# Patient Record
Sex: FEMALE | Race: WHITE | Employment: OTHER | URBAN - METROPOLITAN AREA
[De-identification: names, ages, dates, MRNs, and addresses within clinical notes are randomized per-mention and may not be internally consistent; named-entity substitution may affect disease eponyms.]

---

## 2018-02-16 ENCOUNTER — APPOINTMENT (OUTPATIENT)
Dept: GENERAL RADIOLOGY | Facility: HOSPITAL | Age: 78
End: 2018-02-16
Attending: EMERGENCY MEDICINE
Payer: MEDICARE

## 2018-02-16 ENCOUNTER — HOSPITAL ENCOUNTER (EMERGENCY)
Facility: HOSPITAL | Age: 78
Discharge: HOME | End: 2018-02-16
Attending: EMERGENCY MEDICINE
Payer: MEDICARE

## 2018-02-16 VITALS
DIASTOLIC BLOOD PRESSURE: 58 MMHG | SYSTOLIC BLOOD PRESSURE: 143 MMHG | HEIGHT: 58 IN | WEIGHT: 136 LBS | RESPIRATION RATE: 18 BRPM | HEART RATE: 100 BPM | OXYGEN SATURATION: 98 % | TEMPERATURE: 98 F | BODY MASS INDEX: 28.55 KG/M2

## 2018-02-16 DIAGNOSIS — S42.215A CLOSED NONDISPLACED FRACTURE OF SURGICAL NECK OF LEFT HUMERUS, UNSPECIFIED FRACTURE MORPHOLOGY, INITIAL ENCOUNTER: Primary | ICD-10-CM

## 2018-02-16 PROCEDURE — 99284 EMERGENCY DEPT VISIT MOD MDM: CPT

## 2018-02-16 PROCEDURE — 73030 X-RAY EXAM OF SHOULDER: CPT | Performed by: EMERGENCY MEDICINE

## 2018-02-16 RX ORDER — HYDROCODONE BITARTRATE AND ACETAMINOPHEN 5; 325 MG/1; MG/1
1 TABLET ORAL EVERY 6 HOURS PRN
Qty: 30 TABLET | Refills: 0 | Status: ON HOLD | OUTPATIENT
Start: 2018-02-16 | End: 2020-02-06

## 2018-02-16 RX ORDER — AMOXICILLIN 875 MG/1
875 TABLET, COATED ORAL 2 TIMES DAILY
Status: ON HOLD | COMMUNITY
End: 2020-01-31 | Stop reason: ALTCHOICE

## 2018-02-16 RX ORDER — HYDROCODONE BITARTRATE AND ACETAMINOPHEN 5; 325 MG/1; MG/1
1 TABLET ORAL ONCE
Status: COMPLETED | OUTPATIENT
Start: 2018-02-16 | End: 2018-02-16
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
## 2018-02-18 NOTE — ED PROVIDER NOTES
Patient Seen in: [facility] Emergency Department    History   Patient presents with:  Upper Extremity Injury (musculoskeletal)      HPI    Patient presents to the ED complaining of left shoulder pain.   The patient fell roughly 1 hour ago and injur Cardiovascular: Normal rate and intact distal pulses. Pulmonary/Chest: Effort normal. No stridor. No respiratory distress. Abdominal: Soft. She exhibits no distension. Musculoskeletal: She exhibits edema and tenderness. She exhibits no deformity. mildly laterally displaced, slightly impacted fracture through the surgical neck and slightly laterally displaced fracture fragments. 2. Primary osteoarthritis of the left acromioclavicular joint.          ED Medications Administered:   Medications   HYDRO as of 2/16/2018  7:25 PM    START taking these medications    HYDROcodone-acetaminophen 5-325 MG Oral Tab  Take 1 tablet by mouth every 6 (six) hours as needed for Pain., Print Script, Disp-30 tablet, R-0

## 2018-08-04 ENCOUNTER — APPOINTMENT (OUTPATIENT)
Dept: GENERAL RADIOLOGY | Facility: HOSPITAL | Age: 78
End: 2018-08-04
Payer: MEDICARE

## 2018-08-04 ENCOUNTER — HOSPITAL ENCOUNTER (EMERGENCY)
Facility: HOSPITAL | Age: 78
Discharge: HOME | End: 2018-08-04
Payer: MEDICARE

## 2018-08-04 VITALS
OXYGEN SATURATION: 96 % | BODY MASS INDEX: 28.34 KG/M2 | HEART RATE: 78 BPM | DIASTOLIC BLOOD PRESSURE: 70 MMHG | SYSTOLIC BLOOD PRESSURE: 134 MMHG | RESPIRATION RATE: 20 BRPM | HEIGHT: 58 IN | WEIGHT: 135 LBS | TEMPERATURE: 99 F

## 2018-08-04 DIAGNOSIS — T46.4X5A COUGH DUE TO ACE INHIBITOR: Primary | ICD-10-CM

## 2018-08-04 DIAGNOSIS — R05.8 COUGH DUE TO ACE INHIBITOR: Primary | ICD-10-CM

## 2018-08-04 PROCEDURE — 71046 X-RAY EXAM CHEST 2 VIEWS: CPT

## 2018-08-04 PROCEDURE — 99284 EMERGENCY DEPT VISIT MOD MDM: CPT

## 2018-08-04 PROCEDURE — 93005 ELECTROCARDIOGRAM TRACING: CPT

## 2018-08-04 PROCEDURE — 93010 ELECTROCARDIOGRAM REPORT: CPT | Performed by: INTERNAL MEDICINE

## 2018-08-04 NOTE — ED INITIAL ASSESSMENT (HPI)
Pt to ER with c/o non productive cough for about one week. Pt unsure of temp at home states she feels \"warm\". Pt c/o intermittent left chest aching with cough. Pt currently denies pain to left chest. +poor appetite. No respiratory distress noted.  97% on

## 2018-08-04 NOTE — ED PROVIDER NOTES
Patient Seen in: [facility] AND [facility] Emergency Department    History   Patient presents with:  Cough/URI    Stated Complaint: cough    HPI    Patient is an 57-year-old female who presents to the emergency department with a 3-4 week history of chronic dry c warm and dry. No rash noted. Nursing note and vitals reviewed. ED Course   Labs Reviewed - No data to display  EKG    Rate, intervals and axes as noted on EKG Report.   Rate: 81  Rhythm: Sinus Rhythm  Reading: Normal EKG with occasional PACs cont

## 2020-01-30 ENCOUNTER — HOSPITAL ENCOUNTER (INPATIENT)
Facility: HOSPITAL | Age: 80
LOS: 6 days | Discharge: NURSING, REHAB OR LONG-TERM CARE | DRG: 481 | End: 2020-02-06
Attending: EMERGENCY MEDICINE | Admitting: INTERNAL MEDICINE
Payer: MEDICARE

## 2020-01-30 ENCOUNTER — APPOINTMENT (OUTPATIENT)
Dept: GENERAL RADIOLOGY | Facility: HOSPITAL | Age: 80
DRG: 481 | End: 2020-01-30
Attending: EMERGENCY MEDICINE
Payer: MEDICARE

## 2020-01-30 DIAGNOSIS — S72.002A CLOSED FRACTURE OF LEFT HIP, INITIAL ENCOUNTER (HCC): Primary | ICD-10-CM

## 2020-01-30 PROBLEM — F32.9 MAJOR DEPRESSIVE DISORDER WITH SINGLE EPISODE: Status: ACTIVE | Noted: 2017-07-17

## 2020-01-30 LAB
ANION GAP SERPL CALC-SCNC: 9 MMOL/L (ref 0–18)
BASOPHILS # BLD AUTO: 0.03 X10(3) UL (ref 0–0.2)
BASOPHILS NFR BLD AUTO: 0.4 %
BUN BLD-MCNC: 74 MG/DL (ref 7–18)
BUN/CREAT SERPL: 26.3 (ref 10–20)
CALCIUM BLD-MCNC: 8.7 MG/DL (ref 8.5–10.1)
CHLORIDE SERPL-SCNC: 106 MMOL/L (ref 98–112)
CO2 SERPL-SCNC: 20 MMOL/L (ref 21–32)
CREAT BLD-MCNC: 2.81 MG/DL (ref 0.55–1.02)
DEPRECATED RDW RBC AUTO: 45.9 FL (ref 35.1–46.3)
EOSINOPHIL # BLD AUTO: 0.16 X10(3) UL (ref 0–0.7)
EOSINOPHIL NFR BLD AUTO: 2.3 %
ERYTHROCYTE [DISTWIDTH] IN BLOOD BY AUTOMATED COUNT: 14.4 % (ref 11–15)
GLUCOSE BLD-MCNC: 92 MG/DL (ref 70–99)
HCT VFR BLD AUTO: 25.3 % (ref 35–48)
HGB BLD-MCNC: 8.3 G/DL (ref 12–16)
IMM GRANULOCYTES # BLD AUTO: 0.01 X10(3) UL (ref 0–1)
IMM GRANULOCYTES NFR BLD: 0.1 %
LYMPHOCYTES # BLD AUTO: 1.66 X10(3) UL (ref 1–4)
LYMPHOCYTES NFR BLD AUTO: 23.5 %
MCH RBC QN AUTO: 28.8 PG (ref 26–34)
MCHC RBC AUTO-ENTMCNC: 32.8 G/DL (ref 31–37)
MCV RBC AUTO: 87.8 FL (ref 80–100)
MONOCYTES # BLD AUTO: 0.59 X10(3) UL (ref 0.1–1)
MONOCYTES NFR BLD AUTO: 8.4 %
NEUTROPHILS # BLD AUTO: 4.61 X10 (3) UL (ref 1.5–7.7)
NEUTROPHILS # BLD AUTO: 4.61 X10(3) UL (ref 1.5–7.7)
NEUTROPHILS NFR BLD AUTO: 65.3 %
OSMOLALITY SERPL CALC.SUM OF ELEC: 302 MOSM/KG (ref 275–295)
PLATELET # BLD AUTO: 169 10(3)UL (ref 150–450)
POTASSIUM SERPL-SCNC: 5 MMOL/L (ref 3.5–5.1)
RBC # BLD AUTO: 2.88 X10(6)UL (ref 3.8–5.3)
SODIUM SERPL-SCNC: 135 MMOL/L (ref 136–145)
WBC # BLD AUTO: 7.1 X10(3) UL (ref 4–11)

## 2020-01-30 PROCEDURE — 99285 EMERGENCY DEPT VISIT HI MDM: CPT

## 2020-01-30 PROCEDURE — 93010 ELECTROCARDIOGRAM REPORT: CPT | Performed by: EMERGENCY MEDICINE

## 2020-01-30 PROCEDURE — 80048 BASIC METABOLIC PNL TOTAL CA: CPT | Performed by: EMERGENCY MEDICINE

## 2020-01-30 PROCEDURE — 83036 HEMOGLOBIN GLYCOSYLATED A1C: CPT | Performed by: INTERNAL MEDICINE

## 2020-01-30 PROCEDURE — 93005 ELECTROCARDIOGRAM TRACING: CPT

## 2020-01-30 PROCEDURE — 85025 COMPLETE CBC W/AUTO DIFF WBC: CPT | Performed by: EMERGENCY MEDICINE

## 2020-01-30 PROCEDURE — 73502 X-RAY EXAM HIP UNI 2-3 VIEWS: CPT | Performed by: EMERGENCY MEDICINE

## 2020-01-30 PROCEDURE — 96374 THER/PROPH/DIAG INJ IV PUSH: CPT

## 2020-01-30 PROCEDURE — 87641 MR-STAPH DNA AMP PROBE: CPT | Performed by: EMERGENCY MEDICINE

## 2020-01-30 PROCEDURE — 73552 X-RAY EXAM OF FEMUR 2/>: CPT | Performed by: EMERGENCY MEDICINE

## 2020-01-30 PROCEDURE — 87640 STAPH A DNA AMP PROBE: CPT | Performed by: EMERGENCY MEDICINE

## 2020-01-30 PROCEDURE — 73560 X-RAY EXAM OF KNEE 1 OR 2: CPT | Performed by: EMERGENCY MEDICINE

## 2020-01-30 PROCEDURE — 96375 TX/PRO/DX INJ NEW DRUG ADDON: CPT

## 2020-01-30 RX ORDER — ONDANSETRON 2 MG/ML
4 INJECTION INTRAMUSCULAR; INTRAVENOUS ONCE
Status: COMPLETED | OUTPATIENT
Start: 2020-01-30 | End: 2020-01-30

## 2020-01-30 RX ORDER — CLOPIDOGREL BISULFATE 75 MG/1
75 TABLET ORAL DAILY
COMMUNITY

## 2020-01-30 RX ORDER — MORPHINE SULFATE 4 MG/ML
2 INJECTION, SOLUTION INTRAMUSCULAR; INTRAVENOUS ONCE
Status: COMPLETED | OUTPATIENT
Start: 2020-01-30 | End: 2020-01-30

## 2020-01-30 RX ORDER — MORPHINE SULFATE 4 MG/ML
4 INJECTION, SOLUTION INTRAMUSCULAR; INTRAVENOUS ONCE
Status: COMPLETED | OUTPATIENT
Start: 2020-01-30 | End: 2020-01-30

## 2020-01-30 RX ORDER — LOSARTAN POTASSIUM 25 MG/1
25 TABLET ORAL DAILY
Status: ON HOLD | COMMUNITY
End: 2020-02-06

## 2020-01-30 RX ORDER — ASPIRIN 81 MG/1
81 TABLET ORAL DAILY
Status: ON HOLD | COMMUNITY
End: 2020-02-06

## 2020-01-30 RX ORDER — SIMVASTATIN 20 MG
20 TABLET ORAL NIGHTLY
COMMUNITY

## 2020-01-30 RX ORDER — ESCITALOPRAM OXALATE 5 MG/1
5 TABLET ORAL DAILY
COMMUNITY

## 2020-01-30 RX ORDER — BUSPIRONE HYDROCHLORIDE 15 MG/1
15 TABLET ORAL 3 TIMES DAILY
COMMUNITY

## 2020-01-31 ENCOUNTER — ANESTHESIA (OUTPATIENT)
Dept: SURGERY | Facility: HOSPITAL | Age: 80
DRG: 481 | End: 2020-01-31
Payer: MEDICARE

## 2020-01-31 ENCOUNTER — APPOINTMENT (OUTPATIENT)
Dept: GENERAL RADIOLOGY | Facility: HOSPITAL | Age: 80
DRG: 481 | End: 2020-01-31
Attending: ORTHOPAEDIC SURGERY
Payer: MEDICARE

## 2020-01-31 ENCOUNTER — ANESTHESIA EVENT (OUTPATIENT)
Dept: SURGERY | Facility: HOSPITAL | Age: 80
DRG: 481 | End: 2020-01-31
Payer: MEDICARE

## 2020-01-31 PROBLEM — S72.002A CLOSED FRACTURE OF LEFT HIP, INITIAL ENCOUNTER (HCC): Status: ACTIVE | Noted: 2020-01-31

## 2020-01-31 LAB
ANION GAP SERPL CALC-SCNC: 5 MMOL/L (ref 0–18)
ANION GAP SERPL CALC-SCNC: 6 MMOL/L (ref 0–18)
ANION GAP SERPL CALC-SCNC: 7 MMOL/L (ref 0–18)
ANTIBODY SCREEN: NEGATIVE
BASOPHILS # BLD AUTO: 0.03 X10(3) UL (ref 0–0.2)
BASOPHILS NFR BLD AUTO: 0.6 %
BILIRUB UR QL: NEGATIVE
BUN BLD-MCNC: 71 MG/DL (ref 7–18)
BUN BLD-MCNC: 77 MG/DL (ref 7–18)
BUN BLD-MCNC: 79 MG/DL (ref 7–18)
BUN/CREAT SERPL: 23.2 (ref 10–20)
BUN/CREAT SERPL: 25.5 (ref 10–20)
BUN/CREAT SERPL: 27.4 (ref 10–20)
CALCIUM BLD-MCNC: 7.8 MG/DL (ref 8.5–10.1)
CALCIUM BLD-MCNC: 8.1 MG/DL (ref 8.5–10.1)
CALCIUM BLD-MCNC: 8.3 MG/DL (ref 8.5–10.1)
CHLORIDE SERPL-SCNC: 107 MMOL/L (ref 98–112)
CHLORIDE SERPL-SCNC: 109 MMOL/L (ref 98–112)
CHLORIDE SERPL-SCNC: 112 MMOL/L (ref 98–112)
CO2 SERPL-SCNC: 20 MMOL/L (ref 21–32)
CO2 SERPL-SCNC: 21 MMOL/L (ref 21–32)
CO2 SERPL-SCNC: 22 MMOL/L (ref 21–32)
COLOR UR: YELLOW
CREAT BLD-MCNC: 2.88 MG/DL (ref 0.55–1.02)
CREAT BLD-MCNC: 3.02 MG/DL (ref 0.55–1.02)
CREAT BLD-MCNC: 3.06 MG/DL (ref 0.55–1.02)
DEPRECATED RDW RBC AUTO: 46.6 FL (ref 35.1–46.3)
EOSINOPHIL # BLD AUTO: 0.15 X10(3) UL (ref 0–0.7)
EOSINOPHIL NFR BLD AUTO: 2.9 %
ERYTHROCYTE [DISTWIDTH] IN BLOOD BY AUTOMATED COUNT: 14.6 % (ref 11–15)
EST. AVERAGE GLUCOSE BLD GHB EST-MCNC: 137 MG/DL (ref 68–126)
GLUCOSE BLD-MCNC: 116 MG/DL (ref 70–99)
GLUCOSE BLD-MCNC: 125 MG/DL (ref 70–99)
GLUCOSE BLD-MCNC: 148 MG/DL (ref 70–99)
GLUCOSE BLDC GLUCOMTR-MCNC: 128 MG/DL (ref 70–99)
GLUCOSE BLDC GLUCOMTR-MCNC: 139 MG/DL (ref 70–99)
GLUCOSE BLDC GLUCOMTR-MCNC: 145 MG/DL (ref 70–99)
GLUCOSE BLDC GLUCOMTR-MCNC: 164 MG/DL (ref 70–99)
GLUCOSE BLDC GLUCOMTR-MCNC: 271 MG/DL (ref 70–99)
GLUCOSE BLDC GLUCOMTR-MCNC: 88 MG/DL (ref 70–99)
GLUCOSE UR-MCNC: NEGATIVE MG/DL
HAV IGM SER QL: 2.2 MG/DL (ref 1.6–2.6)
HBA1C MFR BLD HPLC: 6.4 % (ref ?–5.7)
HCT VFR BLD AUTO: 20.9 % (ref 35–48)
HGB BLD-MCNC: 6.9 G/DL (ref 12–16)
HGB BLD-MCNC: 6.9 G/DL (ref 12–16)
HGB BLD-MCNC: 8.5 G/DL (ref 12–16)
IMM GRANULOCYTES # BLD AUTO: 0.02 X10(3) UL (ref 0–1)
IMM GRANULOCYTES NFR BLD: 0.4 %
KETONES UR-MCNC: NEGATIVE MG/DL
LYMPHOCYTES # BLD AUTO: 1.69 X10(3) UL (ref 1–4)
LYMPHOCYTES NFR BLD AUTO: 32.8 %
MCH RBC QN AUTO: 29.2 PG (ref 26–34)
MCHC RBC AUTO-ENTMCNC: 33 G/DL (ref 31–37)
MCV RBC AUTO: 88.6 FL (ref 80–100)
MONOCYTES # BLD AUTO: 0.41 X10(3) UL (ref 0.1–1)
MONOCYTES NFR BLD AUTO: 7.9 %
MRSA NASAL: NEGATIVE
NEUTROPHILS # BLD AUTO: 2.86 X10 (3) UL (ref 1.5–7.7)
NEUTROPHILS # BLD AUTO: 2.86 X10(3) UL (ref 1.5–7.7)
NEUTROPHILS NFR BLD AUTO: 55.4 %
NITRITE UR QL STRIP.AUTO: NEGATIVE
OSMOLALITY SERPL CALC.SUM OF ELEC: 305 MOSM/KG (ref 275–295)
OSMOLALITY SERPL CALC.SUM OF ELEC: 306 MOSM/KG (ref 275–295)
OSMOLALITY SERPL CALC.SUM OF ELEC: 310 MOSM/KG (ref 275–295)
PH UR: 5 [PH] (ref 5–8)
PLATELET # BLD AUTO: 147 10(3)UL (ref 150–450)
POTASSIUM SERPL-SCNC: 4.8 MMOL/L (ref 3.5–5.1)
POTASSIUM SERPL-SCNC: 5.2 MMOL/L (ref 3.5–5.1)
POTASSIUM SERPL-SCNC: 5.2 MMOL/L (ref 3.5–5.1)
POTASSIUM SERPL-SCNC: 5.4 MMOL/L (ref 3.5–5.1)
PROT UR-MCNC: 100 MG/DL
PTH-INTACT SERPL-MCNC: 116.1 PG/ML (ref 18.5–88)
RBC # BLD AUTO: 2.36 X10(6)UL (ref 3.8–5.3)
RBC #/AREA URNS AUTO: 6 /HPF
RH BLOOD TYPE: POSITIVE
SODIUM SERPL-SCNC: 135 MMOL/L (ref 136–145)
SODIUM SERPL-SCNC: 137 MMOL/L (ref 136–145)
SODIUM SERPL-SCNC: 137 MMOL/L (ref 136–145)
SP GR UR STRIP: 1.01 (ref 1–1.03)
STAPH A BY PCR: NEGATIVE
UROBILINOGEN UR STRIP-ACNC: <2
WBC # BLD AUTO: 5.2 X10(3) UL (ref 4–11)
WBC #/AREA URNS AUTO: 142 /HPF

## 2020-01-31 PROCEDURE — 86920 COMPATIBILITY TEST SPIN: CPT

## 2020-01-31 PROCEDURE — 30233N1 TRANSFUSION OF NONAUTOLOGOUS RED BLOOD CELLS INTO PERIPHERAL VEIN, PERCUTANEOUS APPROACH: ICD-10-PCS | Performed by: INTERNAL MEDICINE

## 2020-01-31 PROCEDURE — 81001 URINALYSIS AUTO W/SCOPE: CPT | Performed by: EMERGENCY MEDICINE

## 2020-01-31 PROCEDURE — 85060 BLOOD SMEAR INTERPRETATION: CPT | Performed by: INTERNAL MEDICINE

## 2020-01-31 PROCEDURE — 73501 X-RAY EXAM HIP UNI 1 VIEW: CPT | Performed by: ORTHOPAEDIC SURGERY

## 2020-01-31 PROCEDURE — 0QS736Z REPOSITION LEFT UPPER FEMUR WITH INTRAMEDULLARY INTERNAL FIXATION DEVICE, PERCUTANEOUS APPROACH: ICD-10-PCS | Performed by: ORTHOPAEDIC SURGERY

## 2020-01-31 PROCEDURE — 80048 BASIC METABOLIC PNL TOTAL CA: CPT | Performed by: INTERNAL MEDICINE

## 2020-01-31 PROCEDURE — 82962 GLUCOSE BLOOD TEST: CPT

## 2020-01-31 PROCEDURE — 87086 URINE CULTURE/COLONY COUNT: CPT | Performed by: EMERGENCY MEDICINE

## 2020-01-31 PROCEDURE — 85018 HEMOGLOBIN: CPT | Performed by: INTERNAL MEDICINE

## 2020-01-31 PROCEDURE — 83970 ASSAY OF PARATHORMONE: CPT | Performed by: INTERNAL MEDICINE

## 2020-01-31 PROCEDURE — 86901 BLOOD TYPING SEROLOGIC RH(D): CPT | Performed by: INTERNAL MEDICINE

## 2020-01-31 PROCEDURE — 76000 FLUOROSCOPY <1 HR PHYS/QHP: CPT | Performed by: ORTHOPAEDIC SURGERY

## 2020-01-31 PROCEDURE — 85025 COMPLETE CBC W/AUTO DIFF WBC: CPT | Performed by: INTERNAL MEDICINE

## 2020-01-31 PROCEDURE — 87077 CULTURE AEROBIC IDENTIFY: CPT | Performed by: EMERGENCY MEDICINE

## 2020-01-31 PROCEDURE — 86900 BLOOD TYPING SEROLOGIC ABO: CPT | Performed by: INTERNAL MEDICINE

## 2020-01-31 PROCEDURE — 84132 ASSAY OF SERUM POTASSIUM: CPT | Performed by: INTERNAL MEDICINE

## 2020-01-31 PROCEDURE — 86850 RBC ANTIBODY SCREEN: CPT | Performed by: INTERNAL MEDICINE

## 2020-01-31 PROCEDURE — 82306 VITAMIN D 25 HYDROXY: CPT | Performed by: INTERNAL MEDICINE

## 2020-01-31 PROCEDURE — 83735 ASSAY OF MAGNESIUM: CPT | Performed by: INTERNAL MEDICINE

## 2020-01-31 PROCEDURE — 87186 SC STD MICRODIL/AGAR DIL: CPT | Performed by: EMERGENCY MEDICINE

## 2020-01-31 DEVICE — SCREW BN 5MM 4.3MM 36MM NLEX: Type: IMPLANTABLE DEVICE | Site: FEMUR | Status: FUNCTIONAL

## 2020-01-31 RX ORDER — SODIUM PHOSPHATE, DIBASIC AND SODIUM PHOSPHATE, MONOBASIC 7; 19 G/133ML; G/133ML
1 ENEMA RECTAL ONCE AS NEEDED
Status: DISCONTINUED | OUTPATIENT
Start: 2020-01-31 | End: 2020-02-06

## 2020-01-31 RX ORDER — NEOSTIGMINE METHYLSULFATE 0.5 MG/ML
INJECTION INTRAVENOUS AS NEEDED
Status: DISCONTINUED | OUTPATIENT
Start: 2020-01-31 | End: 2020-01-31 | Stop reason: SURG

## 2020-01-31 RX ORDER — ONDANSETRON 2 MG/ML
4 INJECTION INTRAMUSCULAR; INTRAVENOUS ONCE AS NEEDED
Status: DISCONTINUED | OUTPATIENT
Start: 2020-01-31 | End: 2020-01-31 | Stop reason: HOSPADM

## 2020-01-31 RX ORDER — SODIUM CHLORIDE 0.9 % (FLUSH) 0.9 %
3 SYRINGE (ML) INJECTION AS NEEDED
Status: DISCONTINUED | OUTPATIENT
Start: 2020-01-31 | End: 2020-02-06

## 2020-01-31 RX ORDER — HYDROCODONE BITARTRATE AND ACETAMINOPHEN 5; 325 MG/1; MG/1
2 TABLET ORAL AS NEEDED
Status: DISCONTINUED | OUTPATIENT
Start: 2020-01-31 | End: 2020-01-31 | Stop reason: HOSPADM

## 2020-01-31 RX ORDER — BISACODYL 10 MG
10 SUPPOSITORY, RECTAL RECTAL
Status: DISCONTINUED | OUTPATIENT
Start: 2020-01-31 | End: 2020-01-31 | Stop reason: ALTCHOICE

## 2020-01-31 RX ORDER — DEXAMETHASONE SODIUM PHOSPHATE 4 MG/ML
VIAL (ML) INJECTION AS NEEDED
Status: DISCONTINUED | OUTPATIENT
Start: 2020-01-31 | End: 2020-01-31

## 2020-01-31 RX ORDER — HYDROMORPHONE HYDROCHLORIDE 1 MG/ML
0.2 INJECTION, SOLUTION INTRAMUSCULAR; INTRAVENOUS; SUBCUTANEOUS EVERY 5 MIN PRN
Status: DISCONTINUED | OUTPATIENT
Start: 2020-01-31 | End: 2020-01-31 | Stop reason: HOSPADM

## 2020-01-31 RX ORDER — HYDROCODONE BITARTRATE AND ACETAMINOPHEN 5; 325 MG/1; MG/1
1 TABLET ORAL EVERY 4 HOURS PRN
Status: DISCONTINUED | OUTPATIENT
Start: 2020-01-31 | End: 2020-02-06

## 2020-01-31 RX ORDER — MORPHINE SULFATE 4 MG/ML
4 INJECTION, SOLUTION INTRAMUSCULAR; INTRAVENOUS EVERY 2 HOUR PRN
Status: DISCONTINUED | OUTPATIENT
Start: 2020-01-31 | End: 2020-02-01

## 2020-01-31 RX ORDER — MORPHINE SULFATE 2 MG/ML
1 INJECTION, SOLUTION INTRAMUSCULAR; INTRAVENOUS EVERY 2 HOUR PRN
Status: DISCONTINUED | OUTPATIENT
Start: 2020-01-31 | End: 2020-02-01

## 2020-01-31 RX ORDER — MORPHINE SULFATE 2 MG/ML
2 INJECTION, SOLUTION INTRAMUSCULAR; INTRAVENOUS EVERY 2 HOUR PRN
Status: DISCONTINUED | OUTPATIENT
Start: 2020-01-31 | End: 2020-02-01

## 2020-01-31 RX ORDER — ROCURONIUM BROMIDE 10 MG/ML
INJECTION, SOLUTION INTRAVENOUS AS NEEDED
Status: DISCONTINUED | OUTPATIENT
Start: 2020-01-31 | End: 2020-01-31 | Stop reason: SURG

## 2020-01-31 RX ORDER — ESCITALOPRAM OXALATE 10 MG/1
5 TABLET ORAL DAILY
Status: DISCONTINUED | OUTPATIENT
Start: 2020-01-31 | End: 2020-02-06

## 2020-01-31 RX ORDER — BISACODYL 10 MG
10 SUPPOSITORY, RECTAL RECTAL
Status: DISCONTINUED | OUTPATIENT
Start: 2020-01-31 | End: 2020-02-06

## 2020-01-31 RX ORDER — SODIUM CHLORIDE 9 MG/ML
INJECTION, SOLUTION INTRAVENOUS CONTINUOUS
Status: DISCONTINUED | OUTPATIENT
Start: 2020-01-31 | End: 2020-01-31

## 2020-01-31 RX ORDER — HYDROCODONE BITARTRATE AND ACETAMINOPHEN 5; 325 MG/1; MG/1
2 TABLET ORAL EVERY 4 HOURS PRN
Status: DISCONTINUED | OUTPATIENT
Start: 2020-01-31 | End: 2020-01-31

## 2020-01-31 RX ORDER — ONDANSETRON 2 MG/ML
4 INJECTION INTRAMUSCULAR; INTRAVENOUS EVERY 6 HOURS PRN
Status: DISCONTINUED | OUTPATIENT
Start: 2020-01-31 | End: 2020-02-06

## 2020-01-31 RX ORDER — DEXTROSE AND SODIUM CHLORIDE 5; .9 G/100ML; G/100ML
INJECTION, SOLUTION INTRAVENOUS CONTINUOUS
Status: DISCONTINUED | OUTPATIENT
Start: 2020-01-31 | End: 2020-02-01

## 2020-01-31 RX ORDER — DEXTROSE MONOHYDRATE 25 G/50ML
50 INJECTION, SOLUTION INTRAVENOUS AS NEEDED
Status: DISCONTINUED | OUTPATIENT
Start: 2020-01-31 | End: 2020-02-01

## 2020-01-31 RX ORDER — NALOXONE HYDROCHLORIDE 0.4 MG/ML
80 INJECTION, SOLUTION INTRAMUSCULAR; INTRAVENOUS; SUBCUTANEOUS AS NEEDED
Status: DISCONTINUED | OUTPATIENT
Start: 2020-01-31 | End: 2020-01-31 | Stop reason: HOSPADM

## 2020-01-31 RX ORDER — BUSPIRONE HYDROCHLORIDE 15 MG/1
15 TABLET ORAL 3 TIMES DAILY
Status: DISCONTINUED | OUTPATIENT
Start: 2020-01-31 | End: 2020-02-06

## 2020-01-31 RX ORDER — MORPHINE SULFATE 4 MG/ML
2 INJECTION, SOLUTION INTRAMUSCULAR; INTRAVENOUS EVERY 10 MIN PRN
Status: DISCONTINUED | OUTPATIENT
Start: 2020-01-31 | End: 2020-01-31 | Stop reason: HOSPADM

## 2020-01-31 RX ORDER — SODIUM CHLORIDE, SODIUM LACTATE, POTASSIUM CHLORIDE, CALCIUM CHLORIDE 600; 310; 30; 20 MG/100ML; MG/100ML; MG/100ML; MG/100ML
INJECTION, SOLUTION INTRAVENOUS CONTINUOUS
Status: DISCONTINUED | OUTPATIENT
Start: 2020-01-31 | End: 2020-01-31 | Stop reason: HOSPADM

## 2020-01-31 RX ORDER — POLYETHYLENE GLYCOL 3350 17 G/17G
17 POWDER, FOR SOLUTION ORAL DAILY PRN
Status: DISCONTINUED | OUTPATIENT
Start: 2020-01-31 | End: 2020-02-06

## 2020-01-31 RX ORDER — PROCHLORPERAZINE EDISYLATE 5 MG/ML
5 INJECTION INTRAMUSCULAR; INTRAVENOUS ONCE AS NEEDED
Status: DISCONTINUED | OUTPATIENT
Start: 2020-01-31 | End: 2020-01-31 | Stop reason: HOSPADM

## 2020-01-31 RX ORDER — HALOPERIDOL 5 MG/ML
0.25 INJECTION INTRAMUSCULAR ONCE AS NEEDED
Status: DISCONTINUED | OUTPATIENT
Start: 2020-01-31 | End: 2020-01-31 | Stop reason: HOSPADM

## 2020-01-31 RX ORDER — SODIUM CHLORIDE 9 MG/ML
INJECTION, SOLUTION INTRAVENOUS CONTINUOUS PRN
Status: DISCONTINUED | OUTPATIENT
Start: 2020-01-31 | End: 2020-01-31 | Stop reason: SURG

## 2020-01-31 RX ORDER — MORPHINE SULFATE 10 MG/ML
6 INJECTION, SOLUTION INTRAMUSCULAR; INTRAVENOUS EVERY 10 MIN PRN
Status: DISCONTINUED | OUTPATIENT
Start: 2020-01-31 | End: 2020-01-31 | Stop reason: HOSPADM

## 2020-01-31 RX ORDER — GLYCOPYRROLATE 0.2 MG/ML
INJECTION INTRAMUSCULAR; INTRAVENOUS AS NEEDED
Status: DISCONTINUED | OUTPATIENT
Start: 2020-01-31 | End: 2020-01-31 | Stop reason: SURG

## 2020-01-31 RX ORDER — SENNOSIDES 8.6 MG
17.2 TABLET ORAL NIGHTLY
Status: DISCONTINUED | OUTPATIENT
Start: 2020-01-31 | End: 2020-02-06

## 2020-01-31 RX ORDER — POLYETHYLENE GLYCOL 3350 17 G/17G
17 POWDER, FOR SOLUTION ORAL DAILY
Status: DISCONTINUED | OUTPATIENT
Start: 2020-01-31 | End: 2020-02-06

## 2020-01-31 RX ORDER — HYDROMORPHONE HYDROCHLORIDE 1 MG/ML
0.4 INJECTION, SOLUTION INTRAMUSCULAR; INTRAVENOUS; SUBCUTANEOUS EVERY 5 MIN PRN
Status: DISCONTINUED | OUTPATIENT
Start: 2020-01-31 | End: 2020-01-31 | Stop reason: HOSPADM

## 2020-01-31 RX ORDER — ACETAMINOPHEN 325 MG/1
650 TABLET ORAL EVERY 4 HOURS PRN
Status: DISCONTINUED | OUTPATIENT
Start: 2020-01-31 | End: 2020-02-06

## 2020-01-31 RX ORDER — ASPIRIN 81 MG/1
81 TABLET ORAL DAILY
Status: DISCONTINUED | OUTPATIENT
Start: 2020-01-31 | End: 2020-01-31

## 2020-01-31 RX ORDER — SODIUM CHLORIDE 9 MG/ML
INJECTION, SOLUTION INTRAVENOUS CONTINUOUS
Status: DISCONTINUED | OUTPATIENT
Start: 2020-01-31 | End: 2020-02-01

## 2020-01-31 RX ORDER — HYDROMORPHONE HYDROCHLORIDE 1 MG/ML
0.6 INJECTION, SOLUTION INTRAMUSCULAR; INTRAVENOUS; SUBCUTANEOUS EVERY 5 MIN PRN
Status: DISCONTINUED | OUTPATIENT
Start: 2020-01-31 | End: 2020-01-31 | Stop reason: HOSPADM

## 2020-01-31 RX ORDER — HYDROMORPHONE HYDROCHLORIDE 1 MG/ML
0.2 INJECTION, SOLUTION INTRAMUSCULAR; INTRAVENOUS; SUBCUTANEOUS EVERY 2 HOUR PRN
Status: DISCONTINUED | OUTPATIENT
Start: 2020-01-31 | End: 2020-02-06

## 2020-01-31 RX ORDER — DOCUSATE SODIUM 100 MG/1
100 CAPSULE, LIQUID FILLED ORAL 2 TIMES DAILY
Status: DISCONTINUED | OUTPATIENT
Start: 2020-01-31 | End: 2020-02-06

## 2020-01-31 RX ORDER — ONDANSETRON 2 MG/ML
INJECTION INTRAMUSCULAR; INTRAVENOUS AS NEEDED
Status: DISCONTINUED | OUTPATIENT
Start: 2020-01-31 | End: 2020-01-31 | Stop reason: SURG

## 2020-01-31 RX ORDER — HYDROCODONE BITARTRATE AND ACETAMINOPHEN 5; 325 MG/1; MG/1
1 TABLET ORAL EVERY 4 HOURS PRN
Status: DISCONTINUED | OUTPATIENT
Start: 2020-01-31 | End: 2020-01-31

## 2020-01-31 RX ORDER — SENNA AND DOCUSATE SODIUM 50; 8.6 MG/1; MG/1
2 TABLET, FILM COATED ORAL DAILY
Status: DISCONTINUED | OUTPATIENT
Start: 2020-01-31 | End: 2020-02-01

## 2020-01-31 RX ORDER — SODIUM CHLORIDE 9 MG/ML
INJECTION, SOLUTION INTRAVENOUS ONCE
Status: COMPLETED | OUTPATIENT
Start: 2020-01-31 | End: 2020-01-31

## 2020-01-31 RX ORDER — HYDROMORPHONE HYDROCHLORIDE 1 MG/ML
0.4 INJECTION, SOLUTION INTRAMUSCULAR; INTRAVENOUS; SUBCUTANEOUS EVERY 2 HOUR PRN
Status: DISCONTINUED | OUTPATIENT
Start: 2020-01-31 | End: 2020-02-06

## 2020-01-31 RX ORDER — HYDROCODONE BITARTRATE AND ACETAMINOPHEN 5; 325 MG/1; MG/1
2 TABLET ORAL EVERY 4 HOURS PRN
Status: DISCONTINUED | OUTPATIENT
Start: 2020-01-31 | End: 2020-02-06

## 2020-01-31 RX ORDER — PHENYLEPHRINE HCL 10 MG/ML
VIAL (ML) INJECTION AS NEEDED
Status: DISCONTINUED | OUTPATIENT
Start: 2020-01-31 | End: 2020-01-31 | Stop reason: SURG

## 2020-01-31 RX ORDER — CEFAZOLIN SODIUM/WATER 2 G/20 ML
2 SYRINGE (ML) INTRAVENOUS EVERY 8 HOURS
Status: COMPLETED | OUTPATIENT
Start: 2020-01-31 | End: 2020-02-01

## 2020-01-31 RX ORDER — ESCITALOPRAM OXALATE 10 MG/1
5 TABLET ORAL DAILY
Status: DISCONTINUED | OUTPATIENT
Start: 2020-01-31 | End: 2020-01-31

## 2020-01-31 RX ORDER — ACETAMINOPHEN 325 MG/1
650 TABLET ORAL EVERY 4 HOURS PRN
Status: DISCONTINUED | OUTPATIENT
Start: 2020-01-31 | End: 2020-01-31

## 2020-01-31 RX ORDER — HYDROMORPHONE HYDROCHLORIDE 1 MG/ML
0.8 INJECTION, SOLUTION INTRAMUSCULAR; INTRAVENOUS; SUBCUTANEOUS EVERY 2 HOUR PRN
Status: DISCONTINUED | OUTPATIENT
Start: 2020-01-31 | End: 2020-02-01

## 2020-01-31 RX ORDER — MORPHINE SULFATE 4 MG/ML
4 INJECTION, SOLUTION INTRAMUSCULAR; INTRAVENOUS EVERY 10 MIN PRN
Status: DISCONTINUED | OUTPATIENT
Start: 2020-01-31 | End: 2020-01-31 | Stop reason: HOSPADM

## 2020-01-31 RX ORDER — SODIUM CHLORIDE 0.9 % (FLUSH) 0.9 %
10 SYRINGE (ML) INJECTION AS NEEDED
Status: DISCONTINUED | OUTPATIENT
Start: 2020-01-31 | End: 2020-02-06

## 2020-01-31 RX ORDER — HYDROCODONE BITARTRATE AND ACETAMINOPHEN 5; 325 MG/1; MG/1
1 TABLET ORAL AS NEEDED
Status: DISCONTINUED | OUTPATIENT
Start: 2020-01-31 | End: 2020-01-31 | Stop reason: HOSPADM

## 2020-01-31 RX ORDER — ENOXAPARIN SODIUM 100 MG/ML
30 INJECTION SUBCUTANEOUS 2 TIMES DAILY
Status: DISCONTINUED | OUTPATIENT
Start: 2020-02-01 | End: 2020-01-31

## 2020-01-31 RX ADMIN — GLYCOPYRROLATE 0.2 MG: 0.2 INJECTION INTRAMUSCULAR; INTRAVENOUS at 15:56:00

## 2020-01-31 RX ADMIN — ROCURONIUM BROMIDE 20 MG: 10 INJECTION, SOLUTION INTRAVENOUS at 14:22:00

## 2020-01-31 RX ADMIN — SODIUM CHLORIDE: 9 INJECTION, SOLUTION INTRAVENOUS at 14:14:00

## 2020-01-31 RX ADMIN — SODIUM CHLORIDE: 9 INJECTION, SOLUTION INTRAVENOUS at 14:31:00

## 2020-01-31 RX ADMIN — ONDANSETRON 4 MG: 2 INJECTION INTRAMUSCULAR; INTRAVENOUS at 15:36:00

## 2020-01-31 RX ADMIN — PHENYLEPHRINE HCL 100 MCG: 10 MG/ML VIAL (ML) INJECTION at 15:13:00

## 2020-01-31 RX ADMIN — PHENYLEPHRINE HCL 100 MCG: 10 MG/ML VIAL (ML) INJECTION at 14:27:00

## 2020-01-31 RX ADMIN — PHENYLEPHRINE HCL 100 MCG: 10 MG/ML VIAL (ML) INJECTION at 15:33:00

## 2020-01-31 RX ADMIN — SODIUM CHLORIDE: 9 INJECTION, SOLUTION INTRAVENOUS at 15:38:00

## 2020-01-31 RX ADMIN — SODIUM CHLORIDE: 9 INJECTION, SOLUTION INTRAVENOUS at 16:12:00

## 2020-01-31 RX ADMIN — SODIUM CHLORIDE: 9 INJECTION, SOLUTION INTRAVENOUS at 15:41:00

## 2020-01-31 RX ADMIN — NEOSTIGMINE METHYLSULFATE 1 MG: 0.5 INJECTION INTRAVENOUS at 15:56:00

## 2020-01-31 RX ADMIN — PHENYLEPHRINE HCL 100 MCG: 10 MG/ML VIAL (ML) INJECTION at 14:53:00

## 2020-01-31 NOTE — ANESTHESIA PROCEDURE NOTES
Airway  Date/Time: 1/31/2020 2:24 PM  Urgency: Elective    Airway not difficult    General Information and Staff    Patient location during procedure: OR  Anesthesiologist: Kylah Dan MD  Resident/CRNA: Alcon Mcdermott CRNA  Performed: CRNA     In

## 2020-01-31 NOTE — ANESTHESIA POSTPROCEDURE EVALUATION
Patient:  Carol Benitez    Procedure Summary     Date:  01/31/20 Room / Location:  [facility] OR 09 / [facility] OR    Anesthesia Start:  0141 Anesthesia Stop:      Procedure:  FEMUR IM NAIL (Left Hip) Diagnosis:       Intertrochanteric fracture of left hip ([address]

## 2020-01-31 NOTE — BRIEF OP NOTE
Pre-Operative Diagnosis: Intertrochanteric fracture of left hip ([facility]) [S72.142A]     Post-Operative Diagnosis: Intertrochanteric fracture of left hip ([address] [X52.142A]      Procedure Performed:   Procedure(s):  LEFT HIP CLOSED REDUCTION, INTRAMEDULLARY NAIL

## 2020-01-31 NOTE — PLAN OF CARE
Patient received from ED with acute left hip fracture. Also noted with DEBO. Fluids initiated. Dilaudid administered PRN for pain. Denies nausea. NPO status maintained for possible surgery today. Vitals WNL.        Problem: Diabetes/Glucose Control  Goal: Gl assessment.  - Educate pt/family on patient safety including physical limitations  - Instruct pt to call for assistance with activity based on assessment  - Modify environment to reduce risk of injury  - Provide assistive devices as appropriate  - Consider

## 2020-01-31 NOTE — H&P
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, NHL, PVD who presented after Medical History:   Diagnosis Date   • Anxiety    • Essential hypertension    • Hyperlipidemia    • Type 1 diabetes mellitus (HCC)         PSH  Past Surgical History:   Procedure Laterality Date   • APPENDECTOMY     • TOTAL ABDOM HYSTERECTOMY          ALL: CBC/Chem    Recent Labs   Lab 01/30/20  2259 01/31/20  0512 01/31/20  0655   RBC 2.88* 2.36*  --    HGB 8.3* 6.9* 6.9*   HCT 25.3* 20.9*  --    MCV 87.8 88.6  --    MCH 28.8 29.2  --    MCHC 32.8 33.0  --    RDW 14.4 14.6  --    NEPRELIM 4.61 2.86  -- was submitted by the [facility] teleradiologist and there are no significant discrepancies.   Dictated by (CST): Jazz Calixto MD on 1/31/2020 at 6:10     Approved by (CST): Jazz Calixto MD on 1/31/2020 at 6:11

## 2020-01-31 NOTE — ED PROVIDER NOTES
Patient Seen in: [facility] Emergency Department    History   Patient presents with:  Fall      HPI    Patient presents to the ED after falling last night due to tripping over her dog. She states that she hit her left hip.   Denies hitting her hea distress. Abdominal: Soft. She exhibits no distension. Musculoskeletal:         General: Tenderness and deformity present. No edema. Comments: Severe tenderness to the left hip and shortening of the left leg with mild external rotation.   Neurovascul Available and Reviewed while in ED:     ED Medications Administered:   Medications   ondansetron HCl (ZOFRAN) injection 4 mg (4 mg Intravenous Given 1/30/20 2320)   morphINE sulfate (PF) 4 MG/ML injection 4 mg (4 mg Intravenous Given 1/30/20 2320)   giovaniI

## 2020-01-31 NOTE — CM/SW NOTE
JAYASHREE met with the pt. At bedside. The pt. Lives with her nephew and his wife in a 2 level home with a basement. There are 4 steps to enter and the pt. Resides on the main level. The pt.  Reports being independent prior to admission with adls and ambulation

## 2020-01-31 NOTE — ANESTHESIA PREPROCEDURE EVALUATION
Anesthesia PreOp Note    HPI:     Gabrielle Steward is a 78year old female who presents for preoperative consultation requested by: Elana Loving MD    Date of Surgery: 1/30/2020 - 1/31/2020    Procedure(s):   FEMUR IM NAIL  Indication: Intertrochanteric fr Taking  insulin glargine 100 UNIT/ML Subcutaneous Solution, Inject 5 Units into the skin nightly., Disp: , Rfl: , Taking  aspirin 81 MG Oral Tab EC, Take 81 mg by mouth daily. , Disp: , Rfl: , Taking  escitalopram 5 MG Oral Tab, Take 5 mg by mouth daily. , D packet 17 g, 17 g, Oral, Daily, Malon Area, DO  Senna-Docusate Sodium (SENOKOT S) 8.6-50 MG tab 2 tablet, 2 tablet, Oral, Daily, Guicho Perez,   bisacodyl (DULCOLAX) rectal suppository 10 mg, 10 mg, Rectal, Daily PRN, Malon Area, DO  dext status: Not on file      Intimate partner violence:        Fear of current or ex partner: Not on file        Emotionally abused: Not on file        Physically abused: Not on file        Forced sexual activity: Not on file    Other Topics      Concerns: Patient  Discussed plan with:  CRNA      I have informed Guru Alvarado and/or legal guardian or family member of the nature of the anesthetic plan, benefits, risks including possible dental damage if relevant, major complications, and any alternative forms

## 2020-01-31 NOTE — OPERATIVE REPORT
[facility]    PATIENT'S NAME: Keely Duffy   ATTENDING PHYSICIAN: Kassandra Guerra MD   OPERATING PHYSICIAN: Jaden Hu MD   PATIENT ACCOUNT#:   [id number]    LOCATION:  [facility] PACU 6 [facility] 10  MEDICAL RECORD #:   [id number]       DATE OF BI surgical prophylaxis. She was transferred to the fracture table. With closed reduction manipulation of her left hip, closed reduction of her left intertrochanteric hip fracture was performed.   The patient's left lower extremity was prepped and draped in her questions were answered. She was in agreement with the treatment plan.     Dictated By Jenny Riggs MD  d: 01/31/2020 15:54:56  t: 01/31/2020 16:55:33  [facility] [id number]  JEL/

## 2020-01-31 NOTE — CONSULTS
1/30/2020  Alva Rodriguez  91/1940  78year old   female    I was asked in consultation by Dr. Pat Rodriguez to see and evaluate Alva Rodriguez regarding her left hip pain.     HPI:   Patient presents with:  Fall      Date of injury/ onset of symptoms: 1/29/20 after Smoker      Smokeless tobacco: Never Used    Alcohol use: Not on file    Drug use: Not on file          REVIEW OF SYSTEMS:   A 12 point review of systems was performed as documented on the intake form and reviewed by me today with pertinent positives and n include bleeding, infection, neurovascular injury, failure of the procedure, stiffness, and potential need for additional surgery as well as anesthetic complications including death.   Risks of fracture care include malunion, nonunion, delayed union, and po

## 2020-01-31 NOTE — PAYOR COMM NOTE
--------------  ADMISSION REVIEW     [facility]  Subscriber #:  [id number]  Authorization Number: [id number]      Admit date: 1/31/20  Admit time: 0034       Admitting Physician: Marina Sorto DO  Attending Physician:  Laurie Henley MD  Primary other components within normal limits   URINALYSIS WITH CULTURE REFLEX     EKG SR: Normal intervals, frequent PACs, normal EKG     ED Medications Administered:   Medications   ondansetron HCl (ZOFRAN) injection 4 mg (4 mg Intravenous Given 1/30/20 2320) morphINE sulfate (PF) 4 MG/ML injection 2 mg     Date Action Dose Route User    1/30/2020 2357 Given 2 mg Intravenous Celso Olszewski, RN      ondansetron HCl [facility]) injection 4 mg     Date Action Dose Route User    1/30/2020 2320 Given 4 mg Intraveno

## 2020-01-31 NOTE — CONSULTS
[facility]    Report of Consultation    Jason Ferreira Patient Status:  Inpatient    1940 MRN [id number]   Location [facility] 4W/SW/SE Attending Aundrea Portillo MD   Hosp Day # 0 CONRADO Ann     Date of Admission:  2020 Oral, Q4H PRN    Or  HYDROcodone-acetaminophen (NORCO) 5-325 MG per tab 1 tablet, 1 tablet, Oral, Q4H PRN    Or  HYDROcodone-acetaminophen (NORCO) 5-325 MG per tab 2 tablet, 2 tablet, Oral, Q4H PRN  HYDROmorphone HCl (DILAUDID) 1 MG/ML injection 0.2 mg, 0. Oral, resp. rate 18, weight 136 lb 3.2 oz (61.8 kg), SpO2 96 %. General: No acute distress. Alert and oriented x 3. HEENT: Moist mucous membranes. EOM-I. PERRL  Neck: No JVD.   Respiratory: Clear to auscultation No wheezing Rhonchi or crackles  Cardiov Nathan Keller MD on 1/31/2020 at 6:10     Approved by (CST): Bell Antony MD on 1/31/2020 at 6:11               Impression:   Patient is a 78year old female with PMH of HTN, HL, DM2, PVD, NHL, CKD stage IV- V, previously needed HD in the past was last seen by ne

## 2020-02-01 LAB
ALBUMIN SERPL-MCNC: 2.4 G/DL (ref 3.4–5)
ANION GAP SERPL CALC-SCNC: 7 MMOL/L (ref 0–18)
ANION GAP SERPL CALC-SCNC: 7 MMOL/L (ref 0–18)
ANION GAP SERPL CALC-SCNC: 9 MMOL/L (ref 0–18)
BUN BLD-MCNC: 72 MG/DL (ref 7–18)
BUN BLD-MCNC: 72 MG/DL (ref 7–18)
BUN BLD-MCNC: 76 MG/DL (ref 7–18)
BUN/CREAT SERPL: 22.3 (ref 10–20)
BUN/CREAT SERPL: 23.4 (ref 10–20)
BUN/CREAT SERPL: 23.4 (ref 10–20)
CALCIUM BLD-MCNC: 7.7 MG/DL (ref 8.5–10.1)
CALCIUM BLD-MCNC: 7.7 MG/DL (ref 8.5–10.1)
CALCIUM BLD-MCNC: 8.1 MG/DL (ref 8.5–10.1)
CHLORIDE SERPL-SCNC: 107 MMOL/L (ref 98–112)
CHLORIDE SERPL-SCNC: 110 MMOL/L (ref 98–112)
CHLORIDE SERPL-SCNC: 110 MMOL/L (ref 98–112)
CO2 SERPL-SCNC: 18 MMOL/L (ref 21–32)
CREAT BLD-MCNC: 3.08 MG/DL (ref 0.55–1.02)
CREAT BLD-MCNC: 3.08 MG/DL (ref 0.55–1.02)
CREAT BLD-MCNC: 3.41 MG/DL (ref 0.55–1.02)
DEPRECATED HBV CORE AB SER IA-ACNC: 265.9 NG/ML (ref 18–340)
DEPRECATED RDW RBC AUTO: 46.9 FL (ref 35.1–46.3)
ERYTHROCYTE [DISTWIDTH] IN BLOOD BY AUTOMATED COUNT: 14.5 % (ref 11–15)
GLUCOSE BLD-MCNC: 137 MG/DL (ref 70–99)
GLUCOSE BLD-MCNC: 137 MG/DL (ref 70–99)
GLUCOSE BLD-MCNC: 223 MG/DL (ref 70–99)
GLUCOSE BLDC GLUCOMTR-MCNC: 197 MG/DL (ref 70–99)
GLUCOSE BLDC GLUCOMTR-MCNC: 270 MG/DL (ref 70–99)
GLUCOSE BLDC GLUCOMTR-MCNC: 277 MG/DL (ref 70–99)
GLUCOSE BLDC GLUCOMTR-MCNC: 357 MG/DL (ref 70–99)
HAV IGM SER QL: 2 MG/DL (ref 1.6–2.6)
HCT VFR BLD AUTO: 20.4 % (ref 35–48)
HCT VFR BLD AUTO: 24 % (ref 35–48)
HGB BLD-MCNC: 6.6 G/DL (ref 12–16)
HGB BLD-MCNC: 8.1 G/DL (ref 12–16)
IRON SATURATION: 9 % (ref 15–50)
IRON SERPL-MCNC: 21 UG/DL (ref 50–170)
MCH RBC QN AUTO: 28.9 PG (ref 26–34)
MCHC RBC AUTO-ENTMCNC: 32.4 G/DL (ref 31–37)
MCV RBC AUTO: 89.5 FL (ref 80–100)
OSMOLALITY SERPL CALC.SUM OF ELEC: 303 MOSM/KG (ref 275–295)
OSMOLALITY SERPL CALC.SUM OF ELEC: 303 MOSM/KG (ref 275–295)
OSMOLALITY SERPL CALC.SUM OF ELEC: 308 MOSM/KG (ref 275–295)
PHOSPHATE SERPL-MCNC: 4.5 MG/DL (ref 2.5–4.9)
PLATELET # BLD AUTO: 123 10(3)UL (ref 150–450)
POTASSIUM SERPL-SCNC: 5 MMOL/L (ref 3.5–5.1)
POTASSIUM SERPL-SCNC: 5.1 MMOL/L (ref 3.5–5.1)
POTASSIUM SERPL-SCNC: 5.1 MMOL/L (ref 3.5–5.1)
RBC # BLD AUTO: 2.28 X10(6)UL (ref 3.8–5.3)
SODIUM SERPL-SCNC: 134 MMOL/L (ref 136–145)
SODIUM SERPL-SCNC: 135 MMOL/L (ref 136–145)
SODIUM SERPL-SCNC: 135 MMOL/L (ref 136–145)
TOTAL IRON BINDING CAPACITY: 238 UG/DL (ref 240–450)
TRANSFERRIN SERPL-MCNC: 160 MG/DL (ref 200–360)
WBC # BLD AUTO: 5.1 X10(3) UL (ref 4–11)

## 2020-02-01 PROCEDURE — 82962 GLUCOSE BLOOD TEST: CPT

## 2020-02-01 PROCEDURE — 83735 ASSAY OF MAGNESIUM: CPT | Performed by: INTERNAL MEDICINE

## 2020-02-01 PROCEDURE — 97162 PT EVAL MOD COMPLEX 30 MIN: CPT

## 2020-02-01 PROCEDURE — 80069 RENAL FUNCTION PANEL: CPT | Performed by: INTERNAL MEDICINE

## 2020-02-01 PROCEDURE — 84466 ASSAY OF TRANSFERRIN: CPT | Performed by: INTERNAL MEDICINE

## 2020-02-01 PROCEDURE — 97166 OT EVAL MOD COMPLEX 45 MIN: CPT

## 2020-02-01 PROCEDURE — 97530 THERAPEUTIC ACTIVITIES: CPT

## 2020-02-01 PROCEDURE — 36430 TRANSFUSION BLD/BLD COMPNT: CPT

## 2020-02-01 PROCEDURE — 83540 ASSAY OF IRON: CPT | Performed by: INTERNAL MEDICINE

## 2020-02-01 PROCEDURE — 85018 HEMOGLOBIN: CPT | Performed by: HOSPITALIST

## 2020-02-01 PROCEDURE — 85014 HEMATOCRIT: CPT | Performed by: HOSPITALIST

## 2020-02-01 PROCEDURE — 85027 COMPLETE CBC AUTOMATED: CPT | Performed by: INTERNAL MEDICINE

## 2020-02-01 PROCEDURE — 82728 ASSAY OF FERRITIN: CPT | Performed by: INTERNAL MEDICINE

## 2020-02-01 RX ORDER — SODIUM BICARBONATE 650 MG/1
650 TABLET ORAL 3 TIMES DAILY
Status: DISCONTINUED | OUTPATIENT
Start: 2020-02-01 | End: 2020-02-01

## 2020-02-01 RX ORDER — DEXTROSE MONOHYDRATE 25 G/50ML
50 INJECTION, SOLUTION INTRAVENOUS AS NEEDED
Status: DISCONTINUED | OUTPATIENT
Start: 2020-02-01 | End: 2020-02-06

## 2020-02-01 RX ORDER — SODIUM BICARBONATE 650 MG/1
1300 TABLET ORAL 3 TIMES DAILY
Status: DISCONTINUED | OUTPATIENT
Start: 2020-02-01 | End: 2020-02-06

## 2020-02-01 RX ORDER — SODIUM CHLORIDE 450 MG/100ML
INJECTION, SOLUTION INTRAVENOUS CONTINUOUS
Status: DISCONTINUED | OUTPATIENT
Start: 2020-02-01 | End: 2020-02-02

## 2020-02-01 NOTE — PROGRESS NOTES
[facility]    Nephrology Progress Note    Champ Goldberg Attending:  Mauricio Roberts MD       SUBJECTIVE:   Champ Goldberg is feeling well this afternoon. Denies dyspnea. Denies lower extremity edema. Trying to increase oral intake.      PHYSICAL E (H) 02/01/2020    CREATSERUM 3.08 (H) 02/01/2020    CREATSERUM 3.08 (H) 02/01/2020    ANIONGAP 7 02/01/2020    ANIONGAP 7 02/01/2020    GFRNAA 14 (L) 02/01/2020    GFRNAA 14 (L) 02/01/2020    GFRAA 16 (L) 02/01/2020    GFRAA 16 (L) 02/01/2020    CA 7.7 (L) PRN  dextrose 50 % injection 50 mL, 50 mL, Intravenous, PRN  Glucose-Vitamin C (DEX-4) chewable tab 4 tablet, 4 tablet, Oral, Q15 Min PRN  glucose (DEX4) oral liquid 15 g, 15 g, Oral, Q15 Min PRN  HYDROmorphone HCl (DILAUDID) 1 MG/ML injection 0.2 mg, 0.2 fracture    #.  Chronic anemia of CKD    RECOMMENDATIONS:   - No indication for dialysis presently  - Decreasing IVF rate to 50 ml/hr to avoid hypervolemia  - Encourage PO intake  - Renal diet  - Checking iron stores to ensure replete prior to EPO  - Starti

## 2020-02-01 NOTE — PROGRESS NOTES
[facility] Hospitalist Progress Note     Reason for Admission: mechanical fall, left hip fracture  PCP: Omari Simms     Assessment/Plan:     Principal Problem:    Closed fracture of left hip, initial encounter [facility])  Active Problems:    Closed fracture of left hours) at 2/1/2020 1222  Last data filed at 2/1/2020 0600  Gross per 24 hour   Intake 2462.08 ml   Output 750 ml   Net 1712.08 ml       Last 3 Weights  01/31/20 0534 : 136 lb 3.2 oz (61.8 kg)  08/04/18 1219 : 135 lb (61.2 kg)  02/16/18 1818 : 136 lb (61.7 Xr Knee (1 Or 2 Views), Left (cpt=73560)    Result Date: 1/31/2020  CONCLUSION:   Mild tricompartmental osteoarthritis without acute fracture or dislocation.   Preliminary report was submitted by the [facility] teleradiologist and there are no significant d PEG 3350, magnesium hydroxide, bisacodyl, Fleet Enema, acetaminophen **OR** HYDROcodone-acetaminophen **OR** HYDROcodone-acetaminophen, morphINE sulfate **OR** morphINE sulfate **OR** morphINE sulfate, ondansetron HCl

## 2020-02-01 NOTE — PHYSICAL THERAPY NOTE
PHYSICAL THERAPY EVALUATION - INPATIENT     Room Number: 415/415-A  Evaluation Date: 2/1/2020  Type of Evaluation: Initial   Physician Order: PT Eval and Treat    Presenting Problem: left hip IM nailing  Reason for Therapy: Mobility Dysfunction and Discha fracture of left hip [facility])      Past Medical History  Past Medical History:   Diagnosis Date   • Anxiety    • Essential hypertension    • Hyperlipidemia    • Type 1 diabetes mellitus ([facility] [address]        Past Surgical History  Past Surgical History:   Procedure La bedside commode, etc.): A Lot   -   Moving from lying on back to sitting on the side of the bed?: A Lot   How much help from another person does the patient currently need. ..   -   Moving to and from a bed to a chair (including a wheelchair)?: A Lot   -

## 2020-02-01 NOTE — PLAN OF CARE
Pt is POD #0-1. Vital signs within normal limits. PRN Norco/Tylenol provided for pain. Alert and oriented x4. CMS intact. Tele #65 in place, NSR/ST. On room air. Tolerating carb controlled diet. Rao in place. Dressing clean, dry, and intact.  SCDs and charity management  - Manage/alleviate anxiety  - Utilize distraction and/or relaxation techniques  - Monitor for opioid side effects  - Notify MD/LIP if interventions unsuccessful or patient reports new pain  - Anticipate increased pain with activity and pre-medi

## 2020-02-01 NOTE — PROGRESS NOTES
ARRIVED 4 WEST POST OP. DENTURES UPPER AND LOWER RINSED AND PLACED  IN MOUTH, HEARING AID PLACED IN RIGHT EAR, GLASSES AND JEWELRY 2 RINGS PUT ON BY PATIENT.  DIET ADVANCED TO [address] AND INSTRUCTED TO ORDER DINNER.     BELONGINGS IN CLOSET FROM PREVIOUS

## 2020-02-01 NOTE — PLAN OF CARE
Patient received 1 unit PRBC this AM. Labs following. Received OK to ambulate patient, PT/OT on consult. Patient able to  afternoon with RN/PCT. Worked with PT/OT later in day, ambulated small steps to chair.  2 assist with walker (May need luca w Term Goal  Description  Patient's Short Term Goal: \"I hope to be able to stand/walk\"    Interventions:   - PT/OT added on consult today  - WBAT  - [address] for PRN pain control  - See additional Care Plan goals for specific interventions  Outcome: Progressi

## 2020-02-01 NOTE — OCCUPATIONAL THERAPY NOTE
OCCUPATIONAL THERAPY EVALUATION - INPATIENT      Room Number: 415/415-A  Evaluation Date: 2/1/2020  Type of Evaluation: Initial  Presenting Problem: s/p L closed reduction with intramedullary nail fixation    Physician Order: IP Consult to Occupational The this level of impairment may benefit from LISE.     DISCHARGE RECOMMENDATIONS  OT Discharge Recommendations: Sub-acute rehabilitation  OT Device Recommendations: None(patient owns all recommended equipment; many require hip kit)    PLAN  OT Treatment Plan: B Tolerated    PAIN ASSESSMENT  Ratin  Location: L hip  Management Techniques: Activity promotion; Body mechanics;Repositioning    COGNITION  Overall Cognitive Status:  WFL - within functional limits    RANGE OF MOTION   Upper extremity ROM is within func with Min A  Comment:     Patient will complete toilet transfer with Min A  Comment:     Patient will complete self care task at sink level with Min A   Comment:         Goals  on:2/15/2020  Frequency: 5x/week    Ailin Jose F, OTR/L  [place] [facility]

## 2020-02-02 LAB
ANION GAP SERPL CALC-SCNC: 6 MMOL/L (ref 0–18)
BLOOD TYPE BARCODE: 6200
BUN BLD-MCNC: 81 MG/DL (ref 7–18)
BUN/CREAT SERPL: 25.5 (ref 10–20)
CALCIUM BLD-MCNC: 7.7 MG/DL (ref 8.5–10.1)
CHLORIDE SERPL-SCNC: 108 MMOL/L (ref 98–112)
CO2 SERPL-SCNC: 20 MMOL/L (ref 21–32)
CREAT BLD-MCNC: 3.18 MG/DL (ref 0.55–1.02)
DEPRECATED RDW RBC AUTO: 48 FL (ref 35.1–46.3)
ERYTHROCYTE [DISTWIDTH] IN BLOOD BY AUTOMATED COUNT: 14.5 % (ref 11–15)
GLUCOSE BLD-MCNC: 80 MG/DL (ref 70–99)
GLUCOSE BLDC GLUCOMTR-MCNC: 111 MG/DL (ref 70–99)
GLUCOSE BLDC GLUCOMTR-MCNC: 158 MG/DL (ref 70–99)
GLUCOSE BLDC GLUCOMTR-MCNC: 257 MG/DL (ref 70–99)
GLUCOSE BLDC GLUCOMTR-MCNC: 328 MG/DL (ref 70–99)
HCT VFR BLD AUTO: 23.7 % (ref 35–48)
HGB BLD-MCNC: 7.9 G/DL (ref 12–16)
MCH RBC QN AUTO: 30.3 PG (ref 26–34)
MCHC RBC AUTO-ENTMCNC: 33.3 G/DL (ref 31–37)
MCV RBC AUTO: 90.8 FL (ref 80–100)
OSMOLALITY SERPL CALC.SUM OF ELEC: 301 MOSM/KG (ref 275–295)
PLATELET # BLD AUTO: 123 10(3)UL (ref 150–450)
POTASSIUM SERPL-SCNC: 5.1 MMOL/L (ref 3.5–5.1)
RBC # BLD AUTO: 2.61 X10(6)UL (ref 3.8–5.3)
SODIUM SERPL-SCNC: 134 MMOL/L (ref 136–145)
WBC # BLD AUTO: 5.5 X10(3) UL (ref 4–11)

## 2020-02-02 PROCEDURE — 97116 GAIT TRAINING THERAPY: CPT

## 2020-02-02 PROCEDURE — 97530 THERAPEUTIC ACTIVITIES: CPT

## 2020-02-02 PROCEDURE — 80048 BASIC METABOLIC PNL TOTAL CA: CPT | Performed by: ORTHOPAEDIC SURGERY

## 2020-02-02 PROCEDURE — 51702 INSERT TEMP BLADDER CATH: CPT

## 2020-02-02 PROCEDURE — 85027 COMPLETE CBC AUTOMATED: CPT | Performed by: INTERNAL MEDICINE

## 2020-02-02 PROCEDURE — 82962 GLUCOSE BLOOD TEST: CPT

## 2020-02-02 NOTE — PLAN OF CARE
POD 2. Progressing well. Tolerating PO pain medication of [place] 5. Tele #65 in place. C/o nausea this shift and heartburn, treated with Zofran with relief. Accucheck ACHS with Humolog and Levemir CANDACE Rao in place. I/O charted.  DVT prophylaxis of Eliquis additional Care Plan goals for specific interventions  Outcome: Progressing  Goal: Patient/Family Short Term Goal  Description  Patient's Short Term Goal: free of infections    Interventions:   - ID consult, abx therapy continued  - See additional Care Tamera Problem: GASTROINTESTINAL - ADULT  Goal: Minimal or absence of nausea and vomiting  Description  INTERVENTIONS:  - Maintain adequate hydration with IV or PO as ordered and tolerated  - Nasogastric tube to low intermittent suction as ordered  - Evaluate e mobilization  - Obtain PT/OT consults as needed  - Advance activity as appropriate  - Communicate ordered activity level and limitations with patient/family  Outcome: Progressing  Goal: Maintain proper alignment of affected body part  Description  INTERVEN

## 2020-02-02 NOTE — PROGRESS NOTES
[facility]    Josselyn Ramirez Patient Status:  Inpatient    1940 MRN [id number]   Location [facility] 4W/SW/SE Attending Tammie Marin MD   [facility] PCP Michelle Moreno Gigi is a 78year old female patient.     Rafa also sore; heel protectors in room but not on pt    Pt got another unit of blood yesterday). Objective:  General Appearance:  Comfortable.     Vital signs: (most recent): Blood pressure 142/53, pulse 80, temperature 98.2 °F (36.8 °C), temperature so

## 2020-02-02 NOTE — PROGRESS NOTES
[facility]    Nephrology Progress Note    Jose Hall Attending:  Marti Gardner MD       SUBJECTIVE:   Jose Hall is feeling well this afternoon. Denies leg swelling or SOB.     PHYSICAL EXAM:     Vital Signs: /44 (BP Location: Right GFRAA 15 (L) 02/02/2020    CA 7.7 (L) 02/02/2020    OSMOCALC 301 (H) 02/02/2020    ALB 2.4 (L) 02/01/2020     (L) 02/02/2020    K 5.1 02/02/2020     02/02/2020    CO2 20.0 (L) 02/02/2020     Lab Results   Component Value Date    WBC 5.5 02/0 1-11 Units, Subcutaneous, TID CC and HS  Normal Saline Flush 0.9 % injection 3 mL, 3 mL, Intravenous, PRN  HYDROmorphone HCl (DILAUDID) 1 MG/ML injection 0.2 mg, 0.2 mg, Intravenous, Q2H PRN    Or  HYDROmorphone HCl (DILAUDID) 1 MG/ML injection 0.4 mg, 0.4 days course of IV iron. - Avoid nephrotoxins such as NSAIDs, IV contrast, fleet enema. - Renally dose all medications  - Renal diet.      Fabiano Juarez MD   Nephrology  [address]   [phone]

## 2020-02-02 NOTE — PHYSICAL THERAPY NOTE
PHYSICAL THERAPY TREATMENT NOTE - INPATIENT     Room Number: 068/800-H       Presenting Problem: left hip IM nailing    Problem List  Principal Problem:    Closed fracture of left hip, initial encounter [facility])  Active Problems:    Closed fracture of left hi -           Static Standing: Poor  Dynamic Standing: Poor    ACTIVITY TOLERANCE                         O2 WALK                  AM-PAC '6-Clicks' INPATIENT SHORT FORM - BASIC MOBILITY  How much difficulty does the patient currently have. ..  -   Turning ov stairs/one curb w/ assistive device and supervision   Goal #4   Current Status NT   Goal #5 Patient to demonstrate independence with home activity/exercise instructions provided to patient in preparation for discharge.    Goal #5   Current Status ongoing

## 2020-02-02 NOTE — PLAN OF CARE
Patient progressed with ambulation with PT and staff today. Up with 2 assist with walker and gait belt. Rao catheter removed in afternoon, multiple attempts to void, unsuccessful, MD notified of bladder scan. [address] for PRN pain control.  Diabetic accuchec Goal: \"I hope I can get some rest tonight\"    Interventions:   - Reposition for comfort  - NORCO for PRN pain control  - Verbalize pain needs to staff  - See additional Care Plan goals for specific interventions  Outcome: Progressing     Problem: PAIN - vomiting  Description  INTERVENTIONS:  - Maintain adequate hydration with IV or PO as ordered and tolerated  - Nasogastric tube to low intermittent suction as ordered  - Evaluate effectiveness of ordered antiemetic medications  - Provide nonpharmacologic c Communicate ordered activity level and limitations with patient/family  Outcome: Progressing  Goal: Maintain proper alignment of affected body part  Description  INTERVENTIONS:  - Support and protect limb and body alignment per provider's orders  - Instruc

## 2020-02-02 NOTE — PROGRESS NOTES
[facility] Hospitalist Progress Note     Reason for Admission: mechanical fall, left hip fracture  PCP: Rebecca Quintanilla     Assessment/Plan:     Principal Problem:    Closed fracture of left hip, initial encounter [facility])  Active Problems:    Closed fracture of left 3559 ml   Output 1450 ml   Net 2109 ml       Last 3 Weights  02/02/20 0528 : 145 lb (65.8 kg)  02/01/20 1912 : 139 lb 12.8 oz (63.4 kg)  01/31/20 0534 : 136 lb 3.2 oz (61.8 kg)  08/04/18 1219 : 135 lb (61.2 kg)  02/16/18 1818 : 136 lb (61.7 kg)      Exam (1 Or 2 Views), Left (cpt=73560)    Result Date: 1/31/2020  CONCLUSION:   Mild tricompartmental osteoarthritis without acute fracture or dislocation.   Preliminary report was submitted by the [facility] teleradiologist and there are no significant discrepancies Normal Saline Flush, HYDROmorphone HCl **OR** HYDROmorphone HCl **OR** [DISCONTINUED] HYDROmorphone HCl, Normal Saline Flush, PEG 3350, magnesium hydroxide, bisacodyl, Fleet Enema, acetaminophen **OR** HYDROcodone-acetaminophen **OR** HYDROcodone-acetamino

## 2020-02-03 ENCOUNTER — APPOINTMENT (OUTPATIENT)
Dept: ULTRASOUND IMAGING | Facility: HOSPITAL | Age: 80
DRG: 481 | End: 2020-02-03
Attending: INTERNAL MEDICINE
Payer: MEDICARE

## 2020-02-03 LAB
25(OH)D3 SERPL-MCNC: 25.7 NG/ML (ref 30–100)
ANION GAP SERPL CALC-SCNC: 8 MMOL/L (ref 0–18)
BUN BLD-MCNC: 71 MG/DL (ref 7–18)
BUN/CREAT SERPL: 22.8 (ref 10–20)
CALCIUM BLD-MCNC: 8.4 MG/DL (ref 8.5–10.1)
CHLORIDE SERPL-SCNC: 108 MMOL/L (ref 98–112)
CO2 SERPL-SCNC: 20 MMOL/L (ref 21–32)
CREAT BLD-MCNC: 3.12 MG/DL (ref 0.55–1.02)
DEPRECATED RDW RBC AUTO: 48.2 FL (ref 35.1–46.3)
ERYTHROCYTE [DISTWIDTH] IN BLOOD BY AUTOMATED COUNT: 14.4 % (ref 11–15)
GLUCOSE BLD-MCNC: 161 MG/DL (ref 70–99)
GLUCOSE BLDC GLUCOMTR-MCNC: 143 MG/DL (ref 70–99)
GLUCOSE BLDC GLUCOMTR-MCNC: 145 MG/DL (ref 70–99)
GLUCOSE BLDC GLUCOMTR-MCNC: 159 MG/DL (ref 70–99)
GLUCOSE BLDC GLUCOMTR-MCNC: 162 MG/DL (ref 70–99)
HCT VFR BLD AUTO: 24 % (ref 35–48)
HGB BLD-MCNC: 7.9 G/DL (ref 12–16)
MCH RBC QN AUTO: 29.9 PG (ref 26–34)
MCHC RBC AUTO-ENTMCNC: 32.9 G/DL (ref 31–37)
MCV RBC AUTO: 90.9 FL (ref 80–100)
OSMOLALITY SERPL CALC.SUM OF ELEC: 306 MOSM/KG (ref 275–295)
PLATELET # BLD AUTO: 151 10(3)UL (ref 150–450)
POTASSIUM SERPL-SCNC: 4.8 MMOL/L (ref 3.5–5.1)
RBC # BLD AUTO: 2.64 X10(6)UL (ref 3.8–5.3)
SODIUM SERPL-SCNC: 136 MMOL/L (ref 136–145)
WBC # BLD AUTO: 4.8 X10(3) UL (ref 4–11)

## 2020-02-03 PROCEDURE — 80048 BASIC METABOLIC PNL TOTAL CA: CPT | Performed by: ORTHOPAEDIC SURGERY

## 2020-02-03 PROCEDURE — 76770 US EXAM ABDO BACK WALL COMP: CPT | Performed by: INTERNAL MEDICINE

## 2020-02-03 PROCEDURE — 97110 THERAPEUTIC EXERCISES: CPT

## 2020-02-03 PROCEDURE — 85027 COMPLETE CBC AUTOMATED: CPT | Performed by: INTERNAL MEDICINE

## 2020-02-03 PROCEDURE — 97116 GAIT TRAINING THERAPY: CPT

## 2020-02-03 PROCEDURE — 82962 GLUCOSE BLOOD TEST: CPT

## 2020-02-03 PROCEDURE — 97530 THERAPEUTIC ACTIVITIES: CPT

## 2020-02-03 NOTE — CM/SW NOTE
08: 33AM SW received message from [employer] who states pt's family has chosen [employer] [facility] [employer]. SW sent referral through [employer]. Per chart review, a DON screen has been requested.  Pt would need an insurance authorizat

## 2020-02-03 NOTE — PHYSICAL THERAPY NOTE
PHYSICAL THERAPY TREATMENT NOTE - INPATIENT     Room Number: 301/827-C       Presenting Problem: left hip IM nailing    Problem List  Principal Problem:    Closed fracture of left hip, initial encounter [facility])  Active Problems:    Closed fracture of left hi bedside commode, etc.): A Lot   -   Moving from lying on back to sitting on the side of the bed?: A Lot   How much help from another person does the patient currently need. ..   -   Moving to and from a bed to a chair (including a wheelchair)?: A Lot   -

## 2020-02-03 NOTE — PROGRESS NOTES
[facility]    Nephrology Progress Note      SUBJECTIVE:     Resting comfortably in recliner   Rao had to be replaced due to urinary retention      PHYSICAL EXAM:     Vital Signs: /62 (BP Location: Right arm)   Pulse 91   Temp 99.7 ° Lab Results   Component Value Date    COLORUR Yellow 01/31/2020    CLARITY Hazy (A) 01/31/2020    SPECGRAVITY 1.012 01/31/2020    GLUUR Negative 01/31/2020    BILUR Negative 01/31/2020    KETUR Negative 01/31/2020    BLOODURINE Small (A) 01/31/2020 Daily PRN  Fleet Enema (FLEET) 7-19 GM/118ML enema 133 mL, 1 enema, Rectal, Once PRN  acetaminophen (TYLENOL) tab 650 mg, 650 mg, Oral, Q4H PRN    Or  HYDROcodone-acetaminophen (NORCO) 5-325 MG per tab 1 tablet, 1 tablet, Oral, Q4H PRN    Or  HYDROcodone-a

## 2020-02-03 NOTE — PLAN OF CARE
Problem: Diabetes/Glucose Control  Goal: Glucose maintained within prescribed range  Description  INTERVENTIONS:  - Monitor Blood Glucose as ordered  - Assess for signs and symptoms of hyperglycemia and hypoglycemia  - Administer ordered medications to m devices as appropriate  - Consider OT/PT consult to assist with strengthening/mobility  - Encourage toileting schedule  Outcome: Progressing     Problem: GASTROINTESTINAL - ADULT  Goal: Minimal or absence of nausea and vomiting  Description  INTERVENTIONS: with transfers and ambulation using safe patient handling equipment as needed  - Ensure adequate protection for wounds/incisions during mobilization  - Obtain PT/OT consults as needed  - Advance activity as appropriate  - Communicate ordered activity level assist no new complaints resting  Outcome: Progressing

## 2020-02-03 NOTE — PLAN OF CARE
Problem: Diabetes/Glucose Control  Goal: Glucose maintained within prescribed range  Description  INTERVENTIONS:  - Monitor Blood Glucose as ordered  - Assess for signs and symptoms of hyperglycemia and hypoglycemia  - Administer ordered medications to m devices as appropriate  - Consider OT/PT consult to assist with strengthening/mobility  - Encourage toileting schedule  Outcome: Progressing     Problem: GASTROINTESTINAL - ADULT  Goal: Minimal or absence of nausea and vomiting  Description  INTERVENTIONS: with transfers and ambulation using safe patient handling equipment as needed  - Ensure adequate protection for wounds/incisions during mobilization  - Obtain PT/OT consults as needed  - Advance activity as appropriate  - Communicate ordered activity level intact. Rao catheter re-inserted after patient was unable to void post straight cath. Remote tele in place. Accucheck ACHS. Safety precautions maintained, bed alarm activated. Call light within reach. Will continue to monitor.

## 2020-02-03 NOTE — PROGRESS NOTES
[facility] Hospitalist Progress Note     Reason for Admission: mechanical fall, left hip fracture  PCP: Ion Yeh     Assessment/Plan:     Principal Problem:    Closed fracture of left hip, initial encounter [facility])  Active Problems:    Closed fracture of left weight 139 lb 1.6 oz (63.1 kg), SpO2 97 %.     Temp:  [97.9 °F (36.6 °C)-99.7 °F (37.6 °C)] 99.7 °F (37.6 °C)  Pulse:  [90-91] 91  Resp:  [18] 18  BP: (144-158)/(41-62) 146/62      Intake/Output:    Intake/Output Summary (Last 24 hours) at 2/3/2020 1321  La ALB 2.4*       Imaging:  Xr Fluoroscopy C-arm Time <1 Hour  (cpt=76000)    Result Date: 1/31/2020  CONCLUSION:  1. Intraoperative fluoroscopy was utilized.     Dictated by (CST): Yazmin Benavides MD on 1/31/2020 at 16:54     Approved by (CST): Hernandez

## 2020-02-04 LAB
ANION GAP SERPL CALC-SCNC: 9 MMOL/L (ref 0–18)
BILIRUB UR QL: NEGATIVE
BUN BLD-MCNC: 61 MG/DL (ref 7–18)
BUN/CREAT SERPL: 21.9 (ref 10–20)
CALCIUM BLD-MCNC: 8.1 MG/DL (ref 8.5–10.1)
CHLORIDE SERPL-SCNC: 107 MMOL/L (ref 98–112)
CO2 SERPL-SCNC: 19 MMOL/L (ref 21–32)
COLOR UR: YELLOW
CREAT BLD-MCNC: 2.78 MG/DL (ref 0.55–1.02)
DEPRECATED RDW RBC AUTO: 49.8 FL (ref 35.1–46.3)
ERYTHROCYTE [DISTWIDTH] IN BLOOD BY AUTOMATED COUNT: 14.9 % (ref 11–15)
GLUCOSE BLD-MCNC: 177 MG/DL (ref 70–99)
GLUCOSE BLDC GLUCOMTR-MCNC: 125 MG/DL (ref 70–99)
GLUCOSE BLDC GLUCOMTR-MCNC: 158 MG/DL (ref 70–99)
GLUCOSE BLDC GLUCOMTR-MCNC: 181 MG/DL (ref 70–99)
GLUCOSE BLDC GLUCOMTR-MCNC: 193 MG/DL (ref 70–99)
GLUCOSE UR-MCNC: NEGATIVE MG/DL
HAV IGM SER QL: 2.1 MG/DL (ref 1.6–2.6)
HCT VFR BLD AUTO: 25.2 % (ref 35–48)
HGB BLD-MCNC: 8.4 G/DL (ref 12–16)
KETONES UR-MCNC: NEGATIVE MG/DL
MCH RBC QN AUTO: 30.7 PG (ref 26–34)
MCHC RBC AUTO-ENTMCNC: 33.3 G/DL (ref 31–37)
MCV RBC AUTO: 92 FL (ref 80–100)
NITRITE UR QL STRIP.AUTO: NEGATIVE
OSMOLALITY SERPL CALC.SUM OF ELEC: 302 MOSM/KG (ref 275–295)
PH UR: 6 [PH] (ref 5–8)
PLATELET # BLD AUTO: 184 10(3)UL (ref 150–450)
POTASSIUM SERPL-SCNC: 4.6 MMOL/L (ref 3.5–5.1)
PROT UR-MCNC: NEGATIVE MG/DL
RBC # BLD AUTO: 2.74 X10(6)UL (ref 3.8–5.3)
RBC #/AREA URNS AUTO: 23 /HPF
SODIUM SERPL-SCNC: 135 MMOL/L (ref 136–145)
SP GR UR STRIP: 1.01 (ref 1–1.03)
UROBILINOGEN UR STRIP-ACNC: <2
WBC # BLD AUTO: 5.1 X10(3) UL (ref 4–11)
WBC #/AREA URNS AUTO: 845 /HPF

## 2020-02-04 PROCEDURE — 80048 BASIC METABOLIC PNL TOTAL CA: CPT | Performed by: INTERNAL MEDICINE

## 2020-02-04 PROCEDURE — 83735 ASSAY OF MAGNESIUM: CPT | Performed by: INTERNAL MEDICINE

## 2020-02-04 PROCEDURE — 85027 COMPLETE CBC AUTOMATED: CPT | Performed by: INTERNAL MEDICINE

## 2020-02-04 PROCEDURE — 97110 THERAPEUTIC EXERCISES: CPT

## 2020-02-04 PROCEDURE — 82962 GLUCOSE BLOOD TEST: CPT

## 2020-02-04 PROCEDURE — 97530 THERAPEUTIC ACTIVITIES: CPT

## 2020-02-04 PROCEDURE — 87086 URINE CULTURE/COLONY COUNT: CPT | Performed by: HOSPITALIST

## 2020-02-04 PROCEDURE — 81001 URINALYSIS AUTO W/SCOPE: CPT | Performed by: HOSPITALIST

## 2020-02-04 NOTE — PROGRESS NOTES
[facility]    Nephrology Progress Note      SUBJECTIVE:     Per nursing staff, had some confusion last night   No complaints currently, denies SOB        PHYSICAL EXAM:     Vital Signs: /57 (BP Location: Right arm)   Pulse 110   Temp Results   Component Value Date    COLORUR Yellow 01/31/2020    CLARITY Hazy (A) 01/31/2020    SPECGRAVITY 1.012 01/31/2020    GLUUR Negative 01/31/2020    BILUR Negative 01/31/2020    KETUR Negative 01/31/2020    BLOODURINE Small (A) 01/31/2020    PHURINE PRN  bisacodyl (DULCOLAX) rectal suppository 10 mg, 10 mg, Rectal, Daily PRN  Fleet Enema (FLEET) 7-19 GM/118ML enema 133 mL, 1 enema, Rectal, Once PRN  acetaminophen (TYLENOL) tab 650 mg, 650 mg, Oral, Q4H PRN    Or  HYDROcodone-acetaminophen (NORCO) 5-32

## 2020-02-04 NOTE — PROGRESS NOTES
1/30/2020  Calin Paul  91/1940  78year old   female  No name on file. HPI:   Patient presents with:  Fall      The patient complains of pain located left hip. The pain is decreased. The patient denies numbness and tingling.   The patient has a fole

## 2020-02-04 NOTE — PLAN OF CARE
VSS, no acute events overnight. Pt not complaining of any pain. Pt intermittently confused, insisting to this RN that she took her nighttime medications at 7:30PM and refused them at 9PM when this RN brought them in.  All medications scheduled for 9PM refus PAIN - ADULT  Goal: Verbalizes/displays adequate comfort level or patient's stated pain goal  Description  INTERVENTIONS:  - Encourage pt to monitor pain and request assistance  - Assess pain using appropriate pain scale  - Administer analgesics based on t nonpharmacologic comfort measures as appropriate  - Advance diet as tolerated, if ordered  - Obtain nutritional consult as needed  - Evaluate fluid balance  Outcome: Progressing     Problem: SKIN/TISSUE INTEGRITY - ADULT  Goal: Skin integrity remains intac provider's orders  - Instruct and reinforce with patient and family use of appropriate assistive device and precautions (e.g. spinal or hip dislocation precautions)  Outcome: Progressing     Problem: NEUROLOGICAL - ADULT  Goal: Achieves maximal functionali

## 2020-02-04 NOTE — PLAN OF CARE
Problem: Diabetes/Glucose Control  Goal: Glucose maintained within prescribed range  Description  INTERVENTIONS:  - Monitor Blood Glucose as ordered  - Assess for signs and symptoms of hyperglycemia and hypoglycemia  - Administer ordered medications to m devices as appropriate  - Consider OT/PT consult to assist with strengthening/mobility  - Encourage toileting schedule  Outcome: Progressing     Problem: GASTROINTESTINAL - ADULT  Goal: Minimal or absence of nausea and vomiting  Description  INTERVENTIONS: with transfers and ambulation using safe patient handling equipment as needed  - Ensure adequate protection for wounds/incisions during mobilization  - Obtain PT/OT consults as needed  - Advance activity as appropriate  - Communicate ordered activity level pain up with assist and she worked with therapy.  Pt still waiting for insurance auth for rehab transfer

## 2020-02-04 NOTE — PROGRESS NOTES
[facility] Hospitalist Progress Note     Reason for Admission: mechanical fall, left hip fracture  PCP: Dayan Kumar     Assessment/Plan:     Principal Problem:    Closed fracture of left hip, initial encounter [facility])  Active Problems:    Closed fracture of left CP or SOB     OBJECTIVE:    Blood pressure 147/51, pulse 90, temperature 97.7 °F (36.5 °C), temperature source Oral, resp. rate 18, height 154.9 cm (5' 1\"), weight 139 lb 1.6 oz (63.1 kg), SpO2 100 %.     Temp:  [97.7 °F (36.5 °C)-98.2 °F (36.8 °C)] 97.7 ° * 136 135*   K 5.1 4.8 4.6    108 107   CO2 20.0* 20.0* 19.0*       Recent Labs   Lab 02/01/20  0452   ALB 2.4*       Imaging:          Meds:     • insulin detemir  9 Units Subcutaneous Nightly   • Insulin Aspart Pen  5 Units Subcutaneous TID

## 2020-02-04 NOTE — PROGRESS NOTES
21:35: Pt insistent that she took all her night time medications \"around 7\". This RN reviewed the eMAR documentation with the patient and explained that the night time medications were not given to her.  Pt stated \"I'd rather miss them than take too many

## 2020-02-04 NOTE — OCCUPATIONAL THERAPY NOTE
OCCUPATIONAL THERAPY TREATMENT NOTE - INPATIENT    Room Number: 415/415-A         Presenting Problem: s/p L closed reduction with intramedullary nail fixation     Problem List  Principal Problem:    Closed fracture of left hip, initial encounter ([facility]  Act 6  Location: L hip  Management Techniques: Activity promotion; Body mechanics;Repositioning          ACTIVITIES OF DAILY LIVING ASSESSMENT  AM-PAC ‘6-Clicks’ Inpatient Daily Activity Short Form  How much help from another person does the patient currently n

## 2020-02-04 NOTE — PHYSICAL THERAPY NOTE
PHYSICAL THERAPY TREATMENT NOTE - INPATIENT     Room Number: 615/034-A       Presenting Problem: left hip IM nailing    Problem List  Principal Problem:    Closed fracture of left hip, initial encounter [facility])  Active Problems:    Closed fracture of left hi -   Sitting down on and standing up from a chair with arms (e.g., wheelchair, bedside commode, etc.): A Little   -   Moving from lying on back to sitting on the side of the bed?: A Little   How much help from another person does the patient currently nee

## 2020-02-05 LAB
ALBUMIN SERPL-MCNC: 2.3 G/DL (ref 3.4–5)
ANION GAP SERPL CALC-SCNC: 7 MMOL/L (ref 0–18)
ANION GAP SERPL CALC-SCNC: 7 MMOL/L (ref 0–18)
BASOPHILS # BLD AUTO: 0.03 X10(3) UL (ref 0–0.2)
BASOPHILS NFR BLD AUTO: 0.5 %
BILIRUB UR QL: NEGATIVE
BUN BLD-MCNC: 60 MG/DL (ref 7–18)
BUN BLD-MCNC: 60 MG/DL (ref 7–18)
BUN/CREAT SERPL: 24.3 (ref 10–20)
BUN/CREAT SERPL: 24.3 (ref 10–20)
CALCIUM BLD-MCNC: 8.2 MG/DL (ref 8.5–10.1)
CALCIUM BLD-MCNC: 8.2 MG/DL (ref 8.5–10.1)
CHLORIDE SERPL-SCNC: 108 MMOL/L (ref 98–112)
CHLORIDE SERPL-SCNC: 108 MMOL/L (ref 98–112)
CO2 SERPL-SCNC: 22 MMOL/L (ref 21–32)
CO2 SERPL-SCNC: 22 MMOL/L (ref 21–32)
COLOR UR: YELLOW
CREAT BLD-MCNC: 2.47 MG/DL (ref 0.55–1.02)
CREAT BLD-MCNC: 2.47 MG/DL (ref 0.55–1.02)
DEPRECATED RDW RBC AUTO: 49.5 FL (ref 35.1–46.3)
EOSINOPHIL # BLD AUTO: 0.17 X10(3) UL (ref 0–0.7)
EOSINOPHIL NFR BLD AUTO: 3.1 %
ERYTHROCYTE [DISTWIDTH] IN BLOOD BY AUTOMATED COUNT: 14.7 % (ref 11–15)
GLUCOSE BLD-MCNC: 57 MG/DL (ref 70–99)
GLUCOSE BLD-MCNC: 57 MG/DL (ref 70–99)
GLUCOSE BLDC GLUCOMTR-MCNC: 124 MG/DL (ref 70–99)
GLUCOSE BLDC GLUCOMTR-MCNC: 148 MG/DL (ref 70–99)
GLUCOSE BLDC GLUCOMTR-MCNC: 212 MG/DL (ref 70–99)
GLUCOSE BLDC GLUCOMTR-MCNC: 316 MG/DL (ref 70–99)
GLUCOSE BLDC GLUCOMTR-MCNC: 84 MG/DL (ref 70–99)
GLUCOSE UR-MCNC: NEGATIVE MG/DL
HAV IGM SER QL: 2 MG/DL (ref 1.6–2.6)
HCT VFR BLD AUTO: 24.3 % (ref 35–48)
HGB BLD-MCNC: 8.1 G/DL (ref 12–16)
IMM GRANULOCYTES # BLD AUTO: 0.03 X10(3) UL (ref 0–1)
IMM GRANULOCYTES NFR BLD: 0.5 %
KETONES UR-MCNC: NEGATIVE MG/DL
LYMPHOCYTES # BLD AUTO: 1.11 X10(3) UL (ref 1–4)
LYMPHOCYTES NFR BLD AUTO: 20.3 %
MCH RBC QN AUTO: 30.6 PG (ref 26–34)
MCHC RBC AUTO-ENTMCNC: 33.3 G/DL (ref 31–37)
MCV RBC AUTO: 91.7 FL (ref 80–100)
MONOCYTES # BLD AUTO: 0.58 X10(3) UL (ref 0.1–1)
MONOCYTES NFR BLD AUTO: 10.6 %
NEUTROPHILS # BLD AUTO: 3.55 X10 (3) UL (ref 1.5–7.7)
NEUTROPHILS # BLD AUTO: 3.55 X10(3) UL (ref 1.5–7.7)
NEUTROPHILS NFR BLD AUTO: 65 %
NITRITE UR QL STRIP.AUTO: NEGATIVE
OSMOLALITY SERPL CALC.SUM OF ELEC: 299 MOSM/KG (ref 275–295)
OSMOLALITY SERPL CALC.SUM OF ELEC: 299 MOSM/KG (ref 275–295)
PH UR: 6 [PH] (ref 5–8)
PHOSPHATE SERPL-MCNC: 3.8 MG/DL (ref 2.5–4.9)
PLATELET # BLD AUTO: 197 10(3)UL (ref 150–450)
POTASSIUM SERPL-SCNC: 4.6 MMOL/L (ref 3.5–5.1)
POTASSIUM SERPL-SCNC: 4.6 MMOL/L (ref 3.5–5.1)
PROT UR-MCNC: 30 MG/DL
RBC # BLD AUTO: 2.65 X10(6)UL (ref 3.8–5.3)
RBC #/AREA URNS AUTO: 2 /HPF
SODIUM SERPL-SCNC: 137 MMOL/L (ref 136–145)
SODIUM SERPL-SCNC: 137 MMOL/L (ref 136–145)
SP GR UR STRIP: 1.01 (ref 1–1.03)
UROBILINOGEN UR STRIP-ACNC: <2
WBC # BLD AUTO: 5.5 X10(3) UL (ref 4–11)
WBC #/AREA URNS AUTO: 65 /HPF

## 2020-02-05 PROCEDURE — 82962 GLUCOSE BLOOD TEST: CPT

## 2020-02-05 PROCEDURE — 97110 THERAPEUTIC EXERCISES: CPT

## 2020-02-05 PROCEDURE — 87086 URINE CULTURE/COLONY COUNT: CPT | Performed by: HOSPITALIST

## 2020-02-05 PROCEDURE — 85025 COMPLETE CBC W/AUTO DIFF WBC: CPT | Performed by: HOSPITALIST

## 2020-02-05 PROCEDURE — 81001 URINALYSIS AUTO W/SCOPE: CPT | Performed by: HOSPITALIST

## 2020-02-05 PROCEDURE — 80069 RENAL FUNCTION PANEL: CPT | Performed by: HOSPITALIST

## 2020-02-05 PROCEDURE — 97530 THERAPEUTIC ACTIVITIES: CPT

## 2020-02-05 PROCEDURE — 83735 ASSAY OF MAGNESIUM: CPT | Performed by: INTERNAL MEDICINE

## 2020-02-05 PROCEDURE — 97116 GAIT TRAINING THERAPY: CPT

## 2020-02-05 RX ORDER — 0.9 % SODIUM CHLORIDE 0.9 %
VIAL (ML) INJECTION
Status: DISPENSED
Start: 2020-02-05 | End: 2020-02-05

## 2020-02-05 NOTE — PLAN OF CARE
VSS, no acute events overnight. Pt resting in bed, not complaining of any pain. Rao in place. Disease process discussed with pt. Bed in lowest position, call light and personal possessions within reach.  Plan to continue to monitor, administer medications Progressing     Problem: PAIN - ADULT  Goal: Verbalizes/displays adequate comfort level or patient's stated pain goal  Description  INTERVENTIONS:  - Encourage pt to monitor pain and request assistance  - Assess pain using appropriate pain scale  - Adminis medications  - Provide nonpharmacologic comfort measures as appropriate  - Advance diet as tolerated, if ordered  - Obtain nutritional consult as needed  - Evaluate fluid balance  Outcome: Progressing     Problem: SKIN/TISSUE INTEGRITY - ADULT  Goal: Skin alignment per provider's orders  - Instruct and reinforce with patient and family use of appropriate assistive device and precautions (e.g. spinal or hip dislocation precautions)  Outcome: Progressing     Problem: NEUROLOGICAL - ADULT  Goal: Achieves maxim

## 2020-02-05 NOTE — PROGRESS NOTES
EDGARDO WALLIS [facility]    Nephrology Progress Note      SUBJECTIVE:     No complaints today      PHYSICAL EXAM:     Vital Signs: /64 (BP Location: Right arm)   Pulse 85   Temp 98.2 °F (36.8 °C) (Oral)   Resp 18   Ht 5' 1\" (1.549 m)   Wt 139 lb 1 02/05/2020    LYPERCENT 20.3 02/05/2020    MOPERCENT 10.6 02/05/2020    EOPERCENT 3.1 02/05/2020    BAPERCENT 0.5 02/05/2020    NE 3.55 02/05/2020    LYMABS 1.11 02/05/2020    MOABSO 0.58 02/05/2020    EOABSO 0.17 02/05/2020    BAABSO 0.03 02/05/2020 3350 (MIRALAX) powder packet 17 g, 17 g, Oral, Daily  busPIRone HCl (BUSPAR) tab 15 mg, 15 mg, Oral, TID  escitalopram (LEXAPRO) tablet 5 mg, 5 mg, Oral, Daily  Normal Saline Flush 0.9 % injection 10 mL, 10 mL, Intravenous, PRN  docusate sodium (COLACE) ca follow up with her nephrologist at [facility] - Dr. Kale De La Garza within 2 weeks.         JOLENE Hightower MD   Nephrology  [facility]   [phone]

## 2020-02-05 NOTE — PHYSICAL THERAPY NOTE
PHYSICAL THERAPY TREATMENT NOTE - INPATIENT     Room Number: 755/922-T       Presenting Problem: left hip IM nailing    Problem List  Principal Problem:    Closed fracture of left hip, initial encounter [facility])  Active Problems:    Closed fracture of left hi Sitting down on and standing up from a chair with arms (e.g., wheelchair, bedside commode, etc.): A Little   -   Moving from lying on back to sitting on the side of the bed?: A Little   How much help from another person does the patient currently need. ..

## 2020-02-05 NOTE — CM/SW NOTE
10: 01AM JAYASHREE received confirmation of insurance authorization. JAYASHREE confirmed with RN that pt is medically ready for discharge today. JAYASHREE called and spoke with [facility] from [facility] to arrange a time for discharge. RN is aware of discharge time and location.  JAYASHREE noti

## 2020-02-05 NOTE — PROGRESS NOTES
[facility] Hospitalist Progress Note     Reason for Admission: mechanical fall, left hip fracture  PCP: [facility]     Assessment/Plan:     Principal Problem:    Closed fracture of left hip, initial encounter [facility])  Active Problems:    Closed fracture of left    Ney Alston MD 2/5/20  [facility] Hospitalist  Answering service: [phone]         Subjective:     Denies complaints, states pain controled, no CP or SOB     OBJECTIVE:    Blood pressure 138/64, pulse 85, temperature 98.2 °F (36.8 °C), temperature s 02/04/20  0443 02/05/20  0426   * 177* 57*  57*   BUN 71* 61* 60*  60*   CREATSERUM 3.12* 2.78* 2.47*  2.47*   GFRAA 15* 18* 20*  20*   GFRNAA 13* 15* 18*  18*   CA 8.4* 8.1* 8.2*  8.2*    135* 137  137   K 4.8 4.6 4.6  4.6    107 108  1

## 2020-02-06 VITALS
HEART RATE: 77 BPM | DIASTOLIC BLOOD PRESSURE: 58 MMHG | BODY MASS INDEX: 26.27 KG/M2 | WEIGHT: 139.13 LBS | TEMPERATURE: 98 F | SYSTOLIC BLOOD PRESSURE: 137 MMHG | OXYGEN SATURATION: 98 % | RESPIRATION RATE: 16 BRPM | HEIGHT: 61 IN

## 2020-02-06 LAB
ALBUMIN SERPL-MCNC: 2.2 G/DL (ref 3.4–5)
ANION GAP SERPL CALC-SCNC: 8 MMOL/L (ref 0–18)
ANION GAP SERPL CALC-SCNC: 8 MMOL/L (ref 0–18)
BUN BLD-MCNC: 57 MG/DL (ref 7–18)
BUN BLD-MCNC: 57 MG/DL (ref 7–18)
BUN/CREAT SERPL: 23.3 (ref 10–20)
BUN/CREAT SERPL: 23.3 (ref 10–20)
CALCIUM BLD-MCNC: 7.6 MG/DL (ref 8.5–10.1)
CALCIUM BLD-MCNC: 7.6 MG/DL (ref 8.5–10.1)
CHLORIDE SERPL-SCNC: 103 MMOL/L (ref 98–112)
CHLORIDE SERPL-SCNC: 103 MMOL/L (ref 98–112)
CO2 SERPL-SCNC: 23 MMOL/L (ref 21–32)
CO2 SERPL-SCNC: 23 MMOL/L (ref 21–32)
CREAT BLD-MCNC: 2.45 MG/DL (ref 0.55–1.02)
CREAT BLD-MCNC: 2.45 MG/DL (ref 0.55–1.02)
GLUCOSE BLD-MCNC: 201 MG/DL (ref 70–99)
GLUCOSE BLD-MCNC: 201 MG/DL (ref 70–99)
GLUCOSE BLDC GLUCOMTR-MCNC: 194 MG/DL (ref 70–99)
GLUCOSE BLDC GLUCOMTR-MCNC: 208 MG/DL (ref 70–99)
GLUCOSE BLDC GLUCOMTR-MCNC: 210 MG/DL (ref 70–99)
HAV IGM SER QL: 1.9 MG/DL (ref 1.6–2.6)
HGB BLD-MCNC: 7.5 G/DL (ref 12–16)
OSMOLALITY SERPL CALC.SUM OF ELEC: 300 MOSM/KG (ref 275–295)
OSMOLALITY SERPL CALC.SUM OF ELEC: 300 MOSM/KG (ref 275–295)
PHOSPHATE SERPL-MCNC: 3.6 MG/DL (ref 2.5–4.9)
POTASSIUM SERPL-SCNC: 4.7 MMOL/L (ref 3.5–5.1)
POTASSIUM SERPL-SCNC: 4.7 MMOL/L (ref 3.5–5.1)
SODIUM SERPL-SCNC: 134 MMOL/L (ref 136–145)
SODIUM SERPL-SCNC: 134 MMOL/L (ref 136–145)

## 2020-02-06 PROCEDURE — 85018 HEMOGLOBIN: CPT | Performed by: HOSPITALIST

## 2020-02-06 PROCEDURE — 80069 RENAL FUNCTION PANEL: CPT | Performed by: HOSPITALIST

## 2020-02-06 PROCEDURE — 97535 SELF CARE MNGMENT TRAINING: CPT

## 2020-02-06 PROCEDURE — 97116 GAIT TRAINING THERAPY: CPT

## 2020-02-06 PROCEDURE — 97110 THERAPEUTIC EXERCISES: CPT

## 2020-02-06 PROCEDURE — 83735 ASSAY OF MAGNESIUM: CPT | Performed by: INTERNAL MEDICINE

## 2020-02-06 PROCEDURE — 82962 GLUCOSE BLOOD TEST: CPT

## 2020-02-06 RX ORDER — POLYETHYLENE GLYCOL 3350 17 G/17G
17 POWDER, FOR SOLUTION ORAL DAILY
Qty: 30 EACH | Refills: 0 | Status: SHIPPED
Start: 2020-02-07

## 2020-02-06 RX ORDER — SODIUM BICARBONATE 650 MG/1
1300 TABLET ORAL 3 TIMES DAILY
Qty: 90 TABLET | Refills: 0 | Status: SHIPPED
Start: 2020-02-06

## 2020-02-06 RX ORDER — HYDROCODONE BITARTRATE AND ACETAMINOPHEN 5; 325 MG/1; MG/1
1 TABLET ORAL EVERY 6 HOURS PRN
Qty: 20 TABLET | Refills: 0 | Status: SHIPPED | OUTPATIENT
Start: 2020-02-06

## 2020-02-06 NOTE — OCCUPATIONAL THERAPY NOTE
OCCUPATIONAL THERAPY TREATMENT NOTE - INPATIENT    Room Number: 415/415-A         Presenting Problem: s/p L closed reduction with intramedullary nail fixation     Problem List  Principal Problem:    Closed fracture of left hip, initial encounter ([facility]  Act rate  Location: (c/o L thigh 'throbbing')  Management Techniques:  Activity promotion     ACTIVITY TOLERANCE                         O2 SATURATIONS                ACTIVITIES OF DAILY LIVING ASSESSMENT  AM-PAC ‘6-Clicks’ Inpatient Daily Activity Short Form task at sink level with Min A   Comment: pt tolerated standing for 2 min with CGA            Goals  on:2/15/2020  Frequency: 5x/week

## 2020-02-06 NOTE — PROGRESS NOTES
EDGARDO WALLIS [facility]    Nephrology Progress Note      SUBJECTIVE:     No complaints today, denies SOB      PHYSICAL EXAM:     Vital Signs: /75 (BP Location: Right arm)   Pulse 88   Temp 98 °F (36.7 °C) (Oral)   Resp 16   Ht 5' 1\" (1.549 m)   W NEPERCENT 65.0 02/05/2020    LYPERCENT 20.3 02/05/2020    MOPERCENT 10.6 02/05/2020    EOPERCENT 3.1 02/05/2020    BAPERCENT 0.5 02/05/2020    NE 3.55 02/05/2020    LYMABS 1.11 02/05/2020    MOABSO 0.58 02/05/2020    EOABSO 0.17 02/05/2020    BAABSO 0.0 Daily  Normal Saline Flush 0.9 % injection 10 mL, 10 mL, Intravenous, PRN  docusate sodium (COLACE) cap 100 mg, 100 mg, Oral, BID  PEG 3350 (MIRALAX) powder packet 17 g, 17 g, Oral, Daily PRN  magnesium hydroxide (MILK OF MAGNESIA) 400 MG/5ML suspension 30 JOLENE RN     Jeannette Godinez MD   Nephrology  [facility]   [phone]

## 2020-02-06 NOTE — PHYSICAL THERAPY NOTE
PHYSICAL THERAPY TREATMENT NOTE - INPATIENT     Room Number: 850/351-B       Presenting Problem: left hip IM nailing    Problem List  Principal Problem:    Closed fracture of left hip, initial encounter [facility])  Active Problems:    Closed fracture of left hi and blankets)?: A Little   -   Sitting down on and standing up from a chair with arms (e.g., wheelchair, bedside commode, etc.): A Little   -   Moving from lying on back to sitting on the side of the bed?: A Little   How much help from another person does

## 2020-02-06 NOTE — PLAN OF CARE
Taking Tylenol or Norco prn pain. Up with one assist and walker. Call light and belongings in reach. Instructed on handwashing and safety issues. Rao to stay in place for one more week. Transfer to rehab later today.   Glucose levels monitored before me Short Term Goal  Description  Patient's Short Term Goal:     Interventions:   -   - See additional Care Plan goals for specific interventions  2/6/2020 1522 by Corby Gold RN  Outcome: Adequate for Discharge  2/6/2020 1519 by Corby Gold RN  Outcome: environment to reduce risk of injury  - Provide assistive devices as appropriate  - Consider OT/PT consult to assist with strengthening/mobility  - Encourage toileting schedule  2/6/2020 1522 by Jamil Ramachandran RN  Outcome: Adequate for Discharge  2/6/2020 ROE RN  Outcome: Adequate for Discharge  Goal: Oral mucous membranes remain intact  Description  INTERVENTIONS  - Assess oral mucosa and hygiene practices  - Implement preventative oral hygiene regimen  - Implement oral medicated treatments as ordered  2/6/ TOM AU  Outcome: Adequate for Discharge  2/6/2020 1519 by Lulu Bingham RN  Outcome: Adequate for Discharge     Problem: Impaired Functional Mobility  Goal: Achieve highest/safest level of mobility/gait  Description  Interventions:  - Assess patient's func

## 2020-02-06 NOTE — DISCHARGE SUMMARY
[facility] Hospitalist Discharge Summary   Patient ID:  Calin Paul [id number]  88 year old 7/1/1940    Admit date: 1/30/2020  Discharge date: 2/6/2020  Risk of Readmission Lace+ Score: 72  59-90 High Risk  29-58 Medium Risk  0-28   Low Risk    Primary Care Phys home losartan, if BP control needed will need to switch to alternative agent with hyperkalemia and fluctuating renal function     #HLD:  -statin      #Depression:  -continue home meds    EXAM:   GENERAL: no apparent distress, frail  NEURO: A/A Ox3  RESP: n Ask your nurse or doctor about these medications  · apixaban 2.5 MG Tabs  · PEG 3350 Pack  · Sennosides 17.2 MG Tabs  · sodium bicarbonate 650 MG Tabs         Activity: activity as tolerated  Diet: renal diet  Wound Care: as directed  Code Status: Full C

## 2020-02-06 NOTE — CM/SW NOTE
JAYASHREE received confirmation of insurance authorization. JAYASHREE confirmed with RN that pt is medically ready for discharge today. JAYASHREE called and spoke with Carlin Gleason from [facility] to arrange a time for discharge.  [facility] [place] [facility] at 5:00PM.     RN is aware of dis

## 2020-02-06 NOTE — PLAN OF CARE
Problem: Patient Centered Care  Goal: Patient preferences are identified and integrated in the patient's plan of care  Description  Interventions:  - What would you like us to know as we care for you?   - Provide timely, complete, and accurate informatio relaxation techniques  - Monitor for opioid side effects  - Notify MD/LIP if interventions unsuccessful or patient reports new pain  - Anticipate increased pain with activity and pre-medicate as appropriate  Outcome: Progressing     Problem: RISK FOR INFEC needed  Outcome: Progressing  Goal: Incision(s), wounds(s) or drain site(s) healing without S/S of infection  Description  INTERVENTIONS:  - Assess and document risk factors for pressure ulcer development  - Assess and document skin integrity  - Assess and hearing impaired and aphasic patients to use assistive/communication devices  Outcome: Progressing     Problem: Impaired Functional Mobility  Goal: Achieve highest/safest level of mobility/gait  Description  Interventions:  - Assess patient's functional ab

## 2020-02-07 NOTE — PAYOR COMM NOTE
--------------  DISCHARGE REVIEW    Leigh Maher MA [facility]  Subscriber #:  [id number]  Authorization Number: [id number]      Admit date: 1/31/20  Admit time:  0034  Discharge Date: 2/6/2020  5:30 PM     Admitting Physician: DO Joe Bajwa Phys so far this admission, hgb stable and improved to 8.4->8.1  -my need epogen, follow up with primary nephrologist     #E Coli UTI  #Pyuria  -completed 3 doses ceftriaxone, UA repeated with marked pyruia however no growth  -d/c off abx  -repeat UA as OP    PLAVIX     escitalopram 5 MG Tabs  Commonly known as:  LEXAPRO     HYDROcodone-acetaminophen 5-325 MG Tabs  Commonly known as:  NORCO  Take 1 tablet by mouth every 6 (six) hours as needed for Pain.      insulin glargine 100 UNIT/ML Soln  Commonly known as:

## 2020-02-11 ENCOUNTER — EXTERNAL FACILITY (OUTPATIENT)
Dept: FAMILY MEDICINE CLINIC | Facility: CLINIC | Age: 80
End: 2020-02-11

## 2020-02-11 DIAGNOSIS — E11.65 UNCONTROLLED TYPE 2 DIABETES MELLITUS WITH CIRCULATORY DISORDER, WITH LONG-TERM CURRENT USE OF INSULIN (HCC): ICD-10-CM

## 2020-02-11 DIAGNOSIS — S72.142D CLOSED 2-PART INTERTROCHANTERIC FRACTURE OF LEFT FEMUR WITH ROUTINE HEALING, SUBSEQUENT ENCOUNTER: ICD-10-CM

## 2020-02-11 DIAGNOSIS — E11.59 UNCONTROLLED TYPE 2 DIABETES MELLITUS WITH CIRCULATORY DISORDER, WITH LONG-TERM CURRENT USE OF INSULIN (HCC): ICD-10-CM

## 2020-02-11 DIAGNOSIS — Z79.4 UNCONTROLLED TYPE 2 DIABETES MELLITUS WITH CIRCULATORY DISORDER, WITH LONG-TERM CURRENT USE OF INSULIN (HCC): ICD-10-CM

## 2020-02-11 DIAGNOSIS — N18.30 STAGE 3 CHRONIC KIDNEY DISEASE (HCC): ICD-10-CM

## 2020-02-11 PROCEDURE — 99305 1ST NF CARE MODERATE MDM 35: CPT | Performed by: FAMILY MEDICINE

## 2020-02-12 ENCOUNTER — SNF ADMIT/H&P (OUTPATIENT)
Dept: INTERNAL MEDICINE CLINIC | Facility: SKILLED NURSING FACILITY | Age: 80
End: 2020-02-12

## 2020-02-12 DIAGNOSIS — I10 ESSENTIAL HYPERTENSION, BENIGN: ICD-10-CM

## 2020-02-12 DIAGNOSIS — Z09 SURGICAL FOLLOW-UP CARE: ICD-10-CM

## 2020-02-12 DIAGNOSIS — Z71.89 ENCOUNTER FOR MEDICATION REVIEW AND COUNSELING: ICD-10-CM

## 2020-02-12 DIAGNOSIS — Z09 FOLLOW UP: ICD-10-CM

## 2020-02-12 PROCEDURE — 99310 SBSQ NF CARE HIGH MDM 45: CPT | Performed by: NURSE PRACTITIONER

## 2020-02-12 NOTE — PROGRESS NOTES
Nitin Mtz  : 1940  Age 78year old  female patient is admitted to [address] [place] [address] for LISE. Chief complaint:Initial Assessment/Follow up S/P fall, left hip fracture, urinary retention, UTI, and DEBO.     HPI  Patient is an 68-year-old cyanosis, +pedal pulses. Back:No tenderness. Skin: Normal color, warm, and moist. No rashes, erythema, or diaphoresis. Wound: Left hip surgical incision, no signs of infection  Neuro: AOx3, no focal deficit, and didn't observe gait. Uses wheel chair .

## 2020-02-19 ENCOUNTER — SNF VISIT (OUTPATIENT)
Dept: INTERNAL MEDICINE CLINIC | Facility: SKILLED NURSING FACILITY | Age: 80
End: 2020-02-19

## 2020-02-19 DIAGNOSIS — R60.0 EDEMA OF LEFT LOWER EXTREMITY: ICD-10-CM

## 2020-02-19 DIAGNOSIS — M25.432 PAIN AND SWELLING OF LEFT WRIST: ICD-10-CM

## 2020-02-19 DIAGNOSIS — E86.0 DEHYDRATION: ICD-10-CM

## 2020-02-19 DIAGNOSIS — Z01.89 LABORATORY TEST: ICD-10-CM

## 2020-02-19 DIAGNOSIS — D64.9 ANEMIA, UNSPECIFIED TYPE: ICD-10-CM

## 2020-02-19 DIAGNOSIS — Z09 FOLLOW UP: ICD-10-CM

## 2020-02-19 DIAGNOSIS — M25.532 PAIN AND SWELLING OF LEFT WRIST: ICD-10-CM

## 2020-02-19 PROCEDURE — 99310 SBSQ NF CARE HIGH MDM 45: CPT | Performed by: NURSE PRACTITIONER

## 2020-02-19 NOTE — PROGRESS NOTES
Radha Alvarez  : 1940  Age 78year old  female patient is admitted to [address] [place] [address] for LISE. Chief complaint: Follow up S/P fall, left hip fracture, urinary retention, UTI, and DEBO. C/O LLE edema and wrist edema and reviewed labs resul OF SYSTEMS:See HPI. Constitutional: Denies fever, chills, unintentional weight loss or gain. CV: Denies SOB, dizziness,palpitations or CP. Respiratory: Denies SOB, cough or wheezing   GI: Denies black or bloody stools.  Denies Abd tenderness, diarrhea in 2 weeks  -Monitor labs     Urinary Retention:  -Urinary retention on admission, attempted void trial on 2/2 and failed,  -Indwelling Rao, repeat void trial in one week, follow up with Dr. Ellie Chilel if fails voiding trial     T2DM:  -A1C 6.4%  -CPM Glargin

## 2020-02-20 PROBLEM — S72.142D: Status: ACTIVE | Noted: 2020-02-20

## 2020-02-21 ENCOUNTER — SNF DISCHARGE (OUTPATIENT)
Dept: INTERNAL MEDICINE CLINIC | Facility: SKILLED NURSING FACILITY | Age: 80
End: 2020-02-21

## 2020-02-21 DIAGNOSIS — Z71.89 ENCOUNTER FOR MEDICATION REVIEW AND COUNSELING: ICD-10-CM

## 2020-02-21 DIAGNOSIS — Z09 FOLLOW UP: ICD-10-CM

## 2020-02-21 DIAGNOSIS — Z02.9 DISCHARGE PLANNING ISSUES: ICD-10-CM

## 2020-02-21 DIAGNOSIS — R60.0 EDEMA OF LEFT LOWER EXTREMITY: ICD-10-CM

## 2020-02-21 PROCEDURE — 99310 SBSQ NF CARE HIGH MDM 45: CPT | Performed by: NURSE PRACTITIONER

## 2020-02-21 NOTE — PROGRESS NOTES
Venkat Fleming  : 1940  Age 78year old  female patient is admitted to [address] [place] [address] for LISE. Chief complaint: Follow up S/P fall, S/P Left hip reduction, DEBO and follow up LLE edema and wrist edema and discharge planning.     HPI  Collette stools. Denies Abd tenderness, diarrhea or constipation. Denies N or Nitish Spire frequency/hematuria/dysuria. Skin: Denies rash or itching. Neuro: Denies weakness, syncope or headache.    Hematologic: Denies excessive bruising,hemoptysis, or any bleed UTI  -completed 3 doses ceftriaxone,repeat Ucx with no growth     DEBO on CKD   -baseline Crt 2.7,2/14-BUN-50, Crt 2.30   -renal US normal  -CPM bicarb tabs  -follow up with primary nephrologist at [address]  Urinary Retention:  -Urinary ret

## 2020-04-20 NOTE — ED NOTES
Main caregiver/ great nephew would like to be contacted on [facility] Lyssa Male

## 2025-01-10 LAB
AMB EXT HGBA1C: 6 %
AMB EXT MALB URINE CALC: 737 MG/24HR

## 2025-01-24 ENCOUNTER — APPOINTMENT (OUTPATIENT)
Dept: GENERAL RADIOLOGY | Facility: HOSPITAL | Age: 85
End: 2025-01-24
Attending: EMERGENCY MEDICINE
Payer: MEDICARE

## 2025-01-24 ENCOUNTER — HOSPITAL ENCOUNTER (INPATIENT)
Facility: HOSPITAL | Age: 85
LOS: 4 days | Discharge: NURSING, REHAB OR LONG-TERM CARE | End: 2025-01-28
Attending: EMERGENCY MEDICINE | Admitting: HOSPITALIST
Payer: MEDICARE

## 2025-01-24 DIAGNOSIS — N18.9 CHRONIC RENAL FAILURE, UNSPECIFIED CKD STAGE: ICD-10-CM

## 2025-01-24 DIAGNOSIS — E86.0 DEHYDRATION: ICD-10-CM

## 2025-01-24 DIAGNOSIS — D72.829 LEUKOCYTOSIS, UNSPECIFIED TYPE: ICD-10-CM

## 2025-01-24 DIAGNOSIS — N30.00 ACUTE CYSTITIS WITHOUT HEMATURIA: ICD-10-CM

## 2025-01-24 DIAGNOSIS — U07.1 COVID-19 VIRUS INFECTION: Primary | ICD-10-CM

## 2025-01-24 PROBLEM — A41.9 SEPSIS SECONDARY TO UTI (HCC): Status: ACTIVE | Noted: 2025-01-24

## 2025-01-24 PROBLEM — N39.0 SEPSIS SECONDARY TO UTI (HCC): Status: ACTIVE | Noted: 2025-01-24

## 2025-01-24 LAB
ALBUMIN SERPL-MCNC: 3.5 G/DL (ref 3.2–4.8)
ALP LIVER SERPL-CCNC: 102 U/L
ALT SERPL-CCNC: 13 U/L
ANION GAP SERPL CALC-SCNC: 16 MMOL/L (ref 0–18)
AST SERPL-CCNC: 32 U/L (ref ?–34)
ATRIAL RATE: 101 BPM
BASOPHILS # BLD AUTO: 0.05 X10(3) UL (ref 0–0.2)
BASOPHILS NFR BLD AUTO: 0.2 %
BILIRUB DIRECT SERPL-MCNC: 0.3 MG/DL (ref ?–0.3)
BILIRUB SERPL-MCNC: 0.5 MG/DL (ref 0.2–1.1)
BILIRUB UR QL: NEGATIVE
BUN BLD-MCNC: 58 MG/DL (ref 9–23)
BUN/CREAT SERPL: 18.4 (ref 10–20)
CALCIUM BLD-MCNC: 8.6 MG/DL (ref 8.7–10.4)
CHLORIDE SERPL-SCNC: 96 MMOL/L (ref 98–112)
CO2 SERPL-SCNC: 17 MMOL/L (ref 21–32)
COLOR UR: YELLOW
CREAT BLD-MCNC: 3.16 MG/DL
DEPRECATED RDW RBC AUTO: 47.2 FL (ref 35.1–46.3)
EGFRCR SERPLBLD CKD-EPI 2021: 14 ML/MIN/1.73M2 (ref 60–?)
EOSINOPHIL # BLD AUTO: 0.01 X10(3) UL (ref 0–0.7)
EOSINOPHIL NFR BLD AUTO: 0 %
ERYTHROCYTE [DISTWIDTH] IN BLOOD BY AUTOMATED COUNT: 15.5 % (ref 11–15)
EST. AVERAGE GLUCOSE BLD GHB EST-MCNC: 146 MG/DL (ref 68–126)
FLUAV + FLUBV RNA SPEC NAA+PROBE: NEGATIVE
FLUAV + FLUBV RNA SPEC NAA+PROBE: NEGATIVE
GLUCOSE BLD-MCNC: 182 MG/DL (ref 70–99)
GLUCOSE BLDC GLUCOMTR-MCNC: 190 MG/DL (ref 70–99)
GLUCOSE BLDC GLUCOMTR-MCNC: 195 MG/DL (ref 70–99)
GLUCOSE UR-MCNC: NORMAL MG/DL
HBA1C MFR BLD: 6.7 % (ref ?–5.7)
HCT VFR BLD AUTO: 26.3 %
HGB BLD-MCNC: 9.3 G/DL
IMM GRANULOCYTES # BLD AUTO: 0.18 X10(3) UL (ref 0–1)
IMM GRANULOCYTES NFR BLD: 0.8 %
KETONES UR-MCNC: NEGATIVE MG/DL
LACTATE SERPL-SCNC: 1.5 MMOL/L (ref 0.5–2)
LEUKOCYTE ESTERASE UR QL STRIP.AUTO: 500
LYMPHOCYTES # BLD AUTO: 0.33 X10(3) UL (ref 1–4)
LYMPHOCYTES NFR BLD AUTO: 1.5 %
MAGNESIUM SERPL-MCNC: 1.9 MG/DL (ref 1.6–2.6)
MCH RBC QN AUTO: 29.1 PG (ref 26–34)
MCHC RBC AUTO-ENTMCNC: 35.4 G/DL (ref 31–37)
MCV RBC AUTO: 82.2 FL
MONOCYTES # BLD AUTO: 0.55 X10(3) UL (ref 0.1–1)
MONOCYTES NFR BLD AUTO: 2.6 %
NEUTROPHILS # BLD AUTO: 20.31 X10 (3) UL (ref 1.5–7.7)
NEUTROPHILS # BLD AUTO: 20.31 X10(3) UL (ref 1.5–7.7)
NEUTROPHILS NFR BLD AUTO: 94.9 %
NITRITE UR QL STRIP.AUTO: NEGATIVE
OSMOLALITY SERPL CALC.SUM OF ELEC: 289 MOSM/KG (ref 275–295)
P AXIS: 75 DEGREES
P-R INTERVAL: 196 MS
PH UR: 5.5 [PH] (ref 5–8)
PLATELET # BLD AUTO: 261 10(3)UL (ref 150–450)
POTASSIUM SERPL-SCNC: 4.4 MMOL/L (ref 3.5–5.1)
PROT SERPL-MCNC: 6.4 G/DL (ref 5.7–8.2)
PROT UR-MCNC: 100 MG/DL
Q-T INTERVAL: 340 MS
QRS DURATION: 72 MS
QTC CALCULATION (BEZET): 440 MS
R AXIS: 20 DEGREES
RBC # BLD AUTO: 3.2 X10(6)UL
RBC #/AREA URNS AUTO: >10 /HPF
RSV RNA SPEC NAA+PROBE: NEGATIVE
SARS-COV-2 RNA RESP QL NAA+PROBE: DETECTED
SODIUM SERPL-SCNC: 129 MMOL/L (ref 136–145)
SP GR UR STRIP: 1.01 (ref 1–1.03)
T AXIS: 31 DEGREES
UROBILINOGEN UR STRIP-ACNC: NORMAL
VENTRICULAR RATE: 101 BPM
WBC # BLD AUTO: 21.4 X10(3) UL (ref 4–11)
WBC #/AREA URNS AUTO: >50 /HPF
WBC CLUMPS UR QL AUTO: PRESENT /HPF

## 2025-01-24 PROCEDURE — 71045 X-RAY EXAM CHEST 1 VIEW: CPT | Performed by: EMERGENCY MEDICINE

## 2025-01-24 PROCEDURE — 99223 1ST HOSP IP/OBS HIGH 75: CPT | Performed by: HOSPITALIST

## 2025-01-24 RX ORDER — AMLODIPINE BESYLATE 2.5 MG/1
10 TABLET ORAL DAILY
COMMUNITY

## 2025-01-24 RX ORDER — AMLODIPINE BESYLATE 2.5 MG/1
2.5 TABLET ORAL DAILY
Status: DISCONTINUED | OUTPATIENT
Start: 2025-01-24 | End: 2025-01-28

## 2025-01-24 RX ORDER — METOCLOPRAMIDE HYDROCHLORIDE 5 MG/ML
10 INJECTION INTRAMUSCULAR; INTRAVENOUS EVERY 8 HOURS PRN
Status: DISCONTINUED | OUTPATIENT
Start: 2025-01-24 | End: 2025-01-28

## 2025-01-24 RX ORDER — HEPARIN SODIUM 5000 [USP'U]/ML
5000 INJECTION, SOLUTION INTRAVENOUS; SUBCUTANEOUS EVERY 12 HOURS SCHEDULED
Status: DISCONTINUED | OUTPATIENT
Start: 2025-01-24 | End: 2025-01-28

## 2025-01-24 RX ORDER — DEXTROSE MONOHYDRATE 25 G/50ML
50 INJECTION, SOLUTION INTRAVENOUS
Status: DISCONTINUED | OUTPATIENT
Start: 2025-01-24 | End: 2025-01-28

## 2025-01-24 RX ORDER — VANCOMYCIN HYDROCHLORIDE 125 MG/1
125 CAPSULE ORAL DAILY
Status: DISCONTINUED | OUTPATIENT
Start: 2025-01-24 | End: 2025-01-28

## 2025-01-24 RX ORDER — ONDANSETRON 2 MG/ML
4 INJECTION INTRAMUSCULAR; INTRAVENOUS EVERY 6 HOURS PRN
Status: DISCONTINUED | OUTPATIENT
Start: 2025-01-24 | End: 2025-01-28

## 2025-01-24 RX ORDER — ATORVASTATIN CALCIUM 20 MG/1
20 TABLET, FILM COATED ORAL NIGHTLY
Status: DISCONTINUED | OUTPATIENT
Start: 2025-01-24 | End: 2025-01-28

## 2025-01-24 RX ORDER — SODIUM BICARBONATE 650 MG/1
1300 TABLET ORAL 2 TIMES DAILY
Status: DISCONTINUED | OUTPATIENT
Start: 2025-01-24 | End: 2025-01-28

## 2025-01-24 RX ORDER — ASPIRIN 81 MG/1
81 TABLET ORAL DAILY
COMMUNITY

## 2025-01-24 RX ORDER — INSULIN DEGLUDEC 100 U/ML
5 INJECTION, SOLUTION SUBCUTANEOUS NIGHTLY
Status: DISCONTINUED | OUTPATIENT
Start: 2025-01-24 | End: 2025-01-28

## 2025-01-24 RX ORDER — ASPIRIN 81 MG/1
81 TABLET ORAL DAILY
Status: DISCONTINUED | OUTPATIENT
Start: 2025-01-24 | End: 2025-01-28

## 2025-01-24 RX ORDER — NICOTINE POLACRILEX 4 MG
15 LOZENGE BUCCAL
Status: DISCONTINUED | OUTPATIENT
Start: 2025-01-24 | End: 2025-01-28

## 2025-01-24 RX ORDER — ESCITALOPRAM OXALATE 10 MG/1
10 TABLET ORAL DAILY
Status: DISCONTINUED | OUTPATIENT
Start: 2025-01-24 | End: 2025-01-28

## 2025-01-24 RX ORDER — SODIUM BICARBONATE 650 MG/1
1300 TABLET ORAL 2 TIMES DAILY
COMMUNITY

## 2025-01-24 RX ORDER — CLOPIDOGREL BISULFATE 75 MG/1
75 TABLET ORAL DAILY
Status: DISCONTINUED | OUTPATIENT
Start: 2025-01-24 | End: 2025-01-28

## 2025-01-24 RX ORDER — ACETAMINOPHEN 500 MG
500 TABLET ORAL EVERY 4 HOURS PRN
Status: DISCONTINUED | OUTPATIENT
Start: 2025-01-24 | End: 2025-01-28

## 2025-01-24 RX ORDER — SODIUM CHLORIDE 9 MG/ML
INJECTION, SOLUTION INTRAVENOUS CONTINUOUS
Status: DISCONTINUED | OUTPATIENT
Start: 2025-01-24 | End: 2025-01-26

## 2025-01-24 RX ORDER — NICOTINE POLACRILEX 4 MG
30 LOZENGE BUCCAL
Status: DISCONTINUED | OUTPATIENT
Start: 2025-01-24 | End: 2025-01-28

## 2025-01-24 RX ORDER — ATORVASTATIN CALCIUM 20 MG/1
20 TABLET, FILM COATED ORAL NIGHTLY
COMMUNITY

## 2025-01-24 NOTE — H&P
[facility]    PATIENT'S NAME: RAYRAY PAIZ   ATTENDING PHYSICIAN: Sanjay Allen DO   PATIENT ACCOUNT#:   [id number]    LOCATION:  [address]  MEDICAL RECORD #:   [id number]       YOB: 1940  ADMISSION DATE:       01/24/2025    HISTORY AND PHYSICAL EXAMINATION    CHIEF COMPLAINT:  Urinary tract infection, acute sepsis, acute on chronic kidney injury.    HISTORY OF PRESENT ILLNESS:  Patient is an 84-year-old  female who was brought in today by her family for evaluation of fever, chills, and weakness for the last 2 days.  Usually gets her care at [facility].  In the emergency room today, CBC showed white blood cell count of 21.4 with left shift.  Chemistry showed GFR of 14, slightly below her baseline; bicarb 17; BUN and creatinine 58 and 3.16; sodium 129; chloride 96.  GeneXpert viral panel also was positive for COVID.  Urinalysis showed evidence of gross urinary tract infection.  Blood cultures, urine cultures, and lactic acid still pending.  Chest x-ray showed no acute findings.  Started on IV fluids and IV Rocephin, and she will be admitted to the hospital for further management.     PAST MEDICAL HISTORY:  Patient has a history of diabetes mellitus type 2; hypertension; hyperlipidemia; diabetic nephropathy; and chronic kidney disease stage 4, has required brief dialysis in the past.  History of C difficile infection after antibiotic exposure.  Peripheral arterial disease, status post right popliteal artery angioplasty; bilateral carotid atherosclerosis with chronic occlusion on the left side and right carotid stents; secondary hyperparathyroidism; anemia of chronic kidney disease; diverticulitis.  She has history of non-Hodgkin follicular lymphoma, status post chemotherapy, currently in remission.  History of urinary tract infections.    PAST SURGICAL HISTORY:  Left femoral-popliteal bypass, remote hysterectomy, left hip open reduction and internal  fixation.    MEDICATIONS:  Please see medication reconciliation list.     ALLERGIES:  Levaquin.    FAMILY HISTORY:  Positive for diabetes mellitus type 2 and hypertension.    SOCIAL HISTORY:  Ex-tobacco user.  No current tobacco, alcohol, or drug use.  Lives with her family.  Usually independent for basic activities of daily living.     REVIEW OF SYSTEMS:  Fever, chills, fatigue for the last 2 to 3 days.  Patient is not able to give me any other details.  No sick contacts.        PHYSICAL EXAMINATION:    GENERAL:  Alert and oriented to time, place and person.  Mild distress.   VITAL SIGNS:  Temperature 98.6, pulse 86, respiratory rate 21, blood pressure 124/68, pulse ox 99% on room air.  HEENT:  Atraumatic.  Oropharynx clear.  Dry mucous membranes.  Ears and nose normal.  Eyes:  Anicteric sclerae.   NECK:  Supple.  No lymphadenopathy.  Trachea midline.  Full range of motion.   LUNGS:  Clear to auscultation bilaterally.  Normal respiratory effort.    HEART:  Regular rate and rhythm.  S1 and S2 auscultated.  No murmur.    ABDOMEN:  Soft, nondistended.  No tenderness.  Positive bowel sounds.   EXTREMITIES:  No peripheral edema, clubbing or cyanosis.   NEUROLOGIC:  Motor and sensory intact.    ASSESSMENT:    1.   Urinary tract infection.  2.   Acute on chronic kidney injury.  3.   Diabetes mellitus type 2.    PLAN:  Patient will be admitted to general medical floor.  Follow up on lactic acid, blood and urine cultures.  Continue IV fluids, IV Rocephin.  Oral vancomycin for C difficile prophylaxis.  Monitor hemodynamic status closely.  Monitor Accu-Cheks.  Fall precautions.  When clinically better, physical and occupational therapy.  Further recommendations to follow.     Dictated By Delio Giraldo MD  d: 01/24/2025 14:27:10  t: 01/24/2025 15:18:14  Job [id number]  /

## 2025-01-24 NOTE — ED INITIAL ASSESSMENT (HPI)
Pt to ED with family with c/o fever and dizziness that started last night. Pt c/o fatigue. Family states patient getting over a \"cold\". Family states recent UTI and given oral antibiotics. No respiratory distress noted. Pt denies chest pain.

## 2025-01-24 NOTE — ED QUICK NOTES
Orders for admission, patient is aware of plan and ready to go upstairs. Any questions, please call ED RN susana at extension [phone].     Patient Covid vaccination status: Unvaccinated     COVID Test Ordered in ED: SARS-CoV-2/Flu A and B/RSV by PCR (GeneXpert)    COVID Suspicion at Admission: N/A    Running Infusions:      Mental Status/LOC at time of transport: a/o x3    Other pertinent information: Cov (+)  CIWA score: N/A   NIH score:  N/A

## 2025-01-24 NOTE — ED PROVIDER NOTES
Patient Seen in: [facility] Emergency Department    History     Chief Complaint   Patient presents with    Dizziness    Fever       HPI    84-year-old female presents ER for generalized weakness as well as decreased appetite and fever.  Patient with a past medical history of insulin dependent diabetes, hyperlipidemia, hypertension, UTI.  Patient recently treated for urinary tract infection.  Family concerned she may have a UTI again    History reviewed.   Past Medical History:    Anxiety    Essential hypertension    Hyperlipidemia    Type 1 diabetes mellitus (HCC)       History reviewed.   Past Surgical History:   Procedure Laterality Date    Appendectomy      Total abdom hysterectomy           Medications :  Prescriptions Prior to Admission[1]     History reviewed. No pertinent family history.    Smoking Status:   Social History     Socioeconomic History    Marital status:    Tobacco Use    Smoking status: Every Day    Smokeless tobacco: Never       ROS  All pertinent positives for the review of systems are mentioned in the HPI  All other organ systems are reviewed and are negative.    Constitutional and vital signs reviewed.      Social History and Family History elements reviewed from today, pertinent positives to the presenting problem noted.    Physical Exam     ED Triage Vitals [01/24/25 1121]   /68   Pulse 72   Resp 19   Temp 98.6 °F (37 °C)   Temp src Temporal   SpO2 98 %   O2 Device None (Room air)       All measures to prevent infection transmission during my interaction with the patient were taken. The patient was already wearing a droplet mask on my arrival to the room. Personal protective equipment including droplet mask, eye protection, and gloves were worn throughout the duration of the exam.  Handwashing was performed prior to and after the exam.  Stethoscope and any equipment used during my examination was cleaned with super sani-cloth germicidal wipes following the exam.      Physical Exam  Vitals and nursing note reviewed.   Constitutional:       Appearance: She is well-developed.   HENT:      Head: Normocephalic and atraumatic.      Right Ear: External ear normal.      Left Ear: External ear normal.      Nose: Nose normal.   Eyes:      Conjunctiva/sclera: Conjunctivae normal.      Pupils: Pupils are equal, round, and reactive to light.   Cardiovascular:      Rate and Rhythm: Normal rate and regular rhythm.      Heart sounds: Normal heart sounds.   Pulmonary:      Effort: Pulmonary effort is normal.      Breath sounds: Normal breath sounds.   Abdominal:      General: Bowel sounds are normal.      Palpations: Abdomen is soft.   Musculoskeletal:         General: Normal range of motion.      Cervical back: Normal range of motion and neck supple.   Skin:     General: Skin is warm and dry.   Neurological:      Mental Status: She is oriented to person, place, and time. She is lethargic.      Deep Tendon Reflexes: Reflexes are normal and symmetric.   Psychiatric:         Behavior: Behavior normal.         Thought Content: Thought content normal.         Judgment: Judgment normal.         ED Course        Labs Reviewed   BASIC METABOLIC PANEL (8) - Abnormal; Notable for the following components:       Result Value    Glucose 182 (*)     Sodium 129 (*)     Chloride 96 (*)     CO2 17.0 (*)     BUN 58 (*)     Creatinine 3.16 (*)     Calcium, Total 8.6 (*)     eGFR-Cr 14 (*)     All other components within normal limits   CBC WITH DIFFERENTIAL WITH PLATELET - Abnormal; Notable for the following components:    WBC 21.4 (*)     RBC 3.20 (*)     HGB 9.3 (*)     HCT 26.3 (*)     RDW-SD 47.2 (*)     RDW 15.5 (*)     Neutrophil Absolute Prelim 20.31 (*)     Neutrophil Absolute 20.31 (*)     Lymphocyte Absolute 0.33 (*)     All other components within normal limits   URINALYSIS WITH CULTURE REFLEX - Abnormal; Notable for the following components:    Clarity Urine Turbid (*)     Blood Urine 2+ (*)      Protein Urine 100 (*)     Leukocyte Esterase Urine 500 (*)     WBC Urine >50 (*)     RBC Urine >10 (*)     Bacteria Urine 3+ (*)     Squamous Epi. Cells Few (*)     WBC Clump Present (*)     All other components within normal limits   SARS-COV-2/FLU A AND B/RSV BY PCR (GENEXPERT) - Abnormal; Notable for the following components:    SARS-CoV-2 (COVID-19) - (GeneXpert) Detected (*)     All other components within normal limits    Narrative:     This test is intended for the qualitative detection and differentiation of SARS-CoV-2, influenza A, influenza B, and respiratory syncytial virus (RSV) viral RNA in nasopharyngeal or nares swabs from individuals suspected of respiratory viral infection consistent with COVID-19 by their healthcare provider. Signs and symptoms of respiratory viral infection due to SARS-CoV-2, influenza, and RSV can be similar.                                    Test performed using the Xpert Xpress SARS-CoV-2/FLU/RSV (real time RT-PCR)  assay on the GeneXpert instrument, [employer], [place] [address].                   This test is being used under the Food and Drug Administration's Emergency Use Authorization.                                    The authorized Fact Sheet for Healthcare Providers for this assay is available upon request from the laboratory.   HEPATIC FUNCTION PANEL (7) - Normal   MAGNESIUM - Normal   LACTIC ACID, PLASMA - Normal   URINE CULTURE, ROUTINE   BLOOD CULTURE   BLOOD CULTURE     EKG    Rate, intervals and axes as noted on EKG Report.  Rate: 101  Rhythm: Sinus Rhythm  Reading: Sinus tachycardia, no ST deviation, normal axis             Imaging Results Available and Reviewed while in ED: XR CHEST AP PORTABLE  (CPT=71045)    Result Date: 1/24/2025  CONCLUSION:  1. Suboptimal inspiration with probable bibasilar right greater than left atelectasis.  Less likely small areas of pneumonia.    Dictated by (CST): Mohamud Infante MD on 1/24/2025 at 1:29 PM     Finalized by (CST):  Mohamud Infante MD on 1/24/2025 at 1:31 PM         ED Medications Administered:   Medications   sodium chloride 0.9 % IV bolus 1,000 mL (0 mL Intravenous Stopped 1/24/25 1430)   cefTRIAXone (Rocephin) 2 g in sodium chloride 0.9% 100 mL IVPB-ADDV (0 g Intravenous Stopped 1/24/25 1515)         MDM     Vitals:    01/24/25 1315 01/24/25 1330 01/24/25 1400 01/24/25 1500   BP: 148/62 154/65 156/64 (!) 165/64   Pulse: 89 86 86 88   Resp: 26 26 21 25   Temp:       TempSrc:       SpO2: 99% 100% 99% 98%   Weight:         *I personally reviewed and interpreted all ED vitals.  I also personally reviewed all labs and imaging if ordered    Pulse Ox: 97%, Room air, Normal     Monitor Interpretation:   normal sinus rhythm    Differential Diagnosis/ Diagnostic Considerations: Dehydration, UTI, DEBO, electrolyte abnormality, viral syndrome, fever    Medical Record Review: I personally reviewed available prior medical records for any recent pertinent discharge summaries, testing, and procedures and reviewed those reports.    Complicating Factors: The patient already has does not have any pertinent problems on file. to contribute to the complexity of this ED evaluation.    Medical Decision Making  84-year-old female presents ER for generalized weakness and fever since yesterday.  Patient tested positive for COVID-19.  Patient also with acute cystitis with leukocytosis.  Patient slightly dehydrated as well with baseline renal function.  Patient given  2 L IV fluid as well as 2 g of Rocephin IV.  Patient will be admitted for further evaluation.  [employer] hospitalist notified of admission.  Patient's nephew at bedside made aware of the disposition of admission    Total Critical Care Time: 36 minutes including time spent examining and re-evaluating the patient, ordering and reviewing laboratory tests, documenting, reviewing previous records, obtaining information from the family, and speaking with consultants, admitting doctors, nurses and  medics and excludes any time spent on procedures.      Problems Addressed:  Acute cystitis without hematuria: acute illness or injury  Chronic renal failure, unspecified CKD stage: chronic illness or injury  COVID-19 virus infection: acute illness or injury  Dehydration: acute illness or injury  Leukocytosis, unspecified type: acute illness or injury    Amount and/or Complexity of Data Reviewed  Independent Historian:      Details: Medical history obtained from the nephew who is bedside states he been feeling weak with decreased appetite since yesterday.  Labs: ordered. Decision-making details documented in ED Course.  Radiology: ordered and independent interpretation performed. Decision-making details documented in ED Course.     Details: Chest x-ray reviewed by myself shows no acute cardiopulmonary issues        Condition upon leaving the department: Stable    Disposition and Plan     Clinical Impression:  1. COVID-19 virus infection    2. Acute cystitis without hematuria    3. Leukocytosis, unspecified type    4. Chronic renal failure, unspecified CKD stage    5. Dehydration        Disposition:  Admit    Follow-up:  No follow-up provider specified.    Medications Prescribed:  Current Discharge Medication List          Hospital Problems       Present on Admission  Date Reviewed: 2/20/2020            ICD-10-CM Noted POA    * (Principal) COVID-19 virus infection U07.1 1/24/2025 Unknown               [1] (Not in a hospital admission)

## 2025-01-25 PROBLEM — R78.81 BACTEREMIA: Status: ACTIVE | Noted: 2025-01-25

## 2025-01-25 LAB
ANION GAP SERPL CALC-SCNC: 12 MMOL/L (ref 0–18)
BASOPHILS # BLD AUTO: 0.03 X10(3) UL (ref 0–0.2)
BASOPHILS NFR BLD AUTO: 0.2 %
BUN BLD-MCNC: 59 MG/DL (ref 9–23)
BUN/CREAT SERPL: 19.2 (ref 10–20)
CALCIUM BLD-MCNC: 8.1 MG/DL (ref 8.7–10.4)
CHLORIDE SERPL-SCNC: 100 MMOL/L (ref 98–112)
CO2 SERPL-SCNC: 18 MMOL/L (ref 21–32)
CREAT BLD-MCNC: 3.07 MG/DL
DEPRECATED RDW RBC AUTO: 46.8 FL (ref 35.1–46.3)
EGFRCR SERPLBLD CKD-EPI 2021: 14 ML/MIN/1.73M2 (ref 60–?)
EOSINOPHIL # BLD AUTO: 0 X10(3) UL (ref 0–0.7)
EOSINOPHIL NFR BLD AUTO: 0 %
ERYTHROCYTE [DISTWIDTH] IN BLOOD BY AUTOMATED COUNT: 15.3 % (ref 11–15)
GLUCOSE BLD-MCNC: 130 MG/DL (ref 70–99)
GLUCOSE BLDC GLUCOMTR-MCNC: 135 MG/DL (ref 70–99)
GLUCOSE BLDC GLUCOMTR-MCNC: 168 MG/DL (ref 70–99)
GLUCOSE BLDC GLUCOMTR-MCNC: 199 MG/DL (ref 70–99)
GLUCOSE BLDC GLUCOMTR-MCNC: 278 MG/DL (ref 70–99)
HCT VFR BLD AUTO: 25.4 %
HGB BLD-MCNC: 8.8 G/DL
IMM GRANULOCYTES # BLD AUTO: 0.15 X10(3) UL (ref 0–1)
IMM GRANULOCYTES NFR BLD: 1.2 %
LYMPHOCYTES # BLD AUTO: 0.37 X10(3) UL (ref 1–4)
LYMPHOCYTES NFR BLD AUTO: 2.9 %
MCH RBC QN AUTO: 29 PG (ref 26–34)
MCHC RBC AUTO-ENTMCNC: 34.6 G/DL (ref 31–37)
MCV RBC AUTO: 83.8 FL
MONOCYTES # BLD AUTO: 0.37 X10(3) UL (ref 0.1–1)
MONOCYTES NFR BLD AUTO: 2.9 %
NEUTROPHILS # BLD AUTO: 11.65 X10 (3) UL (ref 1.5–7.7)
NEUTROPHILS # BLD AUTO: 11.65 X10(3) UL (ref 1.5–7.7)
NEUTROPHILS NFR BLD AUTO: 92.8 %
OSMOLALITY SERPL CALC.SUM OF ELEC: 288 MOSM/KG (ref 275–295)
PLATELET # BLD AUTO: 240 10(3)UL (ref 150–450)
POTASSIUM SERPL-SCNC: 3.8 MMOL/L (ref 3.5–5.1)
RBC # BLD AUTO: 3.03 X10(6)UL
SODIUM SERPL-SCNC: 130 MMOL/L (ref 136–145)
WBC # BLD AUTO: 12.6 X10(3) UL (ref 4–11)

## 2025-01-25 PROCEDURE — 99233 SBSQ HOSP IP/OBS HIGH 50: CPT | Performed by: HOSPITALIST

## 2025-01-25 NOTE — PLAN OF CARE
Problem: Patient Centered Care  Goal: Patient preferences are identified and integrated in the patient's plan of care  Description: Interventions:  - What would you like us to know as we care for you?   - Provide timely, complete, and accurate information to patient/family  - Incorporate patient and family knowledge, values, beliefs, and cultural backgrounds into the planning and delivery of care  - Encourage patient/family to participate in care and decision-making at the level they choose  - Honor patient and family perspectives and choices  Outcome: Progressing     Problem: PAIN - ADULT  Goal: Verbalizes/displays adequate comfort level or patient's stated pain goal  Description: INTERVENTIONS:  - Encourage pt to monitor pain and request assistance  - Assess pain using appropriate pain scale  - Administer analgesics based on type and severity of pain and evaluate response  - Implement non-pharmacological measures as appropriate and evaluate response  - Consider cultural and social influences on pain and pain management  - Manage/alleviate anxiety  - Utilize distraction and/or relaxation techniques  - Monitor for opioid side effects  - Notify MD/LIP if interventions unsuccessful or patient reports new pain  - Anticipate increased pain with activity and pre-medicate as appropriate  Outcome: Progressing     Problem: RESPIRATORY - ADULT  Goal: Achieves optimal ventilation and oxygenation  Description: INTERVENTIONS:  - Assess for changes in respiratory status  - Assess for changes in mentation and behavior  - Position to facilitate oxygenation and minimize respiratory effort  - Oxygen supplementation based on oxygen saturation or ABGs  - Provide Smoking Cessation handout, if applicable  - Encourage broncho-pulmonary hygiene including cough, deep breathe, Incentive Spirometry  - Assess the need for suctioning and perform as needed  - Assess and instruct to report SOB or any respiratory difficulty  - Respiratory  Therapy support as indicated  - Manage/alleviate anxiety  - Monitor for signs/symptoms of CO2 retention  Outcome: Progressing

## 2025-01-25 NOTE — PHYSICAL THERAPY NOTE
PHYSICAL THERAPY EVALUATION - INPATIENT     Room Number: 554/554-A  Evaluation Date: 1/25/2025  Type of Evaluation: Initial   Physician Order: PT Eval and Treat    Presenting Problem: admitted with fever, dizziness, UTI. also found to be covid 19 + on 1/24/25  Co-Morbidities : DM, HTN, CKD, L fem pop bypass, L hip ORIF  Reason for Therapy: Mobility Dysfunction and Discharge Planning    PHYSICAL THERAPY ASSESSMENT   Patient is a 84 year old female admitted 1/24/2025 for fever, dizziness, UTI. also found to be covid 19 + on 1/24/25.  Prior to admission, patient's baseline is ambulatory with RW, lives at home with nephew and nephew's wife in a house with 5 steps to enter.  Patient is currently functioning below baseline with bed mobility, transfers, gait, stair negotiation, and standing prolonged periods.  Patient is requiring moderate assist as a result of the following impairments: decreased functional strength, decreased endurance/aerobic capacity, pain, impaired standing balance, and decreased muscular endurance.  Physical Therapy will continue to follow for duration of hospitalization.    Patient will benefit from continued skilled PT Services to promote return to prior level of function and safety with continuous assistance and gradual rehabilitative therapy .    PLAN DURING HOSPITALIZATION  Nursing Mobility Recommendation : Lift Equipment  PT Device Recommendation: Rolling walker  PT Treatment Plan: Bed mobility;Patient education;Strengthening;Transfer training;Balance training  Rehab Potential : Good  Frequency (Obs): 3-5x/week     PHYSICAL THERAPY MEDICAL/SOCIAL HISTORY     Problem List  Principal Problem:    COVID-19 virus infection  Active Problems:    Acute cystitis without hematuria    Leukocytosis, unspecified type    Chronic renal failure, unspecified CKD stage    Dehydration    Sepsis secondary to UTI (HCC)    Bacteremia      HOME SITUATION  Type of Home: House  Home Layout: Multi-level (pt 's  bedroom/bathroom on main floor)  Stairs to Enter : 5   Railing: Yes              Lives With:  (nephew and nephew's wife)    Drives: No   Patient Regularly Uses: Rolling walker     Prior Level of [place]: ambulatory with RW    SUBJECTIVE  \"I can't move too. I'm tired and my back hurts\"    PHYSICAL THERAPY EXAMINATION   OBJECTIVE  Precautions: Limb alert - left;Bed/chair alarm  Fall Risk: High fall risk    WEIGHT BEARING RESTRICTION   none    PAIN ASSESSMENT  Ratin  Location: back  Management Techniques: Activity promotion;Repositioning    COGNITION  Overall Cognitive Status:  Impaired  Orientation Level:  oriented to place and oriented to person  Following Commands:  follows one step commands with repetition  Problem Solving:  assistance required to generate solutions    RANGE OF MOTION AND STRENGTH ASSESSMENT  Upper extremity ROM and strength are within functional limits   Lower extremity ROM is within functional limits   Lower extremity strength is grossly 3/5    BALANCE  Static Sitting: Fair  Dynamic Sitting: Fair -  Static Standing: Poor +       AM-PAC '6-Clicks' INPATIENT SHORT FORM - BASIC MOBILITY  How much difficulty does the patient currently have...  Patient Difficulty: Turning over in bed (including adjusting bedclothes, sheets and blankets)?: A Lot   Patient Difficulty: Sitting down on and standing up from a chair with arms (e.g., wheelchair, bedside commode, etc.): A Lot   Patient Difficulty: Moving from lying on back to sitting on the side of the bed?: A Lot   How much help from another person does the patient currently need...   Help from Another: Moving to and from a bed to a chair (including a wheelchair)?: A Lot   Help from Another: Need to walk in hospital room?: A Lot   Help from Another: Climbing 3-5 steps with a railing?: Total     AM-PAC Score:  Raw Score: 11   Approx Degree of Impairment: 72.57%   Standardized Score (AM-PAC Scale): 33.86   CMS Modifier (G-Code): CL    FUNCTIONAL  ABILITY STATUS  Functional Mobility/Gait Assessment  Gait Assistance: Not tested (pt unable to initiate step at this time, poor standing balance and tolerance)  Rolling: moderate assist  Supine to Sit: moderate assist  Sit to Supine: moderate assist  Sit to Stand: moderate assist    Exercise/Education Provided:  Bed mobility  Functional activity tolerated  Lower therapeutic exercise:  Ankle pumps  Hip AB/AD  Heel slides  Transfer training    Skilled Therapy Provided: pt received in bed. RN approved activity. Pt participated well with PT. Able to perform supine to sit with mod assist. Extra time to complete due to weakness and c/o back pain. Able to stand with RW, mod assist. Standing tolerance ~ 30 secs., manual cues to maintain upright posture    The patient's Approx Degree of Impairment: 72.57% has been calculated based on documentation in the [facility] '6 clicks' Inpatient Basic Mobility Short Form.  Research supports that patients with this level of impairment may benefit from gradual rehab.  Final disposition will be made by interdisciplinary medical team.    Patient End of Session: In bed;Needs met;Call light within reach;RN aware of session/findings;Alarm set;Hospital anti-slip socks    CURRENT GOALS  Goals to be met by: 2/8/25  Patient Goal Patient's self-stated goal is: to be able to walk   Goal #1 Patient is able to demonstrate supine - sit EOB @ level: supervision     Goal #1   Current Status    Goal #2 Patient is able to demonstrate transfers EOB to/from Chair/Wheelchair at assistance level: supervision with rolling walker     Goal #2  Current Status    Goal #3 Patient is able to ambulate 50 feet with assist device: rolling walker at assistance level: supervision   Goal #3   Current Status    Goal #4 Patient will negotiate 5 stairs/one curb w/ assistive device and supervision   Goal #4   Current Status    Goal #5 Patient to demonstrate independence with home activity/exercise instructions provided to patient  in preparation for discharge.   Goal #5   Current Status      Patient Evaluation Complexity Level:  History Moderate - 1 or 2 personal factors and/or co-morbidities   Examination of body systems Moderate - addressing a total of 3 or more elements   Clinical Presentation  Moderate - Evolving   Clinical Decision Making  Moderate Complexity       Therapeutic Activity:  25 minutes

## 2025-01-25 NOTE — PROGRESS NOTES
[facility]  part of [facility]  Hospitalist Progress Note     Radha Yu Patient Status:  Inpatient    1940  84 year old CSN [id number]   Location 554/554-A Attending Jorge Fernando MD   Hosp Day # 1 PCP JONNIE STUBBS     Assessment & Plan:   ----------------------------------  Acute UTI.  Patient has leukocytosis, no fever, ? urinary symptoms.  Sepsis is not present.   -Urine culture: E. coli  -Blood cultures: E. coli  -Antibiotics: Ceftriaxone    E. coli bacteremia.  In the setting of UTI which is the source.  Not septic.  Feeling better.  -Antibiotics as above  -Monitor for sensitivities    Chronic kidney disease stage IV.  Baseline creatinine 2.5, presented at 3.1, slightly improved  -IV fluids  -Recheck in the morning  -Consider nephrology consult    Acute COVID-19 infection.  Possibly incidental finding.  With no hypoxia.  With no pneumonia on imaging/exam.  No symptoms  -No decadron  -No RDV  -antibiotics as above  -Supplemental oxygen as needed  -ID consult if worsens  -Pulmonology consult if worsens    Hyponatremia, normo-osmolar.  Degree is moderate.  Possible cause(s) include current infections, COVID.  Sodium was as low as 129.   -BMP in am  -Interventions: Normal saline  -Nephrology consult if worsens  -Avoid overly rapid correction    Other problems  Non-anion gap metabolic acidosis  diabetes  Hypertension  Dyslipidemia peripheral artery disease  Anemia of chronic kidney disease  Diverticulitis  Remote history of non-Hodgkin's lymphoma    DVT Mechanical Prophylaxis:        DVT Pharmacologic Prophylaxis   Medication    heparin (Porcine) 5000 UNIT/ML injection 5,000 Units         DVT Pharmacologic prophylaxis: Aspirin 81 mg    code status: full  dispo: home upon medical clearance  If applicable, malnutrition status at bottom of note    I personally reviewed the available laboratories, imaging including. I discussed/will discuss the case with consultants. I ordered  laboratories and/or radiographic studies. I adjusted medications as detailed above.  Medical decision making high, risk is high.    Subjective:   ----------------------------------  No physical complaints.  Feeling better.  Stronger.  No chest pain or shortness of breath.  No cough.      Objective:   Chief Complaint:   Chief Complaint   Patient presents with    Dizziness    Fever     ----------------------------------  Temp:  [98.2 °F (36.8 °C)-99.1 °F (37.3 °C)] 98.4 °F (36.9 °C)  Pulse:  [86-95] 93  Resp:  [18-25] 18  BP: (142-179)/(55-77) 159/71  SpO2:  [95 %-100 %] 96 %  Gen: A+Ox3.  No distress.   HEENT: NCAT, neck supple, no carotid bruit.  CV: RRR, S1S2, and intact distal pulses. No gallop, rub, murmur.  Pulm: Effort and breath sounds normal. No distress, wheezes, rales, rhonchi.  Abd: Soft, NTND, BS normal, no mass, no HSM, no rebound/guarding.   Neuro: Normal reflexes, CN. Sensory/motor exams grossly normal deficit.   MS: No joint effusions.  No peripheral edema.  Skin: Skin is warm and dry. No rashes, erythema, diaphoresis.   Psych: Normal mood and affect. Calm, cooperative    Labs:  Lab Results   Component Value Date    HGB 8.8 (L) 01/25/2025    WBC 12.6 (H) 01/25/2025    .0 01/25/2025     (L) 01/25/2025    K 3.8 01/25/2025    CREATSERUM 3.07 (H) 01/25/2025    AST 32 01/24/2025    ALT 13 01/24/2025            cefTRIAXone  2 g Intravenous Q24H    vancomycin  125 mg Oral Daily    insulin aspart  1-7 Units Subcutaneous TID CC    heparin  5,000 Units Subcutaneous 2 times per day    amLODIPine  2.5 mg Oral Daily    aspirin  81 mg Oral Daily    atorvastatin  20 mg Oral Nightly    clopidogrel  75 mg Oral Daily    escitalopram  10 mg Oral Daily    insulin degludec  5 Units Subcutaneous Nightly    sodium bicarbonate  1,300 mg Oral BID       glucose **OR** glucose **OR** glucose-vitamin C **OR** dextrose **OR** glucose **OR** glucose **OR** glucose-vitamin C    acetaminophen    ondansetron     metoclopramide  **Certification      PHYSICIAN Certification of Need for Inpatient Hospitalization - Initial Certification    Patient will require inpatient services that will reasonably be expected to span two midnight's based on the clinical documentation in H+P.   Based on patients current state of illness, I anticipate that, after discharge, patient will require TBD.

## 2025-01-26 LAB
ANION GAP SERPL CALC-SCNC: 13 MMOL/L (ref 0–18)
BASOPHILS # BLD AUTO: 0.02 X10(3) UL (ref 0–0.2)
BASOPHILS NFR BLD AUTO: 0.2 %
BUN BLD-MCNC: 56 MG/DL (ref 9–23)
BUN/CREAT SERPL: 19.3 (ref 10–20)
CALCIUM BLD-MCNC: 7.8 MG/DL (ref 8.7–10.4)
CHLORIDE SERPL-SCNC: 103 MMOL/L (ref 98–112)
CO2 SERPL-SCNC: 16 MMOL/L (ref 21–32)
CREAT BLD-MCNC: 2.9 MG/DL
DEPRECATED RDW RBC AUTO: 46.8 FL (ref 35.1–46.3)
EGFRCR SERPLBLD CKD-EPI 2021: 15 ML/MIN/1.73M2 (ref 60–?)
EOSINOPHIL # BLD AUTO: 0.01 X10(3) UL (ref 0–0.7)
EOSINOPHIL NFR BLD AUTO: 0.1 %
ERYTHROCYTE [DISTWIDTH] IN BLOOD BY AUTOMATED COUNT: 15.4 % (ref 11–15)
GLUCOSE BLD-MCNC: 118 MG/DL (ref 70–99)
GLUCOSE BLDC GLUCOMTR-MCNC: 122 MG/DL (ref 70–99)
GLUCOSE BLDC GLUCOMTR-MCNC: 139 MG/DL (ref 70–99)
GLUCOSE BLDC GLUCOMTR-MCNC: 195 MG/DL (ref 70–99)
GLUCOSE BLDC GLUCOMTR-MCNC: 252 MG/DL (ref 70–99)
HCT VFR BLD AUTO: 24.9 %
HGB BLD-MCNC: 8 G/DL
IMM GRANULOCYTES # BLD AUTO: 0.06 X10(3) UL (ref 0–1)
IMM GRANULOCYTES NFR BLD: 0.7 %
LYMPHOCYTES # BLD AUTO: 0.5 X10(3) UL (ref 1–4)
LYMPHOCYTES NFR BLD AUTO: 6.1 %
MCH RBC QN AUTO: 26.7 PG (ref 26–34)
MCHC RBC AUTO-ENTMCNC: 32.1 G/DL (ref 31–37)
MCV RBC AUTO: 83 FL
MONOCYTES # BLD AUTO: 0.41 X10(3) UL (ref 0.1–1)
MONOCYTES NFR BLD AUTO: 5 %
NEUTROPHILS # BLD AUTO: 7.23 X10 (3) UL (ref 1.5–7.7)
NEUTROPHILS # BLD AUTO: 7.23 X10(3) UL (ref 1.5–7.7)
NEUTROPHILS NFR BLD AUTO: 87.9 %
OSMOLALITY SERPL CALC.SUM OF ELEC: 291 MOSM/KG (ref 275–295)
PLATELET # BLD AUTO: 232 10(3)UL (ref 150–450)
POTASSIUM SERPL-SCNC: 3.5 MMOL/L (ref 3.5–5.1)
RBC # BLD AUTO: 3 X10(6)UL
SODIUM SERPL-SCNC: 132 MMOL/L (ref 136–145)
WBC # BLD AUTO: 8.2 X10(3) UL (ref 4–11)

## 2025-01-26 PROCEDURE — 99233 SBSQ HOSP IP/OBS HIGH 50: CPT | Performed by: HOSPITALIST

## 2025-01-26 NOTE — PROGRESS NOTES
[facility]  part of [facility]  Hospitalist Progress Note     Radha Yu Patient Status:  Inpatient    1940  84 year old CSN [id number]   Location 554/554-A Attending Jorge Fernando MD   Hosp Day # 2 PCP JONNIE STUBBS     Assessment & Plan:   ----------------------------------  Acute UTI.  Patient has leukocytosis, no fever, ? urinary symptoms.  Sepsis is not present.   -Urine culture: E. coli  -Blood cultures: E. coli  -Antibiotics: Ancef    E. coli bacteremia.  In the setting of UTI which is the source.  Not septic.  Feeling better.  -Antibiotics as above    Chronic kidney disease stage IV.  Baseline creatinine 2.5, presented at 3.1, slightly improved  -IV fluids, changed to bicarb solution  -Recheck in the morning  -Consider nephrology consult    Acute COVID-19 infection.  Possibly incidental finding.  With no hypoxia.  With no pneumonia on imaging/exam.  No symptoms  -No decadron  -No RDV  -antibiotics as above  -Supplemental oxygen as needed  -ID consult if worsens  -Pulmonology consult if worsens    Hyponatremia, normo-osmolar.  Degree is moderate.  Possible cause(s) include current infections, COVID.  Sodium was as low as 129.   -BMP in am  -Interventions: Normal saline  -Nephrology consult if worsens  -Avoid overly rapid correction    Other problems  Non-anion gap metabolic acidosis, changed to bicarb solution  diabetes  Hypertension  Dyslipidemia peripheral artery disease  Anemia of chronic kidney disease  Diverticulitis  Remote history of non-Hodgkin's lymphoma    DVT Mechanical Prophylaxis:        DVT Pharmacologic Prophylaxis   Medication    heparin (Porcine) 5000 UNIT/ML injection 5,000 Units         DVT Pharmacologic prophylaxis: Aspirin 81 mg    code status: full  dispo: Discussed with nephew, planning for subacute rehab  If applicable, malnutrition status at bottom of note    I personally reviewed the available laboratories, imaging including. I discussed/will discuss the  case with consultants. I ordered laboratories and/or radiographic studies. I adjusted medications as detailed above.  Medical decision making high, risk is high.  Discussed with nephew at the bedside.    Subjective:   ----------------------------------  No physical complaints.  Feeling better.  Stronger.  No chest pain or shortness of breath.  No cough.      Objective:   Chief Complaint:   Chief Complaint   Patient presents with    Dizziness    Fever     ----------------------------------  Temp:  [98 °F (36.7 °C)-98.5 °F (36.9 °C)] 98.1 °F (36.7 °C)  Pulse:  [] 93  Resp:  [18] 18  BP: (121-167)/(53-74) 167/74  SpO2:  [91 %-96 %] 93 %  Gen: A+Ox3.  No distress.   HEENT: NCAT, neck supple, no carotid bruit.  CV: RRR, S1S2, and intact distal pulses. No gallop, rub, murmur.  Pulm: Effort and breath sounds normal. No distress, wheezes, rales, rhonchi.  Abd: Soft, NTND, BS normal, no mass, no HSM, no rebound/guarding.   Neuro: Normal reflexes, CN. Sensory/motor exams grossly normal deficit.   MS: No joint effusions.  No peripheral edema.  Skin: Skin is warm and dry. No rashes, erythema, diaphoresis.   Psych: Normal mood and affect. Calm, cooperative    Labs:  Lab Results   Component Value Date    HGB 8.0 (L) 01/26/2025    WBC 8.2 01/26/2025    .0 01/26/2025     (L) 01/26/2025    K 3.5 01/26/2025    CREATSERUM 2.90 (H) 01/26/2025    AST 32 01/24/2025    ALT 13 01/24/2025            ceFAZolin  1 g Intravenous Q24H    vancomycin  125 mg Oral Daily    insulin aspart  1-7 Units Subcutaneous TID CC    heparin  5,000 Units Subcutaneous 2 times per day    amLODIPine  2.5 mg Oral Daily    aspirin  81 mg Oral Daily    atorvastatin  20 mg Oral Nightly    clopidogrel  75 mg Oral Daily    escitalopram  10 mg Oral Daily    insulin degludec  5 Units Subcutaneous Nightly    sodium bicarbonate  1,300 mg Oral BID       glucose **OR** glucose **OR** glucose-vitamin C **OR** dextrose **OR** glucose **OR** glucose **OR**  glucose-vitamin C    acetaminophen    ondansetron    metoclopramide  **Certification      PHYSICIAN Certification of Need for Inpatient Hospitalization - Initial Certification    Patient will require inpatient services that will reasonably be expected to span two midnight's based on the clinical documentation in H+P.   Based on patients current state of illness, I anticipate that, after discharge, patient will require TBD.

## 2025-01-26 NOTE — OCCUPATIONAL THERAPY NOTE
OCCUPATIONAL THERAPY EVALUATION - INPATIENT     Room Number: 554/554-A  Evaluation Date: 1/26/2025  Type of Evaluation: Initial  Presenting Problem: bacteremia, COVID +, cystitis    Physician Order: IP Consult to Occupational Therapy  Reason for Therapy: ADL/IADL Dysfunction and Discharge Planning    OCCUPATIONAL THERAPY ASSESSMENT   Patient is a 84 year old female admitted 1/24/2025 for fever, dizzy, weak. Dx include bacteremia, COVID 19 +, cystitis.  Prior to admission, patient's baseline is assist as needed for ADLs and functional mobility using R/W. Pt reports she resides on the main floor of the home and is cared for by nephew and his wife.   Patient is currently functioning below baseline with ADLs and functional mobility.  Patient is requiring maximum assist as a result of the following impairments: decreased functional strength, decreased functional reach, decreased endurance, decreased muscular endurance, cognitive deficits (poor memory, problem solving, impaired problem solving), decreased insight to deficits, and decreased safety awareness. Occupational Therapy will continue to follow for duration of hospitalization.    Patient will benefit from continued skilled OT Services to promote return to prior level of function and safety with continuous assistance and gradual rehabilitative therapy.    PLAN DURING HOSPITALIZATION     OT Treatment Plan: ADL training;Functional transfer training;Endurance training;Cognitive reorientation;Patient/Family education;Equipment eval/education;Patient/Family training;Compensatory technique education     OCCUPATIONAL THERAPY MEDICAL/SOCIAL HISTORY   Problem List  Principal Problem:    COVID-19 virus infection  Active Problems:    Acute cystitis without hematuria    Leukocytosis, unspecified type    Chronic renal failure, unspecified CKD stage    Dehydration    Sepsis secondary to UTI (HCC)    Bacteremia    HOME SITUATION  Type of Home: House  Home Layout: Able to live on  main level  Lives With: Family  Drives: No  Patient Regularly Uses: Rolling walker      Prior Level of [place]: Prior to admission, patient's baseline is assist as needed for ADLs and functional mobility using R/W. Pt reports she resides on the main floor of the home and is cared for by nephew and his wife.      SUBJECTIVE  \"I'm tired\"    OCCUPATIONAL THERAPY EXAMINATION      OBJECTIVE  Precautions: Limb alert - left; Bed/chair alarm  Fall Risk: High fall risk      ACTIVITY TOLERANCE  Pulse: 97    O2 SATURATIONS  Oxygen Therapy  SPO2% on Room Air at Rest: 97    COGNITION  Pt is awake, alert  O to person, place grossly  Pt is able to follow commands with repeated cues  Pt benefits from frequent reassurance    Communication: able to communicate her needs    Behavioral/Emotional/Social: pleasantly confused    RANGE OF MOTION   Upper extremity ROM is within functional limits except for limited AROM LT shoulder    STRENGTH ASSESSMENT  Upper extremity strength is within functional limits except except     ACTIVITIES OF DAILY LIVING ASSESSMENT  AM-PAC ‘6-Clicks’ Inpatient Daily Activity Short Form  How much help from another person does the patient currently need…  -   Putting on and taking off regular lower body clothing?: A Lot  -   Bathing (including washing, rinsing, drying)?: A Lot  -   Toileting, which includes using toilet, bedpan or urinal? : Total  -   Putting on and taking off regular upper body clothing?: Total  -   Taking care of personal grooming such as brushing teeth?: A Little  -   Eating meals?: A Little    AM-PAC Score:  Score: 12  Approx Degree of Impairment: 66.57%  Standardized Score (AM-PAC Scale): 30.6  CMS Modifier (G-Code): CL    FUNCTIONAL TRANSFER ASSESSMENT  Sit to Stand: Edge of Bed  Edge of Bed: Moderate Assist    BED MOBILITY  Supine to Sit : Maximum Assist  Scooting: Max A    FUNCTIONAL ADL ASSESSMENT  Feeding: set up  Grooming: set up from sitting  UE self care: max A  LE self care:  Max A  Toileting: Max A    Skilled Therapy Provided: Pt received sitting up in bed having recently finished her lunch. No visitors present. Pt initially declining to participate however after education and offering to assist her to the chair, pt agreeable.   Pt demo labored breathing with RA O2 sats 97%.  Pt's chief c/o is weakness and fatigue.   Once assisted to bed side chair pt reports feeling improved.   Pt demo poor ST memory, pleasantly confused with poor carry over.       EDUCATION PROVIDED  Patient Education : Role of Occupational Therapy; Plan of Care  Patient's Response to Education: Does Not Demonstrate Skills Needed for Learning    The patient's Approx Degree of Impairment: 66.57% has been calculated based on documentation in the [facility] '6 clicks' Inpatient Daily Activity Short Form.  Research supports that patients with this level of impairment may benefit from [facility].  Final disposition will be made by interdisciplinary medical team.     Patient End of Session: Up in chair;Needs met;Call light within reach;RN aware of session/findings;All patient questions and concerns addressed;Alarm set    OT Goals  Patients self stated goal is: to feel better     Patient will complete functional transfer with Min A  Comment:     Patient will complete toileting with Min A  Comment:     Patient will tolerate standing for 2 minutes in prep for adls with CGA   Comment:     Comment:          Goals  on: 2/10/25  Frequency: 3-5x/week    Patient Evaluation Complexity Level:   Occupational Profile/Medical History MODERATE - Expanded review of history including review of medical or therapy record   Specific performance deficits impacting engagement in ADL/IADL MODERATE  3 - 5 performance deficits   Client Assessment/Performance Deficits MODERATE - Comorbidities and min to mod modifications of tasks    Clinical Decision Making MODERATE - Analysis of occupational profile, detailed assessments, several treatment options     Overall Complexity MODERATE     Self-Care Home Management: 20 minutes

## 2025-01-27 LAB
ANION GAP SERPL CALC-SCNC: 10 MMOL/L (ref 0–18)
BACTERIA BLD CULT: POSITIVE
BASOPHILS # BLD AUTO: 0.02 X10(3) UL (ref 0–0.2)
BASOPHILS NFR BLD AUTO: 0.4 %
BLACTX-M ISLT/SPM QL: NOT DETECTED
BUN BLD-MCNC: 58 MG/DL (ref 9–23)
BUN/CREAT SERPL: 19.2 (ref 10–20)
CALCIUM BLD-MCNC: 7.7 MG/DL (ref 8.7–10.4)
CHLORIDE SERPL-SCNC: 102 MMOL/L (ref 98–112)
CO2 SERPL-SCNC: 25 MMOL/L (ref 21–32)
CREAT BLD-MCNC: 3.02 MG/DL
DEPRECATED RDW RBC AUTO: 47.1 FL (ref 35.1–46.3)
E COLI DNA BLD POS QL NAA+NON-PROBE: DETECTED
EGFRCR SERPLBLD CKD-EPI 2021: 15 ML/MIN/1.73M2 (ref 60–?)
EOSINOPHIL # BLD AUTO: 0.02 X10(3) UL (ref 0–0.7)
EOSINOPHIL NFR BLD AUTO: 0.4 %
ERYTHROCYTE [DISTWIDTH] IN BLOOD BY AUTOMATED COUNT: 15.3 % (ref 11–15)
GLUCOSE BLD-MCNC: 144 MG/DL (ref 70–99)
GLUCOSE BLDC GLUCOMTR-MCNC: 135 MG/DL (ref 70–99)
GLUCOSE BLDC GLUCOMTR-MCNC: 216 MG/DL (ref 70–99)
GLUCOSE BLDC GLUCOMTR-MCNC: 227 MG/DL (ref 70–99)
GLUCOSE BLDC GLUCOMTR-MCNC: 251 MG/DL (ref 70–99)
HCT VFR BLD AUTO: 24.9 %
HGB BLD-MCNC: 8.5 G/DL
IMM GRANULOCYTES # BLD AUTO: 0.03 X10(3) UL (ref 0–1)
IMM GRANULOCYTES NFR BLD: 0.6 %
LYMPHOCYTES # BLD AUTO: 0.62 X10(3) UL (ref 1–4)
LYMPHOCYTES NFR BLD AUTO: 12 %
MCH RBC QN AUTO: 28.4 PG (ref 26–34)
MCHC RBC AUTO-ENTMCNC: 34.1 G/DL (ref 31–37)
MCV RBC AUTO: 83.3 FL
MONOCYTES # BLD AUTO: 0.4 X10(3) UL (ref 0.1–1)
MONOCYTES NFR BLD AUTO: 7.8 %
NEUTROPHILS # BLD AUTO: 4.06 X10 (3) UL (ref 1.5–7.7)
NEUTROPHILS # BLD AUTO: 4.06 X10(3) UL (ref 1.5–7.7)
NEUTROPHILS NFR BLD AUTO: 78.8 %
OSMOLALITY SERPL CALC.SUM OF ELEC: 303 MOSM/KG (ref 275–295)
PLATELET # BLD AUTO: 208 10(3)UL (ref 150–450)
POTASSIUM SERPL-SCNC: 3.2 MMOL/L (ref 3.5–5.1)
POTASSIUM SERPL-SCNC: 3.4 MMOL/L (ref 3.5–5.1)
POTASSIUM SERPL-SCNC: 3.6 MMOL/L (ref 3.5–5.1)
RBC # BLD AUTO: 2.99 X10(6)UL
SODIUM SERPL-SCNC: 137 MMOL/L (ref 136–145)
WBC # BLD AUTO: 5.2 X10(3) UL (ref 4–11)

## 2025-01-27 PROCEDURE — 99233 SBSQ HOSP IP/OBS HIGH 50: CPT | Performed by: HOSPITALIST

## 2025-01-27 RX ORDER — CEFDINIR 300 MG/1
300 CAPSULE ORAL DAILY
Qty: 11 CAPSULE | Refills: 0 | Status: SHIPPED | OUTPATIENT
Start: 2025-01-27

## 2025-01-27 RX ORDER — POTASSIUM CHLORIDE 1500 MG/1
20 TABLET, EXTENDED RELEASE ORAL ONCE
Status: COMPLETED | OUTPATIENT
Start: 2025-01-27 | End: 2025-01-27

## 2025-01-27 NOTE — PROGRESS NOTES
CHG was not able to get done during day shift, I let night shift RN and night shift PCT if they can please get it done.

## 2025-01-27 NOTE — PROGRESS NOTES
[facility]  part of [facility]  Hospitalist Progress Note     Radha Yu Patient Status:  Inpatient    1940  84 year old CSN [id number]   Location 554/554-A Attending Jorge Fernando MD   Hosp Day # 3 PCP JONNIE STUBBS     Assessment & Plan:   ----------------------------------  Acute UTI.  Patient has leukocytosis, no fever, ? urinary symptoms.  Sepsis is not present.   -Urine culture: E. coli  -Blood cultures: E. coli  -Antibiotics: Ancef    E. coli bacteremia.  In the setting of UTI which is the source.  Not septic.  Feeling better.  -Antibiotics as above    Chronic kidney disease stage IV.  Baseline creatinine 2.5, presented at 3.1, slightly improved  -IV fluids, changed to bicarb solution, dec rate  -Recheck in the morning  -Consider nephrology consult    Acute COVID-19 infection.  Possibly incidental finding.  With no hypoxia.  With no pneumonia on imaging/exam.  No symptoms  -No decadron  -No RDV  -antibiotics as above  -Supplemental oxygen as needed  -ID consult if worsens  -Pulmonology consult if worsens    Hyponatremia, normo-osmolar.  Degree is moderate.  Possible cause(s) include current infections, COVID.  Sodium was as low as 129. Now resolved  -BMP in am  -Interventions: Normal saline  -Nephrology consult if worsens  -Avoid overly rapid correction    Other problems  Non-anion gap metabolic acidosis, changed to bicarb solution  diabetes  Hypertension  Dyslipidemia peripheral artery disease  Anemia of chronic kidney disease  Diverticulitis  Remote history of non-Hodgkin's lymphoma    DVT Mechanical Prophylaxis:        DVT Pharmacologic Prophylaxis   Medication    heparin (Porcine) 5000 UNIT/ML injection 5,000 Units         DVT Pharmacologic prophylaxis: Aspirin 81 mg    code status: full  dispo: Medically stable for discharge to subacute rehab when bed available  If applicable, malnutrition status at bottom of note    I personally reviewed the available laboratories, imaging  including. I discussed/will discuss the case with consultants. I ordered laboratories and/or radiographic studies. I adjusted medications as detailed above.  Medical decision making high, risk is high.  Discussed with nephew at the bedside.    Subjective:   ----------------------------------  No physical complaints.  Feeling better.  Stronger.  No chest pain or shortness of breath.  No cough.      Objective:   Chief Complaint:   Chief Complaint   Patient presents with    Dizziness    Fever     ----------------------------------  Temp:  [98 °F (36.7 °C)-98.3 °F (36.8 °C)] 98 °F (36.7 °C)  Pulse:  [80-97] 93  Resp:  [18] 18  BP: (137-161)/(54-67) 137/54  SpO2:  [93 %-95 %] 95 %  Gen: A+Ox3.  No distress.   HEENT: NCAT, neck supple, no carotid bruit.  CV: RRR, S1S2, and intact distal pulses. No gallop, rub, murmur.  Pulm: Effort and breath sounds normal. No distress, wheezes, rales, rhonchi.  Abd: Soft, NTND, BS normal, no mass, no HSM, no rebound/guarding.   Neuro: Normal reflexes, CN. Sensory/motor exams grossly normal deficit.   MS: No joint effusions.  No peripheral edema.  Skin: Skin is warm and dry. No rashes, erythema, diaphoresis.   Psych: Normal mood and affect. Calm, cooperative    Labs:  Lab Results   Component Value Date    HGB 8.5 (L) 01/27/2025    WBC 5.2 01/27/2025    .0 01/27/2025     01/27/2025    K 3.4 (L) 01/27/2025    CREATSERUM 3.02 (H) 01/27/2025    AST 32 01/24/2025    ALT 13 01/24/2025            potassium chloride  20 mEq Oral Once    ceFAZolin  1 g Intravenous Q24H    vancomycin  125 mg Oral Daily    insulin aspart  1-7 Units Subcutaneous TID CC    heparin  5,000 Units Subcutaneous 2 times per day    amLODIPine  2.5 mg Oral Daily    aspirin  81 mg Oral Daily    atorvastatin  20 mg Oral Nightly    clopidogrel  75 mg Oral Daily    escitalopram  10 mg Oral Daily    insulin degludec  5 Units Subcutaneous Nightly    sodium bicarbonate  1,300 mg Oral BID       glucose **OR** glucose  **OR** glucose-vitamin C **OR** dextrose **OR** glucose **OR** glucose **OR** glucose-vitamin C    acetaminophen    ondansetron    metoclopramide  **Certification      PHYSICIAN Certification of Need for Inpatient Hospitalization - Initial Certification    Patient will require inpatient services that will reasonably be expected to span two midnight's based on the clinical documentation in H+P.   Based on patients current state of illness, I anticipate that, after discharge, patient will require TBD.

## 2025-01-27 NOTE — CM/SW NOTE
LMOVM to schedule an OV to discuss a referral.    Patient discussed in RN DC Rounds. JAYASHREE called nephew sharla in regards to [facility] list. Sharla will be back later to review list. Jayashree left list in patient's room. Sharla is aware of patient's insurance auth needing to approve for [facility].     PASRR level 1 screen submitted and completed and uploaded to aidin referral    Plan: Pending medical clearance, DC to [facility], *choice/auth/*PT OT     SW/CM to remain available for support and/or discharge planning.     Gretel Carreno, MSW, LSW   x [phone]

## 2025-01-27 NOTE — CONGREGATE LIVING REVIEW
[facility] Living Authorization    The [facility] Review Committee has reviewed this case and the patient IS APPROVED for discharge to a facility for Short Term Skilled once the following procedure is followed:     - The physician discharge instructions (contained within the JAMES note for SNF) must inlcude the below appropriate and approved COVID instructions to the facility    For questions regarding CLRC approval process, please contact the CM assigned to the case.  For questions regarding RN discharge workflow, please contact the unit Clinical Leader.

## 2025-01-27 NOTE — PLAN OF CARE
Problem: Patient Centered Care  Goal: Patient preferences are identified and integrated in the patient's plan of care  Description: Interventions:  - What would you like us to know as we care for you?   - Provide timely, complete, and accurate information to patient/family  - Incorporate patient and family knowledge, values, beliefs, and cultural backgrounds into the planning and delivery of care  - Encourage patient/family to participate in care and decision-making at the level they choose  - Honor patient and family perspectives and choices  Outcome: Progressing     Problem: PAIN - ADULT  Goal: Verbalizes/displays adequate comfort level or patient's stated pain goal  Description: INTERVENTIONS:  - Encourage pt to monitor pain and request assistance  - Assess pain using appropriate pain scale  - Administer analgesics based on type and severity of pain and evaluate response  - Implement non-pharmacological measures as appropriate and evaluate response  - Consider cultural and social influences on pain and pain management  - Manage/alleviate anxiety  - Utilize distraction and/or relaxation techniques  - Monitor for opioid side effects  - Notify MD/LIP if interventions unsuccessful or patient reports new pain  - Anticipate increased pain with activity and pre-medicate as appropriate  Outcome: Progressing     Problem: RESPIRATORY - ADULT  Goal: Achieves optimal ventilation and oxygenation  Description: INTERVENTIONS:  - Assess for changes in respiratory status  - Assess for changes in mentation and behavior  - Position to facilitate oxygenation and minimize respiratory effort  - Oxygen supplementation based on oxygen saturation or ABGs  - Provide Smoking Cessation handout, if applicable  - Encourage broncho-pulmonary hygiene including cough, deep breathe, Incentive Spirometry  - Assess the need for suctioning and perform as needed  - Assess and instruct to report SOB or any respiratory difficulty  - Respiratory  Therapy support as indicated  - Manage/alleviate anxiety  - Monitor for signs/symptoms of CO2 retention  Outcome: Progressing     Problem: MUSCULOSKELETAL - ADULT  Goal: Return mobility to safest level of function  Description: INTERVENTIONS:  - Assess patient stability and activity tolerance for standing, transferring and ambulating w/ or w/o assistive devices  - Assist with transfers and ambulation using safe patient handling equipment as needed  - Ensure adequate protection for wounds/incisions during mobilization  - Obtain PT/OT consults as needed  - Advance activity as appropriate  - Communicate ordered activity level and limitations with patient/family  Outcome: Progressing  Goal: Maintain proper alignment of affected body part  Description: INTERVENTIONS:  - Support and protect limb and body alignment per provider's orders  - Instruct and reinforce with patient and family use of appropriate assistive device and precautions (e.g. spinal or hip dislocation precautions)  Outcome: Progressing     Problem: Impaired Activities of Daily Living  Goal: Achieve highest/safest level of independence in self care  Description: Interventions:  - Assess ability and encourage patient to participate in ADLs to maximize function  - Promote sitting position while performing ADLs such as feeding, grooming, and bathing  - Educate and encourage patient/family in tolerated functional activity level and precautions during self-care    Outcome: Progressing     Problem: SAFETY ADULT - FALL  Goal: Free from fall injury  Description: INTERVENTIONS:  - Assess pt frequently for physical needs  - Identify cognitive and physical deficits and behaviors that affect risk of falls.  - [place] fall precautions as indicated by assessment.  - Educate pt/family on patient safety including physical limitations  - Instruct pt to call for assistance with activity based on assessment  - Modify environment to reduce risk of injury  - Provide assistive  devices as appropriate  - Consider OT/PT consult to assist with strengthening/mobility  - Encourage toileting schedule  Outcome: Progressing     Problem: DISCHARGE PLANNING  Goal: Discharge to home or other facility with appropriate resources  Description: INTERVENTIONS:  - Identify barriers to discharge w/pt and caregiver  - Include patient/family/discharge partner in discharge planning  - Arrange for needed discharge resources and transportation as appropriate  - Identify discharge learning needs (meds, wound care, etc)  - Arrange for interpreters to assist at discharge as needed  - Consider post-discharge preferences of patient/family/discharge partner  - Complete POLST form as appropriate  - Assess patient's ability to be responsible for managing their own health  - Refer to Case Management Department for coordinating discharge planning if the patient needs post-hospital services based on physician/LIP order or complex needs related to functional status, cognitive ability or social support system  Outcome: Progressing

## 2025-01-27 NOTE — PLAN OF CARE
Problem: Patient Centered Care  Goal: Patient preferences are identified and integrated in the patient's plan of care  Description: Interventions:  - What would you like us to know as we care for you? Home with family   - Provide timely, complete, and accurate information to patient/family  - Incorporate patient and family knowledge, values, beliefs, and cultural backgrounds into the planning and delivery of care  - Encourage patient/family to participate in care and decision-making at the level they choose  - Honor patient and family perspectives and choices  Outcome: Progressing     Problem: PAIN - ADULT  Goal: Verbalizes/displays adequate comfort level or patient's stated pain goal  Description: INTERVENTIONS:  - Encourage pt to monitor pain and request assistance  - Assess pain using appropriate pain scale  - Administer analgesics based on type and severity of pain and evaluate response  - Implement non-pharmacological measures as appropriate and evaluate response  - Consider cultural and social influences on pain and pain management  - Manage/alleviate anxiety  - Utilize distraction and/or relaxation techniques  - Monitor for opioid side effects  - Notify MD/LIP if interventions unsuccessful or patient reports new pain  - Anticipate increased pain with activity and pre-medicate as appropriate  Outcome: Progressing     Problem: RESPIRATORY - ADULT  Goal: Achieves optimal ventilation and oxygenation  Description: INTERVENTIONS:  - Assess for changes in respiratory status  - Assess for changes in mentation and behavior  - Position to facilitate oxygenation and minimize respiratory effort  - Oxygen supplementation based on oxygen saturation or ABGs  - Provide Smoking Cessation handout, if applicable  - Encourage broncho-pulmonary hygiene including cough, deep breathe, Incentive Spirometry  - Assess the need for suctioning and perform as needed  - Assess and instruct to report SOB or any respiratory difficulty  -  Respiratory Therapy support as indicated  - Manage/alleviate anxiety  - Monitor for signs/symptoms of CO2 retention  Outcome: Progressing     Problem: MUSCULOSKELETAL - ADULT  Goal: Return mobility to safest level of function  Description: INTERVENTIONS:  - Assess patient stability and activity tolerance for standing, transferring and ambulating w/ or w/o assistive devices  - Assist with transfers and ambulation using safe patient handling equipment as needed  - Ensure adequate protection for wounds/incisions during mobilization  - Obtain PT/OT consults as needed  - Advance activity as appropriate  - Communicate ordered activity level and limitations with patient/family  Outcome: Progressing  Goal: Maintain proper alignment of affected body part  Description: INTERVENTIONS:  - Support and protect limb and body alignment per provider's orders  - Instruct and reinforce with patient and family use of appropriate assistive device and precautions (e.g. spinal or hip dislocation precautions)  Outcome: Progressing     Problem: Impaired Activities of Daily Living  Goal: Achieve highest/safest level of independence in self care  Description: Interventions:  - Assess ability and encourage patient to participate in ADLs to maximize function  - Promote sitting position while performing ADLs such as feeding, grooming, and bathing  - Educate and encourage patient/family in tolerated functional activity level and precautions during self-care  Outcome: Progressing     Problem: SAFETY ADULT - FALL  Goal: Free from fall injury  Description: INTERVENTIONS:  - Assess pt frequently for physical needs  - Identify cognitive and physical deficits and behaviors that affect risk of falls.  - [place] fall precautions as indicated by assessment.  - Educate pt/family on patient safety including physical limitations  - Instruct pt to call for assistance with activity based on assessment  - Modify environment to reduce risk of injury  - Provide  assistive devices as appropriate  - Consider OT/PT consult to assist with strengthening/mobility  - Encourage toileting schedule  Outcome: Progressing     Problem: DISCHARGE PLANNING  Goal: Discharge to home or other facility with appropriate resources  Description: INTERVENTIONS:  - Identify barriers to discharge w/pt and caregiver  - Include patient/family/discharge partner in discharge planning  - Arrange for needed discharge resources and transportation as appropriate  - Identify discharge learning needs (meds, wound care, etc)  - Arrange for interpreters to assist at discharge as needed  - Consider post-discharge preferences of patient/family/discharge partner  - Complete POLST form as appropriate  - Assess patient's ability to be responsible for managing their own health  - Refer to Case Management Department for coordinating discharge planning if the patient needs post-hospital services based on physician/LIP order or complex needs related to functional status, cognitive ability or social support system  Outcome: Progressing

## 2025-01-27 NOTE — PAYOR COMM NOTE
--------------  ADMISSION REVIEW     [place] [facility] [place]:  [id number]  Authorization Number: [id number]    Admit date: 1/24/25  Admit time:  6:03 PM       ED Provider Notes        History   HPI  84-year-old female presents ER for generalized weakness as well as decreased appetite and fever.  Patient with a past medical history of insulin dependent diabetes, hyperlipidemia, hypertension, UTI.  Patient recently treated for urinary tract infection.  Family concerned she may have a UTI again    Past Medical History:    Anxiety    Essential hypertension    Hyperlipidemia    Type 1 diabetes mellitus (HCC)     Past Surgical History:   Procedure Laterality Date    Appendectomy      Total abdom hysterectomy       ED Triage Vitals [01/24/25 1121]   /68   Pulse 72   Resp 19   Temp 98.6 °F (37 °C)   Temp src Temporal   SpO2 98 %   O2 Device None (Room air)     HENT:      Head: Normocephalic and atraumatic.      Right Ear: External ear normal.      Left Ear: External ear normal.      Nose: Nose normal.   Eyes:      Conjunctiva/sclera: Conjunctivae normal.      Pupils: Pupils are equal, round, and reactive to light.   Cardiovascular:      Rate and Rhythm: Normal rate and regular rhythm.      Heart sounds: Normal heart sounds.   Pulmonary:      Effort: Pulmonary effort is normal.      Breath sounds: Normal breath sounds.   Abdominal:      General: Bowel sounds are normal.      Palpations: Abdomen is soft.   Musculoskeletal:         General: Normal range of motion.      Cervical back: Normal range of motion and neck supple.   Skin:     General: Skin is warm and dry.   Neurological:      Mental Status: She is oriented to person, place, and time. She is lethargic.      Deep Tendon Reflexes: Reflexes are normal and symmetric.   Psychiatric:         Behavior: Behavior normal.         Thought Content: Thought content normal.         Judgment: Judgment normal.     Labs Reviewed   BASIC METABOLIC PANEL (8) - Abnormal;  Notable for the following components:       Result Value    Glucose 182 (*)     Sodium 129 (*)     Chloride 96 (*)     CO2 17.0 (*)     BUN 58 (*)     Creatinine 3.16 (*)     Calcium, Total 8.6 (*)     eGFR-Cr 14 (*)     All other components within normal limits   CBC WITH DIFFERENTIAL WITH PLATELET - Abnormal; Notable for the following components:    WBC 21.4 (*)     RBC 3.20 (*)     HGB 9.3 (*)     HCT 26.3 (*)     RDW-SD 47.2 (*)     RDW 15.5 (*)     Neutrophil Absolute Prelim 20.31 (*)     Neutrophil Absolute 20.31 (*)     Lymphocyte Absolute 0.33 (*)     All other components within normal limits   URINALYSIS WITH CULTURE REFLEX - Abnormal; Notable for the following components:    Clarity Urine Turbid (*)     Blood Urine 2+ (*)     Protein Urine 100 (*)     Leukocyte Esterase Urine 500 (*)     WBC Urine >50 (*)     RBC Urine >10 (*)     Bacteria Urine 3+ (*)     Squamous Epi. Cells Few (*)     WBC Clump Present (*)     All other components within normal limits   SARS-COV-2/FLU A AND B/RSV BY PCR (GENEXPERT) - Abnormal; Notable for the following components:    SARS-CoV-2 (COVID-19) - (GeneXpert) Detected (*)    EKG    Rate, intervals and axes as noted on EKG Report.  Rate: 101  Rhythm: Sinus Rhythm  Reading: Sinus tachycardia, no ST deviation, normal axis     XR CHEST AP PORTABLE  (CPT=71045)    Result Date: 1/24/2025  CONCLUSION:  1. Suboptimal inspiration with probable bibasilar right greater than left atelectasis.  Less likely small areas of pneumonia.    Dictated by (CST): Mohamud Infante MD on 1/24/2025 at 1:29 PM     Finalized by (CST): Mohamud Infante MD on 1/24/2025 at 1:31 PM         ED Medications Administered:   Medications   sodium chloride 0.9 % IV bolus 1,000 mL (0 mL Intravenous Stopped 1/24/25 1430)   cefTRIAXone (Rocephin) 2 g in sodium chloride 0.9% 100 mL IVPB-ADDV (0 g Intravenous Stopped 1/24/25 1515)           Medical Decision Making  84-year-old female presents ER for generalized weakness and  fever since yesterday.  Patient tested positive for COVID-19.  Patient also with acute cystitis with leukocytosis.  Patient slightly dehydrated as well with baseline renal function.  Patient given  2 L IV fluid as well as 2 g of Rocephin IV.  Patient will be admitted for further evaluation.  [facility] notified of admission.  Patient's nephew at bedside made aware of the disposition of admission    Total Critical Care Time: 36 minutes including time spent examining and re-evaluating the patient, ordering and reviewing laboratory tests, documenting, reviewing previous records, obtaining information from the family, and speaking with consultants, admitting doctors, nurses and medics and excludes any time spent on procedures.      Disposition and Plan     Clinical Impression:  1. COVID-19 virus infection    2. Acute cystitis without hematuria    3. Leukocytosis, unspecified type    4. Chronic renal failure, unspecified CKD stage    5. Dehydration      Disposition:  Admit         Rayray Yu #[id number]  Admission Info: Inpatient (Adm: 01/24/25)  Hospital Account: [id number]  Description: 84 year old F Primary Service: Medical Unit Info: [facility] 5-SE/SW     History and Physical    H&P signed by Delio Giraldo MD at 1/25/2025  9:34 AM    Author: Delio Giraldo MD Service: Hospitalist Author Type: Physician   Filed: 1/25/2025  9:34 AM Status: Signed   : Delio Giraldo MD (Physician) Trans ID: [id number]   Dictation Time: 1/24/2025  2:27 PM Trans Time: 1/24/2025  3:18 PM Trans Doc Type: History & Physical Trans Status: Available      [facility]     PATIENT'S NAME: RAYRAY YU   ATTENDING PHYSICIAN: Sanjay Allen DO   PATIENT ACCOUNT#:   [id number]    LOCATION:  [place] [address]  MEDICAL RECORD #:   [id number]       YOB: 1940  ADMISSION DATE:       01/24/2025     HISTORY AND PHYSICAL EXAMINATION     CHIEF COMPLAINT:  Urinary tract infection, acute sepsis, acute on chronic  kidney injury.     HISTORY OF PRESENT ILLNESS:  Patient is an 84-year-old  female who was brought in today by her family for evaluation of fever, chills, and weakness for the last 2 days.  [facility].  In the emergency room today, CBC showed white blood cell count of 21.4 with left shift.  Chemistry showed GFR of 14, slightly below her baseline; bicarb 17; BUN and creatinine 58 and 3.16; sodium 129; chloride 96.  GeneXpert viral panel also was positive for COVID.  Urinalysis showed evidence of gross urinary tract infection.  Blood cultures, urine cultures, and lactic acid still pending.  Chest x-ray showed no acute findings.  Started on IV fluids and IV Rocephin, and she will be admitted to the hospital for further management.      PAST MEDICAL HISTORY:  Patient has a history of diabetes mellitus type 2; hypertension; hyperlipidemia; diabetic nephropathy; and chronic kidney disease stage 4, has required brief dialysis in the past.  History of C difficile infection after antibiotic exposure.  Peripheral arterial disease, status post right popliteal artery angioplasty; bilateral carotid atherosclerosis with chronic occlusion on the left side and right carotid stents; secondary hyperparathyroidism; anemia of chronic kidney disease; diverticulitis.  She has history of non-Hodgkin follicular lymphoma, status post chemotherapy, currently in remission.  History of urinary tract infections.     PAST SURGICAL HISTORY:  Left femoral-popliteal bypass, remote hysterectomy, left hip open reduction and internal fixation.     MEDICATIONS:  Please see medication reconciliation list.      ALLERGIES:  Levaquin.     FAMILY HISTORY:  Positive for diabetes mellitus type 2 and hypertension.     SOCIAL HISTORY:  Ex-tobacco user.  No current tobacco, alcohol, or drug use.  Lives with her family.  Usually independent for basic activities of daily living.      REVIEW OF SYSTEMS:  Fever, chills,  fatigue for the last 2 to 3 days.  Patient is not able to give me any other details.  No sick contacts.         PHYSICAL EXAMINATION:    GENERAL:  Alert and oriented to time, place and person.  Mild distress.   VITAL SIGNS:  Temperature 98.6, pulse 86, respiratory rate 21, blood pressure 124/68, pulse ox 99% on room air.  HEENT:  Atraumatic.  Oropharynx clear.  Dry mucous membranes.  Ears and nose normal.  Eyes:  Anicteric sclerae.   NECK:  Supple.  No lymphadenopathy.  Trachea midline.  Full range of motion.   LUNGS:  Clear to auscultation bilaterally.  Normal respiratory effort.    HEART:  Regular rate and rhythm.  S1 and S2 auscultated.  No murmur.    ABDOMEN:  Soft, nondistended.  No tenderness.  Positive bowel sounds.   EXTREMITIES:  No peripheral edema, clubbing or cyanosis.   NEUROLOGIC:  Motor and sensory intact.     ASSESSMENT:    1.       Urinary tract infection.  2.       Acute on chronic kidney injury.  3.       Diabetes mellitus type 2.     PLAN:  Patient will be admitted to general medical floor.  Follow up on lactic acid, blood and urine cultures.  Continue IV fluids, IV Rocephin.  Oral vancomycin for C difficile prophylaxis.  Monitor hemodynamic status closely.  Monitor Accu-Cheks.  Fall precautions.  When clinically better, physical and occupational therapy.              1/25/25     Assessment & Plan:   ----------------------------------  Acute UTI.  Patient has leukocytosis, no fever, ? urinary symptoms.   -Urine culture: E. coli  -Blood cultures: E. coli  -Antibiotics: Ceftriaxone     E. coli bacteremia.  In the setting of UTI which is the source.  Not septic.  Feeling better.  -Antibiotics as above  -Monitor for sensitivities     Chronic kidney disease stage IV.  Baseline creatinine 2.5, presented at 3.1, slightly improved  -IV fluids  -Recheck in the morning  -Consider nephrology consult     Acute COVID-19 infection.  Possibly incidental finding.  With no hypoxia.  With no pneumonia on  imaging/exam.  No symptoms  -No decadron  -No RDV  -antibiotics as above  -Supplemental oxygen as needed  -ID consult if worsens  -Pulmonology consult if worsens     Hyponatremia, normo-osmolar.  Degree is moderate.  Possible cause(s) include current infections, COVID.  Sodium was as low as 129.   -BMP in am  -Interventions: Normal saline  -Nephrology consult if worsens  -Avoid overly rapid correction     Other problems  Non-anion gap metabolic acidosis  diabetes  Hypertension  Dyslipidemia peripheral artery disease  Anemia of chronic kidney disease  Diverticulitis  Remote history of non-Hodgkin's lymphoma     DVT Mechanical Prophylaxis:            DVT Pharmacologic Prophylaxis   Medication    heparin (Porcine) 5000 UNIT/ML injection 5,000 Units         DVT Pharmacologic prophylaxis: Aspirin 81 mg     Chief Complaint   Patient presents with    Dizziness    Fever      ----------------------------------  Temp:  [98.2 °F (36.8 °C)-99.1 °F (37.3 °C)] 98.4 °F (36.9 °C)  Pulse:  [86-95] 93  Resp:  [18-25] 18  BP: (142-179)/(55-77) 159/71  SpO2:  [95 %-100 %] 96 %  Gen: A+Ox3.  No distress.   HEENT: NCAT, neck supple, no carotid bruit.  CV: RRR, S1S2, and intact distal pulses. No gallop, rub, murmur.  Pulm: Effort and breath sounds normal. No distress, wheezes, rales, rhonchi.  Abd: Soft, NTND, BS normal, no mass, no HSM, no rebound/guarding.   Neuro:  Normal reflexes, CN. Sensory/motor exams grossly normal deficit.   MS: No joint effusions.  No peripheral edema.  Skin: Skin is warm and dry. No rashes, erythema, diaphoresis.   Psych:   Normal mood and affect. Calm, cooperative     Lab Results   Component Value Date     HGB 8.8 (L) 01/25/2025     WBC 12.6 (H) 01/25/2025     .0 01/25/2025      (L) 01/25/2025     K 3.8 01/25/2025     CREATSERUM 3.07 (H) 01/25/2025     AST 32 01/24/2025     ALT 13 01/24/2025         cefTRIAXone  2 g Intravenous Q24H    vancomycin  125 mg Oral Daily    insulin aspart  1-7 Units  Subcutaneous TID CC    heparin  5,000 Units Subcutaneous 2 times per day    amLODIPine  2.5 mg Oral Daily    aspirin  81 mg Oral Daily    atorvastatin  20 mg Oral Nightly    clopidogrel  75 mg Oral Daily    escitalopram  10 mg Oral Daily    insulin degludec  5 Units Subcutaneous Nightly    sodium bicarbonate  1,300 mg Oral BID              1/26/25     Assessment & Plan:   ----------------------------------  Acute UTI.  Patient has leukocytosis, no fever, ? urinary symptoms.    -Urine culture: E. coli  -Blood cultures: E. coli  -Antibiotics: Ancef     E. coli bacteremia.  In the setting of UTI which is the source.  Not septic.  Feeling better.  -Antibiotics as above     Chronic kidney disease stage IV.  Baseline creatinine 2.5, presented at 3.1, slightly improved  -IV fluids, changed to bicarb solution  -Recheck in the morning  -Consider nephrology consult     Acute COVID-19 infection.  Possibly incidental finding.  With no hypoxia.  With no pneumonia on imaging/exam.  No symptoms  -No decadron  -No RDV  -antibiotics as above  -Supplemental oxygen as needed  -ID consult if worsens  -Pulmonology consult if worsens     Hyponatremia, normo-osmolar.  Degree is moderate.  Possible cause(s) include current infections, COVID.  Sodium was as low as 129.   -BMP in am  -Interventions: Normal saline  -Nephrology consult if worsens  -Avoid overly rapid correction     Other problems  Non-anion gap metabolic acidosis, changed to bicarb solution  diabetes  Hypertension  Dyslipidemia peripheral artery disease  Anemia of chronic kidney disease  Diverticulitis  Remote history of non-Hodgkin's lymphoma     DVT Mechanical Prophylaxis:            DVT Pharmacologic Prophylaxis   Medication    heparin (Porcine) 5000 UNIT/ML injection 5,000 Units         DVT Pharmacologic prophylaxis: Aspirin 81 mg     Temp:  [98 °F (36.7 °C)-98.5 °F (36.9 °C)] 98.1 °F (36.7 °C)  Pulse:  [] 93  Resp:  [18] 18  BP: (121-167)/(53-74) 167/74  SpO2:  [91  %-96 %] 93 %  Gen: A+Ox3.    HEENT: NCAT, neck supple, no carotid bruit.  CV: RRR, S1S2, and intact distal pulses. No gallop, rub, murmur.  Pulm: Effort and breath sounds normal. No distress, wheezes, rales, rhonchi.  Abd: Soft, NTND, BS normal, no mass, no HSM, no rebound/guarding.   Neuro:  Normal reflexes, CN. Sensory/motor exams grossly normal deficit.   MS: No joint effusions.  No peripheral edema.  Skin: Skin is warm and dry. No rashes, erythema, diaphoresis.   Psych:   Normal mood and affect. Calm, cooperative     Lab Results   Component Value Date     HGB 8.0 (L) 01/26/2025     WBC 8.2 01/26/2025     .0 01/26/2025      (L) 01/26/2025     K 3.5 01/26/2025     CREATSERUM 2.90 (H) 01/26/2025     AST 32 01/24/2025     ALT 13 01/24/2025         ceFAZolin  1 g Intravenous Q24H    vancomycin  125 mg Oral Daily    insulin aspart  1-7 Units Subcutaneous TID CC    heparin  5,000 Units Subcutaneous 2 times per day    amLODIPine  2.5 mg Oral Daily    aspirin  81 mg Oral Daily    atorvastatin  20 mg Oral Nightly    clopidogrel  75 mg Oral Daily    escitalopram  10 mg Oral Daily    insulin degludec  5 Units Subcutaneous Nightly    sodium bicarbonate  1,300 mg Oral BID                     MEDICATIONS ADMINISTERED IN LAST 1 DAY:  amLODIPine (Norvasc) tab 2.5 mg       Date Action Dose Route User    1/27/2025 0956 Given 2.5 mg Oral Tess Swanson RN          aspirin DR tab 81 mg       Date Action Dose Route User    1/27/2025 0956 Given 81 mg Oral Tess Swanson RN          atorvastatin (Lipitor) tab 20 mg       Date Action Dose Route User    1/26/2025 2243 Given 20 mg Oral Deb Rubio RN          ceFAZolin (Ancef) 1 g in dextrose 5% 100mL IVPB-ADD       Date Action Dose Route User    1/27/2025 0957 New Bag 1 g Intravenous Tess Swanson RN          clopidogrel (Plavix) tab 75 mg       Date Action Dose Route User    1/27/2025 0956 Given 75 mg Oral Tess Swanson RN          escitalopram (Lexapro) tab 10 mg        Date Action Dose Route User    1/27/2025 0956 Given 10 mg Oral Tess Swanson RN          heparin (Porcine) 5000 UNIT/ML injection 5,000 Units       Date Action Dose Route User    1/27/2025 0956 Given 5,000 Units Subcutaneous (Left Lower Abdomen) Tess Swanson RN    1/26/2025 2243 Given 5,000 Units Subcutaneous (Right Upper Arm) Deb Rubio RN          insulin aspart (NovoLOG) 100 Units/mL FlexPen 1-7 Units       Date Action Dose Route User    1/27/2025 1315 Given 3 Units Subcutaneous (Left Upper Arm) Tess Swanson RN    1/26/2025 1914 Given 2 Units Subcutaneous (Right Upper Arm) Tess Swanson RN          insulin degludec (Tresiba) 100 units/mL flextouch 5 Units       Date Action Dose Route User    1/26/2025 2243 Given 5 Units Subcutaneous (Left Upper Abdomen) Deb Rubio RN          potassium chloride (Klor-Con M20) tab 20 mEq       Date Action Dose Route User    1/27/2025 0956 Given 20 mEq Oral Tess Swanson RN          potassium chloride (Klor-Con M20) tab 20 mEq       Date Action Dose Route User    1/27/2025 1400 Given 20 mEq Oral Tess Swanson RN          sodium bicarbonate 150 mEq in sodium chloride 0.45% 1,000 mL infusion       Date Action Dose Route User    1/27/2025 1225 New Bag 150 mEq Intravenous Tess Swanson RN    1/27/2025 0005 New Bag 150 mEq Intravenous Deb Rubio RN          sodium bicarbonate 150 mEq in sodium chloride 0.45% 1,000 mL infusion       Date Action Dose Route User    1/27/2025 1330 Rate/Dose Change (none) Intravenous Tess Swanson RN          sodium bicarbonate tab 1,300 mg       Date Action Dose Route User    1/27/2025 0956 Given 1,300 mg Oral Tess Swanson RN    1/26/2025 2243 Given 1,300 mg Oral Deb Rubio RN          vancomycin (Vancocin) cap 125 mg       Date Action Dose Route User    1/27/2025 0956 Given 125 mg Oral Tess Swanson RN                      Medications 01/24 01/25 01/26 01/27   amLODIPine (Norvasc) tab 2.5 mg  Dose: 2.5 mg  Freq: Daily Route:  OR  Start: 01/24/25 1815    00300      27699      44285      27938        aspirin DR tab 81 mg  Dose: 81 mg  Freq: Daily Route: OR  Start: 01/24/25 1815   Admin Instructions:   Do not crush    26939      26074      35519      04856        atorvastatin (Lipitor) tab 20 mg  Dose: 20 mg  Freq: Nightly Route: OR  Start: 01/24/25 2100    (2114) [C]9     020255      [id number]      [id number] 2100        ceFAZolin (Ancef) 1 g in dextrose 5% 100mL IVPB-ADD  Dose: 1 g  Freq: Every 24 hours Route: IV  Start: 01/26/25 0800   Order specific questions:         [id number]      [id number]        clopidogrel (Plavix) tab 75 mg  Dose: 75 mg  Freq: Daily Route: OR  Start: 01/24/25 1815    204958      279321      369963      873434        escitalopram (Lexapro) tab 10 mg  Dose: 10 mg  Freq: Daily Route: OR  Start: 01/24/25 1815 [id number]      575637      544086      074204        heparin (Porcine) 5000 UNIT/ML injection 5,000 Units  Dose: 5,000 Units  Freq: 2 times per day Route: SC  Start: 01/24/25 2100    [id number]      338502     287611      145172     390649      963551     2100        insulin aspart (NovoLOG) 100 Units/mL FlexPen 1-7 Units  Dose: 1-7 Units  Freq: 3 times daily with meals Route: SC  Start: 01/24/25 1815   Admin Instructions:   CORRECTION FACTOR - MEDIUM DOSE  Continue to give correction insulin even if NPO  DO NOT HOLD OR ALTER INSULIN DOSE WITHOUT A PHYSICIAN ORDER    Give 1 unit for blood glucose 150-180 mg/dL  Give 2 units for blood glucose 181-210 mg/dL  Give 3 units for blood glucose 211-240 mg/dL  Give 4 units for blood glucose 241-270 mg/dL  Give 5 units for blood glucose 271-300 mg/dL  Give 6 units for blood glucose 301-330 mg/dL  Give 7 units for blood glucose 331-360 mg/dL  Call physician if blood glucose is greater than 360 mg/dL with time and last dose of correction insulin given.    365221      (8147) [C]30     170971     840131      (0800)33     (1200)34     319328      (9590)05 [id number] 4945        insulin  degludec (Tresiba) 100 units/mL flextouch 5 Units  Dose: 5 Units  Freq: Nightly Route: SC  Start: 01/24/25 2100   Admin Instructions:   This is LONG ACTING insulin.  Continue to give basal insulin (insulin degludec - Tresiba) even if NPO.  DO NOT hold or alter insulin dose without a physician order.    507743      973749      293382      2100        sodium bicarbonate 150 mEq in sodium chloride 0.45% 1,000 mL infusion  Rate: 42 mL/hr Dose: 150 mEq  Freq: Continuous Route: IV  Start: 01/27/25 1330       703715        sodium bicarbonate tab 1,300 mg  Dose: 1,300 mg  Freq: 2 times daily Route: OR  Start: 01/24/25 2100    093040      954097     203104      153783     587218      731217     2100        vancomycin (Vancocin) cap 125 mg  Dose: 125 mg  Freq: Daily Route: OR  Start: 01/24/25 1815   Order specific questions:       [id number]      684100      635837      158034           Medications 01/24 01/25 01/26 01/27   cefTRIAXone (Rocephin) 2 g in sodium chloride 0.9% 100 mL IVPB-ADDV  Dose: 2 g  Freq: Once Route: IV  Start: 01/24/25 1353 End: 01/24/25 1515   Admin Instructions:   Ceftriaxone must NOT be administered simultaneously with calcium containing IV solutions. Includes Y-site as well.  In patients other than neonates ceftriaxone and calcium containing products may administered sequentially, provided the line is flushed in between administrations.   Order specific questions:       [id number]     [id number]           potassium chloride (Klor-Con M20) tab 20 mEq  Dose: 20 mEq  Freq: Once Route: OR  Start: 01/27/25 1315 End: 01/27/25 1400   Admin Instructions:   Do not crush       [id number]        potassium chloride (Klor-Con M20) tab 20 mEq  Dose: 20 mEq  Freq: Once Route: OR  Start: 01/27/25 0900 End: 01/27/25 0956   Admin Instructions:   Do not crush       [id number]        sodium chloride 0.9 % IV bolus 1,000 mL  Dose: 1,000 mL  Freq: Once Route: IV  Start: 01/24/25 1201 End: 01/24/25 1430    114523     072751               Medications 01/24 01/25 01/26 01/27   cefTRIAXone (Rocephin) 2 g in sodium chloride 0.9% 100 mL IVPB-ADDV  Dose: 2 g  Freq: Every 24 hours Route: IV  Start: 01/25/25 1500 End: 01/26/25 0746   Admin Instructions:   Ceftriaxone must NOT be administered simultaneously with calcium containing IV solutions. Includes Y-site as well.  In patients other than neonates ceftriaxone and calcium containing products may administered sequentially, provided the line is flushed in between administrations.   Order specific questions:        [id number]          sodium bicarbonate 150 mEq in sodium chloride 0.45% 1,000 mL infusion  Rate: 84 mL/hr Dose: 150 mEq  Freq: Continuous Route: IV  Start: 01/26/25 0900 End: 01/27/25 1318      [id number]      940232     540292        sodium chloride 0.9% infusion  Rate: 100 mL/hr  Freq: Continuous Route: IV  Start: 01/24/25 1815 End: 01/26/25 0848    [id number]      017643

## 2025-01-28 VITALS
BODY MASS INDEX: 23.3 KG/M2 | HEIGHT: 60.98 IN | WEIGHT: 123.44 LBS | SYSTOLIC BLOOD PRESSURE: 149 MMHG | TEMPERATURE: 98 F | OXYGEN SATURATION: 92 % | RESPIRATION RATE: 18 BRPM | DIASTOLIC BLOOD PRESSURE: 53 MMHG | HEART RATE: 92 BPM

## 2025-01-28 LAB
ANION GAP SERPL CALC-SCNC: 11 MMOL/L (ref 0–18)
BUN BLD-MCNC: 54 MG/DL (ref 9–23)
BUN/CREAT SERPL: 18.9 (ref 10–20)
CALCIUM BLD-MCNC: 7.6 MG/DL (ref 8.7–10.4)
CHLORIDE SERPL-SCNC: 101 MMOL/L (ref 98–112)
CO2 SERPL-SCNC: 27 MMOL/L (ref 21–32)
CREAT BLD-MCNC: 2.85 MG/DL
EGFRCR SERPLBLD CKD-EPI 2021: 16 ML/MIN/1.73M2 (ref 60–?)
GLUCOSE BLD-MCNC: 144 MG/DL (ref 70–99)
GLUCOSE BLDC GLUCOMTR-MCNC: 140 MG/DL (ref 70–99)
GLUCOSE BLDC GLUCOMTR-MCNC: 170 MG/DL (ref 70–99)
OSMOLALITY SERPL CALC.SUM OF ELEC: 305 MOSM/KG (ref 275–295)
POTASSIUM SERPL-SCNC: 3.5 MMOL/L (ref 3.5–5.1)
SODIUM SERPL-SCNC: 139 MMOL/L (ref 136–145)

## 2025-01-28 PROCEDURE — 99233 SBSQ HOSP IP/OBS HIGH 50: CPT | Performed by: HOSPITALIST

## 2025-01-28 NOTE — PAYOR COMM NOTE
--------------  CONTINUED STAY REVIEW    [place]or[place] [facility] [place]  Subscriber #:  [id number]  Authorization Number: [id number]    Admit date: 1/24/25  Admit time:  6:03 PM    REVIEW DOCUMENTATION:   1-27-25    Assessment & Plan:  ----------------------------------  Acute UTI.  Patient has leukocytosis, no fever, ? urinary symptoms.  Sepsis is not present.   -Urine culture: E. coli  -Blood cultures: E. coli  -Antibiotics: Ancef     E. coli bacteremia.  In the setting of UTI which is the source.  Not septic.  Feeling better.  -Antibiotics as above     Chronic kidney disease stage IV.  Baseline creatinine 2.5, presented at 3.1, slightly improved  -IV fluids, changed to bicarb solution, dec rate  -Recheck in the morning  -Consider nephrology consult     Acute COVID-19 infection.  Possibly incidental finding.  With no hypoxia.  With no pneumonia on imaging/exam.  No symptoms  -No decadron  -No RDV  -antibiotics as above  -Supplemental oxygen as needed  -ID consult if worsens  -Pulmonology consult if worsens     Hyponatremia, normo-osmolar.  Degree is moderate.  Possible cause(s) include current infections, COVID.  Sodium was as low as 129. Now resolved  -BMP in am  -Interventions: Normal saline  -Nephrology consult if worsens  -Avoid overly rapid correction     Other problems  Non-anion gap metabolic acidosis, changed to bicarb solution  diabetes  Hypertension  Dyslipidemia peripheral artery disease  Anemia of chronic kidney disease  Diverticulitis  Remote history of non-Hodgkin's lymphoma     DVT Mechanical Prophylaxis:            DVT Pharmacologic Prophylaxis   Medication    heparin (Porcine) 5000 UNIT/ML injection 5,000 Units         DVT Pharmacologic prophylaxis: Aspirin 81 mg    Gen: A+Ox3.  No distress.   HEENT: NCAT, neck supple, no carotid bruit.  CV: RRR, S1S2, and intact distal pulses. No gallop, rub, murmur.  Pulm: Effort and breath sounds normal. No distress, wheezes, rales, rhonchi.  Abd: Soft, NTND, BS normal, no  mass, no HSM, no rebound/guarding.   Neuro:  Normal reflexes, CN. Sensory/motor exams grossly normal deficit.   MS: No joint effusions.  No peripheral edema.  Skin: Skin is warm and dry. No rashes, erythema, diaphoresis.   Psych:   Normal mood and affect. Calm, cooperative     Labs:        Lab Results   Component Value Date     HGB 8.5 (L) 01/27/2025     WBC 5.2 01/27/2025     .0 01/27/2025      01/27/2025     K 3.4 (L) 01/27/2025            MEDICATIONS ADMINISTERED IN LAST 1 DAY:  amLODIPine (Norvasc) tab 2.5 mg       Date Action Dose Route User    1/28/2025 0941 Given 2.5 mg Oral Tess Swanson RN          aspirin DR tab 81 mg       Date Action Dose Route User    1/28/2025 0941 Given 81 mg Oral Tess Swanson RN          atorvastatin (Lipitor) tab 20 mg       Date Action Dose Route User    1/27/2025 2113 Given 20 mg Oral Greta Estrella RN          ceFAZolin (Ancef) 1 g in dextrose 5% 100mL IVPB-ADD       Date Action Dose Route User    1/28/2025 0941 New Bag 1 g Intravenous Tess Swanson RN          clopidogrel (Plavix) tab 75 mg       Date Action Dose Route User    1/28/2025 0941 Given 75 mg Oral Tess Swanson RN          escitalopram (Lexapro) tab 10 mg       Date Action Dose Route User    1/28/2025 0941 Given 10 mg Oral Tess Swanson RN          heparin (Porcine) 5000 UNIT/ML injection 5,000 Units       Date Action Dose Route User    1/28/2025 0941 Given 5,000 Units Subcutaneous (Left Lower Abdomen) Tess Swanson RN    1/27/2025 2113 Given 5,000 Units Subcutaneous (Right Upper Arm) Greta Estrella RN          insulin aspart (NovoLOG) 100 Units/mL FlexPen 1-7 Units       Date Action Dose Route User    1/27/2025 1746 Given 3 Units Subcutaneous (Left Upper Arm) Tess Swanson RN    1/27/2025 1315 Given 3 Units Subcutaneous (Left Upper Arm) Tess Swanson RN          insulin degludec (Tresiba) 100 units/mL flextouch 5 Units       Date Action Dose Route User    1/27/2025 2113 Given 5 Units  Subcutaneous (Right Upper Arm) Greta Estrella, RN          potassium chloride (Klor-Con M20) tab 20 mEq       Date Action Dose Route User    1/27/2025 1400 Given 20 mEq Oral Tess Swanson RN          sodium bicarbonate 150 mEq in sodium chloride 0.45% 1,000 mL infusion 42 ml hr        Date Action Dose Route User    1/28/2025 0811 New Bag 150 mEq Intravenous Tess Swanson RN    1/27/2025 1330 Rate/Dose Change (none) Intravenous Tess Swanson RN          sodium bicarbonate tab 1,300 mg       Date Action Dose Route User    1/28/2025 0941 Given 1,300 mg Oral Tess Swanson RN    1/27/2025 2113 Given 1,300 mg Oral Greta Estrella RN          vancomycin (Vancocin) cap 125 mg       Date Action Dose Route User    1/28/2025 0941 Given 125 mg Oral Tess Swanson RN            Vitals (last day)       Date/Time Temp Pulse Resp BP SpO2 Weight O2 Device O2 Flow Rate (L/min) [facility]    01/28/25 0939 98 °F (36.7 °C) 92 18 149/53 92 % -- None (Room air) -- YD    01/28/25 0857 -- 88 -- 174/64 -- -- -- -- CM    01/28/25 0830 -- 88 -- 174/64 -- -- -- -- GF    01/28/25 0558 98.4 °F (36.9 °C) 91 18 178/55 91 % -- None (Room air) --     01/27/25 2111 97.5 °F (36.4 °C) 81 18 162/57 94 % -- None (Room air) --     01/27/25 1648 98.3 °F (36.8 °C) 85 18 158/58 94 % -- None (Room air) -- YD    01/27/25 0954 98 °F (36.7 °C) 93 18 137/54 95 % -- None (Room air) -- YD    01/27/25 0503 98.2 °F (36.8 °C) 86 18 159/67 94 % -- None (Room air) -- CL

## 2025-01-28 NOTE — CM/SW NOTE
Patient discussed in RN DC rounds. SW was informed that patient's nephew's LISE choice is [facility]. SW asked DSC to initiate auth, and informed her that [facility] is choice for patient. SW reserved [facility] via aidin.         01/28/25 1135   Discharge disposition   Expected discharge disposition subacute   Post Acute Care Provider [facility]   Discharge transportation [facility]     Patient received insurance auth for [facility]. Pt requires an ambulance according to the RN due to being a max assist, and covid + 1/24/2024. JAYASHREE called and ordered an Ambulance from [facility] to transport pt to [facility]  at 4:00 PM.  PCS flow sheet completed. RN to attached to AVS and print out.  Zachery Gallagher  is aware of above. Le from [facility] is aware of time. RN to call report to [facility] at [phone].       JAYASHREE/GARRETT to remain available for support and/or discharge planning.     Gretel Carreno, MSW, LSW   x [phone]

## 2025-01-28 NOTE — CM/SW NOTE
Prior Authorization - Destination  Destination Type: Skilled nursing facility  Service Provider: (**PAC  Payer Communication Destination Comments: Kush [phone]  Prior Authorization Status: Submitted/Pending      Amy Miller DSC

## 2025-01-28 NOTE — PLAN OF CARE
Problem: Patient Centered Care  Goal: Patient preferences are identified and integrated in the patient's plan of care  Description: Interventions:  - What would you like us to know as we care for you?   - Provide timely, complete, and accurate information to patient/family  - Incorporate patient and family knowledge, values, beliefs, and cultural backgrounds into the planning and delivery of care  - Encourage patient/family to participate in care and decision-making at the level they choose  - Honor patient and family perspectives and choices  Outcome: Adequate for Discharge     Problem: PAIN - ADULT  Goal: Verbalizes/displays adequate comfort level or patient's stated pain goal  Description: INTERVENTIONS:  - Encourage pt to monitor pain and request assistance  - Assess pain using appropriate pain scale  - Administer analgesics based on type and severity of pain and evaluate response  - Implement non-pharmacological measures as appropriate and evaluate response  - Consider cultural and social influences on pain and pain management  - Manage/alleviate anxiety  - Utilize distraction and/or relaxation techniques  - Monitor for opioid side effects  - Notify MD/LIP if interventions unsuccessful or patient reports new pain  - Anticipate increased pain with activity and pre-medicate as appropriate  Outcome: Adequate for Discharge     Problem: RESPIRATORY - ADULT  Goal: Achieves optimal ventilation and oxygenation  Description: INTERVENTIONS:  - Assess for changes in respiratory status  - Assess for changes in mentation and behavior  - Position to facilitate oxygenation and minimize respiratory effort  - Oxygen supplementation based on oxygen saturation or ABGs  - Provide Smoking Cessation handout, if applicable  - Encourage broncho-pulmonary hygiene including cough, deep breathe, Incentive Spirometry  - Assess the need for suctioning and perform as needed  - Assess and instruct to report SOB or any respiratory  difficulty  - Respiratory Therapy support as indicated  - Manage/alleviate anxiety  - Monitor for signs/symptoms of CO2 retention  Outcome: Adequate for Discharge     Problem: MUSCULOSKELETAL - ADULT  Goal: Return mobility to safest level of function  Description: INTERVENTIONS:  - Assess patient stability and activity tolerance for standing, transferring and ambulating w/ or w/o assistive devices  - Assist with transfers and ambulation using safe patient handling equipment as needed  - Ensure adequate protection for wounds/incisions during mobilization  - Obtain PT/OT consults as needed  - Advance activity as appropriate  - Communicate ordered activity level and limitations with patient/family  Outcome: Adequate for Discharge  Goal: Maintain proper alignment of affected body part  Description: INTERVENTIONS:  - Support and protect limb and body alignment per provider's orders  - Instruct and reinforce with patient and family use of appropriate assistive device and precautions (e.g. spinal or hip dislocation precautions)  Outcome: Adequate for Discharge     Problem: Impaired Activities of Daily Living  Goal: Achieve highest/safest level of independence in self care  Description: Interventions:  - Assess ability and encourage patient to participate in ADLs to maximize function  - Promote sitting position while performing ADLs such as feeding, grooming, and bathing  - Educate and encourage patient/family in tolerated functional activity level and precautions during self-care    Outcome: Adequate for Discharge     Problem: SAFETY ADULT - FALL  Goal: Free from fall injury  Description: INTERVENTIONS:  - Assess pt frequently for physical needs  - Identify cognitive and physical deficits and behaviors that affect risk of falls.  - [place] fall precautions as indicated by assessment.  - Educate pt/family on patient safety including physical limitations  - Instruct pt to call for assistance with activity based on  assessment  - Modify environment to reduce risk of injury  - Provide assistive devices as appropriate  - Consider OT/PT consult to assist with strengthening/mobility  - Encourage toileting schedule  Outcome: Adequate for Discharge     Problem: DISCHARGE PLANNING  Goal: Discharge to home or other facility with appropriate resources  Description: INTERVENTIONS:  - Identify barriers to discharge w/pt and caregiver  - Include patient/family/discharge partner in discharge planning  - Arrange for needed discharge resources and transportation as appropriate  - Identify discharge learning needs (meds, wound care, etc)  - Arrange for interpreters to assist at discharge as needed  - Consider post-discharge preferences of patient/family/discharge partner  - Complete POLST form as appropriate  - Assess patient's ability to be responsible for managing their own health  - Refer to Case Management Department for coordinating discharge planning if the patient needs post-hospital services based on physician/LIP order or complex needs related to functional status, cognitive ability or social support system  Outcome: Adequate for Discharge      IV removed. I called [place] extended care and gave report to receiving RN, answered all questions. Patient left via [facility] at [phone].

## 2025-01-28 NOTE — PHYSICAL THERAPY NOTE
PHYSICAL THERAPY TREATMENT NOTE - INPATIENT     Room Number: 554/554-A       Presenting Problem: admitted with fever, dizziness, UTI. also found to be covid 19 + on 1/24/25  Co-Morbidities : LT femur fx '20, DM, retinopathy, OM    Problem List  Principal Problem:    COVID-19 virus infection  Active Problems:    Acute cystitis without hematuria    Leukocytosis, unspecified type    Chronic renal failure, unspecified CKD stage    Dehydration    Sepsis secondary to UTI (HCC)    Bacteremia      PHYSICAL THERAPY ASSESSMENT   Patient demonstrates fair progress this session, goals  remain in progress.      Patient is requiring moderate assist and maximum assist x2 as a result of the following impairments: decreased functional strength, decreased endurance/aerobic capacity, pain, impaired sitting and standing balance, decreased muscular endurance, and medical status.     Patient continues to function below baseline with bed mobility, transfers, gait, stair negotiation, maintaining seated position, standing prolonged periods, and performing household tasks.  Next session anticipate patient to progress bed mobility, transfers, gait, maintaining seated position, and standing prolonged periods.  Physical Therapy will continue to follow patient for duration of hospitalization.    Patient continues to benefit from continued skilled PT services: to promote return to prior level of function and safety with continuous assistance and gradual rehabilitative therapy .    PLAN DURING HOSPITALIZATION  Nursing Mobility Recommendation : Lift Equipment  PT Device Recommendation: Rolling walker  PT Treatment Plan: Bed mobility;Patient education;Strengthening;Transfer training;Balance training  Frequency (Obs): 3-5x/week     SUBJECTIVE  I'm just so tired    OBJECTIVE  Precautions: Limb alert - left;Bed/chair alarm    PAIN ASSESSMENT   Rating: Unable to rate  Location: back  Management Techniques: Activity promotion;Body  mechanics;Repositioning    BALANCE  Static Sitting: Fair  Dynamic Sitting: Fair -  Static Standing: Poor +  Dynamic Standing: Poor    ACTIVITY TOLERANCE  Pulse: 88  Heart Rate Source: Monitor     BP: (!) 174/64  BP Location: Right arm  BP Method: Automatic  Patient Position: Semi-Crews     O2 WALK  Oxygen Therapy  SPO2% on Room Air at Rest: 93    AM-PAC '6-Clicks' INPATIENT SHORT FORM - BASIC MOBILITY  How much difficulty does the patient currently have...  Patient Difficulty: Turning over in bed (including adjusting bedclothes, sheets and blankets)?: A Lot   Patient Difficulty: Sitting down on and standing up from a chair with arms (e.g., wheelchair, bedside commode, etc.): A Lot   Patient Difficulty: Moving from lying on back to sitting on the side of the bed?: A Lot   How much help from another person does the patient currently need...   Help from Another: Moving to and from a bed to a chair (including a wheelchair)?: A Lot   Help from Another: Need to walk in hospital room?: A Lot   Help from Another: Climbing 3-5 steps with a railing?: Total     AM-PAC Score:  Raw Score: 11   Approx Degree of Impairment: 72.57%   Standardized Score (AM-PAC Scale): 33.86   CMS Modifier (G-Code): CL    FUNCTIONAL ABILITY STATUS  Functional Mobility/Gait Assessment  Gait Assistance: Moderate assistance (x2)  Distance (ft): 4 ft  Assistive Device: Rolling walker  Pattern: Shuffle (slow pace, unsteady)  Supine to Sit: maximum assist x2  Sit to Stand: moderate assist x2 from elevated bed height to RW    Skilled Therapy Provided: Pt agreeable to therapy, identified by name and , gait belt donned for mobility. Coordinated session with OT to maximize pt outcomes. Pt requiring Max A x2 for bed mobility and Min A/CGA to maintain static sitting balance at EOB prior to transfer. Pt able to perform STS transfer with Mod A x2 to RW and take steps to chair. Pt with unsteady gait and premature sit to chair, requiring Mod A x2 for safety. Pt  encouraged to sit up in chair as tolerated for improved lung function and core stabilization. Pt most limited by decreased activity tolerance and medical status at this date.     The patient's Approx Degree of Impairment: 72.57% has been calculated based on documentation in the [facility] '6 clicks' Inpatient Daily Activity Short Form.  Research supports that patients with this level of impairment may benefit from rehab.  Final disposition will be made by interdisciplinary medical team.    Patient End of Session: Up in chair;Needs met;Call light within reach;RN aware of session/findings;Alarm set    CURRENT GOALS   Goals to be met by: 2/8/25  Patient Goal Patient's self-stated goal is: to be able to walk   Goal #1 Patient is able to demonstrate supine - sit EOB @ level: supervision      Goal #1   Current Status  in progress   Goal #2 Patient is able to demonstrate transfers EOB to/from Chair/Wheelchair at assistance level: supervision with rolling walker      Goal #2  Current Status  in progress   Goal #3 Patient is able to ambulate 50 feet with assist device: rolling walker at assistance level: supervision   Goal #3   Current Status  in progress   Goal #4 Patient will negotiate 5 stairs/one curb w/ assistive device and supervision   Goal #4   Current Status  in progress   Goal #5 Patient to demonstrate independence with home activity/exercise instructions provided to patient in preparation for discharge.   Goal #5   Current Status  in progress       Therapeutic Activity: 16 minutes

## 2025-01-28 NOTE — CM/SW NOTE
Prior Authorization - Destination  Destination Type: Skilled nursing facility  Service Provider: [facility] [place] [facility]  Payer Communication Destination Comments: Kush vivar [phone]  Prior Authorization Status: Approved  Prior Authorization Start Date: 01/28/25  Prior Authorization Number: [id number]  1/28 to 2/1      Amy Miller DSC

## 2025-01-28 NOTE — OCCUPATIONAL THERAPY NOTE
OCCUPATIONAL THERAPY TREATMENT NOTE - INPATIENT        Room Number: 554/554-A     Presenting Problem: bacteremia, COVID +, cystitis    Problem List  Principal Problem:    COVID-19 virus infection  Active Problems:    Acute cystitis without hematuria    Leukocytosis, unspecified type    Chronic renal failure, unspecified CKD stage    Dehydration    Sepsis secondary to UTI (HCC)    Bacteremia      OCCUPATIONAL THERAPY ASSESSMENT   Patient demonstrates fair progress this session, goals remain in progress.    Patient is requiring maximum assist as a result of the following impairments: decreased functional strength, decreased functional reach, decreased endurance, impaired standing balance, and medical status.    Patient continues to function near baseline with ADLs.  Next session anticipate patient to progress ADLs and functional transfers.  Occupational Therapy will continue to follow patient for duration of hospitalization.    Patient continues to benefit from continued skilled OT services: to promote return to prior level of function and safety with continuous assistance and gradual rehabilitative therapy.     PLAN DURING HOSPITALIZATION     OT Treatment Plan: ADL training;Functional transfer training;Endurance training;Cognitive reorientation;Patient/Family education;Equipment eval/education;Patient/Family training;Compensatory technique education     SUBJECTIVE  \"I just want to relax.\"    OBJECTIVE  Precautions: Limb alert - left;Bed/chair alarm    WEIGHT BEARING RESTRICTION     PAIN ASSESSMENT  Rating: Unable to rate  Location: low back pain    ACTIVITY TOLERANCE  Pulse: 88        BP: (!) 174/64              O2 SATURATIONS  Oxygen Therapy  SPO2% on Room Air at Rest: 93    ACTIVITIES OF DAILY LIVING ASSESSMENT  AM-PAC ‘6-Clicks’ Inpatient Daily Activity Short Form  How much help from another person does the patient currently need…  -   Putting on and taking off regular lower body clothing?: A Lot  -   Bathing  (including washing, rinsing, drying)?: A Lot  -   Toileting, which includes using toilet, bedpan or urinal? : A Lot  -   Putting on and taking off regular upper body clothing?: A Lot  -   Taking care of personal grooming such as brushing teeth?: A Little  -   Eating meals?: A Little    AM-PAC Score:  Score: 14  Approx Degree of Impairment: 59.67%  Standardized Score (AM-PAC Scale): 33.39  CMS Modifier (G-Code): CK    FUNCTIONAL TRANSFER ASSESSMENT  Sit to Stand: Edge of Bed  Edge of Bed: Moderate Assist  Toilet Transfer: Moderate Assist    BED MOBILITY  Supine to Sit : Maximum Assist  Scooting: Max A       FUNCTIONAL ADL ASSESSMENT   Feeding: setup            Skilled Therapy Provided: Per RN, pt ok to tx. Co-treated with PT. No visitors present. Introduced self and role of OT to pt. Pt verbalized understanding and was agreeable to session. Pt received in bed with unrated/10 pain in low back. Max A for supine to sit. VSS; see below. Gait belt donned. Mod A for STS and mod A for functional transfer at RW level. Educated pt about proper hand placement. Pt demonstrated proper carryover. Overall, pt required max for ADLs and tolerated about <1 minutes of standing in supported standing. Pt left in chair and alarm activated. Call light and all needs in reach. Handoff given to RN.          EDUCATION PROVIDED  Patient Education : Role of Occupational Therapy; Plan of Care  Patient's Response to Education: Does Not Demonstrate Skills Needed for Learning    The patient's Approx Degree of Impairment: 59.67% has been calculated based on documentation in the [facility] '6 clicks' Inpatient Daily Activity Short Form.  Research supports that patients with this level of impairment may benefit from GR.  Final disposition will be made by interdisciplinary medical team.    Patient End of Session: Up in chair;Needs met;Call light within reach;RN aware of session/findings;All patient questions and concerns addressed;Alarm set    OT Goals:      Patients self stated goal is: to feel better     Patient will complete functional transfer with Min A  Comment: mod A     Patient will complete toileting with Min A  Comment: max A    Patient will tolerate standing for 2 minutes in prep for adls with CGA   Comment:ongoing     Comment:          Goals  on: 2/10/25  Frequency: 3-5x/week    OT Session Time: 15 minutes  Self-Care Home Management: 15 minutes       Marcela Maloney OT  [facility]  Inpatient Rehabilitation  Occupational Therapy  [phone]

## 2025-01-28 NOTE — PROGRESS NOTES
[facility]  part of [facility]  Hospitalist Progress Note     Radha Yu Patient Status:  Inpatient    1940  84 year old CSN [id number]   Location 554/554-A Attending Jorge Fernando MD   Hosp Day # 4 PCP JONNIE STUBBS     Assessment & Plan:   ----------------------------------  Acute UTI.  Patient has leukocytosis, no fever, ? urinary symptoms.  Sepsis is not present.   -Urine culture: E. coli  -Blood cultures: E. coli  -Antibiotics: Ancef    E. coli bacteremia.  In the setting of UTI which is the source.  Not septic.  Feeling better.  -Antibiotics as above    Chronic kidney disease stage IV.  At baseline  -IV fluids dc  -Recheck in the morning  -Consider nephrology consult    Acute COVID-19 infection.  Possibly incidental finding.  With no hypoxia.  With no pneumonia on imaging/exam.  No symptoms  -No decadron  -No RDV  -antibiotics as above  -Supplemental oxygen as needed    Hyponatremia, normo-osmolar.  Degree is moderate.  Possible cause(s) include current infections, COVID.  Sodium was as low as 129. Now resolved  -BMP in am  -Monitor off IV fluids    Other problems  Non-anion gap metabolic acidosis, changed to bicarb solution  diabetes  Hypertension  Dyslipidemia peripheral artery disease  Anemia of chronic kidney disease  Diverticulitis  Remote history of non-Hodgkin's lymphoma    DVT Mechanical Prophylaxis:        DVT Pharmacologic Prophylaxis   Medication    heparin (Porcine) 5000 UNIT/ML injection 5,000 Units         DVT Pharmacologic prophylaxis: Aspirin 81 mg    code status: full  dispo: Medically stable since  for discharge to subacute rehab when bed available   If applicable, malnutrition status at bottom of note    I personally reviewed the available laboratories, imaging including. I discussed/will discuss the case with consultants. I ordered laboratories and/or radiographic studies. I adjusted medications as detailed above.  Medical decision making high, risk is  high.  Discussed with nephew at the bedside.    Subjective:   ----------------------------------  No physical complaints.  Feeling better.  Stronger.  No chest pain or shortness of breath.  No cough.  Worked well with physical therapy      Objective:   Chief Complaint:   Chief Complaint   Patient presents with    Dizziness    Fever     ----------------------------------  Temp:  [97.5 °F (36.4 °C)-98.4 °F (36.9 °C)] 98 °F (36.7 °C)  Pulse:  [81-92] 92  Resp:  [18] 18  BP: (149-178)/(53-64) 149/53  SpO2:  [91 %-94 %] 92 %  Gen: A+Ox3.  No distress.   HEENT: NCAT, neck supple, no carotid bruit.  CV: RRR, S1S2, and intact distal pulses. No gallop, rub, murmur.  Pulm: Effort and breath sounds normal. No distress, wheezes, rales, rhonchi.  Abd: Soft, NTND, BS normal, no mass, no HSM, no rebound/guarding.   Neuro: Normal reflexes, CN. Sensory/motor exams grossly normal deficit.   MS: No joint effusions.  No peripheral edema.  Skin: Skin is warm and dry. No rashes, erythema, diaphoresis.   Psych: Normal mood and affect. Calm, cooperative    Labs:  Lab Results   Component Value Date    HGB 8.5 (L) 01/27/2025    WBC 5.2 01/27/2025    .0 01/27/2025     01/28/2025    K 3.5 01/28/2025    CREATSERUM 2.85 (H) 01/28/2025    AST 32 01/24/2025    ALT 13 01/24/2025            ceFAZolin  1 g Intravenous Q24H    vancomycin  125 mg Oral Daily    insulin aspart  1-7 Units Subcutaneous TID CC    heparin  5,000 Units Subcutaneous 2 times per day    amLODIPine  2.5 mg Oral Daily    aspirin  81 mg Oral Daily    atorvastatin  20 mg Oral Nightly    clopidogrel  75 mg Oral Daily    escitalopram  10 mg Oral Daily    insulin degludec  5 Units Subcutaneous Nightly    sodium bicarbonate  1,300 mg Oral BID       glucose **OR** glucose **OR** glucose-vitamin C **OR** dextrose **OR** glucose **OR** glucose **OR** glucose-vitamin C    acetaminophen    ondansetron    metoclopramide  **Certification      PHYSICIAN Certification of Need for  Inpatient Hospitalization - Initial Certification    Patient will require inpatient services that will reasonably be expected to span two midnight's based on the clinical documentation in H+P.   Based on patients current state of illness, I anticipate that, after discharge, patient will require TBD.

## 2025-01-28 NOTE — PLAN OF CARE
Problem: Patient Centered Care  Goal: Patient preferences are identified and integrated in the patient's plan of care  Description: Interventions:  - What would you like us to know as we care for you? Home  - Provide timely, complete, and accurate information to patient/family  - Incorporate patient and family knowledge, values, beliefs, and cultural backgrounds into the planning and delivery of care  - Encourage patient/family to participate in care and decision-making at the level they choose  - Honor patient and family perspectives and choices  Outcome: Progressing     Problem: PAIN - ADULT  Goal: Verbalizes/displays adequate comfort level or patient's stated pain goal  Description: INTERVENTIONS:  - Encourage pt to monitor pain and request assistance  - Assess pain using appropriate pain scale  - Administer analgesics based on type and severity of pain and evaluate response  - Implement non-pharmacological measures as appropriate and evaluate response  - Consider cultural and social influences on pain and pain management  - Manage/alleviate anxiety  - Utilize distraction and/or relaxation techniques  - Monitor for opioid side effects  - Notify MD/LIP if interventions unsuccessful or patient reports new pain  - Anticipate increased pain with activity and pre-medicate as appropriate  Outcome: Progressing     Problem: RESPIRATORY - ADULT  Goal: Achieves optimal ventilation and oxygenation  Description: INTERVENTIONS:  - Assess for changes in respiratory status  - Assess for changes in mentation and behavior  - Position to facilitate oxygenation and minimize respiratory effort  - Oxygen supplementation based on oxygen saturation or ABGs  - Provide Smoking Cessation handout, if applicable  - Encourage broncho-pulmonary hygiene including cough, deep breathe, Incentive Spirometry  - Assess the need for suctioning and perform as needed  - Assess and instruct to report SOB or any respiratory difficulty  - Respiratory  Therapy support as indicated  - Manage/alleviate anxiety  - Monitor for signs/symptoms of CO2 retention  Outcome: Progressing     Problem: MUSCULOSKELETAL - ADULT  Goal: Return mobility to safest level of function  Description: INTERVENTIONS:  - Assess patient stability and activity tolerance for standing, transferring and ambulating w/ or w/o assistive devices  - Assist with transfers and ambulation using safe patient handling equipment as needed  - Ensure adequate protection for wounds/incisions during mobilization  - Obtain PT/OT consults as needed  - Advance activity as appropriate  - Communicate ordered activity level and limitations with patient/family  Outcome: Progressing  Goal: Maintain proper alignment of affected body part  Description: INTERVENTIONS:  - Support and protect limb and body alignment per provider's orders  - Instruct and reinforce with patient and family use of appropriate assistive device and precautions (e.g. spinal or hip dislocation precautions)  Outcome: Progressing     Problem: Impaired Activities of Daily Living  Goal: Achieve highest/safest level of independence in self care  Description: Interventions:  - Assess ability and encourage patient to participate in ADLs to maximize function  - Promote sitting position while performing ADLs such as feeding, grooming, and bathing  - Educate and encourage patient/family in tolerated functional activity level and precautions during self-care  Outcome: Progressing     Problem: SAFETY ADULT - FALL  Goal: Free from fall injury  Description: INTERVENTIONS:  - Assess pt frequently for physical needs  - Identify cognitive and physical deficits and behaviors that affect risk of falls.  - [place] fall precautions as indicated by assessment.  - Educate pt/family on patient safety including physical limitations  - Instruct pt to call for assistance with activity based on assessment  - Modify environment to reduce risk of injury  - Provide assistive  devices as appropriate  - Consider OT/PT consult to assist with strengthening/mobility  - Encourage toileting schedule  Outcome: Progressing     Problem: DISCHARGE PLANNING  Goal: Discharge to home or other facility with appropriate resources  Description: INTERVENTIONS:  - Identify barriers to discharge w/pt and caregiver  - Include patient/family/discharge partner in discharge planning  - Arrange for needed discharge resources and transportation as appropriate  - Identify discharge learning needs (meds, wound care, etc)  - Arrange for interpreters to assist at discharge as needed  - Consider post-discharge preferences of patient/family/discharge partner  - Complete POLST form as appropriate  - Assess patient's ability to be responsible for managing their own health  - Refer to Case Management Department for coordinating discharge planning if the patient needs post-hospital services based on physician/LIP order or complex needs related to functional status, cognitive ability or social support system  Outcome: Progressing

## 2025-01-30 ENCOUNTER — INITIAL APN SNF VISIT (OUTPATIENT)
Dept: INTERNAL MEDICINE CLINIC | Facility: SKILLED NURSING FACILITY | Age: 85
End: 2025-01-30

## 2025-01-30 DIAGNOSIS — U07.1 COVID-19: ICD-10-CM

## 2025-01-30 DIAGNOSIS — R78.81 BACTEREMIA: ICD-10-CM

## 2025-01-30 DIAGNOSIS — R53.81 PHYSICAL DECONDITIONING: ICD-10-CM

## 2025-01-30 DIAGNOSIS — N30.00 ACUTE CYSTITIS WITHOUT HEMATURIA: Primary | ICD-10-CM

## 2025-01-30 DIAGNOSIS — I10 ESSENTIAL HYPERTENSION, BENIGN: ICD-10-CM

## 2025-01-30 DIAGNOSIS — E11.69 TYPE 2 DIABETES MELLITUS WITH OTHER SPECIFIED COMPLICATION, UNSPECIFIED WHETHER LONG TERM INSULIN USE (HCC): ICD-10-CM

## 2025-01-30 DIAGNOSIS — Z79.899 ENCOUNTER FOR MEDICATION REVIEW: ICD-10-CM

## 2025-01-30 NOTE — PROGRESS NOTES
HPI: Radha Yu  : 1940  Age 84 year old  female patient is admitted to [facility] for LISE    Reason for visit: Initial APRN assessment and f/u fever, UTI, bacteremia, physical deconditioning    [facility]: -  [facility]: -present     This is a(n) 83 yo female w/ past medical hx significant for anxiety, HTN, DM, HLD; who presented to hospital w/ fever for evaluation of possible UTI.  Work up positive for E.coli UTI and E.coli bacteremia.  Pt was on Ancef and switched to cefdinir at discharge.  Noted w/ hyponatremia which improved after IVF.  Her status improved with treatment and on  she was transferred to [facility] for LISE.     Pt seen in , in no acute distress.  STs she is feeling well.  Tolerating diet.  Participating in therapies.  Denies dysuria.      Past Medical History:    Anxiety    Essential hypertension    Hyperlipidemia    Type 1 diabetes mellitus (HCC)     Past Surgical History:   Procedure Laterality Date    Appendectomy      Total abdom hysterectomy       No family history on file.  Social History     Socioeconomic History    Marital status:    Tobacco Use    Smoking status: Every Day    Smokeless tobacco: Never     Social Drivers of Health     Financial Resource Strain: Low Risk  (7/15/2024)    Received from [facility]    Overall Financial Resource Strain (CARDIA)     Difficulty of Paying Living Expenses: Not hard at all   Food Insecurity: No Food Insecurity (2025)    Food Insecurity     Food Insecurity: Never true   Transportation Needs: No Transportation Needs (2025)    Transportation Needs     Lack of Transportation: No    Received from [facility]    Social Connections   Housing Stability: Low Risk  (2025)    Housing Stability     Housing Instability: No       ALLERGIES:  Allergies[1]    CODE STATUS:  Full Code    CURRENT MEDICATIONS: Reviewed on SNF EMR     SUBJECTIVE/ROS:  GENERAL  HEALTH:feels well otherwise  HEENT:no visual complaints or deficits  denies nasal congestion, sinus pain or sore throat;  RESPIRATORY: denies shortness of breath, wheezing or cough   CARDIOVASCULAR:denies chest pain, no palpitations   GI: denies nausea, vomiting, constipation, diarrhea; no rectal bleeding; no heartburn  MUSCULOSKELETAL:no joint complaints upper or lower extremities  NEURO:denies seizures  PSYCHE: no symptoms of depression or anxiety  HEMATOLOGY:denies excessive bleeding  ENDOCRINE: denies excessive thirst or urination; denies unexpected wt gain or wt loss  SKIN: denies any unusual skin lesions or rashes    OBJECTIVE:  VITALS: Reviewed   LABS/Imaging: Reviewed  PHYSICAL EXAM:  GENERAL HEALTH: well developed, well nourished, in no apparent distress  HEENT: atraumatic/normocephalic, mucous membranes pink and moist, EOMI, sclera anicteric, conjunctiva normal.   RESPIRATORY:clear   CARDIOVASCULAR: regular   ABDOMEN:  normal active BS+, soft, non distended, nontender   LYMPHATIC:no lymphedema  MUSCULOSKELETAL: Able to move extremities x4   EXTREMITIES/VASCULAR:no cyanosis, clubbing or edema  LINES, TUBES, DRAINS:  none  SKIN: no rashes, no suspicious lesions  NEUROLOGIC: follows commands  PSYCHIATRIC: A/) x2-3, calm and coopertaive        ASSESSMENT/PLAN    Acute UTI.    - leukocytosis  -Urine culture: E. coli  -Blood cultures: E. coli  -s/p Ancef     2. E. coli bacteremia.   - In the setting of UTI which is the source.  Not septic.   -s/p ancef, now on cefdinir  -monitor labs     3. Chronic kidney disease stage IV.  - previously on HD  -At baseline  -s/p IV fluids   -monitor labs     4. Acute COVID-19 infection.    -Possibly incidental finding.  With no hypoxia.  With no pneumonia on imaging/exam.  No symptoms  -No decadron  -No RDV  -antibiotics as above  -Supplemental oxygen as needed     5. Hyponatremia,  -Possible cause(s) include current infections, COVID.  Sodium was as low as 129. Now  resolved  -on sodium bicarbonate   -Monitor labs    6. HTN  - amlodipine increased to 10mg   -monitor    7. DM  - Hgb A1c 6.7 1/24/25  -cont insulin glargine  -cont insulin SS and baseline w/ meals  -hypoglycemia management per facility's protocol  - accu checks    8. Depression  -cont escitalopram    9. HLD  -cont statin     10. PVD  -cont asa, plavix  - f/u outpatient     11. GOC  - full code  - SW assisting w/ discharge planing     Shanell Horton, APRN    60 minutes spent w/ patient and staff, including but not limited to/ reviewing present status, needs, abilities with disciplines, reviewing medical records, vital signs, labs, completing medication reconciliation and entering orders for continued care in [facility].    01/30/25   12:26 PM                      [1]   Allergies  Allergen Reactions    Levaquin [Levofloxacin] ITCHING

## 2025-01-30 NOTE — PAYOR COMM NOTE
Payor: [employer] [facility]  Subscriber #:  [id number]  Authorization Number: [id number]    Admit date: 1/24/25  Admit time:   6:03 PM    Discharge Date: 1/28/2025  4:50 PM

## 2025-02-03 PROCEDURE — 1111F DSCHRG MED/CURRENT MED MERGE: CPT | Performed by: CLINICAL NURSE SPECIALIST

## 2025-02-03 PROCEDURE — 99309 SBSQ NF CARE MODERATE MDM 30: CPT | Performed by: CLINICAL NURSE SPECIALIST

## 2025-02-04 ENCOUNTER — SNF VISIT (OUTPATIENT)
Dept: INTERNAL MEDICINE CLINIC | Facility: SKILLED NURSING FACILITY | Age: 85
End: 2025-02-04

## 2025-02-04 DIAGNOSIS — R60.0 EDEMA OF BOTH LEGS: ICD-10-CM

## 2025-02-04 DIAGNOSIS — R78.81 BACTEREMIA: Primary | ICD-10-CM

## 2025-02-04 DIAGNOSIS — Z09 ENCOUNTER FOR FOLLOW-UP: ICD-10-CM

## 2025-02-04 DIAGNOSIS — N30.00 ACUTE CYSTITIS WITHOUT HEMATURIA: ICD-10-CM

## 2025-02-04 DIAGNOSIS — R53.81 PHYSICAL DECONDITIONING: ICD-10-CM

## 2025-02-04 NOTE — PROGRESS NOTES
HPI: Radha Yu : 1940 Age 84 year old female patient is admitted to [facility] for LISE    Reason for visit: f/u fever, UTI, bacteremia, physical deconditioning    [facility]: -  [place]: -present    This is a(n) 83 yo female w/ past medical hx significant for anxiety, HTN, DM, HLD; who presented to hospital w/ fever for evaluation of possible UTI. Work up positive for E.coli UTI and E.coli bacteremia. Pt was on Ancef and switched to cefdinir at discharge. Noted w/ hyponatremia which improved after IVF. Her status improved with treatment and on  she was transferred to [place] for LISE.  Pt seen in , in no acute distress. STs she is feeling well. Tolerating diet, denies constipation or diarrhea. Participating in therapies. Denies dysuria. Noted w/ BLE edema.  CXR was already ordered.  Participating in therapies.    Past Medical History:   Anxiety   Essential hypertension   Hyperlipidemia   Type 1 diabetes mellitus (HCC)  Past Surgical History:  Procedure Laterality Date   Appendectomy   Total abdom hysterectomy  No family history on file.  Social History  Socioeconomic History   Marital status:   Tobacco Use   Smoking status: Every Day   Smokeless tobacco: Never  Social Drivers of Health  FinancialResource Strain: Low Risk (7/15/2024)  Received from [facility]  Overall Financial Resource Strain (CARDIA)   Difficulty of Paying Living Expenses: Not hard at all  Food Insecurity: No Food Insecurity (2025)  Food Insecurity   Food Insecurity: Never true  Transportation Needs: No Transportation Needs (2025)  Transportation Needs   Lack of Transportation: No  Received from [facility]  Social Connections  Housing Stability: LowRisk (2025)  Housing Stability   Housing Instability: No  ALLERGIES:  [Allergies]  [Allergies]  Allergen Reactions   Levaquin [Levofloxacin] ITCHING  CODE STATUS: Full Code  CURRENT  MEDICATIONS: Reviewed on SNF EMR  SUBJECTIVE/ROS:  GENERAL HEALTH:feels well otherwise  HEENT:no visual complaints or deficits denies nasal congestion, sinus pain or sore throat;  RESPIRATORY: denies shortness of breath, wheezing or cough  CARDIOVASCULAR:denies chest pain, no palpitations  GI: denies nausea, vomiting, constipation, diarrhea; no rectal bleeding; no heartburn  MUSCULOSKELETAL:no joint complaints upper or lower extremities  NEURO:denies seizures  PSYCHE: no symptoms of depression or anxiety  HEMATOLOGY:denies excessive bleeding  ENDOCRINE: denies excessive thirst or urination; denies unexpected wt gain or wt loss  SKIN: denies any unusual skin lesions or rashes  OBJECTIVE:  VITALS: Reviewed  LABS/Imaging: Reviewed.  Weekly at [facility]. Ordered for 2/5  PHYSICAL EXAM:  GENERAL HEALTH: well developed, well nourished, in no apparent distress  HEENT: atraumatic/normocephalic, mucous membranes pink and moist, EOMI, sclera anicteric, conjunctiva normal.  RESPIRATORY:clear  CARDIOVASCULAR: regular  ABDOMEN: normal active BS+, soft, non distended, nontender  LYMPHATIC:no lymphedema  MUSCULOSKELETAL: Able to move extremities x4  EXTREMITIES/VASCULAR:+ BLE edema  LINES, TUBES, DRAINS: none  SKIN: no rashes, no suspicious lesions  NEUROLOGIC: follows commands  PSYCHIATRIC: A/O x2-3, calm and cooperative    ASSESSMENT/PLAN    BLE edema: await CXR results.  In house cardiology consult.     1. Acute UTI.  - leukocytosis  -Urine culture: E. coli  -Blood cultures: E. coli  -s/p Ancef     2. E. coli bacteremia.  - In the setting of UTI which is the source. Not septic.  -s/p ancef, now on cefdinir (EOT 2/4)  -monitor labs     3. Chronic kidney disease stage IV.  - previously on HD  -At baseline  -s/p IV fluids  -monitor labs     4. Acute COVID-19 infection.  -Possibly incidental finding. With no hypoxia. With no pneumonia on imaging/exam. No symptoms  -No decadron  -No RDV  -antibiotics as above  -Supplemental oxygen as  needed     5. Hyponatremia,  -Possible cause(s) include current infections, COVID. Sodium was as low as 129. Now resolved  -on sodium bicarbonate  -Monitor labs    6. HTN  -amlodipine increased to 10mg  -monitor    7. DM  - Hgb A1c 6.7 1/24/25  -cont insulin glargine  -cont insulin SS and baseline w/ meals  -hypoglycemia management per facility's protocol  - accu checks    8. Depression  -cont escitalopram    9. HLD  -cont statin    10. PVD  -cont asa, plavix  - f/u outpatient    11. GOC  - full code  - SW assisting w/ discharge planning    Shanell Horton, APRN  30 minutes spent w/ patient and staff, including but not limited to/ reviewing present status, needs, abilities with disciplines, reviewing medical records, vital signs, labs, completing medication reconciliation and entering orders for continued care in [facility].

## 2025-02-06 ENCOUNTER — SNF VISIT (OUTPATIENT)
Dept: INTERNAL MEDICINE CLINIC | Facility: SKILLED NURSING FACILITY | Age: 85
End: 2025-02-06

## 2025-02-06 DIAGNOSIS — I50.9 CONGESTIVE HEART FAILURE, UNSPECIFIED HF CHRONICITY, UNSPECIFIED HEART FAILURE TYPE (HCC): ICD-10-CM

## 2025-02-06 DIAGNOSIS — R50.9 FEVER, UNSPECIFIED FEVER CAUSE: Primary | ICD-10-CM

## 2025-02-06 DIAGNOSIS — Z09 ENCOUNTER FOR FOLLOW-UP: ICD-10-CM

## 2025-02-06 DIAGNOSIS — R78.81 BACTEREMIA: ICD-10-CM

## 2025-02-06 PROCEDURE — 1111F DSCHRG MED/CURRENT MED MERGE: CPT | Performed by: CLINICAL NURSE SPECIALIST

## 2025-02-06 PROCEDURE — 99309 SBSQ NF CARE MODERATE MDM 30: CPT | Performed by: CLINICAL NURSE SPECIALIST

## 2025-02-06 RX ORDER — FUROSEMIDE 20 MG/1
20 TABLET ORAL EVERY OTHER DAY
COMMUNITY

## 2025-02-06 RX ORDER — DOXYCYCLINE 100 MG/1
100 CAPSULE ORAL 2 TIMES DAILY
COMMUNITY
Start: 2025-02-06 | End: 2025-02-13

## 2025-02-06 NOTE — PROGRESS NOTES
HPI: Radha Yu : 1940 Age 84 year old female patient is admitted to [facility] for LISE    Reason for visit: Fever, CHF, f/u UTI, bacteremia, physical deconditioning    [facility]: -  [facility]: -present    This is a(n) 85 yo female w/ past medical hx significant for anxiety, HTN, DM, HLD; who presented to hospital w/ fever for evaluation of possible UTI. Work up positive for E.coli UTI and E.coli bacteremia. Pt was on Ancef and switched to cefdinir at discharge. Noted w/ hyponatremia which improved after IVF. Her status improved with treatment and on  she was transferred to [facility] for LISE.  Pt seen in bed, in no acute distress. STs she is feeling well. Tolerating diet, denies constipation or diarrhea. Denies dysuria but reports frequency.    Started on lasix recently by cardiology due to CHF evidence on CXR.  Participating in therapies. Noted w/ BLE edema. Pt febrile today.    Ua/cx ordered, STAT labs done and results pending.  Doxycycline was ordered.  Participating in therapies. Monitor closely    Past Medical History:   Anxiety   Essential hypertension   Hyperlipidemia   Type 1 diabetes mellitus (HCC)  Past Surgical History:  Procedure Laterality Date   Appendectomy   Total abdom hysterectomy  No family history on file.  Social History  Socioeconomic History   Marital status:   Tobacco Use   Smoking status: Every Day   Smokeless tobacco: Never  Social Drivers of Health  FinancialResource Strain: Low Risk (7/15/2024)  Received from [facility]  Overall Financial Resource Strain (CARDIA)   Difficulty of Paying Living Expenses: Not hard at all  Food Insecurity: No Food Insecurity (2025)  Food Insecurity   Food Insecurity: Never true  Transportation Needs: No Transportation Needs (2025)  Transportation Needs   Lack of Transportation: No  Received from [facility]  Social Connections  Housing Stability: LowRisk  (1/24/2025)  Housing Stability   Housing Instability: No  ALLERGIES:  [Allergies]  [Allergies]  Allergen Reactions   Levaquin [Levofloxacin] ITCHING  CODE STATUS: Full Code  CURRENT MEDICATIONS: Reviewed on SNF EMR  SUBJECTIVE/ROS:  GENERAL HEALTH:feels well otherwise  HEENT:no visual complaints or deficits denies nasal congestion, sinus pain or sore throat;  RESPIRATORY: denies shortness of breath, wheezing or cough  CARDIOVASCULAR:denies chest pain,no palpitations  GI: denies nausea, vomiting, constipation, diarrhea; no rectal bleeding; no heartburn  MUSCULOSKELETAL:no joint complaints upper or lower extremities  NEURO:denies seizures  PSYCHE: no symptoms of depression or anxiety  HEMATOLOGY:deniesexcessive bleeding  ENDOCRINE: denies excessive thirst or urination; denies unexpected wt gain or wt loss  SKIN: denies any unusual skin lesions or rashes  OBJECTIVE:  VITALS: Reviewed  LABS/Imaging: Reviewed. Weekly at [facility]. STAT labs 2/6 pending  PHYSICAL EXAM:  GENERAL HEALTH: well developed, well nourished, in no apparent distress  HEENT: atraumatic/normocephalic, mucous membranes pink and moist, EOMI, sclera anicteric, conjunctiva normal.  RESPIRATORY:clear  CARDIOVASCULAR: regular  ABDOMEN: normal activeBS+, soft, non distended, nontender  LYMPHATIC:no lymphedema  MUSCULOSKELETAL: Able to move extremities x4  EXTREMITIES/VASCULAR:+ BLE edema  LINES, TUBES, DRAINS: none  SKIN: no rashes, no suspicious lesions  NEUROLOGIC: follows commands  PSYCHIATRIC: A/O x2-3, calm and cooperative    ASSESSMENT/PLAN  Fever: STAT labs pending, Ua/Cx, Resp panel. doxycycline started.  Monitor.     1. Acute UTI.  - leukocytosis  -Urine culture: E. coli  -Blood cultures: E. coli  -s/p Ancef  2. E. coli bacteremia.  - In the setting of UTI which is the source. Not septic.  -s/p ancef, now on cefdinir (EOT 2/4)  -monitor labs  3. Chronic kidney disease stage IV.  - previously on HD  -At baseline  -s/p IV fluids  -monitor labs  4. Acute  COVID-19 infection.  -Possibly incidental finding. With no hypoxia. With no pneumonia on imaging/exam. No symptoms  -No decadron  -No RDV  -antibiotics as above  -Supplemental oxygen as needed  5. Hyponatremia,  -Possible cause(s) include current infections, COVID. Sodium was as low as 129. Now resolved  -on sodium bicarbonate  -Monitor labs  6. HTN  -amlodipine increased to 10mg  -monitor  7. DM  - Hgb A1c 6.7 1/24/25  -cont insulin glargine  -cont insulin SS and baseline w/ meals  -hypoglycemia management per facility's protocol  - accu checks  8. Depression  -cont escitalopram  9. HLD  -cont statin  10. PVD  -cont asa, plavix  - f/u outpatient  11. CHF  - as seen on CXR  - in house cardiology following  - ECHO done  - started on lasix  - daily weights  - monitor  12. GOC  - full code  - SW assisting w/ discharge planning  Shanell Horton, FAHAD  30 minutes spent w/ patient and staff, including butnot limited to/ reviewing present status, needs, abilities with disciplines, reviewing medical records, vital signs, labs, completing medication reconciliation and entering orders for continued care in [facility].

## 2025-02-07 NOTE — DISCHARGE SUMMARY
[facility]  part of [facility]     DISCHARGE SUMMARY     Radha Yu Patient Status:  Inpatient    1940 MRN [id number]   Location [facility] 5SW/SE Attending Tiffany Hodge MD   Hosp Day # 4 PCP JONNIE STUBBS     DATE OF ADMISSION: 2025  DATE OF DISCHARGE: 2025   DISPOSITION: home  CONDITION ON DISCHARGE: good    DISCHARGE DIAGNOSES:  Acute UTI  E. coli bacteremia  Chronic kidney disease stage IV  Acute COVID-19 infection  Hyponatremia, normo-osmolar  Non-anion gap metabolic acidosis  diabetes  Hypertension  Dyslipidemia peripheral artery disease  Anemia of chronic kidney disease  Diverticulitis  Remote history of non-Hodgkin's lymphoma    HISTORY OF PRESENT ILLNESS (COPIED FROM ADMISSION H&P)  Patient is an 84-year-old  female who was brought in today by her family for evaluation of fever, chills, and weakness for the last 2 days. Usually gets her care at [facility]. In the emergency room today, CBC showed white blood cell count of 21.4 with left shift. Chemistry showed GFR of 14, slightly below her baseline; bicarb 17; BUN and creatinine 58 and 3.16; sodium 129; chloride 96. GeneXpert viral panel also was positive for COVID. Urinalysis showed evidence of gross urinary tract infection. Blood cultures, urine cultures, and lactic acid still pending. Chest x-ray showed no acute findings. Started on IV fluids and IV Rocephin, and she will be admitted to the hospital for further management.     HOSPITAL COURSE:  Patient was admitted, placed on IV antibiotics for her UTI.  Subsequently had positive blood cultures growing E. coli.  She continued to improve clinically.  Energy level improved.  She will be switched to oral antibiotics to complete a 2-week course.  Patient had positive COVID test but this was likely incidental, without significant symptoms.    Patient understands return to the emergency room for increased pain, fever, discharge, shortness of  breath, chest pain, new neurologic symptoms, other concerning symptoms.    DISCHARGE MEDICATIONS     Discharge Medications        START taking these medications        Instructions Prescription details   cefdinir 300 MG Caps  Commonly known as: Omnicef      Take 1 capsule (300 mg total) by mouth daily.   Quantity: 11 capsule  Refills: 0            CONTINUE taking these medications        Instructions Prescription details   amLODIPine 2.5 MG Tabs  Commonly known as: Norvasc      Take 4 tablets (10 mg total) by mouth daily.   Refills: 0     aspirin 81 MG Tbec      Take 1 tablet (81 mg total) by mouth daily.   Refills: 0     atorvastatin 20 MG Tabs  Commonly known as: Lipitor      Take 1 tablet (20 mg total) by mouth nightly.   Refills: 0     Cholecalciferol 50 MCG (2000 UT) Caps      Take 1 capsule by mouth daily.   Refills: 0     clopidogrel 75 MG Tabs  Commonly known as: Plavix      Take 1 tablet (75 mg total) by mouth daily.   Refills: 0     escitalopram 10 MG Tabs  Commonly known as: Lexapro      Take 1 tablet (10 mg total) by mouth daily.   Refills: 0     insulin aspart 100 Units/mL Sopn  Commonly known as: NovoLOG  Notes to patient: Give 1 unit for blood glucose 150-180 mg/dL  Give 2 units for blood glucose 181-210 mg/dL  Give 3 units for blood glucose 211-240 mg/dL  Give 4 units for blood glucose 241-270 mg/dL  Give 5 units for blood glucose 271-300 mg/dL  Give 6 units for blood glucose 301-330 mg/dL  Give 7 units for blood glucose 331-360 mg/dL  Call physician if blood glucose is greater than 360 mg/dL with time and last dose of correction insulin given.       Inject 4-10 Units into the skin 3 (three) times daily before meals. Base dose: 4 units, Sliding scale dose adjustments:  = 4 units (base dose given); 101-120 = 4 units (base); 121-140 = 4 units (base); 141-160 = 4+1 units; 161-180 = 4+2 units.   Refills: 0     insulin glargine 100 UNIT/ML Soln  Commonly known as: Lantus      Inject 5 Units into the  skin nightly.   Refills: 0     sodium bicarbonate 650 MG Tabs      Take 2 tablets (1,300 mg total) by mouth 2 (two) times daily.   Refills: 0               Where to Get Your Medications        Please  your prescriptions at the location directed by your doctor or nurse    Bring a paper prescription for each of these medications  cefdinir 300 MG Caps         CONSULTANTS      FOLLOW UP:  Dwight Cantu  [address] [place] [address] [id number] [phone]    Follow up in 1 week(s)  Post Discharge Followup    The above plan and follow-up instructions were reviewed with the patient and they verbalized understanding and agreement.  They understand to return to the emergency room for any concerning signs or symptoms.  Greater than 30 minutes spent on discharge.  -----------------------    Hospital Discharge Diagnoses: E. coli bacteremia    Lace+ Score: 69  59-90 High Risk  29-58 Medium Risk  0-28   Low Risk.    TCM Follow-Up Recommendation:  LACE > 58: High Risk of readmission after discharge from the hospital.

## 2025-02-10 ENCOUNTER — SNF VISIT (OUTPATIENT)
Dept: INTERNAL MEDICINE CLINIC | Facility: SKILLED NURSING FACILITY | Age: 85
End: 2025-02-10

## 2025-02-10 DIAGNOSIS — N30.00 ACUTE CYSTITIS WITHOUT HEMATURIA: ICD-10-CM

## 2025-02-10 DIAGNOSIS — R53.81 PHYSICAL DECONDITIONING: ICD-10-CM

## 2025-02-10 DIAGNOSIS — R78.81 BACTEREMIA: ICD-10-CM

## 2025-02-10 DIAGNOSIS — R50.9 FEVER, UNSPECIFIED FEVER CAUSE: Primary | ICD-10-CM

## 2025-02-10 DIAGNOSIS — Z09 ENCOUNTER FOR FOLLOW-UP: ICD-10-CM

## 2025-02-10 NOTE — PROGRESS NOTES
HPI: Radha Yu : 1940 Age 84 year old female patient is admitted to [facility] for LISE    Reason for visit: F/u Fever, CHF, f/u UTI, bacteremia, physical deconditioning    [facility]: -  [facility]: -present    This is a(n) 85 yo female w/ past medical hx significant for anxiety, HTN, DM, HLD; who presented to hospital w/ fever for evaluation of possible UTI. Work up positive for E.coli UTI and E.coli bacteremia. Pt was on Ancef and switched to cefdinir at discharge.Noted w/ hyponatremia which improved after IVF. Her status improved with treatment and on  she was transferred to [facility] for LISE.  Pt seen in , in no acute distress. STs she is feeling well. Tolerating diet, denies constipation or diarrhea.  Participating in therapies. Afebrile.  UCx pending.  CXR negative for infection.      Past Medical History:   Anxiety   Essential hypertension   Hyperlipidemia   Type 1 diabetes mellitus (HCC)  Past Surgical History:  Procedure Laterality Date   Appendectomy   Total abdom hysterectomy  No family history on file.  Social History  Socioeconomic History   Marital status:   Tobacco Use   Smoking status: Every Day   Smokeless tobacco: Never  Social Drivers of Health  FinancialResource Strain: Low Risk (7/15/2024)  Received from [facility]  Overall Financial Resource Strain (CARDIA)   Difficulty of Paying Living Expenses: Not hard at all  Food Insecurity: No Food Insecurity (2025)  Food Insecurity   Food Insecurity: Never true  Transportation Needs: No Transportation Needs (2025)  Transportation Needs   Lack of Transportation: No  Received from [facility]  Social Connections  Housing Stability: LowRisk (2025)  Housing Stability   Housing Instability: No  ALLERGIES:  [Allergies]  [Allergies]  Allergen Reactions   Levaquin [Levofloxacin] ITCHING  CODE STATUS: Full Code  CURRENT MEDICATIONS: Reviewed on SNF  EMR  SUBJECTIVE/ROS:  GENERAL HEALTH:feels well otherwise  HEENT:no visual complaints or deficits denies nasal congestion, sinus pain or sore throat;  RESPIRATORY: denies shortness of breath, wheezing or cough  CARDIOVASCULAR:denies chest pain,no palpitations  GI: denies nausea, vomiting, constipation, diarrhea; no rectal bleeding; no heartburn  MUSCULOSKELETAL:no joint complaints upper or lower extremities  NEURO:denies seizures  PSYCHE: no symptoms of depression or anxiety  HEMATOLOGY:deniesexcessive bleeding  ENDOCRINE: denies excessive thirst or urination;denies unexpected wt gain or wt loss  SKIN: denies any unusual skin lesions or rashes  OBJECTIVE:  VITALS: Reviewed  LABS/Imaging: Reviewed. Weekly at [facility].   PHYSICAL EXAM:  GENERAL HEALTH: well developed, well nourished, in no apparent distress  HEENT: atraumatic/normocephalic, mucous membranes pink and moist, EOMI, sclera anicteric, conjunctiva normal.  RESPIRATORY:clear  CARDIOVASCULAR: regular  ABDOMEN: normal active BS+, soft, non distended, nontender  LYMPHATIC:no lymphedema  MUSCULOSKELETAL: Able to move extremities x4  EXTREMITIES/VASCULAR:+ BLE edema  LINES, TUBES, DRAINS: none  SKIN: no rashes, no suspicious lesions  NEUROLOGIC: follows commands  PSYCHIATRIC: A/O x2-3, calm and cooperative    ASSESSMENT/PLAN  Fever: UCx pending.  Blood Cx NGTD.  Afebrile now.    Continue therapies     1. Acute UTI.  - leukocytosis  -Urine culture: E. coli  -Blood cultures: E. coli  -s/p Ancef  2. E. coli bacteremia.  - In the setting of UTI which is the source. Not septic.  -s/p ancef, now on cefdinir (EOT 2/4)  -monitor labs  - repeat Blood Cx 2/7: NGTD  3. Chronic kidney disease stage IV.  - previously on HD  -At baseline  -s/p IV fluids  -monitor labs  4. Acute COVID-19 infection.  -Possibly incidental finding. With no hypoxia. With no pneumonia on imaging/exam. No symptoms  -No decadron  -No RDV  -antibiotics as above  -Supplemental oxygen as needed  5.  Hyponatremia,  -Possible cause(s) include current infections, COVID. Sodium was as low as 129. Now resolved  -on sodium bicarbonate  -Monitor labs  6. HTN  -amlodipine increased to 10mg  -monitor  7. DM  - Hgb A1c 6.7 1/24/25  -cont insulin glargine  -cont insulin SS and baseline w/ meals  -hypoglycemia management per facility's protocol  - accu checks  8. Depression  -cont escitalopram  9. HLD  -cont statin  10. PVD  -cont asa, plavix  - f/u outpatient  11. CHF  - as seen on CXR  - in house cardiology following  - ECHO done  - cont lasix  - daily weights  - monitor  12. GOC  - full code  - SW assisting w/ discharge planning  Shanell Horton, APRN  30 minutes spent w/ patient and staff, including but not limited to/ reviewing present status, needs, abilities with disciplines, reviewing medical records, vital signs, labs, completing medication reconciliation and entering orders for continued care in [facility].

## 2025-02-13 ENCOUNTER — SNF VISIT (OUTPATIENT)
Dept: INTERNAL MEDICINE CLINIC | Facility: SKILLED NURSING FACILITY | Age: 85
End: 2025-02-13

## 2025-02-13 DIAGNOSIS — Z16.12 ESBL (EXTENDED SPECTRUM BETA-LACTAMASE) PRODUCING BACTERIA INFECTION: ICD-10-CM

## 2025-02-13 DIAGNOSIS — R78.81 BACTEREMIA: ICD-10-CM

## 2025-02-13 DIAGNOSIS — R53.81 PHYSICAL DECONDITIONING: ICD-10-CM

## 2025-02-13 DIAGNOSIS — N18.4 STAGE 4 CHRONIC KIDNEY DISEASE (HCC): ICD-10-CM

## 2025-02-13 DIAGNOSIS — Z09 ENCOUNTER FOR FOLLOW-UP: ICD-10-CM

## 2025-02-13 DIAGNOSIS — N30.00 ACUTE CYSTITIS WITHOUT HEMATURIA: Primary | ICD-10-CM

## 2025-02-13 DIAGNOSIS — A49.9 ESBL (EXTENDED SPECTRUM BETA-LACTAMASE) PRODUCING BACTERIA INFECTION: ICD-10-CM

## 2025-02-13 PROCEDURE — 1111F DSCHRG MED/CURRENT MED MERGE: CPT | Performed by: CLINICAL NURSE SPECIALIST

## 2025-02-13 PROCEDURE — 99309 SBSQ NF CARE MODERATE MDM 30: CPT | Performed by: CLINICAL NURSE SPECIALIST

## 2025-02-13 NOTE — PROGRESS NOTES
HPI: Radha Yu : 1940 Age 84 year old female patient is admitted to [facility] for LISE    Reason for visit: UTI, F/u Fever, CHF, f/u UTI, bacteremia, physical deconditioning    [facility]: -  [facility]: -present    This is a(n) 85 yo female w/ past medical hx significant for anxiety, HTN, DM, HLD; who presented to hospital w/ fever for evaluation of possible UTI. Work up positive for E.coli UTI and E.coli bacteremia. Pt was on Ancef and switched to cefdinir at discharge. Noted w/ hyponatremia which improved after IVF. Her status improved with treatment and on  she was transferred to [facility] for LISE.    Pt seen in , in no acute distress. STs she is feeling well. Tolerating diet, denies constipation or diarrhea. Participating in therapies. Afebrile. UCx + ESBL klebsiella pneumoniae and pseudomonas aeruginosa.  She she was started on Meropenem based on sensitivities. Crt Clearance is 12 based on labs from , Will change dose to 500mg q12H, discussed w/ Dr. Foster.  Per nursing, pt w/ difficulty sleeping at night.  Hgb low at 7.2.  Stool collected to r/o occult blood and results pending.  Pt denies hematuria or blood in stool.      Past Medical History:   Anxiety   Essential hypertension   Hyperlipidemia   Type 1 diabetes mellitus (HCC)  Past Surgical History:  Procedure Laterality Date   Appendectomy   Total abdom hysterectomy  No family history on file.  Social History  Socioeconomic History   Marital status:   Tobacco Use   Smoking status: Every Day   Smokeless tobacco: Never  Social Drivers of Health  FinancialResource Strain: Low Risk (7/15/2024)  Received from [facility]  Overall Financial Resource Strain (CARDIA)   Difficulty of Paying Living Expenses: Not hard at all  Food Insecurity: No Food Insecurity (2025)  Food Insecurity   Food Insecurity: Never true  Transportation Needs: No Transportation Needs  (1/24/2025)  Transportation Needs   Lack of Transportation: No  Received from [facility]  Social Connections  Housing Stability: LowRisk (1/24/2025)  Housing Stability   Housing Instability: No  ALLERGIES:  [Allergies]  [Allergies]  Allergen Reactions   Levaquin [Levofloxacin] ITCHING  CODE STATUS: Full Code  CURRENT MEDICATIONS: Reviewed on SNF EMR  SUBJECTIVE/ROS:  GENERAL HEALTH:feels well otherwise  HEENT:no visual complaints or deficits denies nasal congestion, sinus pain or sore throat;  RESPIRATORY: denies shortness of breath, wheezing or cough  CARDIOVASCULAR:denies chest pain,no palpitations  GI: deniesnausea, vomiting, constipation, diarrhea; no rectal bleeding; no heartburn  MUSCULOSKELETAL:no joint complaints upper or lower extremities  NEURO:denies seizures  PSYCHE: no symptoms of depression or anxiety  HEMATOLOGY:deniesexcessive bleeding  ENDOCRINE: denies excessive thirst or urination;denies unexpected wt gain or wt loss  SKIN: denies any unusual skin lesions or rashes  OBJECTIVE:  VITALS: Reviewed  LABS/Imaging: Reviewed. Weekly at [facility]. Repeat H&H 2/14  PHYSICAL EXAM:  GENERAL HEALTH: well developed, well nourished, in no apparent distress  HEENT: atraumatic/normocephalic, mucous membranes pink and moist, EOMI, sclera anicteric, conjunctiva normal.  RESPIRATORY:clear  CARDIOVASCULAR: regular  ABDOMEN: normal active BS+, soft, non distended, nontender  LYMPHATIC:no lymphedema  MUSCULOSKELETAL: Able to move extremities x4  EXTREMITIES/VASCULAR:+ BLE edema  LINES, TUBES, DRAINS: RUE midline  SKIN: no rashes, no suspicious lesions  NEUROLOGIC: follows commands  PSYCHIATRIC: A/O x2-3, calm and cooperative    ASSESSMENT/PLAN    ESBL UTI: UCx 2/11 + ESBL klebsiella pneumoniae and pseudomonas aeruginosa. cont meropenem IV 500mg q12H (EOT 2/18). Cont contact iso. RUE midline in place.    Continue therapies    1. Acute UTI.  - leukocytosis  -Urine culture: E. coli  -Blood cultures: E.  coli  -s/p Ancef  2. E. coli bacteremia.  - In the setting of UTI which is the source. Not septic.  -s/p ancef, now on cefdinir (EOT 2/4)  -monitor labs  - repeat Blood Cx 2/7: NGTD  3. Chronic kidney disease stage IV.  - previously on HD  - Crt up to 3.09: repeat labs  - s/p IV fluids  -monitor labs  4. Acute COVID-19 infection.  -Possibly incidental finding. With no hypoxia. With no pneumonia on imaging/exam. No symptoms  -No decadron  -No RDV  -antibiotics as above  -Supplemental oxygen as needed  5. Hyponatremia,  -Possible cause(s) include current infections, COVID. Sodium was as low as 129. Now resolved  -on sodium bicarbonate  -Monitor labs  6. HTN  -amlodipine increased to 10mg  -monitor  7. DM  - Hgb A1c 6.7 1/24/25  -cont insulin glargine  -cont insulin SS and baseline w/ meals  -hypoglycemia management per facility's protocol  - accu checks  8. Depression  -cont escitalopram  9. HLD  -cont statin  10. PVD  -cont asa, plavix  - f/u outpatient  11. CHF  - as seen on CXR  - in house cardiology following  - ECHO done  - cont lasix  - daily weights  - monitor  12. Anemia  - likely of chronic disease  - ferritin wnl  - fecal occult blood sample collected: results pending   - Hgb at 7.2  - Repeat H&H 2/14  13. GO  - full code  - SW assisting w/ discharge planning  Shanell Horton, APRN  30 minutes spent w/ patient and staff, including but not limited to/ reviewing present status, needs, abilities with disciplines, reviewing medical records, vital signs, labs, completing medication reconciliation and entering orders for continued care in [facility].

## 2025-02-18 ENCOUNTER — SNF VISIT (OUTPATIENT)
Dept: INTERNAL MEDICINE CLINIC | Facility: SKILLED NURSING FACILITY | Age: 85
End: 2025-02-18

## 2025-02-18 DIAGNOSIS — D64.9 ANEMIA, UNSPECIFIED TYPE: ICD-10-CM

## 2025-02-18 DIAGNOSIS — Z09 ENCOUNTER FOR FOLLOW-UP: ICD-10-CM

## 2025-02-18 DIAGNOSIS — N30.00 ACUTE CYSTITIS WITHOUT HEMATURIA: ICD-10-CM

## 2025-02-18 DIAGNOSIS — Z16.12 ESBL (EXTENDED SPECTRUM BETA-LACTAMASE) PRODUCING BACTERIA INFECTION: Primary | ICD-10-CM

## 2025-02-18 DIAGNOSIS — A49.9 ESBL (EXTENDED SPECTRUM BETA-LACTAMASE) PRODUCING BACTERIA INFECTION: Primary | ICD-10-CM

## 2025-02-18 DIAGNOSIS — R53.81 PHYSICAL DECONDITIONING: ICD-10-CM

## 2025-02-18 DIAGNOSIS — N18.4 STAGE 4 CHRONIC KIDNEY DISEASE (HCC): ICD-10-CM

## 2025-02-18 PROCEDURE — 1111F DSCHRG MED/CURRENT MED MERGE: CPT | Performed by: CLINICAL NURSE SPECIALIST

## 2025-02-18 PROCEDURE — 99309 SBSQ NF CARE MODERATE MDM 30: CPT | Performed by: CLINICAL NURSE SPECIALIST

## 2025-02-18 NOTE — PROGRESS NOTES
HPI: Radha Yu : 1940 Age 84 year old female patient is admitted to [facility] for LISE    Reason for visit: F/u  UTI, F/u Fever, CHF, f/u UTI, bacteremia, physical deconditioning    [facility]: -  [facility]: -present    This is a(n) 85 yo female w/ past medical hx significant for anxiety, HTN, DM, HLD; who presented to hospital w/ fever for evaluation of possible UTI. Work up positive for E.coli UTI and E.coli bacteremia. Pt was on Ancef and switched to cefdinir at discharge. Noted w/ hyponatremia which improved after IVF. Her status improved with treatment and on  she was transferred to [facility] for LISE.  Pt seen in , in no acute distress. STs she is feeling well. Tolerating diet, denies constipation or diarrhea. Participating in therapies. Afebrile. UCx + ESBL klebsiella pneumoniae and pseudomonas aeruginosa. She she was started on Meropenem based on sensitivities. Meropenem EOT .  No acute issues reported by nursing.  Noted w/ edema BLE.  Cardiology following.   Past Medical History:   Anxiety   Essential hypertension   Hyperlipidemia   Type 1 diabetes mellitus (HCC)  Past Surgical History:  Procedure Laterality Date   Appendectomy   Total abdom hysterectomy  No family history on file.  Social History  Socioeconomic History   Marital status:   Tobacco Use   Smoking status: Every Day   Smokeless tobacco: Never  Social Drivers of Health  FinancialResource Strain: Low Risk (7/15/2024)  Received from [facility]  Overall Financial Resource Strain (CARDIA)   Difficulty of Paying Living Expenses: Not hard at all  Food Insecurity: No Food Insecurity (2025)  Food Insecurity   Food Insecurity: Never true  Transportation Needs: No Transportation Needs (2025)  Transportation Needs   Lack of Transportation: No  Received from [facility]  Social Connections  Housing Stability: LowRisk (2025)  Housing Stability    Housing Instability: No  ALLERGIES:  [Allergies]  [Allergies]  Allergen Reactions   Levaquin [Levofloxacin] ITCHING  CODE STATUS: Full Code  CURRENT MEDICATIONS: Reviewed on SNF EMR  SUBJECTIVE/ROS:  GENERAL HEALTH:feels well otherwise  HEENT:no visual complaints or deficits denies nasal congestion, sinus pain or sore throat;  RESPIRATORY: denies shortness of breath, wheezing or cough  CARDIOVASCULAR:denies chest pain,no palpitations  GI: deniesnausea, vomiting, constipation, diarrhea; no rectal bleeding; no heartburn  MUSCULOSKELETAL:no joint complaints upper or lower extremities  NEURO:denies seizures  PSYCHE: no symptoms of depression or anxiety  HEMATOLOGY:deniesexcessive bleeding  ENDOCRINE: deniesexcessive thirst or urination;denies unexpected wt gain or wt loss  SKIN: denies any unusual skin lesions or rashes  OBJECTIVE:  VITALS: Reviewed  LABS/Imaging: Reviewed. CBC 2/18 pending.  CMP 2/19  PHYSICAL EXAM:  GENERAL HEALTH: well developed, well nourished, in no apparent distress  HEENT: atraumatic/normocephalic, mucous membranes pink and moist, EOMI, sclera anicteric, conjunctiva normal.  RESPIRATORY:clear  CARDIOVASCULAR: regular  ABDOMEN: normal active BS+, soft, non distended, nontender  LYMPHATIC:no lymphedema  MUSCULOSKELETAL: Able to move extremities x4  EXTREMITIES/VASCULAR:+ BLE edema  LINES, TUBES, DRAINS: RUE midline  SKIN: no rashes, no suspicious lesions  NEUROLOGIC: follows commands  PSYCHIATRIC: A/O x2-3, calm and cooperative    ASSESSMENT/PLAN    BLE edema: apply tubigrips    Continue therapies    1. Recurrent  UTI.  - leukocytosis  -Urine culture: E. coli  -Blood cultures: E. coli  -s/p Ancef  -ESBL UTI: UCx 2/11 + ESBL klebsiella pneumoniae and pseudomonas aeruginosa. cont meropenem (EOT 2/18). Cont contact iso. RUE midline in place.  2. E. coli bacteremia.  - In the setting of UTI which is the source. Not septic.  -s/p ancef, now on cefdinir (EOT 2/4)  -monitor labs  - repeat Blood Cx 2/7:  NGTD  3. Chronic kidney disease stage IV.  - previously on HD  - Crt up to 3.09: repeat labs  - s/p IV fluids  -monitor labs  - has new consult eval w/ Dr. Sharma 5/19  4. Acute COVID-19 infection.  -Possibly incidental finding. With no hypoxia. With no pneumonia on imaging/exam. No symptoms  -No decadron  -No RDV  -antibiotics as above  -Supplemental oxygen as needed  5. Hyponatremia,  -Possible cause(s) include current infections, COVID. Sodium was as low as 129. Now resolved  -on sodium bicarbonate  -Monitor labs  6. HTN  -amlodipine increased to 10mg  -monitor  7. DM  - Hgb A1c 6.7 1/24/25  -cont insulin glargine  -cont insulin SS and baseline w/ meals  -hypoglycemia management per facility's protocol  - accu checks  8. Depression  -cont escitalopram  9. HLD  -cont statin  10. PVD  -cont asa, plavix  - f/u outpatient  11. CHF  - as seen on CXR  - in house cardiology following  - ECHO done  - cont lasix  - daily weights  - monitor  12. Anemia  - likely of chronic disease  - ferritin wnl  - fecal occult blood sample collected: negative x2  - Hgb at 7.5  - Repeat H&H 2/14  13. GOC  - full code  - SW assisting w/ discharge planning  Shanell Horton, APRN  30 minutes spent w/ patient and staff, including but not limited to/ reviewing present status, needs, abilities with disciplines, reviewing medical records, vital signs, labs, completing medication reconciliation and entering orders for continued care in [facility].

## 2025-02-20 ENCOUNTER — APPOINTMENT (OUTPATIENT)
Dept: GENERAL RADIOLOGY | Facility: HOSPITAL | Age: 85
End: 2025-02-20
Attending: EMERGENCY MEDICINE
Payer: MEDICARE

## 2025-02-20 ENCOUNTER — HOSPITAL ENCOUNTER (EMERGENCY)
Facility: HOSPITAL | Age: 85
Discharge: HOME | End: 2025-02-20
Attending: EMERGENCY MEDICINE
Payer: MEDICARE

## 2025-02-20 VITALS
HEART RATE: 80 BPM | SYSTOLIC BLOOD PRESSURE: 140 MMHG | OXYGEN SATURATION: 99 % | DIASTOLIC BLOOD PRESSURE: 66 MMHG | RESPIRATION RATE: 23 BRPM | BODY MASS INDEX: 23 KG/M2 | TEMPERATURE: 98 F | WEIGHT: 123.44 LBS

## 2025-02-20 DIAGNOSIS — B34.9 VIRAL SYNDROME: Primary | ICD-10-CM

## 2025-02-20 PROCEDURE — 99284 EMERGENCY DEPT VISIT MOD MDM: CPT

## 2025-02-20 PROCEDURE — 99283 EMERGENCY DEPT VISIT LOW MDM: CPT

## 2025-02-20 PROCEDURE — 71045 X-RAY EXAM CHEST 1 VIEW: CPT | Performed by: EMERGENCY MEDICINE

## 2025-02-21 ENCOUNTER — PATIENT OUTREACH (OUTPATIENT)
Dept: CASE MANAGEMENT | Age: 85
End: 2025-02-21

## 2025-02-21 NOTE — ED QUICK NOTES
Spoke w/ [facility] RN. Per family member, pt has been discharged by the facility (confirmed in conversation w/ facility RN) and pt will be discharged into familial care. Facility made aware of pt's discharged status.

## 2025-02-21 NOTE — ED INITIAL ASSESSMENT (HPI)
Pt presents to ED from [facility] for fever. Pt recently treated for ESBL, PICC line removed with plan to discharge home today. Pt is AAOx2-3, verbal. Denies complaints. Dr Fernando at bedside

## 2025-02-21 NOTE — PROGRESS NOTES
Attempted to contact pt for [facility] ER f/u, however no answer. Call continued to ring 15+ times and did not go to a --no option for SMS notification. NCM to try again at a later time.          Disposition and Plan      Clinical Impression:  1. Viral syndrome      Follow-up Appointments:  Your appointments       Date & Time Appointment Department ([place])    May 19, 2025 11:00 AM CDT Consult with Lukasz Sharma MD Counts include 234 beds at the [facility] ([facility])    Please bring in any pertinent lab or diagnostic test results with you to your appointment.              [facility]  [address]  [place] [address]  [phone]     MAR 10  2025 09:15 AM - Appointment  [facility] - Elly Mata OD      DEC 16  2025 08:30 AM - Appointment  [facility]     DEC 16  2025 09:30 AM - Appointment  [facility]     DEC 16  2025 10:00 AM - Appointment  [facility]     DEC 16  2025 11:15 AM - Appointment  [facility] - Domenico Martinez MD

## 2025-02-21 NOTE — ED PROVIDER NOTES
Patient Seen in: [facility] Emergency Department    History     Chief Complaint   Patient presents with    Fever       HPI    History is provided by patient/independent historian: patient, EMS  84 year old female with History of hypertension, diabetes, hyperlipidemia, recently treated for ESBL bacteremia,  supposed to be discharged today, sent here for fever today.  No medications were given or EMS, patient arrives without complaints.    History reviewed.   Past Medical History:    Anxiety    Essential hypertension    Hyperlipidemia    Type 1 diabetes mellitus (HCC)         History reviewed.   Past Surgical History:   Procedure Laterality Date    Appendectomy      Total abdom hysterectomy           Home Medications reviewed :  Prescriptions Prior to Admission[1]      History reviewed.   Social History     Socioeconomic History    Marital status:    Tobacco Use    Smoking status: Every Day    Smokeless tobacco: Never   Substance and Sexual Activity    Alcohol use: Not Currently    Drug use: Not Currently         ROS  Review of Systems   Constitutional:  Positive for fever.   Respiratory:  Negative for shortness of breath.    Cardiovascular:  Negative for chest pain.   All other systems reviewed and are negative.     All other pertinent organ systems are reviewed and are negative.      Physical Exam     ED Triage Vitals [02/20/25 1830]   /90   Pulse 87   Resp 16   Temp 98.3 °F (36.8 °C)   Temp src    SpO2 100 %   O2 Device None (Room air)     Vital signs reviewed.      Physical Exam  Vitals and nursing note reviewed.   Cardiovascular:      Pulses: Normal pulses.   Pulmonary:      Effort: No respiratory distress.   Abdominal:      General: There is no distension.   Neurological:      Mental Status: She is alert.         ED Course       Labs:   Labs Reviewed - No data to display      My EKG Interpretation:   As reviewed and Interpreted by me      Imaging Results Available and Reviewed while in ED:    XR CHEST AP PORTABLE  (CPT=71045)    Result Date: 2/20/2025  CONCLUSION:  1. Cardiomegaly with mild pulmonary venous congestion.  Small left basilar pleural effusion with adjacent atelectasis.  No consolidation indicate pneumonia.     Dictated by (CST): Mohamud Infante MD on 2/20/2025 at 7:53 PM     Finalized by (CST): Mohamud Infante MD on 2/20/2025 at 7:55 PM         My independent review of XR images I ordered: no infiltrate - radiology report corroborates this    Decision rules/scores evaluated: none      Diagnostic labs/tests considered but not ordered: CBC, BMP, blood culture, CXR, UA    ED Medications Administered: Medications - No data to display             MDM       Medical Decision Making      Differential Diagnosis: After obtaining the patient's history, performing the physical exam and reviewing the diagnostics, multiple initial diagnoses were considered based on the presenting problem including infection, UTI, DVT, cellulitis    External document review: I personally reviewed available external medical records for any recent pertinent discharge summaries, testing, and procedures - the findings are as follows: 1/24/25 admission for cystitis/COVID    Complicating Factors: The patient already  has a past medical history of Anxiety, Essential hypertension, Hyperlipidemia, and Type 1 diabetes mellitus (HCC). to contribute to the complexity of this ED evaluation.    Procedures performed: none    Discussed management with physician/appropriate source: - per NH RN, pt had a cough today    Considered admission/deescalation of care for: fever    Social determinants of health affecting patient care: none    Prescription medications considered: discussed continuing current medication regimen    The patient requires continuous monitoring for: fever    Shared decision making: discussed possible admission            Disposition and Plan     Clinical Impression:  1. Viral syndrome         Disposition:  Discharge    Follow-up:  Dwight Cantu  [address] [place] [address]  [facility] [address]  [phone]    Follow up        Medications Prescribed:  Current Discharge Medication List                         [1] (Not in a hospital admission)

## 2025-02-21 NOTE — PROGRESS NOTES
Second attempt to contact pt for [facility] ER f/u, however no answer. Call continued to ring 15+ times and did not go to a VM--no option for SMS notification. NCM to try again at a later time. Pt is not MyChart active.

## 2025-02-25 NOTE — PROGRESS NOTES
Third attempt to contact pt for [facility] ER f/u, however no answer. Call continued to ring 15+ times and did not go to a VM--no option for SMS notification.

## 2025-02-25 NOTE — PROGRESS NOTES
Unable to reach patient - [facility] Navigation Care Manager has attempted outreach three times and are unable to reach the patient Encounter closing.

## 2025-03-05 ENCOUNTER — TELEPHONE (OUTPATIENT)
Dept: CASE MANAGEMENT | Age: 85
End: 2025-03-05

## 2025-03-05 NOTE — TELEPHONE ENCOUNTER
[facility] called, verified patient's Name and . She is calling to get information on the patient's last office visit and to get a copy of progress note. Chart reviewed. Relayed patient has not been seen by any of our providers yet as of this time (has not established care) and that primary care provider is still listed as Dr. Dwight Cantu. Verbalized understanding, states she will notify the patient's home health nurse.

## 2025-03-05 NOTE — TELEPHONE ENCOUNTER
Dr. SHANTI Velasco:   Incoming call from Eileen home health RN from [facility] agency.   They are seeking if PCP can approve a referral for [facility]. (Visiting doctor to patient home, as patient cannot leave home)  Patient has humana o and PCP is SHANTI Velasco.     Patient has never seen Dr SHANTI Velasco. Family had a hard time getting her out of the house.     ER visit 2/20/25 and patient was at [facility]. (ESBL bactermia, )  [facility] was started 2/21/25 for RN, PT, OT.  They will fax over progress notes from [facility] if that helps.     [facility] contact #[phone], Fax #[phone]    [facility] contact ph [phone], fax#[phone]

## 2025-03-06 NOTE — TELEPHONE ENCOUNTER
This patient needs to change PCP from me to someone closer to her house/other physicians who can come to their house.  I do not think it is appropriate for me to place a referral for the patient that I have never seen.  My apologies for the inconvenience.

## 2025-03-06 NOTE — TELEPHONE ENCOUNTER
Yaquelin bender [facility] calling to check status of referral by Dr Velasco. Advised that this patient is not a patient of Dr Johnson.

## 2025-03-06 NOTE — TELEPHONE ENCOUNTER
Called [facility] [employer] [facility] and advised Dr. Velasco's note.

## 2025-03-24 ENCOUNTER — TELEPHONE (OUTPATIENT)
Dept: INTERNAL MEDICINE CLINIC | Facility: CLINIC | Age: 85
End: 2025-03-24

## 2025-03-24 NOTE — TELEPHONE ENCOUNTER
Pt's nephew stated pt seeking appt of Dr Stef gomez , was advised appt available tomorrow ,pt was discharged from Rehab 2/19/25, home health nurse visit, pt now have a sore on right great toe  Appt made ,advised to have her sign HIPAA with his name ,verbalized understanding

## 2025-03-25 ENCOUNTER — OFFICE VISIT (OUTPATIENT)
Dept: INTERNAL MEDICINE CLINIC | Facility: CLINIC | Age: 85
End: 2025-03-25

## 2025-03-25 VITALS
TEMPERATURE: 98 F | HEART RATE: 75 BPM | OXYGEN SATURATION: 100 % | DIASTOLIC BLOOD PRESSURE: 64 MMHG | HEIGHT: 60.98 IN | SYSTOLIC BLOOD PRESSURE: 154 MMHG | BODY MASS INDEX: 23 KG/M2

## 2025-03-25 DIAGNOSIS — Z79.4 TYPE 2 DIABETES MELLITUS WITH STAGE 5 CHRONIC KIDNEY DISEASE NOT ON CHRONIC DIALYSIS, WITH LONG-TERM CURRENT USE OF INSULIN (HCC): ICD-10-CM

## 2025-03-25 DIAGNOSIS — I65.22 STENOSIS OF LEFT CAROTID ARTERY: ICD-10-CM

## 2025-03-25 DIAGNOSIS — F32.A DEPRESSION, UNSPECIFIED DEPRESSION TYPE: ICD-10-CM

## 2025-03-25 DIAGNOSIS — D64.9 ANEMIA, UNSPECIFIED TYPE: ICD-10-CM

## 2025-03-25 DIAGNOSIS — C82.90 FOLLICULAR LYMPHOMA, UNSPECIFIED FOLLICULAR LYMPHOMA TYPE, UNSPECIFIED BODY REGION (HCC): ICD-10-CM

## 2025-03-25 DIAGNOSIS — I70.239 ATHEROSCLEROSIS OF NATIVE ARTERY OF RIGHT LOWER EXTREMITY WITH ULCERATION, UNSPECIFIED ULCERATION SITE (HCC): ICD-10-CM

## 2025-03-25 DIAGNOSIS — I65.21 OCCLUSION OF RIGHT CAROTID ARTERY: Primary | ICD-10-CM

## 2025-03-25 DIAGNOSIS — E11.22 TYPE 2 DIABETES MELLITUS WITH STAGE 5 CHRONIC KIDNEY DISEASE NOT ON CHRONIC DIALYSIS, WITH LONG-TERM CURRENT USE OF INSULIN (HCC): ICD-10-CM

## 2025-03-25 DIAGNOSIS — N18.5 TYPE 2 DIABETES MELLITUS WITH STAGE 5 CHRONIC KIDNEY DISEASE NOT ON CHRONIC DIALYSIS, WITH LONG-TERM CURRENT USE OF INSULIN (HCC): ICD-10-CM

## 2025-03-25 DIAGNOSIS — I10 ESSENTIAL HYPERTENSION, BENIGN: ICD-10-CM

## 2025-03-25 PROBLEM — D72.829 LEUKOCYTOSIS, UNSPECIFIED TYPE: Status: RESOLVED | Noted: 2025-01-24 | Resolved: 2025-03-25

## 2025-03-25 PROBLEM — U07.1 COVID-19 VIRUS INFECTION: Status: RESOLVED | Noted: 2025-01-24 | Resolved: 2025-03-25

## 2025-03-25 PROBLEM — R78.81 BACTEREMIA: Status: RESOLVED | Noted: 2025-01-25 | Resolved: 2025-03-25

## 2025-03-25 PROBLEM — N39.0 SEPSIS SECONDARY TO UTI (HCC): Status: RESOLVED | Noted: 2025-01-24 | Resolved: 2025-03-25

## 2025-03-25 PROBLEM — S72.002A CLOSED FRACTURE OF LEFT HIP, INITIAL ENCOUNTER (HCC): Status: RESOLVED | Noted: 2020-01-31 | Resolved: 2025-03-25

## 2025-03-25 PROBLEM — A41.9 SEPSIS SECONDARY TO UTI (HCC): Status: RESOLVED | Noted: 2025-01-24 | Resolved: 2025-03-25

## 2025-03-25 PROBLEM — E86.0 DEHYDRATION: Status: RESOLVED | Noted: 2025-01-24 | Resolved: 2025-03-25

## 2025-03-25 PROBLEM — N30.00 ACUTE CYSTITIS WITHOUT HEMATURIA: Status: RESOLVED | Noted: 2025-01-24 | Resolved: 2025-03-25

## 2025-03-25 PROBLEM — F32.9 MAJOR DEPRESSIVE DISORDER WITH SINGLE EPISODE: Status: RESOLVED | Noted: 2017-07-17 | Resolved: 2025-03-25

## 2025-03-25 PROBLEM — S72.142D: Status: RESOLVED | Noted: 2020-02-20 | Resolved: 2025-03-25

## 2025-03-25 PROBLEM — N18.9 CHRONIC RENAL FAILURE, UNSPECIFIED CKD STAGE: Status: RESOLVED | Noted: 2025-01-24 | Resolved: 2025-03-25

## 2025-03-25 PROCEDURE — 3078F DIAST BP <80 MM HG: CPT | Performed by: INTERNAL MEDICINE

## 2025-03-25 PROCEDURE — 3008F BODY MASS INDEX DOCD: CPT | Performed by: INTERNAL MEDICINE

## 2025-03-25 PROCEDURE — 1125F AMNT PAIN NOTED PAIN PRSNT: CPT | Performed by: INTERNAL MEDICINE

## 2025-03-25 PROCEDURE — 1159F MED LIST DOCD IN RCRD: CPT | Performed by: INTERNAL MEDICINE

## 2025-03-25 PROCEDURE — 3077F SYST BP >= 140 MM HG: CPT | Performed by: INTERNAL MEDICINE

## 2025-03-25 PROCEDURE — 99205 OFFICE O/P NEW HI 60 MIN: CPT | Performed by: INTERNAL MEDICINE

## 2025-03-25 PROCEDURE — G2211 COMPLEX E/M VISIT ADD ON: HCPCS | Performed by: INTERNAL MEDICINE

## 2025-03-25 RX ORDER — GLUCOSAM/CHON-MSM1/C/MANG/BOSW 500-416.6
TABLET ORAL
COMMUNITY
Start: 2025-01-17

## 2025-03-25 RX ORDER — AMLODIPINE BESYLATE 5 MG/1
5 TABLET ORAL
COMMUNITY
Start: 2025-02-19

## 2025-03-25 RX ORDER — CALCIUM CITRATE/VITAMIN D3 200MG-6.25
TABLET ORAL
COMMUNITY
Start: 2025-01-17

## 2025-03-25 RX ORDER — IPRATROPIUM BROMIDE AND ALBUTEROL SULFATE 2.5; .5 MG/3ML; MG/3ML
SOLUTION RESPIRATORY (INHALATION)
COMMUNITY
Start: 2025-02-13

## 2025-03-25 NOTE — PROGRESS NOTES
Radha Yu is a 84 year old female.  Chief Complaint   Patient presents with    Establish Care    Foot Pain     Pt has noticed a blister on top of right foot, pts caregiver states they were cleaning it but would like to follow up on it    Referral     Would like referral to ophthalmology     HPI:     History of Present Illness  The patient, 84-year-old pleasant lady with a complex medical history, presents today for a follow-up visit after recent hospitalization for a urinary tract infection and COVID-19. She was discharged from subacute rehab and is currently receiving home health care. The patient has a history of carotid artery disease and has had arterial bypass surgery in her leg. She also has a history of a hip fracture. The patient has a toe ulceration, which is being monitored by a home health nurse. The patient's blood pressure was slightly elevated during the visit. The patient's medications include aspirin, Plavix, statins, amlodipine, and Lexapro. The patient is also hard of hearing and uses a hearing aid. The patient's relative, Elliott, is actively involved in her care and is present during the visit.       Current Outpatient Medications   Medication Sig Dispense Refill    amLODIPine 5 MG Oral Tab 1 tablet (5 mg total).      TRUEplus Lancets 33G Does not apply Misc       Glucose Blood (TRUE METRIX BLOOD GLUCOSE TEST) In Vitro Strip       Cranberry 125 MG Oral Tab Take by mouth.      furosemide 20 MG Oral Tab Take 1 tablet (20 mg total) by mouth every other day.      insulin aspart 100 Units/mL Subcutaneous Solution Pen-injector Inject 4-10 Units into the skin 3 (three) times daily before meals. Base dose: 4 units, Sliding scale dose adjustments:  = 4 units (base dose given); 101-120 = 4 units (base); 121-140 = 4 units (base); 141-160 = 4+1 units; 161-180 = 4+2 units.      aspirin 81 MG Oral Tab EC Take 1 tablet (81 mg total) by mouth daily.      sodium bicarbonate 650 MG Oral Tab Take 2 tablets  (1,300 mg total) by mouth 2 (two) times daily.      atorvastatin 20 MG Oral Tab Take 1 tablet (20 mg total) by mouth nightly.      Cholecalciferol 50 MCG (2000 UT) Oral Cap Take 1 capsule by mouth daily.      insulin glargine 100 UNIT/ML Subcutaneous Solution Inject 5 Units into the skin nightly.      escitalopram 10 MG Oral Tab Take 1 tablet (10 mg total) by mouth daily.      Clopidogrel Bisulfate 75 MG Oral Tab Take 1 tablet (75 mg total) by mouth daily.      ipratropium-albuterol 0.5-2.5 (3) MG/3ML Inhalation Solution Inhale into the lungs. (Patient not taking: Reported on 3/25/2025)        Past Medical History:    Anxiety    Essential hypertension    Hyperlipidemia    Kidney disease    Lymphoma (HCC)    Type 1 diabetes mellitus (HCC)      Past Surgical History:   Procedure Laterality Date    Appendectomy      Hip surgery  2018    Total abdom hysterectomy        Social History:  Social History     Socioeconomic History    Marital status:    Tobacco Use    Smoking status: Former     Types: Cigarettes    Smokeless tobacco: Never   Vaping Use    Vaping status: Never Used   Substance and Sexual Activity    Alcohol use: Not Currently    Drug use: Not Currently     Social Drivers of Health     Food Insecurity: No Food Insecurity (3/25/2025)    NCSS - Food Insecurity     Worried About Running Out of Food in the Last Year: No     Ran Out of Food in the Last Year: No   Transportation Needs: No Transportation Needs (3/25/2025)    NCSS - Transportation     Lack of Transportation: No   Housing Stability: Not At Risk (3/25/2025)    NCSS - Housing/Utilities     Has Housing: Yes     Worried About Losing Housing: No     Unable to Get Utilities: No      History reviewed. No pertinent family history.   Allergies[1]     REVIEW OF SYSTEMS:   Review of Systems   Review of Systems   Constitutional: Negative for activity change, appetite change and fever.   HENT: Negative for congestion and voice change.    Respiratory: Negative  for cough and shortness of breath.    Cardiovascular: Negative for chest pain.   Gastrointestinal: Negative for abdominal distention, abdominal pain and vomiting.   Genitourinary: Negative for hematuria.   Skin: Noninfected wound.   Psychiatric/Behavioral: Negative for behavioral problems.   Wt Readings from Last 5 Encounters:   02/20/25 123 lb 7.3 oz (56 kg)   01/26/25 123 lb 7.3 oz (56 kg)   02/03/20 139 lb 1.6 oz (63.1 kg)   08/04/18 135 lb (61.2 kg)   02/16/18 136 lb (61.7 kg)     Body mass index is 23.34 kg/m².      EXAM:   /64   Pulse 75   Temp 97.8 °F (36.6 °C) (Temporal)   Ht 5' 0.98\" (1.549 m)   SpO2 100%   BMI 23.34 kg/m²   Physical Exam   Constitutional:       Appearance: Normal appearance.   HENT:      Head: Normocephalic.   Eyes:      Conjunctiva/sclera: Conjunctivae normal.   Cardiovascular:      Rate and Rhythm: Normal rate and regular rhythm.      Heart sounds: Normal heart sounds. No murmur heard.  Pulmonary:      Effort: Pulmonary effort is normal.      Breath sounds: Normal breath sounds. No rhonchi or rales.   Abdominal:      General: Bowel sounds are normal.      Palpations: Abdomen is soft.      Tenderness: There is no abdominal tenderness.   Musculoskeletal:      Cervical back: Neck supple.      Right lower leg: No edema.      Left lower leg: No edema.   Skin:     General: Skin is warm and dry.   Neurological:      General: No focal deficit present.      Mental Status: He is alert and oriented to person, place, and time. Mental status is at baseline.   Psychiatric:         Mood and Affect: Mood normal.         Behavior: Behavior normal.   Healing laceration with no discharge    ASSESSMENT AND PLAN:   1. Occlusion of right carotid artery    - Vascular Surgery - In Network    2. Stenosis of left carotid artery    - Vascular Surgery - In Network    3. Atherosclerosis of native artery of right lower extremity with ulceration, unspecified ulceration site (HCC)    - Vascular Surgery - In  Network    4. 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 Essential hypertension, benign      6. Follicular lymphoma, unspecified follicular lymphoma type, unspecified body region (HCC)    - Oncology/Hematology Referral - In Network    7. Anemia, unspecified type      8. Depression, unspecified depression type      Extended visit - 1 hr- more than 45 minutes F2F  Elliott nephritchie present  Assessment & Plan  Carotid Artery Disease  Right carotid artery occlusion and 60% stenosis on the left.  - Continue aspirin, clopidogrel, and statins.  - Refer to vascular surgeon.    Peripheral Vascular Disease with Toe Ulceration  Right big toe ulceration without infection due to peripheral vascular disease.  - Continue aspirin, clopidogrel, and statins.  - Monitor for infection.  - Use diabetic footwear.  - Contact healthcare provider if infection develops.    Chronic Kidney Disease  Chronic kidney disease with creatinine level of 3.23. Discussed potential dialysis if renal function declines.  - Monitor renal function and discuss dialysis if necessary.    Type 2 Diabetes Mellitus with Kidney Involvement  Diabetes well-controlled with hemoglobin A1c of 6.0. Renal involvement likely contributing to chronic kidney disease.  - Monitor blood glucose levels.  - Continue current diabetes management.    Hypertension  Initial blood pressure 166/64, improved to 154/64. Home monitoring required.  - Monitor blood pressure with home health nurse.  - Increase amlodipine to 10 mg if blood pressure exceeds 140/90.    Anemia  Chronic anemia with hemoglobin level of 7.8, likely related to chronic kidney disease.  - Monitor hemoglobin levels.  - Consider erythropoiesis-stimulating agents if necessary.    Follicular Lymphoma  Non-Hodgkin's lymphoma in remission. Difficulty scheduling with previous oncologist.  - Refer to  a local oncologist.    Depression  Depression managed with escitalopram, symptoms stable.  - Continue escitalopram.    General Health Maintenance  Vision and vascular health monitoring required. No recent vision assessment.  - Refer to an ophthalmologist for vision assessment.    Follow-up  Scheduled follow-up in June for ongoing care and treatment adjustments.  - Maintain [facility] appointment.  - Provide referrals for vascular surgery, oncology, and ophthalmology.     Plan: As above.      The patient indicates understanding of these issues and agrees to the plan.  No follow-ups on file.    This note was prepared using Dragon Medical voice recognition dictation software. As a result errors may occur. When identified these errors have been corrected. While every attempt is made to correct errors during dictation discrepancies may still exist.         [1]   Allergies  Allergen Reactions    Levaquin [Levofloxacin] ITCHING

## 2025-04-01 ENCOUNTER — PATIENT OUTREACH (OUTPATIENT)
Dept: CASE MANAGEMENT | Age: 85
End: 2025-04-01

## 2025-04-01 NOTE — PROCEDURES
Received order requesting to update Primary Care Physician (PCP) to Dr. Stef Velasco is Approved and finalized on April 1, 2025.    Evan SAAVEDRA Attributed PCP is Stef Velasco MD

## 2025-04-07 ENCOUNTER — TELEPHONE (OUTPATIENT)
Dept: INTERNAL MEDICINE CLINIC | Facility: CLINIC | Age: 85
End: 2025-04-07

## 2025-04-07 NOTE — TELEPHONE ENCOUNTER
Ramandeep calling from [facility] intake     Ramandeep is requesting Last office visit 3/25/2025  notes       Notes faxed to [phone] per Right fax

## 2025-04-09 ENCOUNTER — MED REC SCAN ONLY (OUTPATIENT)
Dept: INTERNAL MEDICINE CLINIC | Facility: CLINIC | Age: 85
End: 2025-04-09

## 2025-05-19 ENCOUNTER — TELEPHONE (OUTPATIENT)
Dept: INTERNAL MEDICINE CLINIC | Facility: CLINIC | Age: 85
End: 2025-05-19

## 2025-05-19 DIAGNOSIS — N18.30 STAGE 3 CHRONIC KIDNEY DISEASE, UNSPECIFIED WHETHER STAGE 3A OR 3B CKD (HCC): Primary | ICD-10-CM

## 2025-05-22 ENCOUNTER — APPOINTMENT (OUTPATIENT)
Dept: INTERVENTIONAL RADIOLOGY/VASCULAR | Facility: HOSPITAL | Age: 85
End: 2025-05-22
Attending: RADIOLOGY
Payer: MEDICARE

## 2025-05-22 ENCOUNTER — APPOINTMENT (OUTPATIENT)
Dept: GENERAL RADIOLOGY | Facility: HOSPITAL | Age: 85
End: 2025-05-22
Attending: RADIOLOGY
Payer: MEDICARE

## 2025-05-22 ENCOUNTER — HOSPITAL ENCOUNTER (INPATIENT)
Facility: HOSPITAL | Age: 85
LOS: 18 days | Discharge: NURSING, REHAB OR LONG-TERM CARE | End: 2025-06-09
Attending: EMERGENCY MEDICINE | Admitting: HOSPITALIST
Payer: MEDICARE

## 2025-05-22 ENCOUNTER — APPOINTMENT (OUTPATIENT)
Dept: INTERVENTIONAL RADIOLOGY/VASCULAR | Facility: HOSPITAL | Age: 85
End: 2025-05-22
Attending: INTERNAL MEDICINE
Payer: MEDICARE

## 2025-05-22 ENCOUNTER — APPOINTMENT (OUTPATIENT)
Dept: GENERAL RADIOLOGY | Facility: HOSPITAL | Age: 85
End: 2025-05-22
Attending: EMERGENCY MEDICINE
Payer: MEDICARE

## 2025-05-22 DIAGNOSIS — I21.4 NSTEMI (NON-ST ELEVATED MYOCARDIAL INFARCTION) (HCC): ICD-10-CM

## 2025-05-22 DIAGNOSIS — N18.6 ESRD (END STAGE RENAL DISEASE) (HCC): ICD-10-CM

## 2025-05-22 DIAGNOSIS — N17.9 ACUTE RENAL FAILURE SUPERIMPOSED ON CHRONIC KIDNEY DISEASE, UNSPECIFIED ACUTE RENAL FAILURE TYPE, UNSPECIFIED CKD STAGE: ICD-10-CM

## 2025-05-22 DIAGNOSIS — J96.21 ACUTE ON CHRONIC RESPIRATORY FAILURE WITH HYPOXIA (HCC): Primary | ICD-10-CM

## 2025-05-22 DIAGNOSIS — I50.9 ACUTE CONGESTIVE HEART FAILURE, UNSPECIFIED HEART FAILURE TYPE (HCC): ICD-10-CM

## 2025-05-22 DIAGNOSIS — N18.9 ACUTE RENAL FAILURE SUPERIMPOSED ON CHRONIC KIDNEY DISEASE, UNSPECIFIED ACUTE RENAL FAILURE TYPE, UNSPECIFIED CKD STAGE: ICD-10-CM

## 2025-05-22 LAB
ANION GAP SERPL CALC-SCNC: 13 MMOL/L (ref 0–18)
APTT PPP: 28.1 SECONDS (ref 23–36)
APTT PPP: 29.4 SECONDS (ref 23–36)
APTT PPP: 31 SECONDS (ref 23–36)
ATRIAL RATE: 99 BPM
BASOPHILS # BLD AUTO: 0.04 X10(3) UL (ref 0–0.2)
BASOPHILS NFR BLD AUTO: 0.8 %
BUN BLD-MCNC: 92 MG/DL (ref 9–23)
BUN/CREAT SERPL: 23.3 (ref 10–20)
CALCIUM BLD-MCNC: 8.3 MG/DL (ref 8.7–10.4)
CHLORIDE SERPL-SCNC: 108 MMOL/L (ref 98–112)
CHOLEST SERPL-MCNC: 113 MG/DL (ref ?–200)
CO2 SERPL-SCNC: 18 MMOL/L (ref 21–32)
CREAT BLD-MCNC: 3.95 MG/DL (ref 0.55–1.02)
DEPRECATED RDW RBC AUTO: 47.6 FL (ref 35.1–46.3)
EGFRCR SERPLBLD CKD-EPI 2021: 11 ML/MIN/1.73M2 (ref 60–?)
EOSINOPHIL # BLD AUTO: 0.06 X10(3) UL (ref 0–0.7)
EOSINOPHIL NFR BLD AUTO: 1.2 %
ERYTHROCYTE [DISTWIDTH] IN BLOOD BY AUTOMATED COUNT: 15.7 % (ref 11–15)
EST. AVERAGE GLUCOSE BLD GHB EST-MCNC: 140 MG/DL (ref 68–126)
FLUAV + FLUBV RNA SPEC NAA+PROBE: NEGATIVE
FLUAV + FLUBV RNA SPEC NAA+PROBE: NEGATIVE
GLUCOSE BLD-MCNC: 169 MG/DL (ref 70–99)
GLUCOSE BLDC GLUCOMTR-MCNC: 203 MG/DL (ref 70–99)
GLUCOSE BLDC GLUCOMTR-MCNC: 266 MG/DL (ref 70–99)
GLUCOSE BLDC GLUCOMTR-MCNC: 410 MG/DL (ref 70–99)
HBA1C MFR BLD: 6.5 % (ref ?–5.7)
HBV CORE AB SERPL QL IA: NONREACTIVE
HBV SURFACE AB SER QL: NONREACTIVE
HBV SURFACE AB SERPL IA-ACNC: <3.1 MIU/ML
HBV SURFACE AG SER-ACNC: <0.1 [IU]/L
HBV SURFACE AG SERPL QL IA: NONREACTIVE
HCT VFR BLD AUTO: 25.4 % (ref 35–48)
HDLC SERPL-MCNC: 52 MG/DL (ref 40–59)
HGB BLD-MCNC: 8.2 G/DL (ref 12–16)
IMM GRANULOCYTES # BLD AUTO: 0.02 X10(3) UL (ref 0–1)
IMM GRANULOCYTES NFR BLD: 0.4 %
INR BLD: 1.16 (ref 0.8–1.2)
LDLC SERPL CALC-MCNC: 48 MG/DL (ref ?–100)
LYMPHOCYTES # BLD AUTO: 1.15 X10(3) UL (ref 1–4)
LYMPHOCYTES NFR BLD AUTO: 23 %
MAGNESIUM SERPL-MCNC: 2.2 MG/DL (ref 1.6–2.6)
MCH RBC QN AUTO: 27.3 PG (ref 26–34)
MCHC RBC AUTO-ENTMCNC: 32.3 G/DL (ref 31–37)
MCV RBC AUTO: 84.7 FL (ref 80–100)
MONOCYTES # BLD AUTO: 0.27 X10(3) UL (ref 0.1–1)
MONOCYTES NFR BLD AUTO: 5.4 %
NEUTROPHILS # BLD AUTO: 3.46 X10 (3) UL (ref 1.5–7.7)
NEUTROPHILS # BLD AUTO: 3.46 X10(3) UL (ref 1.5–7.7)
NEUTROPHILS NFR BLD AUTO: 69.2 %
NONHDLC SERPL-MCNC: 61 MG/DL (ref ?–130)
NT-PROBNP SERPL-MCNC: ABNORMAL PG/ML (ref ?–450)
OSMOLALITY SERPL CALC.SUM OF ELEC: 320 MOSM/KG (ref 275–295)
P AXIS: 46 DEGREES
P-R INTERVAL: 202 MS
PLATELET # BLD AUTO: 234 10(3)UL (ref 150–450)
POTASSIUM SERPL-SCNC: 4.3 MMOL/L (ref 3.5–5.1)
PROTHROMBIN TIME: 15.5 SECONDS (ref 11.6–14.8)
Q-T INTERVAL: 346 MS
QRS DURATION: 74 MS
QTC CALCULATION (BEZET): 444 MS
R AXIS: 27 DEGREES
RBC # BLD AUTO: 3 X10(6)UL (ref 3.8–5.3)
RSV RNA SPEC NAA+PROBE: NEGATIVE
SARS-COV-2 RNA RESP QL NAA+PROBE: NOT DETECTED
SODIUM SERPL-SCNC: 139 MMOL/L (ref 136–145)
T AXIS: 175 DEGREES
TRIGL SERPL-MCNC: 56 MG/DL (ref 30–149)
TROPONIN I SERPL HS-MCNC: 224 NG/L (ref ?–34)
TROPONIN I SERPL HS-MCNC: 235 NG/L (ref ?–34)
TROPONIN I SERPL HS-MCNC: 426 NG/L (ref ?–34)
VENTRICULAR RATE: 99 BPM
VLDLC SERPL CALC-MCNC: 8 MG/DL (ref 0–30)
WBC # BLD AUTO: 5 X10(3) UL (ref 4–11)

## 2025-05-22 PROCEDURE — 99223 1ST HOSP IP/OBS HIGH 75: CPT | Performed by: HOSPITALIST

## 2025-05-22 PROCEDURE — 99283 EMERGENCY DEPT VISIT LOW MDM: CPT | Performed by: INTERNAL MEDICINE

## 2025-05-22 PROCEDURE — 0JH60XZ INSERTION OF TUNNELED VASCULAR ACCESS DEVICE INTO CHEST SUBCUTANEOUS TISSUE AND FASCIA, OPEN APPROACH: ICD-10-PCS | Performed by: RADIOLOGY

## 2025-05-22 PROCEDURE — 02H633Z INSERTION OF INFUSION DEVICE INTO RIGHT ATRIUM, PERCUTANEOUS APPROACH: ICD-10-PCS | Performed by: RADIOLOGY

## 2025-05-22 PROCEDURE — 71045 X-RAY EXAM CHEST 1 VIEW: CPT | Performed by: EMERGENCY MEDICINE

## 2025-05-22 PROCEDURE — 99223 1ST HOSP IP/OBS HIGH 75: CPT | Performed by: INTERNAL MEDICINE

## 2025-05-22 RX ORDER — ASPIRIN 81 MG/1
324 TABLET, CHEWABLE ORAL ONCE
Status: COMPLETED | OUTPATIENT
Start: 2025-05-22 | End: 2025-05-22

## 2025-05-22 RX ORDER — ESCITALOPRAM OXALATE 10 MG/1
10 TABLET ORAL DAILY
Status: DISCONTINUED | OUTPATIENT
Start: 2025-05-22 | End: 2025-06-09

## 2025-05-22 RX ORDER — METOCLOPRAMIDE HYDROCHLORIDE 5 MG/ML
10 INJECTION INTRAMUSCULAR; INTRAVENOUS EVERY 8 HOURS PRN
Status: DISCONTINUED | OUTPATIENT
Start: 2025-05-22 | End: 2025-05-22

## 2025-05-22 RX ORDER — IPRATROPIUM BROMIDE AND ALBUTEROL SULFATE 2.5; .5 MG/3ML; MG/3ML
3 SOLUTION RESPIRATORY (INHALATION)
Status: DISCONTINUED | OUTPATIENT
Start: 2025-05-22 | End: 2025-05-24

## 2025-05-22 RX ORDER — ASPIRIN 81 MG/1
81 TABLET ORAL DAILY
Status: DISCONTINUED | OUTPATIENT
Start: 2025-05-22 | End: 2025-05-28

## 2025-05-22 RX ORDER — HEPARIN SODIUM 1000 [USP'U]/ML
1.5 INJECTION, SOLUTION INTRAVENOUS; SUBCUTANEOUS
Status: DISCONTINUED | OUTPATIENT
Start: 2025-05-22 | End: 2025-05-27 | Stop reason: ALTCHOICE

## 2025-05-22 RX ORDER — FUROSEMIDE 10 MG/ML
40 INJECTION INTRAMUSCULAR; INTRAVENOUS ONCE
Status: COMPLETED | OUTPATIENT
Start: 2025-05-22 | End: 2025-05-22

## 2025-05-22 RX ORDER — HEPARIN SODIUM AND DEXTROSE 10000; 5 [USP'U]/100ML; G/100ML
12 INJECTION INTRAVENOUS ONCE
Status: COMPLETED | OUTPATIENT
Start: 2025-05-22 | End: 2025-05-22

## 2025-05-22 RX ORDER — INSULIN DEGLUDEC 100 U/ML
12 INJECTION, SOLUTION SUBCUTANEOUS NIGHTLY
Status: DISCONTINUED | OUTPATIENT
Start: 2025-05-22 | End: 2025-05-24

## 2025-05-22 RX ORDER — CLOPIDOGREL BISULFATE 75 MG/1
75 TABLET ORAL DAILY
Status: DISCONTINUED | OUTPATIENT
Start: 2025-05-22 | End: 2025-05-28

## 2025-05-22 RX ORDER — DEXTROSE MONOHYDRATE 25 G/50ML
50 INJECTION, SOLUTION INTRAVENOUS
Status: DISCONTINUED | OUTPATIENT
Start: 2025-05-22 | End: 2025-06-09

## 2025-05-22 RX ORDER — ONDANSETRON 2 MG/ML
4 INJECTION INTRAMUSCULAR; INTRAVENOUS EVERY 6 HOURS PRN
Status: DISCONTINUED | OUTPATIENT
Start: 2025-05-22 | End: 2025-06-09

## 2025-05-22 RX ORDER — HEPARIN SODIUM 1000 [USP'U]/ML
1.5 INJECTION, SOLUTION INTRAVENOUS; SUBCUTANEOUS
Status: DISCONTINUED | OUTPATIENT
Start: 2025-05-23 | End: 2025-05-22

## 2025-05-22 RX ORDER — NICOTINE POLACRILEX 4 MG
30 LOZENGE BUCCAL
Status: DISCONTINUED | OUTPATIENT
Start: 2025-05-22 | End: 2025-06-09

## 2025-05-22 RX ORDER — ACETAMINOPHEN 500 MG
500 TABLET ORAL EVERY 4 HOURS PRN
Status: DISCONTINUED | OUTPATIENT
Start: 2025-05-22 | End: 2025-06-09

## 2025-05-22 RX ORDER — INSULIN DEGLUDEC 100 U/ML
5 INJECTION, SOLUTION SUBCUTANEOUS NIGHTLY
Status: DISCONTINUED | OUTPATIENT
Start: 2025-05-22 | End: 2025-05-22

## 2025-05-22 RX ORDER — ALBUMIN (HUMAN) 12.5 G/50ML
25 SOLUTION INTRAVENOUS
Status: DISPENSED | OUTPATIENT
Start: 2025-05-22 | End: 2025-05-24

## 2025-05-22 RX ORDER — HEPARIN SODIUM 1000 [USP'U]/ML
60 INJECTION, SOLUTION INTRAVENOUS; SUBCUTANEOUS ONCE
Status: COMPLETED | OUTPATIENT
Start: 2025-05-22 | End: 2025-05-22

## 2025-05-22 RX ORDER — METOCLOPRAMIDE HYDROCHLORIDE 5 MG/ML
10 INJECTION INTRAMUSCULAR; INTRAVENOUS DAILY PRN
Status: DISCONTINUED | OUTPATIENT
Start: 2025-05-22 | End: 2025-05-30

## 2025-05-22 RX ORDER — ECHINACEA PURPUREA EXTRACT 125 MG
1 TABLET ORAL
Status: DISCONTINUED | OUTPATIENT
Start: 2025-05-22 | End: 2025-06-09

## 2025-05-22 RX ORDER — IPRATROPIUM BROMIDE AND ALBUTEROL SULFATE 2.5; .5 MG/3ML; MG/3ML
3 SOLUTION RESPIRATORY (INHALATION) ONCE
Status: COMPLETED | OUTPATIENT
Start: 2025-05-22 | End: 2025-05-22

## 2025-05-22 RX ORDER — METHYLPREDNISOLONE SODIUM SUCCINATE 125 MG/2ML
60 INJECTION INTRAMUSCULAR; INTRAVENOUS ONCE
Status: COMPLETED | OUTPATIENT
Start: 2025-05-22 | End: 2025-05-22

## 2025-05-22 RX ORDER — NICOTINE POLACRILEX 4 MG
15 LOZENGE BUCCAL
Status: DISCONTINUED | OUTPATIENT
Start: 2025-05-22 | End: 2025-06-09

## 2025-05-22 RX ORDER — MIDAZOLAM HYDROCHLORIDE 1 MG/ML
INJECTION INTRAMUSCULAR; INTRAVENOUS
Status: DISCONTINUED
Start: 2025-05-22 | End: 2025-05-22 | Stop reason: WASHOUT

## 2025-05-22 RX ORDER — METHYLPREDNISOLONE SODIUM SUCCINATE 40 MG/ML
40 INJECTION INTRAMUSCULAR; INTRAVENOUS EVERY 12 HOURS
Status: DISCONTINUED | OUTPATIENT
Start: 2025-05-22 | End: 2025-05-24

## 2025-05-22 RX ORDER — ALBUMIN (HUMAN) 12.5 G/50ML
25 SOLUTION INTRAVENOUS
Status: DISCONTINUED | OUTPATIENT
Start: 2025-05-23 | End: 2025-05-22

## 2025-05-22 RX ORDER — BENZONATATE 200 MG/1
200 CAPSULE ORAL 3 TIMES DAILY PRN
Status: DISCONTINUED | OUTPATIENT
Start: 2025-05-22 | End: 2025-06-09

## 2025-05-22 RX ORDER — LIDOCAINE HYDROCHLORIDE 20 MG/ML
INJECTION, SOLUTION INFILTRATION; PERINEURAL
Status: COMPLETED
Start: 2025-05-22 | End: 2025-05-22

## 2025-05-22 RX ORDER — HEPARIN SODIUM 1000 [USP'U]/ML
INJECTION, SOLUTION INTRAVENOUS; SUBCUTANEOUS
Status: COMPLETED
Start: 2025-05-22 | End: 2025-05-22

## 2025-05-22 RX ORDER — ATORVASTATIN CALCIUM 20 MG/1
20 TABLET, FILM COATED ORAL NIGHTLY
Status: DISCONTINUED | OUTPATIENT
Start: 2025-05-22 | End: 2025-05-28

## 2025-05-22 RX ORDER — HEPARIN SODIUM AND DEXTROSE 10000; 5 [USP'U]/100ML; G/100ML
INJECTION INTRAVENOUS CONTINUOUS
Status: DISCONTINUED | OUTPATIENT
Start: 2025-05-22 | End: 2025-05-24

## 2025-05-22 RX ORDER — POLYETHYLENE GLYCOL 3350 17 G/17G
17 POWDER, FOR SOLUTION ORAL DAILY PRN
Status: DISCONTINUED | OUTPATIENT
Start: 2025-05-22 | End: 2025-06-09

## 2025-05-22 RX ORDER — AMLODIPINE BESYLATE 5 MG/1
5 TABLET ORAL DAILY
Status: DISCONTINUED | OUTPATIENT
Start: 2025-05-23 | End: 2025-05-25

## 2025-05-22 RX ORDER — SENNOSIDES 8.6 MG
17.2 TABLET ORAL NIGHTLY PRN
Status: DISCONTINUED | OUTPATIENT
Start: 2025-05-22 | End: 2025-06-09

## 2025-05-22 RX ORDER — BISACODYL 10 MG
10 SUPPOSITORY, RECTAL RECTAL
Status: DISCONTINUED | OUTPATIENT
Start: 2025-05-22 | End: 2025-06-09

## 2025-05-22 NOTE — CONSULTS
[facility]  Cardiology Consultation    Radha Yu Patient Status:  Emergency    1940 MRN [id number]   Location [facility] EMERGENCY DEPARTMENT Attending Armin Copeland MD    Day # 0 PCP Stef Velasco MD     Date of Admission:  2025  Date of Consult:  2025  Reason for Consultation:   SOB    History of Present Illness:   Patient is an 84-year-old female with a history of PVD s/p left femoral-popliteal bypass , s/p right TCAR and total occluded left ICA, HTN, HLD, DM, and CKD who presents with shortness of breath.  Family stated that yesterday evening she became more short of breath which persisted overnight, this morning was pale with increased work of breathing.  Noticed audible wheezing with some coughing therefore brought her to the hospital.  Denies lower extremity edema, orthopnea, chest pain, palpitations, or syncope.    Troponin 224  Creatinine 3.95    CXR-moderate pulmonary edema  ECG-sinus rhythm, T wave abnormality, 99 bpm    Cardiac history:  PVD s/p left femoropopliteal bypass 2015  Moderate right ICA stenosis s/p right TCAR 2019, totally occluded left ICA  HTN  HLD  DMI  CKD    Past Medical History  Past Medical History:    Anxiety    Essential hypertension    Hyperlipidemia    Kidney disease    Lymphoma (HCC)    Type 1 diabetes mellitus (HCC)     Past Surgical History  Past Surgical History:   Procedure Laterality Date    Appendectomy      Hip surgery  2018    Total abdom hysterectomy       Family History  No family history on file.    Social History  Lives at home with family.  Longtime smoker but quit just a few months ago.  No alcohol use.  Pediatric History   Patient Parents    Not on file     Other Topics Concern    Not on file   Social History Narrative    Not on file         Current Medications:  Current Hospital Medications[1]  Prescriptions Prior to Admission[2]  Allergies  Allergies[3]    Review of Systems:     14 point review of  systems completed and negative except as noted.    Physical Exam:     Patient Vitals for the past 24 hrs:   BP Temp Pulse Resp SpO2 Weight   05/22/25 1114 -- -- -- -- -- 118 lb 13.3 oz (53.9 kg)   05/22/25 1045 143/58 -- 92 17 100 % --   05/22/25 1030 144/54 -- 90 16 100 % --   05/22/25 1015 (!) 182/73 -- 96 20 100 % --   05/22/25 1006 -- -- 102 (!) 27 100 % --   05/22/25 0950 (!) 174/104 -- -- -- -- --   05/22/25 0945 -- 97.9 °F (36.6 °C) 102 (!) 30 100 % --     Intake/Output:   Last 3 shifts:   Vent Settings:    Hemodynamic parameters (last 24 hours):    Scheduled Meds: Scheduled Medications[4]  Continuous Infusions: Medication Infusions[5]    General: Alert and oriented x 3. No apparent distress.   HEENT: Normocephalic, anicteric sclera, neck supple, no thyromegaly or adenopathy.  Neck: No JVD, carotids 2+, no bruits.  Cardiac: Regular rate and rhythm. S1, S2 normal. No murmur, pericardial rub, S3, or extra cardiac sounds.  Lungs: Diffuse wheezing bilaterally.  Normal excursions and effort.  Abdomen: Soft, non-tender. No organosplenomegally, mass or rebound, BS-present.  Extremities: Without clubbing or cyanosis. No edema.    Neurologic: Alert and oriented, normal affect. No focal defects  Skin: Warm and dry.     Results:   Laboratory Data:  Lab Results   Component Value Date    WBC 5.0 05/22/2025    HGB 8.2 (L) 05/22/2025    HCT 25.4 (L) 05/22/2025    .0 05/22/2025    CREATSERUM 3.95 (H) 05/22/2025    BUN 92 (H) 05/22/2025     05/22/2025    K 4.3 05/22/2025     05/22/2025    CO2 18.0 (L) 05/22/2025     (H) 05/22/2025    CA 8.3 (L) 05/22/2025    ALB 2.9 (L) 02/06/2025    ALKPHO 100 02/06/2025    TP 5.5 (L) 02/06/2025    AST 27 02/06/2025    ALT 7 (L) 02/06/2025    MG 1.9 01/24/2025    PHOS 3.6 02/06/2020         Recent Labs   Lab 05/22/25  0948   *   BUN 92*   CREATSERUM 3.95*   CA 8.3*      K 4.3      CO2 18.0*     Recent Labs   Lab 05/22/25  0948   RBC 3.00*   HGB  8.2*   HCT 25.4*   MCV 84.7   MCH 27.3   MCHC 32.3   RDW 15.7*   NEPRELIM 3.46   WBC 5.0   .0       No results for input(s): \"BNPML\" in the last 168 hours.    No results for input(s): \"TROP\", \"CK\" in the last 168 hours.    Imaging:  XR CHEST AP PORTABLE  (CPT=71045)  Result Date: 5/22/2025  CONCLUSION:   Moderate pulmonary edema, significantly progressed since 2/20/2025. Underlying pneumonia can have a similar appearance.   Dictated by (CST): Chirag Reilly MD on 5/22/2025 at 10:38 AM     Finalized by (CST): Chirag Reilly MD on 5/22/2025 at 10:43 AM            Impression:   Assessment:  Acute hypoxic respiratory failure  Chest x-ray with evidence of moderate pulmonary edema, noted diffuse wheezing on exam in setting of long-term tobacco use  Suspect mixture of bronchitis/COPD exacerbation and fluid retention, which in part may be due to progressive worsening kidney disease  Elevated troponin  First troponin elevated at 224, ECG demonstrates lateral T wave inversion  Denies any chest pain, shortness of breath secondary to bronchospasm pulmonary edema  Currently on IV hepari  Acute on chronic renal insufficiency  PVD s/p left femoropopliteal bypass 9/2015  Moderate right ICA stenosis s/p right TCAR 2/2019, totally occluded left ICA  HTN  HLD  DMI    Plan:  - Continue nebulizers, IV steroids, further respiratory management per pulmonary  - Continue Lasix 40 mg IV twice daily  - Obtain transthoracic echocardiogram  - Continue IV heparin while we trend troponin, if flat can likely discontinue  - Continue aspirin, atorvastatin 20 mg daily, Plavix 75 mg daily, amlodipine 5 mg daily  - Management of acute on chronic renal insufficiency per nephrology    Thank you for allowing me to participate in the care of your patient.    Jarvis Bergeron MD  [facility]  5/22/2025'         [1]   Current Facility-Administered Medications   Medication Dose Route Frequency    heparin (Porcine) 1000 UNIT/ML  injection - BOLUS IV 3,200 Units  60 Units/kg Intravenous Once    heparin (Porcine) 08107 units/250mL infusion ED INITIAL DOSE  12 Units/kg/hr Intravenous Once    heparin (Porcine) 99206 units/250mL infusion ACS/AFIB CONTINUOUS  200-3,000 Units/hr Intravenous Continuous   [2] (Not in a hospital admission)  [3]   Allergies  Allergen Reactions    Levaquin [Levofloxacin] ITCHING   [4]    heparin  60 Units/kg Intravenous Once    ED initial dose heparin  12 Units/kg/hr Intravenous Once   [5]    continuous dose heparin

## 2025-05-22 NOTE — ED INITIAL ASSESSMENT (HPI)
Pt brought to ED via ems for shortness of breath that started this morning. Pt is a diabetic and ESRD patient and lifetime smoker. Pt received one neb treatment pta. Per EMS, pt was 90% RA and then 98% RA after neb treatment. Pt has audible wheezing and is alert and answering questions upon arrival. Pt stating \"I can't breathe\" upon arrival. Non rebreather applied upon arrival.

## 2025-05-22 NOTE — PROCEDURES
[facility]  part of [facility]  Procedure Note    Radha Yu Patient Status:  Inpatient    1940 MRN [id number]   Location [facility] 2W/SW Attending Madison Crump MD   Hosp Day # 0 PCP Stef Velasco MD     Procedure: left subclavian vein permcath placement    Pre-Procedure Diagnosis:  arf    Post-Procedure Diagnosis: same    Anesthesia:  Local    Findings:  occluded left jugular vein, interna/external; right  chest port precluding right permcath placement-- tip in mid atrium-- ready for immediate use         Blood Loss:  less than 10 mls       Complications:  None       Mario Hernandez MD  2025

## 2025-05-22 NOTE — ED QUICK NOTES
Orders for admission, patient is aware of plan and ready to go upstairs. Any questions, please call ED RN Chilo at extension [phone].     Patient Covid vaccination status: Unvaccinated     COVID Test Ordered in ED: SARS-CoV-2/Flu A and B/RSV by PCR (GeneXpert)    COVID Suspicion at Admission: N/A    Running Infusions: Medication Infusions[1] None    Mental Status/LOC at time of transport: A+Ox4    Other pertinent information: external urinary catheter  CIWA score: N/A   NIH score:  N/A             [1]    continuous dose heparin        No - the patient is unable to be screened due to medical condition

## 2025-05-22 NOTE — ED QUICK NOTES
Pt not tolerating the high flow vapo therm well. Pt stating \"I can't do it. I am claustrophobic\". Pt using the non rebreather 15L, SpO2 100

## 2025-05-22 NOTE — ED QUICK NOTES
Pt refusing non rebreather and high flow nasal cannula. Pt is tolerating the t piece and is willing to hold the t piece to her mouth.

## 2025-05-22 NOTE — CONSULTS
[facility]  part of [facility]    Report of Consultation    Radha Yu Patient Status:  Emergency    1940 MRN [id number]   Location [facility] EMERGENCY DEPARTMENT Attending Armin Copeland MD   Hosp Day # 0 PCP Stef Velasco MD     Date of Admission:  2025  Date of Consult:  2025   Reason for Consultation:   DEBO    History of Present Illness:   Patient is a 84 year old female who was admitted to the hospital for Acute on chronic respiratory failure with hypoxia (HCC):  The patient has a history of chronic kidney disease and follows with [place] nephrology.  Her GFR tends to run around 14.    History is given to me by her nephew Elliott who is at the bedside.  Apparently in 2019 the patient required dialysis after an episode of pneumonia/respiratory failure.  She recovered from dialysis and has been without dialysis since 2019.    However her nephrologist has been preparing her for the inevitable return to dialysis.  Patient came to the emergency room due to shortness of breath and inability to breathe.  Her initial blood pressure was 170/104        Past Medical History  Past Medical History:   Anxiety    Essential hypertension    Hyperlipidemia    Kidney disease    Lymphoma (HCC)    Type 1 diabetes mellitus (HCC)       Past Surgical History  Past Surgical History:  Procedure Laterality Date    Appendectomy      Hip surgery  2018    Total abdom hysterectomy         Family History  No family history on file.    Social History  Social History  Socioeconomic History    Marital status:    Tobacco Use    Smoking status: Former     Types: Cigarettes    Smokeless tobacco: Never   Vaping Use    Vaping status: Never Used   Substance and Sexual Activity    Alcohol use: Not Currently    Drug use: Not Currently     Social Drivers of Health     Food Insecurity: No Food Insecurity (3/25/2025)    NCSS - Food Insecurity     Worried About Running Out of Food in the Last Year:  No     Ran Out of Food in the Last Year: No   Transportation Needs: No Transportation Needs (3/25/2025)    NCSS - Transportation     Lack of Transportation: No   Housing Stability: Not At Risk (3/25/2025)    NCSS - Housing/Utilities     Has Housing: Yes     Worried About Losing Housing: No     Unable to Get Utilities: No       Current Medications:  Current Facility-Administered Medications   Medication Dose Route Frequency    heparin (Porcine) 84678 units/250mL infusion ED INITIAL DOSE  12 Units/kg/hr Intravenous Once    heparin (Porcine) 05696 units/250mL infusion ACS/AFIB CONTINUOUS  200-3,000 Units/hr Intravenous Continuous     (Not in a hospital admission)      Review of Systems:     General: weak        A comprehensive 12 point review of systems was completed.  Pertinent positives as above and all the rest were negative.     Physical Exam:   /50   Pulse 95   Temp 97.9 °F (36.6 °C)   Resp 25   Wt 118 lb 13.3 oz (53.9 kg)   SpO2 100%   BMI 22.47 kg/m²    No intake or output data in the 24 hours ending 05/22/25 1223  Wt Readings from Last 1 Encounters:   05/22/25 118 lb 13.3 oz (53.9 kg)       Exam  Gen: No acute distress  Heent: NC AT, mucous memb clear, neck supple  Pulm: Lungs coarse, normal respiratory effort  CV: Heart with regular rate and rhythm, no edema  Abd: Abdomen soft, nontender, nondistended, no organomegaly, bowel sounds present  Skin: no symptoms reported  Psych: alert and oriented        Results:     Laboratory Data:  Recent Labs   Lab 05/22/25  0948   RBC 3.00*   HGB 8.2*   HCT 25.4*   MCV 84.7   MCH 27.3   MCHC 32.3   RDW 15.7*   NEPRELIM 3.46   WBC 5.0   .0         Recent Labs   Lab 05/22/25  0948   *   BUN 92*   CREATSERUM 3.95*   CA 8.3*      K 4.3      CO2 18.0*        Imaging:  XR CHEST AP PORTABLE  (CPT=71045)  Result Date: 5/22/2025  CONCLUSION:   Moderate pulmonary edema, significantly progressed since 2/20/2025. Underlying pneumonia can have a  similar appearance.   Dictated by (CST): Chirag Reilly MD on 5/22/2025 at 10:38 AM     Finalized by (CST): Chirag Reilly MD on 5/22/2025 at 10:43 AM                    Impression/Receommendations:     1 - DEBO/fluid overload  Long discussion with the patient's nephew who is at the bedside.  Given her history and her ongoing advanced kidney disease, I feel it is better to go ahead and proceed with initiation of renal replacement therapies, dialysis.  Will place a dialysis catheter today and plan to dialyze her today and tomorrow.    2 - HTN urgency  Blood pressure control    3 - Respiratory failure/COPD  Volume removal with dialysis will help    Thank you for allowing me to participate in the care of your patient.    TEODORO HAJI MD  5/22/2025

## 2025-05-22 NOTE — H&P
[facility]  part of [facility]    History & Physical    Radha Yu Patient Status:  Emergency    1940 MRN [id number]   Location [facility] EMERGENCY DEPARTMENT Attending Armin Copeland MD   Hosp Day # 0 PCP Stef Velasco MD     Date:  2025  Date of Admission:  2025    History provided by:patient  Chief Complaint:   No chief complaint on file.      HPI:   Radha Yu is a(n) 84 year old female with a PMH of PAD, HTN, HL, CKD stage 3 who presents with increased sob.  She was extremely tachypneic and wheezing, saturating 90% on room air.  She was given a nebulizer treatment and her oxygen sats improved to 98%.  In the ED she had a cxr that was consistent with fluid overload.  She also has a significant tobacco use history (stopped in January) and was still wheezing on exam.  Her trop was elevated and she had some EKG changes that were new.  She will be admitted.     History   Past Medical History[1]  Past Surgical History[2]  Family History[3] FH reviewed and not pertinent  Social History:  Short Social Hx on File[4]  Allergies/Medications:   Allergies: Allergies[5]  Prescriptions Prior to Admission[6]    Review of Systems:   Pertinent items are noted in HPI.  12 ROS completed and otherwise negative    Physical Exam:   Vital Signs:  Blood pressure 142/50, pulse 95, temperature 97.9 °F (36.6 °C), resp. rate 25, weight 118 lb 13.3 oz (53.9 kg), SpO2 100%.     Gen: uncomfortable  Pulm: tachypneic, +exp wheeze  CV: Heart with regular rate and rhythm  Abd: Abdomen soft, nontender, nondistended, bowel sounds present  Neuro: No acute focal deficits  MSK: moves extremities  Skin: Warm and dry  Psych: anxious affect  Ext: no c/c/e      Cervical Papanicolaou to be done in MD's office    Results:     Lab Results   Component Value Date    WBC 5.0 2025    HGB 8.2 (L) 2025    HCT 25.4 (L) 2025    .0 2025    CREATSERUM 3.95 (H) 2025    BUN 92  (H) 05/22/2025     05/22/2025    K 4.3 05/22/2025     05/22/2025    CO2 18.0 (L) 05/22/2025     (H) 05/22/2025    CA 8.3 (L) 05/22/2025    ALB 2.9 (L) 02/06/2025    ALKPHO 100 02/06/2025    BILT 0.3 02/06/2025    TP 5.5 (L) 02/06/2025    AST 27 02/06/2025    ALT 7 (L) 02/06/2025    PTT 31.0 05/22/2025    INR 1.16 05/22/2025    MG 1.9 01/24/2025    PHOS 3.6 02/06/2020       XR CHEST AP PORTABLE  (CPT=71045)  Result Date: 5/22/2025  CONCLUSION:   Moderate pulmonary edema, significantly progressed since 2/20/2025. Underlying pneumonia can have a similar appearance.   Dictated by (CST): Chirag Reilly MD on 5/22/2025 at 10:38 AM     Finalized by (CST): Chirag Reilly MD on 5/22/2025 at 10:43 AM          EKG  Result Date: 5/22/2025  Normal sinus rhythm T wave abnormality, consider lateral ischemia Abnormal ECG When compared with ECG of 24-JAN-2025 11:27, T wave inversion now evident in Lateral leads Confirmed by JENNIFER WILEY, HOFFMAN ([id number]) on 5/22/2025 10:23:36 AM      Assessment/Plan:       Possible NSTEMI type 1  Possible Acute CHF, diastolic vs systolic  PVD  HL  HTN  - cards consulted  - check echo  - given IV lasix  - monitor I/O, daily wts  - watch renal function with diuresis  - cont heparin drip while trending trops - if trops remain flat can stop  - cont asa, statin, plavix, anti-hypertensives    Possible acute copd exacerbation  - pulm consulted  - plan nebs scheduled and prn  - cont IV steroids  - plan pulm hygiene  - wean oxygen as able  - hold abx until seen by pulm    DEBO on CKD stage 4  - nephrology consulted  - will plan to initiate HD today   - place HD catheter today     DM  - accuchecks and ISS  - adjust insulin as needed    Madison Crump MD  5/22/2025    **Certification      PHYSICIAN Certification of Need for Inpatient Hospitalization - Initial Certification    Patient will require inpatient services that will reasonably be expected to span two midnight's based on the clinical  documentation in H+P.   Based on patients current state of illness, I anticipate that, after discharge, patient will require TBD.    Greater than 75 minutes spent in preparation of this admission in examining patient, obtaining history, reviewing records and d/w patient/family/consultants.         [1]   Past Medical History:   Anxiety    Essential hypertension    Hyperlipidemia    Kidney disease    Lymphoma (HCC)    Type 1 diabetes mellitus (HCC)   [2]   Past Surgical History:  Procedure Laterality Date    Appendectomy      Hip surgery  2018    Total abdom hysterectomy     [3] No family history on file.  [4]   Social History  Socioeconomic History    Marital status:    Tobacco Use    Smoking status: Former     Types: Cigarettes    Smokeless tobacco: Never   Vaping Use    Vaping status: Never Used   Substance and Sexual Activity    Alcohol use: Not Currently    Drug use: Not Currently     Social Drivers of Health     Food Insecurity: No Food Insecurity (3/25/2025)    NCSS - Food Insecurity     Worried About Running Out of Food in the Last Year: No     Ran Out of Food in the Last Year: No   Transportation Needs: No Transportation Needs (3/25/2025)    NCSS - Transportation     Lack of Transportation: No   Housing Stability: Not At Risk (3/25/2025)    NCSS - Housing/Utilities     Has Housing: Yes     Worried About Losing Housing: No     Unable to Get Utilities: No   [5]   Allergies  Allergen Reactions    Levaquin [Levofloxacin] ITCHING   [6] (Not in a hospital admission)

## 2025-05-22 NOTE — PROGRESS NOTES
Temporary dialysis line attempted bedside with use of lidocaine for numbing by Dr. Hernandez, Patient tolerated well, will need to take patient to lab for placement, Report to Josey SANTOS.

## 2025-05-22 NOTE — ED PROVIDER NOTES
Patient Seen in: [facility] Emergency Department        History  No chief complaint on file.    Stated Complaint: Difficulty breathing    Subjective:   HPI            84-year-old female with listed past medical history comes in by EMS today for difficulty breathing upon awakening this morning.  Patient states she felt fine last night.  Was 90% on room air with EMS.  Smoked in the past.  Does have end-stage kidney disease but is not on dialysis.  EMS gave her 1 treatment and placed her on supplemental O2.  She still states that she feels like she cannot breathe.      Objective:     Past Medical History:    Anxiety    COPD (chronic obstructive pulmonary disease) (HCC)    Essential hypertension    Hearing impaired person, bilateral    Hyperlipidemia    Kidney disease    Lymphoma (HCC)    Osteoarthritis    Renal disorder    Shortness of breath    Type 1 diabetes mellitus (HCC)    Visual impairment              Past Surgical History:   Procedure Laterality Date    Appendectomy      Hip surgery  2018    Total abdom hysterectomy                  Social History     Socioeconomic History    Marital status:    Tobacco Use    Smoking status: Former     Types: Cigarettes    Smokeless tobacco: Never   Vaping Use    Vaping status: Never Used   Substance and Sexual Activity    Alcohol use: Not Currently    Drug use: Not Currently     Social Drivers of Health     Food Insecurity: No Food Insecurity (5/22/2025)    NCSS - Food Insecurity     Worried About Running Out of Food in the Last Year: No     Ran Out of Food in the Last Year: No   Transportation Needs: No Transportation Needs (5/22/2025)    NCSS - Transportation     Lack of Transportation: No   Housing Stability: Not At Risk (5/22/2025)    NCSS - Housing/Utilities     Has Housing: Yes     Worried About Losing Housing: No     Unable to Get Utilities: No                                Physical Exam    ED Triage Vitals   BP 05/22/25 0950 (!) 174/104   Pulse  05/22/25 0945 102   Resp 05/22/25 0945 (!) 30   Temp 05/22/25 0945 97.9 °F (36.6 °C)   Temp src 05/22/25 2000 Temporal   SpO2 05/22/25 0945 100 %   O2 Device 05/22/25 0945 Non-rebreather mask       Current Vitals:   Vital Signs  BP: 156/69  Pulse: 80  Resp: 20  Temp: 98.3 °F (36.8 °C)  Temp src: Oral  MAP (mmHg): 96    Oxygen Therapy  SpO2: 100 %  O2 Device: Nasal cannula  O2 Flow Rate (L/min): 2 L/min            Physical Exam  Constitutional: Oriented to person, place, and time.  Appears well-developed.  Patient is in moderate respiratory distress and very anxious  Head: Normocephalic and atraumatic.   Eyes: Conjunctivae are normal. Pupils are equal, round, and reactive to light.   Neck: Normal range of motion. Neck supple.  No JVD or stridor  Cardiovascular: Normal rate, regular rhythm and intact distal pulses.    Pulmonary/Chest: Moderate respiratory distress with tachypnea diminished lung sounds wheeze and productive cough.  She does have retractions.  Abdominal: Soft. There is no tenderness. There is no guarding.   Musculoskeletal: Normal range of motion. No edema or tenderness.   Neurological: Alert and oriented to person, place, and time.   Skin: Skin is warm and dry.   Psychiatric: Normal mood and affect.  Behavior is normal.  Patient is very anxious.  She is not compliant with O2 therapy despite multiple modalities.  Nursing note and vitals reviewed.    Differential diagnosis includes acute on chronic respiratory failure, COPD exacerbation, viral bacterial infection, pneumothorax and pneumonia, heart failure and ACS.          ED Course  Labs Reviewed   BASIC METABOLIC PANEL (8) - Abnormal; Notable for the following components:       Result Value    Glucose 169 (*)     CO2 18.0 (*)     BUN 92 (*)     Creatinine 3.95 (*)     BUN/CREA Ratio 23.3 (*)     Calcium, Total 8.3 (*)     Calculated Osmolality 320 (*)     eGFR-Cr 11 (*)     All other components within normal limits   CBC WITH DIFFERENTIAL WITH PLATELET  - Abnormal; Notable for the following components:    RBC 3.00 (*)     HGB 8.2 (*)     HCT 25.4 (*)     RDW-SD 47.6 (*)     RDW 15.7 (*)     All other components within normal limits   PRO BETA NATRIURETIC PEPTIDE - Abnormal; Notable for the following components:    Pro-Beta Natriuretic Peptide 29,411 (*)     All other components within normal limits   TROPONIN I HIGH SENSITIVITY - Abnormal; Notable for the following components:    Troponin I (High Sensitivity) 224 (*)     All other components within normal limits   PROTHROMBIN TIME (PT) - Abnormal; Notable for the following components:    PT 15.5 (*)     All other components within normal limits   TROPONIN I HIGH SENSITIVITY - Abnormal; Notable for the following components:    Troponin I (High Sensitivity) 235 (*)     All other components within normal limits   HEPATITIS B PROFILE - Abnormal; Notable for the following components:    Hepatitis B Surface Antibody Nonreactive (*)     All other components within normal limits   HEMOGLOBIN A1C - Abnormal; Notable for the following components:    HgbA1C 6.5 (*)     Estimated Average Glucose 140 (*)     All other components within normal limits   TROPONIN I HIGH SENSITIVITY - Abnormal; Notable for the following components:    Troponin I (High Sensitivity) 426 (*)     All other components within normal limits   PTT, ACTIVATED - Abnormal; Notable for the following components:    PTT 44.4 (*)     All other components within normal limits   CBC WITH DIFFERENTIAL WITH PLATELET - Abnormal; Notable for the following components:    RBC 2.62 (*)     HGB 6.9 (*)     HCT 21.4 (*)     RDW 15.6 (*)     Lymphocyte Absolute 0.47 (*)     All other components within normal limits   RENAL FUNCTION PANEL - Abnormal; Notable for the following components:    Glucose 246 (*)     BUN 57 (*)     Creatinine 2.84 (*)     BUN/CREA Ratio 20.1 (*)     Calcium, Total 8.3 (*)     Calculated Osmolality 312 (*)     eGFR-Cr 16 (*)     All other components  within normal limits   IRON AND TIBC - Abnormal; Notable for the following components:    Iron 30 (*)     Transferrin 222 (*)     % Saturation 10 (*)     All other components within normal limits   HEMOGLOBIN + HEMATOCRIT - Abnormal; Notable for the following components:    HGB 7.9 (*)     HCT 24.8 (*)     All other components within normal limits   HEMOGLOBIN + HEMATOCRIT - Abnormal; Notable for the following components:    HGB 8.9 (*)     HCT 27.8 (*)     All other components within normal limits   POCT GLUCOSE - Abnormal; Notable for the following components:    POC Glucose  203 (*)     All other components within normal limits   POCT GLUCOSE - Abnormal; Notable for the following components:    POC Glucose  410 (*)     All other components within normal limits   POCT GLUCOSE - Abnormal; Notable for the following components:    POC Glucose  266 (*)     All other components within normal limits   POCT GLUCOSE - Abnormal; Notable for the following components:    POC Glucose  193 (*)     All other components within normal limits   POCT GLUCOSE - Abnormal; Notable for the following components:    POC Glucose  145 (*)     All other components within normal limits   LIPID PANEL - Normal   PTT, ACTIVATED - Normal   PTT, ACTIVATED - Normal   MAGNESIUM - Normal   PTT, ACTIVATED - Normal   MAGNESIUM - Normal   FERRITIN - Normal   SARS-COV-2/FLU A AND B/RSV BY PCR (GENEXPERT) - Normal    Narrative:     This test is intended for the qualitative detection and differentiation of SARS-CoV-2, influenza A, influenza B, and respiratory syncytial virus (RSV) viral RNA in nasopharyngeal or nares swabs from individuals suspected of respiratory viral infection consistent with COVID-19 by their healthcare provider. Signs and symptoms of respiratory viral infection due to SARS-CoV-2, influenza, and RSV can be similar.    Test performed using the Xpert Xpress SARS-CoV-2/FLU/RSV (real time RT-PCR)  assay on the [employer] instrument,  [employer], [place] [address].   This test is being used under the Food and Drug Administration's Emergency Use Authorization.    The authorized Fact Sheet for Healthcare Providers for this assay is available upon request from the laboratory.   MD BLOOD SMEAR CONSULT   TYPE AND SCREEN    Narrative:     The following orders were created for panel order Type and screen.  Procedure                               Abnormality         Status                     ---------                               -----------         ------                     ABORH (Blood Type)[[id number]]                               Final result               Antibody Screen[[id number]]                                  Final result                 Please view results for these tests on the individual orders.   PREPARE RBC   ABORH (BLOOD TYPE)   ANTIBODY SCREEN   RAINBOW DRAW LAVENDER   OCCULT BLOOD, STOOL     EKG    Rate, intervals and axes as noted on EKG Report.  Rate: 99  Rhythm: Sinus Rhythm  Reading: Artifact due to respiratory motion artifact, lateral ST and T wave changes, no obvious acute ST elevation              IR CENTRAL VENOUS ACCESS  Result Date: 5/22/2025  PROCEDURE: IR TUNNELED CV CATH INSERTION EXCHANGE CHECK  INDICATIONS: Renal Failure  COMPARISON: None.  (S): DEVI Hernandez MD  ANESTHESIA/SEDATION: Level of anesthesia/sedation:  Local only   ESTIMATED BLOOD LOSS: Less than 5 mL  COMPLICATIONS: None  FINDINGS:  Informed consent was obtained.  Initially, attempted placement of a temporary catheter bedside was unsuccessful given the tortuosity of the right internal jugular vein and existing right-sided port.  Patient was brought to the fluoroscopy suite.  The left neck and chest were prepped and draped usual sterile fashion soft tissues rib to local anesthetic.  Sonographic evaluation left neck reveals occluded left internal and external jugular veins.  Given difficulty of right internal jugular vein access from prior attempt, the  left subclavian vein was targeted.  Left subclavian vein is noted to be patent.  Access was gained in the vein with standard micropuncture technique.  A wire was passed into the central circulation.  Under fluoroscopic guidance, a  0.035 inch wire was advanced into the inferior vena cava. The access was dilated. A peel-away sheath was advanced over the wire and secured.  Local Lidocaine was administered. A short skin incision was made in the left chest several cm from the venotomy. A 27 cm tip-to-cuff tunneled dialysis catheter was tunneled from the skin incision to the venotomy. The catheter was then advanced through the peel-away sheath to the right atrium.  The catheter aspirated and flushed well. The catheter was flushed with heparin and secured to the skin.         CONCLUSION: Placement of tunneled dialysis venous catheter via left subclavian vein. Tip in mid right atrium.  Line is ready for immediate use.    Dictated by (CST): Mario Hernandez MD on 5/22/2025 at 6:52 PM     Finalized by (CST): Mario Hernandez MD on 5/22/2025 at 6:58 PM          IR PROCEDURE NOT PERFORMED  Result Date: 5/22/2025    was not performed/completed today. Reason exam was not completed:  Please review imaging encounter notes for further details.   will be rescheduled using the existing order and a new order is not needed at this time.     XR CHEST AP PORTABLE  (CPT=71045)  Result Date: 5/22/2025  PROCEDURE: XR CHEST AP PORTABLE  (CPT=71045) TIME: 10 12.   COMPARISON: [facility], XR CHEST AP PORTABLE (CPT=71045), 2/20/2025, 7:31 PM.  [facility], XR CHEST AP PORTABLE (CPT=71045), 1/24/2025, 12:55 PM.  [facility], XR CHEST PA + LAT CHEST (XVK=41173), 8/04/2018,  12:48 PM.  INDICATIONS: Difficulty breathing today.  TECHNIQUE:   Single AP view was performed.   FINDINGS:   CARDIAC/MEDIASTINUM:  The cardiac silhouette is enlarged and unchanged. There is atherosclerotic calcification of the aortic  arch and thoracic aorta. There is a right sided port catheter with tip at the mid SVC.  LUNGS:   There is diffuse perihilar fullness and bilateral reticulonodular opacities and perihilar airspace opacities which has progressed since 2/20/2025. There are bibasilar airspace opacities, suggestive of atelectasis.  Small bilateral pleural effusions. BONES:   There is degenerative disease of the thoracic spine.         CONCLUSION:   Moderate pulmonary edema, significantly progressed since 2/20/2025. Underlying pneumonia can have a similar appearance.   Dictated by (CST): Chirag Reilly MD on 5/22/2025 at 10:38 AM     Finalized by (CST): Chirag Reilly MD on 5/22/2025 at 10:43 AM                            [facility]         Admission disposition: 5/22/2025 11:15 AM           Medical Decision Making  As noted above patient arrived in moderate respiratory distress.  She did not need supplemental noninvasive ventilation which I felt could help.  She could not even tolerate the nonrebreather mask.  We tried high flow nasal cannula/Vapotherm with inline treatments but she could not tolerate this as well.  I did talk to respiratory therapy and we will start with a T-piece.  I could see her declining and I told her this.  Will wait on some labs and imaging and treat her appropriately but anticipate obvious admission.    Workup as noted.  I could see the patient decompensating and I think she would be more closely watched and able to be managed and observed more closely in the PCU today.  She was seen by the hospitalist here.  I did talk to renal and I messaged cardiology.  Will do aspirin and heparin.  2-hour troponin pending    Problems Addressed:  Acute congestive heart failure, unspecified heart failure type (HCC): acute illness or injury with systemic symptoms that poses a threat to life or bodily functions  Acute on chronic respiratory failure with hypoxia (HCC): chronic illness or injury with severe exacerbation, progression, or side  effects of treatment that poses a threat to life or bodily functions  Acute renal failure superimposed on chronic kidney disease, unspecified acute renal failure type, unspecified CKD stage: chronic illness or injury with exacerbation, progression, or side effects of treatment  NSTEMI (non-ST elevated myocardial infarction) (HCC): acute illness or injury that poses a threat to life or bodily functions    Amount and/or Complexity of Data Reviewed  External Data Reviewed: ECG.     Details: 1/30/2020 EKG reviewed, ST and T wave changes present today were not present on this EKG from January 2020  Labs: ordered. Decision-making details documented in ED Course.  Radiology: ordered and independent interpretation performed. Decision-making details documented in ED Course.     Details: By my gross review of the chest x-ray there appears to be a likely diffuse pulmonary edema pattern in the mid to lower lung field  ECG/medicine tests: ordered and independent interpretation performed. Decision-making details documented in ED Course.    Risk  Drug therapy requiring intensive monitoring for toxicity.  Decision regarding hospitalization.    Critical Care  Total time providing critical care: minutes (I spent a total of 35 minutes of critical care time in obtaining history, performing a physical exam, bedside monitoring of interventions, collecting and interpreting tests and discussion with consultants but not including time spent performing procedures.)        Disposition and Plan     Clinical Impression:  1. Acute on chronic respiratory failure with hypoxia (HCC)    2. Acute congestive heart failure, unspecified heart failure type (HCC)    3. NSTEMI (non-ST elevated myocardial infarction) (HCC)    4. Acute renal failure superimposed on chronic kidney disease, unspecified acute renal failure type, unspecified CKD stage         Disposition:  Admit  5/22/2025 11:15 am    Follow-up:  No follow-up provider specified.  We recommend  that you schedule follow up care with a primary care provider within the next three months to obtain basic health screening including reassessment of your blood pressure.      Medications Prescribed:  Current Discharge Medication List                Supplementary Documentation:         Hospital Problems       Present on Admission  Date Reviewed: 3/28/2025          ICD-10-CM Noted POA    * (Principal) Acute on chronic respiratory failure with hypoxia ([facility]) J96.21 5/22/2025 Unknown    Acute congestive heart failure, unspecified heart failure type ([facility]) I50.9 5/22/2025 Unknown    Acute renal failure superimposed on chronic kidney disease, unspecified acute renal failure type, unspecified CKD stage N17.9, N18.9 5/22/2025 Unknown    NSTEMI (non-ST elevated myocardial infarction) ([facility]) I21.4 5/22/2025 Unknown

## 2025-05-22 NOTE — PROGRESS NOTES
[facility]  part of [facility]  Progress Note    Rayray Yu Patient Status:  Inpatient    1940 MRN [id number]   Location [facility] 2W/SW Attending Madison Crump MD   Hosp Day # 0 PCP Stef Velasco MD       Subjective:   SOB improved.     Objective:   Sitting up in bed.      Blood pressure 155/66, pulse 103, temperature (!) 95 °F (35 °C), resp. rate 24, weight 118 lb 13.3 oz (53.9 kg), SpO2 100%.    Results:   Labs:  Lab Results   Component Value Date    WBC 5.0 2025    RBC 3.00 2025    HGB 8.2 2025    HCT 25.4 2025    MCV 84.7 2025    MCH 27.3 2025    MCHC 32.3 2025    RDW 15.7 2025    .0 2025       Microbiology:  No results for input(s): \"URINE\", \"CULTI\", \"BLDSMR\" in the last 168 hours.    XR CHEST AP PORTABLE  (CPT=71045)  Result Date: 2025  CONCLUSION:   Moderate pulmonary edema, significantly progressed since 2025. Underlying pneumonia can have a similar appearance.   Dictated by (CST): Chirag Reilly MD on 2025 at 10:38 AM     Finalized by (CST): Chirag Reilly MD on 2025 at 10:43 AM             Assessment and Plan:   Renal failure - plan is for left sided tunneled dialysis catheter placement.  Rayray consents to procedure.       RAYRAY LEO AMI, APRN  2025

## 2025-05-22 NOTE — CONSULTS
[facility]  part of [facility]    Report of Consultation    Radha Yu Patient Status:  Inpatient    1940 MRN [id number]   Location [facility] 2W/SW Attending Hien Longoria MD   Hosp Day # 0 PCP Stef Velasco MD     Date of Admission:  2025    Reason for Consultation:   Dyspnea    History of Present Illness:   Patient is a 84-year-old female with past medical history significant for hypertension, chronic kidney disease who presents with chief complaint of increased dyspnea since last night.  Admits to some increased work of breathing with wheezing.  Cough primarily nonproductive in nature.  Family unaware of any history of known lung disease.  Dyspnea somewhat improved after nebulizer treatment.  Denies active chest pain.    Past Medical History  Past Medical History[1]    Past Surgical History  Past Surgical History[2]    Family History  Family History[3]    Social History  Social Hx on file[4]        Current Medications:  Current Hospital Medications[5]  Prescriptions Prior to Admission[6]    Allergies  Allergies[7]    Review of Systems:   Constitutional: Fatigue, malaise  HEENT: denies headache, sore throat, vision loss  Cardio: denies chest pain, chest pressure, palpitations  Respiratory: dyspnea, cough, wheezing, denies hemoptysis   GI: denies nausea, vomiting, abdominal pain  : denies dysuria, hematuria  Musculoskeletal: denies arthralgia, myalgia  Integumentary: denies rash, itching  Neurological: denies syncope, weakness, dizziness,   Psychiatric: denies depression, anxiety  Hematologic: denies bruising        Physical Exam:   Blood pressure 140/64, pulse 103, temperature 97.9 °F (36.6 °C), resp. rate 23, weight 118 lb 13.3 oz (53.9 kg), SpO2 95%.    Constitutional: no acute distress  Eyes: PERRL  ENT: nares patent  Neck: neck supple, no JVD  Cardio: RRR, S1 S2  Respiratory: Expiratory wheeze present  GI: abdomen soft, non tender, active bowel souds, no  organomegaly  Extremities: no clubbing, cyanosis, edema  Neurologic: no gross motor deficits  Skin: warm, dry    Results:   Laboratory Data  Lab Results   Component Value Date    WBC 5.0 05/22/2025    HGB 8.2 (L) 05/22/2025    HCT 25.4 (L) 05/22/2025    .0 05/22/2025    CREATSERUM 3.95 (H) 05/22/2025    BUN 92 (H) 05/22/2025     05/22/2025    K 4.3 05/22/2025     05/22/2025    CO2 18.0 (L) 05/22/2025     (H) 05/22/2025    CA 8.3 (L) 05/22/2025    ALB 2.9 (L) 02/06/2025    ALKPHO 100 02/06/2025    TP 5.5 (L) 02/06/2025    AST 27 02/06/2025    ALT 7 (L) 02/06/2025    PTT 31.0 05/22/2025    INR 1.16 05/22/2025    PTP 15.5 (H) 05/22/2025    MG 1.9 01/24/2025    PHOS 3.6 02/06/2020         Imaging  XR CHEST AP PORTABLE  (CPT=71045)  Result Date: 5/22/2025  CONCLUSION:   Moderate pulmonary edema, significantly progressed since 2/20/2025. Underlying pneumonia can have a similar appearance.   Dictated by (CST): Chirag Reilly MD on 5/22/2025 at 10:38 AM     Finalized by (CST): Chirag Reilly MD on 5/22/2025 at 10:43 AM            Assessment   1.  Acute hypoxemic respiratory failure  2.  Pulmonary edema  3.  Elevated troponin  4.  Wheezing  5.  Peripheral vascular disease  6.  Acute kidney injury on chronic kidney disease  7.  Prior history of right ICA stenosis status post TCAR  8.  Hypertension  9.  Diabetes mellitus    Plan   -Patient presents with worsening dyspnea symptoms.  Concern for pulmonary edema on chest x-ray cannot rule out underlying pneumonia.  Wheezing on examination but no history of known lung disease.  - IV steroids and gradually  - Nebulizer treatments  - Diuresis per cardiology recommendations  -   - Viral respiratory panel negative  - Anticoagulation with heparin per cardiology recommendations.  - Eval by nephrology.  Plan for dialysis catheter placement and initiation of hemodialysis afterwards.  - Reviewed vitals labs and imaging    Neelam Cherry DO  Pulmonary Critical Care  [facility]  5/22/2025  2:43 PM          [1]   Past Medical History:   Anxiety    Essential hypertension    Hyperlipidemia    Kidney disease    Lymphoma (HCC)    Type 1 diabetes mellitus (HCC)   [2]   Past Surgical History:  Procedure Laterality Date    Appendectomy      Hip surgery  2018    Total abdom hysterectomy     [3] No family history on file.  [4]   Social History  Socioeconomic History    Marital status:    Tobacco Use    Smoking status: Former     Types: Cigarettes    Smokeless tobacco: Never   Vaping Use    Vaping status: Never Used   Substance and Sexual Activity    Alcohol use: Not Currently    Drug use: Not Currently   [5]   Current Facility-Administered Medications   Medication Dose Route Frequency    heparin (Porcine) 93962 units/250mL infusion ED INITIAL DOSE  12 Units/kg/hr Intravenous Once    heparin (Porcine) 57672 units/250mL infusion ACS/AFIB CONTINUOUS  200-3,000 Units/hr Intravenous Continuous   [6]   Medications Prior to Admission   Medication Sig    amLODIPine 5 MG Oral Tab Take 1 tablet (5 mg total) by mouth in the morning.    Cranberry 125 MG Oral Tab Take 1 tablet by mouth in the morning.    insulin aspart 100 Units/mL Subcutaneous Solution Pen-injector Inject 5-10 Units into the skin in the morning and 5-10 Units at noon and 5-10 Units in the evening. Inject before meals. Injecting with sliding scale as = 5 units; 150-170= 6 units; 170-190= 7 units; 190-210= 8 units; 210-230= 9 units; 230 and above= 10 units. .    aspirin 81 MG Oral Tab EC Take 1 tablet (81 mg total) by mouth in the morning.    sodium bicarbonate 650 MG Oral Tab Take 2 tablets (1,300 mg total) by mouth in the morning and 2 tablets (1,300 mg total) before bedtime.    atorvastatin 20 MG Oral Tab Take 1 tablet (20 mg total) by mouth in the morning.    Cholecalciferol 50 MCG (2000 UT) Oral Cap Take 1 capsule by mouth in the morning.    insulin glargine 100 UNIT/ML Subcutaneous Solution Inject 5  Units into the skin nightly. (Patient taking differently: Inject 4 Units into the skin nightly.)    escitalopram 10 MG Oral Tab Take 1 tablet (10 mg total) by mouth in the morning.    Clopidogrel Bisulfate 75 MG Oral Tab Take 1 tablet (75 mg total) by mouth in the morning.   [7]   Allergies  Allergen Reactions    Levaquin [Levofloxacin] ITCHING

## 2025-05-23 ENCOUNTER — APPOINTMENT (OUTPATIENT)
Dept: CV DIAGNOSTICS | Facility: HOSPITAL | Age: 85
End: 2025-05-23
Attending: STUDENT IN AN ORGANIZED HEALTH CARE EDUCATION/TRAINING PROGRAM
Payer: MEDICARE

## 2025-05-23 LAB
ALBUMIN SERPL-MCNC: 3.8 G/DL (ref 3.2–4.8)
ANION GAP SERPL CALC-SCNC: 11 MMOL/L (ref 0–18)
ANTIBODY SCREEN: NEGATIVE
APTT PPP: 44.4 SECONDS (ref 23–36)
BASOPHILS # BLD AUTO: 0.01 X10(3) UL (ref 0–0.2)
BASOPHILS NFR BLD AUTO: 0.2 %
BUN BLD-MCNC: 57 MG/DL (ref 9–23)
BUN/CREAT SERPL: 20.1 (ref 10–20)
CALCIUM BLD-MCNC: 8.3 MG/DL (ref 8.7–10.4)
CHLORIDE SERPL-SCNC: 105 MMOL/L (ref 98–112)
CO2 SERPL-SCNC: 23 MMOL/L (ref 21–32)
CREAT BLD-MCNC: 2.84 MG/DL (ref 0.55–1.02)
DEPRECATED HBV CORE AB SER IA-ACNC: 90 NG/ML (ref 50–306)
DEPRECATED RDW RBC AUTO: 46 FL (ref 35.1–46.3)
EGFRCR SERPLBLD CKD-EPI 2021: 16 ML/MIN/1.73M2 (ref 60–?)
EOSINOPHIL # BLD AUTO: 0 X10(3) UL (ref 0–0.7)
EOSINOPHIL NFR BLD AUTO: 0 %
ERYTHROCYTE [DISTWIDTH] IN BLOOD BY AUTOMATED COUNT: 15.6 % (ref 11–15)
GLUCOSE BLD-MCNC: 246 MG/DL (ref 70–99)
GLUCOSE BLDC GLUCOMTR-MCNC: 135 MG/DL (ref 70–99)
GLUCOSE BLDC GLUCOMTR-MCNC: 145 MG/DL (ref 70–99)
GLUCOSE BLDC GLUCOMTR-MCNC: 158 MG/DL (ref 70–99)
GLUCOSE BLDC GLUCOMTR-MCNC: 193 MG/DL (ref 70–99)
GLUCOSE BLDC GLUCOMTR-MCNC: 320 MG/DL (ref 70–99)
HCT VFR BLD AUTO: 21.4 % (ref 35–48)
HCT VFR BLD AUTO: 24.8 % (ref 35–48)
HCT VFR BLD AUTO: 27.8 % (ref 35–48)
HGB BLD-MCNC: 6.9 G/DL (ref 12–16)
HGB BLD-MCNC: 7.9 G/DL (ref 12–16)
HGB BLD-MCNC: 8.9 G/DL (ref 12–16)
IMM GRANULOCYTES # BLD AUTO: 0.02 X10(3) UL (ref 0–1)
IMM GRANULOCYTES NFR BLD: 0.4 %
IRON SATN MFR SERPL: 10 % (ref 15–50)
IRON SERPL-MCNC: 30 UG/DL (ref 50–170)
LYMPHOCYTES # BLD AUTO: 0.47 X10(3) UL (ref 1–4)
LYMPHOCYTES NFR BLD AUTO: 8.4 %
MAGNESIUM SERPL-MCNC: 2.1 MG/DL (ref 1.6–2.6)
MCH RBC QN AUTO: 26.3 PG (ref 26–34)
MCHC RBC AUTO-ENTMCNC: 32.2 G/DL (ref 31–37)
MCV RBC AUTO: 81.7 FL (ref 80–100)
MONOCYTES # BLD AUTO: 0.23 X10(3) UL (ref 0.1–1)
MONOCYTES NFR BLD AUTO: 4.1 %
NEUTROPHILS # BLD AUTO: 4.86 X10 (3) UL (ref 1.5–7.7)
NEUTROPHILS # BLD AUTO: 4.86 X10(3) UL (ref 1.5–7.7)
NEUTROPHILS NFR BLD AUTO: 86.9 %
OSMOLALITY SERPL CALC.SUM OF ELEC: 312 MOSM/KG (ref 275–295)
PHOSPHATE SERPL-MCNC: 4.5 MG/DL (ref 2.4–5.1)
PLATELET # BLD AUTO: 204 10(3)UL (ref 150–450)
POTASSIUM SERPL-SCNC: 4.5 MMOL/L (ref 3.5–5.1)
RBC # BLD AUTO: 2.62 X10(6)UL (ref 3.8–5.3)
RH BLOOD TYPE: POSITIVE
SODIUM SERPL-SCNC: 139 MMOL/L (ref 136–145)
TOTAL IRON BINDING CAPACITY: 296 UG/DL (ref 250–425)
TRANSFERRIN SERPL-MCNC: 222 MG/DL (ref 250–380)
WBC # BLD AUTO: 5.6 X10(3) UL (ref 4–11)

## 2025-05-23 PROCEDURE — 5A1D70Z PERFORMANCE OF URINARY FILTRATION, INTERMITTENT, LESS THAN 6 HOURS PER DAY: ICD-10-PCS | Performed by: INTERNAL MEDICINE

## 2025-05-23 PROCEDURE — 93306 TTE W/DOPPLER COMPLETE: CPT | Performed by: STUDENT IN AN ORGANIZED HEALTH CARE EDUCATION/TRAINING PROGRAM

## 2025-05-23 PROCEDURE — 99233 SBSQ HOSP IP/OBS HIGH 50: CPT | Performed by: INTERNAL MEDICINE

## 2025-05-23 PROCEDURE — 99232 SBSQ HOSP IP/OBS MODERATE 35: CPT | Performed by: INTERNAL MEDICINE

## 2025-05-23 PROCEDURE — 30233N1 TRANSFUSION OF NONAUTOLOGOUS RED BLOOD CELLS INTO PERIPHERAL VEIN, PERCUTANEOUS APPROACH: ICD-10-PCS | Performed by: HOSPITALIST

## 2025-05-23 PROCEDURE — 99233 SBSQ HOSP IP/OBS HIGH 50: CPT | Performed by: HOSPITALIST

## 2025-05-23 RX ORDER — FUROSEMIDE 10 MG/ML
20 INJECTION INTRAMUSCULAR; INTRAVENOUS ONCE
Status: COMPLETED | OUTPATIENT
Start: 2025-05-23 | End: 2025-05-23

## 2025-05-23 RX ORDER — SODIUM CHLORIDE 9 MG/ML
INJECTION, SOLUTION INTRAVENOUS ONCE
Status: COMPLETED | OUTPATIENT
Start: 2025-05-23 | End: 2025-05-23

## 2025-05-23 RX ORDER — HEPARIN SODIUM 1000 [USP'U]/ML
INJECTION, SOLUTION INTRAVENOUS; SUBCUTANEOUS
Status: COMPLETED
Start: 2025-05-23 | End: 2025-05-23

## 2025-05-23 RX ORDER — HYDROCODONE BITARTRATE AND ACETAMINOPHEN 5; 325 MG/1; MG/1
1 TABLET ORAL EVERY 6 HOURS PRN
Refills: 0 | Status: DISCONTINUED | OUTPATIENT
Start: 2025-05-23 | End: 2025-06-09

## 2025-05-23 NOTE — PROGRESS NOTES
[facility]  part of [facility]    Progress Note    Radha Yu Patient Status:  Inpatient    1940 MRN [id number]   Location [facility] 2W/SW Attending Madison Crump MD   Hosp Day # 1 PCP Stef Velasco MD     Chief Complaint: sob    SUBJECTIVE:    Patient feeling much better this morning.  Tolerating HD.  Currently on HD.  Hgb 6.9 this am.  Discussed GI consult.     10 ROS completed and otherwise negative.    OBJECTIVE:  Vital signs in last 24 hours:  /64 (BP Location: Right arm)   Pulse 81   Temp 97.7 °F (36.5 °C) (Oral)   Resp 24   Wt 123 lb 14.4 oz (56.2 kg)   SpO2 98%   BMI 23.43 kg/m²     Intake/Output:    Intake/Output Summary (Last 24 hours) at 2025 1149  Last data filed at 2025 0611  Gross per 24 hour   Intake 318.05 ml   Output 1697 ml   Net -1378.95 ml       Wt Readings from Last 3 Encounters:   25 123 lb 14.4 oz (56.2 kg)   25 123 lb 7.3 oz (56 kg)   25 123 lb 7.3 oz (56 kg)       Exam     Gen: No acute distress  Pulm: no wheezing, decreased bs  CV: Heart with regular rate and rhythm  Abd: Abdomen soft, nontender, bowel sounds present  Neuro: No acute focal deficits  MSK: moves extremities  Skin: Warm and dry  Psych: Normal affect  Ext: no c/c/e    Data Review:     Labs:   Lab Results   Component Value Date    WBC 5.6 2025    HGB 6.9 2025    HCT 21.4 2025    .0 2025    CREATSERUM 2.84 2025    BUN 57 2025     2025    K 4.5 2025     2025    CO2 23.0 2025     2025    CA 8.3 2025    ALB 3.8 2025    PTT 44.4 2025    MG 2.1 2025    PHOS 4.5 2025       Imaging: Reviewed    IR CENTRAL VENOUS ACCESS  Result Date: 2025  CONCLUSION: Placement of tunneled dialysis venous catheter via left subclavian vein. Tip in mid right atrium.  Line is ready for immediate use.    Dictated by (CST): Mario Hernandez MD on  5/22/2025 at 6:52 PM     Finalized by (CST): Mario Hernandez MD on 5/22/2025 at 6:58 PM          XR CHEST AP PORTABLE  (CPT=71045)  Result Date: 5/22/2025  CONCLUSION:   Moderate pulmonary edema, significantly progressed since 2/20/2025. Underlying pneumonia can have a similar appearance.   Dictated by (CST): Chirag Reilly MD on 5/22/2025 at 10:38 AM     Finalized by (CST): Chirag Reilly MD on 5/22/2025 at 10:43 AM            Meds:   Current Hospital Medications[1]      Assessment  Problem List[2]    Plan:     Possible NSTEMI type 1  Possible Acute CHF, diastolic vs systolic  PVD  HL  HTN  - cards consulted  - check echo - pending  - given IV lasix - another dose of 20mg IV given once now - assess daily  - monitor I/O, daily wts  - Started heparin drip - hold now given drop in hgb  - cont asa, statin, plavix, anti-hypertensives     Acute on chronic anemia  - possibly baseline anemia from ckd  - for acute drop would need to rule out gi etiology since she was just started on a heparin drip  - start PPI IV  - check stool for occult blood  - check iron studies  - transfuse 1 unit prbc for hgb 6.9  - GI consulted    Possible acute copd exacerbation  - pulm consulted  - plan nebs scheduled and prn  - cont IV steroids  - plan pulm hygiene  - wean oxygen as able  - hold abx until seen by pulm     DEBO on CKD stage 4  - nephrology consulted  - will plan to initiate HD today   - place HD catheter today      DM  - accuchecks and ISS  - adjust insulin as needed        Global A/P  - reviewed labs and vitals from today  - reviewed notes of the day  - cbc, bmp, mag ordered for tomorrow  - discussed need to stay in the hospital today due to above  - cont IV ppi  - discussed with patient/RN and care team  - dispo pending clinical course      Madison Crump MD  5/23/2025  11:49 AM    Supplementary Documentation:   DVT Mechanical Prophylaxis:   SCDs,    DVT Pharmacologic Prophylaxis   Medication    heparin (Porcine) 74031  units/250mL infusion ACS/AFIB CONTINUOUS    heparin (Porcine) 100 Units/mL lock flush 150 Units    heparin (Porcine) 1000 UNIT/ML injection 1,500 Units         DVT Pharmacologic prophylaxis: Aspirin 81 mg      Code Status: Full Code  Rao: External urinary catheter in place  Rao Duration (in days):   Central line:    FRANCIS:                            MDM: High complexity- severe exacerbation of chronic illness posing a threat to life. IV meds requiring close inpatient monitoring. I personally spent time on chart/note review, review of labs/imaging, discussion with patient, physical exam, discussion with staff, writing progress note, and discussion of plan of care.         [1]   Current Facility-Administered Medications   Medication Dose Route Frequency    HYDROcodone-acetaminophen (Norco) 5-325 MG per tab 1 tablet  1 tablet Oral Q6H PRN    furosemide (Lasix) 10 mg/mL injection 20 mg  20 mg Intravenous Once    epoetin brittany (Epogen, Procrit) 5,000 Units injection  5,000 Units Subcutaneous Once per day on Monday Wednesday Friday    pantoprazole (Protonix) 40 mg in sodium chloride 0.9% PF 10 mL IV push  40 mg Intravenous Q12H    heparin (Porcine) 16331 units/250mL infusion ACS/AFIB CONTINUOUS  200-3,000 Units/hr Intravenous Continuous    sodium chloride 0.9 % IV bolus 100 mL  100 mL Intravenous Q30 Min PRN    And    albumin human (Albumin) 25% injection 25 g  25 g Intravenous PRN Dialysis    aspirin DR tab 81 mg  81 mg Oral Daily    atorvastatin (Lipitor) tab 20 mg  20 mg Oral Nightly    amLODIPine (Norvasc) tab 5 mg  5 mg Oral Daily    clopidogrel (Plavix) tab 75 mg  75 mg Oral Daily    escitalopram (Lexapro) tab 10 mg  10 mg Oral Daily    ipratropium-albuterol (Duoneb) 0.5-2.5 (3) MG/3ML inhalation solution 3 mL  3 mL Nebulization Q6H WA    glucose (Dex4) 15 GM/59ML oral liquid 15 g  15 g Oral Q15 Min PRN    Or    glucose (Glutose) 40% oral gel 15 g  15 g Oral Q15 Min PRN    Or    glucose-vitamin C (Dex-4) chewable  tab 4 tablet  4 tablet Oral Q15 Min PRN    Or    dextrose 50% injection 50 mL  50 mL Intravenous Q15 Min PRN    Or    glucose (Dex4) 15 GM/59ML oral liquid 30 g  30 g Oral Q15 Min PRN    Or    glucose (Glutose) 40% oral gel 30 g  30 g Oral Q15 Min PRN    Or    glucose-vitamin C (Dex-4) chewable tab 8 tablet  8 tablet Oral Q15 Min PRN    acetaminophen (Tylenol Extra Strength) tab 500 mg  500 mg Oral Q4H PRN    melatonin tab 3 mg  3 mg Oral Nightly PRN    polyethylene glycol (PEG 3350) (Miralax) 17 g oral packet 17 g  17 g Oral Daily PRN    sennosides (Senokot) tab 17.2 mg  17.2 mg Oral Nightly PRN    bisacodyl (Dulcolax) 10 MG rectal suppository 10 mg  10 mg Rectal Daily PRN    ondansetron (Zofran) 4 MG/2ML injection 4 mg  4 mg Intravenous Q6H PRN    benzonatate (Tessalon) cap 200 mg  200 mg Oral TID PRN    guaiFENesin (Robitussin) 100 MG/5 ML oral liquid 200 mg  200 mg Oral Q4H PRN    glycerin-hypromellose- (Artificial Tears) 0.2-0.2-1 % ophthalmic solution 1 drop  1 drop Both Eyes QID PRN    sodium chloride (Saline Mist) 0.65 % nasal solution 1 spray  1 spray Each Nare Q3H PRN    methylPREDNISolone sodium succinate (Solu-MEDROL) injection 40 mg  40 mg Intravenous Q12H    metoclopramide (Reglan) 5 mg/mL injection 10 mg  10 mg Intravenous Daily PRN    insulin aspart (NovoLOG) 100 Units/mL FlexPen 1-7 Units  1-7 Units Subcutaneous TID CC    heparin (Porcine) 100 Units/mL lock flush 150 Units  1.5 mL Intravenous Once    insulin degludec (Tresiba) 100 units/mL flextouch 12 Units  12 Units Subcutaneous Nightly    insulin aspart (NovoLOG) 100 Units/mL FlexPen 5 Units  5 Units Subcutaneous TID CC and HS    heparin (Porcine) 1000 UNIT/ML injection 1,500 Units  1.5 mL Intracatheter PRN Dialysis   [2]   Patient Active Problem List  Diagnosis    Closed fracture of left hip (HCC)    Background diabetic retinopathy(362.01)    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HCC)    Essential hypertension, benign    Occlusion of right carotid artery     Stenosis of left carotid artery    Atherosclerosis of native artery of right lower extremity with ulceration (HCC)    Type 2 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 not on chronic dialysis, with long-term current use of insulin (HCC)    Anemia    Depression    Acute on chronic respiratory failure with hypoxia (HCC)    Acute congestive heart failure, unspecified heart failure type (HCC)    NSTEMI (non-ST elevated myocardial infarction) (HCC)    Acute renal failure superimposed on chronic kidney disease, unspecified acute renal failure type, unspecified CKD stage

## 2025-05-23 NOTE — PLAN OF CARE
Patient A&Ox2-3 on 2L/NC. Had hemodialysis last night, 1547ml removed. Heparin drip restarted. Norco given prn pain to back.  Safety precautions maintained, call light and personal belongings within reach.     Problem: CARDIOVASCULAR - ADULT  Goal: Maintains optimal cardiac output and hemodynamic stability  Description: INTERVENTIONS:  - Monitor vital signs, rhythm, and trends  - Monitor for bleeding, hypotension and signs of decreased cardiac output  - Evaluate effectiveness of vasoactive medications to optimize hemodynamic stability  - Monitor arterial and/or venous puncture sites for bleeding and/or hematoma  - Assess quality of pulses, skin color and temperature  - Assess for signs of decreased coronary artery perfusion - ex. Angina  - Evaluate fluid balance, assess for edema, trend weights  Outcome: Progressing  Goal: Absence of cardiac arrhythmias or at baseline  Description: INTERVENTIONS:  - Continuous cardiac monitoring, monitor vital signs, obtain 12 lead EKG if indicated  - Evaluate effectiveness of antiarrhythmic and heart rate control medications as ordered  - Initiate emergency measures for life threatening arrhythmias  - Monitor electrolytes and administer replacement therapy as ordered  Outcome: Progressing     Problem: RESPIRATORY - ADULT  Goal: Achieves optimal ventilation and oxygenation  Description: INTERVENTIONS:  - Assess for changes in respiratory status  - Assess for changes in mentation and behavior  - Position to facilitate oxygenation and minimize respiratory effort  - Oxygen supplementation based on oxygen saturation or ABGs  - Provide Smoking Cessation handout, if applicable  - Encourage broncho-pulmonary hygiene including cough, deep breathe, Incentive Spirometry  - Assess the need for suctioning and perform as needed  - Assess and instruct to report SOB or any respiratory difficulty  - Respiratory Therapy support as indicated  - Manage/alleviate anxiety  - Monitor for signs/symptoms  of CO2 retention  Outcome: Progressing     Problem: GENITOURINARY - ADULT  Goal: Absence of urinary retention  Description: INTERVENTIONS:  - Assess patient’s ability to void and empty bladder  - Monitor intake/output and perform bladder scan as needed  - Follow urinary retention protocol/standard of care  - Consider collaborating with pharmacy to review patient's medication profile  - Implement strategies to promote bladder emptying  Outcome: Progressing     Problem: Patient Centered Care  Goal: Patient preferences are identified and integrated in the patient's plan of care  Description: Interventions:  - What would you like us to know as we care for you? From home w/ nephew  - Provide timely, complete, and accurate information to patient/family  - Incorporate patient and family knowledge, values, beliefs, and cultural backgrounds into the planning and delivery of care  - Encourage patient/family to participate in care and decision-making at the level they choose  - Honor patient and family perspectives and choices  Outcome: Progressing     Problem: Diabetes/Glucose Control  Goal: Glucose maintained within prescribed range  Description: INTERVENTIONS:  - Monitor Blood Glucose as ordered  - Assess for signs and symptoms of hyperglycemia and hypoglycemia  - Administer ordered medications to maintain glucose within target range  - Assess barriers to adequate nutritional intake and initiate nutrition consult as needed  - Instruct patient on self management of diabetes  Outcome: Progressing

## 2025-05-23 NOTE — PROGRESS NOTES
[facility]  Cardiology Progress Note    Radha Yu Patient Status:  Inpatient    1940 MRN [id number]   Location [facility] 2W/SW Attending Madison Crump MD   Hosp Day # 1 PCP Stef Velasco MD     Subjective     Feeling much better today, now on 2 L oxygen.    Objective:     Patient Vitals for the past 24 hrs:   BP Temp Temp src Pulse Resp SpO2 Weight   25 1130 130/64 97.7 °F (36.5 °C) Oral 81 24 98 % --   25 1115 (!) 116/103 97.7 °F (36.5 °C) Oral 112 15 100 % --   25 1045 124/45 97.6 °F (36.4 °C) -- 83 25 100 % --   25 1030 122/55 97.7 °F (36.5 °C) Oral 83 23 98 % --   25 1000 135/52 -- -- 81 24 99 % --   25 0827 132/66 -- -- 97 19 99 % --   25 0800 -- 98.3 °F (36.8 °C) -- -- -- -- --   25 0600 140/67 -- -- 80 (!) 27 -- --   25 0400 129/46 -- Temporal 72 21 99 % --   25 0200 118/44 -- -- 74 20 95 % --   25 0000 116/44 98.1 °F (36.7 °C) Temporal 84 24 97 % --   25 2300 113/55 -- -- 87 23 96 % --   25 2200 124/50 -- -- 85 24 100 % --   25 2146 -- -- -- 89 24 100 % 123 lb 14.4 oz (56.2 kg)   255 121/58 -- -- -- -- 100 % --   25 2100 110/55 -- -- 94 -- 99 % --   255 111/56 -- -- -- -- -- --   25 106/57 -- -- 98 -- 98 % --   25 97/48 -- -- -- -- -- --   25 105/54 97.7 °F (36.5 °C) Temporal 97 (!) 27 95 % --   25 1950 98/46 -- -- 101 -- 100 % --   25 1945 (!) 82/51 -- -- 98 -- 98 % --   25 193 106/61 -- -- 102 -- 100 % --   25 191 100/58 -- -- 100 -- 100 % --   25 190 104/54 -- -- 101 (!) 29 100 % --   25 1900 116/57 -- -- 104 20 -- --   25 1538 155/66 (!) 95 °F (35 °C) -- -- 24 100 % --   25 1524 147/60 98.2 °F (36.8 °C) -- 100 (!) 33 100 % --   25 1345 140/64 -- -- 103 23 95 % --   25 1325 -- -- -- -- -- 96 % --   25 1230 138/58 -- -- 98 24 100 % --   25 1145  142/50 -- -- 95 25 100 % --       Intake/Output:   Last 3 shifts:   Intake/Output                   05/21/25 0700 - 05/22/25 0659 (Not Admitted) 05/22/25 0700 - 05/23/25 0659 05/23/25 0700 - 05/24/25 0659       Intake    P.O.  --  240  --    P.O. -- 240 --    I.V.  --  78.1  --    I.V. -- 20 --    Volume (mL) Heparin -- 58.1 --    Total Intake -- 318.1 --       Output    Urine  --  150  --    Incontinent Urine Occurrence -- 4 x --    Output (mL) (External Urinary Catheter) -- 150 --    Other  --  1547  --    HD Output (Hemodialysis Catheter Permacath (tunneled) Left Jugular) -- 1547 --    Total Output -- 1697 --       Net I/O     -- -1379 --             Vent Settings:      Hemodynamic parameters (last 24 hours):      Scheduled Meds: Scheduled Medications[1]    Continuous Infusions: Medication Infusions[2]    Results:     Lab Results   Component Value Date    WBC 5.6 05/23/2025    HGB 6.9 (LL) 05/23/2025    HCT 21.4 (L) 05/23/2025    .0 05/23/2025    CREATSERUM 2.84 (H) 05/23/2025    BUN 57 (H) 05/23/2025     05/23/2025    K 4.5 05/23/2025     05/23/2025    CO2 23.0 05/23/2025     (H) 05/23/2025    CA 8.3 (L) 05/23/2025    ALB 3.8 05/23/2025    ALKPHO 100 02/06/2025    BILT 0.3 02/06/2025    TP 5.5 (L) 02/06/2025    AST 27 02/06/2025    ALT 7 (L) 02/06/2025    PTT 44.4 (H) 05/23/2025    INR 1.16 05/22/2025    MG 2.1 05/23/2025    PHOS 4.5 05/23/2025       Recent Labs   Lab 05/22/25  0948 05/23/25  0413   * 246*   BUN 92* 57*   CREATSERUM 3.95* 2.84*   CA 8.3* 8.3*    139   K 4.3 4.5    105   CO2 18.0* 23.0     Recent Labs   Lab 05/22/25  0948 05/23/25  0413   RBC 3.00* 2.62*   HGB 8.2* 6.9*   HCT 25.4* 21.4*   MCV 84.7 81.7   MCH 27.3 26.3   MCHC 32.3 32.2   RDW 15.7* 15.6*   NEPRELIM 3.46 4.86   WBC 5.0 5.6   .0 204.0       No results for input(s): \"BNPML\" in the last 168 hours.    No results for input(s): \"TROP\", \"CK\" in the last 168 hours.    IR CENTRAL VENOUS  ACCESS  Result Date: 5/22/2025  CONCLUSION: Placement of tunneled dialysis venous catheter via left subclavian vein. Tip in mid right atrium.  Line is ready for immediate use.    Dictated by (CST): Mario Hernandez MD on 5/22/2025 at 6:52 PM     Finalized by (CST): Mario Hernandez MD on 5/22/2025 at 6:58 PM          XR CHEST AP PORTABLE  (CPT=71045)  Result Date: 5/22/2025  CONCLUSION:   Moderate pulmonary edema, significantly progressed since 2/20/2025. Underlying pneumonia can have a similar appearance.   Dictated by (CST): Chirag Reilly MD on 5/22/2025 at 10:38 AM     Finalized by (CST): Chirag Reilly MD on 5/22/2025 at 10:43 AM          EKG  Result Date: 5/22/2025  Normal sinus rhythm T wave abnormality, consider lateral ischemia Abnormal ECG When compared with ECG of 24-JAN-2025 11:27, T wave inversion now evident in Lateral leads Confirmed by JENNIFER WILEY, HOFFMAN ([id number]) on 5/22/2025 10:23:36 AM           Exam:     General: Alert and oriented x 3. No apparent distress.   HEENT: Normocephalic, anicteric sclera, neck supple, no thyromegaly or adenopathy.  Neck: No JVD, carotids 2+, no bruits.  Cardiac: Regular rate and rhythm. S1, S2 normal. No murmur, pericardial rub, S3, or extra cardiac sounds.  Lungs: Bilateral wheezing..  Normal excursions and effort.  Abdomen: Soft, non-tender. No organosplenomegally, mass or rebound, BS-present.  Extremities: Without clubbing or cyanosis. No edema.    Neurologic: Alert and oriented, normal affect. No focal defects  Skin: Warm and dry.     Assessment and Plan:   Assessment:  Acute hypoxic respiratory failure  Chest x-ray with evidence of moderate pulmonary edema, noted diffuse wheezing on exam in setting of long-term tobacco use  Suspect mixture of bronchitis/COPD exacerbation and fluid retention, which in part may be due to progressive worsening kidney disease  Elevated troponin  First troponin elevated at 224, ECG demonstrates lateral T wave inversion  Denies any chest pain,  shortness of breath secondary to bronchospasm pulmonary edema  Currently on IV hepari  Acute on chronic renal insufficiency  Started on dialysis today 5/23/2025  PVD s/p left femoropopliteal bypass 9/2015  Moderate right ICA stenosis s/p right TCAR 2/2019, totally occluded left ICA  HTN  HLD  DMI     Plan:  - Continue nebulizers, IV steroids, further respiratory management per pulmonary  - Obtain transthoracic echocardiogram, pending today  - Continue aspirin, atorvastatin 20 mg daily, Plavix 75 mg daily, amlodipine 5 mg daily  - Dialysis management per nephrology    Jarvis Bergeron MD  [facility]  5/23/2025         [1]    furosemide  20 mg Intravenous Once    epoetin brittany  5,000 Units Subcutaneous Once per day on Monday Wednesday Friday    pantoprazole  40 mg Intravenous Q12H    aspirin  81 mg Oral Daily    atorvastatin  20 mg Oral Nightly    amLODIPine  5 mg Oral Daily    clopidogrel  75 mg Oral Daily    escitalopram  10 mg Oral Daily    ipratropium-albuterol  3 mL Nebulization Q6H WA    methylPREDNISolone  40 mg Intravenous Q12H    insulin aspart  1-7 Units Subcutaneous TID CC    heparin  1.5 mL Intravenous Once    insulin degludec  12 Units Subcutaneous Nightly    insulin aspart  5 Units Subcutaneous TID CC and HS   [2]    continuous dose heparin Stopped (05/23/25 0611)

## 2025-05-23 NOTE — PLAN OF CARE
Problem: CARDIOVASCULAR - ADULT  Goal: Maintains optimal cardiac output and hemodynamic stability  Description: INTERVENTIONS:  - Monitor vital signs, rhythm, and trends  - Monitor for bleeding, hypotension and signs of decreased cardiac output  - Evaluate effectiveness of vasoactive medications to optimize hemodynamic stability  - Monitor arterial and/or venous puncture sites for bleeding and/or hematoma  - Assess quality of pulses, skin color and temperature  - Assess for signs of decreased coronary artery perfusion - ex. Angina  - Evaluate fluid balance, assess for edema, trend weights  Outcome: Progressing     Problem: RESPIRATORY - ADULT  Goal: Achieves optimal ventilation and oxygenation  Description: INTERVENTIONS:  - Assess for changes in respiratory status  - Assess for changes in mentation and behavior  - Position to facilitate oxygenation and minimize respiratory effort  - Oxygen supplementation based on oxygen saturation or ABGs  - Provide Smoking Cessation handout, if applicable  - Encourage broncho-pulmonary hygiene including cough, deep breathe, Incentive Spirometry  - Assess the need for suctioning and perform as needed  - Assess and instruct to report SOB or any respiratory difficulty  - Respiratory Therapy support as indicated  - Manage/alleviate anxiety  - Monitor for signs/symptoms of CO2 retention  Outcome: Progressing     Problem: GENITOURINARY - ADULT  Goal: Absence of urinary retention  Description: INTERVENTIONS:  - Assess patient’s ability to void and empty bladder  - Monitor intake/output and perform bladder scan as needed  - Follow urinary retention protocol/standard of care  - Consider collaborating with pharmacy to review patient's medication profile  - Implement strategies to promote bladder emptying  Outcome: Progressing     Problem: Diabetes/Glucose Control  Goal: Glucose maintained within prescribed range  Description: INTERVENTIONS:  - Monitor Blood Glucose as ordered  - Assess  for signs and symptoms of hyperglycemia and hypoglycemia  - Administer ordered medications to maintain glucose within target range  - Assess barriers to adequate nutritional intake and initiate nutrition consult as needed  - Instruct patient on self management of diabetes  Outcome: Progressing     Problem: Patient/Family Goals  Goal: Patient/Family Long Term Goal  Description: Patient's Long Term Goal:     Interventions:  - See additional Care Plan goals for specific interventions  Outcome: Progressing  Goal: Patient/Family Short Term Goal  Description: Patient's Short Term Goal:    Interventions:   - See additional Care Plan goals for specific interventions  Outcome: Progressing

## 2025-05-23 NOTE — PROGRESS NOTES
[facility]  part of [facility]     Progress Note    Radha Yu Patient Status:  Inpatient    1940 MRN [id number]   Location [facility] 2W/SW Attending Madison Crump MD   Hosp Day # 1 PCP Stef Velasco MD       Subjective:   Patient seen and examined.  Dyspnea improving.  Less wheezing.    Objective:   Blood pressure 130/50, pulse 86, temperature 97.1 °F (36.2 °C), temperature source Oral, resp. rate 20, weight 123 lb 14.4 oz (56.2 kg), SpO2 99%.  Intake/Output:   Last 3 shifts: I/O last 3 completed shifts:  In: 318.1 [P.O.:240; I.V.:78.1]  Out: 1697 [Urine:150; Other:1547]   This shift: I/O this shift:  In: 500 [Blood:500]  Out: 2500 [Other:2500]     Vent Settings:      Hemodynamic parameters (last 24 hours):      Scheduled Meds: Current Hospital Medications[1]    Continuous Infusions: Medication Infusions[2]    Physical Exam  Constitutional: no acute distress  Eyes: PERRL  ENT: nares pateint  Neck: supple, no JVD  Cardio: RRR, S1 S2  Respiratory: Faint expiratory wheeze  GI: abdomen soft, non tender, active bowel sounds, no organomegaly  Extremities: no clubbing, cyanosis, edema  Neurologic: no gross motor deficits  Skin: warm, dry      Results:     Lab Results   Component Value Date    WBC 5.6 2025    HGB 6.9 2025    HCT 21.4 2025    .0 2025    CREATSERUM 2.84 2025    BUN 57 2025     2025    K 4.5 2025     2025    CO2 23.0 2025     2025    CA 8.3 2025    ALB 3.8 2025    PTT 44.4 2025    MG 2.1 2025    PHOS 4.5 2025       IR CENTRAL VENOUS ACCESS  Result Date: 2025  CONCLUSION: Placement of tunneled dialysis venous catheter via left subclavian vein. Tip in mid right atrium.  Line is ready for immediate use.    Dictated by (CST): Mario Hernandez MD on 2025 at 6:52 PM     Finalized by (CST): Mario Hernandez MD on 2025 at 6:58 PM           XR CHEST AP PORTABLE  (CPT=71045)  Result Date: 5/22/2025  CONCLUSION:   Moderate pulmonary edema, significantly progressed since 2/20/2025. Underlying pneumonia can have a similar appearance.   Dictated by (CST): Chirag Reilly MD on 5/22/2025 at 10:38 AM     Finalized by (CST): Chirag Reilly MD on 5/22/2025 at 10:43 AM            EKG  Result Date: 5/22/2025  Normal sinus rhythm T wave abnormality, consider lateral ischemia Abnormal ECG When compared with ECG of 24-JAN-2025 11:27, T wave inversion now evident in Lateral leads Confirmed by JENNIFER WILEY, HOFFMAN ([id number]) on 5/22/2025 10:23:36 AM      Assessment   1.  Acute hypoxemic respiratory failure  2.  Pulmonary edema  3.  Elevated troponin  4.  Wheezing  5.  Peripheral vascular disease  6.  Acute kidney injury on chronic kidney disease  7.  Prior history of right ICA stenosis status post TCAR  8.  Hypertension  9.  Diabetes mellitus     Plan   -Patient presents with worsening dyspnea symptoms.  Concern for pulmonary edema on chest x-ray cannot rule out underlying pneumonia.  Wheezing on examination but no history of known lung disease.  - 2D Echo pending  - Repeat CXR in AM  - Wean steroid therapy  - Nebulizer treatments  - Diuresis per cardiology recommendations  - Viral respiratory panel negative  - Anticoagulation with heparin per cardiology recommendations.  - Eval by nephrology.  Plan for dialysis catheter placement and initiation of hemodialysis afterwards.  - Heparin GTT per cardiology recommendations  - Possible transfer to floor later today      Neelam Cherry, DO  Pulmonary Critical Care Medicine  [facility]        [1]   Current Facility-Administered Medications   Medication Dose Route Frequency    HYDROcodone-acetaminophen (Norco) 5-325 MG per tab 1 tablet  1 tablet Oral Q6H PRN    furosemide (Lasix) 10 mg/mL injection 20 mg  20 mg Intravenous Once    epoetin brittany (Epogen, Procrit) 5,000 Units injection  5,000 Units Subcutaneous Once per day on Monday  Wednesday Friday    pantoprazole (Protonix) 40 mg in sodium chloride 0.9% PF 10 mL IV push  40 mg Intravenous Q12H    heparin (Porcine) 64211 units/250mL infusion ACS/AFIB CONTINUOUS  200-3,000 Units/hr Intravenous Continuous    sodium chloride 0.9 % IV bolus 100 mL  100 mL Intravenous Q30 Min PRN    And    albumin human (Albumin) 25% injection 25 g  25 g Intravenous PRN Dialysis    aspirin DR tab 81 mg  81 mg Oral Daily    atorvastatin (Lipitor) tab 20 mg  20 mg Oral Nightly    amLODIPine (Norvasc) tab 5 mg  5 mg Oral Daily    clopidogrel (Plavix) tab 75 mg  75 mg Oral Daily    escitalopram (Lexapro) tab 10 mg  10 mg Oral Daily    ipratropium-albuterol (Duoneb) 0.5-2.5 (3) MG/3ML inhalation solution 3 mL  3 mL Nebulization Q6H WA    glucose (Dex4) 15 GM/59ML oral liquid 15 g  15 g Oral Q15 Min PRN    Or    glucose (Glutose) 40% oral gel 15 g  15 g Oral Q15 Min PRN    Or    glucose-vitamin C (Dex-4) chewable tab 4 tablet  4 tablet Oral Q15 Min PRN    Or    dextrose 50% injection 50 mL  50 mL Intravenous Q15 Min PRN    Or    glucose (Dex4) 15 GM/59ML oral liquid 30 g  30 g Oral Q15 Min PRN    Or    glucose (Glutose) 40% oral gel 30 g  30 g Oral Q15 Min PRN    Or    glucose-vitamin C (Dex-4) chewable tab 8 tablet  8 tablet Oral Q15 Min PRN    acetaminophen (Tylenol Extra Strength) tab 500 mg  500 mg Oral Q4H PRN    melatonin tab 3 mg  3 mg Oral Nightly PRN    polyethylene glycol (PEG 3350) (Miralax) 17 g oral packet 17 g  17 g Oral Daily PRN    sennosides (Senokot) tab 17.2 mg  17.2 mg Oral Nightly PRN    bisacodyl (Dulcolax) 10 MG rectal suppository 10 mg  10 mg Rectal Daily PRN    ondansetron (Zofran) 4 MG/2ML injection 4 mg  4 mg Intravenous Q6H PRN    benzonatate (Tessalon) cap 200 mg  200 mg Oral TID PRN    guaiFENesin (Robitussin) 100 MG/5 ML oral liquid 200 mg  200 mg Oral Q4H PRN    glycerin-hypromellose- (Artificial Tears) 0.2-0.2-1 % ophthalmic solution 1 drop  1 drop Both Eyes QID PRN    sodium  chloride (Saline Mist) 0.65 % nasal solution 1 spray  1 spray Each Nare Q3H PRN    methylPREDNISolone sodium succinate (Solu-MEDROL) injection 40 mg  40 mg Intravenous Q12H    metoclopramide (Reglan) 5 mg/mL injection 10 mg  10 mg Intravenous Daily PRN    insulin aspart (NovoLOG) 100 Units/mL FlexPen 1-7 Units  1-7 Units Subcutaneous TID CC    heparin (Porcine) 100 Units/mL lock flush 150 Units  1.5 mL Intravenous Once    insulin degludec (Tresiba) 100 units/mL flextouch 12 Units  12 Units Subcutaneous Nightly    insulin aspart (NovoLOG) 100 Units/mL FlexPen 5 Units  5 Units Subcutaneous TID CC and HS    heparin (Porcine) 1000 UNIT/ML injection 1,500 Units  1.5 mL Intracatheter PRN Dialysis   [2]    continuous dose heparin Stopped (05/23/25 0611)

## 2025-05-23 NOTE — SPIRITUAL CARE NOTE
Spiritual Care Visit Note    Patient Name: Radha Yu Date of Spiritual Care Visit: 25   : 1940 Primary Dx: Acute on chronic respiratory failure with hypoxia (HCC)       Referred By: Referral From: Care Coordination    Spiritual Care Taxonomy:    Intended Effects: Build relationship of care and support    Methods: Collaborate with care team member, Offer support, Offer spiritual/Rastafari support, Offer emotional support    Interventions: Assist someone with Advance Directives    Visit Type/Summary:     - PoA: Other: Provided education regarding PoA for Healthcare. Left PoA information with patient for review.   assessed for other spiritual needs.  Left PoA information and Spiritual Care contact information. Patient wants to wait for her nephew and decide later if she is ready to sign POA documentation. Patient is on hemodialysis at this moment and no family members are present  remains available for follow up.    Spiritual Care support can be requested via an Epic consult. For urgent/immediate needs, please contact the On Call  at: [place]: ext [phone]    Harsh LOPEZ  Chaplain Resident  [id number]

## 2025-05-23 NOTE — PROGRESS NOTES
05/23/25 0810   Wound 05/22/25 1 Dorsal;Right   Date First Assessed/Time First Assessed: 05/22/25 1425    Wound Number (Wound Clinic Only): 1  Wound Location Orientation: Dorsal;Right   Wound Image     Site Assessment Dry;Brown;Black   Closure Not approximated   Drainage Amount None   Treatments Betadine   Dressing Open to air   Wound Depth (cm)   (adherent eschar)   Margins Attached edges   Non-staged Wound Description Full thickness   Ruth-wound Assessment Dry;Intact;Fragile;Pink   Wound Bed Eschar (%) 100 %   State of Healing Non-healing   Wound Odor None   Wound Follow Up   Follow up needed No   Nursing consult to see the pt's right 1st toe tip, she is resting in bed, no c/o pain, see the wound assessment, right toe, applied Betadine swab, heels are intact. Recommend to turn in bed, elevate heels, Betadine swab to right 1st toe daily. Call light in reach. Spoke with the nurse.

## 2025-05-23 NOTE — PROGRESS NOTES
[facility]  part of [facility]    Progress Note    Radha Yu Patient Status:  Inpatient    1940 MRN [id number]   Location [facility] 2W/SW Attending Madison Crump MD   Hosp Day # 1 PCP Stef Velasco MD       Subjective:   Radha Yu is a(n) 84 year old female who I am seeing for DEBO    Resting      Objective:   /66 (BP Location: Right arm)   Pulse 97   Temp 98.1 °F (36.7 °C) (Temporal)   Resp 19   Wt 123 lb 14.4 oz (56.2 kg)   SpO2 99%   BMI 23.43 kg/m²      Intake/Output Summary (Last 24 hours) at 2025 0949  Last data filed at 2025 0611  Gross per 24 hour   Intake 318.05 ml   Output 1697 ml   Net -1378.95 ml     Wt Readings from Last 1 Encounters:   25 123 lb 14.4 oz (56.2 kg)       Exam  Gen: No acute distress, Heent: NC AT, mucous memb clear, neck supple  Pulm: Lungs clear, normal respiratory effort  CV: Heart with regular rate and rhythm, no edema  Abd: Abdomen soft, nontender, nondistended, no organomegaly, bowel sounds present  Skin: no symptoms reported  Psych: alert and oriented    Assessment and Plan:       1 - ESRD  New start to dialysis  SW to place pt in an output dialysis unit     2 - HTN urgency  On amlodipin     3 - Respiratory failure/COPD  Better    4 - Anemia  PRBC today  Start epogen            Results:     Recent Labs   Lab 25  0948 25   RBC 3.00* 2.62*   HGB 8.2* 6.9*   HCT 25.4* 21.4*   MCV 84.7 81.7   MCH 27.3 26.3   MCHC 32.3 32.2   RDW 15.7* 15.6*   NEPRELIM 3.46 4.86   WBC 5.0 5.6   .0 204.0         Recent Labs   Lab 25  0948 25  0413   * 246*   BUN 92* 57*   CREATSERUM 3.95* 2.84*   CA 8.3* 8.3*    139   K 4.3 4.5    105   CO2 18.0* 23.0          IR CENTRAL VENOUS ACCESS  Result Date: 2025  CONCLUSION: Placement of tunneled dialysis venous catheter via left subclavian vein. Tip in mid right atrium.  Line is ready for immediate use.    Dictated by  (CST): Mario Hernandez MD on 5/22/2025 at 6:52 PM     Finalized by (CST): Mario Hernandez MD on 5/22/2025 at 6:58 PM          XR CHEST AP PORTABLE  (CPT=71045)  Result Date: 5/22/2025  CONCLUSION:   Moderate pulmonary edema, significantly progressed since 2/20/2025. Underlying pneumonia can have a similar appearance.   Dictated by (CST): Chirag Reilly MD on 5/22/2025 at 10:38 AM     Finalized by (CST): Chirag Reilly MD on 5/22/2025 at 10:43 AM                TEODORO HAJI MD  5/23/2025

## 2025-05-23 NOTE — PAYOR COMM NOTE
--------------  ADMISSION REVIEW   5/22-5/23  Payor: [employer]  Subscriber #:  [id number]  Authorization Number: [id number]    Admit date: 5/22/25  Admit time:  2:36 PM       REVIEW DOCUMENTATION:  5/22 H&P    HPI:   Radha Yu is a(n) 84 year old female with a PMH of PAD, HTN, HL, CKD stage 3 who presents with increased sob.  She was extremely tachypneic and wheezing, saturating 90% on room air.  She was given a nebulizer treatment and her oxygen sats improved to 98%.  In the ED she had a cxr that was consistent with fluid overload.  She also has a significant tobacco use history (stopped in January) and was still wheezing on exam.  Her trop was elevated and she had some EKG changes that were new.  She will be admitted.     Blood pressure 142/50, pulse 95, temperature 97.9 °F (36.6 °C), resp. rate 25, weight 118 lb 13.3 oz (53.9 kg), SpO2 100%.     Gen: uncomfortable  Pulm: tachypneic, +exp wheeze  CV: Heart with regular rate and rhythm  Abd: Abdomen soft, nontender, nondistended, bowel sounds present  Neuro: No acute focal deficits  MSK: moves extremities  Skin: Warm and dry  Psych: anxious affect  Ext: no c/c/e  XR CHEST AP PORTABLE  (CPT=71045)  Result Date: 5/22/2025  CONCLUSION:                Moderate pulmonary edema, significantly progressed since 2/20/2025. Underlying pneumonia can have a similar appearance.   Dictated by (CST): Chirag Reilly MD on 5/22/2025 at 10:38 AM     Finalized by (CST): Chirag Reilly MD on 5/22/2025 at 10:43 AM           EKG  Result Date: 5/22/2025  Normal sinus rhythm T wave abnormality, consider lateral ischemia Abnormal ECG When compared with ECG of 24-JAN-2025 11:27, T wave inversion now evident in Lateral leads Confirmed by JENNIFER WILEY, DALTON ([id number]) on 5/22/2025 10:23:36 AM    Possible NSTEMI type 1  Possible Acute CHF, diastolic vs systolic  PVD  HL  HTN  - cards consulted  - check echo  - given IV lasix  - monitor I/O, daily wts  - watch renal function with diuresis  - cont  heparin drip while trending trops - if trops remain flat can stop  - cont asa, statin, plavix, anti-hypertensives     Possible acute copd exacerbation  - pulm consulted  - plan nebs scheduled and prn  - cont IV steroids  - plan pulm hygiene  - wean oxygen as able  - hold abx until seen by pulm     DEBO on CKD stage 4  - nephrology consulted  - will plan to initiate HD today   - place HD catheter today      DM  - accuchecks and ISS  - adjust insulin as needed          5/22 Cardiology    Reason for Consultation:   SOB     History of Present Illness:   Patient is an 84-year-old female with a history of PVD s/p left femoral-popliteal bypass 2015, s/p right TCAR and total occluded left ICA, HTN, HLD, DM, and CKD who presents with shortness of breath.  Family stated that yesterday evening she became more short of breath which persisted overnight, this morning was pale with increased work of breathing.  Noticed audible wheezing with some coughing therefore brought her to the hospital.  Denies lower extremity edema, orthopnea, chest pain, palpitations, or syncope.     Troponin 224  Creatinine 3.95     CXR-moderate pulmonary edema  ECG-sinus rhythm, T wave abnormality, 99 bpm     Cardiac history:  PVD s/p left femoropopliteal bypass 9/2015  Moderate right ICA stenosis s/p right TCAR 2/2019, totally occluded left ICA  HTN  HLD  DMI  CKD  Assessment:  Acute hypoxic respiratory failure  Chest x-ray with evidence of moderate pulmonary edema, noted diffuse wheezing on exam in setting of long-term tobacco use  Suspect mixture of bronchitis/COPD exacerbation and fluid retention, which in part may be due to progressive worsening kidney disease  Elevated troponin  First troponin elevated at 224, ECG demonstrates lateral T wave inversion  Denies any chest pain, shortness of breath secondary to bronchospasm pulmonary edema  Currently on IV hepari  Acute on chronic renal insufficiency  PVD s/p left femoropopliteal bypass 9/2015  Moderate  right ICA stenosis s/p right TCAR 2/2019, totally occluded left ICA  HTN  HLD  DMI     Plan:  - Continue nebulizers, IV steroids, further respiratory management per pulmonary  - Continue Lasix 40 mg IV twice daily  - Obtain transthoracic echocardiogram  - Continue IV heparin while we trend troponin, if flat can likely discontinue  - Continue aspirin, atorvastatin 20 mg daily, Plavix 75 mg daily, amlodipine 5 mg daily          5/22 Nephrology  DEBO     History of Present Illness:   Patient is a 84 year old female who was admitted to the hospital for Acute on chronic respiratory failure with hypoxia (HCC):  The patient has a history of chronic kidney disease and follows with [facility].  Her GFR tends to run around 14.     History is given to me by her nephew Elliott who is at the bedside.  Apparently in 2019 the patient required dialysis after an episode of pneumonia/respiratory failure.  She recovered from dialysis and has been without dialysis since 2019.     However her nephrologist has been preparing her for the inevitable return to dialysis.  Patient came to the emergency room due to shortness of breath and inability to breathe.  Her initial blood pressure was 170/104          1 - DEBO/fluid overload  Long discussion with the patient's nephew who is at the bedside.  Given her history and her ongoing advanced kidney disease, I feel it is better to go ahead and proceed with initiation of renal replacement therapies, dialysis.  Will place a dialysis catheter today and plan to dialyze her today and tomorrow.     2 - HTN urgency  Blood pressure control     3 - Respiratory failure/COPD  Volume removal with dialysis will help        5/22 Pulm  Patient is a 84-year-old female with past medical history significant for hypertension, chronic kidney disease who presents with chief complaint of increased dyspnea since last night.  Admits to some increased work of breathing with wheezing.  Cough primarily nonproductive in  nature.  Family unaware of any history of known lung disease.  Dyspnea somewhat improved after nebulizer treatment.  Denies active chest pain.  Assessment   1.  Acute hypoxemic respiratory failure  2.  Pulmonary edema  3.  Elevated troponin  4.  Wheezing  5.  Peripheral vascular disease  6.  Acute kidney injury on chronic kidney disease  7.  Prior history of right ICA stenosis status post TCAR  8.  Hypertension  9.  Diabetes mellitus     Plan   -Patient presents with worsening dyspnea symptoms.  Concern for pulmonary edema on chest x-ray cannot rule out underlying pneumonia.  Wheezing on examination but no history of known lung disease.  - IV steroids and gradually  - Nebulizer treatments  - Diuresis per cardiology recommendations  -   - Viral respiratory panel negative  - Anticoagulation with heparin per cardiology recommendations.  - Eval by nephrology.  Plan for dialysis catheter placement and initiation of hemodialysis afterwards.        5/22  Procedure: left subclavian vein permcath placement     Pre-Procedure Diagnosis:  arf     Post-Procedure Diagnosis: same     Anesthesia:  Local     Findings:  occluded left jugular vein, interna/external; right  chest port precluding right permcath placement-- tip in mid atrium-- ready for immediate use           Blood Loss:  less than 10 mls                        Complications:  None            5/23 Nephrology      1 - ESRD  New start to dialysis  SW to place pt in an output dialysis unit     2 - HTN urgency  On amlodipin     3 - Respiratory failure/COPD  Better     4 - Anemia  PRBC today  Start epogen          5/23 GI  Reason for Consultation:  Acute on chronic      History of Present Illness:  Radha Yu is a 84 year old female w/ PMHx of PAD, HTN, HL, CKD who presented to ER for worsening SOB. She was found to have elevated troponin and fluid overload on CXR. Was started on heparin drip. GI consulted for acute on chronic anemia.      Patient is sitting comfortably in  bed getting first session of dialysis. She states her stools have been brown in color. She has no GI complaints. Denies hematochezia, melena. Denies nausea and vomiting. Denies abdominal pain, acid reflux, dysphagia and globus. Denies bright red blood per rectum.     Radha Yu is a 84 year old female w/ PMHx of PAD, HTN, HL, CKD who presented to ER for worsening SOB. She was found to have elevated troponin and fluid overload on CXR. Was started on heparin drip. GI consulted for acute on chronic anemia.      #acute on chronic anemia  -patient without overt GIB bleeding   -last colonoscopy 2007, no prior EGD  -etiology of anemia likely component of CKD and LALITA, at this time without overt bleeding discussed with patient plan for conservative therapy unless overt bleeding or further drop in hemoglobin, family acknowledged understanding      #acute hypoxic respiratory failure   -pulmonology following      #acute on chronic renal insufficiency   -started dialysis today   -nephrology following      #elevated troponin  -cardiology following was started on heparin, held due to drop in hemoglobin      Recommend:  -diet as tolerated   -empiric PPI BID  -trend hemoglobin, goal >7   -monitor for overt bleeding  -ferritin pending   -reserve endoscopic evaluation for further drop in hemoglobin or overt GI bleeding           5/23 Cardiology  now on 2 L oxygen.   Assessment:  Acute hypoxic respiratory failure  Chest x-ray with evidence of moderate pulmonary edema, noted diffuse wheezing on exam in setting of long-term tobacco use  Suspect mixture of bronchitis/COPD exacerbation and fluid retention, which in part may be due to progressive worsening kidney disease  Elevated troponin  First troponin elevated at 224, ECG demonstrates lateral T wave inversion  Denies any chest pain, shortness of breath secondary to bronchospasm pulmonary edema  Currently on IV hepari  Acute on chronic renal insufficiency  Started on dialysis today  5/23/2025  PVD s/p left femoropopliteal bypass 9/2015  Moderate right ICA stenosis s/p right TCAR 2/2019, totally occluded left ICA  HTN  HLD  DMI     Plan:  - Continue nebulizers, IV steroids, further respiratory management per pulmonary  - Obtain transthoracic echocardiogram, pending today  - Continue aspirin, atorvastatin 20 mg daily, Plavix 75 mg daily, amlodipine 5 mg daily        5/23 IM  Tolerating HD. Currently on HD. Hgb 6.9 this am.         Possible NSTEMI type 1  Possible Acute CHF, diastolic vs systolic  PVD  HL  HTN  - cards consulted  - check echo - pending  - given IV lasix - another dose of 20mg IV given once now - assess daily  - monitor I/O, daily wts  - Started heparin drip - hold now given drop in hgb  - cont asa, statin, plavix, anti-hypertensives     Acute on chronic anemia  - possibly baseline anemia from ckd  - for acute drop would need to rule out gi etiology since she was just started on a heparin drip  - start PPI IV  - check stool for occult blood  - check iron studies  - transfuse 1 unit prbc for hgb 6.9  - GI consulted     Possible acute copd exacerbation  - pulm consulted  - plan nebs scheduled and prn  - cont IV steroids  - plan pulm hygiene  - wean oxygen as able  - hold abx until seen by pulm     DEBO on CKD stage 4  - nephrology consulted  - will plan to initiate HD today   - place HD catheter today      DM  - accuchecks and ISS  - adjust insulin as needed                      Global A/P  - reviewed labs and vitals from today  - reviewed notes of the day  - cbc, bmp, mag ordered for tomorrow  - discussed need to stay in the hospital today due to above  - cont IV ppi  - discussed with patient/RN and care team        BMP:   No results found for: \"GLUCOSE\"  Lab Results   Component Value Date    K 4.5 05/23/2025    K 4.3 05/22/2025    K 4.9 02/06/2025     Lab Results   Component Value Date    BUN 57 (H) 05/23/2025    BUN 92 (H) 05/22/2025    BUN 51 (H) 02/06/2025     Lab Results    Component Value Date    CREATSERUM 2.84 (H) 05/23/2025    CREATSERUM 3.95 (H) 05/22/2025    CREATSERUM 3.23 (H) 02/06/2025     CBC:    Lab Results   Component Value Date    WBC 5.6 05/23/2025    WBC 5.0 05/22/2025    WBC 9.5 02/06/2025     Lab Results   Component Value Date    HGB 6.9 (LL) 05/23/2025    HGB 8.2 (L) 05/22/2025    HGB 7.8 (L) 02/06/2025      Lab Results   Component Value Date    .0 05/23/2025    .0 05/22/2025    .0 02/06/2025           MEDICATIONS ADMINISTERED IN LAST 1 DAY:  Transfuse RBC       Date Action Dose Route User    5/23/2025 1030 New Bag (none) Intravenous Jaja Flores RN          acetaminophen (Tylenol Extra Strength) tab 500 mg       Date Action Dose Route User    5/23/2025 1225 Given 500 mg Oral Jaja Flores RN    5/22/2025 2132 Given 500 mg Oral Millie Mccain RN          aspirin DR tab 81 mg       Date Action Dose Route User    5/23/2025 0833 Given 81 mg Oral Jaja Flores RN          atorvastatin (Lipitor) tab 20 mg       Date Action Dose Route User    5/22/2025 2136 Given 20 mg Oral Millie Mccain RN          heparin (Porcine) 77517 units/250mL infusion ACS/AFIB CONTINUOUS       Date Action Dose Route User    5/23/2025 0526 Hi-Risk Rate/Dose Change 700 Units/hr Intravenous Millie Mccain RN    5/22/2025 2139 New Bag 600 Units/hr Intravenous Millie Mccain RN          escitalopram (Lexapro) tab 10 mg       Date Action Dose Route User    5/23/2025 0833 Given 10 mg Oral Jaja Flores RN          heparin (Porcine) 1000 UNIT/ML injection 1,500 Units       Date Action Dose Route User    5/22/2025 2236 Given 1,500 Units Intracatheter Millie Mccain RN          heparin (Porcine) 1000 UNIT/ML injection       Date Action Dose Route User    5/23/2025 1246 Given 4,000 Units (none) Jaja Flores RN          HYDROcodone-acetaminophen (Norco) 5-325 MG per tab 1 tablet       Date Action Dose Route User    5/23/2025 0117 Given 1 tablet Oral Millie Mccain RN           insulin aspart (NovoLOG) 100 Units/mL FlexPen 1-7 Units       Date Action Dose Route User    5/23/2025 0833 Given 2 Units Subcutaneous (Right Upper Arm) Jaja Flores RN    5/22/2025 1820 Given 7 Units Subcutaneous (Right Lower Abdomen) Josey Gomez RN          insulin aspart (NovoLOG) 100 Units/mL FlexPen 5 Units       Date Action Dose Route User    5/23/2025 1302 Given 5 Units Subcutaneous (Left Upper Arm) Jaja Flores RN    5/23/2025 0833 Given 5 Units Subcutaneous (Right Upper Arm) Jaja Flores RN    5/22/2025 2115 Given 5 Units Subcutaneous (Left Upper Abdomen) Millie Mccain RN          insulin degludec (Tresiba) 100 units/mL flextouch 12 Units       Date Action Dose Route User    5/22/2025 2132 Given 12 Units Subcutaneous (Left Upper Abdomen) Millie Mccain RN          ipratropium-albuterol (Duoneb) 0.5-2.5 (3) MG/3ML inhalation solution 3 mL       Date Action Dose Route User    5/23/2025 1323 Given 3 mL Nebulization Morgan Crow    5/23/2025 0809 Given 3 mL Nebulization Morgan Crow    5/22/2025 1829 Given 3 mL Nebulization Morgan Crow          melatonin tab 3 mg       Date Action Dose Route User    5/22/2025 2353 Given 3 mg Oral Millei Mccain RN          methylPREDNISolone sodium succinate (Solu-MEDROL) injection 40 mg       Date Action Dose Route User    5/23/2025 0411 Given 40 mg Intravenous Millie Mccain RN    5/22/2025 1604 Given 40 mg Intravenous Josey Gomez RN          pantoprazole (Protonix) 40 mg in sodium chloride 0.9% PF 10 mL IV push       Date Action Dose Route User    5/23/2025 1225 Given 40 mg Intravenous Jaja Flores RN          sennosides (Senokot) tab 17.2 mg       Date Action Dose Route User    5/22/2025 2353 Given 17.2 mg Oral Millie Mccain RN          sodium chloride 0.9% infusion       Date Action Dose Route User    5/23/2025 1000 New Bag (none) Intravenous Jaja Flores, RN            Vitals (last day)       Date/Time  Temp Pulse Resp BP SpO2 Weight O2 Device O2 Flow Rate (L/min) [facility]    05/23/25 1300 97.1 °F (36.2 °C) 86 20 130/50 99 % -- Nasal cannula 2 L/min LR    05/23/25 1230 97.1 °F (36.2 °C) 77 22 125/50 98 % -- Nasal cannula 2 L/min LR    05/23/25 1200 -- 83 21 118/52 100 % -- Nasal cannula 2 L/min LR    05/23/25 1130 97.7 °F (36.5 °C) 81 24 130/64 98 % -- Nasal cannula 2 L/min LR    05/23/25 1115 97.7 °F (36.5 °C) 112 15 116/103 100 % -- Nasal cannula 2 L/min LR    05/23/25 1100 -- 86 24 125/81 100 % -- Nasal cannula 2 L/min LR    05/23/25 1045 97.6 °F (36.4 °C) 83 25 124/45 100 % -- -- -- LR    05/23/25 1030 97.7 °F (36.5 °C) 83 23 122/55 98 % -- Nasal cannula 2 L/min LR    05/23/25 1000 -- 81 24 135/52 99 % -- Nasal cannula 2 L/min LR    05/23/25 0827 -- 97 19 132/66 99 % -- Nasal cannula 2 L/min LR    05/23/25 0800 98.3 °F (36.8 °C) -- -- -- -- -- -- -- LR    05/23/25 0600 -- 80 27 140/67 -- -- Nasal cannula 2 L/min TQ    05/23/25 0400 -- 72 21 129/46 99 % -- Nasal cannula 2 L/min TQ    05/23/25 0200 -- 74 20 118/44 95 % -- Nasal cannula 2 L/min TQ    05/23/25 0000 98.1 °F (36.7 °C) 84 24 116/44 97 % -- Nasal cannula 2 L/min TQ    05/22/25 2300 -- 87 23 113/55 96 % -- Nasal cannula 2 L/min TQ    05/22/25 2200 -- 85 24 124/50 100 % -- Nasal cannula 2 L/min TQ    05/22/25 2146 -- 89 24 -- 100 % 123 lb 14.4 oz (56.2 kg) Nasal cannula 2 L/min TQ    05/22/25 2115 -- -- -- 121/58 100 % -- -- -- TQ    05/22/25 2100 -- 94 -- 110/55 99 % -- Nasal cannula 2 L/min TQ    05/22/25 2045 -- -- -- 111/56 -- -- -- --     05/22/25 2030 -- 98 -- 106/57 98 % -- Nasal cannula 2 L/min TQ    05/22/25 2015 -- -- -- 97/48 -- -- -- --     05/22/25 2000 97.7 °F (36.5 °C) 97 27 105/54 95 % -- Nasal cannula 2 L/min TQ    05/22/25 1950 -- 101 -- 98/46 100 % -- Nasal cannula 2 L/min TQ    05/22/25 1945 -- 98 -- 82/51 98 % -- Nasal cannula 2 L/min TQ    05/22/25 1930 -- 102 -- 106/61 100 % -- Nasal cannula 2 L/min TQ    05/22/25 1915 -- 100 --  100/58 100 % -- Nasal cannula 2 L/min TQ    05/22/25 1909 -- 101 29 104/54 100 % -- Nasal cannula 2 L/min AM    05/22/25 1900 -- 104 20 116/57 -- -- -- -- TQ    05/22/25 1900 -- -- -- -- -- -- Nasal cannula 2 L/min AM    05/22/25 1538 95 °F (35 °C) -- 24 155/66 100 % -- Nasal cannula 2 L/min MB    05/22/25 1524 -- -- 33 147/60 100 % -- Nasal cannula 2 L/min MB    05/22/25 1524 98.2 °F (36.8 °C) 100 -- -- -- -- -- --     05/22/25 1345 -- 103 23 140/64 95 % -- None (Room air) --     05/22/25 1325 -- -- -- -- 96 % -- None (Room air) --     05/22/25 1230 -- 98 24 138/58 100 % -- T-Piece --     05/22/25 1145 -- 95 25 142/50 100 % -- T-Piece --     05/22/25 1114 -- -- -- -- -- 118 lb 13.3 oz (53.9 kg) -- --     05/22/25 1100 -- 95 29 145/54 97 % -- T-Piece --     05/22/25 1045 -- 92 17 143/58 100 % -- T-Piece 8 L/min     05/22/25 1030 -- 90 16 144/54 100 % -- T-Piece --     05/22/25 1015 -- 96 20 182/73 100 % -- T-Piece --     05/22/25 1006 -- 102 27 -- 100 % -- T-Piece --     05/22/25 0951 -- -- -- -- -- -- Non-rebreather mask 15 L/min     05/22/25 0950 -- -- -- 174/104 -- -- -- --     05/22/25 0945 97.9 °F (36.6 °C) 102 30 -- 100 % -- Non-rebreather mask 15 L/min KB          CIWA Scores (since admission)       None          Blood Transfusion Record       Product Unit Status Volume Start End            Transfuse RBC       25  [id number]  [id number] Completed 05/23/25 1303 500 mL 05/23/25 1030 05/23/25 1300

## 2025-05-23 NOTE — PROGRESS NOTES
Report given to Shima SANTOS. Pt transferring to room 521, all beside belongings sent w/pt. Nephew updated on new room.

## 2025-05-23 NOTE — CONSULTS
Is this a shared or split note between Advanced Practice Provider and Physician? Yes       [facility]   Gastroenterology Consultation Note    Radha Yu  Patient Status:    Inpatient  Date of Admission:         5/22/2025, Hospital day #1  Attending:   Madison Crump MD  PCP:     Stef Velasco MD    Reason for Consultation:  Acute on chronic     History of Present Illness:  Radha Yu is a 84 year old female w/ PMHx of PAD, HTN, HL, CKD who presented to ER for worsening SOB. She was found to have elevated troponin and fluid overload on CXR. Was started on heparin drip. GI consulted for acute on chronic anemia.     Patient is sitting comfortably in bed getting first session of dialysis. She states her stools have been brown in color. She has no GI complaints. Denies hematochezia, melena. Denies nausea and vomiting. Denies abdominal pain, acid reflux, dysphagia and globus. Denies bright red blood per rectum.     Pertinent Family Hx:  - No known history of esophageal, gastric or colon cancers  - No known IBD  - No known liver pathologies    Endoscopy Hx:  - Cln in 2007 with 16 small colon polyps    Social Hx:  - foremer tobacco use/No ETOH  - Denies illicit drug use  - Lives with: nephew  - Occupation: retired       History:  Past Medical History[1]  Past Surgical History[2]  Family History[3]   reports that she has quit smoking. Her smoking use included cigarettes. She has never used smokeless tobacco. She reports that she does not currently use alcohol. She reports that she does not currently use drugs.    Allergies:  Allergies[4]    Medications:  Current Hospital Medications[5]    Review of Systems:   CONSTITUTIONAL:  negative for fevers, chills, unintentional weight loss   EYES:  negative for diplopia or change in vision   RESPIRATORY:  negative for severe shortness of breath  CARDIOVASCULAR:  negative for crushing sub-sternal chest pain  GASTROINTESTINAL:  see HPI  GENITOURINARY:   negative for dysuria or gross hematuria  INTEGUMENT/BREAST:  SKIN:  negative for jaundice or new rash   ALLERGIC/IMMUNOLOGIC:  negative for hay fever   ENDOCRINE:  negative for cold intolerance and heat intolerance  MUSCULOSKELETAL:  negative for joint effusion/severe erythema  NEURO: negative for new loss of consciousness or dizziness   BEHAVIOR/PSYCH:  negative for psychotic behavior    Physical Exam:    Blood pressure 122/55, pulse 83, temperature 97.7 °F (36.5 °C), temperature source Oral, resp. rate 23, weight 123 lb 14.4 oz (56.2 kg), SpO2 98%. Body mass index is 23.43 kg/m².    Gen: awake, alert patient, NAD  HEENT: EOMI, the sclera appears anicteric, oropharynx clear, mucus membranes appear moist  CV: RRR  Lung: no conversational dyspnea on supplemental O2  Abdomen: soft NTND abdomen with NABS appreciated   Back: No CVA tenderness   Skin: dry, warm, no jaundice  Ext: no LE edema is evident  Neuro: Alert and interactive  Psych: calm, cooperative    Laboratory Data:  Lab Results   Component Value Date    WBC 5.6 05/23/2025    HGB 6.9 05/23/2025    HCT 21.4 05/23/2025    .0 05/23/2025    CREATSERUM 2.84 05/23/2025    BUN 57 05/23/2025     05/23/2025    K 4.5 05/23/2025     05/23/2025    CO2 23.0 05/23/2025     05/23/2025    CA 8.3 05/23/2025    ALB 3.8 05/23/2025    PTT 44.4 05/23/2025    MG 2.1 05/23/2025    PHOS 4.5 05/23/2025       Imaging:  IR CENTRAL VENOUS ACCESS  Result Date: 5/22/2025  CONCLUSION: Placement of tunneled dialysis venous catheter via left subclavian vein. Tip in mid right atrium.  Line is ready for immediate use.    Dictated by (CST): Mario Hernandez MD on 5/22/2025 at 6:52 PM     Finalized by (CST): Mario Hernandez MD on 5/22/2025 at 6:58 PM          XR CHEST AP PORTABLE  (CPT=71045)  Result Date: 5/22/2025  CONCLUSION:   Moderate pulmonary edema, significantly progressed since 2/20/2025. Underlying pneumonia can have a similar appearance.   Dictated by (CST):  Chirag Reilly MD on 5/22/2025 at 10:38 AM     Finalized by (CST): Chirag Reilly MD on 5/22/2025 at 10:43 AM            Assessment & Plan   Radha Yu is a 84 year old female w/ PMHx of PAD, HTN, HL, CKD who presented to ER for worsening SOB. She was found to have elevated troponin and fluid overload on CXR. Was started on heparin drip. GI consulted for acute on chronic anemia.     #acute on chronic anemia  -patient without overt GIB bleeding   -last colonoscopy 2007, no prior EGD  -etiology of anemia likely component of CKD and LALITA, at this time without overt bleeding discussed with patient plan for conservative therapy unless overt bleeding or further drop in hemoglobin, family acknowledged understanding     #acute hypoxic respiratory failure   -pulmonology following     #acute on chronic renal insufficiency   -started dialysis today   -nephrology following     #elevated troponin  -cardiology following was started on heparin, held due to drop in hemoglobin     Recommend:  -diet as tolerated   -empiric PPI BID  -trend hemoglobin, goal >7   -monitor for overt bleeding  -ferritin pending   -reserve endoscopic evaluation for further drop in hemoglobin or overt GI bleeding       Thank you for the opportunity to participate in the care of this patient.    Case discussed with Morgan Santos MD and Jaja SANTOS.    Avril Horan PA-C  [facility] Gastroenter[facility]  5/23/2025         [1]   Past Medical History:   Anxiety    COPD (chronic obstructive pulmonary disease) (HCC)    Essential hypertension    Hearing impaired person, bilateral    Hyperlipidemia    Kidney disease    Lymphoma (HCC)    Osteoarthritis    Renal disorder    Shortness of breath    Type 1 diabetes mellitus (HCC)    Visual impairment   [2]   Past Surgical History:  Procedure Laterality Date    Appendectomy      Hip surgery  2018    Total abdom hysterectomy     [3] No family history on file.  [4]   Allergies  Allergen Reactions    Levaquin  [Levofloxacin] ITCHING   [5]   Current Facility-Administered Medications:     HYDROcodone-acetaminophen (Norco) 5-325 MG per tab 1 tablet, 1 tablet, Oral, Q6H PRN    furosemide (Lasix) 10 mg/mL injection 20 mg, 20 mg, Intravenous, Once    epoetin brittany (Epogen, Procrit) 5,000 Units injection, 5,000 Units, Subcutaneous, Once per day on Monday Wednesday Friday    heparin (Porcine) 71852 units/250mL infusion ACS/AFIB CONTINUOUS, 200-3,000 Units/hr, Intravenous, Continuous    sodium chloride 0.9 % IV bolus 100 mL, 100 mL, Intravenous, Q30 Min PRN **AND** albumin human (Albumin) 25% injection 25 g, 25 g, Intravenous, PRN Dialysis    aspirin DR tab 81 mg, 81 mg, Oral, Daily    atorvastatin (Lipitor) tab 20 mg, 20 mg, Oral, Nightly    amLODIPine (Norvasc) tab 5 mg, 5 mg, Oral, Daily    clopidogrel (Plavix) tab 75 mg, 75 mg, Oral, Daily    escitalopram (Lexapro) tab 10 mg, 10 mg, Oral, Daily    ipratropium-albuterol (Duoneb) 0.5-2.5 (3) MG/3ML inhalation solution 3 mL, 3 mL, Nebulization, Q6H WA    glucose (Dex4) 15 GM/59ML oral liquid 15 g, 15 g, Oral, Q15 Min PRN **OR** glucose (Glutose) 40% oral gel 15 g, 15 g, Oral, Q15 Min PRN **OR** glucose-vitamin C (Dex-4) chewable tab 4 tablet, 4 tablet, Oral, Q15 Min PRN **OR** dextrose 50% injection 50 mL, 50 mL, Intravenous, Q15 Min PRN **OR** glucose (Dex4) 15 GM/59ML oral liquid 30 g, 30 g, Oral, Q15 Min PRN **OR** glucose (Glutose) 40% oral gel 30 g, 30 g, Oral, Q15 Min PRN **OR** glucose-vitamin C (Dex-4) chewable tab 8 tablet, 8 tablet, Oral, Q15 Min PRN    acetaminophen (Tylenol Extra Strength) tab 500 mg, 500 mg, Oral, Q4H PRN    melatonin tab 3 mg, 3 mg, Oral, Nightly PRN    polyethylene glycol (PEG 3350) (Miralax) 17 g oral packet 17 g, 17 g, Oral, Daily PRN    sennosides (Senokot) tab 17.2 mg, 17.2 mg, Oral, Nightly PRN    bisacodyl (Dulcolax) 10 MG rectal suppository 10 mg, 10 mg, Rectal, Daily PRN    ondansetron (Zofran) 4 MG/2ML injection 4 mg, 4 mg, Intravenous, Q6H  PRN    benzonatate (Tessalon) cap 200 mg, 200 mg, Oral, TID PRN    guaiFENesin (Robitussin) 100 MG/5 ML oral liquid 200 mg, 200 mg, Oral, Q4H PRN    glycerin-hypromellose- (Artificial Tears) 0.2-0.2-1 % ophthalmic solution 1 drop, 1 drop, Both Eyes, QID PRN    sodium chloride (Saline Mist) 0.65 % nasal solution 1 spray, 1 spray, Each Nare, Q3H PRN    methylPREDNISolone sodium succinate (Solu-MEDROL) injection 40 mg, 40 mg, Intravenous, Q12H    metoclopramide (Reglan) 5 mg/mL injection 10 mg, 10 mg, Intravenous, Daily PRN    insulin aspart (NovoLOG) 100 Units/mL FlexPen 1-7 Units, 1-7 Units, Subcutaneous, TID CC    heparin (Porcine) 100 Units/mL lock flush 150 Units, 1.5 mL, Intravenous, Once    insulin degludec (Tresiba) 100 units/mL flextouch 12 Units, 12 Units, Subcutaneous, Nightly    insulin aspart (NovoLOG) 100 Units/mL FlexPen 5 Units, 5 Units, Subcutaneous, TID CC and HS    heparin (Porcine) 1000 UNIT/ML injection 1,500 Units, 1.5 mL, Intracatheter, PRN Dialysis

## 2025-05-24 LAB
ANION GAP SERPL CALC-SCNC: 10 MMOL/L (ref 0–18)
BLOOD TYPE BARCODE: 6200
BUN BLD-MCNC: 42 MG/DL (ref 9–23)
BUN/CREAT SERPL: 16.8 (ref 10–20)
CALCIUM BLD-MCNC: 8.3 MG/DL (ref 8.7–10.4)
CHLORIDE SERPL-SCNC: 101 MMOL/L (ref 98–112)
CO2 SERPL-SCNC: 25 MMOL/L (ref 21–32)
CREAT BLD-MCNC: 2.5 MG/DL (ref 0.55–1.02)
DEPRECATED RDW RBC AUTO: 48 FL (ref 35.1–46.3)
EGFRCR SERPLBLD CKD-EPI 2021: 18 ML/MIN/1.73M2 (ref 60–?)
ERYTHROCYTE [DISTWIDTH] IN BLOOD BY AUTOMATED COUNT: 16.1 % (ref 11–15)
GLUCOSE BLD-MCNC: 54 MG/DL (ref 70–99)
GLUCOSE BLDC GLUCOMTR-MCNC: 105 MG/DL (ref 70–99)
GLUCOSE BLDC GLUCOMTR-MCNC: 109 MG/DL (ref 70–99)
GLUCOSE BLDC GLUCOMTR-MCNC: 131 MG/DL (ref 70–99)
GLUCOSE BLDC GLUCOMTR-MCNC: 231 MG/DL (ref 70–99)
GLUCOSE BLDC GLUCOMTR-MCNC: 52 MG/DL (ref 70–99)
GLUCOSE BLDC GLUCOMTR-MCNC: 82 MG/DL (ref 70–99)
GLUCOSE BLDC GLUCOMTR-MCNC: 94 MG/DL (ref 70–99)
HCT VFR BLD AUTO: 27.3 % (ref 35–48)
HGB BLD-MCNC: 8.9 G/DL (ref 12–16)
MAGNESIUM SERPL-MCNC: 2 MG/DL (ref 1.6–2.6)
MCH RBC QN AUTO: 26.8 PG (ref 26–34)
MCHC RBC AUTO-ENTMCNC: 32.6 G/DL (ref 31–37)
MCV RBC AUTO: 82.2 FL (ref 80–100)
OSMOLALITY SERPL CALC.SUM OF ELEC: 290 MOSM/KG (ref 275–295)
PLATELET # BLD AUTO: 192 10(3)UL (ref 150–450)
POTASSIUM SERPL-SCNC: 4.2 MMOL/L (ref 3.5–5.1)
RBC # BLD AUTO: 3.32 X10(6)UL (ref 3.8–5.3)
SODIUM SERPL-SCNC: 136 MMOL/L (ref 136–145)
UNIT VOLUME: 350 ML
WBC # BLD AUTO: 8.5 X10(3) UL (ref 4–11)

## 2025-05-24 PROCEDURE — 99233 SBSQ HOSP IP/OBS HIGH 50: CPT | Performed by: HOSPITALIST

## 2025-05-24 PROCEDURE — 99232 SBSQ HOSP IP/OBS MODERATE 35: CPT | Performed by: INTERNAL MEDICINE

## 2025-05-24 RX ORDER — INSULIN DEGLUDEC 100 U/ML
9 INJECTION, SOLUTION SUBCUTANEOUS NIGHTLY
Status: DISCONTINUED | OUTPATIENT
Start: 2025-05-24 | End: 2025-05-26

## 2025-05-24 RX ORDER — IPRATROPIUM BROMIDE AND ALBUTEROL SULFATE 2.5; .5 MG/3ML; MG/3ML
3 SOLUTION RESPIRATORY (INHALATION) EVERY 6 HOURS PRN
Status: DISCONTINUED | OUTPATIENT
Start: 2025-05-24 | End: 2025-06-09

## 2025-05-24 NOTE — PROGRESS NOTES
[facility]  GI SERVICE PROGRESS NOTE    Radha Yu Patient Status:  Inpatient    1940 MRN [id number]   Location [facility]5W Attending Steff Vazquez,*   Hosp Day # 2 PCP Stef Velasco MD       Subjective:     Stable overnight.  Son at bedside today describes dramatic improvement since starting hemodialysis.  Now ambulating with her walker without dyspnea.  Good appetite, eating dinner tonight.    Afebrile; HR 70s-104; pulse oximetry 100% on room air    Objective:   Blood pressure 122/51, pulse 76, temperature 97.4 °F (36.3 °C), temperature source Oral, resp. rate 20, weight 123 lb 14.4 oz (56.2 kg), SpO2 100%.    GENERAL: Sitting up in bedside chair eating dinner  HEENT: Normocephalic; pupils equally round and reactive to light; no temporal wasting; no thyromegaly or cervical lymphadenopathy  PULM: Breathing and speaking comfortably  ABD: Normoactive bowel sounds; soft/nondistended  EXT: No edema  SKIN: No rash        Results:       Recent Labs   Lab 253 25  1635 25  0526   RBC 3.00*  --  2.62*  --  3.32*   HGB 8.2*   < > 6.9* 8.9* 8.9*   HCT 25.4*   < > 21.4* 27.8* 27.3*   MCV 84.7  --  81.7  --  82.2   MCH 27.3  --  26.3  --  26.8   MCHC 32.3  --  32.2  --  32.6   RDW 15.7*  --  15.6*  --  16.1*   NEPRELIM 3.46  --  4.86  --   --    WBC 5.0  --  5.6  --  8.5   .0  --  204.0  --  192.0    < > = values in this interval not displayed.       Lab Results   Component Value Date    INR 1.16 2025       Recent Labs   Lab 2548 25  0413 25  0526   * 246* 54*   BUN 92* 57* 42*   CREATSERUM 3.95* 2.84* 2.50*   CA 8.3* 8.3* 8.3*   ALB  --  3.8  --     139 136   K 4.3 4.5 4.2    105 101   CO2 18.0* 23.0 25.0       No results for input(s): \"MIKKI\", \"LIP\" in the last 168 hours.    Recent Labs   Lab 25  1127 25  0413 25  0526   MG 2.2 2.1 2.0   PHOS  --  4.5  --         No results for input(s): \"URINE\", \"CULTI\", \"BLDSMR\" in the last 168 hours.      IR CENTRAL VENOUS ACCESS  Result Date: 5/22/2025  CONCLUSION: Placement of tunneled dialysis venous catheter via left subclavian vein. Tip in mid right atrium.  Line is ready for immediate use.    Dictated by (CST): Mario Hernandez MD on 5/22/2025 at 6:52 PM     Finalized by (CST): Mario Hernandez MD on 5/22/2025 at 6:58 PM              Assessment and Plan:     84-year-old woman with history hypertension dyslipidemia peripheral artery disease seen in the ED for worsening shortness of breath, renal failure, concern for fluid overload and elevated serum troponin on initial workup.  Concern for hypoxic respiratory failure.     GI service consulted for anemia on labs.  No gross bleeding.    Significant previous anemia hemoglobin 6.6g January - February 2020; no documented normalization; then hemoglobin 9.3g --> 8.0g January 2025; then Hgb 8.2g 5/22/2025, then 6.9g 5/23/2025.  Iron studies 5/22/2025 consistent with iron deficiency     Starting on dialysis during this hospitalization for end-stage renal disease.     To start Epogen therapy for the anemia.     Previous tobacco smoker    5/24/2025: Hemoglobin 6.9g -> 8.9g      Plan:  Supportive care, continue to monitor H&H  Empiric high-dose pantoprazole PPI therapy  Just starting hemodialysis treatment this hospitalization  Would defer invasive procedures under anesthesia next few weeks with initiation of hemodialysis and better fluid status management.  Consider elective EGD/colonoscopy examination next few weeks to months.  84-year-old woman, moderately frail.  Distant history of approximately 16 small colon polyps removed outside colonoscopy examination 2007        I will sign off.  I gave son and caregiver my card today.  Please call with questions.      Morgan Santos MD          Over 25 minutes spent today reviewing today's and recent data; greater than 50% of that time was  spent counseling the patient and coordination of care with RN and other involved physicians.    Digital transcription software was utilized to produce this note. The note was proofread for content only. Typographical errors may remain.

## 2025-05-24 NOTE — PROGRESS NOTES
[facility]  part of [facility]    Progress Note    Radha Yu Patient Status:  Inpatient    1940 MRN [id number]   Location [facility]5W Attending Madison Crump MD   Hosp Day # 2 PCP Stef Velasco MD       Subjective:   Radha Yu is a(n) 84 year old female who I am seeing for ESRD    On room air  Shortness of breath  Tired      Objective:   /69 (BP Location: Left arm)   Pulse 95   Temp 98.4 °F (36.9 °C) (Oral)   Resp 14   Wt 123 lb 14.4 oz (56.2 kg)   SpO2 98%   BMI 23.43 kg/m²      Intake/Output Summary (Last 24 hours) at 2025 0836  Last data filed at 2025 0645  Gross per 24 hour   Intake 960 ml   Output 2500 ml   Net -1540 ml     Wt Readings from Last 1 Encounters:   25 123 lb 14.4 oz (56.2 kg)       Exam  Vital signs: Blood pressure 147/69, pulse 95, temperature 98.4 °F (36.9 °C), temperature source Oral, resp. rate 14, weight 123 lb 14.4 oz (56.2 kg), SpO2 98%.    General: No acute distress. Alert and oriented x 3.  HEENT: Moist mucous membranes. EOMI. PERRLA  Neck:  No JVD.   Respiratory: Clear to auscultation bilaterally.    Cardiovascular: S1, S2.  Regular rate and rhythm.   Abdomen: Soft, nontender, nondistended.    Neurologic: No focal neurological deficits.  Musculoskeletal: Full range of motion of all extremities.  No swelling noted.  Access: Tunneled dialysis catheter    Results:     Recent Labs   Lab 25  0948 25  2056 25  0413 25  1635 25  0526   RBC 3.00*  --  2.62*  --  3.32*   HGB 8.2*   < > 6.9* 8.9* 8.9*   HCT 25.4*   < > 21.4* 27.8* 27.3*   MCV 84.7  --  81.7  --  82.2   MCH 27.3  --  26.3  --  26.8   MCHC 32.3  --  32.2  --  32.6   RDW 15.7*  --  15.6*  --  16.1*   NEPRELIM 3.46  --  4.86  --   --    WBC 5.0  --  5.6  --  8.5   .0  --  204.0  --  192.0    < > = values in this interval not displayed.         Recent Labs   Lab 25  0948 25  0413 25  0526   *  246* 54*   BUN 92* 57* 42*   CREATSERUM 3.95* 2.84* 2.50*   CA 8.3* 8.3* 8.3*    139 136   K 4.3 4.5 4.2    105 101   CO2 18.0* 23.0 25.0          IR CENTRAL VENOUS ACCESS  Result Date: 5/22/2025  CONCLUSION: Placement of tunneled dialysis venous catheter via left subclavian vein. Tip in mid right atrium.  Line is ready for immediate use.    Dictated by (CST): Mario Hernandez MD on 5/22/2025 at 6:52 PM     Finalized by (CST): Mario Hernandez MD on 5/22/2025 at 6:58 PM          XR CHEST AP PORTABLE  (CPT=71045)  Result Date: 5/22/2025  CONCLUSION:   Moderate pulmonary edema, significantly progressed since 2/20/2025. Underlying pneumonia can have a similar appearance.   Dictated by (CST): Chirag Reilly MD on 5/22/2025 at 10:38 AM     Finalized by (CST): Chirag Reilly MD on 5/22/2025 at 10:43 AM            Assessment and Plan:     84 year old female w/ PMHx of PAD, HTN, HL, CKD who presented to ER for worsening SOB     Impression:    ESRD.  New start to dialysis this admission.  2 treatments done on Thursday and Friday.  Hypertensive urgency, amlodipine  Hypoxic respiratory failure/fluid overload/COPD.  Improved and on room air  Anemia, status post PRBC.  LEVON  Secondary hyperparathyroidism, check intact PTH      Plan:    Tentatively plan for third dialysis session on Monday  Social work consult for dialysis placement  Follow-up echo    Thank you very much for allowing me to participate in the care of your patient.  If you have any questions, please do not hesitate to contact me.     Becki Krueger MD  5/24/2025

## 2025-05-24 NOTE — PROGRESS NOTES
Progress Note  Radha Yu Patient Status:  Inpatient    1940 MRN [id number]   Location [facility]5W Attending Madison Crump MD   Hosp Day # 2 PCP Stef Velasco MD     Subjective   No CV concerns this am. Sugar was little low this am, otherwise no acute issues overnight.       Objective:   /63 (BP Location: Left arm)   Pulse 89   Temp 97.4 °F (36.3 °C) (Oral)   Resp 20   Wt 123 lb 14.4 oz (56.2 kg)   SpO2 98%   BMI 23.43 kg/m²     Telemetry: n/a    Intake/Output:    Intake/Output Summary (Last 24 hours) at 2025 05  Last data filed at 2025 1810  Gross per 24 hour   Intake 1001.95 ml   Output 2650 ml   Net -1648.05 ml       Wt Readings from Last 3 Encounters:   25 123 lb 14.4 oz (56.2 kg)   25 123 lb 7.3 oz (56 kg)   25 123 lb 7.3 oz (56 kg)         Labs:  Recent Labs   Lab 25  0948 25  0413   * 246*   BUN 92* 57*   CREATSERUM 3.95* 2.84*   EGFRCR 11* 16*   CA 8.3* 8.3*    139   K 4.3 4.5    105   CO2 18.0* 23.0     Recent Labs   Lab 25  0948 256 25  0413 25  1635   RBC 3.00*  --  2.62*  --    HGB 8.2* 7.9* 6.9* 8.9*   HCT 25.4* 24.8* 21.4* 27.8*   MCV 84.7  --  81.7  --    MCH 27.3  --  26.3  --    MCHC 32.3  --  32.2  --    RDW 15.7*  --  15.6*  --    NEPRELIM 3.46  --  4.86  --    WBC 5.0  --  5.6  --    .0  --  204.0  --          Recent Labs   Lab 25  0948 25  1127 25   TROPHS 224* 235* 426*         Review of Systems:     10 point review of systems completed and negative except as noted in HPI      Exam:     Physical Exam:  General: Alert and oriented x 3. No apparent distress.   HEENT: Normocephalic, neck supple, no thyromegaly or adenopathy.  Neck: No JVD, carotids 2+, no bruits.  Cardiac: Regular rate and rhythm. S1, S2 normal. No murmur, pericardial rub, S3, or extra cardiac sounds.  Lungs: Diminished.  Normal excursions and effort.  Abdomen: Soft,  non-tender. BS-present.  Extremities: Without clubbing or cyanosis. No edema.    Neurologic: Alert and oriented, normal affect. No focal defects  Skin: Warm and dry.       Diagnostic Studies:     Echo completed, pending report     Assessment and Plan:     Assessment:  # acute hypoxic respiratory failure   CXR with moderate pulmonary edema  Suspect mixture of bronchitis/COPD exacerbation and fluid retention, which in part may be due to progressive worsening kidney disease   Pulmonary team following   # NSTEMI  Initial troponin elevated at 224 > 235 > 426   ECG shows lateral T wave inversion   Denies chest pain or anginal equivalent   Echo pending   # CKD on ESRD   Started on dialysis 5/22  Volume management per renal   # acute on chronic anemia  Hgb 6.9 (5/23) s/p unit of pRBC   GI consulted   # PVD s/p left femoropopliteal bypass 9/2015   # Moderate right ICA stenosis s/p right TCAR 2/2019, totally occluded left ICA   # HTN - controlled   # HLD - on statin   # T2DM - per primary       Plan:  BP control much improved   Volume management per nephrology   Currently on room air, further respiratory management per pulmonary     Cardiology will sign off. Please call us with any questions or concerns.     Plan of care discussed with patient, RN.      Winifred Meyers, APRN  5/24/2025  Ph [phone] ([place])  Ph [phone] ([place])        =======================================================  Patient seen and examined independently.  Note reviewed and labs reviewed.  Agree with above assessment and plan except as noted below.      Assessment:  New diagnosis of LV dysfunction  Acute respiratory failure  Non-ST elevation MI  End-stage renal disease on dialysis  Severe PAD    Plan:  Continue current medical therapy  Workup for anemia as per GI, once cleared of no active signs of bleeding would require ischemic evaluation, will need invasive.  Most likely will plan on Tuesday.      Ovidio Amaya MD        3

## 2025-05-24 NOTE — PROGRESS NOTES
[facility]  part of [facility]    Progress Note    Radha Yu Patient Status:  Inpatient    1940 MRN [id number]   Location [facility]5W Attending Steff Vazquez,*   Hosp Day # 2 PCP Stef Velasco MD     Chief Complaint: ok    Subjective:     Constitutional:  Positive for activity change and fatigue. Negative for appetite change.   HENT:  Positive for congestion.    Respiratory:  Positive for shortness of breath.    Gastrointestinal:  Negative for abdominal pain.   Genitourinary:  Negative for difficulty urinating.   Musculoskeletal:  Positive for joint pain.   Neurological:  Positive for weakness.   Psychiatric/Behavioral:  Positive for sleep disturbance. The patient is nervous/anxious.        Objective:   Blood pressure 122/51, pulse 76, temperature 97.4 °F (36.3 °C), temperature source Oral, resp. rate 20, weight 123 lb 14.4 oz (56.2 kg), SpO2 100%.  Physical Exam  Vitals and nursing note reviewed.   Constitutional:       General: She is not in acute distress.     Appearance: She is ill-appearing. She is not toxic-appearing or diaphoretic.   HENT:      Head: Normocephalic and atraumatic.   Cardiovascular:      Rate and Rhythm: Normal rate.      Pulses: Normal pulses.   Pulmonary:      Effort: Pulmonary effort is normal.      Breath sounds: Rhonchi present.   Abdominal:      General: Bowel sounds are normal.      Palpations: Abdomen is soft.      Tenderness: There is no abdominal tenderness.   Skin:     General: Skin is warm and dry.      Capillary Refill: Capillary refill takes less than 2 seconds.   Neurological:      General: No focal deficit present.      Mental Status: She is alert and oriented to person, place, and time.   Psychiatric:         Behavior: Behavior normal.         Results:   Lab Results   Component Value Date    WBC 8.5 2025    HGB 8.9 (L) 2025    HCT 27.3 (L) 2025    .0 2025    CREATSERUM 2.50 (H) 2025    BUN  42 (H) 05/24/2025     05/24/2025    K 4.2 05/24/2025     05/24/2025    CO2 25.0 05/24/2025    GLU 54 (L) 05/24/2025    CA 8.3 (L) 05/24/2025    ALB 3.8 05/23/2025    ALKPHO 100 02/06/2025    BILT 0.3 02/06/2025    TP 5.5 (L) 02/06/2025    AST 27 02/06/2025    ALT 7 (L) 02/06/2025    PTT 44.4 (H) 05/23/2025    INR 1.16 05/22/2025    MG 2.0 05/24/2025    PHOS 4.5 05/23/2025    TROPHS 426 (HH) 05/22/2025       IR CENTRAL VENOUS ACCESS  Result Date: 5/22/2025  CONCLUSION: Placement of tunneled dialysis venous catheter via left subclavian vein. Tip in mid right atrium.  Line is ready for immediate use.    Dictated by (CST): Mario Hernandez MD on 5/22/2025 at 6:52 PM     Finalized by (CST): Mario Hernandez MD on 5/22/2025 at 6:58 PM                Assessment & Plan:         Possible NSTEMI type 1  Possible Acute CHF, diastolic vs systolic  PVD  HL  HTN  - cards consulted  - check echo - pending  - given IV lasix - another dose of 20mg IV given once now - assess daily  - monitor I/O, daily wts  - Started heparin drip - hold now given drop in hgb  - cont asa, statin, plavix, anti-hypertensives     Acute on chronic anemia  - possibly baseline anemia from ckd  - for acute drop would need to rule out gi etiology since she was just started on a heparin drip  - start PPI IV  - check stool for occult blood  - check iron studies  - transfuse 1 unit prbc for hgb 6.9  - GI consulted     Possible acute copd exacerbation  - pulm consulted  - plan nebs scheduled and prn  - cont IV steroids  - plan pulm hygiene  - wean oxygen as able  - hold abx until seen by pulm     DEBO on CKD stage 4 better  - nephrology consulted  - will plan to initiate HD today   - place HD catheter today      DM  - accuchecks and ISS  - adjust insulin as needed needs less now will dec                       complex mdm; coordinating care with nurse and counseling pt about chf/sob/diet           ALEX NUNEZ MD

## 2025-05-24 NOTE — PLAN OF CARE
ECHO with newly reduced LVEF 35-40%, no WMA, Mod-Severe MR.     Ischemic evaluation once recovered from acute illness.     Timing and modality to be determined; With elevated troponin, EKG changes, and depressed LV; likely have ischemic eval prior to discharge.     Cardiology will continue to follow.     Mary Sánchez, MSN, FNP-BC, CCK  05/24/25   10:44 AM  [phone] [place]  [phone] [place]

## 2025-05-24 NOTE — PLAN OF CARE
Problem: CARDIOVASCULAR - ADULT  Goal: Maintains optimal cardiac output and hemodynamic stability  Description: INTERVENTIONS:  - Monitor vital signs, rhythm, and trends  - Monitor for bleeding, hypotension and signs of decreased cardiac output  - Evaluate effectiveness of vasoactive medications to optimize hemodynamic stability  - Monitor arterial and/or venous puncture sites for bleeding and/or hematoma  - Assess quality of pulses, skin color and temperature  - Assess for signs of decreased coronary artery perfusion - ex. Angina  - Evaluate fluid balance, assess for edema, trend weights  Outcome: Progressing  Goal: Absence of cardiac arrhythmias or at baseline  Description: INTERVENTIONS:  - Continuous cardiac monitoring, monitor vital signs, obtain 12 lead EKG if indicated  - Evaluate effectiveness of antiarrhythmic and heart rate control medications as ordered  - Initiate emergency measures for life threatening arrhythmias  - Monitor electrolytes and administer replacement therapy as ordered  Outcome: Progressing     Problem: RESPIRATORY - ADULT  Goal: Achieves optimal ventilation and oxygenation  Description: INTERVENTIONS:  - Assess for changes in respiratory status  - Assess for changes in mentation and behavior  - Position to facilitate oxygenation and minimize respiratory effort  - Oxygen supplementation based on oxygen saturation or ABGs  - Provide Smoking Cessation handout, if applicable  - Encourage broncho-pulmonary hygiene including cough, deep breathe, Incentive Spirometry  - Assess the need for suctioning and perform as needed  - Assess and instruct to report SOB or any respiratory difficulty  - Respiratory Therapy support as indicated  - Manage/alleviate anxiety  - Monitor for signs/symptoms of CO2 retention  Outcome: Progressing     Problem: GENITOURINARY - ADULT  Goal: Absence of urinary retention  Description: INTERVENTIONS:  - Assess patient’s ability to void and empty bladder  - Monitor  intake/output and perform bladder scan as needed  - Follow urinary retention protocol/standard of care  - Consider collaborating with pharmacy to review patient's medication profile  - Implement strategies to promote bladder emptying  Outcome: Progressing     Problem: Patient Centered Care  Goal: Patient preferences are identified and integrated in the patient's plan of care  Description: Interventions:  - What would you like us to know as we care for you? From home with nephew   - Provide timely, complete, and accurate information to patient/family  - Incorporate patient and family knowledge, values, beliefs, and cultural backgrounds into the planning and delivery of care  - Encourage patient/family to participate in care and decision-making at the level they choose  - Honor patient and family perspectives and choices  Outcome: Progressing     Problem: Diabetes/Glucose Control  Goal: Glucose maintained within prescribed range  Description: INTERVENTIONS:  - Monitor Blood Glucose as ordered  - Assess for signs and symptoms of hyperglycemia and hypoglycemia  - Administer ordered medications to maintain glucose within target range  - Assess barriers to adequate nutritional intake and initiate nutrition consult as needed  - Instruct patient on self management of diabetes  Outcome: Progressing     Problem: Patient/Family Goals  Goal: Patient/Family Long Term Goal  Description: Patient's Long Term Goal: dc home     Interventions:  - vitals  - labs  - HD if needed   - See additional Care Plan goals for specific interventions  Outcome: Progressing  Goal: Patient/Family Short Term Goal  Description: Patient's Short Term Goal: Relief of shortness of breath     Interventions:   - oxygen as needed   - frequent ambulation   - See additional Care Plan goals for specific interventions  Outcome: Progressing

## 2025-05-24 NOTE — PROGRESS NOTES
[facility]  part of [facility]     Progress Note    Radha Yu Patient Status:  Inpatient    1940 MRN [id number]   Location [facility] 2W/SW Attending Madison Crump MD   Hosp Day # 2 PCP Stef Velasco MD       Subjective:   Patient seen and examined.  Dyspnea improving.  Less wheezing.    Objective:   Blood pressure 146/63, pulse 89, temperature 97.4 °F (36.3 °C), temperature source Oral, resp. rate 20, weight 123 lb 14.4 oz (56.2 kg), SpO2 98%.  Intake/Output:   Last 3 shifts: I/O last 3 completed shifts:  In: 1278.1 [P.O.:690; I.V.:88.1; Blood:500]  Out: 4197 [Urine:150; Other:4047]   This shift: No intake/output data recorded.     Vent Settings:      Hemodynamic parameters (last 24 hours):      Scheduled Meds: Current Hospital Medications[1]    Continuous Infusions: Medication Infusions[2]    Physical Exam  Constitutional: no acute distress  Eyes: PERRL  ENT: nares pateint  Neck: supple, no JVD  Cardio: RRR, S1 S2  Respiratory: Faint crackles  GI: abdomen soft, non tender, active bowel sounds, no organomegaly  Extremities: no clubbing, cyanosis, edema  Neurologic: no gross motor deficits  Skin: warm, dry      Results:     Lab Results   Component Value Date    WBC 8.5 2025    HGB 8.9 2025    HCT 27.3 2025    .0 2025    CREATSERUM 2.50 2025    BUN 42 2025     2025    K 4.2 2025     2025    CO2 25.0 2025    GLU 54 2025    CA 8.3 2025    MG 2.0 2025       IR CENTRAL VENOUS ACCESS  Result Date: 2025  CONCLUSION: Placement of tunneled dialysis venous catheter via left subclavian vein. Tip in mid right atrium.  Line is ready for immediate use.    Dictated by (CST): Mario Hernandez MD on 2025 at 6:52 PM     Finalized by (CST): Mario Hernandez MD on 2025 at 6:58 PM          XR CHEST AP PORTABLE  (CPT=71045)  Result Date: 2025  CONCLUSION:   Moderate pulmonary  edema, significantly progressed since 2/20/2025. Underlying pneumonia can have a similar appearance.   Dictated by (CST): Chirag Reilly MD on 5/22/2025 at 10:38 AM     Finalized by (CST): Chirag Reilly MD on 5/22/2025 at 10:43 AM            EKG  Result Date: 5/22/2025  Normal sinus rhythm T wave abnormality, consider lateral ischemia Abnormal ECG When compared with ECG of 24-JAN-2025 11:27, T wave inversion now evident in Lateral leads Confirmed by JENNIFER WILEY, HOFFMAN ([id number]) on 5/22/2025 10:23:36 AM      Assessment   1.  Acute hypoxemic respiratory failure  2.  Pulmonary edema  3.  Elevated troponin  4.  Wheezing, improved  5.  Peripheral vascular disease  6.  Acute kidney injury on chronic kidney disease  7.  Prior history of right ICA stenosis status post TCAR  8.  Hypertension  9.  Diabetes mellitus     Plan   -Patient presents with worsening dyspnea symptoms.  Concern for pulmonary edema on chest x-ray cannot rule out underlying pneumonia.  Wheezing on examination but no history of known lung disease.  - 2D Echo results pending  - No significant wheezing.  Will discontinue antibiotic therapy  - Nebulizer treatments  - Diuresis per cardiology recommendations  - Viral respiratory panel negative  - Anticoagulation with heparin per cardiology recommendations.  - Eval by nephrology.  Plan for dialysis catheter placement and initiation of hemodialysis afterwards.  - Heparin GTT per cardiology recommendations        Neelam Cherry,   Pulmonary Critical Care Medicine  [facility]          [1]   Current Facility-Administered Medications   Medication Dose Route Frequency    HYDROcodone-acetaminophen (Norco) 5-325 MG per tab 1 tablet  1 tablet Oral Q6H PRN    epoetin brittany (Epogen, Procrit) 5,000 Units injection  5,000 Units Subcutaneous Once per day on Monday Wednesday Friday    pantoprazole (Protonix) 40 mg in sodium chloride 0.9% PF 10 mL IV push  40 mg Intravenous Q12H    sodium chloride 0.9 % IV bolus 100 mL  100 mL  Intravenous Q30 Min PRN    And    albumin human (Albumin) 25% injection 25 g  25 g Intravenous PRN Dialysis    aspirin DR tab 81 mg  81 mg Oral Daily    atorvastatin (Lipitor) tab 20 mg  20 mg Oral Nightly    amLODIPine (Norvasc) tab 5 mg  5 mg Oral Daily    clopidogrel (Plavix) tab 75 mg  75 mg Oral Daily    escitalopram (Lexapro) tab 10 mg  10 mg Oral Daily    ipratropium-albuterol (Duoneb) 0.5-2.5 (3) MG/3ML inhalation solution 3 mL  3 mL Nebulization Q6H WA    glucose (Dex4) 15 GM/59ML oral liquid 15 g  15 g Oral Q15 Min PRN    Or    glucose (Glutose) 40% oral gel 15 g  15 g Oral Q15 Min PRN    Or    glucose-vitamin C (Dex-4) chewable tab 4 tablet  4 tablet Oral Q15 Min PRN    Or    dextrose 50% injection 50 mL  50 mL Intravenous Q15 Min PRN    Or    glucose (Dex4) 15 GM/59ML oral liquid 30 g  30 g Oral Q15 Min PRN    Or    glucose (Glutose) 40% oral gel 30 g  30 g Oral Q15 Min PRN    Or    glucose-vitamin C (Dex-4) chewable tab 8 tablet  8 tablet Oral Q15 Min PRN    acetaminophen (Tylenol Extra Strength) tab 500 mg  500 mg Oral Q4H PRN    melatonin tab 3 mg  3 mg Oral Nightly PRN    polyethylene glycol (PEG 3350) (Miralax) 17 g oral packet 17 g  17 g Oral Daily PRN    sennosides (Senokot) tab 17.2 mg  17.2 mg Oral Nightly PRN    bisacodyl (Dulcolax) 10 MG rectal suppository 10 mg  10 mg Rectal Daily PRN    ondansetron (Zofran) 4 MG/2ML injection 4 mg  4 mg Intravenous Q6H PRN    benzonatate (Tessalon) cap 200 mg  200 mg Oral TID PRN    guaiFENesin (Robitussin) 100 MG/5 ML oral liquid 200 mg  200 mg Oral Q4H PRN    glycerin-hypromellose- (Artificial Tears) 0.2-0.2-1 % ophthalmic solution 1 drop  1 drop Both Eyes QID PRN    sodium chloride (Saline Mist) 0.65 % nasal solution 1 spray  1 spray Each Nare Q3H PRN    methylPREDNISolone sodium succinate (Solu-MEDROL) injection 40 mg  40 mg Intravenous Q12H    metoclopramide (Reglan) 5 mg/mL injection 10 mg  10 mg Intravenous Daily PRN    insulin aspart (NovoLOG)  100 Units/mL FlexPen 1-7 Units  1-7 Units Subcutaneous TID CC    heparin (Porcine) 100 Units/mL lock flush 150 Units  1.5 mL Intravenous Once    insulin degludec (Tresiba) 100 units/mL flextouch 12 Units  12 Units Subcutaneous Nightly    insulin aspart (NovoLOG) 100 Units/mL FlexPen 5 Units  5 Units Subcutaneous TID CC and HS    heparin (Porcine) 1000 UNIT/ML injection 1,500 Units  1.5 mL Intracatheter PRN Dialysis   [2]

## 2025-05-24 NOTE — CM/SW NOTE
05/24/25 1400   Discharge Needs   Anticipated D/C needs Subacute rehab   Services Requested   Submitted to [facility] Yes   PASRR Level 1 Submitted Yes     Anticipated therapy need: Gradual Rehabilitative Therapy.    Social work sent a LISE referral in [place].     Social work will provide LISE list once available.    Social work sent HH referrals as a back.    If patient chooses home health, outpatient HD will need to be set up.    SW/CM to remain available for support and/or discharge planning.     Regina Fuentes MSW, LSW  Discharge Planner [id number]

## 2025-05-24 NOTE — PLAN OF CARE
Problem: CARDIOVASCULAR - ADULT  Goal: Maintains optimal cardiac output and hemodynamic stability  Description: INTERVENTIONS:  - Monitor vital signs, rhythm, and trends  - Monitor for bleeding, hypotension and signs of decreased cardiac output  - Evaluate effectiveness of vasoactive medications to optimize hemodynamic stability  - Monitor arterial and/or venous puncture sites for bleeding and/or hematoma  - Assess quality of pulses, skin color and temperature  - Assess for signs of decreased coronary artery perfusion - ex. Angina  - Evaluate fluid balance, assess for edema, trend weights  Outcome: Progressing  Goal: Absence of cardiac arrhythmias or at baseline  Description: INTERVENTIONS:  - Continuous cardiac monitoring, monitor vital signs, obtain 12 lead EKG if indicated  - Evaluate effectiveness of antiarrhythmic and heart rate control medications as ordered  - Initiate emergency measures for life threatening arrhythmias  - Monitor electrolytes and administer replacement therapy as ordered  Outcome: Progressing     Problem: RESPIRATORY - ADULT  Goal: Achieves optimal ventilation and oxygenation  Description: INTERVENTIONS:  - Assess for changes in respiratory status  - Assess for changes in mentation and behavior  - Position to facilitate oxygenation and minimize respiratory effort  - Oxygen supplementation based on oxygen saturation or ABGs  - Provide Smoking Cessation handout, if applicable  - Encourage broncho-pulmonary hygiene including cough, deep breathe, Incentive Spirometry  - Assess the need for suctioning and perform as needed  - Assess and instruct to report SOB or any respiratory difficulty  - Respiratory Therapy support as indicated  - Manage/alleviate anxiety  - Monitor for signs/symptoms of CO2 retention  Outcome: Progressing     Problem: GENITOURINARY - ADULT  Goal: Absence of urinary retention  Description: INTERVENTIONS:  - Assess patient’s ability to void and empty bladder  - Monitor  intake/output and perform bladder scan as needed  - Follow urinary retention protocol/standard of care  - Consider collaborating with pharmacy to review patient's medication profile  - Implement strategies to promote bladder emptying  Outcome: Progressing     Problem: Patient Centered Care  Goal: Patient preferences are identified and integrated in the patient's plan of care  Description: Interventions:  - What would you like us to know as we care for you? From home with son.   - Provide timely, complete, and accurate information to patient/family  - Incorporate patient and family knowledge, values, beliefs, and cultural backgrounds into the planning and delivery of care  - Encourage patient/family to participate in care and decision-making at the level they choose  - Honor patient and family perspectives and choices  Outcome: Progressing     Problem: Diabetes/Glucose Control  Goal: Glucose maintained within prescribed range  Description: INTERVENTIONS:  - Monitor Blood Glucose as ordered  - Assess for signs and symptoms of hyperglycemia and hypoglycemia  - Administer ordered medications to maintain glucose within target range  - Assess barriers to adequate nutritional intake and initiate nutrition consult as needed  - Instruct patient on self management of diabetes  Outcome: Progressing     Problem: Patient/Family Goals  Goal: Patient/Family Long Term Goal  Description: Patient's Long Term Goal:     Interventions:  -   - See additional Care Plan goals for specific interventions  Outcome: Progressing  Goal: Patient/Family Short Term Goal  Description: Patient's Short Term Goal:     Interventions:   -   - See additional Care Plan goals for specific interventions  Outcome: Progressing

## 2025-05-24 NOTE — PHYSICAL THERAPY NOTE
PHYSICAL THERAPY EVALUATION - INPATIENT     Room Number: 521/521-A  Evaluation Date: 5/24/2025  Type of Evaluation: Initial   Physician Order: PT Eval and Treat    Presenting Problem: Acute on Chronic  CHF ,anemia  and Respiratory failure / NSTEMI / CKD ESRD new to dialysis this admission / weakness and declining status in home  Co-Morbidities : Prior left hip sx with shorter limb post operatively / Left shoulder fx 2017 / PVD with left femo popliteal Bypass / Moderate right ICA stenosis  right TCAR 2019  / left total occlusion  Left ICA  Reason for Therapy: Mobility Dysfunction and Discharge Planning    PHYSICAL THERAPY ASSESSMENT   Patient is a 84 year old female admitted 5/22/2025 for Presenting Problem: Acute on Chronic  CHF ,anemia  and Respiratory failure / NSTEMI / CKD ESRD new to dialysis this admission / weakness and declining status in home.   Pt with SOB during activity yet feels improved since admission. Pt is alert oriented x 3 with prompts.  Nephew providing support for bills and pills prior to admission reporting some forgetfulness at baseline.  Pt denies pain .   Pt and family reports declining status prior to admission with ambulation and difficulty /inability to negotiate stairs in and out of her home .  Pt primarily homebound on admission.     Prior to admission, patient's baseline is Indep dressing and tolieting , assisted bathing . Denies recent falls, indep household ambulation short distances with RW .  If leaving home use of w/c and family support nephew at times carrying pt up and down stairs.      Patient is currently functioning below baseline with bed mobility, transfers, gait, stair negotiation, standing prolonged periods, and performing household tasks.  Patient is requiring minimal assist as a result of the following impairments with inability to progress to stair training assessment this visit : decreased functional strength, decreased endurance/aerobic capacity, impaired standing  balance, decreased muscular endurance, and medical status.  Physical Therapy will continue to follow for duration of hospitalization.    Patient will benefit from continued skilled PT Services to promote return to prior level of function and safety with continuous assistance and gradual rehabilitative therapy . Pt is new to dialysis with dialysis needs at DC .   Pt and nephew with goal for DC to home setting with 24hr support of family and HH PT .    Nephew feels he will be able to get pt to dialysis.    Therapy will continue to follow updating recommendations as needed  . Pt reports feels her ambulation has improved this admission and feeling better overall.   Therapy anticipates will progress to DC to home setting --stair training assessment to be completed prior to DC for further recommendations on getting into home and dialysis with nephew .     Pt presents with HFR and decrease activity tolerance overall with chronic left shorter LE .  Therapy discussed with pt / nephew resources for trial of lift at next level of care possibly.   SW consult recommended for pt / family for optimal DC Planning.     PLAN DURING HOSPITALIZATION  Nursing Mobility Recommendation : 1 Assist  PT Device Recommendation: Rolling walker, Gait belt (Pt has a w/c  shower chair  rollator and RW at home)  PT Treatment Plan: Bed mobility, Body mechanics, Endurance, Energy conservation, Patient education, Family education, Gait training, Transfer training, Balance training, Stair training  Rehab Potential : Fair  Frequency (Obs): 3-5x/week     PHYSICAL THERAPY MEDICAL/SOCIAL HISTORY   History related to current admission:  Nephrology , Pulmonology , Cardiology on consult     From primary care :     Plan:      Possible NSTEMI type 1  Possible Acute CHF, diastolic vs systolic  PVD  HL  HTN  - cards consulted  - check echo - pending  - given IV lasix - another dose of 20mg IV given once now - assess daily  - monitor I/O, daily wts  - Started  heparin drip - hold now given drop in hgb  - cont asa, statin, plavix, anti-hypertensives     Acute on chronic anemia  - possibly baseline anemia from ckd  - for acute drop would need to rule out gi etiology since she was just started on a heparin drip  - start PPI IV  - check stool for occult blood  - check iron studies  - transfuse 1 unit prbc for hgb 6.9  - GI consulted     Possible acute copd exacerbation  - pulm consulted  - plan nebs scheduled and prn  - cont IV steroids  - plan pulm hygiene  - wean oxygen as able  - hold abx until seen by pulm     DEBO on CKD stage 4  - nephrology consulted  - will plan to initiate HD today   - place HD catheter today      DM  - accuchecks and ISS  - adjust insulin as needed                      Global A/P  - reviewed labs and vitals from today  - reviewed notes of the day  - cbc, bmp, mag ordered for tomorrow  - discussed need to stay in the hospital today due to above  - cont IV ppi  - discussed with patient/RN and care team  - dispo pending clinical course        Madison Crump MD  5/23/2025  11:49 AM       Problem List  Principal Problem:    Acute on chronic respiratory failure with hypoxia (HCC)  Active Problems:    Acute congestive heart failure, unspecified heart failure type (HCC)    NSTEMI (non-ST elevated myocardial infarction) (HCC)    Acute renal failure superimposed on chronic kidney disease, unspecified acute renal failure type, unspecified CKD stage      HOME SITUATION  Type of Home: House  Home Layout: One level  Stairs to Enter : 5   Railing: Yes              Lives With: Family (Nephew present in room.  Per nephew between his spouse and his brother pt has 24hr supported care .)    Drives: No   Patient Regularly Uses: Rolling walker (Per nephew and pt , indep dressing and tolieting , assisted showering with shower chair and nephew help ---Indep ambulation in home short distance with RW yet prior to admission was declining with significant difficulty  inability to negotiate stairs)     SUBJECTIVE  \"I am so happy I was able to walk like this today \"     SOB reported     PHYSICAL THERAPY EXAMINATION   OBJECTIVE  Precautions: Cardiac, Bed/chair alarm, Limb alert - left, Hard of hearing (wears HA)  Fall Risk: High fall risk    WEIGHT BEARING RESTRICTION       PAIN ASSESSMENT  Ratin          COGNITION  Orientation Level:  oriented to place, oriented to time, oriented to situation, oriented to person, and prompts needed   Per nephew some mild forgetfulness at DC     RANGE OF MOTION AND STRENGTH ASSESSMENT  Upper extremity ROM and strength are within functional limits   Lower extremity ROM is within functional limits     Generalized weakness of B LE observed during fxn mobility as noted by difficulty and gait quality --returned demonstration of B AP     BALANCE  Static Sitting: Fair +  Dynamic Sitting: Fair  Static Standing: Poor +  Dynamic Standing: Poor +    ADDITIONAL TESTS         Wound on right toe . RN Informed and they are aware  Chronic left shorter limb from sx 1 year ago post hip fx and repair                             NEUROLOGICAL FINDINGS         Hx of neuropathy              ACTIVITY TOLERANCE  Pulse: 104 (during ambulation  resting 81)        BP: 131/58 (resting   post activity  133/65)             O2 WALK  Oxygen Therapy  SPO2% on Room Air at Rest: 99  SPO2% Ambulation on Room Air: 100 (SOB during activity)    AM-PAC '6-Clicks' INPATIENT SHORT FORM - BASIC MOBILITY  How much difficulty does the patient currently have...  Patient Difficulty: Turning over in bed (including adjusting bedclothes, sheets and blankets)?: A Little   Patient Difficulty: Sitting down on and standing up from a chair with arms (e.g., wheelchair, bedside commode, etc.): A Little   Patient Difficulty: Moving from lying on back to sitting on the side of the bed?: A Little   How much help from another person does the patient currently need...   Help from Another: Moving to and  from a bed to a chair (including a wheelchair)?: A Little   Help from Another: Need to walk in hospital room?: A Little   Help from Another: Climbing 3-5 steps with a railing?: A Lot     AM-PAC Score:  Raw Score: 17   Approx Degree of Impairment: 50.57%   Standardized Score (AM-PAC Scale): 42.13   CMS Modifier (G-Code): CK    FUNCTIONAL ABILITY STATUS--Pacing reinforced and rest provided between ambulation   Functional Mobility/Gait Assessment  Gait Assistance: Minimum assistance  Distance (ft): 40 ft x 1 with RW ---SOB observed with stable O2 sats and BP   HR up to 104 during activity .   Pt reports feels tolerating ambulation better than did prior to admission .   \" I am so happy I could isabel today \"  Assistive Device: Rolling walker  Pattern: R Steppage, L Steppage, R Foot flat, L Foot flat (pt with left shorter limb on ball of left foot ---- discussed / education provided regarding trial of lift.   Left shorter limb for over a year prior to admision)  Sit to Stand: minimal assist        Skilled Therapy Provided: PT eval completed , fxn mobility training , pt and family education and DC Planning , B AP   Education Provided To: Patient, Family/Caregiver   Patient Education: Role of Physical Therapy, Plan of Care, Discharge Recommendations, DME Recommendations, Functional Transfer Techniques, Fall Prevention, Posture/Positioning, Energy Conservation, LE HEP for ROM, Gait Training, Proper Body Mechanics (Reinforced importance of pacing of activity / respect for symptoms and rest /  B AP / Gait belt use at home encouraged , home safety and DC Planning---discussed trial of lift and community resources for lift if family willing to trial with  PT)   Patient's Response to Education: Verbalized Understanding, Returned Demonstration, Requires Further Education      The patient's Approx Degree of Impairment: 50.57% has been calculated based on documentation in the [facility] '6 clicks' Inpatient Basic Mobility Short Form.   Research supports that patients with this level of impairment may benefit from home with HH PT vs need for rehab care facility .  Final disposition will be made by interdisciplinary medical team.    Patient End of Session: Up in chair, Needs met, Call light within reach, RN aware of session/findings, All patient questions and concerns addressed, Alarm set, Family present    CURRENT GOALS  Goals to be met by: 6/15/25   Patient Goal Patient's self-stated goal is: home with family    Goal #1 Patient is able to demonstrate supine - sit EOB @ level: supervision     Goal #1   Current Status    Goal #2 Patient is able to demonstrate transfers EOB to/from Chair/Wheelchair at assistance level: SBA with walker - rolling     Goal #2  Current Status    Goal #3 Patient is able to ambulate 50- 75 feet with assist device: walker - rolling at assistance level: CGA with stable vitals and good tolerance    Goal #3   Current Status    Goal #4 Patient will negotiate 5 stairs/one curb w/ assistive device and min assist with good tolerance    Goal #4   Current Status    Goal #5 Family training in prep for DC to home setting with family    Goal #5   Current Status    Goal #6    Goal #6  Current Status      Patient Evaluation Complexity Level:  History Moderate - 1 or 2 personal factors and/or co-morbidities   Examination of body systems Low -  addressing 1-2 elements   Clinical Presentation  Moderate - Evolving   Clinical Decision Making  Moderate Complexity   PT eval and therapy activity 2 units

## 2025-05-25 PROBLEM — N18.6 ESRD (END STAGE RENAL DISEASE) (HCC): Status: ACTIVE | Noted: 2025-01-24

## 2025-05-25 LAB
ALBUMIN SERPL-MCNC: 3.8 G/DL (ref 3.2–4.8)
ANION GAP SERPL CALC-SCNC: 10 MMOL/L (ref 0–18)
BASOPHILS # BLD AUTO: 0.04 X10(3) UL (ref 0–0.2)
BASOPHILS NFR BLD AUTO: 0.7 %
BUN BLD-MCNC: 57 MG/DL (ref 9–23)
BUN/CREAT SERPL: 17.6 (ref 10–20)
CALCIUM BLD-MCNC: 7.9 MG/DL (ref 8.7–10.4)
CHLORIDE SERPL-SCNC: 103 MMOL/L (ref 98–112)
CO2 SERPL-SCNC: 23 MMOL/L (ref 21–32)
CREAT BLD-MCNC: 3.23 MG/DL (ref 0.55–1.02)
DEPRECATED RDW RBC AUTO: 50.2 FL (ref 35.1–46.3)
EGFRCR SERPLBLD CKD-EPI 2021: 13 ML/MIN/1.73M2 (ref 60–?)
EOSINOPHIL # BLD AUTO: 0.09 X10(3) UL (ref 0–0.7)
EOSINOPHIL NFR BLD AUTO: 1.5 %
ERYTHROCYTE [DISTWIDTH] IN BLOOD BY AUTOMATED COUNT: 16 % (ref 11–15)
GLUCOSE BLD-MCNC: 74 MG/DL (ref 70–99)
GLUCOSE BLDC GLUCOMTR-MCNC: 115 MG/DL (ref 70–99)
GLUCOSE BLDC GLUCOMTR-MCNC: 188 MG/DL (ref 70–99)
GLUCOSE BLDC GLUCOMTR-MCNC: 279 MG/DL (ref 70–99)
GLUCOSE BLDC GLUCOMTR-MCNC: 98 MG/DL (ref 70–99)
HCT VFR BLD AUTO: 27.8 % (ref 35–48)
HGB BLD-MCNC: 8.7 G/DL (ref 12–16)
IMM GRANULOCYTES # BLD AUTO: 0.03 X10(3) UL (ref 0–1)
IMM GRANULOCYTES NFR BLD: 0.5 %
LYMPHOCYTES # BLD AUTO: 1.33 X10(3) UL (ref 1–4)
LYMPHOCYTES NFR BLD AUTO: 21.6 %
MAGNESIUM SERPL-MCNC: 2 MG/DL (ref 1.6–2.6)
MCH RBC QN AUTO: 26.9 PG (ref 26–34)
MCHC RBC AUTO-ENTMCNC: 31.3 G/DL (ref 31–37)
MCV RBC AUTO: 85.8 FL (ref 80–100)
MONOCYTES # BLD AUTO: 0.42 X10(3) UL (ref 0.1–1)
MONOCYTES NFR BLD AUTO: 6.8 %
NEUTROPHILS # BLD AUTO: 4.24 X10 (3) UL (ref 1.5–7.7)
NEUTROPHILS # BLD AUTO: 4.24 X10(3) UL (ref 1.5–7.7)
NEUTROPHILS NFR BLD AUTO: 68.9 %
OSMOLALITY SERPL CALC.SUM OF ELEC: 296 MOSM/KG (ref 275–295)
PHOSPHATE SERPL-MCNC: 3.8 MG/DL (ref 2.4–5.1)
PLATELET # BLD AUTO: 167 10(3)UL (ref 150–450)
POTASSIUM SERPL-SCNC: 4.5 MMOL/L (ref 3.5–5.1)
PTH-INTACT SERPL-MCNC: 430 PG/ML (ref 18.5–88)
RBC # BLD AUTO: 3.24 X10(6)UL (ref 3.8–5.3)
SODIUM SERPL-SCNC: 136 MMOL/L (ref 136–145)
WBC # BLD AUTO: 6.2 X10(3) UL (ref 4–11)

## 2025-05-25 PROCEDURE — 99232 SBSQ HOSP IP/OBS MODERATE 35: CPT | Performed by: INTERNAL MEDICINE

## 2025-05-25 PROCEDURE — 99233 SBSQ HOSP IP/OBS HIGH 50: CPT | Performed by: HOSPITALIST

## 2025-05-25 RX ORDER — CALCITRIOL 0.25 UG/1
0.25 CAPSULE, LIQUID FILLED ORAL
Status: DISCONTINUED | OUTPATIENT
Start: 2025-05-26 | End: 2025-06-09

## 2025-05-25 RX ORDER — PANTOPRAZOLE SODIUM 40 MG/1
40 TABLET, DELAYED RELEASE ORAL
Status: DISCONTINUED | OUTPATIENT
Start: 2025-05-25 | End: 2025-06-09

## 2025-05-25 RX ORDER — HEPARIN SODIUM 1000 [USP'U]/ML
1.5 INJECTION, SOLUTION INTRAVENOUS; SUBCUTANEOUS
Status: ACTIVE | OUTPATIENT
Start: 2025-05-25 | End: 2025-05-27

## 2025-05-25 RX ORDER — ALBUMIN (HUMAN) 12.5 G/50ML
25 SOLUTION INTRAVENOUS
Status: ACTIVE | OUTPATIENT
Start: 2025-05-25 | End: 2025-05-27

## 2025-05-25 NOTE — PLAN OF CARE
Problem: CARDIOVASCULAR - ADULT  Goal: Maintains optimal cardiac output and hemodynamic stability  Description: INTERVENTIONS:  - Monitor vital signs, rhythm, and trends  - Monitor for bleeding, hypotension and signs of decreased cardiac output  - Evaluate effectiveness of vasoactive medications to optimize hemodynamic stability  - Monitor arterial and/or venous puncture sites for bleeding and/or hematoma  - Assess quality of pulses, skin color and temperature  - Assess for signs of decreased coronary artery perfusion - ex. Angina  - Evaluate fluid balance, assess for edema, trend weights  Outcome: Progressing  Goal: Absence of cardiac arrhythmias or at baseline  Description: INTERVENTIONS:  - Continuous cardiac monitoring, monitor vital signs, obtain 12 lead EKG if indicated  - Evaluate effectiveness of antiarrhythmic and heart rate control medications as ordered  - Initiate emergency measures for life threatening arrhythmias  - Monitor electrolytes and administer replacement therapy as ordered  Outcome: Progressing     Problem: RESPIRATORY - ADULT  Goal: Achieves optimal ventilation and oxygenation  Description: INTERVENTIONS:  - Assess for changes in respiratory status  - Assess for changes in mentation and behavior  - Position to facilitate oxygenation and minimize respiratory effort  - Oxygen supplementation based on oxygen saturation or ABGs  - Provide Smoking Cessation handout, if applicable  - Encourage broncho-pulmonary hygiene including cough, deep breathe, Incentive Spirometry  - Assess the need for suctioning and perform as needed  - Assess and instruct to report SOB or any respiratory difficulty  - Respiratory Therapy support as indicated  - Manage/alleviate anxiety  - Monitor for signs/symptoms of CO2 retention  Outcome: Progressing     Problem: GENITOURINARY - ADULT  Goal: Absence of urinary retention  Description: INTERVENTIONS:  - Assess patient’s ability to void and empty bladder  - Monitor  intake/output and perform bladder scan as needed  - Follow urinary retention protocol/standard of care  - Consider collaborating with pharmacy to review patient's medication profile  - Implement strategies to promote bladder emptying  Outcome: Progressing

## 2025-05-25 NOTE — OCCUPATIONAL THERAPY NOTE
OCCUPATIONAL THERAPY EVALUATION - INPATIENT     Room Number: 521/521-A  Evaluation Date: 5/25/2025  Type of Evaluation: Initial   Presenting problem: acute on chronic respiratory failure with hypoxia   Co-morbidities: PAD, HTN, HL, CKD stage 3     Physician Order: IP Consult to Occupational Therapy  Reason for Therapy: ADL/IADL Dysfunction and Discharge Planning    OCCUPATIONAL THERAPY ASSESSMENT   Patient is a 84 year old female admitted 5/22/2025 for acute on chronic respiratory failure with hypoxia.  Prior to admission, patient's baseline is from home with family who provide PRN assist for ADLs (mainly bathing). Pt reports being mod I for mobility using RW. Patient is currently functioning below baseline with toileting, lower body dressing, grooming, transfers, static standing balance, dynamic standing balance, and functional standing tolerance.  Patient is requiring stand-by assist, contact guard assist, and minimal assist as a result of the following impairments: decreased functional strength, decreased endurance, impaired standing balance, decreased muscular endurance, and medical status. Occupational Therapy will continue to follow for duration of hospitalization.    Patient will benefit from continued skilled OT Services to promote return to prior level of function and safety with continuous assistance and gradual rehabilitative therapy.    PLAN DURING HOSPITALIZATION  OT Device Recommendations: None  OT Treatment Plan: Balance activities, Energy conservation/work simplification techniques, ADL training, Functional transfer training, Endurance training, Patient/Family education, Patient/Family training, Compensatory technique education     OCCUPATIONAL THERAPY MEDICAL/SOCIAL HISTORY   Problem List  Principal Problem:    Acute on chronic respiratory failure with hypoxia (HCC)  Active Problems:    ESRD (end stage renal disease) (HCC)    Acute congestive heart failure, unspecified heart failure type (HCC)     NSTEMI (non-ST elevated myocardial infarction) (HCC)    Acute renal failure superimposed on chronic kidney disease, unspecified acute renal failure type, unspecified CKD stage    HOME SITUATION  Type of Home: House  Home Layout: One level  Lives With: Family (Nephew present in room.  Per nephew between his spouse and his brother pt has 24hr supported care .)  Shower/Tub and Equipment: Tub-shower combo; Tub transfer bench  Drives: No  Patient Regularly Uses: Rolling walker (Per nephew and pt , indep dressing and tolieting , assisted showering with shower chair and nephew help ---Indep ambulation in home short distance with RW yet prior to admission was declining with significant difficulty inability to negotiate stairs)    Prior Level of [place]: Prior to admission pt was from home with family who provide  supervision/assist. Pt reports being independent with dressing and toileting; required assist for bathing. Pt was mod I for mobility using RW.    SUBJECTIVE  \"I don't think I can go that far today.\" -when asked if she wanted to walk to bathroom to complete grooming tasks     OCCUPATIONAL THERAPY EXAMINATION      OBJECTIVE  Precautions: Cardiac; Bed/chair alarm; Limb alert - left; Hard of hearing (wears HA)  Fall Risk: High fall risk      PAIN ASSESSMENT  Ratin      ACTIVITY TOLERANCE  Pulse: 77  Heart Rate Source: Monitor                   O2 SATURATIONS  Oxygen Therapy  SPO2% on Room Air at Rest: 99    COGNITION  Orientation Level:  oriented to place, oriented to person, disoriented to time, and disoriented to situation  Following Commands:  follows one step commands with increased time and follows one step commands with repetition    RANGE OF MOTION   Upper extremity ROM is within functional limits     STRENGTH ASSESSMENT  Upper extremity strength is within functional limits     COORDINATION  Gross Motor: WFL   Fine Motor: WFL     ACTIVITIES OF DAILY LIVING ASSESSMENT  AM-PAC ‘6-Clicks’ Inpatient  Daily Activity Short Form  How much help from another person does the patient currently need…  -   Putting on and taking off regular lower body clothing?: A Lot  -   Bathing (including washing, rinsing, drying)?: A Lot  -   Toileting, which includes using toilet, bedpan or urinal? : A Lot  -   Putting on and taking off regular upper body clothing?: A Little  -   Taking care of personal grooming such as brushing teeth?: A Little  -   Eating meals?: None    AM-PAC Score:  Score: 16  Approx Degree of Impairment: 53.32%  Standardized Score (AM-PAC Scale): 35.96  CMS Modifier (G-Code): CK    FUNCTIONAL TRANSFER ASSESSMENT  Sit to Stand: Chair  Chair: Minimal Assist    FUNCTIONAL MOBILITY  Pt required min assist with RW support to complete ~15 ft functional mobility to simulate household distances.     BALANCE ASSESSMENT  Static Sitting: Supervision  Static Standing: Contact Guard Assist  Dynamic sitting: SBA  Dynamic Standing: min assist     FUNCTIONAL ADL ASSESSMENT  Grooming Seated: Supervision    Skilled Therapy Provided:   RN cleared pt for participation. Pt received in recliner with no visitors present. Pt agreeable to participation in therapy. Pt oriented to self and place only; not able to accurately report year or why she is in the hospital. To assess pt's participation during tasks while also providing intermittent education to maximize participation, OT facilitated the following: functional transfers, functional mobility, and ADL tasks. Pt tolerated treatment fairly well and completed all tasks with assist levels listed above. Pt required min assist to complete STS from recliner; min assist to complete ~15 ft room mobility with RW support. Pt not feeling strong enough to walk to bathroom at this time; required SUP to complete grooming tasks from recliner. Vitals monitored and WNL. Pt remained in recliner with all needs in reach and alarm on at end of session. RN aware.         EDUCATION PROVIDED  Patient  Education : Role of Occupational Therapy; Plan of Care; Functional Transfer Techniques; Fall Prevention; Posture/Positioning; Energy Conservation; Proper Body Mechanics  Patient's Response to Education: Returned Demonstration; Verbalized Understanding    The patient's Approx Degree of Impairment: 53.32% has been calculated based on documentation in the [facility] '6 clicks' Inpatient Daily Activity Short Form.  Research supports that patients with this level of impairment may benefit from rehab.  Final disposition will be made by interdisciplinary medical team.     Patient End of Session: Up in chair, Call light within reach, Needs met, RN aware of session/findings, All patient questions and concerns addressed, Hospital anti-slip socks, Alarm set    OT Goals  Patients self stated goal is: none stated      Patient will complete functional transfer with SBA  Comment:     Patient will complete toileting with SBA  Comment:     Patient will tolerate standing for 5 minutes in prep for adls with SBA   Comment:    Patient will complete item retrieval with SBA  Comment:          Goals  on: 25  Frequency: 3x/week    Patient Evaluation Complexity Level:   Occupational Profile/Medical History MODERATE - Expanded review of history including review of medical or therapy record   Specific performance deficits impacting engagement in ADL/IADL MODERATE  3 - 5 performance deficits   Client Assessment/Performance Deficits MODERATE - Comorbidities and min to mod modifications of tasks    Clinical Decision Making MODERATE - Analysis of occupational profile, detailed assessments, several treatment options    Overall Complexity MODERATE     OT Session Time: 18 minutes  Self-Care Home Management: 18 minutes

## 2025-05-25 NOTE — PROGRESS NOTES
Progress Note  Radha Yu Patient Status:  Inpatient    1940 MRN [id number]   Location [facility]5W Attending Madison Crump MD   Hosp Day # 3 PCP Stef Velasco MD     Subjective   Up in the chair. No cardiac complaints this am. Slept well.       Objective:   /56 (BP Location: Left arm)   Pulse 90   Temp 98 °F (36.7 °C) (Oral)   Resp 16   Wt 123 lb 14.4 oz (56.2 kg)   SpO2 100%   BMI 23.43 kg/m²     Telemetry: n/a    Intake/Output:    Intake/Output Summary (Last 24 hours) at 2025 0532  Last data filed at 2025 1335  Gross per 24 hour   Intake 650 ml   Output 0 ml   Net 650 ml       Wt Readings from Last 3 Encounters:   25 123 lb 14.4 oz (56.2 kg)   25 123 lb 7.3 oz (56 kg)   25 123 lb 7.3 oz (56 kg)         Labs:  Recent Labs   Lab 25  0948 25  0413 25  0526   * 246* 54*   BUN 92* 57* 42*   CREATSERUM 3.95* 2.84* 2.50*   EGFRCR 11* 16* 18*   CA 8.3* 8.3* 8.3*    139 136   K 4.3 4.5 4.2    105 101   CO2 18.0* 23.0 25.0     Recent Labs   Lab 25  0948 25  0413 25  1635 25  0526   RBC 3.00*  --  2.62*  --  3.32*   HGB 8.2*   < > 6.9* 8.9* 8.9*   HCT 25.4*   < > 21.4* 27.8* 27.3*   MCV 84.7  --  81.7  --  82.2   MCH 27.3  --  26.3  --  26.8   MCHC 32.3  --  32.2  --  32.6   RDW 15.7*  --  15.6*  --  16.1*   NEPRELIM 3.46  --  4.86  --   --    WBC 5.0  --  5.6  --  8.5   .0  --  204.0  --  192.0    < > = values in this interval not displayed.         Recent Labs   Lab 25  0948 25  1127 25   TROPHS 224* 235* 426*         Review of Systems:     10 point review of systems completed and negative except as noted in HPI      Exam:     Physical Exam:  General: Alert and oriented x 3. No apparent distress.   HEENT: Normocephalic, neck supple, no thyromegaly or adenopathy.  Neck: No JVD, carotids 2+, no bruits.  Cardiac: Regular rate and rhythm. S1, S2 normal.  No murmur, pericardial rub, S3, or extra cardiac sounds.  Lungs: Diminished.  Normal excursions and effort.  Abdomen: Soft, non-tender. BS-present.  Extremities: Without clubbing or cyanosis. No edema.    Neurologic: Alert and oriented, normal affect. No focal defects  Skin: Warm and dry.       Diagnostic Studies:     Echo 5/23/25  Conclusions:     1. Left ventricle: The cavity size was normal. Wall thickness was mildly      increased. Systolic function was moderately reduced. The estimated      ejection fraction was 35-40%, by biplane method of disks. Doppler      parameters are consistent with abnormal left ventricular relaxation -      grade 1 diastolic dysfunction.   2. Left atrium: The atrium was markedly dilated.   3. Right atrium: The atrium was dilated.   4. Atrial septum: A patent foramen ovale cannot be excluded.   5. Aortic valve: There was mild regurgitation.   6. Mitral valve: The annulus was mildly to moderately calcified. The      leaflets were normal thickness. There was moderate to severe      regurgitation.   7. Tricuspid valve: There was mild-moderate regurgitation.   Impressions:  No previous study from [facility] was   available for comparison.     Assessment and Plan:     Assessment:  # acute hypoxic respiratory failure   CXR with moderate pulmonary edema  Suspect mixture of bronchitis/COPD exacerbation and fluid retention, which in part may be due to progressive worsening kidney disease and new cardiomyopathy   Pulmonary team following   # NSTEMI  Initial troponin elevated at 224 > 235 > 426   ECG shows lateral T wave inversion   Denies chest pain or anginal equivalent   Echo showed moderately reduced LVEF 35-40%, no diffuse rwma, moderate to severe mitral regurgitation, mild to mod TR   # new cardiomyopathy, HFrEF 35-40%  Ischemic work up, likely [facility] Tuesday   Volume management with HD per renal   GDMT: limited now with ESRD and on dialysis   # CKD on ESRD   Started on  dialysis 5/22  Volume management per renal   # acute on chronic anemia  Hgb 6.9 (5/23) s/p unit of pRBC  Started on Epogen   GI consulted - deferring invasive work up at this time   # PVD s/p left femoropopliteal bypass 9/2015   # Moderate right ICA stenosis s/p right TCAR 2/2019, totally occluded left ICA   # HTN - controlled   # HLD - on statin   # T2DM - per primary       Plan:  Start BB for GDMT, otherwise limited with ESRD on dialysis   Volume management per nephrology   Plan for ischemic eval with [facility] on Tuesday, keep npo Monday after midnight   Hgb trending steady in upper 8's, no gross bleeding, now on epogen  Currently on room air, further respiratory management per pulmonary         Plan of care discussed with patient, RN.      Winifred Meyers, APRN  5/25/2025  Ph [phone] ([place])  Ph [phone] ([place])          =======================================================  Patient seen and examined independently.  Note reviewed and labs reviewed.  Agree with above assessment and plan except as noted below.      Ovidio Amaya MD        2

## 2025-05-25 NOTE — PLAN OF CARE
HD scheduled for tomorrow, call made to [facility].     Problem: CARDIOVASCULAR - ADULT  Goal: Maintains optimal cardiac output and hemodynamic stability  Description: INTERVENTIONS:  - Monitor vital signs, rhythm, and trends  - Monitor for bleeding, hypotension and signs of decreased cardiac output  - Evaluate effectiveness of vasoactive medications to optimize hemodynamic stability  - Monitor arterial and/or venous puncture sites for bleeding and/or hematoma  - Assess quality of pulses, skin color and temperature  - Assess for signs of decreased coronary artery perfusion - ex. Angina  - Evaluate fluid balance, assess for edema, trend weights  5/25/2025 1449 by Shima Lambert RN  Outcome: Progressing  5/25/2025 1220 by Shima Lambert RN  Outcome: Progressing  Goal: Absence of cardiac arrhythmias or at baseline  Description: INTERVENTIONS:  - Continuous cardiac monitoring, monitor vital signs, obtain 12 lead EKG if indicated  - Evaluate effectiveness of antiarrhythmic and heart rate control medications as ordered  - Initiate emergency measures for life threatening arrhythmias  - Monitor electrolytes and administer replacement therapy as ordered  5/25/2025 1449 by Shima Lambert RN  Outcome: Progressing  5/25/2025 1220 by Shima Lambert RN  Outcome: Progressing     Problem: RESPIRATORY - ADULT  Goal: Achieves optimal ventilation and oxygenation  Description: INTERVENTIONS:  - Assess for changes in respiratory status  - Assess for changes in mentation and behavior  - Position to facilitate oxygenation and minimize respiratory effort  - Oxygen supplementation based on oxygen saturation or ABGs  - Provide Smoking Cessation handout, if applicable  - Encourage broncho-pulmonary hygiene including cough, deep breathe, Incentive Spirometry  - Assess the need for suctioning and perform as needed  - Assess and instruct to report SOB or any respiratory difficulty  - Respiratory Therapy support as indicated  -  Manage/alleviate anxiety  - Monitor for signs/symptoms of CO2 retention  5/25/2025 1449 by Shima Lambert RN  Outcome: Progressing  5/25/2025 1220 by Shima Lambert RN  Outcome: Progressing     Problem: GENITOURINARY - ADULT  Goal: Absence of urinary retention  Description: INTERVENTIONS:  - Assess patient’s ability to void and empty bladder  - Monitor intake/output and perform bladder scan as needed  - Follow urinary retention protocol/standard of care  - Consider collaborating with pharmacy to review patient's medication profile  - Implement strategies to promote bladder emptying  5/25/2025 1449 by Shima Lambert RN  Outcome: Progressing  5/25/2025 1220 by Shima Lambert RN  Outcome: Progressing     Problem: Patient Centered Care  Goal: Patient preferences are identified and integrated in the patient's plan of care  Description: Interventions:  - What would you like us to know as we care for you? From home with nephew  - Provide timely, complete, and accurate information to patient/family  - Incorporate patient and family knowledge, values, beliefs, and cultural backgrounds into the planning and delivery of care  - Encourage patient/family to participate in care and decision-making at the level they choose  - Honor patient and family perspectives and choices  5/25/2025 1449 by Shima Lambert RN  Outcome: Progressing  5/25/2025 1220 by Shima Lambert RN  Outcome: Progressing     Problem: Diabetes/Glucose Control  Goal: Glucose maintained within prescribed range  Description: INTERVENTIONS:  - Monitor Blood Glucose as ordered  - Assess for signs and symptoms of hyperglycemia and hypoglycemia  - Administer ordered medications to maintain glucose within target range  - Assess barriers to adequate nutritional intake and initiate nutrition consult as needed  - Instruct patient on self management of diabetes  5/25/2025 1449 by Shima Lambert RN  Outcome: Progressing  5/25/2025 1220 by  Shima Lambert, RN  Outcome: Progressing     Problem: Patient/Family Goals  Goal: Patient/Family Long Term Goal  Description: Patient's Long Term Goal: dc home     Interventions:  - vitals  - labs  - HD started    - See additional Care Plan goals for specific interventions  5/25/2025 1449 by Shima Lambert, RN  Outcome: Progressing  5/25/2025 1220 by Shima Lambert, RN  Outcome: Progressing  Goal: Patient/Family Short Term Goal  Description: Patient's Short Term Goal: relief of shortness of breath     Interventions:   - oxygen as needed   - HD   - See additional Care Plan goals for specific interventions  5/25/2025 1449 by Shima Lambert, RN  Outcome: Progressing  5/25/2025 1220 by Shima Lambert, RN  Outcome: Progressing

## 2025-05-25 NOTE — CM/SW NOTE
05/25/25 1000   CM/SW Referral Data   Referral Source Social Work (self-referral)   Reason for Referral Discharge planning   Informant Other  (Nephew Elliott)   Medical Hx   Does patient have an established PCP? Yes   Significant Past Medical/Mental Health Hx PAD, HTN, HL, CKD3   Patient Info   Patient's Home Environment House   Number of Levels in Home 1   Number of Stair in Home 0   Patient lives with Other  (Nephew and Niece)   Patient Status Prior to Admission   Independent with ADLs and Mobility Yes   Services in place prior to admission Home Health Care;DME/Supplies at home   Home Health Provider Info [facility]    Type of DME/Supplies Wheeled Walker   Discharge Needs   Anticipated D/C needs Subacute rehab   Services Requested   Submitted to [facility] Yes   PASRR Level 1 Submitted Yes     Social work was able to speak to the patient's nephew via phone regarding discharge planning.    The patient lives with her nephew and his wife and their son in a 1 level home.  The patient is independent with most ADLs at baseline.  The patient uses a walker.     Social work advised the nephew that the Anticipated therapy need: Gradual Rehabilitative Therapy.  The patient's nephew is in agreement.    Social work sent a [facility] referral in [facility] with on-site HD.  HD Flowsheets and Hep B profile are uploaded.  Social work will provide the LISE list to the patient and her nephew at bedside once options are available.     The patient's PCP had set her up with [facility] out of [place], [place].  Social work sent a referral to them in [facility] as a back up.    The patient's nephew has no questions or concerns at this time.    SW/CM to remain available for support and/or discharge planning.     Regina Fuentes MSW, LSW  Discharge Planner [id number]

## 2025-05-25 NOTE — PROGRESS NOTES
[facility]  part of [facility]    Progress Note    Radha Yu Patient Status:  Inpatient    1940 MRN [id number]   Location [facility]5W Attending Madison Crump MD   Hosp Day # 3 PCP Stef Velasco MD       Subjective:   Radha Yu is a(n) 84 year old female who I am seeing for ESRD    On room air  Denies sob        Objective:   /57 (BP Location: Right arm)   Pulse 76   Temp 97 °F (36.1 °C) (Oral)   Resp 18   Wt 123 lb 14.4 oz (56.2 kg)   SpO2 96%   BMI 23.43 kg/m²      Intake/Output Summary (Last 24 hours) at 2025 1058  Last data filed at 2025 1335  Gross per 24 hour   Intake 280 ml   Output --   Net 280 ml     Wt Readings from Last 1 Encounters:   25 123 lb 14.4 oz (56.2 kg)       Exam  Vital signs: Blood pressure 140/57, pulse 76, temperature 97 °F (36.1 °C), temperature source Oral, resp. rate 18, weight 123 lb 14.4 oz (56.2 kg), SpO2 96%.    General: No acute distress. Alert and oriented x 3.  HEENT: Moist mucous membranes. EOMI. PERRLA  Neck:  No JVD.   Respiratory: Clear to auscultation bilaterally.    Cardiovascular: S1, S2.  Regular rate and rhythm.   Abdomen: Soft, nontender, nondistended.    Neurologic: No focal neurological deficits.  Musculoskeletal: Full range of motion of all extremities.  No swelling noted.  Access: Tunneled dialysis catheter    Results:     Recent Labs   Lab 25  0948 25  2056 25  0413 25  1635 25  0526 25  0516   RBC 3.00*  --  2.62*  --  3.32* 3.24*   HGB 8.2*   < > 6.9* 8.9* 8.9* 8.7*   HCT 25.4*   < > 21.4* 27.8* 27.3* 27.8*   MCV 84.7  --  81.7  --  82.2 85.8   MCH 27.3  --  26.3  --  26.8 26.9   MCHC 32.3  --  32.2  --  32.6 31.3   RDW 15.7*  --  15.6*  --  16.1* 16.0*   NEPRELIM 3.46  --  4.86  --   --  4.24   WBC 5.0  --  5.6  --  8.5 6.2   .0  --  204.0  --  192.0 167.0    < > = values in this interval not displayed.         Recent Labs   Lab 25  7422  05/24/25  0526 05/25/25  0516   * 54* 74   BUN 57* 42* 57*   CREATSERUM 2.84* 2.50* 3.23*   CA 8.3* 8.3* 7.9*    136 136   K 4.5 4.2 4.5    101 103   CO2 23.0 25.0 23.0          No results found.      Assessment and Plan:     84 year old female w/ PMHx of PAD, HTN, HL, CKD who presented to ER for worsening SOB     Impression:    ESRD.  New start to dialysis this admission.  2 treatments done on Thursday and Friday.  Hypertensive urgency, amlodipine  Hypoxic respiratory failure/fluid overload/COPD.  Improved and on room air  Anemia, status post PRBC.  LEVON  Secondary hyperparathyroidism, ipth 430  chfrEF echo 5/23/25 EF 35-40%, grade 1 DD, moderate to severe MR, mild to moderate TR      Plan:    HD in am _ mwf schedule while inpatient  Social work consult for dialysis placement  Because of new cardiomyopathy plans for left heart cath on Tuesday per cardiology notes  Start calcitriol      Thank you very much for allowing me to participate in the care of your patient.  If you have any questions, please do not hesitate to contact me.     Bceki Krueger MD

## 2025-05-25 NOTE — PROGRESS NOTES
[facility]  part of [facility]     Progress Note    Radha Yu Patient Status:  Inpatient    1940 MRN [id number]   Location [facility] 2W/SW Attending Madison Crump MD   Hosp Day # 3 PCP Stef Velasco MD       Subjective:   Patient seen and examined.  Dyspnea improving.  Denies wheezing    Objective:   Blood pressure 140/57, pulse 76, temperature 97 °F (36.1 °C), temperature source Oral, resp. rate 18, weight 123 lb 14.4 oz (56.2 kg), SpO2 96%.  Intake/Output:   Last 3 shifts: I/O last 3 completed shifts:  In: 660 [P.O.:640; I.V.:20]  Out: 0    This shift: No intake/output data recorded.     Vent Settings:      Hemodynamic parameters (last 24 hours):      Scheduled Meds: Current Hospital Medications[1]    Continuous Infusions: Medication Infusions[2]    Physical Exam  Constitutional: no acute distress  Eyes: PERRL  ENT: nares pateint  Neck: supple, no JVD  Cardio: RRR, S1 S2  Respiratory: Faint crackles  GI: abdomen soft, non tender, active bowel sounds, no organomegaly  Extremities: no clubbing, cyanosis, edema  Neurologic: no gross motor deficits  Skin: warm, dry      Results:     Lab Results   Component Value Date    WBC 6.2 2025    HGB 8.7 2025    HCT 27.8 2025    .0 2025    CREATSERUM 3.23 2025    BUN 57 2025     2025    K 4.5 2025     2025    CO2 23.0 2025    GLU 74 2025    CA 7.9 2025    ALB 3.8 2025    MG 2.0 2025    PHOS 3.8 2025       No results found.              Assessment   1.  Acute hypoxemic respiratory failure  2.  Pulmonary edema  3.  Elevated troponin  4.  Wheezing, improved  5.  Peripheral vascular disease  6.  Acute kidney injury on chronic kidney disease  7.  Prior history of right ICA stenosis status post TCAR  8.  Hypertension  9.  Diabetes mellitus     Plan   -Patient presents with worsening dyspnea symptoms.  Concern for pulmonary edema  on chest x-ray cannot rule out underlying pneumonia.  Wheezing on examination but no history of known lung disease.  - No significant wheezing.  Steroid therapy discontinued  - Nebulizer treatments  - Viral respiratory panel negative  - Eval by nephrology.  Dialysis catheter placed and started on dialysis.  - OK for DC from pulmonary standpoint.        Neelam Cherry DO  Pulmonary Critical Care Medicine  [facility]          [1]   Current Facility-Administered Medications   Medication Dose Route Frequency    metoprolol succinate (Toprol XL) partial tablet 12.5 mg  12.5 mg Oral Daily Beta Blocker    pantoprazole (Protonix) DR tab 40 mg  40 mg Oral BID AC    ipratropium-albuterol (Duoneb) 0.5-2.5 (3) MG/3ML inhalation solution 3 mL  3 mL Nebulization Q6H PRN    insulin degludec (Tresiba) 100 units/mL flextouch 9 Units  9 Units Subcutaneous Nightly    HYDROcodone-acetaminophen (Norco) 5-325 MG per tab 1 tablet  1 tablet Oral Q6H PRN    epoetin brittany (Epogen, Procrit) 5,000 Units injection  5,000 Units Subcutaneous Once per day on Monday Wednesday Friday    aspirin DR tab 81 mg  81 mg Oral Daily    atorvastatin (Lipitor) tab 20 mg  20 mg Oral Nightly    clopidogrel (Plavix) tab 75 mg  75 mg Oral Daily    escitalopram (Lexapro) tab 10 mg  10 mg Oral Daily    glucose (Dex4) 15 GM/59ML oral liquid 15 g  15 g Oral Q15 Min PRN    Or    glucose (Glutose) 40% oral gel 15 g  15 g Oral Q15 Min PRN    Or    glucose-vitamin C (Dex-4) chewable tab 4 tablet  4 tablet Oral Q15 Min PRN    Or    dextrose 50% injection 50 mL  50 mL Intravenous Q15 Min PRN    Or    glucose (Dex4) 15 GM/59ML oral liquid 30 g  30 g Oral Q15 Min PRN    Or    glucose (Glutose) 40% oral gel 30 g  30 g Oral Q15 Min PRN    Or    glucose-vitamin C (Dex-4) chewable tab 8 tablet  8 tablet Oral Q15 Min PRN    acetaminophen (Tylenol Extra Strength) tab 500 mg  500 mg Oral Q4H PRN    melatonin tab 3 mg  3 mg Oral Nightly PRN    polyethylene glycol (PEG 3350)  (Miralax) 17 g oral packet 17 g  17 g Oral Daily PRN    sennosides (Senokot) tab 17.2 mg  17.2 mg Oral Nightly PRN    bisacodyl (Dulcolax) 10 MG rectal suppository 10 mg  10 mg Rectal Daily PRN    ondansetron (Zofran) 4 MG/2ML injection 4 mg  4 mg Intravenous Q6H PRN    benzonatate (Tessalon) cap 200 mg  200 mg Oral TID PRN    guaiFENesin (Robitussin) 100 MG/5 ML oral liquid 200 mg  200 mg Oral Q4H PRN    glycerin-hypromellose- (Artificial Tears) 0.2-0.2-1 % ophthalmic solution 1 drop  1 drop Both Eyes QID PRN    sodium chloride (Saline Mist) 0.65 % nasal solution 1 spray  1 spray Each Nare Q3H PRN    metoclopramide (Reglan) 5 mg/mL injection 10 mg  10 mg Intravenous Daily PRN    insulin aspart (NovoLOG) 100 Units/mL FlexPen 1-7 Units  1-7 Units Subcutaneous TID CC    heparin (Porcine) 100 Units/mL lock flush 150 Units  1.5 mL Intravenous Once    [Held by provider] insulin aspart (NovoLOG) 100 Units/mL FlexPen 5 Units  5 Units Subcutaneous TID CC and HS    heparin (Porcine) 1000 UNIT/ML injection 1,500 Units  1.5 mL Intracatheter PRN Dialysis   [2]

## 2025-05-25 NOTE — PROGRESS NOTES
[facility]  part of [facility]    Progress Note    Radha Yu Patient Status:  Inpatient    1940 MRN [id number]   Location [facility]5W Attending Steff Vazquez,*   Hosp Day # 3 PCP Stef Velasco MD     Chief Complaint: ok    Subjective:     Constitutional:  Positive for activity change and fatigue. Negative for appetite change.   HENT:  Positive for congestion.    Respiratory:  Negative for choking, chest tightness and shortness of breath.    Cardiovascular:  Negative for chest pain.   Gastrointestinal:  Negative for abdominal pain.   Genitourinary:  Negative for difficulty urinating.   Musculoskeletal:  Positive for joint pain.   Neurological:  Positive for weakness.   Psychiatric/Behavioral:  Positive for sleep disturbance. The patient is nervous/anxious.        Objective:   Blood pressure 140/55, pulse 79, temperature 98.9 °F (37.2 °C), temperature source Oral, resp. rate 14, weight 123 lb 14.4 oz (56.2 kg), SpO2 99%.  Physical Exam  Vitals and nursing note reviewed.   Constitutional:       General: She is not in acute distress.     Appearance: She is ill-appearing. She is not toxic-appearing or diaphoretic.   HENT:      Head: Normocephalic and atraumatic.   Cardiovascular:      Rate and Rhythm: Normal rate.      Pulses: Normal pulses.   Pulmonary:      Effort: Pulmonary effort is normal.      Breath sounds: Rhonchi present.   Abdominal:      General: Bowel sounds are normal.      Palpations: Abdomen is soft.      Tenderness: There is no abdominal tenderness.   Skin:     General: Skin is warm and dry.      Capillary Refill: Capillary refill takes less than 2 seconds.   Neurological:      General: No focal deficit present.      Mental Status: She is alert and oriented to person, place, and time.   Psychiatric:         Behavior: Behavior normal.         Results:   Lab Results   Component Value Date    WBC 6.2 2025    HGB 8.7 (L) 2025    HCT 27.8 (L)  05/25/2025    .0 05/25/2025    CREATSERUM 3.23 (H) 05/25/2025    BUN 57 (H) 05/25/2025     05/25/2025    K 4.5 05/25/2025     05/25/2025    CO2 23.0 05/25/2025    GLU 74 05/25/2025    CA 7.9 (L) 05/25/2025    ALB 3.8 05/25/2025    ALKPHO 100 02/06/2025    BILT 0.3 02/06/2025    TP 5.5 (L) 02/06/2025    AST 27 02/06/2025    ALT 7 (L) 02/06/2025    PTT 44.4 (H) 05/23/2025    INR 1.16 05/22/2025    MG 2.0 05/25/2025    PHOS 3.8 05/25/2025    TROPHS 426 (HH) 05/22/2025       No results found.          Assessment & Plan:       NSTEMI type 1 with new ischemic cardiomyopathy acute systolic chf  await [facility] tues as per cards    Tricuspid and mitral regurg    PVD    HL    HTN     Acute on chronic anemia of renal disease  - possibly baseline anemia from ckd  - heparin stopped; gi defers invasive dx till more stable     acute copd exacerbation  - pulm consulted signed off no steroids; no abx  - plan nebs scheduled and prn  - plan pulm hygiene  - wean oxygen as able     DEBO on CKD stage 4 better  - nephrology consulted  - will plan to initiate HD today   - place HD catheter today      DM  - accuchecks and ISS  - adjust insulin as needed needs less now will dec                       complex mdm; coordinating care with nurse and counseling pt and with her permission her nephew in room  about chf/sob/diet/cath/dialysis    Dispo: poss dc when dialysis/lisa set up and after cath; perhaps wed           ALEX NUNEZ MD

## 2025-05-26 LAB
ALBUMIN SERPL-MCNC: 3.6 G/DL (ref 3.2–4.8)
ALBUMIN/GLOB SERPL: 1.7 {RATIO} (ref 1–2)
ALP LIVER SERPL-CCNC: 73 U/L (ref 55–142)
ALT SERPL-CCNC: <7 U/L (ref 10–49)
ANION GAP SERPL CALC-SCNC: 10 MMOL/L (ref 0–18)
AST SERPL-CCNC: 16 U/L (ref ?–34)
BASOPHILS # BLD AUTO: 0.03 X10(3) UL (ref 0–0.2)
BASOPHILS NFR BLD AUTO: 0.6 %
BILIRUB SERPL-MCNC: 0.2 MG/DL (ref 0.2–1.1)
BUN BLD-MCNC: 75 MG/DL (ref 9–23)
BUN/CREAT SERPL: 20.9 (ref 10–20)
CALCIUM BLD-MCNC: 7.6 MG/DL (ref 8.7–10.4)
CHLORIDE SERPL-SCNC: 102 MMOL/L (ref 98–112)
CO2 SERPL-SCNC: 23 MMOL/L (ref 21–32)
CREAT BLD-MCNC: 3.58 MG/DL (ref 0.55–1.02)
DEPRECATED RDW RBC AUTO: 49.5 FL (ref 35.1–46.3)
EGFRCR SERPLBLD CKD-EPI 2021: 12 ML/MIN/1.73M2 (ref 60–?)
EOSINOPHIL # BLD AUTO: 0.23 X10(3) UL (ref 0–0.7)
EOSINOPHIL NFR BLD AUTO: 4.5 %
ERYTHROCYTE [DISTWIDTH] IN BLOOD BY AUTOMATED COUNT: 15.9 % (ref 11–15)
GLOBULIN PLAS-MCNC: 2.1 G/DL (ref 2–3.5)
GLUCOSE BLD-MCNC: 88 MG/DL (ref 70–99)
GLUCOSE BLDC GLUCOMTR-MCNC: 146 MG/DL (ref 70–99)
GLUCOSE BLDC GLUCOMTR-MCNC: 151 MG/DL (ref 70–99)
GLUCOSE BLDC GLUCOMTR-MCNC: 195 MG/DL (ref 70–99)
GLUCOSE BLDC GLUCOMTR-MCNC: 208 MG/DL (ref 70–99)
GLUCOSE BLDC GLUCOMTR-MCNC: 228 MG/DL (ref 70–99)
GLUCOSE BLDC GLUCOMTR-MCNC: 265 MG/DL (ref 70–99)
GLUCOSE BLDC GLUCOMTR-MCNC: 64 MG/DL (ref 70–99)
GLUCOSE BLDC GLUCOMTR-MCNC: 74 MG/DL (ref 70–99)
HCT VFR BLD AUTO: 26.6 % (ref 35–48)
HGB BLD-MCNC: 8.3 G/DL (ref 12–16)
IMM GRANULOCYTES # BLD AUTO: 0.02 X10(3) UL (ref 0–1)
IMM GRANULOCYTES NFR BLD: 0.4 %
LYMPHOCYTES # BLD AUTO: 1.43 X10(3) UL (ref 1–4)
LYMPHOCYTES NFR BLD AUTO: 27.7 %
MAGNESIUM SERPL-MCNC: 1.9 MG/DL (ref 1.6–2.6)
MCH RBC QN AUTO: 26.4 PG (ref 26–34)
MCHC RBC AUTO-ENTMCNC: 31.2 G/DL (ref 31–37)
MCV RBC AUTO: 84.7 FL (ref 80–100)
MONOCYTES # BLD AUTO: 0.47 X10(3) UL (ref 0.1–1)
MONOCYTES NFR BLD AUTO: 9.1 %
NEUTROPHILS # BLD AUTO: 2.98 X10 (3) UL (ref 1.5–7.7)
NEUTROPHILS # BLD AUTO: 2.98 X10(3) UL (ref 1.5–7.7)
NEUTROPHILS NFR BLD AUTO: 57.7 %
OSMOLALITY SERPL CALC.SUM OF ELEC: 302 MOSM/KG (ref 275–295)
PHOSPHATE SERPL-MCNC: 3.9 MG/DL (ref 2.4–5.1)
PLATELET # BLD AUTO: 155 10(3)UL (ref 150–450)
POTASSIUM SERPL-SCNC: 4.7 MMOL/L (ref 3.5–5.1)
PROT SERPL-MCNC: 5.7 G/DL (ref 5.7–8.2)
RBC # BLD AUTO: 3.14 X10(6)UL (ref 3.8–5.3)
SODIUM SERPL-SCNC: 135 MMOL/L (ref 136–145)
WBC # BLD AUTO: 5.2 X10(3) UL (ref 4–11)

## 2025-05-26 PROCEDURE — 99233 SBSQ HOSP IP/OBS HIGH 50: CPT | Performed by: INTERNAL MEDICINE

## 2025-05-26 PROCEDURE — 99233 SBSQ HOSP IP/OBS HIGH 50: CPT | Performed by: HOSPITALIST

## 2025-05-26 PROCEDURE — 99232 SBSQ HOSP IP/OBS MODERATE 35: CPT | Performed by: INTERNAL MEDICINE

## 2025-05-26 RX ORDER — CHLORHEXIDINE GLUCONATE 40 MG/ML
SOLUTION TOPICAL
Status: COMPLETED | OUTPATIENT
Start: 2025-05-27 | End: 2025-05-27

## 2025-05-26 RX ORDER — ISOSORBIDE DINITRATE 20 MG/1
20 TABLET ORAL
Status: DISCONTINUED | OUTPATIENT
Start: 2025-05-26 | End: 2025-06-04

## 2025-05-26 RX ORDER — DEXTROSE MONOHYDRATE 50 MG/ML
INJECTION, SOLUTION INTRAVENOUS CONTINUOUS
Status: DISCONTINUED | OUTPATIENT
Start: 2025-05-27 | End: 2025-05-26

## 2025-05-26 RX ORDER — ASPIRIN 81 MG/1
324 TABLET, CHEWABLE ORAL ONCE
Status: COMPLETED | OUTPATIENT
Start: 2025-05-27 | End: 2025-05-27

## 2025-05-26 RX ORDER — HYDRALAZINE HYDROCHLORIDE 25 MG/1
25 TABLET, FILM COATED ORAL EVERY 8 HOURS SCHEDULED
Status: DISCONTINUED | OUTPATIENT
Start: 2025-05-26 | End: 2025-06-04

## 2025-05-26 RX ORDER — SODIUM CHLORIDE 9 MG/ML
INJECTION, SOLUTION INTRAVENOUS
Status: ACTIVE | OUTPATIENT
Start: 2025-05-27 | End: 2025-05-27

## 2025-05-26 NOTE — PLAN OF CARE
Problem: CARDIOVASCULAR - ADULT  Goal: Maintains optimal cardiac output and hemodynamic stability  Description: INTERVENTIONS:  - Monitor vital signs, rhythm, and trends  - Monitor for bleeding, hypotension and signs of decreased cardiac output  - Evaluate effectiveness of vasoactive medications to optimize hemodynamic stability  - Monitor arterial and/or venous puncture sites for bleeding and/or hematoma  - Assess quality of pulses, skin color and temperature  - Assess for signs of decreased coronary artery perfusion - ex. Angina  - Evaluate fluid balance, assess for edema, trend weights  Outcome: Progressing  Goal: Absence of cardiac arrhythmias or at baseline  Description: INTERVENTIONS:  - Continuous cardiac monitoring, monitor vital signs, obtain 12 lead EKG if indicated  - Evaluate effectiveness of antiarrhythmic and heart rate control medications as ordered  - Initiate emergency measures for life threatening arrhythmias  - Monitor electrolytes and administer replacement therapy as ordered  Outcome: Progressing     Problem: RESPIRATORY - ADULT  Goal: Achieves optimal ventilation and oxygenation  Description: INTERVENTIONS:  - Assess for changes in respiratory status  - Assess for changes in mentation and behavior  - Position to facilitate oxygenation and minimize respiratory effort  - Oxygen supplementation based on oxygen saturation or ABGs  - Provide Smoking Cessation handout, if applicable  - Encourage broncho-pulmonary hygiene including cough, deep breathe, Incentive Spirometry  - Assess the need for suctioning and perform as needed  - Assess and instruct to report SOB or any respiratory difficulty  - Respiratory Therapy support as indicated  - Manage/alleviate anxiety  - Monitor for signs/symptoms of CO2 retention  Outcome: Progressing     Problem: GENITOURINARY - ADULT  Goal: Absence of urinary retention  Description: INTERVENTIONS:  - Assess patient’s ability to void and empty bladder  - Monitor  intake/output and perform bladder scan as needed  - Follow urinary retention protocol/standard of care  - Consider collaborating with pharmacy to review patient's medication profile  - Implement strategies to promote bladder emptying  Outcome: Progressing          Problem: Diabetes/Glucose Control  Goal: Glucose maintained within prescribed range  Description: INTERVENTIONS:  - Monitor Blood Glucose as ordered  - Assess for signs and symptoms of hyperglycemia and hypoglycemia  - Administer ordered medications to maintain glucose within target range  - Assess barriers to adequate nutritional intake and initiate nutrition consult as needed  - Instruct patient on self management of diabetes  Outcome: Progressing

## 2025-05-26 NOTE — PROGRESS NOTES
[facility]  part of [facility]    Progress Note    Radha Yu Patient Status:  Inpatient    1940 MRN [id number]   Location [facility]5W Attending Britni Watson,    Hosp Day # 4 PCP Stef Velasco MD     Chief complaint     Subjective:   Radha Yu is a(n) 84 year old female Pt doing well. No c/o's.     Blood sugar was low this am.     ROS:   No cp, sob   No c/d   No n/v     Objective:     Blood pressure 128/49, pulse 78, temperature 97.5 °F (36.4 °C), temperature source Oral, resp. rate 20, weight 123 lb 14.4 oz (56.2 kg), SpO2 99%.      Intake/Output Summary (Last 24 hours) at 2025 1352  Last data filed at 2025 1304  Gross per 24 hour   Intake 640 ml   Output 2450 ml   Net -1810 ml       Patient Weight(s) for the past 336 hrs:   Weight   25 2146 123 lb 14.4 oz (56.2 kg)   25 1114 118 lb 13.3 oz (53.9 kg)           General appearance: alert, appears stated age and cooperative  Pulmonary:  clear to auscultation bilaterally  Cardiovascular: S1, S2 normal, no murmur, click, rub or gallop, regular rate and rhythm  Abdominal: soft, non-tender; bowel sounds normal; no masses,  no organomegaly  Extremities: extremities normal, atraumatic, no cyanosis or edema        Medicines:     Current Hospital Medications[1]            Blood Sugar Medications            insulin aspart 100 Units/mL Subcutaneous Solution Pen-injector    insulin glargine 100 UNIT/ML Subcutaneous Solution            Blood Pressure and Cardiac Medications            amLODIPine 5 MG Oral Tab            Medication Infusions[2]        Lab Results   Component Value Date    WBC 5.2 2025    HGB 8.3 (L) 2025    HCT 26.6 (L) 2025    .0 2025    CREATSERUM 3.58 (H) 2025    BUN 75 (H) 2025     (L) 2025    K 4.7 2025     2025    CO2 23.0 2025    GLU 88 2025    CA 7.6 (L) 2025    ALB 3.6 2025    ALKPHO 73  05/26/2025    BILT 0.2 05/26/2025    TP 5.7 05/26/2025    AST 16 05/26/2025    ALT <7 (L) 05/26/2025    PTT 44.4 (H) 05/23/2025    INR 1.16 05/22/2025    MG 1.9 05/26/2025    PHOS 3.9 05/26/2025       No results found.        Results:     CBC:    Lab Results   Component Value Date    WBC 5.2 05/26/2025    WBC 6.2 05/25/2025    WBC 8.5 05/24/2025     Lab Results   Component Value Date    HGB 8.3 (L) 05/26/2025    HGB 8.7 (L) 05/25/2025    HGB 8.9 (L) 05/24/2025      Lab Results   Component Value Date    .0 05/26/2025    .0 05/25/2025    .0 05/24/2025       Recent Labs   Lab 05/24/25  0526 05/25/25  0516 05/26/25  0551   GLU 54* 74 88   BUN 42* 57* 75*   CREATSERUM 2.50* 3.23* 3.58*   CA 8.3* 7.9* 7.6*    136 135*   K 4.2 4.5 4.7    103 102   CO2 25.0 23.0 23.0           Assessment and Plan:     NSTEMI type 1 with new ischemic cardiomyopathy acute systolic chf  - left heart cath tomorrow ; apprec cards   - heparin on hold due to acute drop in hb      Tricuspid and mitral regurg     PVD     HL     HTN     Acute on chronic anemia of renal disease  - possibly baseline anemia from ckd  - heparin stopped; gi defers invasive dx till more stable  - hb stable today      acute copd exacerbation  - pulm consulted signed off no steroids; no abx  - plan nebs scheduled and prn  - plan pulm hygiene  - wean oxygen as able     DEBO on CKD stage 4 better  - nephrology consulted  - tolerating HD   - hd cath placed      DM  - hypoglycemic this am. Hold tresiba.   - accuchecks and ISS                       complex mdm; coordinating care with nurse and counseling pt and with her permission her nephew in room  about chf/sob/diet/cath/dialysis     Dispo: poss dc when dialysis/lisa set up and after cath; perhaps wed       Britni Watson,          Chart reviewed, including current vitals, notes, labs and imaging  Labs ordered and medications adjusted as outlined above  Coordinate care with care  team/consultants  Discussed with patient results of tests, management plan as outlined above, and the need for ongoing hospitalization  D/w RN     [facility]        5/26/2025             Supplementary Documentation:   DVT Mechanical Prophylaxis:   SCDs,    DVT Pharmacologic Prophylaxis   Medication    heparin (Porcine) 1000 UNIT/ML injection 1,500 Units    heparin (Porcine) 100 Units/mL lock flush 150 Units    heparin (Porcine) 1000 UNIT/ML injection 1,500 Units      DVT Pharmacologic prophylaxis: Aspirin 162 mg  DVT Pharmacologic prophylaxis: Aspirin 81 mg      Code Status: Full Code  Rao: External urinary catheter in place  Rao Duration (in days):   Central line:    FRANCIS:                             [1]   Current Facility-Administered Medications   Medication Dose Route Frequency    hydrALAZINE (Apresoline) tab 25 mg  25 mg Oral Q8H BLANE    isosorbide dinitrate (Isordil) tab 20 mg  20 mg Oral TID (Nitrates)    [START ON 5/27/2025] chlorhexidine (Hibiclens) 4 % external liquid   Topical On Call    [START ON 5/27/2025] sodium chloride 0.9% infusion   Intravenous On Call    [START ON 5/27/2025] aspirin chewable tab 324 mg  324 mg Oral Once    metoprolol succinate (Toprol XL) partial tablet 12.5 mg  12.5 mg Oral Daily Beta Blocker    pantoprazole (Protonix) DR tab 40 mg  40 mg Oral BID AC    sodium chloride 0.9 % IV bolus 100 mL  100 mL Intravenous Q30 Min PRN    And    albumin human (Albumin) 25% injection 25 g  25 g Intravenous PRN Dialysis    heparin (Porcine) 1000 UNIT/ML injection 1,500 Units  1.5 mL Intracatheter Once    calcitriol (Rocaltrol) cap 0.25 mcg  0.25 mcg Oral Q MWF    ipratropium-albuterol (Duoneb) 0.5-2.5 (3) MG/3ML inhalation solution 3 mL  3 mL Nebulization Q6H PRN    HYDROcodone-acetaminophen (Norco) 5-325 MG per tab 1 tablet  1 tablet Oral Q6H PRN    epoetin brittany (Epogen, Procrit) 5,000 Units injection  5,000 Units Subcutaneous Once per day on Monday Wednesday Friday    [Held by provider]  aspirin DR tab 81 mg  81 mg Oral Daily    atorvastatin (Lipitor) tab 20 mg  20 mg Oral Nightly    clopidogrel (Plavix) tab 75 mg  75 mg Oral Daily    escitalopram (Lexapro) tab 10 mg  10 mg Oral Daily    glucose (Dex4) 15 GM/59ML oral liquid 15 g  15 g Oral Q15 Min PRN    Or    glucose (Glutose) 40% oral gel 15 g  15 g Oral Q15 Min PRN    Or    glucose-vitamin C (Dex-4) chewable tab 4 tablet  4 tablet Oral Q15 Min PRN    Or    dextrose 50% injection 50 mL  50 mL Intravenous Q15 Min PRN    Or    glucose (Dex4) 15 GM/59ML oral liquid 30 g  30 g Oral Q15 Min PRN    Or    glucose (Glutose) 40% oral gel 30 g  30 g Oral Q15 Min PRN    Or    glucose-vitamin C (Dex-4) chewable tab 8 tablet  8 tablet Oral Q15 Min PRN    acetaminophen (Tylenol Extra Strength) tab 500 mg  500 mg Oral Q4H PRN    melatonin tab 3 mg  3 mg Oral Nightly PRN    polyethylene glycol (PEG 3350) (Miralax) 17 g oral packet 17 g  17 g Oral Daily PRN    sennosides (Senokot) tab 17.2 mg  17.2 mg Oral Nightly PRN    bisacodyl (Dulcolax) 10 MG rectal suppository 10 mg  10 mg Rectal Daily PRN    ondansetron (Zofran) 4 MG/2ML injection 4 mg  4 mg Intravenous Q6H PRN    benzonatate (Tessalon) cap 200 mg  200 mg Oral TID PRN    guaiFENesin (Robitussin) 100 MG/5 ML oral liquid 200 mg  200 mg Oral Q4H PRN    glycerin-hypromellose- (Artificial Tears) 0.2-0.2-1 % ophthalmic solution 1 drop  1 drop Both Eyes QID PRN    sodium chloride (Saline Mist) 0.65 % nasal solution 1 spray  1 spray Each Nare Q3H PRN    metoclopramide (Reglan) 5 mg/mL injection 10 mg  10 mg Intravenous Daily PRN    insulin aspart (NovoLOG) 100 Units/mL FlexPen 1-7 Units  1-7 Units Subcutaneous TID CC    heparin (Porcine) 100 Units/mL lock flush 150 Units  1.5 mL Intravenous Once    [Held by provider] insulin aspart (NovoLOG) 100 Units/mL FlexPen 5 Units  5 Units Subcutaneous TID CC and HS    heparin (Porcine) 1000 UNIT/ML injection 1,500 Units  1.5 mL Intracatheter PRN Dialysis   [2]

## 2025-05-26 NOTE — PLAN OF CARE
Evanius RN provided education about renal diet, this RN reeinforced at bedside.   NPO at midnight for L heart cath tomorrow.   Spoke w/patients nephew, made aware of transfer to room 309    Problem: CARDIOVASCULAR - ADULT  Goal: Maintains optimal cardiac output and hemodynamic stability  Description: INTERVENTIONS:  - Monitor vital signs, rhythm, and trends  - Monitor for bleeding, hypotension and signs of decreased cardiac output  - Evaluate effectiveness of vasoactive medications to optimize hemodynamic stability  - Monitor arterial and/or venous puncture sites for bleeding and/or hematoma  - Assess quality of pulses, skin color and temperature  - Assess for signs of decreased coronary artery perfusion - ex. Angina  - Evaluate fluid balance, assess for edema, trend weights  5/26/2025 1305 by Marietta Daugherty, RN  Outcome: Progressing  5/26/2025 1106 by Marietta Daugherty, RN  Outcome: Progressing  Goal: Absence of cardiac arrhythmias or at baseline  Description: INTERVENTIONS:  - Continuous cardiac monitoring, monitor vital signs, obtain 12 lead EKG if indicated  - Evaluate effectiveness of antiarrhythmic and heart rate control medications as ordered  - Initiate emergency measures for life threatening arrhythmias  - Monitor electrolytes and administer replacement therapy as ordered  5/26/2025 1305 by Marietta Daugherty, RN  Outcome: Progressing  5/26/2025 1106 by Marietta Daugherty, RN  Outcome: Progressing     Problem: RESPIRATORY - ADULT  Goal: Achieves optimal ventilation and oxygenation  Description: INTERVENTIONS:  - Assess for changes in respiratory status  - Assess for changes in mentation and behavior  - Position to facilitate oxygenation and minimize respiratory effort  - Oxygen supplementation based on oxygen saturation or ABGs  - Provide Smoking Cessation handout, if applicable  - Encourage broncho-pulmonary hygiene including cough, deep breathe, Incentive Spirometry  - Assess the need for suctioning and  perform as needed  - Assess and instruct to report SOB or any respiratory difficulty  - Respiratory Therapy support as indicated  - Manage/alleviate anxiety  - Monitor for signs/symptoms of CO2 retention  5/26/2025 1305 by Marietta Daugherty RN  Outcome: Progressing  5/26/2025 1106 by Marietta Daugherty RN  Outcome: Progressing     Problem: GENITOURINARY - ADULT  Goal: Absence of urinary retention  Description: INTERVENTIONS:  - Assess patient’s ability to void and empty bladder  - Monitor intake/output and perform bladder scan as needed  - Follow urinary retention protocol/standard of care  - Consider collaborating with pharmacy to review patient's medication profile  - Implement strategies to promote bladder emptying  5/26/2025 1305 by Marietta Daugherty RN  Outcome: Progressing  5/26/2025 1106 by Marietta Daugherty RN  Outcome: Progressing     Problem: Patient Centered Care  Goal: Patient preferences are identified and integrated in the patient's plan of care  Description: Interventions:  - What would you like us to know as we care for you? From home w/ nephew  - Provide timely, complete, and accurate information to patient/family  - Incorporate patient and family knowledge, values, beliefs, and cultural backgrounds into the planning and delivery of care  - Encourage patient/family to participate in care and decision-making at the level they choose  - Honor patient and family perspectives and choices  5/26/2025 1305 by Marietta Daugherty RN  Outcome: Progressing  5/26/2025 1106 by Marietta Daugherty RN  Outcome: Progressing     Problem: Diabetes/Glucose Control  Goal: Glucose maintained within prescribed range  Description: INTERVENTIONS:  - Monitor Blood Glucose as ordered  - Assess for signs and symptoms of hyperglycemia and hypoglycemia  - Administer ordered medications to maintain glucose within target range  - Assess barriers to adequate nutritional intake and initiate nutrition consult as needed  - Instruct  patient on self management of diabetes  5/26/2025 1305 by Marietta Daugherty RN  Outcome: Progressing  5/26/2025 1106 by Marietta Daugherty RN  Outcome: Progressing     Problem: Patient/Family Goals  Goal: Patient/Family Long Term Goal  Description: Patient's Long Term Goal: discharge     Interventions:  - cards and nephrology consult   - See additional Care Plan goals for specific interventions  5/26/2025 1305 by Marietta Daugherty RN  Outcome: Progressing  5/26/2025 1106 by Marietta Daugherty RN  Outcome: Progressing  Goal: Patient/Family Short Term Goal  Description: Patient's Short Term Goal: feel better     Interventions:   - monitor labs, monitor vitals,   - See additional Care Plan goals for specific interventions  5/26/2025 1305 by Marietta Daugherty RN  Outcome: Progressing  5/26/2025 1106 by Marietta Daugherty RN  Outcome: Progressing     Problem: METABOLIC/FLUID AND ELECTROLYTES - ADULT  Goal: Glucose maintained within prescribed range  Description: INTERVENTIONS:  - Monitor Blood Glucose as ordered  - Assess for signs and symptoms of hyperglycemia and hypoglycemia  - Administer ordered medications to maintain glucose within target range  - Assess barriers to adequate nutritional intake and initiate nutrition consult as needed  - Instruct patient on self management of diabetes  5/26/2025 1305 by Marietta Daugherty RN  Outcome: Progressing  5/26/2025 1106 by Marietta Daugherty RN  Outcome: Progressing  Goal: Electrolytes maintained within normal limits  Description: INTERVENTIONS:  - Monitor labs and rhythm and assess patient for signs and symptoms of electrolyte imbalances  - Administer electrolyte replacement as ordered  - Monitor response to electrolyte replacements, including rhythm and repeat lab results as appropriate  - Fluid restriction as ordered  - Instruct patient on fluid and nutrition restrictions as appropriate  Outcome: Progressing  Goal: Hemodynamic stability and optimal renal function  maintained  Description: INTERVENTIONS:  - Monitor labs and assess for signs and symptoms of volume excess or deficit  - Monitor intake, output and patient weight  - Monitor urine specific gravity, serum osmolarity and serum sodium as indicated or ordered  - Monitor response to interventions for patient's volume status, including labs, urine output, blood pressure (other measures as available)  - Encourage oral intake as appropriate  - Instruct patient on fluid and nutrition restrictions as appropriate  Outcome: Progressing

## 2025-05-26 NOTE — PROGRESS NOTES
[facility]  part of [facility]    Progress Note    Radha Yu Patient Status:  Inpatient    1940 MRN [id number]   Location [facility]5W Attending Britni Watson,    Hosp Day # 4 PCP Stef Velasco MD       Subjective:   Subjective:  Up to the chair  Comfortable on room air  Denied any chest pain or cough or sputum or wheezes  No fever  Objective:   Blood pressure 142/53, pulse 69, temperature 97.5 °F (36.4 °C), temperature source Oral, resp. rate 20, weight 123 lb 14.4 oz (56.2 kg), SpO2 99%.  Physical Exam  Vitals and nursing note reviewed.   Constitutional:       General: She is not in acute distress.     Appearance: She is ill-appearing.   HENT:      Head: Atraumatic.      Nose: Nose normal.      Mouth/Throat:      Mouth: Mucous membranes are moist.   Eyes:      General: No scleral icterus.  Cardiovascular:      Rate and Rhythm: Normal rate.      Heart sounds:      No gallop.   Pulmonary:      Effort: No respiratory distress.      Breath sounds: No stridor. No wheezing, rhonchi or rales.   Abdominal:      General: Abdomen is flat. Bowel sounds are normal.      Palpations: Abdomen is soft.   Musculoskeletal:      Cervical back: Normal range of motion.      Right lower leg: No edema.      Left lower leg: No edema.   Skin:     General: Skin is dry.   Neurological:      General: No focal deficit present.      Mental Status: She is oriented to person, place, and time.         Results:   Lab Results   Component Value Date    WBC 5.2 2025    HGB 8.3 (L) 2025    HCT 26.6 (L) 2025    .0 2025    CREATSERUM 3.58 (H) 2025    BUN 75 (H) 2025     (L) 2025    K 4.7 2025     2025    CO2 23.0 2025    GLU 88 2025    CA 7.6 (L) 2025    ALB 3.6 2025    ALKPHO 73 2025    BILT 0.2 2025    TP 5.7 2025    AST 16 2025    ALT <7 (L) 2025    PTT 44.4 (H) 2025    INR  1.16 05/22/2025    MG 1.9 05/26/2025    PHOS 3.9 05/26/2025    TROPHS 426 () 05/22/2025           Assessment & Plan:      1- acute HFrEF LVEF 45 % , NSTEMI   Patient was on heparin drip now off since hemoglobin dropped  Diuretics as needed  Beta-blocker, statin, aspirin, Plavix  Cardiac cath in a.m.    2-acute on chronic stage IV kidney injury  Started on hemodialysis  Renal following    3-acute respiratory failure with hypoxia  Secondary to pulmonary edema possible underlying COPD  Better overall and now on room air  Avoid steroid  Neb as needed    4-DVT prophylaxis  No heparin on hold b/o anemia           Kristin Ashford MD  5/26/2025

## 2025-05-26 NOTE — PROGRESS NOTES
Progress Note  Radha Yu Patient Status:  Inpatient    1940 MRN [id number]   Location [facility]5W Attending Britni Watson DO   Hosp Day # 4 PCP Stef Velasco MD     SUBJECTIVE:    Denies chest pain or dyspnea.     VITALS:  /53 (BP Location: Right arm)   Pulse 69   Temp 97.5 °F (36.4 °C) (Oral)   Resp 20   Wt 123 lb 14.4 oz (56.2 kg)   SpO2 99%   BMI 23.43 kg/m²   INTAKE/OUTPUT:    Intake/Output Summary (Last 24 hours) at 2025 0901  Last data filed at 2025 2140  Gross per 24 hour   Intake 480 ml   Output --   Net 480 ml     Last 3 Weights   25 2146 123 lb 14.4 oz (56.2 kg)   25 1114 118 lb 13.3 oz (53.9 kg)   25 1830 123 lb 7.3 oz (56 kg)   25 0700 123 lb 7.3 oz (56 kg)   25 1805 123 lb 6.4 oz (56 kg)   25 1121 139 lb 1.8 oz (63.1 kg)     LABS:  Recent Labs   Lab 25  0526 25  0516 25  0551   GLU 54* 74 88   BUN 42* 57* 75*   CREATSERUM 2.50* 3.23* 3.58*   EGFRCR 18* 13* 12*   CA 8.3* 7.9* 7.6*    136 135*   K 4.2 4.5 4.7    103 102   CO2 25.0 23.0 23.0     Recent Labs   Lab 25  0413 25  1635 25  0526 25  0516 25  0551   RBC 2.62*  --  3.32* 3.24* 3.14*   HGB 6.9*   < > 8.9* 8.7* 8.3*   HCT 21.4*   < > 27.3* 27.8* 26.6*   MCV 81.7  --  82.2 85.8 84.7   MCH 26.3  --  26.8 26.9 26.4   MCHC 32.2  --  32.6 31.3 31.2   RDW 15.6*  --  16.1* 16.0* 15.9*   NEPRELIM 4.86  --   --  4.24 2.98   WBC 5.6  --  8.5 6.2 5.2   .0  --  192.0 167.0 155.0    < > = values in this interval not displayed.     No results for input(s): \"TROP\", \"CK\" in the last 168 hours.  DIAGNOSTICS:  TELEMETRY: Not tele     Echo 25  Conclusions:   1. Left ventricle: The cavity size was normal. Wall thickness was mildly      increased. Systolic function was moderately reduced. The estimated      ejection fraction was 35-40%, by biplane method of disks. Doppler      parameters are consistent with abnormal  left ventricular relaxation -      grade 1 diastolic dysfunction.   2. Left atrium: The atrium was markedly dilated.   3. Right atrium: The atrium was dilated.   4. Atrial septum: A patent foramen ovale cannot be excluded.   5. Aortic valve: There was mild regurgitation.   6. Mitral valve: The annulus was mildly to moderately calcified. The      leaflets were normal thickness. There was moderate to severe      regurgitation.   7. Tricuspid valve: There was mild-moderate regurgitation.     ROS: Negative unless noted above     PHYSICAL EXAM:  General: Alert and oriented x 3. No apparent distress.  HEENT: Normocephalic, sclera are nonicteric. Hearing appropriate bilaterally.  Neck: No JVD or Carotid bruits. Trachea midline.   Cardiac: IRRegular rate. S1, S2 auscultated. +murmur   Lungs: Clear without wheezes, rales, rhonchi or dullness. Chest expansion symmetrical. Regular effort.  Abdomen: Soft, non-tender, +BS. No hepatosplenomegaly or appreciable masses.   Extremities: Without clubbing, cyanosis. Peripheral pulses are 2+. Edema none   Neurologic: Motor and sensory nerves grossly intact.   Psych: Appropriate affect   Skin: Warm and dry. No obvious lesions, wounds, or ulcerations.     MEDICATIONS:  Scheduled Medications[1]  Medication Infusions[2]    ASSESSMENT:    Patient is an 84-year-old female with a history of PVD s/p left femoral-popliteal bypass 2015, s/p right TCAR and total occluded left ICA, HTN, HLD, DM, and CKD who presents with shortness of breath. Family stated that yesterday she became more short of breath which persisted overnight, was pale with increased work of breathing. Noticed audible wheezing with some coughing therefore brought her to the hospital.     Acute Hypoxic Respiratory Failure   Acute Bronchitits/COPD/ Pulmonary Edema   Acute HF/ New LV Dysfunction   Mod-Severe TR & MR  - Pulm s/ 5/25, s/p steroids  - proBNP 29,411  - ECHO with new LVEF 35-40%, no WMA  - Volume per nephro w/ HD  - BB,  not on ACE/ARB/ARNI/MRA 2/2 renal fx     Type I Vs Type II NSTEMI  - Troponin up to 426  - EKG with lateral Twave inversion  - ECHO as above     ESRD- New to HD- MWF  - Nephro following     Acute Anemia   - Hgb 6.9 on 5/23 , s/p I U PRBCs   - GI eval, defer invasive procedures with Hgb stabilization  - Epogen  - Iron Sat 10%    PVD s/p left femoropopliteal bypass 9/2015- ASA, Plavix   Moderate right ICA stenosis s/p right TCAR 2/2019, totally occluded left ICA  HTN- Uncontrolled   HLD- LDL 48, Lipitor 20 mg   DMI- A1c 6.5%    PLAN:  - LHC tomorrow with new LV dysfunction, possible add on RHC with valve dysfunction  - Start nitrates for LV dysfunction and uncontrolled HTN   - HF  to see tomorrow, will need HF follow up with [facility] on discharge   - Eventual Jardiance   - Start telemetry, irregular rhythm on exam, possibly A-fib if having paroxysmal A-fib outpatient could have possibly caused LV dysfunction  - Transferred to telemetry when bed available     Plan of care discussed with patient and RN.     Mary Sánchez, MSN, FNP-BC, CCK  05/26/25   9:01 AM        L3  Seen and examined bedside. Agree with the present management, will follow with you.    New onset cardiomyopathy plan for left and right heart catheterization tomorrow n.p.o. after midnight    ESRD- New to HD- MWF  Nephrology following   Hgb 6.9 on 5/23 , s/p I U PRBCs   Thank you for allowing me to participate in the care of your patient, feel free to contact me if you have any questions.    Rahul Meraz MD  [facility]  Interventional Cardiology.             [1]    metoprolol succinate  12.5 mg Oral Daily Beta Blocker    pantoprazole  40 mg Oral BID AC    heparin  1.5 mL Intracatheter Once    calcitriol  0.25 mcg Oral Q MWF    insulin degludec  9 Units Subcutaneous Nightly    epoetin brittany  5,000 Units Subcutaneous Once per day on Monday Wednesday Friday    aspirin  81 mg Oral Daily    atorvastatin  20 mg Oral  Nightly    clopidogrel  75 mg Oral Daily    escitalopram  10 mg Oral Daily    insulin aspart  1-7 Units Subcutaneous TID CC    heparin  1.5 mL Intravenous Once    [Held by provider] insulin aspart  5 Units Subcutaneous TID CC and HS   [2]

## 2025-05-26 NOTE — PROGRESS NOTES
[facility]  Nephrology Daily Progress Note    Radha Yu  [id number]  84 year old      HPI:   Radha Yu is a 84 year old female.  Patient had hemodialysis earlier today.  Tolerated well with 2 L fluid removal.  Currently denies any chest pain or shortness of breath.  Comfortable at rest.      ROS:     Constitutional:  Negative for decreased activity, fever, irritability and lethargy  Endocrine:  Negative for abnormal sleep patterns, increased activity, polydipsia and polyphagia  Cardiovascular:  Negative for cool extremity and irregular heartbeat/palpitations  Gastrointestinal:  Negative for abdominal pain, constipation, decreased appetite, diarrhea and vomiting  Genitourinary:  Negative for dysuria and hematuria  Hema/Lymph:  Negative for easy bleeding and easy bruising  Integumentary:  Negative for pruritus and rash  Musculoskeletal:  Negative for bone/joint symptoms  Neurological:  Negative for gait disturbance  Psychiatric:  Negative for inappropriate interaction and psychiatric symptoms  Respiratory:  Negative for cough, dyspnea and wheezing      PHYSICAL EXAM:   Temp:  [97.2 °F (36.2 °C)-98.8 °F (37.1 °C)] 97.5 °F (36.4 °C)  Pulse:  [69-89] 78  Resp:  [18-20] 20  BP: (128-166)/(46-68) 128/49  SpO2:  [96 %-99 %] 99 %  No data found.    Constitutional: appears well hydrated alert and responsive no acute distress noted  Neck/Thyroid: neck is supple without adenopathy  Lymphatic: no abnormal cervical, supraclavicular or axillary adenopathy is noted  Respiratory: normal to inspection lungs are clear to auscultation bilaterally normal respiratory effort  Cardiovascular: regular rate and rhythm no murmurs, gallups, or rubs  Abdomen: soft non-tender non-distended no organomegaly noted no masses  Musculoskeletal: full ROM all extremities good strength  no deformities  Extremities: no edema, cyanosis, or clubbing  Neurological: exam appropriate for age reflexes and motor skills appropriate for  age    Labs:  Lab Results   Component Value Date    WBC 5.2 05/26/2025    HGB 8.3 05/26/2025    HCT 26.6 05/26/2025    .0 05/26/2025    CREATSERUM 3.58 05/26/2025    BUN 75 05/26/2025     05/26/2025    K 4.7 05/26/2025     05/26/2025    CO2 23.0 05/26/2025    GLU 88 05/26/2025    CA 7.6 05/26/2025    ALB 3.6 05/26/2025    ALKPHO 73 05/26/2025    BILT 0.2 05/26/2025    TP 5.7 05/26/2025    AST 16 05/26/2025    ALT <7 05/26/2025    MG 1.9 05/26/2025    PHOS 3.9 05/26/2025     Recent Labs   Lab 05/24/25  0526 05/25/25  0516 05/26/25  0551   WBC 8.5 6.2 5.2   HGB 8.9* 8.7* 8.3*   HCT 27.3* 27.8* 26.6*   .0 167.0 155.0     Recent Labs   Lab 05/23/25  0413 05/24/25  0526 05/25/25  0516 05/26/25  0551   * 54* 74 88   BUN 57* 42* 57* 75*   CREATSERUM 2.84* 2.50* 3.23* 3.58*   CA 8.3* 8.3* 7.9* 7.6*   ALB 3.8  --  3.8 3.6    136 136 135*   K 4.5 4.2 4.5 4.7    101 103 102   CO2 23.0 25.0 23.0 23.0   ALKPHO  --   --   --  73   AST  --   --   --  16   ALT  --   --   --  <7*   BILT  --   --   --  0.2   TP  --   --   --  5.7   PHOS 4.5  --  3.8 3.9       Imaging  No results found.      Medications:  Current Hospital Medications[1]    Allergies:  Allergies[2]    Input/Output:    Intake/Output Summary (Last 24 hours) at 5/26/2025 1708  Last data filed at 5/26/2025 1304  Gross per 24 hour   Intake 640 ml   Output 2450 ml   Net -1810 ml          ASSESSMENT/PLAN:   Assessment   Problem List[3]    Overall stable from a renal standpoint.  Scheduled for left heart cath tomorrow.  Otherwise next hemodialysis treatment on Wednesday.  Hemoglobin 8.3.  On  Epogen.  Check iron studies.             5/26/2025  Lukasz Sharma MD               [1]   Current Facility-Administered Medications:     hydrALAZINE (Apresoline) tab 25 mg, 25 mg, Oral, Q8H BLANE    isosorbide dinitrate (Isordil) tab 20 mg, 20 mg, Oral, TID (Nitrates)    [START ON 5/27/2025] chlorhexidine (Hibiclens) 4 % external liquid, ,  Topical, On Call    [START ON 5/27/2025] sodium chloride 0.9% infusion, , Intravenous, On Call    [START ON 5/27/2025] aspirin chewable tab 324 mg, 324 mg, Oral, Once    metoprolol succinate (Toprol XL) partial tablet 12.5 mg, 12.5 mg, Oral, Daily Beta Blocker    pantoprazole (Protonix) DR tab 40 mg, 40 mg, Oral, BID AC    sodium chloride 0.9 % IV bolus 100 mL, 100 mL, Intravenous, Q30 Min PRN **AND** albumin human (Albumin) 25% injection 25 g, 25 g, Intravenous, PRN Dialysis    heparin (Porcine) 1000 UNIT/ML injection 1,500 Units, 1.5 mL, Intracatheter, Once    calcitriol (Rocaltrol) cap 0.25 mcg, 0.25 mcg, Oral, Q MWF    ipratropium-albuterol (Duoneb) 0.5-2.5 (3) MG/3ML inhalation solution 3 mL, 3 mL, Nebulization, Q6H PRN    HYDROcodone-acetaminophen (Norco) 5-325 MG per tab 1 tablet, 1 tablet, Oral, Q6H PRN    epoetin brittany (Epogen, Procrit) 5,000 Units injection, 5,000 Units, Subcutaneous, Once per day on Monday Wednesday Friday    [Held by provider] aspirin DR tab 81 mg, 81 mg, Oral, Daily    atorvastatin (Lipitor) tab 20 mg, 20 mg, Oral, Nightly    clopidogrel (Plavix) tab 75 mg, 75 mg, Oral, Daily    escitalopram (Lexapro) tab 10 mg, 10 mg, Oral, Daily    glucose (Dex4) 15 GM/59ML oral liquid 15 g, 15 g, Oral, Q15 Min PRN **OR** glucose (Glutose) 40% oral gel 15 g, 15 g, Oral, Q15 Min PRN **OR** glucose-vitamin C (Dex-4) chewable tab 4 tablet, 4 tablet, Oral, Q15 Min PRN **OR** dextrose 50% injection 50 mL, 50 mL, Intravenous, Q15 Min PRN **OR** glucose (Dex4) 15 GM/59ML oral liquid 30 g, 30 g, Oral, Q15 Min PRN **OR** glucose (Glutose) 40% oral gel 30 g, 30 g, Oral, Q15 Min PRN **OR** glucose-vitamin C (Dex-4) chewable tab 8 tablet, 8 tablet, Oral, Q15 Min PRN    acetaminophen (Tylenol Extra Strength) tab 500 mg, 500 mg, Oral, Q4H PRN    melatonin tab 3 mg, 3 mg, Oral, Nightly PRN    polyethylene glycol (PEG 3350) (Miralax) 17 g oral packet 17 g, 17 g, Oral, Daily PRN    sennosides (Senokot) tab 17.2 mg,  17.2 mg, Oral, Nightly PRN    bisacodyl (Dulcolax) 10 MG rectal suppository 10 mg, 10 mg, Rectal, Daily PRN    ondansetron (Zofran) 4 MG/2ML injection 4 mg, 4 mg, Intravenous, Q6H PRN    benzonatate (Tessalon) cap 200 mg, 200 mg, Oral, TID PRN    guaiFENesin (Robitussin) 100 MG/5 ML oral liquid 200 mg, 200 mg, Oral, Q4H PRN    glycerin-hypromellose- (Artificial Tears) 0.2-0.2-1 % ophthalmic solution 1 drop, 1 drop, Both Eyes, QID PRN    sodium chloride (Saline Mist) 0.65 % nasal solution 1 spray, 1 spray, Each Nare, Q3H PRN    metoclopramide (Reglan) 5 mg/mL injection 10 mg, 10 mg, Intravenous, Daily PRN    insulin aspart (NovoLOG) 100 Units/mL FlexPen 1-7 Units, 1-7 Units, Subcutaneous, TID CC    heparin (Porcine) 100 Units/mL lock flush 150 Units, 1.5 mL, Intravenous, Once    [Held by provider] insulin aspart (NovoLOG) 100 Units/mL FlexPen 5 Units, 5 Units, Subcutaneous, TID CC and HS    heparin (Porcine) 1000 UNIT/ML injection 1,500 Units, 1.5 mL, Intracatheter, PRN Dialysis  [2]   Allergies  Allergen Reactions    Levaquin [Levofloxacin] ITCHING   [3]   Patient Active Problem List  Diagnosis    Closed fracture of left hip ([facility])    Background diabetic retinopathy(362.01)    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[facility])    Essential hypertension, benign    ESRD (end stage renal disease) ([facility])    Occlusion of right carotid artery    Stenosis of left carotid artery    Atherosclerosis of native artery of right lower extremity with ulceration ([facility])    Type 2 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 not on chronic dialysis, with long-term current use of insulin ([facility])    Anemia    Depression    Acute on chronic respiratory failure with hypoxia ([facility])    Acute congestive heart failure, unspecified heart failure type ([facility])    NSTEMI (non-ST elevated myocardial infarction) ([facility])    Acute renal failure superimposed on chronic kidney disease, unspecified acute renal failure type, unspecified CKD stage

## 2025-05-27 ENCOUNTER — APPOINTMENT (OUTPATIENT)
Dept: INTERVENTIONAL RADIOLOGY/VASCULAR | Facility: HOSPITAL | Age: 85
End: 2025-05-27
Payer: MEDICARE

## 2025-05-27 LAB
ALBUMIN SERPL-MCNC: 3.6 G/DL (ref 3.2–4.8)
ANION GAP SERPL CALC-SCNC: 8 MMOL/L (ref 0–18)
AVOX TOTAL HEMOGLOBIN CONCENTRATION: 7.3 G/DL (ref 12–16)
AVOX TOTAL HEMOGLOBIN CONCENTRATION: 7.6 G/DL (ref 12–16)
BASOPHILS # BLD AUTO: 0.03 X10(3) UL (ref 0–0.2)
BASOPHILS NFR BLD AUTO: 0.7 %
BUN BLD-MCNC: 43 MG/DL (ref 9–23)
BUN/CREAT SERPL: 16.1 (ref 10–20)
BUN/CREAT SERPL: 16.1 (ref 10–20)
BUN/CREAT SERPL: 16.5 (ref 10–20)
CALCIUM BLD-MCNC: 7.4 MG/DL (ref 8.7–10.4)
CALCIUM BLD-MCNC: 7.7 MG/DL (ref 8.7–10.4)
CALCIUM BLD-MCNC: 7.7 MG/DL (ref 8.7–10.4)
CHLORIDE SERPL-SCNC: 101 MMOL/L (ref 98–112)
CHLORIDE SERPL-SCNC: 101 MMOL/L (ref 98–112)
CHLORIDE SERPL-SCNC: 102 MMOL/L (ref 98–112)
CO2 SERPL-SCNC: 27 MMOL/L (ref 21–32)
COHGB MFR BLD: 51.5 % (ref 65–80)
COHGB MFR BLD: 91.2 % (ref 95–100)
CREAT BLD-MCNC: 2.61 MG/DL (ref 0.55–1.02)
CREAT BLD-MCNC: 2.67 MG/DL (ref 0.55–1.02)
CREAT BLD-MCNC: 2.67 MG/DL (ref 0.55–1.02)
DEPRECATED RDW RBC AUTO: 48.3 FL (ref 35.1–46.3)
EGFRCR SERPLBLD CKD-EPI 2021: 17 ML/MIN/1.73M2 (ref 60–?)
EOSINOPHIL # BLD AUTO: 0.18 X10(3) UL (ref 0–0.7)
EOSINOPHIL NFR BLD AUTO: 4.2 %
ERYTHROCYTE [DISTWIDTH] IN BLOOD BY AUTOMATED COUNT: 15.9 % (ref 11–15)
GLUCOSE BLD-MCNC: 171 MG/DL (ref 70–99)
GLUCOSE BLD-MCNC: 171 MG/DL (ref 70–99)
GLUCOSE BLD-MCNC: 223 MG/DL (ref 70–99)
GLUCOSE BLDC GLUCOMTR-MCNC: 164 MG/DL (ref 70–99)
GLUCOSE BLDC GLUCOMTR-MCNC: 176 MG/DL (ref 70–99)
GLUCOSE BLDC GLUCOMTR-MCNC: 260 MG/DL (ref 70–99)
GLUCOSE BLDC GLUCOMTR-MCNC: 403 MG/DL (ref 70–99)
HCT VFR BLD AUTO: 26.2 % (ref 35–48)
HGB BLD-MCNC: 8.3 G/DL (ref 12–16)
IMM GRANULOCYTES # BLD AUTO: 0.02 X10(3) UL (ref 0–1)
IMM GRANULOCYTES NFR BLD: 0.5 %
IRON SATN MFR SERPL: 9 % (ref 15–50)
IRON SERPL-MCNC: 23 UG/DL (ref 50–170)
LYMPHOCYTES # BLD AUTO: 1.01 X10(3) UL (ref 1–4)
LYMPHOCYTES NFR BLD AUTO: 23.8 %
MAGNESIUM SERPL-MCNC: 1.8 MG/DL (ref 1.6–2.6)
MAGNESIUM SERPL-MCNC: 1.8 MG/DL (ref 1.6–2.6)
MCH RBC QN AUTO: 26.3 PG (ref 26–34)
MCHC RBC AUTO-ENTMCNC: 31.7 G/DL (ref 31–37)
MCV RBC AUTO: 82.9 FL (ref 80–100)
MONOCYTES # BLD AUTO: 0.41 X10(3) UL (ref 0.1–1)
MONOCYTES NFR BLD AUTO: 9.6 %
NEUTROPHILS # BLD AUTO: 2.6 X10 (3) UL (ref 1.5–7.7)
NEUTROPHILS # BLD AUTO: 2.6 X10(3) UL (ref 1.5–7.7)
NEUTROPHILS NFR BLD AUTO: 61.2 %
OSMOLALITY SERPL CALC.SUM OF ELEC: 297 MOSM/KG (ref 275–295)
OSMOLALITY SERPL CALC.SUM OF ELEC: 297 MOSM/KG (ref 275–295)
OSMOLALITY SERPL CALC.SUM OF ELEC: 302 MOSM/KG (ref 275–295)
PHOSPHATE SERPL-MCNC: 3.2 MG/DL (ref 2.4–5.1)
PLATELET # BLD AUTO: 145 10(3)UL (ref 150–450)
POTASSIUM SERPL-SCNC: 3.6 MMOL/L (ref 3.5–5.1)
POTASSIUM SERPL-SCNC: 3.6 MMOL/L (ref 3.5–5.1)
POTASSIUM SERPL-SCNC: 3.8 MMOL/L (ref 3.5–5.1)
RBC # BLD AUTO: 3.16 X10(6)UL (ref 3.8–5.3)
SODIUM SERPL-SCNC: 136 MMOL/L (ref 136–145)
SODIUM SERPL-SCNC: 136 MMOL/L (ref 136–145)
SODIUM SERPL-SCNC: 137 MMOL/L (ref 136–145)
TOTAL IRON BINDING CAPACITY: 264 UG/DL (ref 250–425)
TRANSFERRIN SERPL-MCNC: 193 MG/DL (ref 250–380)
TSI SER-ACNC: 1.99 UIU/ML (ref 0.55–4.78)
WBC # BLD AUTO: 4.3 X10(3) UL (ref 4–11)

## 2025-05-27 PROCEDURE — 99233 SBSQ HOSP IP/OBS HIGH 50: CPT | Performed by: INTERNAL MEDICINE

## 2025-05-27 PROCEDURE — B2151ZZ FLUOROSCOPY OF LEFT HEART USING LOW OSMOLAR CONTRAST: ICD-10-PCS | Performed by: INTERNAL MEDICINE

## 2025-05-27 PROCEDURE — 99232 SBSQ HOSP IP/OBS MODERATE 35: CPT | Performed by: INTERNAL MEDICINE

## 2025-05-27 PROCEDURE — B2111ZZ FLUOROSCOPY OF MULTIPLE CORONARY ARTERIES USING LOW OSMOLAR CONTRAST: ICD-10-PCS | Performed by: INTERNAL MEDICINE

## 2025-05-27 PROCEDURE — 4A023N8 MEASUREMENT OF CARDIAC SAMPLING AND PRESSURE, BILATERAL, PERCUTANEOUS APPROACH: ICD-10-PCS | Performed by: INTERNAL MEDICINE

## 2025-05-27 PROCEDURE — B41D1ZZ FLUOROSCOPY OF AORTA AND BILATERAL LOWER EXTREMITY ARTERIES USING LOW OSMOLAR CONTRAST: ICD-10-PCS | Performed by: INTERNAL MEDICINE

## 2025-05-27 RX ORDER — VERAPAMIL HYDROCHLORIDE 2.5 MG/ML
INJECTION INTRAVENOUS
Status: COMPLETED
Start: 2025-05-27 | End: 2025-05-27

## 2025-05-27 RX ORDER — LIDOCAINE HYDROCHLORIDE 20 MG/ML
INJECTION, SOLUTION EPIDURAL; INFILTRATION; INTRACAUDAL; PERINEURAL
Status: COMPLETED
Start: 2025-05-27 | End: 2025-05-27

## 2025-05-27 RX ORDER — ASPIRIN 81 MG/1
324 TABLET, CHEWABLE ORAL ONCE
Status: COMPLETED | OUTPATIENT
Start: 2025-05-28 | End: 2025-05-28

## 2025-05-27 RX ORDER — SODIUM CHLORIDE 9 MG/ML
INJECTION, SOLUTION INTRAVENOUS
Status: ACTIVE | OUTPATIENT
Start: 2025-05-28 | End: 2025-05-28

## 2025-05-27 RX ORDER — CLOPIDOGREL BISULFATE 75 MG/1
600 TABLET ORAL ONCE
Status: COMPLETED | OUTPATIENT
Start: 2025-05-27 | End: 2025-05-27

## 2025-05-27 RX ORDER — CHLORHEXIDINE GLUCONATE 40 MG/ML
SOLUTION TOPICAL
Status: ACTIVE | OUTPATIENT
Start: 2025-05-28 | End: 2025-05-28

## 2025-05-27 RX ORDER — IOPAMIDOL 612 MG/ML
90 INJECTION, SOLUTION INTRAVASCULAR
Status: COMPLETED | OUTPATIENT
Start: 2025-05-27 | End: 2025-05-27

## 2025-05-27 RX ORDER — ALBUMIN (HUMAN) 12.5 G/50ML
25 SOLUTION INTRAVENOUS
Status: ACTIVE | OUTPATIENT
Start: 2025-05-27 | End: 2025-05-29

## 2025-05-27 RX ORDER — MIDAZOLAM HYDROCHLORIDE 1 MG/ML
INJECTION INTRAMUSCULAR; INTRAVENOUS
Status: COMPLETED
Start: 2025-05-27 | End: 2025-05-27

## 2025-05-27 RX ORDER — NITROGLYCERIN 20 MG/100ML
INJECTION INTRAVENOUS
Status: COMPLETED
Start: 2025-05-27 | End: 2025-05-27

## 2025-05-27 RX ORDER — HEPARIN SODIUM 1000 [USP'U]/ML
INJECTION, SOLUTION INTRAVENOUS; SUBCUTANEOUS
Status: COMPLETED
Start: 2025-05-27 | End: 2025-05-27

## 2025-05-27 NOTE — PHYSICAL THERAPY NOTE
PHYSICAL THERAPY TREATMENT NOTE - INPATIENT     Room Number: 309/309-A       Presenting Problem: Acute on Chronic  CHF ,anemia  and Respiratory failure / NSTEMI / CKD ESRD new to dialysis this admission / weakness and declining status in home  Co-Morbidities : Prior left hip sx with shorter limb post operatively / Left shoulder fx 2017 / PVD with left femo popliteal Bypass / Moderate right ICA stenosis  right TCAR 2019  / left total occlusion  Left ICA    Problem List  Principal Problem:    Acute on chronic respiratory failure with hypoxia ([facility])  Active Problems:    ESRD (end stage renal disease) ([facility])    Acute congestive heart failure, unspecified heart failure type ([facility])    NSTEMI (non-ST elevated myocardial infarction) ([facility])    Acute renal failure superimposed on chronic kidney disease, unspecified acute renal failure type, unspecified CKD stage      PHYSICAL THERAPY ASSESSMENT   Patient demonstrates limited progress this session, goals  remain in progress.      Patient is requiring minimal assist as a result of the following impairments: decreased functional strength, decreased endurance/aerobic capacity, pain, impaired standing balance, impaired coordination, impaired motor planning, decreased muscular endurance, and medical status.     Patient continues to function below baseline with bed mobility, transfers, gait, stair negotiation, maintaining seated position, standing prolonged periods, and performing household tasks.  Next session anticipate patient to progress bed mobility, transfers, gait, stair negotiation, maintaining seated position, standing prolonged periods, and performing household tasks.  Physical Therapy will continue to follow patient for duration of hospitalization.    Patient continues to benefit from continued skilled PT services: to promote return to prior level of function and safety with continuous assistance and gradual rehabilitative therapy . Pt would require transport assist with 5  stairs to enter home unable to complete stair training. Vs GR with PT OT as a secondary discharge option if [facility] PT 24 hr assist with stair assist to enter is not an option.     PLAN DURING HOSPITALIZATION  Nursing Mobility Recommendation : 1 Assist  PT Device Recommendation: Rolling walker, Gait belt (Pt has a w/c  shower chair  rollator and RW at home)  PT Treatment Plan: Bed mobility, Body mechanics, Endurance, Energy conservation, Family education, Gait training, Range of motion, Strengthening, Transfer training, Balance training, Stoop training, Stair training  Frequency (Obs): 3-5x/week     SUBJECTIVE  I feel limited walking to the door and back that's my max right now I am not able to do any stairs. I am too weak for stairs.     OBJECTIVE  Precautions: Cardiac, Bed/chair alarm, Limb alert - left, Hard of hearing (wears HA)    WEIGHT BEARING RESTRICTION       PAIN ASSESSMENT   Ratin          BALANCE  Static Sitting: Fair +  Dynamic Sitting: Fair  Static Standing: Poor +  Dynamic Standing: Poor +    ACTIVITY TOLERANCE                          O2 WALK  Oxygen Therapy  SPO2% Ambulation on Room Air: 100    AM-PAC '6-Clicks' INPATIENT SHORT FORM - BASIC MOBILITY  How much difficulty does the patient currently have...  Patient Difficulty: Turning over in bed (including adjusting bedclothes, sheets and blankets)?: A Little   Patient Difficulty: Sitting down on and standing up from a chair with arms (e.g., wheelchair, bedside commode, etc.): A Little   Patient Difficulty: Moving from lying on back to sitting on the side of the bed?: A Little   How much help from another person does the patient currently need...   Help from Another: Moving to and from a bed to a chair (including a wheelchair)?: A Little   Help from Another: Need to walk in hospital room?: A Little   Help from Another: Climbing 3-5 steps with a railing?: A Lot     AM-PAC Score:  Raw Score: 17   Approx Degree of Impairment: 50.57%   Standardized Score  (AM-PAC Scale): 42.13   CMS Modifier (G-Code): CK    FUNCTIONAL ABILITY STATUS  Functional Mobility/Gait Assessment  Gait Assistance: Minimum assistance  Distance (ft): 40  Assistive Device: Rolling walker  Pattern: R Decreased stance time, R Foot flat, L Foot flat  Rolling: minimal assist  Supine to Sit: minimal assist  Sit to Supine: minimal assist  Sit to Stand: minimal assist    Skilled Therapy Provided: Pt ed with bed mobility and transfers with RW to chair with min A. Pt ed with min A with RW to amb 40' with shuffling unsteady gait and limited balance and endurance. Pt is unable to tolerate stair mobility. Pt will benefit from [facility] PT with family 24 hr assist for assist with all mobility and ADL's once in the home. Pt will benefit from transport assist to re enter home with 5 stairs to enter she is unable to tolerate stair mobility. If [facility] PT with 24 hr assist and transport assist to enter the home is not an option GR with PT, OT would be a secondary D/C recommendation.     The patient's Approx Degree of Impairment: 50.57% has been calculated based on documentation in the [facility] '6 clicks' Inpatient Daily Activity Short Form.  Research supports that patients with this level of impairment may benefit from [facility] PT 24 hr assist with transport assist to enter home 5 stairs.    Final disposition will be made by interdisciplinary medical team.    THERAPEUTIC EXERCISES  Lower Extremity Ankle pumps  Heel slides  LAQ     Position Sitting & Standing       Patient End of Session: Up in chair, With  staff, Needs met, Call light within reach, RN aware of session/findings, All patient questions and concerns addressed, Alarm set    CURRENT GOALS     Goals to be met by: 6/15/25   Patient Goal Patient's self-stated goal is: home with family    Goal #1 Patient is able to demonstrate supine - sit EOB @ level: supervision      Goal #1   Current Status  Min A   Goal #2 Patient is able to demonstrate transfers EOB to/from  Chair/Wheelchair at assistance level: SBA with walker - rolling      Goal #2  Current Status  Min A with RW   Goal #3 Patient is able to ambulate 50- 75 feet with assist device: walker - rolling at assistance level: CGA with stable vitals and good tolerance    Goal #3   Current Status  Min A with RW 40' shuffling unsteady gait limited endurance    Goal #4 Patient will negotiate 5 stairs/one curb w/ assistive device and min assist with good tolerance    Goal #4   Current Status  Unable   Goal #5 Family training in prep for DC to home setting with family    Goal #5   Current Status  Ongoing     Gait Training: 15 minutes  Therapeutic Exercise: 8 minutes

## 2025-05-27 NOTE — PROGRESS NOTES
[facility]  part of [facility]     Progress Note    Radha Yu Patient Status:  Inpatient    1940 MRN [id number]   Location [facility] 2W/SW Attending Madison Crump MD   Hosp Day # 5 PCP Stef Velasco MD       Subjective:   Patient seen and examined.  Dyspnea improving.  Denies wheezing    Objective:   Blood pressure 131/45, pulse 71, temperature 97.1 °F (36.2 °C), temperature source Oral, resp. rate 16, weight 116 lb 9.6 oz (52.9 kg), SpO2 99%.  Intake/Output:   Last 3 shifts: I/O last 3 completed shifts:  In: 1290 [P.O.:1280; I.V.:10]  Out: 2450 [Urine:450; Other:2000]   This shift: No intake/output data recorded.     Vent Settings:      Hemodynamic parameters (last 24 hours):      Scheduled Meds: Current Hospital Medications[1]    Continuous Infusions: Medication Infusions[2]    Physical Exam  Constitutional: no acute distress  Eyes: PERRL  ENT: nares pateint  Neck: supple, no JVD  Cardio: RRR, S1 S2  Respiratory: Faint crackles  GI: abdomen soft, non tender, active bowel sounds, no organomegaly  Extremities: no clubbing, cyanosis, edema  Neurologic: no gross motor deficits  Skin: warm, dry      Results:     Lab Results   Component Value Date    WBC 4.3 2025    HGB 8.3 2025    HCT 26.2 2025    .0 2025    CREATSERUM 2.67 2025    CREATSERUM 2.67 2025    BUN 43 2025    BUN 43 2025     2025     2025    K 3.6 2025    K 3.6 2025     2025     2025    CO2 27.0 2025    CO2 27.0 2025     2025     2025    CA 7.7 2025    CA 7.7 2025    ALB 3.6 2025    TSH 1.993 2025    MG 1.8 2025    PHOS 3.2 2025       No results found.              Assessment   1.  Acute hypoxemic respiratory failure  2.  Pulmonary edema  3.  Non-ST elevation myocardial infarction  4.  Wheezing, resolved  5.  Peripheral  vascular disease  6.  Acute kidney injury on chronic kidney disease  7.  Prior history of right ICA stenosis status post TCAR  8.  Hypertension  9.  Diabetes mellitus     Plan   -Patient presents with worsening dyspnea symptoms.  Concern for pulmonary edema on chest x-ray cannot rule out underlying pneumonia.  Wheezing on examination but no history of known lung disease.  - No significant wheezing.  Steroid therapy discontinued  - Nebulizer treatments  - Viral respiratory panel negative  -Status post left and right heart catheterization on 5/27/2025 with mild to moderate LV dysfunction and severe multivessel coronary disease seen.  Recommending CT surgery evaluation for consideration of CABG  - Eval by nephrology.  Dialysis catheter placed and started on dialysis.          Neelam Cherry DO  Pulmonary Critical Care Medicine  [facility]          [1]   Current Facility-Administered Medications   Medication Dose Route Frequency    clopidogrel (Plavix) tab 600 mg  600 mg Oral Once    [START ON 5/28/2025] chlorhexidine (Hibiclens) 4 % external liquid   Topical On Call    [START ON 5/28/2025] sodium chloride 0.9% infusion   Intravenous On Call    [START ON 5/28/2025] aspirin chewable tab 324 mg  324 mg Oral Once    hydrALAZINE (Apresoline) tab 25 mg  25 mg Oral Q8H BLANE    isosorbide dinitrate (Isordil) tab 20 mg  20 mg Oral TID (Nitrates)    metoprolol succinate (Toprol XL) partial tablet 12.5 mg  12.5 mg Oral Daily Beta Blocker    pantoprazole (Protonix) DR tab 40 mg  40 mg Oral BID AC    calcitriol (Rocaltrol) cap 0.25 mcg  0.25 mcg Oral Q MWF    ipratropium-albuterol (Duoneb) 0.5-2.5 (3) MG/3ML inhalation solution 3 mL  3 mL Nebulization Q6H PRN    HYDROcodone-acetaminophen (Norco) 5-325 MG per tab 1 tablet  1 tablet Oral Q6H PRN    epoetin brittany (Epogen, Procrit) 5,000 Units injection  5,000 Units Subcutaneous Once per day on Monday Wednesday Friday    [Held by provider] aspirin DR tab 81 mg  81 mg Oral Daily     atorvastatin (Lipitor) tab 20 mg  20 mg Oral Nightly    clopidogrel (Plavix) tab 75 mg  75 mg Oral Daily    escitalopram (Lexapro) tab 10 mg  10 mg Oral Daily    glucose (Dex4) 15 GM/59ML oral liquid 15 g  15 g Oral Q15 Min PRN    Or    glucose (Glutose) 40% oral gel 15 g  15 g Oral Q15 Min PRN    Or    glucose-vitamin C (Dex-4) chewable tab 4 tablet  4 tablet Oral Q15 Min PRN    Or    dextrose 50% injection 50 mL  50 mL Intravenous Q15 Min PRN    Or    glucose (Dex4) 15 GM/59ML oral liquid 30 g  30 g Oral Q15 Min PRN    Or    glucose (Glutose) 40% oral gel 30 g  30 g Oral Q15 Min PRN    Or    glucose-vitamin C (Dex-4) chewable tab 8 tablet  8 tablet Oral Q15 Min PRN    acetaminophen (Tylenol Extra Strength) tab 500 mg  500 mg Oral Q4H PRN    melatonin tab 3 mg  3 mg Oral Nightly PRN    polyethylene glycol (PEG 3350) (Miralax) 17 g oral packet 17 g  17 g Oral Daily PRN    sennosides (Senokot) tab 17.2 mg  17.2 mg Oral Nightly PRN    bisacodyl (Dulcolax) 10 MG rectal suppository 10 mg  10 mg Rectal Daily PRN    ondansetron (Zofran) 4 MG/2ML injection 4 mg  4 mg Intravenous Q6H PRN    benzonatate (Tessalon) cap 200 mg  200 mg Oral TID PRN    guaiFENesin (Robitussin) 100 MG/5 ML oral liquid 200 mg  200 mg Oral Q4H PRN    glycerin-hypromellose- (Artificial Tears) 0.2-0.2-1 % ophthalmic solution 1 drop  1 drop Both Eyes QID PRN    sodium chloride (Saline Mist) 0.65 % nasal solution 1 spray  1 spray Each Nare Q3H PRN    metoclopramide (Reglan) 5 mg/mL injection 10 mg  10 mg Intravenous Daily PRN    insulin aspart (NovoLOG) 100 Units/mL FlexPen 1-7 Units  1-7 Units Subcutaneous TID CC    [Held by provider] insulin aspart (NovoLOG) 100 Units/mL FlexPen 5 Units  5 Units Subcutaneous TID CC and HS   [2]

## 2025-05-27 NOTE — CONSULTS
[facility]  part of [facility]  [facility]  Report of Consultation    Radha Yu Patient Status:  Inpatient    1940 MRN [id number]   Location [facility] 3W/SW Attending Villa Angel MD   Hosp Day # 5 PCP Stef Velasco MD     Date of Admission:  2025  Date of Consult:  2025    Reason for Consultation:  Multivessel coronary artery disease, moderate to severe mitral regurgitation    History of Present Illness:  Radha Yu is a a(n) 84 year old female.  History of hypertension, hyperlipidemia, peripheral arterial disease status post a left lower extremity femoropopliteal bypass, right TCAR for carotid stenosis, known left carotid artery occlusion and chronic kidney disease stage III.  She presented to the hospital 5 days ago with shortness of breath and dyspnea on exertion with tachypnea and saturating 90% on room air.  Chest x-ray at that time demonstrated fluid overload and elevated troponin.  She was admitted for further workup and evaluation.  Of note she has an extensive tobacco use history and just recently stopped in January of this year.    Since admission, she has been seen by multiple services including cardiology and nephrology.  She had a brief run of dialysis in 2019 but was told that she would need dialysis at some point in the future with her GFR running around 14.  Since this admission, she has been started on hemodialysis via a right IJ line.  Fluid removal has been performed and she has been feeling better from a respiratory standpoint and her creatinine has somewhat improved.    She does appear to have anemia of chronic disease or chronic kidney disease therefore she is being evaluated by nephrology for this as well and gastroenterology.    Regards to her cardiac function, a cardiologist consult emergency room given elevated troponin.  She had echocardiogram that demonstrated mildly reduced ejection fraction as well as moderate to  severe mitral valve regurgitation.  This prompted ischemic workup and ultimately today she underwent left heart cath and this was found to have multivessel coronary artery disease including 100% occluded right coronary artery with good collateral filling of the posterior descending artery, high-grade lesion in the proximal to mid left anterior descending artery at the takeoff of the diagonal artery.  No significant left main stenosis or circumflex stenosis.  Given these findings, we are asked to eval for possible surgical vascularization mitral invention.  Patient seen in her room today after her angiogram.  Her nephew Elliott is at bedside and he is the power of .    History:  Past Medical History[1]  Past Surgical History[2]  Family History[3]   reports that she has quit smoking. Her smoking use included cigarettes. She has never used smokeless tobacco. She reports that she does not currently use alcohol. She reports that she does not currently use drugs.    Allergies:  Allergies[4]    Medications:  Current Hospital Medications[5]    Review of Systems:  Pertinent items are noted in HPI.    Physical Exam:  Blood pressure 129/50, pulse 67, temperature 97.1 °F (36.2 °C), temperature source Oral, resp. rate 16, weight 116 lb 9.6 oz (52.9 kg), SpO2 98%.    GENERAL: No acute distress, appears states age, thin, frail  VITAL SIGNS: See above.  HEENT: Trachea midline.  No jugular venous distention.  No carotid bruit.  CHEST: Chest wall is nontender.  HEART: Regular rate and rhythm, 3/6 VIOLET LLSB  LUNGS: Decreased breath sounds in bases, mild wheeze  ABDOMEN: Soft, nontender nondistended.  VASCULAR: Palpable radial artery pulses 2+ bilateral.  SKIN: No rash, no excessive bruising, petechiae, or purpura.  NEUROLOGIC: Cranial nerves II-XII intact without motor/sensory deficit.    Laboratory Data:  Recent Labs   Lab 05/25/25  0516 05/26/25  0551 05/27/25  0750   RBC 3.24* 3.14* 3.16*   HGB 8.7* 8.3* 8.3*   HCT 27.8* 26.6*  26.2*   MCV 85.8 84.7 82.9   MCH 26.9 26.4 26.3   MCHC 31.3 31.2 31.7   RDW 16.0* 15.9* 15.9*   NEPRELIM 4.24 2.98 2.60   WBC 6.2 5.2 4.3   .0 155.0 145.0*       Recent Labs   Lab 05/26/25  0551 05/27/25  0246 05/27/25  0750   GLU 88 223* 171*  171*   BUN 75* 43* 43*  43*   CREATSERUM 3.58* 2.61* 2.67*  2.67*   EGFRCR 12* 17* 17*  17*   CA 7.6* 7.4* 7.7*  7.7*   * 137 136  136   K 4.7 3.8 3.6  3.6    102 101  101   CO2 23.0 27.0 27.0  27.0     Findings:  1) Left Ventriculography at 30 degrees CEE: LVEF 40 to 45%, hypokinesia of the mid to distal inferior/inferoapical walls.  2) Hemodynamics: LVEDP 18 mmHg  3) Coronaries:  Left main coronary artery: Large size vessel with no angiographically significant disease.  Left anterior descending artery: Large size, Type IV vessel with 50% proximal, 80% mid segment stenosis.  90% proximal first diagonal branch disease which originates from the diseased segment of the mid LAD.   Circumflex artery: Large size non-dominant vessel with luminal irregularities.  Angiographically significant disease.  Small OM1 with focal 80% ostial stenosis.  OM 2  is medium caliber, diffuse 70 to 80% disease of proximal segment.  Right coronary artery: Large size dominant vessel with 100%  of the proximal segment with reconstitution of the PDA via left-to-right septal collaterals.      Aortobifemoral angiogram  Mildly dilated infrarenal aorta  Left renal artery proximal 80% stenosis  Nonvisualization of the right renal artery  Severely, diffusely calcified but patent bilateral iliac arteries     RIGHT HEART CATHETERIZATION HEMODYNAMICS:  Right Atrial Pressure: 14/13/12 mmHg  Right Ventricular Pressure: 36/10/14 mmHg  Pulmonary Artery Pressure: 37/18/24 mmHg  Wedge Pressure: 23/22/21 mmHg  Colleen CO: 4.7 L/min; Colleen CI: 3.1 L/min/m²  Transpulmonary Gradient (PAmean - Wedge): 13 mmHg  Pulmonary Vascular Resistance (PA-wedge/CO): 0.6 CONARD  Ao saturation 91.2%  PA saturation  51.5%  Hgb 7.6  Heart Rate 58     Conclusions:   1. Left ventricle: The cavity size was normal. Wall thickness was mildly      increased. Systolic function was moderately reduced. The estimated      ejection fraction was 35-40%, by biplane method of disks. Doppler      parameters are consistent with abnormal left ventricular relaxation -      grade 1 diastolic dysfunction.   2. Left atrium: The atrium was markedly dilated.   3. Right atrium: The atrium was dilated.   4. Atrial septum: A patent foramen ovale cannot be excluded.   5. Aortic valve: There was mild regurgitation.   6. Mitral valve: The annulus was mildly to moderately calcified. The      leaflets were normal thickness. There was moderate to severe      regurgitation.   7. Tricuspid valve: There was mild-moderate regurgitation.     Impression and Plan:  Problem List[6]    84-year-old female admitted with acute heart failure masturbation found to have moderate severe mitral valve regurgitation and multivessel coronary artery disease.    STS risk calculator for CABG + MVR as follows.    Procedure Type: CABG + MVR  Perioperative Outcome Estimate %  Operative Mortality 47.1%  Morbidity & Mortality 64.2%  Stroke 10.1%  Renal Failure NA  Reoperation 22.8%  Prolonged Ventilation 60.4%  Deep Sternal Wound Infection 0.238%  Long Hospital Stay (>14 days) 55.2%  Short Hospital Stay (<6 days)* 0.435%    If we only do CABG, risks as follows:     Procedure Type: Isolated CABG  Perioperative Outcome Estimate %  Operative Mortality 41.1%  Morbidity & Mortality 50.6%  Stroke 4.96%  Renal Failure NA  Reoperation 16.2%  Prolonged Ventilation 32%  Deep Sternal Wound Infection 0.283%  Long Hospital Stay (>14 days) 41.1%  Short Hospital Stay (<6 days)* 2.99%    From my standpoint, patient is a prohibitive risk candidate for open heart surgery.  Extremely high risk of mortality and morbidity as seen by the STS risk or is as outlined above.  I do not believe she would survive an  operation.  At this point, recommend continue medical management versus consideration for percutaneous treatment options as determined by cardiology.  This was discussed at length with the patient and her nephew who is her power of .  Both patient and nephew are much in agreement with this plan.  Discussed with cardiology team.  We will follow peripherally at this point.    Manuel Anderson MD  Cardiothoracic Surgery  [facility]      Time spent on counseling/coordination of care:  48265- 80 min    Total time spent with patient:  1 Hour    Manuel Anderson MD  5/27/2025  1:49 PM         [1]   Past Medical History:   Anxiety    COPD (chronic obstructive pulmonary disease) (HCC)    Essential hypertension    Hearing impaired person, bilateral    Hyperlipidemia    Kidney disease    Lymphoma (HCC)    Osteoarthritis    Renal disorder    Shortness of breath    Type 1 diabetes mellitus (HCC)    Visual impairment   [2]   Past Surgical History:  Procedure Laterality Date    Appendectomy      Hip surgery  2018    Total abdom hysterectomy     [3] No family history on file.  [4]   Allergies  Allergen Reactions    Levaquin [Levofloxacin] ITCHING   [5]   Current Facility-Administered Medications:     hydrALAZINE (Apresoline) tab 25 mg, 25 mg, Oral, Q8H BLANE    isosorbide dinitrate (Isordil) tab 20 mg, 20 mg, Oral, TID (Nitrates)    metoprolol succinate (Toprol XL) partial tablet 12.5 mg, 12.5 mg, Oral, Daily Beta Blocker    pantoprazole (Protonix) DR tab 40 mg, 40 mg, Oral, BID AC    calcitriol (Rocaltrol) cap 0.25 mcg, 0.25 mcg, Oral, Q MWF    ipratropium-albuterol (Duoneb) 0.5-2.5 (3) MG/3ML inhalation solution 3 mL, 3 mL, Nebulization, Q6H PRN    HYDROcodone-acetaminophen (Norco) 5-325 MG per tab 1 tablet, 1 tablet, Oral, Q6H PRN    epoetin brittany (Epogen, Procrit) 5,000 Units injection, 5,000 Units, Subcutaneous, Once per day on Monday Wednesday Friday    [Held by provider] aspirin DR tab 81 mg, 81 mg, Oral,  Daily    atorvastatin (Lipitor) tab 20 mg, 20 mg, Oral, Nightly    clopidogrel (Plavix) tab 75 mg, 75 mg, Oral, Daily    escitalopram (Lexapro) tab 10 mg, 10 mg, Oral, Daily    glucose (Dex4) 15 GM/59ML oral liquid 15 g, 15 g, Oral, Q15 Min PRN **OR** glucose (Glutose) 40% oral gel 15 g, 15 g, Oral, Q15 Min PRN **OR** glucose-vitamin C (Dex-4) chewable tab 4 tablet, 4 tablet, Oral, Q15 Min PRN **OR** dextrose 50% injection 50 mL, 50 mL, Intravenous, Q15 Min PRN **OR** glucose (Dex4) 15 GM/59ML oral liquid 30 g, 30 g, Oral, Q15 Min PRN **OR** glucose (Glutose) 40% oral gel 30 g, 30 g, Oral, Q15 Min PRN **OR** glucose-vitamin C (Dex-4) chewable tab 8 tablet, 8 tablet, Oral, Q15 Min PRN    acetaminophen (Tylenol Extra Strength) tab 500 mg, 500 mg, Oral, Q4H PRN    melatonin tab 3 mg, 3 mg, Oral, Nightly PRN    polyethylene glycol (PEG 3350) (Miralax) 17 g oral packet 17 g, 17 g, Oral, Daily PRN    sennosides (Senokot) tab 17.2 mg, 17.2 mg, Oral, Nightly PRN    bisacodyl (Dulcolax) 10 MG rectal suppository 10 mg, 10 mg, Rectal, Daily PRN    ondansetron (Zofran) 4 MG/2ML injection 4 mg, 4 mg, Intravenous, Q6H PRN    benzonatate (Tessalon) cap 200 mg, 200 mg, Oral, TID PRN    guaiFENesin (Robitussin) 100 MG/5 ML oral liquid 200 mg, 200 mg, Oral, Q4H PRN    glycerin-hypromellose- (Artificial Tears) 0.2-0.2-1 % ophthalmic solution 1 drop, 1 drop, Both Eyes, QID PRN    sodium chloride (Saline Mist) 0.65 % nasal solution 1 spray, 1 spray, Each Nare, Q3H PRN    metoclopramide (Reglan) 5 mg/mL injection 10 mg, 10 mg, Intravenous, Daily PRN    insulin aspart (NovoLOG) 100 Units/mL FlexPen 1-7 Units, 1-7 Units, Subcutaneous, TID CC    [Held by provider] insulin aspart (NovoLOG) 100 Units/mL FlexPen 5 Units, 5 Units, Subcutaneous, TID CC and HS  [6]   Patient Active Problem List  Diagnosis    Closed fracture of left hip (HCC)    Background diabetic retinopathy(362.01)    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HCC)    Essential hypertension,  benign    ESRD (end stage renal disease) (HCC)    Occlusion of right carotid artery    Stenosis of left carotid artery    Atherosclerosis of native artery of right lower extremity with ulceration ([facility])    Type 2 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 not on chronic dialysis, with long-term current use of insulin (HCC)    Anemia    Depression    Acute on chronic respiratory failure with hypoxia (HCC)    Acute congestive heart failure, unspecified heart failure type (HCC)    NSTEMI (non-ST elevated myocardial infarction) ([facility])    Acute renal failure superimposed on chronic kidney disease, unspecified acute renal failure type, unspecified CKD stage

## 2025-05-27 NOTE — CM/SW NOTE
Per chart, PT worked w/ pt today and Anticipated therapy need: Gradual Rehabilitative Therapy vs home w/ HH and 24/7 family support.    SW met w/ pt at bedside to discuss. Per pt's request, SW contacted her nephew Elliott.    Elliott confirmed he would likely want to pursue LISE for pt to regain some strength. He also confirmed that someone would be home w/ pt 24/7 upon DC. Per Elliott, he is able to assist and work is flexible and he can work from home to assist her during the day. Elliott's wife and Elliott's nephew would also be available for assistance.    Elliott is agreeable to have LISE list left at bedside for review and discussion w/ pt. List of quality data left at bedside w/ SW's name and contact # for f/up.    JAYASHREE also sent tentative HD referral to Dr. Sharma's preferred clinic:  [facility] in [place]. If pt returns home, will need to confirm w/ pt and nephew of preferred HD clinic and make final arrangements.    Need for DC:  Confirm LISE vs Home Health  If LISE: choice, Ins auth, & onsite HD approval  If Home: confirmed HD clinic & arrangements      PLAN: LISE w/ onsite HD vs Home w/ [facility] & Outpatient HD - pending above & med clear      SW/CM to remain available for support and/or discharge planning.       ANTONIA Prieto, LSW [id number]

## 2025-05-27 NOTE — PROGRESS NOTES
[facility]  Nephrology Daily Progress Note    Radha Yu  [id number]  84 year old      HPI:   Radha Yu is a 84 year old female.  Cardiac cath report noted.  Also CV surgeon note reviewed.  Currently the patient states she is feeling well without any chest pain, shortness of breath.  Eating well.  On room air.      ROS:     Constitutional:  Negative for decreased activity, fever, irritability and lethargy  Endocrine:  Negative for abnormal sleep patterns, increased activity, polydipsia and polyphagia  Cardiovascular:  Negative for cool extremity and irregular heartbeat/palpitations  Gastrointestinal:  Negative for abdominal pain, constipation, decreased appetite, diarrhea and vomiting  Genitourinary:  Negative for dysuria and hematuria  Hema/Lymph:  Negative for easy bleeding and easy bruising  Integumentary:  Negative for pruritus and rash  Musculoskeletal:  Negative for bone/joint symptoms  Neurological:  Negative for gait disturbance  Psychiatric:  Negative for inappropriate interaction and psychiatric symptoms  Respiratory:  Negative for cough, dyspnea and wheezing      PHYSICAL EXAM:   Temp:  [97.1 °F (36.2 °C)-98.3 °F (36.8 °C)] 97.5 °F (36.4 °C)  Pulse:  [59-85] 85  Resp:  [16-20] 16  BP: (114-160)/(41-76) 146/50  SpO2:  [95 %-99 %] 99 %  Patient Weight for the past 72 hrs:   Weight   05/27/25 0617 116 lb 9.6 oz (52.9 kg)       Constitutional: appears well hydrated alert and responsive no acute distress noted  Neck/Thyroid: neck is supple without adenopathy  Lymphatic: no abnormal cervical, supraclavicular or axillary adenopathy is noted  Respiratory: normal to inspection lungs are clear to auscultation bilaterally normal respiratory effort  Cardiovascular: regular rate and rhythm no murmurs, gallups, or rubs  Abdomen: soft non-tender non-distended no organomegaly noted no masses  Musculoskeletal: full ROM all extremities good strength  no deformities  Extremities: no edema, cyanosis, or  clubbing  Neurological: exam appropriate for age reflexes and motor skills appropriate for age    Labs:  Lab Results   Component Value Date    WBC 4.3 05/27/2025    HGB 8.3 05/27/2025    HCT 26.2 05/27/2025    .0 05/27/2025    CREATSERUM 2.67 05/27/2025    CREATSERUM 2.67 05/27/2025    BUN 43 05/27/2025    BUN 43 05/27/2025     05/27/2025     05/27/2025    K 3.6 05/27/2025    K 3.6 05/27/2025     05/27/2025     05/27/2025    CO2 27.0 05/27/2025    CO2 27.0 05/27/2025     05/27/2025     05/27/2025    CA 7.7 05/27/2025    CA 7.7 05/27/2025    ALB 3.6 05/27/2025    TSH 1.993 05/27/2025    MG 1.8 05/27/2025    PHOS 3.2 05/27/2025     Recent Labs   Lab 05/25/25  0516 05/26/25  0551 05/27/25  0750   WBC 6.2 5.2 4.3   HGB 8.7* 8.3* 8.3*   HCT 27.8* 26.6* 26.2*   .0 155.0 145.0*     Recent Labs   Lab 05/25/25  0516 05/26/25  0551 05/27/25  0246 05/27/25  0750   GLU 74 88 223* 171*  171*   BUN 57* 75* 43* 43*  43*   CREATSERUM 3.23* 3.58* 2.61* 2.67*  2.67*   CA 7.9* 7.6* 7.4* 7.7*  7.7*   ALB 3.8 3.6  --  3.6    135* 137 136  136   K 4.5 4.7 3.8 3.6  3.6    102 102 101  101   CO2 23.0 23.0 27.0 27.0  27.0   ALKPHO  --  73  --   --    AST  --  16  --   --    ALT  --  <7*  --   --    BILT  --  0.2  --   --    TP  --  5.7  --   --    PHOS 3.8 3.9  --  3.2       Imaging  No results found.      Medications:  Current Hospital Medications[1]    Allergies:  Allergies[2]    Input/Output:    Intake/Output Summary (Last 24 hours) at 5/27/2025 1635  Last data filed at 5/27/2025 1547  Gross per 24 hour   Intake 690 ml   Output --   Net 690 ml          ASSESSMENT/PLAN:   Assessment   Problem List[3]    Overall stable from renal standpoint.  Volume status looks good.  Will arrange for routine hemodialysis treatment in AM.  Discussed with nephew.             5/27/2025  Lukasz Sharma MD               [1]   Current Facility-Administered Medications:     clopidogrel  (Plavix) tab 600 mg, 600 mg, Oral, Once    [START ON 5/28/2025] chlorhexidine (Hibiclens) 4 % external liquid, , Topical, On Call    [START ON 5/28/2025] sodium chloride 0.9% infusion, , Intravenous, On Call    [START ON 5/28/2025] aspirin chewable tab 324 mg, 324 mg, Oral, Once    hydrALAZINE (Apresoline) tab 25 mg, 25 mg, Oral, Q8H BLANE    isosorbide dinitrate (Isordil) tab 20 mg, 20 mg, Oral, TID (Nitrates)    metoprolol succinate (Toprol XL) partial tablet 12.5 mg, 12.5 mg, Oral, Daily Beta Blocker    pantoprazole (Protonix) DR tab 40 mg, 40 mg, Oral, BID AC    calcitriol (Rocaltrol) cap 0.25 mcg, 0.25 mcg, Oral, Q MWF    ipratropium-albuterol (Duoneb) 0.5-2.5 (3) MG/3ML inhalation solution 3 mL, 3 mL, Nebulization, Q6H PRN    HYDROcodone-acetaminophen (Norco) 5-325 MG per tab 1 tablet, 1 tablet, Oral, Q6H PRN    epoetin brittany (Epogen, Procrit) 5,000 Units injection, 5,000 Units, Subcutaneous, Once per day on Monday Wednesday Friday    [Held by provider] aspirin DR tab 81 mg, 81 mg, Oral, Daily    atorvastatin (Lipitor) tab 20 mg, 20 mg, Oral, Nightly    clopidogrel (Plavix) tab 75 mg, 75 mg, Oral, Daily    escitalopram (Lexapro) tab 10 mg, 10 mg, Oral, Daily    glucose (Dex4) 15 GM/59ML oral liquid 15 g, 15 g, Oral, Q15 Min PRN **OR** glucose (Glutose) 40% oral gel 15 g, 15 g, Oral, Q15 Min PRN **OR** glucose-vitamin C (Dex-4) chewable tab 4 tablet, 4 tablet, Oral, Q15 Min PRN **OR** dextrose 50% injection 50 mL, 50 mL, Intravenous, Q15 Min PRN **OR** glucose (Dex4) 15 GM/59ML oral liquid 30 g, 30 g, Oral, Q15 Min PRN **OR** glucose (Glutose) 40% oral gel 30 g, 30 g, Oral, Q15 Min PRN **OR** glucose-vitamin C (Dex-4) chewable tab 8 tablet, 8 tablet, Oral, Q15 Min PRN    acetaminophen (Tylenol Extra Strength) tab 500 mg, 500 mg, Oral, Q4H PRN    melatonin tab 3 mg, 3 mg, Oral, Nightly PRN    polyethylene glycol (PEG 3350) (Miralax) 17 g oral packet 17 g, 17 g, Oral, Daily PRN    sennosides (Senokot) tab 17.2 mg,  17.2 mg, Oral, Nightly PRN    bisacodyl (Dulcolax) 10 MG rectal suppository 10 mg, 10 mg, Rectal, Daily PRN    ondansetron (Zofran) 4 MG/2ML injection 4 mg, 4 mg, Intravenous, Q6H PRN    benzonatate (Tessalon) cap 200 mg, 200 mg, Oral, TID PRN    guaiFENesin (Robitussin) 100 MG/5 ML oral liquid 200 mg, 200 mg, Oral, Q4H PRN    glycerin-hypromellose- (Artificial Tears) 0.2-0.2-1 % ophthalmic solution 1 drop, 1 drop, Both Eyes, QID PRN    sodium chloride (Saline Mist) 0.65 % nasal solution 1 spray, 1 spray, Each Nare, Q3H PRN    metoclopramide (Reglan) 5 mg/mL injection 10 mg, 10 mg, Intravenous, Daily PRN    insulin aspart (NovoLOG) 100 Units/mL FlexPen 1-7 Units, 1-7 Units, Subcutaneous, TID CC    [Held by provider] insulin aspart (NovoLOG) 100 Units/mL FlexPen 5 Units, 5 Units, Subcutaneous, TID CC and HS  [2]   Allergies  Allergen Reactions    Levaquin [Levofloxacin] ITCHING   [3]   Patient Active Problem List  Diagnosis    Closed fracture of left hip ([facility])    Background diabetic retinopathy(362.01)    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[facility])    Essential hypertension, benign    ESRD (end stage renal disease) ([facility])    Occlusion of right carotid artery    Stenosis of left carotid artery    Atherosclerosis of native artery of right lower extremity with ulceration ([facility])    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

## 2025-05-27 NOTE — PROCEDURES
[facility]  part of [facility] Cardiac Cath Procedure Note  Radha Yu Patient Status:  Inpatient    1940 MRN [id number]   Location [facility] 3W/SW Attending Villa Angel MD   Hosp Day # 5 PCP Stef Velasco MD       Proceduralist: Fausto Aquino DO  Date of Procedure: 2025     Preoperative Diagnosis:  1) NSTEMI  2) Cardiomyopathy EF 35 to 40%  3) Hypoxic respiratory failure  4) Moderate to severe mitral regurgitation    Postoperative Diagnosis:  1) Mild to moderate LV dysfunction EF 40 to 45%  2) Severe multivessel coronary disease  3) PAD and 80% proximal left renal artery stenosis    Procedures Performed:  1) Selective Left and Right Coronary angiogram  2) Left Heart Catheterization  3) Right heart catheterization  4) Aortobifemoral angiogram    Indication/History: Radha Yu is a 84 year old female with history of CKD now on hemodialysis, peripheral vascular disease with  prior TCAR, left femoropopliteal bypass, anemia who presents with hypoxia, shortness of breath and referred for coronary catheterization with possible PCI indicated for LV dysfunction and NSTEMI.    Summary of Case: After written informed consent was obtained from the patient, patient was brought to the cardiac catheterization laboratory.  Patient was prepped and draped in the usual sterile fashion. Lidocaine 1% was used to infiltrate the right groin for local anesthesia and a 6 Iraqi introducer sheath was inserted into the right femoral artery and right femoral vein using modified Seldinger technique and ultrasound guidance.      Selective coronary angiography performed with 6 Iraqi JR4 catheter for RCA and 6 Iraqi JL 4 catheter for LCA.  Angiography performed in standard projections.  6 Iraqi pigtail catheter catheter used to cross the aortic valve and left heart catheterization with left ventriculogram was performed.     Selective right femoral angiogram done assess anatomy for  closure.    Findings:  1) Left Ventriculography at 30 degrees CEE: LVEF 40 to 45%, hypokinesia of the mid to distal inferior/inferoapical walls.  2) Hemodynamics: LVEDP 18 mmHg  3) Coronaries:  Left main coronary artery: Large size vessel with no angiographically significant disease.  Left anterior descending artery: Large size, Type IV vessel with 50% proximal, 80% mid segment stenosis.  90% proximal first diagonal branch disease which originates from the diseased segment of the mid LAD.   Circumflex artery: Large size non-dominant vessel with luminal irregularities.  Angiographically significant disease.  Small OM1 with focal 80% ostial stenosis.  OM 2  is medium caliber, diffuse 70 to 80% disease of proximal segment.  Right coronary artery: Large size dominant vessel with 100%  of the proximal segment with reconstitution of the PDA via left-to-right septal collaterals.     Aortobifemoral angiogram  Mildly dilated infrarenal aorta  Left renal artery proximal 80% stenosis  Nonvisualization of the right renal artery  Severely, diffusely calcified but patent bilateral iliac arteries    RIGHT HEART CATHETERIZATION HEMODYNAMICS:  Right Atrial Pressure: 14/13/12 mmHg  Right Ventricular Pressure: 36/10/14 mmHg  Pulmonary Artery Pressure: 37/18/24 mmHg  Wedge Pressure: 23/22/21 mmHg  Colleen CO: 4.7 L/min; Colleen CI: 3.1 L/min/m²  Transpulmonary Gradient (PAmean - Wedge): 13 mmHg  Pulmonary Vascular Resistance (PA-wedge/CO): 0.6 CONRAD  Ao saturation 91.2%  PA saturation 51.5%  Hgb 7.6  Heart Rate 58      Specimen sent to: No specimen collected  Estimated blood loss: 10cc  Closure: Perclose right femoral artery, Mynx  right femoral.    IV was maintained by RN and moderate conscious sedation of versed 1 mg and fentanyl 50 mcg were given.  Patient was assessed and monitoring of oxygen, heart rate and blood pressure by nurse and myself during the exam from 1148 to 1221.      Assessment and Plan:  84-year-old female with above  history referred for right heart catheterization and coronary catheterization with possible PCI indicated for NSTEMI.  Today's study shows mildly reduced ejection fraction EF 40 to 45%.  There is severe multivessel coronary disease including long segment  of the proximal and mid RCA with left-to-right collaterals filling the PDA.  There is severe bifurcation disease of the mid LAD extending into a large first diagonal branch.  Additionally, there is severe disease of a moderate size second obtuse marginal branch.  Given complex multivessel coronary disease, will recommend CT surgery evaluation for CABG.  If she is turned down then we will discuss options for high risk multivessel PCI with patient, ideally after anemia has stabilized.    Fausto Aquino DO  [facility]   Interventional Cardiac and Vascular Services      Fausto Aquino DO  05/27/25

## 2025-05-27 NOTE — PLAN OF CARE
Notified of nsvt this pm checking and repleting lytes patient asymptomatic   Bp 147/50  HR 70 O2 sats 96%

## 2025-05-27 NOTE — OCCUPATIONAL THERAPY NOTE
Pt undergoing LHC today at noon. Will follow up post-procedure.      Marcela Maloney OT  [facility]  Inpatient Rehabilitation  Occupational Therapy  [phone]

## 2025-05-27 NOTE — PROGRESS NOTES
Progress Note  Radha Yu Patient Status:  Inpatient    1940 MRN [id number]   Location [facility]5W Attending Britni Watson, DO   Hosp Day # 5 PCP Stef Velasco MD     SUBJECTIVE:    Denies chest pain or dyspnea.  Slept well overnight, does not have any questions about her upcoming procedure today.    VITALS:  /61 (BP Location: Right arm)   Pulse 71   Temp 98.1 °F (36.7 °C) (Axillary)   Resp 18   Wt 116 lb 9.6 oz (52.9 kg)   SpO2 96%   BMI 22.05 kg/m²   INTAKE/OUTPUT:    Intake/Output Summary (Last 24 hours) at 2025 0703  Last data filed at 2025 0617  Gross per 24 hour   Intake 1050 ml   Output 2450 ml   Net -1400 ml     Last 3 Weights   25 0617 116 lb 9.6 oz (52.9 kg)   25 2146 123 lb 14.4 oz (56.2 kg)   25 1114 118 lb 13.3 oz (53.9 kg)   25 1830 123 lb 7.3 oz (56 kg)   25 0700 123 lb 7.3 oz (56 kg)   25 1805 123 lb 6.4 oz (56 kg)   25 1121 139 lb 1.8 oz (63.1 kg)     LABS:  Recent Labs   Lab 25  0516 25  0551 25  0246   GLU 74 88 223*   BUN 57* 75* 43*   CREATSERUM 3.23* 3.58* 2.61*   EGFRCR 13* 12* 17*   CA 7.9* 7.6* 7.4*    135* 137   K 4.5 4.7 3.8    102 102   CO2 23.0 23.0 27.0     Recent Labs   Lab 25  0413 25  1635 25  0526 25  0516 25  0551   RBC 2.62*  --  3.32* 3.24* 3.14*   HGB 6.9*   < > 8.9* 8.7* 8.3*   HCT 21.4*   < > 27.3* 27.8* 26.6*   MCV 81.7  --  82.2 85.8 84.7   MCH 26.3  --  26.8 26.9 26.4   MCHC 32.2  --  32.6 31.3 31.2   RDW 15.6*  --  16.1* 16.0* 15.9*   NEPRELIM 4.86  --   --  4.24 2.98   WBC 5.6  --  8.5 6.2 5.2   .0  --  192.0 167.0 155.0    < > = values in this interval not displayed.     No results for input(s): \"TROP\", \"CK\" in the last 168 hours.  DIAGNOSTICS:  TELEMETRY: SR ~ PVCs    Echo 25  Conclusions:   1. Left ventricle: The cavity size was normal. Wall thickness was mildly      increased. Systolic function was moderately  reduced. The estimated      ejection fraction was 35-40%, by biplane method of disks. Doppler      parameters are consistent with abnormal left ventricular relaxation -      grade 1 diastolic dysfunction.   2. Left atrium: The atrium was markedly dilated.   3. Right atrium: The atrium was dilated.   4. Atrial septum: A patent foramen ovale cannot be excluded.   5. Aortic valve: There was mild regurgitation.   6. Mitral valve: The annulus was mildly to moderately calcified. The      leaflets were normal thickness. There was moderate to severe      regurgitation.   7. Tricuspid valve: There was mild-moderate regurgitation.     ROS: Negative unless noted above     PHYSICAL EXAM:  General: Alert and oriented x 3. No apparent distress.  HEENT: Normocephalic, sclera are nonicteric. Hearing decreased bilaterally.  Neck: No JVD or Carotid bruits. Trachea midline.   Cardiac: IRRegular rate. S1, S2 auscultated. +murmur   Lungs: Clear without wheezes, rales, rhonchi or dullness. Chest expansion symmetrical. Regular effort. + Murmur  Abdomen: Soft, non-tender, +BS. No hepatosplenomegaly or appreciable masses.   Extremities: Without clubbing, cyanosis. Peripheral pulses are 2+. Edema none   Neurologic: Motor and sensory nerves grossly intact.   Psych: Appropriate affect   Skin: Warm and dry. No obvious lesions, wounds, or ulcerations.     MEDICATIONS:  Scheduled Medications[1]  Medication Infusions[2]    ASSESSMENT:    Patient is an 84-year-old female with a history of PVD s/p left femoral-popliteal bypass 2015, s/p right TCAR and total occluded left ICA, HTN, HLD, DM, and CKD who presents with shortness of breath. Family stated that yesterday she became more short of breath which persisted overnight, was pale with increased work of breathing. Noticed audible wheezing with some coughing therefore brought her to the hospital.     Acute Hypoxic Respiratory Failure   Acute Bronchitits/COPD/ Pulmonary Edema   Acute HF/ New LV  Dysfunction   Mod-Severe TR & MR  - Pulm s/ 5/25, s/p steroids  - proBNP 29,411  - ECHO with new LVEF 35-40%, no WMA  - Volume per nephro w/ HD  - BB, not on ACE/ARB/ARNI/MRA 2/2 renal fx; started nitrate combo yesterday and tolerating well    Type I Vs Type II NSTEMI  - Troponin up to 426  - EKG with lateral Twave inversion  - ECHO as above     ESRD- New to HD- MWF  Mild electrolyte dysfunction  - Nephro following   - Replacement per nephrology recommendations    Acute Anemia   - Hgb 6.9 on 5/23 , s/p I U PRBCs   - GI eval, defer invasive procedures with Hgb stabilization  - Epogen  - Iron Sat 10%-repeat iron studies per nephrology    PVD s/p left femoropopliteal bypass 9/2015- ASA, Plavix   Moderate right ICA stenosis s/p right TCAR 2/2019, totally occluded left ICA  HTN-controlled with the addition of hydralazine and Isordil  HLD- LDL 48, Lipitor 20 mg   DMI- A1c 6.5%    PLAN:  - LHC today with new LV dysfunction, possible add on RHC with valve dysfunction, determined by procedurlist  - HF  to see today will need HF follow up with [facility] on discharge   - Eventual Jardiance   - Check TSH    Plan of care discussed with patient and RN.     Mary Sánchez, MSN, FNP-BC, CCK  05/27/25   7:03 AM               [1]    hydrALAZINE  25 mg Oral Q8H BLANE    isosorbide dinitrate  20 mg Oral TID (Nitrates)    sodium chloride   Intravenous On Call    aspirin  324 mg Oral Once    metoprolol succinate  12.5 mg Oral Daily Beta Blocker    pantoprazole  40 mg Oral BID AC    heparin  1.5 mL Intracatheter Once    calcitriol  0.25 mcg Oral Q MWF    epoetin brittany  5,000 Units Subcutaneous Once per day on Monday Wednesday Friday    [Held by provider] aspirin  81 mg Oral Daily    atorvastatin  20 mg Oral Nightly    clopidogrel  75 mg Oral Daily    escitalopram  10 mg Oral Daily    insulin aspart  1-7 Units Subcutaneous TID CC    heparin  1.5 mL Intravenous Once    [Held by provider] insulin aspart  5 Units Subcutaneous TID CC and HS    [2]

## 2025-05-27 NOTE — PLAN OF CARE
Alert and oriented.  Vitals stable.  Heart cath done.  Right femoral dressing in place.  Post cath vitals complete.  Plan to go for cath w stents tomorrow.  Call light and belongings in reach.  Nephew at bedside.  Problem: CARDIOVASCULAR - ADULT  Goal: Maintains optimal cardiac output and hemodynamic stability  Description: INTERVENTIONS:  - Monitor vital signs, rhythm, and trends  - Monitor for bleeding, hypotension and signs of decreased cardiac output  - Evaluate effectiveness of vasoactive medications to optimize hemodynamic stability  - Monitor arterial and/or venous puncture sites for bleeding and/or hematoma  - Assess quality of pulses, skin color and temperature  - Assess for signs of decreased coronary artery perfusion - ex. Angina  - Evaluate fluid balance, assess for edema, trend weights  Outcome: Progressing  Goal: Absence of cardiac arrhythmias or at baseline  Description: INTERVENTIONS:  - Continuous cardiac monitoring, monitor vital signs, obtain 12 lead EKG if indicated  - Evaluate effectiveness of antiarrhythmic and heart rate control medications as ordered  - Initiate emergency measures for life threatening arrhythmias  - Monitor electrolytes and administer replacement therapy as ordered  Outcome: Progressing     Problem: RESPIRATORY - ADULT  Goal: Achieves optimal ventilation and oxygenation  Description: INTERVENTIONS:  - Assess for changes in respiratory status  - Assess for changes in mentation and behavior  - Position to facilitate oxygenation and minimize respiratory effort  - Oxygen supplementation based on oxygen saturation or ABGs  - Provide Smoking Cessation handout, if applicable  - Encourage broncho-pulmonary hygiene including cough, deep breathe, Incentive Spirometry  - Assess the need for suctioning and perform as needed  - Assess and instruct to report SOB or any respiratory difficulty  - Respiratory Therapy support as indicated  - Manage/alleviate anxiety  - Monitor for  signs/symptoms of CO2 retention  Outcome: Progressing     Problem: GENITOURINARY - ADULT  Goal: Absence of urinary retention  Description: INTERVENTIONS:  - Assess patient’s ability to void and empty bladder  - Monitor intake/output and perform bladder scan as needed  - Follow urinary retention protocol/standard of care  - Consider collaborating with pharmacy to review patient's medication profile  - Implement strategies to promote bladder emptying  Outcome: Progressing     Problem: Patient Centered Care  Goal: Patient preferences are identified and integrated in the patient's plan of care  Description: Interventions:  - What would you like us to know as we care for you?   - Provide timely, complete, and accurate information to patient/family  - Incorporate patient and family knowledge, values, beliefs, and cultural backgrounds into the planning and delivery of care  - Encourage patient/family to participate in care and decision-making at the level they choose  - Honor patient and family perspectives and choices  Outcome: Progressing     Problem: Diabetes/Glucose Control  Goal: Glucose maintained within prescribed range  Description: INTERVENTIONS:  - Monitor Blood Glucose as ordered  - Assess for signs and symptoms of hyperglycemia and hypoglycemia  - Administer ordered medications to maintain glucose within target range  - Assess barriers to adequate nutritional intake and initiate nutrition consult as needed  - Instruct patient on self management of diabetes  Outcome: Progressing     Problem: Patient/Family Goals  Goal: Patient/Family Long Term Goal  Description: Patient's Long Term Goal:     Interventions:    - See additional Care Plan goals for specific interventions  Outcome: Progressing  Goal: Patient/Family Short Term Goal  Description: Patient's Short Term Goal:     Interventions:   - See additional Care Plan goals for specific interventions  Outcome: Progressing     Problem: METABOLIC/FLUID AND  ELECTROLYTES - ADULT  Goal: Glucose maintained within prescribed range  Description: INTERVENTIONS:  - Monitor Blood Glucose as ordered  - Assess for signs and symptoms of hyperglycemia and hypoglycemia  - Administer ordered medications to maintain glucose within target range  - Assess barriers to adequate nutritional intake and initiate nutrition consult as needed  - Instruct patient on self management of diabetes  Outcome: Progressing  Goal: Electrolytes maintained within normal limits  Description: INTERVENTIONS:  - Monitor labs and rhythm and assess patient for signs and symptoms of electrolyte imbalances  - Administer electrolyte replacement as ordered  - Monitor response to electrolyte replacements, including rhythm and repeat lab results as appropriate  - Fluid restriction as ordered  - Instruct patient on fluid and nutrition restrictions as appropriate  Outcome: Progressing  Goal: Hemodynamic stability and optimal renal function maintained  Description: INTERVENTIONS:  - Monitor labs and assess for signs and symptoms of volume excess or deficit  - Monitor intake, output and patient weight  - Monitor urine specific gravity, serum osmolarity and serum sodium as indicated or ordered  - Monitor response to interventions for patient's volume status, including labs, urine output, blood pressure (other measures as available)  - Encourage oral intake as appropriate  - Instruct patient on fluid and nutrition restrictions as appropriate  Outcome: Progressing

## 2025-05-27 NOTE — PLAN OF CARE
Patient alert and oriented on room air with no complaints of pain. NPO at midnight for [facility]. Call light in reach and no needs noted at this time.   Problem: CARDIOVASCULAR - ADULT  Goal: Maintains optimal cardiac output and hemodynamic stability  Description: INTERVENTIONS:  - Monitor vital signs, rhythm, and trends  - Monitor for bleeding, hypotension and signs of decreased cardiac output  - Evaluate effectiveness of vasoactive medications to optimize hemodynamic stability  - Monitor arterial and/or venous puncture sites for bleeding and/or hematoma  - Assess quality of pulses, skin color and temperature  - Assess for signs of decreased coronary artery perfusion - ex. Angina  - Evaluate fluid balance, assess for edema, trend weights  Outcome: Progressing  Goal: Absence of cardiac arrhythmias or at baseline  Description: INTERVENTIONS:  - Continuous cardiac monitoring, monitor vital signs, obtain 12 lead EKG if indicated  - Evaluate effectiveness of antiarrhythmic and heart rate control medications as ordered  - Initiate emergency measures for life threatening arrhythmias  - Monitor electrolytes and administer replacement therapy as ordered  Outcome: Progressing     Problem: RESPIRATORY - ADULT  Goal: Achieves optimal ventilation and oxygenation  Description: INTERVENTIONS:  - Assess for changes in respiratory status  - Assess for changes in mentation and behavior  - Position to facilitate oxygenation and minimize respiratory effort  - Oxygen supplementation based on oxygen saturation or ABGs  - Provide Smoking Cessation handout, if applicable  - Encourage broncho-pulmonary hygiene including cough, deep breathe, Incentive Spirometry  - Assess the need for suctioning and perform as needed  - Assess and instruct to report SOB or any respiratory difficulty  - Respiratory Therapy support as indicated  - Manage/alleviate anxiety  - Monitor for signs/symptoms of CO2 retention  Outcome: Progressing     Problem:  GENITOURINARY - ADULT  Goal: Absence of urinary retention  Description: INTERVENTIONS:  - Assess patient’s ability to void and empty bladder  - Monitor intake/output and perform bladder scan as needed  - Follow urinary retention protocol/standard of care  - Consider collaborating with pharmacy to review patient's medication profile  - Implement strategies to promote bladder emptying  Outcome: Progressing     Problem: Patient Centered Care  Goal: Patient preferences are identified and integrated in the patient's plan of care  Description: Interventions:  - What would you like us to know as we care for you?   - Provide timely, complete, and accurate information to patient/family  - Incorporate patient and family knowledge, values, beliefs, and cultural backgrounds into the planning and delivery of care  - Encourage patient/family to participate in care and decision-making at the level they choose  - Honor patient and family perspectives and choices  Outcome: Progressing     Problem: Diabetes/Glucose Control  Goal: Glucose maintained within prescribed range  Description: INTERVENTIONS:  - Monitor Blood Glucose as ordered  - Assess for signs and symptoms of hyperglycemia and hypoglycemia  - Administer ordered medications to maintain glucose within target range  - Assess barriers to adequate nutritional intake and initiate nutrition consult as needed  - Instruct patient on self management of diabetes  Outcome: Progressing     Problem: Patient/Family Goals  Goal: Patient/Family Long Term Goal  Description: Patient's Long Term Goal:     Interventions:  -   - See additional Care Plan goals for specific interventions  Outcome: Progressing  Goal: Patient/Family Short Term Goal  Description: Patient's Short Term Goal:     Interventions:     - See additional Care Plan goals for specific interventions  Outcome: Progressing     Problem: METABOLIC/FLUID AND ELECTROLYTES - ADULT  Goal: Glucose maintained within prescribed  range  Description: INTERVENTIONS:  - Monitor Blood Glucose as ordered  - Assess for signs and symptoms of hyperglycemia and hypoglycemia  - Administer ordered medications to maintain glucose within target range  - Assess barriers to adequate nutritional intake and initiate nutrition consult as needed  - Instruct patient on self management of diabetes  Outcome: Progressing  Goal: Electrolytes maintained within normal limits  Description: INTERVENTIONS:  - Monitor labs and rhythm and assess patient for signs and symptoms of electrolyte imbalances  - Administer electrolyte replacement as ordered  - Monitor response to electrolyte replacements, including rhythm and repeat lab results as appropriate  - Fluid restriction as ordered  - Instruct patient on fluid and nutrition restrictions as appropriate  Outcome: Progressing  Goal: Hemodynamic stability and optimal renal function maintained  Description: INTERVENTIONS:  - Monitor labs and assess for signs and symptoms of volume excess or deficit  - Monitor intake, output and patient weight  - Monitor urine specific gravity, serum osmolarity and serum sodium as indicated or ordered  - Monitor response to interventions for patient's volume status, including labs, urine output, blood pressure (other measures as available)  - Encourage oral intake as appropriate  - Instruct patient on fluid and nutrition restrictions as appropriate  Outcome: Progressing

## 2025-05-27 NOTE — DISCHARGE INSTRUCTIONS
Daily weights while in facility    Going Home Instructions  In this section you will find the tools which will guide you through the first few days after you leave the hospital. Continued use of these tools will help you develop the skills necessary to keep your heart failure under control.     Home Care Instructions Following Heart Failure - the most important things to do every day include:   Weigh yourself and review the “Self-Check Plan” sheet every morning.   Call your cardiologist office if you are in the “Pay Attention-Use Caution” (yellow zone) or “Medical Alert-Warning!” (red zone) as outlined in the Self-Check Plan sheet.  Take your medicines as prescribed.  Limit your sodium (salt) intake.  Know when to call your cardiologist, primary doctor, or nurse.  Know when to seek emergency care.      Things for You to Remember:   1. See your doctor or healthcare provider as written on your discharge instructions.  It is important that you attend this appointment to make sure your symptoms are under control.     2. Your recommended sodium intake is 3861-6545 mg daily.    3.  Weigh yourself every day.    4. Some exercise and activity is important to help keep your heart functioning and strong. Unless instructed not to exercise, you may walk at a slow to moderate pace for 10-15 minutes 2-3 days per week to start. Pace your activity to prevent shortness of breath or fatigue. Stop exercising if you develop chest pain, lightheadedness, or significant shortness of breath.       Call Your Cardiologist If:   You gain 2-3 pounds in one day or 5 pounds in one week.  You have more difficulty breathing.  You are getting more tired with normal activity.  You are more short of breath lying down, or awaken at night short of breath.  You have swelling of your feet or legs.  You urinate less often during the day and more often at night.  You have cramps in your legs.  You have blurred vision or see yellowish-green halos around  objects of lights.    Go to the Emergency Room If:   You have pain or tightness in your chest  You are extremely short of breath  You are coughing up pink-frothy mucus  You are traveling and develop symptoms of worsening heart failure      ** Please follow up with your cardiologist or Advanced Practice Provider as written on your discharge instructions. If you are not provided with an appointment, let your nurse know so you can get an appointment**

## 2025-05-28 ENCOUNTER — APPOINTMENT (OUTPATIENT)
Dept: INTERVENTIONAL RADIOLOGY/VASCULAR | Facility: HOSPITAL | Age: 85
End: 2025-05-28
Payer: MEDICARE

## 2025-05-28 LAB
ALBUMIN SERPL-MCNC: 3.6 G/DL (ref 3.2–4.8)
ANION GAP SERPL CALC-SCNC: 11 MMOL/L (ref 0–18)
ANION GAP SERPL CALC-SCNC: 11 MMOL/L (ref 0–18)
ATRIAL RATE: 57 BPM
BASOPHILS # BLD AUTO: 0.02 X10(3) UL (ref 0–0.2)
BASOPHILS NFR BLD AUTO: 0.4 %
BUN BLD-MCNC: 60 MG/DL (ref 9–23)
BUN BLD-MCNC: 60 MG/DL (ref 9–23)
BUN/CREAT SERPL: 18.1 (ref 10–20)
BUN/CREAT SERPL: 18.1 (ref 10–20)
CALCIUM BLD-MCNC: 7.4 MG/DL (ref 8.7–10.4)
CALCIUM BLD-MCNC: 7.4 MG/DL (ref 8.7–10.4)
CHLORIDE SERPL-SCNC: 100 MMOL/L (ref 98–112)
CHLORIDE SERPL-SCNC: 100 MMOL/L (ref 98–112)
CO2 SERPL-SCNC: 23 MMOL/L (ref 21–32)
CO2 SERPL-SCNC: 23 MMOL/L (ref 21–32)
CREAT BLD-MCNC: 3.31 MG/DL (ref 0.55–1.02)
CREAT BLD-MCNC: 3.31 MG/DL (ref 0.55–1.02)
DEPRECATED RDW RBC AUTO: 48 FL (ref 35.1–46.3)
EGFRCR SERPLBLD CKD-EPI 2021: 13 ML/MIN/1.73M2 (ref 60–?)
EGFRCR SERPLBLD CKD-EPI 2021: 13 ML/MIN/1.73M2 (ref 60–?)
EOSINOPHIL # BLD AUTO: 0.11 X10(3) UL (ref 0–0.7)
EOSINOPHIL NFR BLD AUTO: 2.2 %
ERYTHROCYTE [DISTWIDTH] IN BLOOD BY AUTOMATED COUNT: 15.9 % (ref 11–15)
GLUCOSE BLD-MCNC: 209 MG/DL (ref 70–99)
GLUCOSE BLD-MCNC: 209 MG/DL (ref 70–99)
GLUCOSE BLDC GLUCOMTR-MCNC: 133 MG/DL (ref 70–99)
GLUCOSE BLDC GLUCOMTR-MCNC: 142 MG/DL (ref 70–99)
GLUCOSE BLDC GLUCOMTR-MCNC: 234 MG/DL (ref 70–99)
GLUCOSE BLDC GLUCOMTR-MCNC: 243 MG/DL (ref 70–99)
GLUCOSE BLDC GLUCOMTR-MCNC: 243 MG/DL (ref 70–99)
GLUCOSE BLDC GLUCOMTR-MCNC: 347 MG/DL (ref 70–99)
HCT VFR BLD AUTO: 26 % (ref 35–48)
HGB BLD-MCNC: 8.4 G/DL (ref 12–16)
IMM GRANULOCYTES # BLD AUTO: 0.01 X10(3) UL (ref 0–1)
IMM GRANULOCYTES NFR BLD: 0.2 %
ISTAT ACTIVATED CLOTTING TIME: 198 SECONDS (ref 125–137)
ISTAT ACTIVATED CLOTTING TIME: 233 SECONDS (ref 125–137)
ISTAT ACTIVATED CLOTTING TIME: 239 SECONDS (ref 125–137)
LYMPHOCYTES # BLD AUTO: 1.24 X10(3) UL (ref 1–4)
LYMPHOCYTES NFR BLD AUTO: 25.2 %
MAGNESIUM SERPL-MCNC: 1.8 MG/DL (ref 1.6–2.6)
MCH RBC QN AUTO: 26.7 PG (ref 26–34)
MCHC RBC AUTO-ENTMCNC: 32.3 G/DL (ref 31–37)
MCV RBC AUTO: 82.5 FL (ref 80–100)
MONOCYTES # BLD AUTO: 0.4 X10(3) UL (ref 0.1–1)
MONOCYTES NFR BLD AUTO: 8.1 %
NEUTROPHILS # BLD AUTO: 3.14 X10 (3) UL (ref 1.5–7.7)
NEUTROPHILS # BLD AUTO: 3.14 X10(3) UL (ref 1.5–7.7)
NEUTROPHILS NFR BLD AUTO: 63.9 %
OSMOLALITY SERPL CALC.SUM OF ELEC: 301 MOSM/KG (ref 275–295)
OSMOLALITY SERPL CALC.SUM OF ELEC: 301 MOSM/KG (ref 275–295)
P AXIS: 25 DEGREES
P-R INTERVAL: 212 MS
PHOSPHATE SERPL-MCNC: 4.1 MG/DL (ref 2.4–5.1)
PLATELET # BLD AUTO: 142 10(3)UL (ref 150–450)
POTASSIUM SERPL-SCNC: 4.3 MMOL/L (ref 3.5–5.1)
POTASSIUM SERPL-SCNC: 4.3 MMOL/L (ref 3.5–5.1)
Q-T INTERVAL: 464 MS
QRS DURATION: 74 MS
QTC CALCULATION (BEZET): 451 MS
R AXIS: 5 DEGREES
RBC # BLD AUTO: 3.15 X10(6)UL (ref 3.8–5.3)
SODIUM SERPL-SCNC: 134 MMOL/L (ref 136–145)
SODIUM SERPL-SCNC: 134 MMOL/L (ref 136–145)
T AXIS: 120 DEGREES
VENTRICULAR RATE: 57 BPM
WBC # BLD AUTO: 4.9 X10(3) UL (ref 4–11)

## 2025-05-28 PROCEDURE — 0271356 DILATION OF CORONARY ARTERY, TWO ARTERIES, BIFURCATION, WITH TWO DRUG-ELUTING INTRALUMINAL DEVICES, PERCUTANEOUS APPROACH: ICD-10-PCS | Performed by: INTERNAL MEDICINE

## 2025-05-28 PROCEDURE — 99232 SBSQ HOSP IP/OBS MODERATE 35: CPT | Performed by: INTERNAL MEDICINE

## 2025-05-28 PROCEDURE — 99233 SBSQ HOSP IP/OBS HIGH 50: CPT | Performed by: INTERNAL MEDICINE

## 2025-05-28 PROCEDURE — B241ZZ3 ULTRASONOGRAPHY OF MULTIPLE CORONARY ARTERIES, INTRAVASCULAR: ICD-10-PCS | Performed by: INTERNAL MEDICINE

## 2025-05-28 RX ORDER — LIDOCAINE HYDROCHLORIDE 20 MG/ML
INJECTION, SOLUTION EPIDURAL; INFILTRATION; INTRACAUDAL; PERINEURAL
Status: COMPLETED
Start: 2025-05-28 | End: 2025-05-28

## 2025-05-28 RX ORDER — CLOPIDOGREL BISULFATE 75 MG/1
75 TABLET ORAL DAILY
Status: DISCONTINUED | OUTPATIENT
Start: 2025-05-29 | End: 2025-06-09

## 2025-05-28 RX ORDER — ASPIRIN 81 MG/1
81 TABLET ORAL DAILY
Status: DISCONTINUED | OUTPATIENT
Start: 2025-05-29 | End: 2025-06-09

## 2025-05-28 RX ORDER — IOPAMIDOL 612 MG/ML
60 INJECTION, SOLUTION INTRAVASCULAR
Status: COMPLETED | OUTPATIENT
Start: 2025-05-28 | End: 2025-05-28

## 2025-05-28 RX ORDER — NITROGLYCERIN 20 MG/100ML
INJECTION INTRAVENOUS
Status: COMPLETED
Start: 2025-05-28 | End: 2025-05-28

## 2025-05-28 RX ORDER — MIDAZOLAM HYDROCHLORIDE 1 MG/ML
INJECTION INTRAMUSCULAR; INTRAVENOUS
Status: COMPLETED
Start: 2025-05-28 | End: 2025-05-28

## 2025-05-28 RX ORDER — ATORVASTATIN CALCIUM 40 MG/1
40 TABLET, FILM COATED ORAL NIGHTLY
Status: DISCONTINUED | OUTPATIENT
Start: 2025-05-28 | End: 2025-06-09

## 2025-05-28 RX ORDER — CLOPIDOGREL BISULFATE 75 MG/1
TABLET ORAL
Status: COMPLETED
Start: 2025-05-28 | End: 2025-05-28

## 2025-05-28 RX ORDER — SODIUM CHLORIDE 9 MG/ML
INJECTION, SOLUTION INTRAVENOUS CONTINUOUS
Status: ACTIVE | OUTPATIENT
Start: 2025-05-28 | End: 2025-05-28

## 2025-05-28 RX ORDER — HEPARIN SODIUM 1000 [USP'U]/ML
INJECTION, SOLUTION INTRAVENOUS; SUBCUTANEOUS
Status: COMPLETED
Start: 2025-05-28 | End: 2025-05-28

## 2025-05-28 NOTE — PLAN OF CARE
Notified by RN of right groin hematoma. Femstop removed. Manual pressure applied w/ resolution of hematoma. Dressing saturated w/ blood. Removed w/ mild ooze noted. Manual pressure applied for approximately 30 minutes without resolution of bleeding. Quickclot applied w/ tegaderm. Hemostasis achieved.  Femstop reapplied at skin level - plan for 1 hour. Vitals stable. Pt denies cp, sob, abd pain, back pain. Site soft . R DP pulse absent, R PT per doppler. Plan for bedrest x 6 hours. Dr. Aquino notified    Jeana Almaraz, MSN, FPA-APRN, FNP-BC, CCK   5/28/2025  5:45 PM  Ph [phone] ([place])  Ph [phone] ([place])

## 2025-05-28 NOTE — PAYOR COMM NOTE
--------------  CONTINUED STAY REVIEW    Payor: [employer]  Subscriber #:  [id number]  Authorization Number: [id number]    Admit date: 5/22/25  Admit time:  2:36 PM    5/28/25    Pending [facility] today      /52 (BP Location: Right arm)   Pulse 69   Temp 96.9 °F (36.1 °C) (Oral)   Resp 18   Wt 120 lb 14.4 oz (54.8 kg)   SpO2 96%   BMI 22.86 kg/m²      Telemetry: NSR, 69 bpm      Lab 05/27/25  0246 05/27/25  0750 05/28/25  0751   * 171*  171* 209*  209*   BUN 43* 43*  43* 60*  60*   CREATSERUM 2.61* 2.67*  2.67* 3.31*  3.31*   EGFRCR 17* 17*  17* 13*  13*   CA 7.4* 7.7*  7.7* 7.4*  7.4*    136  136 134*  134*   K 3.8 3.6  3.6 4.3  4.3    101  101 100  100   CO2 27.0 27.0  27.0 23.0  23.0      Lab 05/26/25  0551 05/27/25  0750 05/28/25  0751   RBC 3.14* 3.16* 3.15*   HGB 8.3* 8.3* 8.4*   HCT 26.6* 26.2* 26.0*   MCV 84.7 82.9 82.5   MCH 26.4 26.3 26.7   MCHC 31.2 31.7 32.3   RDW 15.9* 15.9* 15.9*   NEPRELIM 2.98 2.60 3.14   WBC 5.2 4.3 4.9   .0 145.0* 142.0*      Lab 05/22/25  0948 05/22/25  1127 05/22/25 2029   TROPHS 224* 235* 426*         5/27/25 L/RHC   Findings:  1) Left Ventriculography at 30 degrees CEE: LVEF 40 to 45%, hypokinesia of the mid to distal inferior/inferoapical walls.  2) Hemodynamics: LVEDP 18 mmHg  3) Coronaries:  Left main coronary artery: Large size vessel with no angiographically significant disease.  Left anterior descending artery: Large size, Type IV vessel with 50% proximal, 80% mid segment stenosis.  90% proximal first diagonal branch disease which originates from the diseased segment of the mid LAD.   Circumflex artery: Large size non-dominant vessel with luminal irregularities.  Angiographically significant disease.  Small OM1 with focal 80% ostial stenosis.  OM 2  is medium caliber, diffuse 70 to 80% disease of proximal segment.  Right coronary artery: Large size dominant vessel with 100%  of the proximal segment with reconstitution  of the PDA via left-to-right septal collaterals.      Aortobifemoral angiogram  Mildly dilated infrarenal aorta  Left renal artery proximal 80% stenosis  Nonvisualization of the right renal artery  Severely, diffusely calcified but patent bilateral iliac arteries     RIGHT HEART CATHETERIZATION HEMODYNAMICS:  Right Atrial Pressure: 14/13/12 mmHg  Right Ventricular Pressure: 36/10/14 mmHg  Pulmonary Artery Pressure: 37/18/24 mmHg  Wedge Pressure: 23/22/21 mmHg  Colleen CO: 4.7 L/min; Colleen CI: 3.1 L/min/m²  Transpulmonary Gradient (PAmean - Wedge): 13 mmHg  Pulmonary Vascular Resistance (PA-wedge/CO): 0.6 CONRAD  Ao saturation 91.2%  PA saturation 51.5%  Hgb 7.6  Heart Rate 58          Physical Exam:    General: Alert and oriented x 3.   HEENT: Normocephalic, anicteric sclera, neck supple, no thyromegaly or adenopathy.  Neck: No JVD, carotids 2+, no bruits.  Cardiac: Regular rate & rhythm. S1, S2 normal. No pericardial rub, S3, or extra cardiac sounds. +Murmur   Lungs: Clear without wheezes, rales, rhonchi or dullness.  Normal excursions and effort.  Abdomen: Soft, non-tender. No organosplenomegally, mass or rebound, BS-present.  Extremities: Without clubbing or cyanosis.  No left lower extremity edema, no right lower extremity edema.  Neurologic: Alert and oriented, normal affect. No focal defects  Skin: Warm and dry.    Right groin: C/D/I. Soft. Non-tender. No signs of bruising, bleeding,  hematoma, or infection noted      Assessment:  84-year-old female with a history of PVD s/p left femoral-popliteal bypass 2015, s/p right TCAR and total occluded left ICA, HTN, HLD, DM, and CKD who presents with shortness of breath      Acute hypoxic respiratory failure  - Chest x-ray w/ evidence of moderate pulmonary edema, noted diffuse wheezing initially on exam in setting of long-term tobacco use  - Viral resp panel negative   - S/p nebs & steroids . Pulm following      NSTEMI   Multivessel coronary artery disease   - Peak TnI 426,  ECG w/ lateral T wave inversion  - Echo w/ EF 35-40%, grade 1dd, bi-atrial dilation, PFO can't be excluded, mod - severe MR, mild-mod TR   - L/RHC w/ severe multivessel CAD. Surgical turndown. Plan for complex PCI today    - On ASA, plavix, atorvastatin, toprol xl      New cardiomyopathy, EF 35-40%   Mod-severe mitral regurgitation   Mild-mod tricuspid regurgitation   - Volume removal per HD   - Compensated on exam   - GDMT: toprol xl , isordil/hydralazine , Not on acei/arb/arni/mra due to CKD      ESRD   - Started HD this admission  - Nephrology following      PVD s/p left femoropopliteal bypass 9/2015  - 80% proximal left renal artery stenosis on angiogram   - On asa, plavix, atorvastatin      Moderate right ICA stenosis s/p right TCAR 2/2019, totally occluded left ICA  - On asa, plavix, atorvastatin      HTN   - Stable on toprol xl , isordil/hydralazine     HLD  - LDL 48, on atorvastatin      DMII  - HgbA1c = 6.5%. On insulin per PMD      Acute on chronic anemia   - Hgb stable   - On PPI. GI following      Plan:     Denies anginal sx . Compensated on exam   Hemodynamically stable. Plan for complex PCI today   Continue asa, plavix, toprol xl, isordil, hydralazine    Increase atorvastatin to 40mg nightly      MEDICATIONS ADMINISTERED IN LAST 1 DAY:  aspirin chewable tab 324 mg       Date Action Dose Route User    5/28/2025 0813 Given 324 mg Oral Shanell Gastelum RN          atorvastatin (Lipitor) tab 20 mg       Date Action Dose Route User    5/27/2025 2027 Given 20 mg Oral Agata Esparza RN          calcitriol (Rocaltrol) cap 0.25 mcg       Date Action Dose Route User    5/28/2025 0818 Given 0.25 mcg Oral Shanell Gastelum RN          clopidogrel (Plavix) tab 600 mg       Date Action Dose Route User    5/27/2025 2047 Given 600 mg Oral Agata Esparza RN          escitalopram (Lexapro) tab 10 mg       Date Action Dose Route User    5/28/2025 0813 Given 10 mg Oral Shanell Gastelum RN          hydrALAZINE (Apresoline) tab 25 mg        Date Action Dose Route User    5/28/2025 1458 Given 25 mg Oral Nunu Duarte RN    5/28/2025 0456 Given 25 mg Oral Agata Esparza RN    5/27/2025 2027 Given 25 mg Oral Agata Esparza RN          insulin aspart (NovoLOG) 100 Units/mL FlexPen 1-7 Units       Date Action Dose Route User    5/28/2025 0814 Given 4 Units Subcutaneous (Right Upper Arm) Shanell Gastelum RN    5/27/2025 1724 Given 7 Units Subcutaneous (Right Upper Arm) Shanell Gastelum RN     isosorbide dinitrate (Isordil) tab 20 mg       Date Action Dose Route User    5/28/2025 1458 Given 20 mg Oral Nunu Duarte RN    5/28/2025 0813 Given 20 mg Oral Shanell Gastelum RN    5/27/2025 1648 Given 20 mg Oral Shanell Gastelum RN          metoprolol succinate (Toprol XL) partial tablet 12.5 mg       Date Action Dose Route User    5/28/2025 0456 Given 12.5 mg Oral Agata Esparza RN          pantoprazole (Protonix) DR tab 40 mg       Date Action Dose Route User    5/28/2025 0456 Given 40 mg Oral Agata Esparza RN    5/27/2025 1648 Given 40 mg Oral Shanell Gastelum RN          sodium chloride 0.9% infusion       Date Action Dose Route User    5/28/2025 1507 New Bag (none) Intravenous Nunu Duarte RN            Vitals (last day)       Date/Time Temp Pulse Resp BP SpO2 Weight O2 Device O2 Flow Rate (L/min) [facility]    05/28/25 1515 -- 62 -- 111/42 95 % -- None (Room air) --     05/28/25 1500 -- 64 -- 133/48 96 % -- None (Room air) --     05/28/25 1445 -- 56 18 138/52 98 % -- None (Room air) --     05/28/25 0811 96.9 °F (36.1 °C) 69 18 158/52 96 % -- None (Room air) --     05/28/25 0454 98 °F (36.7 °C) 68 18 174/54 95 % -- None (Room air) --     05/28/25 0402 -- -- -- -- -- 120 lb 14.4 oz (54.8 kg) -- --     05/27/25 2027 99.1 °F (37.3 °C) 77 18 134/49 96 % -- None (Room air) --     05/27/25 1645 -- 70 18 134/46 -- -- -- --     05/27/25 1546 97.5 °F (36.4 °C) 85 16 146/50 -- -- -- --     05/27/25 1243 97.1 °F (36.2 °C) 59 16 138/54 98 % -- None (Room air)  -- AA    05/27/25 1230 -- 62 -- -- -- -- -- -- AA    05/27/25 0912 98.1 °F (36.7 °C) 61 18 128/76 95 % -- None (Room air) -- AA    05/27/25 0617 98.1 °F (36.7 °C) 71 18 160/61 96 % -- None (Room air) -- EC     Blood Transfusion Record       Product Unit Status Volume Start End            Transfuse RBC       25  [id number]  [id number] Completed 05/23/25 1303 500 mL 05/23/25 1030 05/23/25 1300

## 2025-05-28 NOTE — PROGRESS NOTES
[facility]  part of [facility]    Progress Note    Radha Yu Patient Status:  Inpatient    1940 MRN [id number]   Location [facility]5W Attending Villa Angel MD   Hosp Day # 6 PCP Stef Velasco MD     Chief complaint     Subjective:   Radha Yu is a(n) 84 year old female Pt doing well. No c/o's.     LHC again today. Was not candidate for surgery for multivessel cad found yesterday.     ROS:   No cp, sob   No c/d   No n/v     Objective:     Blood pressure 113/45, pulse 67, temperature 96.9 °F (36.1 °C), temperature source Oral, resp. rate 18, weight 120 lb 14.4 oz (54.8 kg), SpO2 98%.      Intake/Output Summary (Last 24 hours) at 2025 1704  Last data filed at 2025 0815  Gross per 24 hour   Intake 340 ml   Output --   Net 340 ml       Patient Weight(s) for the past 336 hrs:   Weight   25 0402 120 lb 14.4 oz (54.8 kg)   25 0617 116 lb 9.6 oz (52.9 kg)   25 2146 123 lb 14.4 oz (56.2 kg)   25 1114 118 lb 13.3 oz (53.9 kg)           General appearance: alert, appears stated age and cooperative  Pulmonary:  clear to auscultation bilaterally  Cardiovascular: S1, S2 normal, no murmur, click, rub or gallop, regular rate and rhythm  Abdominal: soft, non-tender; bowel sounds normal; no masses,  no organomegaly  Extremities: extremities normal, atraumatic, no cyanosis or edema        Medicines:     Current Hospital Medications[1]            Blood Sugar Medications            insulin aspart 100 Units/mL Subcutaneous Solution Pen-injector    insulin glargine 100 UNIT/ML Subcutaneous Solution            Blood Pressure and Cardiac Medications            amLODIPine 5 MG Oral Tab            Medication Infusions[2]        Lab Results   Component Value Date    WBC 4.9 2025    HGB 8.4 (L) 2025    HCT 26.0 (L) 2025    .0 (L) 2025    CREATSERUM 3.31 (H) 2025    CREATSERUM 3.31 (H) 2025    BUN 60 (H) 2025    BUN 60  (H) 05/28/2025     (L) 05/28/2025     (L) 05/28/2025    K 4.3 05/28/2025    K 4.3 05/28/2025     05/28/2025     05/28/2025    CO2 23.0 05/28/2025    CO2 23.0 05/28/2025     (H) 05/28/2025     (H) 05/28/2025    CA 7.4 (L) 05/28/2025    CA 7.4 (L) 05/28/2025    ALB 3.6 05/28/2025    ALKPHO 73 05/26/2025    BILT 0.2 05/26/2025    TP 5.7 05/26/2025    AST 16 05/26/2025    ALT <7 (L) 05/26/2025    PTT 44.4 (H) 05/23/2025    INR 1.16 05/22/2025    TSH 1.993 05/27/2025    MG 1.8 05/28/2025    PHOS 4.1 05/28/2025       No results found.  EKG 12 Lead  Result Date: 5/28/2025  Sinus bradycardia with 1st degree A-V block with occasional Premature ventricular complexes T wave abnormality, consider anterolateral ischemia Abnormal ECG When compared with ECG of 22-MAY-2025 10:07, Premature ventricular complexes are now Present Vent. rate has decreased BY  42 BPM T wave inversion now evident in Anterior leads      Results:     CBC:    Lab Results   Component Value Date    WBC 4.9 05/28/2025    WBC 4.3 05/27/2025    WBC 5.2 05/26/2025     Lab Results   Component Value Date    HGB 8.4 (L) 05/28/2025    HGB 8.3 (L) 05/27/2025    HGB 8.3 (L) 05/26/2025      Lab Results   Component Value Date    .0 (L) 05/28/2025    .0 (L) 05/27/2025    .0 05/26/2025       Recent Labs   Lab 05/27/25  0246 05/27/25  0750 05/28/25  0751   * 171*  171* 209*  209*   BUN 43* 43*  43* 60*  60*   CREATSERUM 2.61* 2.67*  2.67* 3.31*  3.31*   CA 7.4* 7.7*  7.7* 7.4*  7.4*    136  136 134*  134*   K 3.8 3.6  3.6 4.3  4.3    101  101 100  100   CO2 27.0 27.0  27.0 23.0  23.0           Assessment and Plan:     NSTEMI type 1 with new ischemic cardiomyopathy acute systolic chf  Multivessel CAD  - left heart cath again today with pci, surgical turndown ; apprec cards   - heparin was on hold due to acute drop in hb -resume per cardio  - on asa, plavix, statin, bb     Tricuspid  and mitral regurg  -volume removal per hd     PVD s/p left femoropopliteal bypass 9/2015  - 80% proximal left renal artery stenosis on angiogram   - On asa, plavix, atorvastatin      HL     HTN     Acute on chronic anemia of renal disease  - possibly baseline anemia from ckd  - heparin stopped; gi defers invasive dx till more stable  - hb stable today , ppi     acute copd exacerbation  - pulm consulted signed off no steroids; no abx  - plan nebs scheduled and prn  - plan pulm hygiene  - wean oxygen as able     DEBO on CKD stage 4 ->no esrd  - nephrology consulted  - tolerating HD   - hd cath placed      DM  - hypoglycemic this am. Hold tresiba.   - accuchecks and ISS    Moderate right ICA stenosis s/p right TCAR 2/2019, totally occluded left ICA  - On asa, plavix, atorvastatin                        complex mdm; coordinating care with nurse and counseling pt and with her permission her nephew in room  about chf/sob/diet/cath/dialysis     Dispo: poss dc when dialysis/lisa set up and after cath; perhaps Thursday/friday       Villa Angel MD         Chart reviewed, including current vitals, notes, labs and imaging  Labs ordered and medications adjusted as outlined above  Coordinate care with care team/consultants  Discussed with patient results of tests, management plan as outlined above, and the need for ongoing hospitalization  D/w RN     MDM high        5/28/2025             Supplementary Documentation:   DVT Mechanical Prophylaxis:   SCDs,    DVT Pharmacologic Prophylaxis   Medication   None         DVT Pharmacologic prophylaxis: Aspirin 81 mg      Code Status: Full Code  Rao: No urinary catheter in place  Rao Duration (in days):   Central line:    FRANCIS: 5/31/2025                            [1]   Current Facility-Administered Medications   Medication Dose Route Frequency    atorvastatin (Lipitor) tab 40 mg  40 mg Oral Nightly    sodium chloride 0.9% infusion   Intravenous Continuous    [START ON 5/29/2025]  clopidogrel (Plavix) tab 75 mg  75 mg Oral Daily    [START ON 5/29/2025] aspirin DR tab 81 mg  81 mg Oral Daily    sodium chloride 0.9 % IV bolus 100 mL  100 mL Intravenous Q30 Min PRN    And    albumin human (Albumin) 25% injection 25 g  25 g Intravenous PRN Dialysis    hydrALAZINE (Apresoline) tab 25 mg  25 mg Oral Q8H LBANE    isosorbide dinitrate (Isordil) tab 20 mg  20 mg Oral TID (Nitrates)    metoprolol succinate (Toprol XL) partial tablet 12.5 mg  12.5 mg Oral Daily Beta Blocker    pantoprazole (Protonix) DR tab 40 mg  40 mg Oral BID AC    calcitriol (Rocaltrol) cap 0.25 mcg  0.25 mcg Oral Q MWF    ipratropium-albuterol (Duoneb) 0.5-2.5 (3) MG/3ML inhalation solution 3 mL  3 mL Nebulization Q6H PRN    HYDROcodone-acetaminophen (Norco) 5-325 MG per tab 1 tablet  1 tablet Oral Q6H PRN    epoetin brittany (Epogen, Procrit) 5,000 Units injection  5,000 Units Subcutaneous Once per day on Monday Wednesday Friday    escitalopram (Lexapro) tab 10 mg  10 mg Oral Daily    glucose (Dex4) 15 GM/59ML oral liquid 15 g  15 g Oral Q15 Min PRN    Or    glucose (Glutose) 40% oral gel 15 g  15 g Oral Q15 Min PRN    Or    glucose-vitamin C (Dex-4) chewable tab 4 tablet  4 tablet Oral Q15 Min PRN    Or    dextrose 50% injection 50 mL  50 mL Intravenous Q15 Min PRN    Or    glucose (Dex4) 15 GM/59ML oral liquid 30 g  30 g Oral Q15 Min PRN    Or    glucose (Glutose) 40% oral gel 30 g  30 g Oral Q15 Min PRN    Or    glucose-vitamin C (Dex-4) chewable tab 8 tablet  8 tablet Oral Q15 Min PRN    acetaminophen (Tylenol Extra Strength) tab 500 mg  500 mg Oral Q4H PRN    melatonin tab 3 mg  3 mg Oral Nightly PRN    polyethylene glycol (PEG 3350) (Miralax) 17 g oral packet 17 g  17 g Oral Daily PRN    sennosides (Senokot) tab 17.2 mg  17.2 mg Oral Nightly PRN    bisacodyl (Dulcolax) 10 MG rectal suppository 10 mg  10 mg Rectal Daily PRN    ondansetron (Zofran) 4 MG/2ML injection 4 mg  4 mg Intravenous Q6H PRN    benzonatate (Tessalon) cap 200  mg  200 mg Oral TID PRN    guaiFENesin (Robitussin) 100 MG/5 ML oral liquid 200 mg  200 mg Oral Q4H PRN    glycerin-hypromellose- (Artificial Tears) 0.2-0.2-1 % ophthalmic solution 1 drop  1 drop Both Eyes QID PRN    sodium chloride (Saline Mist) 0.65 % nasal solution 1 spray  1 spray Each Nare Q3H PRN    metoclopramide (Reglan) 5 mg/mL injection 10 mg  10 mg Intravenous Daily PRN    insulin aspart (NovoLOG) 100 Units/mL FlexPen 1-7 Units  1-7 Units Subcutaneous TID CC    [Held by provider] insulin aspart (NovoLOG) 100 Units/mL FlexPen 5 Units  5 Units Subcutaneous TID CC and HS   [2]    sodium chloride 50 mL/hr at 05/28/25 1503

## 2025-05-28 NOTE — PROCEDURES
[facility]  part of [facility] Cardiac Cath Procedure Note  Radha Yu Patient Status:  Inpatient    1940 MRN [id number]   Location [facility] 3W/SW Attending Villa Angel MD   Hosp Day # 6 PCP Stef Velasco MD       Proceduralist: Fausto Aquino DO  Date of Procedure: 2025     Preoperative Diagnosis:  1) NSTEMI  2) Cardiomyopathy EF 40-45%  3) Multivessel coronary artery disease, surgical turndown for CABG    Postoperative Diagnosis:  1) Successful bifurcation PCI to mid LAD and first diagonal branch    Procedures Performed:  1) Successful IVUS guided bifurcation PCI of first diagonal branch and mid LAD (minicrush technique)    Indication/History: Radha Yu is a 84 year old female with CKD now on hemodialysis, PAD with prior left femoropopliteal bypass, TCAR who presented shortness of breath and was referred for coronary catheterization yesterday for NSTEMI.  She was found to have severe multivessel coronary disease and was referred  to CT surgery for CABG evaluation.  She was turndown for CABG due to high mortality risk and was referred back for complex PCI.      Summary of Case: After written informed consent was obtained from the patient, patient was brought to the cardiac catheterization laboratory.  Patient was prepped and draped in the usual sterile fashion. Lidocaine 1% was used to infiltrate the right groin for local anesthesia and a 6 Sami introducer sheath was inserted into the right femoral artery using modified Seldinger technique.      Selective coronary angiography performed with 6 Sami EBU 3.5 guide catheter for LCA.  Angiography performed in standard projections.      Selective right femoral angiogram done assess anatomy for closure.    Findings:  Coronaries:  Left main coronary artery: Large size vessel with no angiographically significant disease.  Left anterior descending artery: Large size, Type IV vessel with 50% proximal, 80% mid  segment stenosis.  90% proximal third diagonal branch disease which originates from the diseased segment of the mid LAD.   Circumflex artery: Large size non-dominant vessel with luminal irregularities.  Angiographically significant disease.  Small OM1 with focal 80% ostial stenosis.  OM 2  is medium caliber, diffuse 70 to 80% disease of proximal segment.    Specimen sent to: No specimen collected  Estimated blood loss: 10cc  Closure: Perclose      Lesion #1 mid LAD, third diagonal branch bifurcation  Lesion Characteristics- nontorturous, moderately calcified.  Type non-C lesion.  Pre-intervention stenosis 78-80% (LAD), 90% third diagonal branch, Post intervention stenosis 0%.  Pre YOU 3, Post YOU 3.     Guide Catheter: 6 [place] EBU 3.5 guide  Wire: [place] blue LAD, run-through diagonal branch  Pre-dilation Balloon: 2.0 compliant, 2.5 NC diagonal; 2.5 NC, 3.0 NC mid LAD  Stent: 2.5 x 18 Favian BRENNA diagonal branch: 3 x 26 mm Favian BRENNA mid LAD  Post-dilation Balloon: 3.5 NC for POTS proximal LAD   Coronaries:  Left main coronary artery: Large size vessel with no angiographically significant disease.  Left anterior descending artery: Large size, Type IV vessel with 50% proximal, 80% mid segment stenosis.  90% proximal first diagonal branch disease which originates from the diseased segment of the mid LAD.   Circumflex artery: Large size non-dominant vessel with luminal irregularities.  Angiographically significant disease.  Small OM1 with focal 80% ostial stenosis.  OM 2  is medium caliber, diffuse 70 to 80% disease of proximal segment. 2 mm compliant diagonal branch, 3 mm NC LAD    IV was maintained by RN and moderate conscious sedation of versed 2mg and fentanyl 75mcg were given.  Patient was assessed and monitoring of oxygen, heart rate and blood pressure by nurse and myself during the exam from 1236 to 1336.      Assessment and Plan:  84-year-old female with above history presenting with NSTEMI, found to have  multivessel coronary disease yesterday.  Patient turndown for bypass due to high mortality risk by CT surgery.  Patient now presents for diagonal/LAD PCI.  Second obtuse marginal branch will be treated medically due to patient's intolerance to lay flat and back pain after LAD/diagonal PCI.  Heparin is used for anticoagulation, greater than 100 units/kg were administered.  Patient loaded with Plavix yesterday and received maintenance dose this morning.  Continue aspirin, Plavix, statin.    Fausto Aquino DO  [facility]   Interventional Cardiac and Vascular Services      Fausto Aquino DO  05/28/25

## 2025-05-28 NOTE — PROGRESS NOTES
[facility]  Nephrology Daily Progress Note    Radha Yu  [id number]  84 year old      HPI:   Radha Yu is a 84 year old female.  Patient just back from cardiac cath.  2 stents were successfully placed.  Patient states she is feeling well and is just hungry.  No chest pain or shortness of breath      ROS:     Constitutional:  Negative for decreased activity, fever, irritability and lethargy  Endocrine:  Negative for abnormal sleep patterns, increased activity, polydipsia and polyphagia  Cardiovascular:  Negative for cool extremity and irregular heartbeat/palpitations  Gastrointestinal:  Negative for abdominal pain, constipation, decreased appetite, diarrhea and vomiting  Genitourinary:  Negative for dysuria and hematuria  Hema/Lymph:  Negative for easy bleeding and easy bruising  Integumentary:  Negative for pruritus and rash  Musculoskeletal:  Negative for bone/joint symptoms  Neurological:  Negative for gait disturbance  Psychiatric:  Negative for inappropriate interaction and psychiatric symptoms  Respiratory:  Negative for cough, dyspnea and wheezing      PHYSICAL EXAM:   Temp:  [96.9 °F (36.1 °C)-99.1 °F (37.3 °C)] 96.9 °F (36.1 °C)  Pulse:  [56-77] 62  Resp:  [18] 18  BP: (111-174)/(42-54) 125/47  SpO2:  [95 %-98 %] 98 %  Patient Weight for the past 72 hrs:   Weight   05/27/25 0617 116 lb 9.6 oz (52.9 kg)   05/28/25 0402 120 lb 14.4 oz (54.8 kg)       Constitutional: appears well hydrated alert and responsive no acute distress noted  Neck/Thyroid: neck is supple without adenopathy  Lymphatic: no abnormal cervical, supraclavicular or axillary adenopathy is noted  Respiratory: normal to inspection lungs are clear to auscultation bilaterally normal respiratory effort  Cardiovascular: regular rate and rhythm no murmurs, gallups, or rubs  Abdomen: soft non-tender non-distended no organomegaly noted no masses  Musculoskeletal: full ROM all extremities good strength  no deformities  Extremities:  no edema, cyanosis, or clubbing  Neurological: exam appropriate for age reflexes and motor skills appropriate for age    Labs:  Lab Results   Component Value Date    WBC 4.9 05/28/2025    HGB 8.4 05/28/2025    HCT 26.0 05/28/2025    .0 05/28/2025    CREATSERUM 3.31 05/28/2025    CREATSERUM 3.31 05/28/2025    BUN 60 05/28/2025    BUN 60 05/28/2025     05/28/2025     05/28/2025    K 4.3 05/28/2025    K 4.3 05/28/2025     05/28/2025     05/28/2025    CO2 23.0 05/28/2025    CO2 23.0 05/28/2025     05/28/2025     05/28/2025    CA 7.4 05/28/2025    CA 7.4 05/28/2025    ALB 3.6 05/28/2025    MG 1.8 05/28/2025    PHOS 4.1 05/28/2025     Recent Labs   Lab 05/26/25  0551 05/27/25  0750 05/28/25  0751   WBC 5.2 4.3 4.9   HGB 8.3* 8.3* 8.4*   HCT 26.6* 26.2* 26.0*   .0 145.0* 142.0*     Recent Labs   Lab 05/26/25  0551 05/27/25  0246 05/27/25  0750 05/28/25  0751   GLU 88 223* 171*  171* 209*  209*   BUN 75* 43* 43*  43* 60*  60*   CREATSERUM 3.58* 2.61* 2.67*  2.67* 3.31*  3.31*   CA 7.6* 7.4* 7.7*  7.7* 7.4*  7.4*   ALB 3.6  --  3.6 3.6   * 137 136  136 134*  134*   K 4.7 3.8 3.6  3.6 4.3  4.3    102 101  101 100  100   CO2 23.0 27.0 27.0  27.0 23.0  23.0   ALKPHO 73  --   --   --    AST 16  --   --   --    ALT <7*  --   --   --    BILT 0.2  --   --   --    TP 5.7  --   --   --    PHOS 3.9  --  3.2 4.1       Imaging  No results found.      Medications:  Current Hospital Medications[1]    Allergies:  Allergies[2]    Input/Output:    Intake/Output Summary (Last 24 hours) at 5/28/2025 1636  Last data filed at 5/28/2025 0815  Gross per 24 hour   Intake 340 ml   Output --   Net 340 ml          ASSESSMENT/PLAN:   Assessment   Problem List[3]    Patient's status post cardiac cath.  Routine hemodialysis scheduled for later today.   attempting to set up L[facility] with onsite dialysis versus going home with outpatient hemodialysis.  Discussed with  nursing.             5/28/2025  Lukasz Sharma MD               [1]   Current Facility-Administered Medications:     atorvastatin (Lipitor) tab 40 mg, 40 mg, Oral, Nightly    sodium chloride 0.9% infusion, , Intravenous, Continuous    [START ON 5/29/2025] clopidogrel (Plavix) tab 75 mg, 75 mg, Oral, Daily    [START ON 5/29/2025] aspirin DR tab 81 mg, 81 mg, Oral, Daily    sodium chloride 0.9 % IV bolus 100 mL, 100 mL, Intravenous, Q30 Min PRN **AND** albumin human (Albumin) 25% injection 25 g, 25 g, Intravenous, PRN Dialysis    hydrALAZINE (Apresoline) tab 25 mg, 25 mg, Oral, Q8H BLANE    isosorbide dinitrate (Isordil) tab 20 mg, 20 mg, Oral, TID (Nitrates)    metoprolol succinate (Toprol XL) partial tablet 12.5 mg, 12.5 mg, Oral, Daily Beta Blocker    pantoprazole (Protonix) DR tab 40 mg, 40 mg, Oral, BID AC    calcitriol (Rocaltrol) cap 0.25 mcg, 0.25 mcg, Oral, Q MWF    ipratropium-albuterol (Duoneb) 0.5-2.5 (3) MG/3ML inhalation solution 3 mL, 3 mL, Nebulization, Q6H PRN    HYDROcodone-acetaminophen (Norco) 5-325 MG per tab 1 tablet, 1 tablet, Oral, Q6H PRN    epoetin brittany (Epogen, Procrit) 5,000 Units injection, 5,000 Units, Subcutaneous, Once per day on Monday Wednesday Friday    escitalopram (Lexapro) tab 10 mg, 10 mg, Oral, Daily    glucose (Dex4) 15 GM/59ML oral liquid 15 g, 15 g, Oral, Q15 Min PRN **OR** glucose (Glutose) 40% oral gel 15 g, 15 g, Oral, Q15 Min PRN **OR** glucose-vitamin C (Dex-4) chewable tab 4 tablet, 4 tablet, Oral, Q15 Min PRN **OR** dextrose 50% injection 50 mL, 50 mL, Intravenous, Q15 Min PRN **OR** glucose (Dex4) 15 GM/59ML oral liquid 30 g, 30 g, Oral, Q15 Min PRN **OR** glucose (Glutose) 40% oral gel 30 g, 30 g, Oral, Q15 Min PRN **OR** glucose-vitamin C (Dex-4) chewable tab 8 tablet, 8 tablet, Oral, Q15 Min PRN    acetaminophen (Tylenol Extra Strength) tab 500 mg, 500 mg, Oral, Q4H PRN    melatonin tab 3 mg, 3 mg, Oral, Nightly PRN    polyethylene glycol (PEG 3350) (Miralax) 17 g  oral packet 17 g, 17 g, Oral, Daily PRN    sennosides (Senokot) tab 17.2 mg, 17.2 mg, Oral, Nightly PRN    bisacodyl (Dulcolax) 10 MG rectal suppository 10 mg, 10 mg, Rectal, Daily PRN    ondansetron (Zofran) 4 MG/2ML injection 4 mg, 4 mg, Intravenous, Q6H PRN    benzonatate (Tessalon) cap 200 mg, 200 mg, Oral, TID PRN    guaiFENesin (Robitussin) 100 MG/5 ML oral liquid 200 mg, 200 mg, Oral, Q4H PRN    glycerin-hypromellose- (Artificial Tears) 0.2-0.2-1 % ophthalmic solution 1 drop, 1 drop, Both Eyes, QID PRN    sodium chloride (Saline Mist) 0.65 % nasal solution 1 spray, 1 spray, Each Nare, Q3H PRN    metoclopramide (Reglan) 5 mg/mL injection 10 mg, 10 mg, Intravenous, Daily PRN    insulin aspart (NovoLOG) 100 Units/mL FlexPen 1-7 Units, 1-7 Units, Subcutaneous, TID CC    [Held by provider] insulin aspart (NovoLOG) 100 Units/mL FlexPen 5 Units, 5 Units, Subcutaneous, TID CC and HS  [2]   Allergies  Allergen Reactions    Levaquin [Levofloxacin] ITCHING   [3]   Patient Active Problem List  Diagnosis    Closed fracture of left hip ([facility])    Background diabetic retinopathy(362.01)    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[facility])    Essential hypertension, benign    ESRD (end stage renal disease) ([facility])    Occlusion of right carotid artery    Stenosis of left carotid artery    Atherosclerosis of native artery of right lower extremity with ulceration ([facility])    Type 2 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 not on chronic dialysis, with long-term current use of insulin ([facility])    Anemia    Depression    Acute on chronic respiratory failure with hypoxia ([facility])    Acute congestive heart failure, unspecified heart failure type ([facility])    NSTEMI (non-ST elevated myocardial infarction) ([facility])    Acute renal failure superimposed on chronic kidney disease, unspecified acute renal failure type, unspecified CKD stage

## 2025-05-28 NOTE — PROGRESS NOTES
Attempted to see patient for heart failure education. Per patient's nurse Nunu, patient is back from [facility], but Nunu is currently holding pressure on patient's access site. Will attempt to see patient later as time allows.     Katharina Nolasco RN  HF   Extension [phone]

## 2025-05-28 NOTE — DIETARY NOTE
NUTRITION EDUCATION NOTE     Received consult for cardiac nutrition education. Verbally reviewed basic cardiac diet restrictions. Provided with Eating Heart-Healthy handout to reinforce. Receptive to instruction. May benefit from outpt f/u. Expect fair compliance. RD contact information provided to pt.       Chica Meadows, MS, RD, LDN  Clinical Dietitian  [phone]

## 2025-05-28 NOTE — CM/SW NOTE
05/28/25 1402   Prior Authorization - Destination   Destination Type Skilled nursing facility   Service Provider PAC pending/Facility choice pending   Payer Communication Destination Comments Pending   Prior Authorization Status Submitted/Pending  (pending Auth ID #: [id number])     SW plan to f/up w/ pt and family in regards to [facility] w/ onsite HD vs Home w/ HH and outpatient HD - pt is currently marked out of her room.    SW submitted tentative Insurance Auth via [employer] web portal - pending L[facility] choice/decision.     Will need to f/up w/ pt and/or nephritchie Gallagher when more appropriate for further DC Planning.    Need for DC:  Confirm L[facility] vs Home Health  If L[facility]: choice, Ins auth, & onsite HD approval  If Home: confirmed HD clinic & arrangements        PLAN: [facility] w/ onsite HD vs Home w/ [facility] & Outpatient HD - pending above & med clear        SW/CM to remain available for support and/or discharge planning.         MS IdaW, LSW [id number]

## 2025-05-28 NOTE — PROGRESS NOTES
[facility]  part of [facility]    Progress Note    Radha Yu Patient Status:  Inpatient    1940 MRN [id number]   Location [facility]5W Attending Villa Angel MD   Hosp Day # 6 PCP Stef Velasco MD     Chief complaint     Subjective:   Radha Yu is a(n) 84 year old female Pt doing well. No c/o's.     To go for cath today.     ROS:   No cp, sob   No c/d   No n/v     Objective:     Blood pressure 113/45, pulse 67, temperature 96.9 °F (36.1 °C), temperature source Oral, resp. rate 18, weight 120 lb 14.4 oz (54.8 kg), SpO2 99%.      Intake/Output Summary (Last 24 hours) at 2025 1702  Last data filed at 2025 0815  Gross per 24 hour   Intake 340 ml   Output --   Net 340 ml       Patient Weight(s) for the past 336 hrs:   Weight   25 0402 120 lb 14.4 oz (54.8 kg)   25 0617 116 lb 9.6 oz (52.9 kg)   25 2146 123 lb 14.4 oz (56.2 kg)   25 1114 118 lb 13.3 oz (53.9 kg)           General appearance: alert, appears stated age and cooperative  Pulmonary:  clear to auscultation bilaterally  Cardiovascular: S1, S2 normal, no murmur, click, rub or gallop, regular rate and rhythm  Abdominal: soft, non-tender; bowel sounds normal; no masses,  no organomegaly  Extremities: extremities normal, atraumatic, no cyanosis or edema        Medicines:     Current Hospital Medications[1]            Blood Sugar Medications            insulin aspart 100 Units/mL Subcutaneous Solution Pen-injector    insulin glargine 100 UNIT/ML Subcutaneous Solution            Blood Pressure and Cardiac Medications            amLODIPine 5 MG Oral Tab            Medication Infusions[2]        Lab Results   Component Value Date    WBC 4.9 2025    HGB 8.4 (L) 2025    HCT 26.0 (L) 2025    .0 (L) 2025    CREATSERUM 3.31 (H) 2025    CREATSERUM 3.31 (H) 2025    BUN 60 (H) 2025    BUN 60 (H) 2025     (L) 2025     (L)  05/28/2025    K 4.3 05/28/2025    K 4.3 05/28/2025     05/28/2025     05/28/2025    CO2 23.0 05/28/2025    CO2 23.0 05/28/2025     (H) 05/28/2025     (H) 05/28/2025    CA 7.4 (L) 05/28/2025    CA 7.4 (L) 05/28/2025    ALB 3.6 05/28/2025    ALKPHO 73 05/26/2025    BILT 0.2 05/26/2025    TP 5.7 05/26/2025    AST 16 05/26/2025    ALT <7 (L) 05/26/2025    PTT 44.4 (H) 05/23/2025    INR 1.16 05/22/2025    TSH 1.993 05/27/2025    MG 1.8 05/28/2025    PHOS 4.1 05/28/2025       No results found.  EKG 12 Lead  Result Date: 5/28/2025  Sinus bradycardia with 1st degree A-V block with occasional Premature ventricular complexes T wave abnormality, consider anterolateral ischemia Abnormal ECG When compared with ECG of 22-MAY-2025 10:07, Premature ventricular complexes are now Present Vent. rate has decreased BY  42 BPM T wave inversion now evident in Anterior leads      Results:     CBC:    Lab Results   Component Value Date    WBC 4.9 05/28/2025    WBC 4.3 05/27/2025    WBC 5.2 05/26/2025     Lab Results   Component Value Date    HGB 8.4 (L) 05/28/2025    HGB 8.3 (L) 05/27/2025    HGB 8.3 (L) 05/26/2025      Lab Results   Component Value Date    .0 (L) 05/28/2025    .0 (L) 05/27/2025    .0 05/26/2025       Recent Labs   Lab 05/27/25  0246 05/27/25  0750 05/28/25  0751   * 171*  171* 209*  209*   BUN 43* 43*  43* 60*  60*   CREATSERUM 2.61* 2.67*  2.67* 3.31*  3.31*   CA 7.4* 7.7*  7.7* 7.4*  7.4*    136  136 134*  134*   K 3.8 3.6  3.6 4.3  4.3    101  101 100  100   CO2 27.0 27.0  27.0 23.0  23.0           Assessment and Plan:     NSTEMI type 1 with new ischemic cardiomyopathy acute systolic chf  - left heart cath today ; apprec cards   - heparin was on hold due to acute drop in hb -resume per cardio     Tricuspid and mitral regurg     PVD     HL     HTN     Acute on chronic anemia of renal disease  - possibly baseline anemia from ckd  - heparin  stopped; gi defers invasive dx till more stable  - hb stable today      acute copd exacerbation  - pulm consulted signed off no steroids; no abx  - plan nebs scheduled and prn  - plan pulm hygiene  - wean oxygen as able     DEBO on CKD stage 4 better  - nephrology consulted  - tolerating HD   - hd cath placed      DM  - hypoglycemic this am. Hold tresiba.   - accuchecks and ISS                       complex mdm; coordinating care with nurse and counseling pt and with her permission her nephew in room  about chf/sob/diet/cath/dialysis     Dispo: poss dc when dialysis/lisa set up and after cath; perhaps wed       Villa Angel MD         Chart reviewed, including current vitals, notes, labs and imaging  Labs ordered and medications adjusted as outlined above  Coordinate care with care team/consultants  Discussed with patient results of tests, management plan as outlined above, and the need for ongoing hospitalization  D/w RN     [facility]        5/26/2025             Supplementary Documentation:   DVT Mechanical Prophylaxis:   SCDs,    DVT Pharmacologic Prophylaxis   Medication   None         DVT Pharmacologic prophylaxis: Aspirin 81 mg      Code Status: Full Code  Rao: No urinary catheter in place  Rao Duration (in days):   Central line:    FRANCIS: 5/31/2025                            [1]   Current Facility-Administered Medications   Medication Dose Route Frequency    atorvastatin (Lipitor) tab 40 mg  40 mg Oral Nightly    sodium chloride 0.9% infusion   Intravenous Continuous    [START ON 5/29/2025] clopidogrel (Plavix) tab 75 mg  75 mg Oral Daily    [START ON 5/29/2025] aspirin DR tab 81 mg  81 mg Oral Daily    sodium chloride 0.9 % IV bolus 100 mL  100 mL Intravenous Q30 Min PRN    And    albumin human (Albumin) 25% injection 25 g  25 g Intravenous PRN Dialysis    hydrALAZINE (Apresoline) tab 25 mg  25 mg Oral Q8H BLANE    isosorbide dinitrate (Isordil) tab 20 mg  20 mg Oral TID (Nitrates)    metoprolol succinate  (Toprol XL) partial tablet 12.5 mg  12.5 mg Oral Daily Beta Blocker    pantoprazole (Protonix) DR tab 40 mg  40 mg Oral BID AC    calcitriol (Rocaltrol) cap 0.25 mcg  0.25 mcg Oral Q MWF    ipratropium-albuterol (Duoneb) 0.5-2.5 (3) MG/3ML inhalation solution 3 mL  3 mL Nebulization Q6H PRN    HYDROcodone-acetaminophen (Norco) 5-325 MG per tab 1 tablet  1 tablet Oral Q6H PRN    epoetin brittany (Epogen, Procrit) 5,000 Units injection  5,000 Units Subcutaneous Once per day on Monday Wednesday Friday    escitalopram (Lexapro) tab 10 mg  10 mg Oral Daily    glucose (Dex4) 15 GM/59ML oral liquid 15 g  15 g Oral Q15 Min PRN    Or    glucose (Glutose) 40% oral gel 15 g  15 g Oral Q15 Min PRN    Or    glucose-vitamin C (Dex-4) chewable tab 4 tablet  4 tablet Oral Q15 Min PRN    Or    dextrose 50% injection 50 mL  50 mL Intravenous Q15 Min PRN    Or    glucose (Dex4) 15 GM/59ML oral liquid 30 g  30 g Oral Q15 Min PRN    Or    glucose (Glutose) 40% oral gel 30 g  30 g Oral Q15 Min PRN    Or    glucose-vitamin C (Dex-4) chewable tab 8 tablet  8 tablet Oral Q15 Min PRN    acetaminophen (Tylenol Extra Strength) tab 500 mg  500 mg Oral Q4H PRN    melatonin tab 3 mg  3 mg Oral Nightly PRN    polyethylene glycol (PEG 3350) (Miralax) 17 g oral packet 17 g  17 g Oral Daily PRN    sennosides (Senokot) tab 17.2 mg  17.2 mg Oral Nightly PRN    bisacodyl (Dulcolax) 10 MG rectal suppository 10 mg  10 mg Rectal Daily PRN    ondansetron (Zofran) 4 MG/2ML injection 4 mg  4 mg Intravenous Q6H PRN    benzonatate (Tessalon) cap 200 mg  200 mg Oral TID PRN    guaiFENesin (Robitussin) 100 MG/5 ML oral liquid 200 mg  200 mg Oral Q4H PRN    glycerin-hypromellose- (Artificial Tears) 0.2-0.2-1 % ophthalmic solution 1 drop  1 drop Both Eyes QID PRN    sodium chloride (Saline Mist) 0.65 % nasal solution 1 spray  1 spray Each Nare Q3H PRN    metoclopramide (Reglan) 5 mg/mL injection 10 mg  10 mg Intravenous Daily PRN    insulin aspart (NovoLOG) 100  Units/mL FlexPen 1-7 Units  1-7 Units Subcutaneous TID CC    [Held by provider] insulin aspart (NovoLOG) 100 Units/mL FlexPen 5 Units  5 Units Subcutaneous TID CC and HS   [2]    sodium chloride 50 mL/hr at 05/28/25 1502

## 2025-05-28 NOTE — PLAN OF CARE
Alert and forgetful.  Cath and stents done.  Bleeding post procedure.  Manual pressure applied.  Fem stop applied for an hour. Site monitored closely.  Cath recovery continued.  Dialysis moved to tomorrow morning. Call light and belongings in reach.  Problem: CARDIOVASCULAR - ADULT  Goal: Maintains optimal cardiac output and hemodynamic stability  Description: INTERVENTIONS:  - Monitor vital signs, rhythm, and trends  - Monitor for bleeding, hypotension and signs of decreased cardiac output  - Evaluate effectiveness of vasoactive medications to optimize hemodynamic stability  - Monitor arterial and/or venous puncture sites for bleeding and/or hematoma  - Assess quality of pulses, skin color and temperature  - Assess for signs of decreased coronary artery perfusion - ex. Angina  - Evaluate fluid balance, assess for edema, trend weights  Outcome: Progressing  Goal: Absence of cardiac arrhythmias or at baseline  Description: INTERVENTIONS:  - Continuous cardiac monitoring, monitor vital signs, obtain 12 lead EKG if indicated  - Evaluate effectiveness of antiarrhythmic and heart rate control medications as ordered  - Initiate emergency measures for life threatening arrhythmias  - Monitor electrolytes and administer replacement therapy as ordered  Outcome: Progressing     Problem: RESPIRATORY - ADULT  Goal: Achieves optimal ventilation and oxygenation  Description: INTERVENTIONS:  - Assess for changes in respiratory status  - Assess for changes in mentation and behavior  - Position to facilitate oxygenation and minimize respiratory effort  - Oxygen supplementation based on oxygen saturation or ABGs  - Provide Smoking Cessation handout, if applicable  - Encourage broncho-pulmonary hygiene including cough, deep breathe, Incentive Spirometry  - Assess the need for suctioning and perform as needed  - Assess and instruct to report SOB or any respiratory difficulty  - Respiratory Therapy support as indicated  -  Manage/alleviate anxiety  - Monitor for signs/symptoms of CO2 retention  Outcome: Progressing     Problem: GENITOURINARY - ADULT  Goal: Absence of urinary retention  Description: INTERVENTIONS:  - Assess patient’s ability to void and empty bladder  - Monitor intake/output and perform bladder scan as needed  - Follow urinary retention protocol/standard of care  - Consider collaborating with pharmacy to review patient's medication profile  - Implement strategies to promote bladder emptying  Outcome: Progressing     Problem: Patient Centered Care  Goal: Patient preferences are identified and integrated in the patient's plan of care  Description: Interventions:  - What would you like us to know as we care for you?   - Provide timely, complete, and accurate information to patient/family  - Incorporate patient and family knowledge, values, beliefs, and cultural backgrounds into the planning and delivery of care  - Encourage patient/family to participate in care and decision-making at the level they choose  - Honor patient and family perspectives and choices  Outcome: Progressing     Problem: Diabetes/Glucose Control  Goal: Glucose maintained within prescribed range  Description: INTERVENTIONS:  - Monitor Blood Glucose as ordered  - Assess for signs and symptoms of hyperglycemia and hypoglycemia  - Administer ordered medications to maintain glucose within target range  - Assess barriers to adequate nutritional intake and initiate nutrition consult as needed  - Instruct patient on self management of diabetes  Outcome: Progressing     Problem: Patient/Family Goals  Goal: Patient/Family Long Term Goal  Description: Patient's Long Term Goal:     Interventions:  - See additional Care Plan goals for specific interventions  Outcome: Progressing  Goal: Patient/Family Short Term Goal  Description: Patient's Short Term Goal:     Interventions:   - See additional Care Plan goals for specific interventions  Outcome: Progressing      Problem: METABOLIC/FLUID AND ELECTROLYTES - ADULT  Goal: Glucose maintained within prescribed range  Description: INTERVENTIONS:  - Monitor Blood Glucose as ordered  - Assess for signs and symptoms of hyperglycemia and hypoglycemia  - Administer ordered medications to maintain glucose within target range  - Assess barriers to adequate nutritional intake and initiate nutrition consult as needed  - Instruct patient on self management of diabetes  Outcome: Progressing  Goal: Electrolytes maintained within normal limits  Description: INTERVENTIONS:  - Monitor labs and rhythm and assess patient for signs and symptoms of electrolyte imbalances  - Administer electrolyte replacement as ordered  - Monitor response to electrolyte replacements, including rhythm and repeat lab results as appropriate  - Fluid restriction as ordered  - Instruct patient on fluid and nutrition restrictions as appropriate  Outcome: Progressing  Goal: Hemodynamic stability and optimal renal function maintained  Description: INTERVENTIONS:  - Monitor labs and assess for signs and symptoms of volume excess or deficit  - Monitor intake, output and patient weight  - Monitor urine specific gravity, serum osmolarity and serum sodium as indicated or ordered  - Monitor response to interventions for patient's volume status, including labs, urine output, blood pressure (other measures as available)  - Encourage oral intake as appropriate  - Instruct patient on fluid and nutrition restrictions as appropriate  Outcome: Progressing

## 2025-05-28 NOTE — PROGRESS NOTES
[facility]  part of [facility]     Progress Note    Radha Yu Patient Status:  Inpatient    1940 MRN [id number]   Location [facility] 2W/SW Attending Madison Crump MD   Hosp Day # 6 PCP Stef Velasco MD       Subjective:   Patient seen and examined.  Dyspnea improving.  Denies wheezing.  No significant respiratory distress    Objective:   Blood pressure (!) 174/54, pulse 68, temperature 98 °F (36.7 °C), temperature source Axillary, resp. rate 18, weight 120 lb 14.4 oz (54.8 kg), SpO2 95%.  Intake/Output:   Last 3 shifts: I/O last 3 completed shifts:  In: 790 [P.O.:780; I.V.:10]  Out: -    This shift: No intake/output data recorded.     Vent Settings:      Hemodynamic parameters (last 24 hours):      Scheduled Meds: Current Hospital Medications[1]    Continuous Infusions: Medication Infusions[2]    Physical Exam  Constitutional: no acute distress  Eyes: PERRL  ENT: nares pateint  Neck: supple, no JVD  Cardio: RRR, S1 S2  Respiratory: Faint crackles  GI: abdomen soft, non tender, active bowel sounds, no organomegaly  Extremities: no clubbing, cyanosis, edema  Neurologic: no gross motor deficits  Skin: warm, dry      Results:            No results found.              Assessment   1.  Acute hypoxemic respiratory failure  2.  Pulmonary edema  3.  Non-ST elevation myocardial infarction  4.  Wheezing, resolved  5.  Peripheral vascular disease  6.  Acute kidney injury on chronic kidney disease  7.  Prior history of right ICA stenosis status post TCAR  8.  Hypertension  9.  Diabetes mellitus     Plan   -Patient presents with worsening dyspnea symptoms.  Concern for pulmonary edema on chest x-ray cannot rule out underlying pneumonia.  Wheezing on examination but no history of known lung disease.  - No significant wheezing.  Steroid therapy discontinued  - Nebulizer treatments  - Viral respiratory panel negative  -Status post left and right heart catheterization on 2025  with mild to moderate LV dysfunction and severe multivessel coronary disease seen.  Eval by CT surgery.  Poor candidate for CABG.  Further recommendations per cardiology  - Eval by nephrology.  Dialysis catheter placed and started on dialysis.          Neelam Cherry DO  Pulmonary Critical Care Medicine  [facility]          [1]   Current Facility-Administered Medications   Medication Dose Route Frequency    chlorhexidine (Hibiclens) 4 % external liquid   Topical On Call    sodium chloride 0.9% infusion   Intravenous On Call    aspirin chewable tab 324 mg  324 mg Oral Once    sodium chloride 0.9 % IV bolus 100 mL  100 mL Intravenous Q30 Min PRN    And    albumin human (Albumin) 25% injection 25 g  25 g Intravenous PRN Dialysis    hydrALAZINE (Apresoline) tab 25 mg  25 mg Oral Q8H BLANE    isosorbide dinitrate (Isordil) tab 20 mg  20 mg Oral TID (Nitrates)    metoprolol succinate (Toprol XL) partial tablet 12.5 mg  12.5 mg Oral Daily Beta Blocker    pantoprazole (Protonix) DR tab 40 mg  40 mg Oral BID AC    calcitriol (Rocaltrol) cap 0.25 mcg  0.25 mcg Oral Q MWF    ipratropium-albuterol (Duoneb) 0.5-2.5 (3) MG/3ML inhalation solution 3 mL  3 mL Nebulization Q6H PRN    HYDROcodone-acetaminophen (Norco) 5-325 MG per tab 1 tablet  1 tablet Oral Q6H PRN    epoetin brittany (Epogen, Procrit) 5,000 Units injection  5,000 Units Subcutaneous Once per day on Monday Wednesday Friday    [Held by provider] aspirin DR tab 81 mg  81 mg Oral Daily    atorvastatin (Lipitor) tab 20 mg  20 mg Oral Nightly    clopidogrel (Plavix) tab 75 mg  75 mg Oral Daily    escitalopram (Lexapro) tab 10 mg  10 mg Oral Daily    glucose (Dex4) 15 GM/59ML oral liquid 15 g  15 g Oral Q15 Min PRN    Or    glucose (Glutose) 40% oral gel 15 g  15 g Oral Q15 Min PRN    Or    glucose-vitamin C (Dex-4) chewable tab 4 tablet  4 tablet Oral Q15 Min PRN    Or    dextrose 50% injection 50 mL  50 mL Intravenous Q15 Min PRN    Or    glucose (Dex4) 15 GM/59ML oral  liquid 30 g  30 g Oral Q15 Min PRN    Or    glucose (Glutose) 40% oral gel 30 g  30 g Oral Q15 Min PRN    Or    glucose-vitamin C (Dex-4) chewable tab 8 tablet  8 tablet Oral Q15 Min PRN    acetaminophen (Tylenol Extra Strength) tab 500 mg  500 mg Oral Q4H PRN    melatonin tab 3 mg  3 mg Oral Nightly PRN    polyethylene glycol (PEG 3350) (Miralax) 17 g oral packet 17 g  17 g Oral Daily PRN    sennosides (Senokot) tab 17.2 mg  17.2 mg Oral Nightly PRN    bisacodyl (Dulcolax) 10 MG rectal suppository 10 mg  10 mg Rectal Daily PRN    ondansetron (Zofran) 4 MG/2ML injection 4 mg  4 mg Intravenous Q6H PRN    benzonatate (Tessalon) cap 200 mg  200 mg Oral TID PRN    guaiFENesin (Robitussin) 100 MG/5 ML oral liquid 200 mg  200 mg Oral Q4H PRN    glycerin-hypromellose- (Artificial Tears) 0.2-0.2-1 % ophthalmic solution 1 drop  1 drop Both Eyes QID PRN    sodium chloride (Saline Mist) 0.65 % nasal solution 1 spray  1 spray Each Nare Q3H PRN    metoclopramide (Reglan) 5 mg/mL injection 10 mg  10 mg Intravenous Daily PRN    insulin aspart (NovoLOG) 100 Units/mL FlexPen 1-7 Units  1-7 Units Subcutaneous TID CC    [Held by provider] insulin aspart (NovoLOG) 100 Units/mL FlexPen 5 Units  5 Units Subcutaneous TID CC and HS   [2]

## 2025-05-28 NOTE — PROGRESS NOTES
[facility] Cardiology Progress Note    Radha Yu Patient Status:  Inpatient    1940 MRN [id number]   Location [facility] 3W/SW Attending Villa Angel MD   Hosp Day # 6 PCP Stef Velasco MD     Subjective:  No CV concerns. Feels tired. Pending [facility] today     Objective:  /52 (BP Location: Right arm)   Pulse 69   Temp 96.9 °F (36.1 °C) (Oral)   Resp 18   Wt 120 lb 14.4 oz (54.8 kg)   SpO2 96%   BMI 22.86 kg/m²     Telemetry: NSR, 69 bpm       Intake/Output:    Intake/Output Summary (Last 24 hours) at 2025 1118  Last data filed at 2025 0815  Gross per 24 hour   Intake 580 ml   Output --   Net 580 ml       Last 3 Weights   25 0402 120 lb 14.4 oz (54.8 kg)   25 0617 116 lb 9.6 oz (52.9 kg)   25 2146 123 lb 14.4 oz (56.2 kg)   25 1114 118 lb 13.3 oz (53.9 kg)   25 1830 123 lb 7.3 oz (56 kg)   25 0700 123 lb 7.3 oz (56 kg)   25 1805 123 lb 6.4 oz (56 kg)   25 1121 139 lb 1.8 oz (63.1 kg)       Labs:  Recent Labs   Lab 25  0246 25  0750 25  0751   * 171*  171* 209*  209*   BUN 43* 43*  43* 60*  60*   CREATSERUM 2.61* 2.67*  2.67* 3.31*  3.31*   EGFRCR 17* 17*  17* 13*  13*   CA 7.4* 7.7*  7.7* 7.4*  7.4*    136  136 134*  134*   K 3.8 3.6  3.6 4.3  4.3    101  101 100  100   CO2 27.0 27.0  27.0 23.0  23.0     Recent Labs   Lab 25  0551 25  0750 25  0751   RBC 3.14* 3.16* 3.15*   HGB 8.3* 8.3* 8.4*   HCT 26.6* 26.2* 26.0*   MCV 84.7 82.9 82.5   MCH 26.4 26.3 26.7   MCHC 31.2 31.7 32.3   RDW 15.9* 15.9* 15.9*   NEPRELIM 2.98 2.60 3.14   WBC 5.2 4.3 4.9   .0 145.0* 142.0*         Recent Labs   Lab 25  0948 25  1127 25   TROPHS 224* 235* 426*       Diagnostics:   25 Echo  1. Left ventricle: The cavity size was normal. Wall thickness was mildly      increased. Systolic function was moderately reduced. The estimated       ejection fraction was 35-40%, by biplane method of disks. Doppler      parameters are consistent with abnormal left ventricular relaxation -      grade 1 diastolic dysfunction.   2. Left atrium: The atrium was markedly dilated.   3. Right atrium: The atrium was dilated.   4. Atrial septum: A patent foramen ovale cannot be excluded.   5. Aortic valve: There was mild regurgitation.   6. Mitral valve: The annulus was mildly to moderately calcified. The      leaflets were normal thickness. There was moderate to severe      regurgitation.   7. Tricuspid valve: There was mild-moderate regurgitation.   Impressions:  No previous study from [facility] was   available for comparison.     5/27/25 L/RHC   Findings:  1) Left Ventriculography at 30 degrees CEE: LVEF 40 to 45%, hypokinesia of the mid to distal inferior/inferoapical walls.  2) Hemodynamics: LVEDP 18 mmHg  3) Coronaries:  Left main coronary artery: Large size vessel with no angiographically significant disease.  Left anterior descending artery: Large size, Type IV vessel with 50% proximal, 80% mid segment stenosis.  90% proximal first diagonal branch disease which originates from the diseased segment of the mid LAD.   Circumflex artery: Large size non-dominant vessel with luminal irregularities.  Angiographically significant disease.  Small OM1 with focal 80% ostial stenosis.  OM 2  is medium caliber, diffuse 70 to 80% disease of proximal segment.  Right coronary artery: Large size dominant vessel with 100%  of the proximal segment with reconstitution of the PDA via left-to-right septal collaterals.      Aortobifemoral angiogram  Mildly dilated infrarenal aorta  Left renal artery proximal 80% stenosis  Nonvisualization of the right renal artery  Severely, diffusely calcified but patent bilateral iliac arteries     RIGHT HEART CATHETERIZATION HEMODYNAMICS:  Right Atrial Pressure: 14/13/12 mmHg  Right Ventricular Pressure: 36/10/14 mmHg  Pulmonary  Artery Pressure: 37/18/24 mmHg  Wedge Pressure: 23/22/21 mmHg  Colleen CO: 4.7 L/min; Colleen CI: 3.1 L/min/m²  Transpulmonary Gradient (PAmean - Wedge): 13 mmHg  Pulmonary Vascular Resistance (PA-wedge/CO): 0.6 CONRAD  Ao saturation 91.2%  PA saturation 51.5%  Hgb 7.6  Heart Rate 58      Review of Systems   Respiratory: Negative.     Cardiovascular: Negative.      Physical Exam:    General: Alert and oriented x 3. No apparent distress.   HEENT: Normocephalic, anicteric sclera, neck supple, no thyromegaly or adenopathy.  Neck: No JVD, carotids 2+, no bruits.  Cardiac: Regular rate & rhythm. S1, S2 normal. No pericardial rub, S3, or extra cardiac sounds. +Murmur   Lungs: Clear without wheezes, rales, rhonchi or dullness.  Normal excursions and effort.  Abdomen: Soft, non-tender. No organosplenomegally, mass or rebound, BS-present.  Extremities: Without clubbing or cyanosis.  No left lower extremity edema, no right lower extremity edema.  Neurologic: Alert and oriented, normal affect. No focal defects  Skin: Warm and dry.    Right groin: C/D/I. Soft. Non-tender. No signs of bruising, bleeding,  hematoma, or infection noted     Medications:  Scheduled Medications[1]  Medication Infusions[2]    Assessment:  84-year-old female with a history of PVD s/p left femoral-popliteal bypass 2015, s/p right TCAR and total occluded left ICA, HTN, HLD, DM, and CKD who presents with shortness of breath     Acute hypoxic respiratory failure  - Chest x-ray w/ evidence of moderate pulmonary edema, noted diffuse wheezing initially on exam in setting of long-term tobacco use  - Viral resp panel negative   - S/p nebs & steroids . Pulm following     NSTEMI   Multivessel coronary artery disease   - Peak TnI 426, ECG w/ lateral T wave inversion  - Echo w/ EF 35-40%, grade 1dd, bi-atrial dilation, PFO can't be excluded, mod - severe MR, mild-mod TR   - L/RHC w/ severe multivessel CAD. Surgical turndown. 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%   Mod-severe mitral regurgitation   Mild-mod tricuspid regurgitation   - Volume removal per HD   - Compensated on exam   - GDMT: toprol xl , isordil/hydralazine , Not on acei/arb/arni/mra due to CKD     ESRD   - Started HD this admission  - Nephrology following     PVD s/p left femoropopliteal bypass 9/2015  - 80% proximal left renal artery stenosis on angiogram   - On asa, plavix, atorvastatin     Moderate right ICA stenosis s/p right TCAR 2/2019, totally occluded left ICA  - On asa, plavix, atorvastatin     HTN   - Stable on toprol xl , isordil/hydralazine    HLD  - LDL 48, on atorvastatin     DMII  - HgbA1c = 6.5%. On insulin per PMD     Acute on chronic anemia   - Hgb stable   - On PPI. GI following     Plan:    Denies anginal sx . Compensated on exam   Hemodynamically stable. Plan for complex PCI today   Continue asa, plavix, toprol xl, isordil, hydralazine    Increase atorvastatin to 40mg nightly     JACKLYN Taylor  5/28/2025  11:18 AM  Ph [phone] ([place])  Ph [phone] ([place])           [1]    chlorhexidine   Topical On Call    sodium chloride   Intravenous On Call    hydrALAZINE  25 mg Oral Q8H BLANE    isosorbide dinitrate  20 mg Oral TID (Nitrates)    metoprolol succinate  12.5 mg Oral Daily Beta Blocker    pantoprazole  40 mg Oral BID AC    calcitriol  0.25 mcg Oral Q MWF    epoetin brittany  5,000 Units Subcutaneous Once per day on Monday Wednesday Friday    [Held by provider] aspirin  81 mg Oral Daily    atorvastatin  20 mg Oral Nightly    clopidogrel  75 mg Oral Daily    escitalopram  10 mg Oral Daily    insulin aspart  1-7 Units Subcutaneous TID CC    [Held by provider] insulin aspart  5 Units Subcutaneous TID CC and HS   [2]

## 2025-05-28 NOTE — PROGRESS NOTES
Patient arrived from cath lab with femstop inflated per order. Old drainage on dressing site that was already there per cath lab RN. Cath lab RN stated +1 pedal pulse. This RN used doppler and was unable to obtain pedal pulse. Jeana VILLASENOR was notified about the pulse, no new orders. Femstop was deflated per order and shortly after hematoma started. Manual pressure was applied and Jeana VILLASENOR was notified and came to the bedside - see her note. Quickclot and femstop was applied at 1615. Femstop was removed at 1715 - at that time dressing clean, dry and intact and site soft without hematoma.

## 2025-05-29 ENCOUNTER — APPOINTMENT (OUTPATIENT)
Dept: CT IMAGING | Facility: HOSPITAL | Age: 85
End: 2025-05-29
Attending: HOSPITALIST
Payer: MEDICARE

## 2025-05-29 LAB
ALBUMIN SERPL-MCNC: 3.6 G/DL (ref 3.2–4.8)
ANION GAP SERPL CALC-SCNC: 9 MMOL/L (ref 0–18)
ANTIBODY SCREEN: NEGATIVE
ATRIAL RATE: 81 BPM
BASOPHILS # BLD AUTO: 0.02 X10(3) UL (ref 0–0.2)
BASOPHILS NFR BLD AUTO: 0.4 %
BUN BLD-MCNC: 26 MG/DL (ref 9–23)
BUN/CREAT SERPL: 15.6 (ref 10–20)
CALCIUM BLD-MCNC: 7.8 MG/DL (ref 8.7–10.4)
CHLORIDE SERPL-SCNC: 101 MMOL/L (ref 98–112)
CO2 SERPL-SCNC: 27 MMOL/L (ref 21–32)
CREAT BLD-MCNC: 1.67 MG/DL (ref 0.55–1.02)
DEPRECATED RDW RBC AUTO: 46.8 FL (ref 35.1–46.3)
EGFRCR SERPLBLD CKD-EPI 2021: 30 ML/MIN/1.73M2 (ref 60–?)
EOSINOPHIL # BLD AUTO: 0.08 X10(3) UL (ref 0–0.7)
EOSINOPHIL NFR BLD AUTO: 1.6 %
ERYTHROCYTE [DISTWIDTH] IN BLOOD BY AUTOMATED COUNT: 15.9 % (ref 11–15)
GLUCOSE BLD-MCNC: 129 MG/DL (ref 70–99)
GLUCOSE BLDC GLUCOMTR-MCNC: 147 MG/DL (ref 70–99)
GLUCOSE BLDC GLUCOMTR-MCNC: 161 MG/DL (ref 70–99)
GLUCOSE BLDC GLUCOMTR-MCNC: 250 MG/DL (ref 70–99)
GLUCOSE BLDC GLUCOMTR-MCNC: 295 MG/DL (ref 70–99)
HCT VFR BLD AUTO: 22.5 % (ref 35–48)
HGB BLD-MCNC: 7.4 G/DL (ref 12–16)
IMM GRANULOCYTES # BLD AUTO: 0.02 X10(3) UL (ref 0–1)
IMM GRANULOCYTES NFR BLD: 0.4 %
LYMPHOCYTES # BLD AUTO: 0.84 X10(3) UL (ref 1–4)
LYMPHOCYTES NFR BLD AUTO: 16.5 %
MAGNESIUM SERPL-MCNC: 1.8 MG/DL (ref 1.6–2.6)
MCH RBC QN AUTO: 26.5 PG (ref 26–34)
MCHC RBC AUTO-ENTMCNC: 32.9 G/DL (ref 31–37)
MCV RBC AUTO: 80.6 FL (ref 80–100)
MONOCYTES # BLD AUTO: 0.32 X10(3) UL (ref 0.1–1)
MONOCYTES NFR BLD AUTO: 6.3 %
NEUTROPHILS # BLD AUTO: 3.81 X10 (3) UL (ref 1.5–7.7)
NEUTROPHILS # BLD AUTO: 3.81 X10(3) UL (ref 1.5–7.7)
NEUTROPHILS NFR BLD AUTO: 74.8 %
OSMOLALITY SERPL CALC.SUM OF ELEC: 290 MOSM/KG (ref 275–295)
P-R INTERVAL: 182 MS
PHOSPHATE SERPL-MCNC: 1.9 MG/DL (ref 2.4–5.1)
PLATELET # BLD AUTO: 141 10(3)UL (ref 150–450)
PLATELETS.RETICULATED NFR BLD AUTO: 5.9 % (ref 0–7)
POTASSIUM SERPL-SCNC: 3.6 MMOL/L (ref 3.5–5.1)
Q-T INTERVAL: 398 MS
QRS DURATION: 72 MS
QTC CALCULATION (BEZET): 462 MS
R AXIS: 14 DEGREES
RBC # BLD AUTO: 2.79 X10(6)UL (ref 3.8–5.3)
RH BLOOD TYPE: POSITIVE
SODIUM SERPL-SCNC: 137 MMOL/L (ref 136–145)
T AXIS: 71 DEGREES
VENTRICULAR RATE: 81 BPM
WBC # BLD AUTO: 5.1 X10(3) UL (ref 4–11)

## 2025-05-29 PROCEDURE — 99291 CRITICAL CARE FIRST HOUR: CPT | Performed by: HOSPITALIST

## 2025-05-29 PROCEDURE — 99233 SBSQ HOSP IP/OBS HIGH 50: CPT | Performed by: INTERNAL MEDICINE

## 2025-05-29 PROCEDURE — 75635 CT ANGIO ABDOMINAL ARTERIES: CPT | Performed by: HOSPITALIST

## 2025-05-29 PROCEDURE — 99223 1ST HOSP IP/OBS HIGH 75: CPT | Performed by: SURGERY

## 2025-05-29 RX ORDER — MORPHINE SULFATE 2 MG/ML
1 INJECTION, SOLUTION INTRAMUSCULAR; INTRAVENOUS ONCE
Refills: 0 | Status: COMPLETED | OUTPATIENT
Start: 2025-05-29 | End: 2025-05-29

## 2025-05-29 RX ORDER — MORPHINE SULFATE 2 MG/ML
INJECTION, SOLUTION INTRAMUSCULAR; INTRAVENOUS
Status: DISPENSED
Start: 2025-05-29 | End: 2025-05-29

## 2025-05-29 RX ORDER — INSULIN DEGLUDEC 100 U/ML
12 INJECTION, SOLUTION SUBCUTANEOUS DAILY
Status: DISCONTINUED | OUTPATIENT
Start: 2025-05-29 | End: 2025-06-05

## 2025-05-29 NOTE — OCCUPATIONAL THERAPY NOTE
Attempted OT tx session x2. Pt off unit for dialysis then STAT CTA of abdomen/pelvis/BLE due to RRT called in dialysis. Will follow.      Marcela Maloney OT  [facility]  Inpatient Rehabilitation  Occupational Therapy  [phone]

## 2025-05-29 NOTE — PHYSICAL THERAPY NOTE
PHYSICAL THERAPY TREATMENT NOTE - INPATIENT     Room Number: 309/309-A       Presenting Problem: Acute on Chronic  CHF ,anemia  and Respiratory failure / NSTEMI / CKD ESRD new to dialysis this admission / weakness and declining status in home  Co-Morbidities : Prior left hip sx with shorter limb post operatively / Left shoulder fx 2017 / PVD with left femo popliteal Bypass / Moderate right ICA stenosis  right TCAR 2019  / left total occlusion  Left ICA    Problem List  Principal Problem:    Acute on chronic respiratory failure with hypoxia ([facility])  Active Problems:    ESRD (end stage renal disease) ([facility])    Acute congestive heart failure, unspecified heart failure type ([facility])    NSTEMI (non-ST elevated myocardial infarction) ([facility])    Acute renal failure superimposed on chronic kidney disease, unspecified acute renal failure type, unspecified CKD stage      PHYSICAL THERAPY ASSESSMENT   Patient demonstrates fair progress this session, goals  remain in progress.      Patient is requiring moderate assist as a result of the following impairments: decreased functional strength, impaired standing balance, decreased muscular endurance, medical status, and impaired sensation.     Patient continues to function below baseline with bed mobility, transfers, and gait.  Next session anticipate patient to progress bed mobility, transfers, and gait.  Physical Therapy will continue to follow patient for duration of hospitalization.    Patient continues to benefit from continued skilled PT services: to promote return to prior level of function and safety with continuous assistance and gradual rehabilitative therapy .    PLAN DURING HOSPITALIZATION  Nursing Mobility Recommendation : 1 Assist  PT Device Recommendation: Rolling walker, Gait belt  PT Treatment Plan: Bed mobility, Body mechanics, Endurance, Energy conservation, Family education, Gait training, Range of motion, Strengthening, Transfer training, Balance training, Stoop  training, Stair training  Frequency (Obs): 3-5x/week     SUBJECTIVE  Agreeable to activity.     OBJECTIVE  Precautions: Cardiac, Bed/chair alarm    WEIGHT BEARING RESTRICTION  none    PAIN ASSESSMENT   Ratin    BALANCE  Static Sitting: Good  Dynamic Sitting: Fair +  Static Standing: Poor +  Dynamic Standing: Poor    ACTIVITY TOLERANCE  BP: 122/50  BP Location: Right arm  BP Method: Automatic  Patient Position: Sitting    AM-PAC '6-Clicks' INPATIENT SHORT FORM - BASIC MOBILITY  How much difficulty does the patient currently have...  Patient Difficulty: Turning over in bed (including adjusting bedclothes, sheets and blankets)?: A Little   Patient Difficulty: Sitting down on and standing up from a chair with arms (e.g., wheelchair, bedside commode, etc.): A Little   Patient Difficulty: Moving from lying on back to sitting on the side of the bed?: A Little   How much help from another person does the patient currently need...   Help from Another: Moving to and from a bed to a chair (including a wheelchair)?: A Lot   Help from Another: Need to walk in hospital room?: A Lot   Help from Another: Climbing 3-5 steps with a railing?: A Lot     AM-PAC Score:  Raw Score: 15   Approx Degree of Impairment: 57.7%   Standardized Score (AM-PAC Scale): 39.45   CMS Modifier (G-Code): CK    FUNCTIONAL ABILITY STATUS  Functional Mobility/Gait Assessment  Gait Assistance: Moderate assistance  Distance (ft): 3  Assistive Device: Rolling walker  Pattern: Shuffle (unsteady, flexed posture, R knee buckling noted on 1st step)  Supine to Sit: minimal assist  Sit to Stand: minimal assist    Skilled Therapy Provided: Pt received resting in bed, eating, and agreeable to activity. No c/o pain. C/o numbness and tingling of RLE. Demos bed mobility with increased time, HOB elevated, and min A. Demos STS transfer to/from bed and chair with RW and min A. On initial step, noted knee buckling requiring mod A for balance. Pt able to take several  unsteady, shuffled steps to chair with RW and mod A. Pt attempted to sit down too far from the chair and required mod A to have hips directed into chair to prevent fall. Pt reported significant fatigue with activity. VSS. Pt was left sitting in chair with needs within reach, alarm on, handoff to RN complete.     The patient's Approx Degree of Impairment: 57.7% has been calculated based on documentation in the [facility] '6 clicks' Inpatient Daily Activity Short Form.  Research supports that patients with this level of impairment may benefit from rehab facility.  Final disposition will be made by interdisciplinary medical team.    Patient End of Session: Up in chair, Needs met, Call light within reach, RN aware of session/findings, All patient questions and concerns addressed, Hospital anti-slip socks, Alarm set    CURRENT GOALS   Goals to be met by: 6/15/25   Patient Goal Patient's self-stated goal is: home with family    Goal #1 Patient is able to demonstrate supine - sit EOB @ level: supervision      Goal #1   Current Status  In progress   Goal #2 Patient is able to demonstrate transfers EOB to/from Chair/Wheelchair at assistance level: SBA with walker - rolling      Goal #2  Current Status In progress   Goal #3 Patient is able to ambulate 50- 75 feet with assist device: walker - rolling at assistance level: CGA with stable vitals and good tolerance    Goal #3   Current Status In progress   Goal #4 Patient will negotiate 5 stairs/one curb w/ assistive device and min assist with good tolerance    Goal #4   Current Status In progress    Goal #5 Family training in prep for DC to home setting with family    Goal #5   Current Status In progress      Therapeutic Activity: 15 minutes

## 2025-05-29 NOTE — CM/SW NOTE
05/29/25 0941   Choice of Post-Acute Provider   Informed patient of right to choose their preferred provider Yes   List of appropriate post-acute services provided to patient/family with quality data Yes   Patient/family choice [facility]   Information given to Other (comment)  (nephew Elliott)     09:45AM  SW spoke to pt's nephew Elliott via phone. He was not able to fully discuss LISE vs home w/ HH and outpatient HD w/ pt yesterday due to her procedure.    Elliott plans to speak w/ pt today. He attempted this AM but she was sleeping.  SW informed Elliott that pt may be medically cleared this afternoon pending confirmation from MD's.    He confirmed he will further discuss w/ pt but is agreeable and has requested to have [facility] reserved for [facility] w/ onsite HD. JAYASHREE explained notifying insurance of choice to confirm approval vs denial for LISE and Elliott is agreeable to this as well.    JAYASHREE also discussed Outpatient HD at  [facility]  if pt returns home. He is agreeable to that location in order to stay w/ Dr. Sharma's services.    [facility] reserved in [place].   [facility]/insurance notified of facility choice. Pending determination.    [facility] has HD on MWF schedule. Message sent to MD and nephro MD's to confirm if pt can have HD tomorrow/Friday or Monday if she should DC to [facility] later today.    11:45AM  Received confirmation from pt's nephew. He spoke w/ pt - she is agreeable to [facility] L[facility] at DC.    Pt is not cleared for DC today.     02:15PM  Auth approved for LISE. Please see separate approval note for details.      PLAN: [facility] w/ onsite HD - pending onsite HD approval & med clear       SW/CM to remain available for support and/or discharge planning.       Ida, MSW, LSW [id number]

## 2025-05-29 NOTE — PLAN OF CARE
Post cath site C/D/I, NO hematoma or new bleeding noted. Report of back pain - PRN pain medication administered. Up with asssist with walker. Plan for dialysis in AM.    Problem: CARDIOVASCULAR - ADULT  Goal: Maintains optimal cardiac output and hemodynamic stability  Description: INTERVENTIONS:  - Monitor vital signs, rhythm, and trends  - Monitor for bleeding, hypotension and signs of decreased cardiac output  - Evaluate effectiveness of vasoactive medications to optimize hemodynamic stability  - Monitor arterial and/or venous puncture sites for bleeding and/or hematoma  - Assess quality of pulses, skin color and temperature  - Assess for signs of decreased coronary artery perfusion - ex. Angina  - Evaluate fluid balance, assess for edema, trend weights  Outcome: Progressing  Goal: Absence of cardiac arrhythmias or at baseline  Description: INTERVENTIONS:  - Continuous cardiac monitoring, monitor vital signs, obtain 12 lead EKG if indicated  - Evaluate effectiveness of antiarrhythmic and heart rate control medications as ordered  - Initiate emergency measures for life threatening arrhythmias  - Monitor electrolytes and administer replacement therapy as ordered  Outcome: Progressing     Problem: RESPIRATORY - ADULT  Goal: Achieves optimal ventilation and oxygenation  Description: INTERVENTIONS:  - Assess for changes in respiratory status  - Assess for changes in mentation and behavior  - Position to facilitate oxygenation and minimize respiratory effort  - Oxygen supplementation based on oxygen saturation or ABGs  - Provide Smoking Cessation handout, if applicable  - Encourage broncho-pulmonary hygiene including cough, deep breathe, Incentive Spirometry  - Assess the need for suctioning and perform as needed  - Assess and instruct to report SOB or any respiratory difficulty  - Respiratory Therapy support as indicated  - Manage/alleviate anxiety  - Monitor for signs/symptoms of CO2 retention  Outcome: Progressing      Problem: GENITOURINARY - ADULT  Goal: Absence of urinary retention  Description: INTERVENTIONS:  - Assess patient’s ability to void and empty bladder  - Monitor intake/output and perform bladder scan as needed  - Follow urinary retention protocol/standard of care  - Consider collaborating with pharmacy to review patient's medication profile  - Implement strategies to promote bladder emptying  Outcome: Progressing     Problem: METABOLIC/FLUID AND ELECTROLYTES - ADULT  Goal: Glucose maintained within prescribed range  Description: INTERVENTIONS:  - Monitor Blood Glucose as ordered  - Assess for signs and symptoms of hyperglycemia and hypoglycemia  - Administer ordered medications to maintain glucose within target range  - Assess barriers to adequate nutritional intake and initiate nutrition consult as needed  - Instruct patient on self management of diabetes  Outcome: Progressing  Goal: Electrolytes maintained within normal limits  Description: INTERVENTIONS:  - Monitor labs and rhythm and assess patient for signs and symptoms of electrolyte imbalances  - Administer electrolyte replacement as ordered  - Monitor response to electrolyte replacements, including rhythm and repeat lab results as appropriate  - Fluid restriction as ordered  - Instruct patient on fluid and nutrition restrictions as appropriate  Outcome: Progressing  Goal: Hemodynamic stability and optimal renal function maintained  Description: INTERVENTIONS:  - Monitor labs and assess for signs and symptoms of volume excess or deficit  - Monitor intake, output and patient weight  - Monitor urine specific gravity, serum osmolarity and serum sodium as indicated or ordered  - Monitor response to interventions for patient's volume status, including labs, urine output, blood pressure (other measures as available)  - Encourage oral intake as appropriate  - Instruct patient on fluid and nutrition restrictions as appropriate  Outcome: Progressing     Problem:  Patient Centered Care  Goal: Patient preferences are identified and integrated in the patient's plan of care  Description: Interventions:  - What would you like us to know as we care for you? Home with Nephew  - Provide timely, complete, and accurate information to patient/family  - Incorporate patient and family knowledge, values, beliefs, and cultural backgrounds into the planning and delivery of care  - Encourage patient/family to participate in care and decision-making at the level they choose  - Honor patient and family perspectives and choices  Outcome: Progressing     Problem: Diabetes/Glucose Control  Goal: Glucose maintained within prescribed range  Description: INTERVENTIONS:  - Monitor Blood Glucose as ordered  - Assess for signs and symptoms of hyperglycemia and hypoglycemia  - Administer ordered medications to maintain glucose within target range  - Assess barriers to adequate nutritional intake and initiate nutrition consult as needed  - Instruct patient on self management of diabetes  Outcome: Progressing     Problem: Patient/Family Goals  Goal: Patient/Family Long Term Goal  Description: Patient's Long Term Goal:     Interventions:  - See additional Care Plan goals for specific interventions  Outcome: Progressing  Goal: Patient/Family Short Term Goal  Description: Patient's Short Term Goal:    Interventions:   - See additional Care Plan goals for specific interventions  Outcome: Progressing

## 2025-05-29 NOTE — CM/SW NOTE
05/29/25 1418   Prior Authorization - Destination   Destination Type Skilled nursing facility   Service Provider [facility]   Payer Communication Destination Comments Approved - auth expires 6/2   Prior Authorization Status Approved   Prior Authorization Start Date 05/29/25   Prior Authorization Number Plan Auth ID: [id number]     Per [facility] [employer] web portal - pt approved for LISE at [facility].  Auth approved from 5/29/25 through 6/2/25.    Approval information uploaded to [employer].    ANTONIA Prieto, LSW [id number]

## 2025-05-29 NOTE — PLAN OF CARE
Problem: CARDIOVASCULAR - ADULT  Goal: Maintains optimal cardiac output and hemodynamic stability  Description: INTERVENTIONS:  - Monitor vital signs, rhythm, and trends  - Monitor for bleeding, hypotension and signs of decreased cardiac output  - Evaluate effectiveness of vasoactive medications to optimize hemodynamic stability  - Monitor arterial and/or venous puncture sites for bleeding and/or hematoma  - Assess quality of pulses, skin color and temperature  - Assess for signs of decreased coronary artery perfusion - ex. Angina  - Evaluate fluid balance, assess for edema, trend weights  Outcome: Progressing  Goal: Absence of cardiac arrhythmias or at baseline  Description: INTERVENTIONS:  - Continuous cardiac monitoring, monitor vital signs, obtain 12 lead EKG if indicated  - Evaluate effectiveness of antiarrhythmic and heart rate control medications as ordered  - Initiate emergency measures for life threatening arrhythmias  - Monitor electrolytes and administer replacement therapy as ordered  Outcome: Progressing     Problem: RESPIRATORY - ADULT  Goal: Achieves optimal ventilation and oxygenation  Description: INTERVENTIONS:  - Assess for changes in respiratory status  - Assess for changes in mentation and behavior  - Position to facilitate oxygenation and minimize respiratory effort  - Oxygen supplementation based on oxygen saturation or ABGs  - Provide Smoking Cessation handout, if applicable  - Encourage broncho-pulmonary hygiene including cough, deep breathe, Incentive Spirometry  - Assess the need for suctioning and perform as needed  - Assess and instruct to report SOB or any respiratory difficulty  - Respiratory Therapy support as indicated  - Manage/alleviate anxiety  - Monitor for signs/symptoms of CO2 retention  Outcome: Progressing     Problem: GENITOURINARY - ADULT  Goal: Absence of urinary retention  Description: INTERVENTIONS:  - Assess patient’s ability to void and empty bladder  - Monitor  intake/output and perform bladder scan as needed  - Follow urinary retention protocol/standard of care  - Consider collaborating with pharmacy to review patient's medication profile  - Implement strategies to promote bladder emptying  Outcome: Progressing     Problem: METABOLIC/FLUID AND ELECTROLYTES - ADULT  Goal: Glucose maintained within prescribed range  Description: INTERVENTIONS:  - Monitor Blood Glucose as ordered  - Assess for signs and symptoms of hyperglycemia and hypoglycemia  - Administer ordered medications to maintain glucose within target range  - Assess barriers to adequate nutritional intake and initiate nutrition consult as needed  - Instruct patient on self management of diabetes  Outcome: Progressing  Goal: Electrolytes maintained within normal limits  Description: INTERVENTIONS:  - Monitor labs and rhythm and assess patient for signs and symptoms of electrolyte imbalances  - Administer electrolyte replacement as ordered  - Monitor response to electrolyte replacements, including rhythm and repeat lab results as appropriate  - Fluid restriction as ordered  - Instruct patient on fluid and nutrition restrictions as appropriate  Outcome: Progressing  Goal: Hemodynamic stability and optimal renal function maintained  Description: INTERVENTIONS:  - Monitor labs and assess for signs and symptoms of volume excess or deficit  - Monitor intake, output and patient weight  - Monitor urine specific gravity, serum osmolarity and serum sodium as indicated or ordered  - Monitor response to interventions for patient's volume status, including labs, urine output, blood pressure (other measures as available)  - Encourage oral intake as appropriate  - Instruct patient on fluid and nutrition restrictions as appropriate  Outcome: Progressing     Problem: Patient Centered Care  Goal: Patient preferences are identified and integrated in the patient's plan of care  Description: Interventions:  - What would you like us to  know as we care for you? Lives home with nephew  - Provide timely, complete, and accurate information to patient/family  - Incorporate patient and family knowledge, values, beliefs, and cultural backgrounds into the planning and delivery of care  - Encourage patient/family to participate in care and decision-making at the level they choose  - Honor patient and family perspectives and choices  Outcome: Progressing     Problem: Diabetes/Glucose Control  Goal: Glucose maintained within prescribed range  Description: INTERVENTIONS:  - Monitor Blood Glucose as ordered  - Assess for signs and symptoms of hyperglycemia and hypoglycemia  - Administer ordered medications to maintain glucose within target range  - Assess barriers to adequate nutritional intake and initiate nutrition consult as needed  - Instruct patient on self management of diabetes  Outcome: Progressing     Problem: Patient/Family Goals  Goal: Patient/Family Long Term Goal  Description: Patient's Long Term Goal: get better and go home    Interventions:  - vascular consult  -assess q shift prn  - See additional Care Plan goals for specific interventions  Outcome: Progressing  Goal: Patient/Family Short Term Goal  Description: Patient's Short Term Goal: will remain without injury    Interventions:   - call light and personal belongings are within reach  -pain controlled  -bathroom assistance  Hourly rounds done  - See additional Care Plan goals for specific interventions  Outcome: Progressing

## 2025-05-29 NOTE — PROGRESS NOTES
[facility]  Nephrology Daily Progress Note    Radha Yu  [id number]  84 year old      HPI:   Radha Yu is a 84 year old female.  Was getting HD this am when she developed severe RLE pain with loss of pulse in R foot.  Pain better after MS.  HD rx terminated early.  No CP or SOB.        ROS:     Constitutional:  Negative for decreased activity, fever, irritability and lethargy  Endocrine:  Negative for abnormal sleep patterns, increased activity, polydipsia and polyphagia  Cardiovascular:  Negative for cool extremity and irregular heartbeat/palpitations  Gastrointestinal:  Negative for abdominal pain, constipation, decreased appetite, diarrhea and vomiting  Genitourinary:  Negative for dysuria and hematuria  Hema/Lymph:  Negative for easy bleeding and easy bruising  Integumentary:  Negative for pruritus and rash  Musculoskeletal:  Negative for bone/joint symptoms  Neurological:  Negative for gait disturbance  Psychiatric:  Negative for inappropriate interaction and psychiatric symptoms  Respiratory:  Negative for cough, dyspnea and wheezing      PHYSICAL EXAM:   Temp:  [97.4 °F (36.3 °C)-98.8 °F (37.1 °C)] 98.2 °F (36.8 °C)  Pulse:  [56-90] 76  Resp:  [16-24] 18  BP: ()/(40-86) 136/59  SpO2:  [95 %-99 %] 98 %  Patient Weight for the past 72 hrs:   Weight   05/27/25 0617 116 lb 9.6 oz (52.9 kg)   05/28/25 0402 120 lb 14.4 oz (54.8 kg)   05/29/25 0558 121 lb (54.9 kg)       Constitutional: appears well hydrated alert and responsive no acute distress noted  Neck/Thyroid: neck is supple without adenopathy  Lymphatic: no abnormal cervical, supraclavicular or axillary adenopathy is noted  Respiratory: normal to inspection lungs are clear to auscultation bilaterally normal respiratory effort  Cardiovascular: regular rate and rhythm no murmurs, gallups, or rubs  Abdomen: soft non-tender non-distended no organomegaly noted no masses  Musculoskeletal: full ROM all extremities good strength  no  deformities  Extremities: no edema, cyanosis, or clubbing  Neurological: exam appropriate for age reflexes and motor skills appropriate for age    Labs:  Lab Results   Component Value Date    WBC 5.1 05/29/2025    HGB 7.4 05/29/2025    HCT 22.5 05/29/2025    .0 05/29/2025    CREATSERUM 1.67 05/29/2025    BUN 26 05/29/2025     05/29/2025    K 3.6 05/29/2025     05/29/2025    CO2 27.0 05/29/2025     05/29/2025    CA 7.8 05/29/2025    ALB 3.6 05/29/2025    MG 1.8 05/29/2025    PHOS 1.9 05/29/2025     Recent Labs   Lab 05/27/25  0750 05/28/25  0751 05/29/25  0900   WBC 4.3 4.9 5.1   HGB 8.3* 8.4* 7.4*   HCT 26.2* 26.0* 22.5*   .0* 142.0* 141.0*     Recent Labs   Lab 05/26/25  0551 05/27/25  0246 05/27/25  0750 05/28/25  0751 05/29/25  0901   GLU 88   < > 171*  171* 209*  209* 129*   BUN 75*   < > 43*  43* 60*  60* 26*   CREATSERUM 3.58*   < > 2.67*  2.67* 3.31*  3.31* 1.67*   CA 7.6*   < > 7.7*  7.7* 7.4*  7.4* 7.8*   ALB 3.6  --  3.6 3.6 3.6   *   < > 136  136 134*  134* 137   K 4.7   < > 3.6  3.6 4.3  4.3 3.6      < > 101  101 100  100 101   CO2 23.0   < > 27.0  27.0 23.0  23.0 27.0   ALKPHO 73  --   --   --   --    AST 16  --   --   --   --    ALT <7*  --   --   --   --    BILT 0.2  --   --   --   --    TP 5.7  --   --   --   --    PHOS 3.9  --  3.2 4.1 1.9*    < > = values in this interval not displayed.       Imaging  CTA ABDOMEN/PELVIS LOWER EXT BILAT W RUNOFF (XIU=[id number])  Result Date: 5/29/2025  CONCLUSION:   1. RIGHT lower extremity:  An approximate 8 cm segment of the proximal superficial femoral artery demonstrates high-grade stenosis and/or near complete occlusion at and below the femoral bifurcation.  There is partial distal reconstitution of the SFA, but with other multifocal high-grade stenoses and occlusions throughout the mid-distal SFA.  The profunda femoris artery is grossly patent.  Moderate multifocal non flow-limiting atherosclerotic  stenoses of the popliteal artery, which is otherwise patent.  Single-vessel runoff via the peroneal artery.  Up to moderate multiple common and external iliac artery atherosclerotic stenoses, but with no high-grade stenosis or occlusion of these vessels.  2. LEFT lower extremity:  Left femoral to left popliteal artery bypass is patent.  Mild scattered non flow-limiting granulation tissue at the mid to distal aspect of the bypass graft.  Native superficial femoral artery is essentially completely occluded.   The profunda femoris artery is patent.  The popliteal artery is grossly patent and demonstrates mild-to-moderate multifocal atherosclerotic stenoses.  Two vessel runoff via the posterior tibial and peroneal arteries, but with partial thready flow throughout the posterior tibial artery.  3. Moderate to severe atherosclerotic stenosis at the ostium of the superior mesenteric artery and both renal arteries.  There is also moderate stenosis at the ostium of the celiac axis. 4. Congestive failure and/or fluid overload with small left greater than right pleural effusions and severe anasarca. 5. Excreted contrast material within the renal collecting systems and urinary bladder.  There is also Kong's suspicion of contrast in the gallbladder.  Please note that contrast in the renal collecting systems limits sensitivity for detection of urinary tract calculi.  Extensive renal hilar vascular calcifications are seen.  There is also right greater than left renal atrophy. 6. Fatty atrophy of the pancreas with mild 6 mm pancreatic ductal dilation.  Please correlate with any available prior imaging to ensure stability of this finding.  If unavailable, request correlation with obstructive laboratory parameters and consider further evaluation with MRCP if laboratory abnormalities are present. 7. Indeterminate 2.4 cm left adrenal nodule, which should be correlated with prior outside institution imaging to ensure stability. 8.  Colonic diverticulosis. 9. Hysterectomy. 10. Left proximal femoral internal fixation; resultant streak artifacts limit evaluation of the pelvic structures.  There are also chronic L2 and L5 vertebral body compression fractures. 11. Lesser incidental findings as above.   [id number]  Dictated by (CST): Nasir Cash MD on 5/29/2025 at 11:08 AM     Finalized by (CST): Nasir Cash MD on 5/29/2025 at 11:42 AM              Medications:  Current Hospital Medications[1]    Allergies:  Allergies[2]    Input/Output:  No intake or output data in the 24 hours ending 05/29/25 1246       ASSESSMENT/PLAN:   Assessment   Problem List[3]    CV surgery has seen pt and may be going to OR later today.  Reevaluate in am for dialysis.  Discussed with RN.              5/29/2025  Lukasz Sharma MD               [1]   Current Facility-Administered Medications:     insulin degludec (Tresiba) 100 units/mL flextouch 12 Units, 12 Units, Subcutaneous, Daily    morphINE PF 2 MG/ML injection, , ,     atorvastatin (Lipitor) tab 40 mg, 40 mg, Oral, Nightly    clopidogrel (Plavix) tab 75 mg, 75 mg, Oral, Daily    aspirin DR tab 81 mg, 81 mg, Oral, Daily    sodium chloride 0.9 % IV bolus 100 mL, 100 mL, Intravenous, Q30 Min PRN **AND** albumin human (Albumin) 25% injection 25 g, 25 g, Intravenous, PRN Dialysis    hydrALAZINE (Apresoline) tab 25 mg, 25 mg, Oral, Q8H BLANE    isosorbide dinitrate (Isordil) tab 20 mg, 20 mg, Oral, TID (Nitrates)    metoprolol succinate (Toprol XL) partial tablet 12.5 mg, 12.5 mg, Oral, Daily Beta Blocker    pantoprazole (Protonix) DR tab 40 mg, 40 mg, Oral, BID AC    calcitriol (Rocaltrol) cap 0.25 mcg, 0.25 mcg, Oral, Q MWF    ipratropium-albuterol (Duoneb) 0.5-2.5 (3) MG/3ML inhalation solution 3 mL, 3 mL, Nebulization, Q6H PRN    HYDROcodone-acetaminophen (Norco) 5-325 MG per tab 1 tablet, 1 tablet, Oral, Q6H PRN    epoetin brittany (Epogen, Procrit) 5,000 Units injection, 5,000 Units, Subcutaneous, Once per day on  Monday Wednesday Friday    escitalopram (Lexapro) tab 10 mg, 10 mg, Oral, Daily    glucose (Dex4) 15 GM/59ML oral liquid 15 g, 15 g, Oral, Q15 Min PRN **OR** glucose (Glutose) 40% oral gel 15 g, 15 g, Oral, Q15 Min PRN **OR** glucose-vitamin C (Dex-4) chewable tab 4 tablet, 4 tablet, Oral, Q15 Min PRN **OR** dextrose 50% injection 50 mL, 50 mL, Intravenous, Q15 Min PRN **OR** glucose (Dex4) 15 GM/59ML oral liquid 30 g, 30 g, Oral, Q15 Min PRN **OR** glucose (Glutose) 40% oral gel 30 g, 30 g, Oral, Q15 Min PRN **OR** glucose-vitamin C (Dex-4) chewable tab 8 tablet, 8 tablet, Oral, Q15 Min PRN    acetaminophen (Tylenol Extra Strength) tab 500 mg, 500 mg, Oral, Q4H PRN    melatonin tab 3 mg, 3 mg, Oral, Nightly PRN    polyethylene glycol (PEG 3350) (Miralax) 17 g oral packet 17 g, 17 g, Oral, Daily PRN    sennosides (Senokot) tab 17.2 mg, 17.2 mg, Oral, Nightly PRN    bisacodyl (Dulcolax) 10 MG rectal suppository 10 mg, 10 mg, Rectal, Daily PRN    ondansetron (Zofran) 4 MG/2ML injection 4 mg, 4 mg, Intravenous, Q6H PRN    benzonatate (Tessalon) cap 200 mg, 200 mg, Oral, TID PRN    guaiFENesin (Robitussin) 100 MG/5 ML oral liquid 200 mg, 200 mg, Oral, Q4H PRN    glycerin-hypromellose- (Artificial Tears) 0.2-0.2-1 % ophthalmic solution 1 drop, 1 drop, Both Eyes, QID PRN    sodium chloride (Saline Mist) 0.65 % nasal solution 1 spray, 1 spray, Each Nare, Q3H PRN    metoclopramide (Reglan) 5 mg/mL injection 10 mg, 10 mg, Intravenous, Daily PRN    insulin aspart (NovoLOG) 100 Units/mL FlexPen 1-7 Units, 1-7 Units, Subcutaneous, TID CC    [Held by provider] insulin aspart (NovoLOG) 100 Units/mL FlexPen 5 Units, 5 Units, Subcutaneous, TID CC and HS  [2]   Allergies  Allergen Reactions    Levaquin [Levofloxacin] ITCHING   [3]   Patient Active Problem List  Diagnosis    Closed fracture of left hip (HCC)    Background diabetic retinopathy(362.01)    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HCC)    Essential hypertension, benign    ESRD (end  stage renal disease) (HCC)    Occlusion of right carotid artery    Stenosis of left carotid artery    Atherosclerosis of native artery of right lower extremity with ulceration (HCC)    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

## 2025-05-29 NOTE — CONSULTS
Samer F. Najjar, MD.    Vascular Surgery           VASCULAR SURGERY   INPATIENT CONSULT NOTE      Name: Radha Yu   :   1940  [id number]     Date of Consultation: 2025       REFERRING PHYSICIAN: Madison Crump MD  PRIMARY CARE PHYSICIAN:  Stef Velasco MD    HISTORY OF PRESENT ILLNESS:   Patient is a 84 year old female whom I have been asked to see regarding assessment of her right lower extremity erythema she has recently undergone a cardiac angiogram with intervention yesterday.  She had presented with myocardial infarction and was felt to be of prohibitive risk for a CABG.  She used to be on lifelong smoker until January.  She reported significant numbness with coolness and occasional pain.  Numbness now is intermittent.The nursing staff reports the ability to get Doppler signals.  She states that she did not have the symptoms prior to the angiogram.  A closure device was used.  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  She underwent a CT angiogram that revealed an approximate 8 cm segment of the proximal superficial femoral artery demonstrates high-grade stenosis and/or near complete occlusion at and below the femoral bifurcation.  There is partial distal reconstitution of the SFA, but with other multifocal high-grade stenoses and occlusions throughout the mid-distal SFA.She still had motor function. The patient has a history of  end-stage renal disease and is currently on dialysis.  She also has a history of vascular procedures in the past mostly done at [facility] where she had undergone a left common femoral endarterectomy with bovine patch angioplasty, left femoral to below-knee artery bypass with PTFE  in .  She has a history of a right TCAR in 2019 and has known left carotid artery occlusion.  She also has a history of lymphoma and still has a port present.  She is on ASA and  plavix.       PAST MEDICAL HISTORY:    Past Medical History[1]    PAST SURGICAL HISTORY:   Past Surgical History[2]    MEDICATIONS:   Current Hospital Medications[3]    ALLERGIES:    She is allergic to levaquin [levofloxacin].    SOCIAL HISTORY:    Patient  reports that she has quit smoking. Her smoking use included cigarettes. She has never used smokeless tobacco. She reports that she does not currently use alcohol. She reports that she does not currently use drugs.    FAMILY HISTORY:    Patient's family history is not on file.    ROS:    Comprehensive ROS reviewed and negative except for what's stated above.  Including negative for chest pain, shortness of breath, syncope.     EXAM:  /57 (BP Location: Right arm)   Pulse 72   Temp 98.1 °F (36.7 °C) (Oral)   Resp 18   Wt 121 lb (54.9 kg)   SpO2 98%   BMI 22.88 kg/m²   GENERAL: alert and orientated X 3, well developed, well nourished, in no apparent distress  NEURO/PSYCH: normal mood and affect  NECK: supple   CAROTID: No bruits  RESPIRATORY: no rales, rhonchi, or wheezes B  CARDIO: RRR without murmur, no murmur, no gallop   ABDOMEN: soft, non-tender with no palpable aneurysm or masses  EXTREMITIES: no tenderness  VASCULAR:        Femoral Popliteal DP PT Peroneal Edema   Right non-palpable       absent signal absent signal absent signal none   Left 1+       monophasic signal monophasic signal monophasic signal none        The right foot is colder than the left.  It has weakened motor function compared to the left.  The calf is soft.  There are some minor numbness present that comes and goes.      Diagnostic Data:      LABS:  Recent Labs   Lab 05/25/25  0516 05/26/25  0551 05/27/25  0750 05/28/25  0751 05/29/25  0900   WBC 6.2 5.2 4.3 4.9 5.1   HGB 8.7* 8.3* 8.3* 8.4* 7.4*   MCV 85.8 84.7 82.9 82.5 80.6   .0 155.0 145.0* 142.0* 141.0*       Recent Labs   Lab 05/25/25  0516 05/26/25  0551 05/27/25  0246 05/27/25  0750 05/28/25  0751 05/29/25  0901     135* 137 136  136 134*  134* 137   K 4.5 4.7 3.8 3.6  3.6 4.3  4.3 3.6    102 102 101  101 100  100 101   CO2 23.0 23.0 27.0 27.0  27.0 23.0  23.0 27.0   BUN 57* 75* 43* 43*  43* 60*  60* 26*   CREATSERUM 3.23* 3.58* 2.61* 2.67*  2.67* 3.31*  3.31* 1.67*   GLU 74 88 223* 171*  171* 209*  209* 129*   CA 7.9* 7.6* 7.4* 7.7*  7.7* 7.4*  7.4* 7.8*   MG 2.0 1.9 1.8 1.8 1.8 1.8   PHOS 3.8 3.9  --  3.2 4.1 1.9*     Recent Labs   Lab 05/22/25  1900 05/22/25 2029 05/23/25  0413   PTT 29.4 28.1 44.4*     Recent Labs   Lab 05/25/25  0516 05/26/25  0551 05/27/25  0750 05/28/25  0751 05/29/25  0901   ALT  --  <7*  --   --   --    AST  --  16  --   --   --    ALB 3.8 3.6 3.6 3.6 3.6     No results for input(s): \"TROP\" in the last 168 hours.  No results found for: \"ANAS\", \"SHYLA\", \"ANASCRN\"  Recent Labs   Lab 05/22/25  1323   HBSAG Nonreactive   HBCAB Nonreactive       Lab Results   Component Value Date    HGB 7.4 (L) 05/29/2025    HGB 8.4 (L) 05/28/2025    HGB 8.3 (L) 05/27/2025    HGB 8.3 (L) 05/26/2025    HGB 8.7 (L) 05/25/2025    HGB 8.9 (L) 05/24/2025    CREATSERUM 1.67 (H) 05/29/2025    CREATSERUM 3.31 (H) 05/28/2025    CREATSERUM 3.31 (H) 05/28/2025    CREATSERUM 2.67 (H) 05/27/2025    CREATSERUM 2.67 (H) 05/27/2025    CREATSERUM 2.61 (H) 05/27/2025           Radiology: CTA ABDOMEN/PELVIS LOWER EXT BILAT W RUNOFF (GCB=47596)  Result Date: 5/29/2025  PROCEDURE: CTA ABDOMEN/PELVIS LOWER EXT BILAT W RUNOFF (CPT=75635)  COMPARISON: [facility], XR CHEST AP PORTABLE (CPT=71045), 5/22/2025, 10:12 AM.  INDICATIONS: rle pain  TECHNIQUE: CT images of the abdomen, pelvis, and lower extremities were obtained without and with non-ionic intravenous contrast material. Multi-planar reformatted and 3-D images were created with vessel analysis performed on an independent workstation. Automated exposure control for dose reduction was used. Adjustment of the mA and/or kV was done based on the  patient's size. Iterative reconstruction technique for dose reduction was employed.  FINDINGS:  ANGIOGRAPHY: CELIAC AXIS/SMA: Moderate atherosclerotic stenosis at the ostium of the celiac axis with additional moderate to severe atherosclerotic stenosis at the ostium of the superior mesenteric artery. RENAL ARTERIES: Moderate to severe atherosclerotic stenosis at the ostia of both renal arteries. AORTA:   There is 2.2 cm ectasia of the infrarenal abdominal aorta, but with no freddy aneurysm.  Moderate atherosclerosis with no abdominal aortic dissection. TAMIKA: Flow is present.  RIGHT LEG   ILIAC ARTERIES: Moderate to severe multifocal common and external iliac artery atherosclerosis, which results in up to moderate multifocal non flow-limiting stenoses, but no high-grade stenosis or occlusion.  FEMORAL ARTERIES: Extensive atherosclerotic disease throughout the common and superficial femoral arteries.  Long segment high-grade stenosis and near complete occlusion that involves an approximate 8 cm segment of the superficial femoral artery at and below the femoral bifurcation.  There is partial distal reconstitution of the superficial femoral artery, but with other multifocal high-grade stenoses and occlusions throughout the course of the SFA.  The profunda femoris artery is grossly patent.  POPLITEAL ARTERY: Reconstitution of distal SFA and popliteal artery (likely via collaterals).  Atherosclerotic disease and moderate multifocal non flow-limiting stenoses of the popliteal artery.  RUN-OFF ARTERIES: Single-vessel runoff via the peroneal artery.  Thready flow and multifocal occlusions throughout the posterior tibial and to a lesser degree anterior tibial arteries.   LEFT LEG   ILIAC ARTERIES: Moderate to severe multifocal atherosclerosis of the common and to a lesser degree external iliac arteries with mild-to-moderate multifocal non flow-limiting stenoses, but no high-grade stenosis or occlusion.  FEMORAL ARTERIES: There  is a left femoral to left popliteal artery bypass, which appears patent.  Mild non flow-limiting granulation tissue is noted throughout the mid to distal aspect of the bypass.  The native superficial femoral artery is essentially  completely occluded.  The profunda femoris artery is grossly patent.  POPLITEAL ARTERY: Grossly patent popliteal artery with up to moderate multifocal atherosclerotic stenoses, but no high-grade stenosis or occlusion.  RUN-OFF ARTERIES: Two-vessel runoff via the posterior tibial and peroneal arteries, but with partial thready flow throughout the posterior tibial artery.  Anterior tibial artery demonstrates multifocal occlusions.  NON-ANGIOGRAPHIC FINDINGS: COMMENT: Interpretation is made in the context of the arterial phase of contrast-enhancement. LUNG BASES: The heart is normal in size.  The inferior tip of a right chest port is partially imaged.  Multi-vessel coronary artery and mitral annular calcification.  Small left greater than right pleural effusions.  Probable associated compressive atelectasis at the left greater than right lower lobes.  Coarse calcifications in both breasts. LIVER: No enlargement, atrophy, abnormal density, or significant focal lesion is identified within the parameters of this arterially enhanced study.  BILIARY: Suspect vicarious excretion of contrast in the nondistended gallbladder. PANCREAS: Fatty atrophy and probable mild 6 mm dilation of the main pancreatic duct.  No peripancreatic inflammation or peripancreatic collection. SPLEEN: No enlargement.  ADRENALS:   Indeterminate 2.4 cm left adrenal nodule. KIDNEYS:   Residual excreted contrast material in the renal collecting systems and urinary bladder, which limits evaluation for detection of urinary tract calculi.  No renal or ureteral calculi are identified. There is no hydronephrosis or hydroureter.  Renal atrophy, right greater than left.  Probable bilateral renal hilar vascular calcification.   Simple-appearing left interpolar renal cyst. GI/MESENTERY:  There is no evidence of bowel obstruction.  Mild gastric distention noted.  Colonic diverticulosis, please note that some segments of the colon are incompletely distended and suboptimally evaluated, moderate to large colonic fecal burden. The appendix is not clearly delineated, but there are no suspicious inflammatory manifestations in the right lower quadrant. URINARY BLADDER: Excreted contrast material throughout the urinary bladder. PELVIC NODES: No lymphadenopathy.   PELVIC ORGANS: Uterus is atrophic or surgically absent. RETROPERITONEUM: Prominent in number but nonenlarged/subcentimeter short axis retroperitoneal lymph nodes. BONES:   Demineralization.  Left proximal femoral internal fixation with chronic fracture deformity of the left proximal femur.  Resultant streak artifacts limit evaluation of the pelvic structures.  Osteitis pubis. Scattered sclerotic foci likely reflect bone islands.  Moderate to severe dextroscoliosis of the thoracolumbar spine.  Chronic mild-to-moderate compression fractures at the L2 and L5 vertebrae.  Tricompartmental osteoarthritis of both knees with coexistent chondrocalcinosis.  Bilateral  1st metatarsophalangeal joint osteoarthritis.  Small left and trace right suprapatellar joint effusions. ABDOMINAL WALL: No mass or hernia is perceived.  Severe anasarca. OTHER: No free air or fluid is seen in the abdomen or pelvis.          CONCLUSION:   1. RIGHT lower extremity:  An approximate 8 cm segment of the proximal superficial femoral artery demonstrates high-grade stenosis and/or near complete occlusion at and below the femoral bifurcation.  There is partial distal reconstitution of the SFA, but with other multifocal high-grade stenoses and occlusions throughout the mid-distal SFA.  The profunda femoris artery is grossly patent.  Moderate multifocal non flow-limiting atherosclerotic stenoses of the popliteal artery, which is  otherwise patent.  Single-vessel runoff via the peroneal artery.  Up to moderate multiple common and external iliac artery atherosclerotic stenoses, but with no high-grade stenosis or occlusion of these vessels.  2. LEFT lower extremity:  Left femoral to left popliteal artery bypass is patent.  Mild scattered non flow-limiting granulation tissue at the mid to distal aspect of the bypass graft.  Native superficial femoral artery is essentially completely occluded.   The profunda femoris artery is patent.  The popliteal artery is grossly patent and demonstrates mild-to-moderate multifocal atherosclerotic stenoses.  Two vessel runoff via the posterior tibial and peroneal arteries, but with partial thready flow throughout the posterior tibial artery.  3. Moderate to severe atherosclerotic stenosis at the ostium of the superior mesenteric artery and both renal arteries.  There is also moderate stenosis at the ostium of the celiac axis. 4. Congestive failure and/or fluid overload with small left greater than right pleural effusions and severe anasarca. 5. Excreted contrast material within the renal collecting systems and urinary bladder.  There is also Kong's suspicion of contrast in the gallbladder.  Please note that contrast in the renal collecting systems limits sensitivity for detection of urinary tract calculi.  Extensive renal hilar vascular calcifications are seen.  There is also right greater than left renal atrophy. 6. Fatty atrophy of the pancreas with mild 6 mm pancreatic ductal dilation.  Please correlate with any available prior imaging to ensure stability of this finding.  If unavailable, request correlation with obstructive laboratory parameters and consider further evaluation with MRCP if laboratory abnormalities are present. 7. Indeterminate 2.4 cm left adrenal nodule, which should be correlated with prior outside institution imaging to ensure stability. 8. Colonic diverticulosis. 9. Hysterectomy. 10.  Left proximal femoral internal fixation; resultant streak artifacts limit evaluation of the pelvic structures.  There are also chronic L2 and L5 vertebral body compression fractures. 11. Lesser incidental findings as above.   elm-remote  Dictated by (CST): Nasir Cash MD on 2025 at 11:08 AM     Finalized by (CST): Nasir Cash MD on 2025 at 11:42 AM          CATH LEFT HEART CATH W/INTERVENTION  Result Date: 2025  This exam has been completed. Please refer to Notes for the results to this procedure.    CATH LEFT HEART CATH W/INTERVENTION  Result Date: 2025  This exam has been completed. Please refer to Notes for the results to this procedure.    CARD ECHO 2D DOPPLER (CPT=93306)  Result Date: 2025  Transthoracic Echocardiogram Name:Radha Yu Date:    2025    :     1940    Ht:     (60in)     BP: MRN:     [id number]       Age:     84years       Wt:     (123lb)    HR: Loc:     [facility]          Gndr:    F             BSA:    1.52m^2 Sonographer: Annalise Winslow [facility] Ordering:    Jarvis Bergeron Consulting:  Morgan Santos ---------------------------------------------------------------------------- Procedure information:  A transthoracic complete 2D study was performed. Additional evaluation included M-mode, complete spectral Doppler, and color Doppler.  Image quality was adequate. ECG rhythm:   Normal sinus ---------------------------------------------------------------------------- Conclusions: 1. Left ventricle: The cavity size was normal. Wall thickness was mildly    increased. Systolic function was moderately reduced. The estimated    ejection fraction was 35-40%, by biplane method of disks. Doppler    parameters are consistent with abnormal left ventricular relaxation -    grade 1 diastolic dysfunction. 2. Left atrium: The atrium was markedly dilated. 3. Right atrium: The atrium was dilated. 4. Atrial septum: A patent foramen ovale cannot be excluded. 5. Aortic valve:  There was mild regurgitation. 6. Mitral valve: The annulus was mildly to moderately calcified. The    leaflets were normal thickness. There was moderate to severe    regurgitation. 7. Tricuspid valve: There was mild-moderate regurgitation. Impressions:  No previous study from [facility] was available for comparison. * ---------------------------------------------------------------------------- * Findings: Left ventricle:  The cavity size was normal. Wall thickness was mildly increased. Systolic function was moderately reduced. The estimated ejection fraction was 35-40%, by biplane method of disks. No diagnostic evidence for diffuse regional wall motion abnormalities. Doppler parameters are consistent with abnormal left ventricular relaxation - grade 1 diastolic dysfunction. Left atrium:  Well visualized. The atrium was markedly dilated. Right ventricle:  The cavity size was normal. Systolic function was normal. Right atrium:  Well visualized. The atrium was dilated. Mitral valve:  Well visualized. The annulus was mildly to moderately calcified. The leaflets were normal thickness. Leaflet separation was normal. No evidence for prolapse.  Doppler:  Transvalvular velocity was within the normal range. There was no evidence for stenosis. There was moderate to severe regurgitation. Aortic valve:  Well visualized.  The valve was probably trileaflet. The leaflets were mildly thickened and mildly calcified. Cusp separation was normal.  Doppler:  Transvalvular velocity was within the normal range. There was no evidence for stenosis. There was mild regurgitation. Tricuspid valve:  Well visualized. The annulus is normal-sized. The valve is structurally normal. The leaflets are normal thickness. Leaflet separation was normal. No echocardiographic evidence for tricuspid prolapse.  Doppler: Transvalvular velocity was within the normal range. There was no evidence for stenosis. There was mild-moderate regurgitation.  Pulmonic valve:   Well visualized. The annulus is normal-sized. The valve is structurally normal. The leaflets are normal thickness. Cusp separation was normal. No evidence for prolapse.  Doppler:  Transvalvular velocity was within the normal range. There was no evidence for stenosis. There was no significant regurgitation. Pericardium:   There was no pericardial effusion. Pleura:  No evidence of pleural fluid accumulation. Aorta: Aortic root: The aortic root was normal-sized. The aortic root was normal. Ascending aorta: The ascending aorta was normal. The ascending aorta was normal. Pulmonary arteries: The main pulmonary artery was normal-sized. Moderate pulmonary hypertension was present. Systemic veins:  Central venous respirophasic diameter changes are blunted (< 50%). Inferior vena cava: The IVC was normal-sized. The IVC was normal-sized. Atrial septum:  A patent foramen ovale cannot be excluded. ---------------------------------------------------------------------------- Measurements  Left ventricle                    Value        Ref  IVS thickness, ED, PLAX       (H) 1.4   cm     0.6 - 0.9  LV ID, ED, PLAX                   4.2   cm     3.8 - 5.2  LV ID, ES, PLAX                   3.3   cm     2.2 - 3.5  LV PW thickness, ED, PLAX     (H) 1.1   cm     0.6 - 0.9  IVS/LV PW ratio, ED, PLAX         1.27         ---------  LV PW/LV ID ratio, ED, PLAX       0.26         ---------  LV ejection fraction          (L) 44    %      54 - 74  Stroke volume/bsa, 2D             36    ml/m^2 ---------  LV end-diastolic volume, 1-p      86    ml     48 - 140  A4C  LV ejection fraction, 1-p A4C (L) 37    %      46 - 78  Stroke volume, 1-p A4C            32    ml     ---------  LV end-diastolic volume/bsa,      57    ml/m^2 30 - 82  1-p A4C  Stroke volume/bsa, 1-p A4C        21    ml/m^2 ---------  LV end-diastolic volume, 2-p      93    ml     46 - 106  LV end-systolic volume, 2-p   (H) 54    ml     14 - 42  LV ejection  fraction, 2-p     (L) 42    %      54 - 74  Stroke volume, 2-p                39    ml     ---------  LV end-diastolic volume/bsa,      61    ml/m^2 29 - 61  2-p  LV end-systolic volume/bsa,   (H) 36    ml/m^2 8 - 24  2-p  Stroke volume/bsa, 2-p            25.5  ml/m^2 ---------  LV e', lateral                (L) 6.1   cm/sec >=10.0  LV E/e', lateral              (H) 22           <=13  LV e', medial                 (L) 5.9   cm/sec >=7.0  LV E/e', medial                   23           ---------  LV e', average                    6.0   cm/sec ---------  LV E/e', average              (H) 22           <=14  LVOT                              Value        Ref  LVOT ID                           2.1   cm     ---------  LVOT peak velocity, S             0.73  m/sec  ---------  LVOT VTI, S                       16.0  cm     ---------  LVOT peak gradient, S             2     mm Hg  ---------  LVOT mean gradient, S             1     mm Hg  ---------  Stroke volume (SV), LVOT DP       55    ml     ---------  Stroke index (SV/bsa), LVOT       37    ml/m^2 ---------  DP  Aortic valve                      Value        Ref  Aortic leaflet separation, MM     1.3   cm     ---------  Aortic pressure half-time         218   ms     ---------  Aortic regurg velocity, ED        3.14  m/sec  ---------  Aortic regurg deceleration        420   cm/s^2 ---------  Aortic regurg pressure            219   ms     ---------  half-time  Aortic regurg gradient, ED        39    mm Hg  ---------  Ascending aorta                   Value        Ref  Ascending aorta ID            (H) 3.7   cm     1.9 - 3.5  Left atrium                       Value        Ref  LA volume, S                  (H) 88    ml     22 - 52  LA volume/bsa, S              (H) 58    ml/m^2 16 - 34  LA volume, ES, 1-p A4C        (H) 94    ml     22 - 52  LA volume, ES, 1-p A2C        (H) 75    ml     22 - 52  LA volume, ES, A/L                91    ml     ---------  LA volume/bsa, ES,  A/L        (H) 60    ml/m^2 16 - 34  Mitral valve                      Value        Ref  Mitral E-wave peak velocity       1.33  m/sec  ---------  Mitral A-wave peak velocity       1.62  m/sec  ---------  Mitral deceleration time          180   ms     ---------  Mitral peak gradient, D           7     mm Hg  ---------  Mitral E/A ratio, peak            0.8          ---------  Pulmonary artery                  Value        Ref  PA pressure, S, DP                51    mm Hg  ---------  Tricuspid valve                   Value        Ref  Tricuspid regurg peak         (H) 3.26  m/sec  <=2.8  velocity  Tricuspid peak RV-RA gradient     43    mm Hg  ---------  Systemic veins                    Value        Ref  Estimated CVP                     8     mm Hg  ---------  Inferior vena cava                Value        Ref  ID                                1.9   cm     <=2.1  Right ventricle                   Value        Ref  TAPSE, 2D                         2.22  cm     >=1.70  TAPSE, MM                         2.22  cm     >=1.70  RV pressure, S, DP                51    mm Hg  ---------  RV s', lateral                    10.8  cm/sec >=9.5 Legend: (L)  and  (H)  sonny values outside specified reference range. ---------------------------------------------------------------------------- Prepared and electronically signed by Ovidio Amaya MD 05/24/2025 10:12     IR CENTRAL VENOUS ACCESS  Result Date: 5/22/2025  PROCEDURE: IR TUNNELED CV CATH INSERTION EXCHANGE CHECK  INDICATIONS: Renal Failure  COMPARISON: None.  (S): DEVI Hernandez MD  ANESTHESIA/SEDATION: Level of anesthesia/sedation:  Local only   ESTIMATED BLOOD LOSS: Less than 5 mL  COMPLICATIONS: None  FINDINGS:  Informed consent was obtained.  Initially, attempted placement of a temporary catheter bedside was unsuccessful given the tortuosity of the right internal jugular vein and existing right-sided port.  Patient was brought to the fluoroscopy suite.  The left  neck and chest were prepped and draped usual sterile fashion soft tissues rib to local anesthetic.  Sonographic evaluation left neck reveals occluded left internal and external jugular veins.  Given difficulty of right internal jugular vein access from prior attempt, the left subclavian vein was targeted.  Left subclavian vein is noted to be patent.  Access was gained in the vein with standard micropuncture technique.  A wire was passed into the central circulation.  Under fluoroscopic guidance, a  0.035 inch wire was advanced into the inferior vena cava. The access was dilated. A peel-away sheath was advanced over the wire and secured.  Local Lidocaine was administered. A short skin incision was made in the left chest several cm from the venotomy. A 27 cm tip-to-cuff tunneled dialysis catheter was tunneled from the skin incision to the venotomy. The catheter was then advanced through the peel-away sheath to the right atrium.  The catheter aspirated and flushed well. The catheter was flushed with heparin and secured to the skin.         CONCLUSION: Placement of tunneled dialysis venous catheter via left subclavian vein. Tip in mid right atrium.  Line is ready for immediate use.    Dictated by (CST): Mario Hernandez MD on 5/22/2025 at 6:52 PM     Finalized by (CST): Mario Hernandez MD on 5/22/2025 at 6:58 PM          IR PROCEDURE NOT PERFORMED  Result Date: 5/22/2025    was not performed/completed today. Reason exam was not completed:  Please review imaging encounter notes for further details.   will be rescheduled using the existing order and a new order is not needed at this time.     XR CHEST AP PORTABLE  (CPT=71045)  Result Date: 5/22/2025  PROCEDURE: XR CHEST AP PORTABLE  (CPT=71045) TIME: 10 12.   COMPARISON: [facility], XR CHEST AP PORTABLE (CPT=71045), 2/20/2025, 7:31 PM.  [facility], XR CHEST AP PORTABLE (CPT=71045), 1/24/2025, 12:55 PM.  [facility], XR CHEST PA  + LAT CHEST (CPT=71046), 8/04/2018,  12:48 PM.  INDICATIONS: Difficulty breathing today.  TECHNIQUE:   Single AP view was performed.   FINDINGS:   CARDIAC/MEDIASTINUM:  The cardiac silhouette is enlarged and unchanged. There is atherosclerotic calcification of the aortic arch and thoracic aorta. There is a right sided port catheter with tip at the mid SVC.  LUNGS:   There is diffuse perihilar fullness and bilateral reticulonodular opacities and perihilar airspace opacities which has progressed since 2/20/2025. There are bibasilar airspace opacities, suggestive of atelectasis.  Small bilateral pleural effusions. BONES:   There is degenerative disease of the thoracic spine.         CONCLUSION:   Moderate pulmonary edema, significantly progressed since 2/20/2025. Underlying pneumonia can have a similar appearance.   Dictated by (CST): Chirag Reilly MD on 5/22/2025 at 10:38 AM     Finalized by (CST): Chirag Reilly MD on 5/22/2025 at 10:43 AM                 ASSESSMENT AND PLAN:     The patient is a 84 year old female who has severe peripheral arterial disease and has recently undergone cardiac catheterization with intervention and developed a postprocedure occlusion of her right common femoral artery.  The artery itself is very diseased including the majority of her superficial femoral artery and the origin of the profunda.  She is now too weak to walk because her right leg has been giving out.  Given the severity of her symptoms and the presence of great toe gangrene at the tip, I have recommended a femoral endarterectomy with a femoral to below-knee popliteal artery bypass.  We will likely require an intraoperative angiogram to better define the below-knee target but this can be done in the OR.  I had a prolonged discussion with her and her nephew and all of the questions were answered. We discussed the risks of MI, need for transfusion, bleeding, wound complications, nerve injury, thrombosis, restenosis, infection, and  need for more open surgery among other complications including an amputation. Patient understood and all questions were answered. The patient has expressed a wish to proceed.   The patient indicated an understanding of these issues and agreed to the plan and all questions were answered.     Thank you for allowing to participate in the patient's care.         Samer F. Najjar, MD    Vascular Surgery                        [1]   Past Medical History:   Anxiety    COPD (chronic obstructive pulmonary disease) (HCC)    Essential hypertension    Hearing impaired person, bilateral    Hyperlipidemia    Kidney disease    Lymphoma (HCC)    Osteoarthritis    Renal disorder    Shortness of breath    Type 1 diabetes mellitus (HCC)    Visual impairment   [2]   Past Surgical History:  Procedure Laterality Date    Appendectomy      Hip surgery  2018    Total abdom hysterectomy     [3]   Current Facility-Administered Medications:     insulin degludec (Tresiba) 100 units/mL flextouch 12 Units, 12 Units, Subcutaneous, Daily    morphINE PF 2 MG/ML injection, , ,     atorvastatin (Lipitor) tab 40 mg, 40 mg, Oral, Nightly    clopidogrel (Plavix) tab 75 mg, 75 mg, Oral, Daily    aspirin DR tab 81 mg, 81 mg, Oral, Daily    sodium chloride 0.9 % IV bolus 100 mL, 100 mL, Intravenous, Q30 Min PRN **AND** albumin human (Albumin) 25% injection 25 g, 25 g, Intravenous, PRN Dialysis    hydrALAZINE (Apresoline) tab 25 mg, 25 mg, Oral, Q8H BLANE    isosorbide dinitrate (Isordil) tab 20 mg, 20 mg, Oral, TID (Nitrates)    metoprolol succinate (Toprol XL) partial tablet 12.5 mg, 12.5 mg, Oral, Daily Beta Blocker    pantoprazole (Protonix) DR tab 40 mg, 40 mg, Oral, BID AC    calcitriol (Rocaltrol) cap 0.25 mcg, 0.25 mcg, Oral, Q MWF    ipratropium-albuterol (Duoneb) 0.5-2.5 (3) MG/3ML inhalation solution 3 mL, 3 mL, Nebulization, Q6H PRN    HYDROcodone-acetaminophen (Norco) 5-325 MG per tab 1 tablet, 1 tablet, Oral, Q6H PRN    epoetin brittany (Epogen,  Procrit) 5,000 Units injection, 5,000 Units, Subcutaneous, Once per day on Monday Wednesday Friday    escitalopram (Lexapro) tab 10 mg, 10 mg, Oral, Daily    glucose (Dex4) 15 GM/59ML oral liquid 15 g, 15 g, Oral, Q15 Min PRN **OR** glucose (Glutose) 40% oral gel 15 g, 15 g, Oral, Q15 Min PRN **OR** glucose-vitamin C (Dex-4) chewable tab 4 tablet, 4 tablet, Oral, Q15 Min PRN **OR** dextrose 50% injection 50 mL, 50 mL, Intravenous, Q15 Min PRN **OR** glucose (Dex4) 15 GM/59ML oral liquid 30 g, 30 g, Oral, Q15 Min PRN **OR** glucose (Glutose) 40% oral gel 30 g, 30 g, Oral, Q15 Min PRN **OR** glucose-vitamin C (Dex-4) chewable tab 8 tablet, 8 tablet, Oral, Q15 Min PRN    acetaminophen (Tylenol Extra Strength) tab 500 mg, 500 mg, Oral, Q4H PRN    melatonin tab 3 mg, 3 mg, Oral, Nightly PRN    polyethylene glycol (PEG 3350) (Miralax) 17 g oral packet 17 g, 17 g, Oral, Daily PRN    sennosides (Senokot) tab 17.2 mg, 17.2 mg, Oral, Nightly PRN    bisacodyl (Dulcolax) 10 MG rectal suppository 10 mg, 10 mg, Rectal, Daily PRN    ondansetron (Zofran) 4 MG/2ML injection 4 mg, 4 mg, Intravenous, Q6H PRN    benzonatate (Tessalon) cap 200 mg, 200 mg, Oral, TID PRN    guaiFENesin (Robitussin) 100 MG/5 ML oral liquid 200 mg, 200 mg, Oral, Q4H PRN    glycerin-hypromellose- (Artificial Tears) 0.2-0.2-1 % ophthalmic solution 1 drop, 1 drop, Both Eyes, QID PRN    sodium chloride (Saline Mist) 0.65 % nasal solution 1 spray, 1 spray, Each Nare, Q3H PRN    metoclopramide (Reglan) 5 mg/mL injection 10 mg, 10 mg, Intravenous, Daily PRN    insulin aspart (NovoLOG) 100 Units/mL FlexPen 1-7 Units, 1-7 Units, Subcutaneous, TID CC    [Held by provider] insulin aspart (NovoLOG) 100 Units/mL FlexPen 5 Units, 5 Units, Subcutaneous, TID CC and HS

## 2025-05-29 NOTE — PROGRESS NOTES
[facility]  part of [facility]    Progress Note    Radha Yu Patient Status:  Inpatient    1940 MRN [id number]   Location [facility]5W Attending Villa Angel MD   Hosp Day # 7 PCP Stef Velasco MD     Chief complaint     Subjective:   Radha Yu is a(n) 84 year old female who came in with chest pain and sob.     Today RRT called by RN b/c she was having increased RLE pain, decreased sensation and decreased rt pedal pulse.  She denies chest pain.  No n/v.  No fevers/chills.     A comprehensive 10 point review of systems was completed.  Pertinent positives and negatives noted in the the HPI.    Objective:     Blood pressure (!) 163/60, pulse 86, temperature 98.8 °F (37.1 °C), temperature source Oral, resp. rate 24, weight 121 lb (54.9 kg), SpO2 98%.    No intake or output data in the 24 hours ending 25 1013      Patient Weight(s) for the past 336 hrs:   Weight   25 0558 121 lb (54.9 kg)   25 0402 120 lb 14.4 oz (54.8 kg)   25 0617 116 lb 9.6 oz (52.9 kg)   25 2146 123 lb 14.4 oz (56.2 kg)   25 1114 118 lb 13.3 oz (53.9 kg)           Gen: uncomfortable  Pulm: Lungs clear, normal respiratory effort  CV: Heart with regular rate and rhythm  Abd: Abdomen soft, nontender, nondistended, bowel sounds present  Neuro: No acute focal deficits  MSK: moves extremities  Skin: Warm and dry  Psych: Normal affect  Ext: rt le looks paler compared to left, sensation intact but decreased, rt groin wound looks good, rt pedal pulse heard with doppler          Medicines:     Current Hospital Medications[1]            Blood Sugar Medications            insulin aspart 100 Units/mL Subcutaneous Solution Pen-injector    insulin glargine 100 UNIT/ML Subcutaneous Solution            Blood Pressure and Cardiac Medications            amLODIPine 5 MG Oral Tab            Medication Infusions[2]        Lab Results   Component Value Date    WBC 5.1 2025    HGB 7.4 (L)  05/29/2025    HCT 22.5 (L) 05/29/2025    .0 (L) 05/29/2025    CREATSERUM 1.67 (H) 05/29/2025    BUN 26 (H) 05/29/2025     05/29/2025    K 3.6 05/29/2025     05/29/2025    CO2 27.0 05/29/2025     (H) 05/29/2025    CA 7.8 (L) 05/29/2025    ALB 3.6 05/29/2025    ALKPHO 73 05/26/2025    BILT 0.2 05/26/2025    TP 5.7 05/26/2025    AST 16 05/26/2025    ALT <7 (L) 05/26/2025    PTT 44.4 (H) 05/23/2025    INR 1.16 05/22/2025    TSH 1.993 05/27/2025    MG 1.8 05/29/2025    PHOS 1.9 (L) 05/29/2025       No results found.  EKG 12 Lead  Result Date: 5/28/2025  Sinus bradycardia with 1st degree A-V block with occasional Premature ventricular complexes T wave abnormality, consider anterolateral ischemia Abnormal ECG When compared with ECG of 22-MAY-2025 10:07, Premature ventricular complexes are now Present Vent. rate has decreased BY  42 BPM T wave inversion now evident in Anterior leads Confirmed by Juliocesar Sol ([id number]) on 5/28/2025 5:12:43 PM      Results:     CBC:    Lab Results   Component Value Date    WBC 5.1 05/29/2025    WBC 4.9 05/28/2025    WBC 4.3 05/27/2025     Lab Results   Component Value Date    HGB 7.4 (L) 05/29/2025    HGB 8.4 (L) 05/28/2025    HGB 8.3 (L) 05/27/2025      Lab Results   Component Value Date    .0 (L) 05/29/2025    .0 (L) 05/28/2025    .0 (L) 05/27/2025       Recent Labs   Lab 05/27/25  0750 05/28/25  0751 05/29/25  0901   *  171* 209*  209* 129*   BUN 43*  43* 60*  60* 26*   CREATSERUM 2.67*  2.67* 3.31*  3.31* 1.67*   CA 7.7*  7.7* 7.4*  7.4* 7.8*     136 134*  134* 137   K 3.6  3.6 4.3  4.3 3.6     101 100  100 101   CO2 27.0  27.0 23.0  23.0 27.0           Assessment and Plan:       Rt LE pain and decreased sensation, color   - plan stat ct a/p/bl le runoff to rule out dissection  - discussed with Dr Najjar who will see patient    NSTEMI type 1 with new ischemic cardiomyopathy acute systolic chf  Multivessel  CAD  - left heart cath again 5/28 with pci, post op with rt groin wound bleeding that improved after pressure   - on asa, plavix, statin, bb     Tricuspid and mitral regurg  - volume removal per hd     PVD s/p left femoropopliteal bypass 9/2015  - 80% proximal left renal artery stenosis on angiogram   - On asa, plavix, atorvastatin      HL  - statin     HTN  - elevated - adjust oral meds per cards     Acute on chronic anemia of renal disease  - possibly baseline anemia from ckd  - heparin stopped; gi defers invasive dx till more stable  - hb stable today , ppi     acute copd exacerbation  - pulm consulted signed off no steroids; no abx  - plan nebs scheduled and prn  - plan pulm hygiene  - wean oxygen as able     DEBO on CKD stage 4 ->no esrd  - nephrology consulted  - tolerating HD   - hd cath placed      DM  - hypoglycemic this am. Hold tresiba.   - accuchecks and ISS    Moderate right ICA stenosis s/p right TCAR 2/2019, totally occluded left ICA  - On asa, plavix, atorvastatin                       high mdm; coordinating care with nurse and counseling pt and with her permission her nephew in room  about chf/sob/diet/cath/dialysis    Upon my evaluation, this patient had a high probability of imminent or life-threatening deterioration due to unexpected postoperative complications, which required my direct attention, intervention, and personal management.    I have personally provided 45 minutes of critical care time, exclusive of time spent on separately billable procedures.  Time includes review of all pertinent laboratory/radiology results, discussion with consultants, and monitoring for potential decompensation.  Performed interventions included stat cta a/p/le.       Dispo: poss dc when dialysis/lisa set up and after cath; perhaps Thursday/friday            Chart reviewed, including current vitals, notes, labs and imaging  Labs ordered and medications adjusted as outlined above  Coordinate care with care  team/consultants  Discussed with patient results of tests, management plan as outlined above, and the need for ongoing hospitalization  D/w RN     [facility]        5/28/2025             Supplementary Documentation:   DVT Mechanical Prophylaxis:   SCDs,    DVT Pharmacologic Prophylaxis   Medication   None         DVT Pharmacologic prophylaxis: Aspirin 81 mg      Code Status: Full Code  Rao: No urinary catheter in place  Rao Duration (in days):   Central line:    FRANCIS: 5/31/2025                            [1]   Current Facility-Administered Medications   Medication Dose Route Frequency    insulin degludec (Tresiba) 100 units/mL flextouch 12 Units  12 Units Subcutaneous Daily    morphINE PF 2 MG/ML injection        atorvastatin (Lipitor) tab 40 mg  40 mg Oral Nightly    clopidogrel (Plavix) tab 75 mg  75 mg Oral Daily    aspirin DR tab 81 mg  81 mg Oral Daily    sodium chloride 0.9 % IV bolus 100 mL  100 mL Intravenous Q30 Min PRN    And    albumin human (Albumin) 25% injection 25 g  25 g Intravenous PRN Dialysis    hydrALAZINE (Apresoline) tab 25 mg  25 mg Oral Q8H BLANE    isosorbide dinitrate (Isordil) tab 20 mg  20 mg Oral TID (Nitrates)    metoprolol succinate (Toprol XL) partial tablet 12.5 mg  12.5 mg Oral Daily Beta Blocker    pantoprazole (Protonix) DR tab 40 mg  40 mg Oral BID AC    calcitriol (Rocaltrol) cap 0.25 mcg  0.25 mcg Oral Q MWF    ipratropium-albuterol (Duoneb) 0.5-2.5 (3) MG/3ML inhalation solution 3 mL  3 mL Nebulization Q6H PRN    HYDROcodone-acetaminophen (Norco) 5-325 MG per tab 1 tablet  1 tablet Oral Q6H PRN    epoetin brittany (Epogen, Procrit) 5,000 Units injection  5,000 Units Subcutaneous Once per day on Monday Wednesday Friday    escitalopram (Lexapro) tab 10 mg  10 mg Oral Daily    glucose (Dex4) 15 GM/59ML oral liquid 15 g  15 g Oral Q15 Min PRN    Or    glucose (Glutose) 40% oral gel 15 g  15 g Oral Q15 Min PRN    Or    glucose-vitamin C (Dex-4) chewable tab 4 tablet  4 tablet Oral Q15  Min PRN    Or    dextrose 50% injection 50 mL  50 mL Intravenous Q15 Min PRN    Or    glucose (Dex4) 15 GM/59ML oral liquid 30 g  30 g Oral Q15 Min PRN    Or    glucose (Glutose) 40% oral gel 30 g  30 g Oral Q15 Min PRN    Or    glucose-vitamin C (Dex-4) chewable tab 8 tablet  8 tablet Oral Q15 Min PRN    acetaminophen (Tylenol Extra Strength) tab 500 mg  500 mg Oral Q4H PRN    melatonin tab 3 mg  3 mg Oral Nightly PRN    polyethylene glycol (PEG 3350) (Miralax) 17 g oral packet 17 g  17 g Oral Daily PRN    sennosides (Senokot) tab 17.2 mg  17.2 mg Oral Nightly PRN    bisacodyl (Dulcolax) 10 MG rectal suppository 10 mg  10 mg Rectal Daily PRN    ondansetron (Zofran) 4 MG/2ML injection 4 mg  4 mg Intravenous Q6H PRN    benzonatate (Tessalon) cap 200 mg  200 mg Oral TID PRN    guaiFENesin (Robitussin) 100 MG/5 ML oral liquid 200 mg  200 mg Oral Q4H PRN    glycerin-hypromellose- (Artificial Tears) 0.2-0.2-1 % ophthalmic solution 1 drop  1 drop Both Eyes QID PRN    sodium chloride (Saline Mist) 0.65 % nasal solution 1 spray  1 spray Each Nare Q3H PRN    metoclopramide (Reglan) 5 mg/mL injection 10 mg  10 mg Intravenous Daily PRN    insulin aspart (NovoLOG) 100 Units/mL FlexPen 1-7 Units  1-7 Units Subcutaneous TID CC    [Held by provider] insulin aspart (NovoLOG) 100 Units/mL FlexPen 5 Units  5 Units Subcutaneous TID CC and HS   [2]

## 2025-05-29 NOTE — PROGRESS NOTES
RRT    *See RRT Documentation Record*    Reason the RRT was called: patient c/o severe right leg, numbness and tingling , absent pedal pulses noted, right foot cool to touch  Interventions/Testing: Stat Ct done, vascular consult, morphine for pain  Patient Outcome/Disposition:   Family Notified: yes  Name of family notified: Elliott otoole

## 2025-05-29 NOTE — PLAN OF CARE
Discussed case with NP. Pt s/p complex PCI yesterday for NSTEMI - has cardiomyopathy. RLE numbness and pain. CTA reviewed. Recommend percutaneous revascularization of right SFA with possible IVL vs atherectomy as she is high risk for open vascular surgery. If vascular surgery is unable to offer percutaneous revascularization, then we will be happy to coordinate attempt at percutaneous SFA revascularization.    Fausto Aquino DO  [facility]   Interventional Cardiac and Vascular Services

## 2025-05-29 NOTE — PROGRESS NOTES
[facility] Cardiology Progress Note    Radha Yu Patient Status:  Inpatient    1940 MRN [id number]   Location [facility] 3W/SW Attending Villa Angel MD   Hosp Day # 7 PCP Stef Velasco MD     Subjective:  No CV concerns   Ambulated overnight with no new sx   Right groin wnl     Objective:  BP (!) 163/60 (BP Location: Right arm)   Pulse 86   Temp 98.8 °F (37.1 °C) (Oral)   Resp 24   Wt 121 lb (54.9 kg)   SpO2 98%   BMI 22.88 kg/m²     Telemetry: NSR, 86 bpm       Intake/Output:    Intake/Output Summary (Last 24 hours) at 2025 0740  Last data filed at 2025 0815  Gross per 24 hour   Intake 0 ml   Output --   Net 0 ml       Last 3 Weights   25 0558 121 lb (54.9 kg)   25 0402 120 lb 14.4 oz (54.8 kg)   25 0617 116 lb 9.6 oz (52.9 kg)   25 2146 123 lb 14.4 oz (56.2 kg)   25 1114 118 lb 13.3 oz (53.9 kg)   25 1830 123 lb 7.3 oz (56 kg)   25 0700 123 lb 7.3 oz (56 kg)   25 1805 123 lb 6.4 oz (56 kg)   25 1121 139 lb 1.8 oz (63.1 kg)       Labs:  Recent Labs   Lab 25  0246 25  0750 25  0751   * 171*  171* 209*  209*   BUN 43* 43*  43* 60*  60*   CREATSERUM 2.61* 2.67*  2.67* 3.31*  3.31*   EGFRCR 17* 17*  17* 13*  13*   CA 7.4* 7.7*  7.7* 7.4*  7.4*    136  136 134*  134*   K 3.8 3.6  3.6 4.3  4.3    101  101 100  100   CO2 27.0 27.0  27.0 23.0  23.0     Recent Labs   Lab 25  0551 25  0750 25  0751   RBC 3.14* 3.16* 3.15*   HGB 8.3* 8.3* 8.4*   HCT 26.6* 26.2* 26.0*   MCV 84.7 82.9 82.5   MCH 26.4 26.3 26.7   MCHC 31.2 31.7 32.3   RDW 15.9* 15.9* 15.9*   NEPRELIM 2.98 2.60 3.14   WBC 5.2 4.3 4.9   .0 145.0* 142.0*         Recent Labs   Lab 25  0948 25  1127 25   TROPHS 224* 235* 426*       Diagnostics:   25 Echo  1. Left ventricle: The cavity size was normal. Wall thickness was mildly      increased. Systolic function  was moderately reduced. The estimated      ejection fraction was 35-40%, by biplane method of disks. Doppler      parameters are consistent with abnormal left ventricular relaxation -      grade 1 diastolic dysfunction.   2. Left atrium: The atrium was markedly dilated.   3. Right atrium: The atrium was dilated.   4. Atrial septum: A patent foramen ovale cannot be excluded.   5. Aortic valve: There was mild regurgitation.   6. Mitral valve: The annulus was mildly to moderately calcified. The      leaflets were normal thickness. There was moderate to severe      regurgitation.   7. Tricuspid valve: There was mild-moderate regurgitation.   Impressions:  No previous study from [facility] was   available for comparison.     5/27/25 L/RHC   Findings:  1) Left Ventriculography at 30 degrees CEE: LVEF 40 to 45%, hypokinesia of the mid to distal inferior/inferoapical walls.  2) Hemodynamics: LVEDP 18 mmHg  3) Coronaries:  Left main coronary artery: Large size vessel with no angiographically significant disease.  Left anterior descending artery: Large size, Type IV vessel with 50% proximal, 80% mid segment stenosis.  90% proximal first diagonal branch disease which originates from the diseased segment of the mid LAD.   Circumflex artery: Large size non-dominant vessel with luminal irregularities.  Angiographically significant disease.  Small OM1 with focal 80% ostial stenosis.  OM 2  is medium caliber, diffuse 70 to 80% disease of proximal segment.  Right coronary artery: Large size dominant vessel with 100%  of the proximal segment with reconstitution of the PDA via left-to-right septal collaterals.      Aortobifemoral angiogram  Mildly dilated infrarenal aorta  Left renal artery proximal 80% stenosis  Nonvisualization of the right renal artery  Severely, diffusely calcified but patent bilateral iliac arteries     RIGHT HEART CATHETERIZATION HEMODYNAMICS:  Right Atrial Pressure: 14/13/12 mmHg  Right  Ventricular Pressure: 36/10/14 mmHg  Pulmonary Artery Pressure: 37/18/24 mmHg  Wedge Pressure: 23/22/21 mmHg  Colleen CO: 4.7 L/min; Colleen CI: 3.1 L/min/m²  Transpulmonary Gradient (PAmean - Wedge): 13 mmHg  Pulmonary Vascular Resistance (PA-wedge/CO): 0.6 CONRAD  Ao saturation 91.2%  PA saturation 51.5%  Hgb 7.6  Heart Rate 58      Review of Systems   Respiratory: Negative.     Cardiovascular: Negative.      Physical Exam:    General: Alert and oriented x 3. No apparent distress.   HEENT: Normocephalic, anicteric sclera, neck supple, no thyromegaly or adenopathy.  Neck: No JVD, carotids 2+, no bruits.  Cardiac: Regular rate & rhythm. S1, S2 normal. No pericardial rub, S3, or extra cardiac sounds. +Murmur   Lungs: Clear without wheezes, rales, rhonchi or dullness.  Normal excursions and effort.  Abdomen: Soft, non-tender. No organosplenomegally, mass or rebound, BS-present.  Extremities: Without clubbing or cyanosis.  No left lower extremity edema, no right lower extremity edema.  Neurologic: Alert and oriented, normal affect. No focal defects  Skin: Warm and dry.    Right groin: C/D/I. Soft. Non-tender. No signs of bruising, bleeding,  hematoma, or infection noted     Medications:  Scheduled Medications[1]  Medication Infusions[2]    Assessment:  84-year-old female with a history of PVD s/p left femoral-popliteal bypass 2015, s/p right TCAR and total occluded left ICA, HTN, HLD, DM, and CKD who presents with shortness of breath     Acute hypoxic respiratory failure  - Chest x-ray w/ evidence of moderate pulmonary edema, noted diffuse wheezing initially on exam in setting of long-term tobacco use  - Viral resp panel negative   - S/p nebs & steroids . Pulm following   - SP02 stable on RA     NSTEMI   Multivessel coronary artery disease   - Peak TnI 426, ECG w/ lateral T wave inversion  - Echo w/ EF 35-40%, grade 1dd, bi-atrial dilation, PFO can't be excluded, mod - severe MR, mild-mod TR   - L/RHC w/ severe multivessel CAD.  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%   Mod-severe mitral regurgitation   Mild-mod tricuspid regurgitation   - Volume removal per HD   - Compensated on exam   - GDMT: toprol xl , isordil/hydralazine , Not on acei/arb/arni/mra due to CKD     ESRD   - Started HD this admission  - Nephrology following     PVD s/p left femoropopliteal bypass 9/2015  - 80% proximal left renal artery stenosis on angiogram   - On asa, plavix, atorvastatin     Moderate right ICA stenosis s/p right TCAR 2/2019, totally occluded left ICA  - On asa, plavix, atorvastatin     HTN   - Stable on toprol xl , isordil/hydralazine    HLD  - LDL 48, on atorvastatin     DMII  - HgbA1c = 6.5%. On insulin per PMD     Acute on chronic anemia   - Hgb stable   - On PPI. GI following     Plan:    Denies anginal sx . Compensated on exam   S/p successful bifurcation PCI to mid LAD and first diagonal branch yesterday   Right groin hematoma & oozing noted post procedure - both resolved w/ manual pressure & quickclot. Wnl this AM. Pt denies back pain, abd pain   Continue asa, plavix, toprol xl, isordil, hydralazine , atorvastatin   Obtain routine post procedure EKG   Plan for HD today     Jeana Almaraz, MSN, FPA-APRN, FNP-BC, CCK   5/29/2025  7:10 AM  Ph [phone] ([place])  Ph [phone] ([place])      Addendum @ 0530 - RRT called on pt by RN for decreased RLE sensation & decreased pedal pulses. Per PMD, plan for STAT CTA abd/pel w/ runoff & Vascular Surgery consult.     Addendum @ 5381 - Per Vascular Surgery APN , pt scheduled for right fem-pop bypass tomorrow. High grade stenosis found on CTA today.          [1]    atorvastatin  40 mg Oral Nightly    clopidogrel  75 mg Oral Daily    aspirin  81 mg Oral Daily    hydrALAZINE  25 mg Oral Q8H BLANE    isosorbide dinitrate  20 mg Oral TID (Nitrates)    metoprolol succinate  12.5 mg Oral Daily Beta Blocker    pantoprazole  40 mg  Oral BID AC    calcitriol  0.25 mcg Oral Q MWF    epoetin brittany  5,000 Units Subcutaneous Once per day on Monday Wednesday Friday    escitalopram  10 mg Oral Daily    insulin aspart  1-7 Units Subcutaneous TID CC    [Held by provider] insulin aspart  5 Units Subcutaneous TID CC and HS   [2]

## 2025-05-29 NOTE — OCCUPATIONAL THERAPY NOTE
OCCUPATIONAL THERAPY TREATMENT NOTE - INPATIENT        Room Number: 309/309-A     Presenting Problem: Acute on chronic CHF    Problem List  Principal Problem:    Acute on chronic respiratory failure with hypoxia ([facility])  Active Problems:    ESRD (end stage renal disease) ([facility])    Acute congestive heart failure, unspecified heart failure type ([facility])    NSTEMI (non-ST elevated myocardial infarction) ([facility])    Acute renal failure superimposed on chronic kidney disease, unspecified acute renal failure type, unspecified CKD stage      OCCUPATIONAL THERAPY ASSESSMENT   Patient demonstrates limited progress this session, goals remain in progress.    Patient is requiring minimal assist and moderate assist as a result of the following impairments: decreased functional strength, decreased functional reach, decreased endurance, impaired standing balance, medical status, decreased sensation in R leg.     Patient continues to function below baseline with toileting, bathing, upper body dressing, lower body dressing, grooming, eating, bed mobility, and transfers.  Next session anticipate patient to progress toileting, bathing, upper body dressing, lower body dressing, grooming, eating, bed mobility, and transfers.  Occupational Therapy will continue to follow patient for duration of hospitalization.    Patient continues to benefit from continued skilled OT services: to promote return to prior level of function and safety with continuous assistance and gradual rehabilitative therapy.     PLAN DURING HOSPITALIZATION  OT Device Recommendations: None  OT Treatment Plan: Balance activities, ADL training, Functional transfer training, Endurance training, Patient/Family education, Patient/Family training, Equipment eval/education, Compensatory technique education     SUBJECTIVE  \"My leg is tingly\"    OBJECTIVE  Precautions: Cardiac, Bed/chair alarm    PAIN ASSESSMENT  Ratin    ACTIVITY TOLERANCE  Pulse: 81  Heart Rate Source:  Monitor  BP: 122/50  BP Location: Right arm  BP Method: Automatic  Patient Position: Sitting    O2 SATURATIONS  Oxygen Therapy  SPO2% on Room Air at Rest: 96    ACTIVITIES OF DAILY LIVING ASSESSMENT  AM-PAC ‘6-Clicks’ Inpatient Daily Activity Short Form  How much help from another person does the patient currently need…  -   Putting on and taking off regular lower body clothing?: A Lot  -   Bathing (including washing, rinsing, drying)?: A Lot  -   Toileting, which includes using toilet, bedpan or urinal? : A Lot  -   Putting on and taking off regular upper body clothing?: A Little  -   Taking care of personal grooming such as brushing teeth?: A Little  -   Eating meals?: A Little    AM-PAC Score:  Score: 15  Approx Degree of Impairment: 56.46%  Standardized Score (AM-PAC Scale): 34.69  CMS Modifier (G-Code): CK    FUNCTIONAL TRANSFER ASSESSMENT  Sit to Stand: Edge of Bed  Edge of Bed: Minimal Assist  Chair: Moderate Assist    FUNCTIONAL ADL ASSESSMENT  Eating: Supervision (set up)    Skilled Therapy Provided: Session completed in collaboration with PT. Pt received in bed agreeable to session. C/o tingling in the R leg with no pain. Required up to min A for bed mobility and STS at RW level. Pt with 1 instance of LOB required up to mod A for functional t/f. Pt left in bedside chair with meal; set up A required for eating. Needs met and call light within reach. Handoff to RN.     EDUCATION PROVIDED  Patient Education : Role of Occupational Therapy; Plan of Care; Functional Transfer Techniques; Fall Prevention; Posture/Positioning; Energy Conservation; Proper Body Mechanics  Patient's Response to Education: Returned Demonstration; Verbalized Understanding    The patient's Approx Degree of Impairment: 56.46% has been calculated based on documentation in the [facility] '6 clicks' Inpatient Daily Activity Short Form.  Research supports that patients with this level of impairment may benefit from rehab.  Final disposition will  be made by interdisciplinary medical team.    Patient End of Session: Up in chair, Needs met, Call light within reach, Hospital anti-slip socks, Alarm set    OT Goals:  Patients self stated goal is: none stated       Patient will complete functional transfer with SBA  Comment: mod A pt with 1 instance of LOB    Patient will complete toileting with SBA  Comment: NT    Patient will tolerate standing for 5 minutes in prep for adls with SBA   Comment:NT     Patient will complete item retrieval with SBA  Comment: NT            Goals  on: 25  Frequency: 3x/week    OT Session Time: 15 minutes  Self-Care Home Management: 15 minutes    APRIL Arauz, OT  [facility]  Inpatient Rehabilitation  Occupational Therapy  [phone]

## 2025-05-29 NOTE — PROGRESS NOTES
[facility]  part of [facility]    Progress Note    Rahda Yu Patient Status:  Inpatient    1940 MRN [id number]   Location [facility] 3W/SW Attending Madison Crump MD   Hosp Day # 7 PCP Stef Velasco MD         Subjective:     Constitutional:  Negative for fever.   HENT:  Negative for congestion.    Respiratory:  Negative for cough and shortness of breath.    Cardiovascular:  Negative for chest pain.   Gastrointestinal:  Negative for abdominal distention.   Musculoskeletal:  Negative for back pain.   Psychiatric/Behavioral:  Negative for agitation.      Seen during dialysis  Comfortable on room air with no chest pain or cough or dyspnea  No fever  No abdominal pain  Lower extremity pain and spasm  Objective:   Blood pressure 119/61, pulse 83, temperature 98.2 °F (36.8 °C), temperature source Oral, resp. rate 20, weight 121 lb (54.9 kg), SpO2 98%.  Physical Exam  Constitutional:       General: She is not in acute distress.  HENT:      Head: Atraumatic.      Mouth/Throat:      Mouth: Mucous membranes are moist.   Eyes:      General: No scleral icterus.  Cardiovascular:      Rate and Rhythm: Normal rate.      Heart sounds:      No gallop.   Pulmonary:      Effort: No respiratory distress.      Breath sounds: No stridor. No wheezing, rhonchi or rales.   Abdominal:      General: Abdomen is flat. Bowel sounds are normal.      Palpations: Abdomen is soft.      Tenderness: There is no guarding or rebound.   Musculoskeletal:      Cervical back: Normal range of motion.      Right lower leg: No edema.      Left lower leg: No edema.   Neurological:      General: No focal deficit present.      Mental Status: She is oriented to person, place, and time.         Results:   Lab Results   Component Value Date    WBC 5.1 2025    HGB 7.4 (L) 2025    HCT 22.5 (L) 2025    .0 (L) 2025    CREATSERUM 1.67 (H) 2025    BUN 26 (H) 2025      05/29/2025    K 3.6 05/29/2025     05/29/2025    CO2 27.0 05/29/2025     (H) 05/29/2025    CA 7.8 (L) 05/29/2025    ALB 3.6 05/29/2025    ALKPHO 73 05/26/2025    BILT 0.2 05/26/2025    TP 5.7 05/26/2025    AST 16 05/26/2025    ALT <7 (L) 05/26/2025    PTT 44.4 (H) 05/23/2025    INR 1.16 05/22/2025    TSH 1.993 05/27/2025    MG 1.8 05/29/2025    PHOS 1.9 (L) 05/29/2025    TROPHS 426 (HH) 05/22/2025       No results found.  EKG 12 Lead  Result Date: 5/28/2025  Sinus bradycardia with 1st degree A-V block with occasional Premature ventricular complexes T wave abnormality, consider anterolateral ischemia Abnormal ECG When compared with ECG of 22-MAY-2025 10:07, Premature ventricular complexes are now Present Vent. rate has decreased BY  42 BPM T wave inversion now evident in Anterior leads Confirmed by Juliocesar Sol ([id number]) on 5/28/2025 5:12:43 PM      Assessment & Plan:      1- acute HFrEF LVEF 45 % , NSTEMI   Beta-blocker, statin, aspirin, Plavix  Severe multivessel's CAD's not a candidate for CABG  Medical therapy     2-acute on chronic stage IV kidney injury  Started on hemodialysis  Renal following     3-s/p acute respiratory failure with hypoxia  Better and now on room air  Secondary to pulmonary edema possible underlying COPD  Better overall and now on room air  Avoid steroid  Neb as needed     4-DVT prophylaxis  No heparin on hold b/o anemia     5-lower extremity pain  Check CT                       Kristin Ashford MD  5/29/2025

## 2025-05-30 ENCOUNTER — APPOINTMENT (OUTPATIENT)
Dept: INTERVENTIONAL RADIOLOGY/VASCULAR | Facility: HOSPITAL | Age: 85
End: 2025-05-30
Attending: NURSE PRACTITIONER
Payer: MEDICARE

## 2025-05-30 LAB
ALBUMIN SERPL-MCNC: 3.5 G/DL (ref 3.2–4.8)
ALBUMIN SERPL-MCNC: 3.5 G/DL (ref 3.2–4.8)
ANION GAP SERPL CALC-SCNC: 7 MMOL/L (ref 0–18)
ANION GAP SERPL CALC-SCNC: 8 MMOL/L (ref 0–18)
ANION GAP SERPL CALC-SCNC: 8 MMOL/L (ref 0–18)
APTT PPP: 223.1 SECONDS (ref 23–36)
APTT PPP: 37.7 SECONDS (ref 23–36)
BASOPHILS # BLD AUTO: 0.03 X10(3) UL (ref 0–0.2)
BASOPHILS NFR BLD AUTO: 0.4 %
BUN BLD-MCNC: 38 MG/DL (ref 9–23)
BUN BLD-MCNC: 39 MG/DL (ref 9–23)
BUN BLD-MCNC: 40 MG/DL (ref 9–23)
BUN/CREAT SERPL: 12.7 (ref 10–20)
BUN/CREAT SERPL: 14.5 (ref 10–20)
BUN/CREAT SERPL: 14.9 (ref 10–20)
CALCIUM BLD-MCNC: 7.7 MG/DL (ref 8.7–10.4)
CALCIUM BLD-MCNC: 7.7 MG/DL (ref 8.7–10.4)
CALCIUM BLD-MCNC: 7.9 MG/DL (ref 8.7–10.4)
CHLORIDE SERPL-SCNC: 101 MMOL/L (ref 98–112)
CHLORIDE SERPL-SCNC: 102 MMOL/L (ref 98–112)
CHLORIDE SERPL-SCNC: 103 MMOL/L (ref 98–112)
CO2 SERPL-SCNC: 25 MMOL/L (ref 21–32)
CO2 SERPL-SCNC: 25 MMOL/L (ref 21–32)
CO2 SERPL-SCNC: 26 MMOL/L (ref 21–32)
CREAT BLD-MCNC: 2.68 MG/DL (ref 0.55–1.02)
CREAT BLD-MCNC: 2.69 MG/DL (ref 0.55–1.02)
CREAT BLD-MCNC: 3 MG/DL (ref 0.55–1.02)
DEPRECATED RDW RBC AUTO: 48.7 FL (ref 35.1–46.3)
EGFRCR SERPLBLD CKD-EPI 2021: 15 ML/MIN/1.73M2 (ref 60–?)
EGFRCR SERPLBLD CKD-EPI 2021: 17 ML/MIN/1.73M2 (ref 60–?)
EGFRCR SERPLBLD CKD-EPI 2021: 17 ML/MIN/1.73M2 (ref 60–?)
EOSINOPHIL # BLD AUTO: 0.07 X10(3) UL (ref 0–0.7)
EOSINOPHIL NFR BLD AUTO: 1 %
ERYTHROCYTE [DISTWIDTH] IN BLOOD BY AUTOMATED COUNT: 15.9 % (ref 11–15)
GLUCOSE BLD-MCNC: 49 MG/DL (ref 70–99)
GLUCOSE BLD-MCNC: 50 MG/DL (ref 70–99)
GLUCOSE BLD-MCNC: 88 MG/DL (ref 70–99)
GLUCOSE BLDC GLUCOMTR-MCNC: 124 MG/DL (ref 70–99)
GLUCOSE BLDC GLUCOMTR-MCNC: 209 MG/DL (ref 70–99)
GLUCOSE BLDC GLUCOMTR-MCNC: 275 MG/DL (ref 70–99)
GLUCOSE BLDC GLUCOMTR-MCNC: 67 MG/DL (ref 70–99)
GLUCOSE BLDC GLUCOMTR-MCNC: 81 MG/DL (ref 70–99)
GLUCOSE BLDC GLUCOMTR-MCNC: 97 MG/DL (ref 70–99)
GLUCOSE BLDC GLUCOMTR-MCNC: 99 MG/DL (ref 70–99)
HCT VFR BLD AUTO: 23 % (ref 35–48)
HCT VFR BLD AUTO: 26.7 % (ref 35–48)
HGB BLD-MCNC: 7.3 G/DL (ref 12–16)
HGB BLD-MCNC: 8.7 G/DL (ref 12–16)
IMM GRANULOCYTES # BLD AUTO: 0.03 X10(3) UL (ref 0–1)
IMM GRANULOCYTES NFR BLD: 0.4 %
LYMPHOCYTES # BLD AUTO: 0.8 X10(3) UL (ref 1–4)
LYMPHOCYTES NFR BLD AUTO: 11.9 %
MAGNESIUM SERPL-MCNC: 1.9 MG/DL (ref 1.6–2.6)
MCH RBC QN AUTO: 26.8 PG (ref 26–34)
MCHC RBC AUTO-ENTMCNC: 31.7 G/DL (ref 31–37)
MCV RBC AUTO: 84.6 FL (ref 80–100)
MONOCYTES # BLD AUTO: 0.55 X10(3) UL (ref 0.1–1)
MONOCYTES NFR BLD AUTO: 8.2 %
NEUTROPHILS # BLD AUTO: 5.22 X10 (3) UL (ref 1.5–7.7)
NEUTROPHILS # BLD AUTO: 5.22 X10(3) UL (ref 1.5–7.7)
NEUTROPHILS NFR BLD AUTO: 78.1 %
OSMOLALITY SERPL CALC.SUM OF ELEC: 286 MOSM/KG (ref 275–295)
OSMOLALITY SERPL CALC.SUM OF ELEC: 287 MOSM/KG (ref 275–295)
OSMOLALITY SERPL CALC.SUM OF ELEC: 289 MOSM/KG (ref 275–295)
PHOSPHATE SERPL-MCNC: 4.1 MG/DL (ref 2.4–5.1)
PHOSPHATE SERPL-MCNC: 4.2 MG/DL (ref 2.4–5.1)
PLATELET # BLD AUTO: 127 10(3)UL (ref 150–450)
PLATELETS.RETICULATED NFR BLD AUTO: 5.5 % (ref 0–7)
POTASSIUM SERPL-SCNC: 4.3 MMOL/L (ref 3.5–5.1)
POTASSIUM SERPL-SCNC: 4.4 MMOL/L (ref 3.5–5.1)
POTASSIUM SERPL-SCNC: 4.5 MMOL/L (ref 3.5–5.1)
RBC # BLD AUTO: 2.72 X10(6)UL (ref 3.8–5.3)
SODIUM SERPL-SCNC: 134 MMOL/L (ref 136–145)
SODIUM SERPL-SCNC: 135 MMOL/L (ref 136–145)
SODIUM SERPL-SCNC: 136 MMOL/L (ref 136–145)
WBC # BLD AUTO: 6.7 X10(3) UL (ref 4–11)

## 2025-05-30 PROCEDURE — 99233 SBSQ HOSP IP/OBS HIGH 50: CPT | Performed by: INTERNAL MEDICINE

## 2025-05-30 PROCEDURE — B41F1ZZ FLUOROSCOPY OF RIGHT LOWER EXTREMITY ARTERIES USING LOW OSMOLAR CONTRAST: ICD-10-PCS | Performed by: INTERNAL MEDICINE

## 2025-05-30 PROCEDURE — 99233 SBSQ HOSP IP/OBS HIGH 50: CPT | Performed by: HOSPITALIST

## 2025-05-30 PROCEDURE — 99232 SBSQ HOSP IP/OBS MODERATE 35: CPT | Performed by: INTERNAL MEDICINE

## 2025-05-30 RX ORDER — IOPAMIDOL 755 MG/ML
200 INJECTION, SOLUTION INTRAVASCULAR
Status: COMPLETED | OUTPATIENT
Start: 2025-05-30 | End: 2025-05-30

## 2025-05-30 RX ORDER — HEPARIN SODIUM AND DEXTROSE 10000; 5 [USP'U]/100ML; G/100ML
12 INJECTION INTRAVENOUS ONCE
Status: COMPLETED | OUTPATIENT
Start: 2025-05-30 | End: 2025-05-30

## 2025-05-30 RX ORDER — CHLORHEXIDINE GLUCONATE 40 MG/ML
SOLUTION TOPICAL
Status: COMPLETED | OUTPATIENT
Start: 2025-05-31 | End: 2025-05-31

## 2025-05-30 RX ORDER — SODIUM CHLORIDE 9 MG/ML
INJECTION, SOLUTION INTRAVENOUS ONCE
Status: COMPLETED | OUTPATIENT
Start: 2025-05-30 | End: 2025-05-30

## 2025-05-30 RX ORDER — HEPARIN SODIUM AND DEXTROSE 10000; 5 [USP'U]/100ML; G/100ML
INJECTION INTRAVENOUS CONTINUOUS
Status: DISCONTINUED | OUTPATIENT
Start: 2025-05-30 | End: 2025-06-03

## 2025-05-30 RX ORDER — DEXTROSE MONOHYDRATE 25 G/50ML
INJECTION, SOLUTION INTRAVENOUS
Status: COMPLETED
Start: 2025-05-30 | End: 2025-05-30

## 2025-05-30 RX ORDER — LIDOCAINE HYDROCHLORIDE 20 MG/ML
INJECTION, SOLUTION INFILTRATION; PERINEURAL
Status: COMPLETED
Start: 2025-05-30 | End: 2025-05-30

## 2025-05-30 RX ORDER — SODIUM CHLORIDE 9 MG/ML
INJECTION, SOLUTION INTRAVENOUS
Status: ACTIVE | OUTPATIENT
Start: 2025-05-31 | End: 2025-05-31

## 2025-05-30 RX ORDER — METOCLOPRAMIDE HYDROCHLORIDE 5 MG/ML
5 INJECTION INTRAMUSCULAR; INTRAVENOUS DAILY PRN
Status: DISCONTINUED | OUTPATIENT
Start: 2025-05-30 | End: 2025-06-09

## 2025-05-30 RX ORDER — HEPARIN SODIUM 1000 [USP'U]/ML
INJECTION, SOLUTION INTRAVENOUS; SUBCUTANEOUS
Status: COMPLETED
Start: 2025-05-30 | End: 2025-05-30

## 2025-05-30 RX ORDER — HEPARIN SODIUM 1000 [USP'U]/ML
60 INJECTION, SOLUTION INTRAVENOUS; SUBCUTANEOUS ONCE
Status: COMPLETED | OUTPATIENT
Start: 2025-05-30 | End: 2025-05-30

## 2025-05-30 RX ORDER — MIDAZOLAM HYDROCHLORIDE 1 MG/ML
INJECTION INTRAMUSCULAR; INTRAVENOUS
Status: COMPLETED
Start: 2025-05-30 | End: 2025-05-30

## 2025-05-30 RX ORDER — SODIUM CHLORIDE 9 MG/ML
INJECTION, SOLUTION INTRAVENOUS CONTINUOUS
Status: DISCONTINUED | OUTPATIENT
Start: 2025-05-30 | End: 2025-06-02

## 2025-05-30 RX ORDER — DEXTROSE MONOHYDRATE 25 G/50ML
25 INJECTION, SOLUTION INTRAVENOUS ONCE
Status: COMPLETED | OUTPATIENT
Start: 2025-05-30 | End: 2025-05-30

## 2025-05-30 NOTE — PROGRESS NOTES
[facility]  part of [facility]    Progress Note    Radha Yu Patient Status:  Inpatient    1940 MRN [id number]   Location [facility]5W Attending Villa Angel MD   Hosp Day # 8 PCP Stef Velasco MD     Chief complaint     Subjective:   Radha Yu is a(n) 84 year old female who came in with chest pain and sob.     Yesterday with decreased sensation in RLE and with increased pain.  Today no worsening of the leg.  Discussed  RLE intervention with cardiology today. She denies chest pain.  No n/v.  No fevers/chills.     A comprehensive 10 point review of systems was completed.  Pertinent positives and negatives noted in the the HPI.    Objective:     Blood pressure 111/34, pulse 68, temperature 98 °F (36.7 °C), temperature source Oral, resp. rate 16, weight 121 lb 12.8 oz (55.2 kg), SpO2 96%.      Intake/Output Summary (Last 24 hours) at 2025 1043  Last data filed at 2025 2300  Gross per 24 hour   Intake 370 ml   Output 100 ml   Net 270 ml         Patient Weight(s) for the past 336 hrs:   Weight   25 0555 121 lb 12.8 oz (55.2 kg)   25 0558 121 lb (54.9 kg)   25 0402 120 lb 14.4 oz (54.8 kg)   25 0617 116 lb 9.6 oz (52.9 kg)   25 2146 123 lb 14.4 oz (56.2 kg)   25 1114 118 lb 13.3 oz (53.9 kg)           Gen: uncomfortable  Pulm: Lungs clear, normal respiratory effort  CV: Heart with regular rate and rhythm  Abd: Abdomen soft, nontender, nondistended, bowel sounds present  Neuro: No acute focal deficits  MSK: moves extremities  Skin: Warm and dry  Psych: Normal affect  Ext: rt le looks paler compared to left, sensation intact but decreased, rt groin wound looks good, rt pedal pulse heard with doppler          Medicines:     Current Hospital Medications[1]            Blood Sugar Medications            insulin aspart 100 Units/mL Subcutaneous Solution Pen-injector    insulin glargine 100 UNIT/ML Subcutaneous Solution            Blood  Pressure and Cardiac Medications            amLODIPine 5 MG Oral Tab            Medication Infusions[2]        Lab Results   Component Value Date    WBC 6.7 05/30/2025    HGB 7.3 (L) 05/30/2025    HCT 23.0 (L) 05/30/2025    .0 (L) 05/30/2025    CREATSERUM 2.69 (H) 05/30/2025    CREATSERUM 2.68 (H) 05/30/2025    BUN 39 (H) 05/30/2025    BUN 40 (H) 05/30/2025     05/30/2025     (L) 05/30/2025    K 4.5 05/30/2025    K 4.4 05/30/2025     05/30/2025     05/30/2025    CO2 26.0 05/30/2025    CO2 25.0 05/30/2025    GLU 50 (L) 05/30/2025    GLU 49 (LL) 05/30/2025    CA 7.7 (L) 05/30/2025    CA 7.7 (L) 05/30/2025    ALB 3.5 05/30/2025    ALKPHO 73 05/26/2025    BILT 0.2 05/26/2025    TP 5.7 05/26/2025    AST 16 05/26/2025    ALT <7 (L) 05/26/2025    PTT 37.7 (H) 05/30/2025    INR 1.16 05/22/2025    TSH 1.993 05/27/2025    MG 1.9 05/30/2025    PHOS 4.1 05/30/2025       CTA ABDOMEN/PELVIS LOWER EXT BILAT W RUNOFF (WGA=[id number])  Result Date: 5/29/2025  CONCLUSION:   1. RIGHT lower extremity:  An approximate 8 cm segment of the proximal superficial femoral artery demonstrates high-grade stenosis and/or near complete occlusion at and below the femoral bifurcation.  There is partial distal reconstitution of the SFA, but with other multifocal high-grade stenoses and occlusions throughout the mid-distal SFA.  The profunda femoris artery is grossly patent.  Moderate multifocal non flow-limiting atherosclerotic stenoses of the popliteal artery, which is otherwise patent.  Single-vessel runoff via the peroneal artery.  Up to moderate multiple common and external iliac artery atherosclerotic stenoses, but with no high-grade stenosis or occlusion of these vessels.  2. LEFT lower extremity:  Left femoral to left popliteal artery bypass is patent.  Mild scattered non flow-limiting granulation tissue at the mid to distal aspect of the bypass graft.  Native superficial femoral artery is essentially completely  occluded.   The profunda femoris artery is patent.  The popliteal artery is grossly patent and demonstrates mild-to-moderate multifocal atherosclerotic stenoses.  Two vessel runoff via the posterior tibial and peroneal arteries, but with partial thready flow throughout the posterior tibial artery.  3. Moderate to severe atherosclerotic stenosis at the ostium of the superior mesenteric artery and both renal arteries.  There is also moderate stenosis at the ostium of the celiac axis. 4. Congestive failure and/or fluid overload with small left greater than right pleural effusions and severe anasarca. 5. Excreted contrast material within the renal collecting systems and urinary bladder.  There is also Kong's suspicion of contrast in the gallbladder.  Please note that contrast in the renal collecting systems limits sensitivity for detection of urinary tract calculi.  Extensive renal hilar vascular calcifications are seen.  There is also right greater than left renal atrophy. 6. Fatty atrophy of the pancreas with mild 6 mm pancreatic ductal dilation.  Please correlate with any available prior imaging to ensure stability of this finding.  If unavailable, request correlation with obstructive laboratory parameters and consider further evaluation with MRCP if laboratory abnormalities are present. 7. Indeterminate 2.4 cm left adrenal nodule, which should be correlated with prior outside institution imaging to ensure stability. 8. Colonic diverticulosis. 9. Hysterectomy. 10. Left proximal femoral internal fixation; resultant streak artifacts limit evaluation of the pelvic structures.  There are also chronic L2 and L5 vertebral body compression fractures. 11. Lesser incidental findings as above.   elm-remote  Dictated by (CST): Nasir Cash MD on 5/29/2025 at 11:08 AM     Finalized by (CST): Nasir Cash MD on 5/29/2025 at 11:42 AM          EKG 12 Lead  Result Date: 5/29/2025  Normal sinus rhythm Nonspecific ST and T  wave abnormality Abnormal ECG When compared with ECG of 28-MAY-2025 14:22, Premature ventricular complexes are no longer Present MS interval has decreased Confirmed by JENNIFER CRUZ JORDAN ([id number]) on 5/29/2025 4:12:29 PM    EKG 12 Lead  Result Date: 5/28/2025  Sinus bradycardia with 1st degree A-V block with occasional Premature ventricular complexes T wave abnormality, consider anterolateral ischemia Abnormal ECG When compared with ECG of 22-MAY-2025 10:07, Premature ventricular complexes are now Present Vent. rate has decreased BY  42 BPM T wave inversion now evident in Anterior leads Confirmed by Juliocesar Sol ([id number]) on 5/28/2025 5:12:43 PM      Results:     CBC:    Lab Results   Component Value Date    WBC 6.7 05/30/2025    WBC 5.1 05/29/2025    WBC 4.9 05/28/2025     Lab Results   Component Value Date    HGB 7.3 (L) 05/30/2025    HGB 7.4 (L) 05/29/2025    HGB 8.4 (L) 05/28/2025      Lab Results   Component Value Date    .0 (L) 05/30/2025    .0 (L) 05/29/2025    .0 (L) 05/28/2025       Recent Labs   Lab 05/28/25  0751 05/29/25  0901 05/30/25  0655   *  209* 129* 49*  50*   BUN 60*  60* 26* 40*  39*   CREATSERUM 3.31*  3.31* 1.67* 2.68*  2.69*   CA 7.4*  7.4* 7.8* 7.7*  7.7*   *  134* 137 135*  136   K 4.3  4.3 3.6 4.4  4.5     100 101 103  102   CO2 23.0  23.0 27.0 25.0  26.0           Assessment and Plan:       Rt LE pain and decreased sensation, color   - plan stat ct a/p/bl le runoff to rule out dissection - results reviewed - plan percutaneous revascularization of right SFA with possible IVL vs atherectomy as she is high risk for open vascular surgery   - discussed with Dr Najjar and Dr Aquino     NSTEMI type 1 with new ischemic cardiomyopathy acute systolic chf  Multivessel CAD  - left heart cath again 5/28 with pci, post op with rt groin wound bleeding that improved after pressure   - on asa, plavix, statin, bb     Tricuspid and mitral regurg  -  volume removal per hd     PVD s/p left femoropopliteal bypass 9/2015  - 80% proximal left renal artery stenosis on angiogram   - On asa, plavix, atorvastatin      HL  - statin     HTN  - elevated - adjust oral meds per cards     Acute on chronic anemia of renal disease  - possibly baseline anemia from ckd  - heparin stopped; gi defers invasive dx till more stable  - hb stable today , ppi     acute copd exacerbation  - pulm consulted signed off no steroids; no abx  - plan nebs scheduled and prn  - plan pulm hygiene  - wean oxygen as able     DEBO on CKD stage 4 ->no esrd  - nephrology consulted  - tolerating HD   - hd cath placed      DM  - hypoglycemic this am. Hold tresiba.   - accuchecks and ISS    Moderate right ICA stenosis s/p right TCAR 2/2019, totally occluded left ICA  - On asa, plavix, atorvastatin                       high mdm; coordinating care with nurse and counseling pt and with her permission her nephew in room  about chf/sob/diet/cath/dialysis         Dispo: poss dc when dialysis/lisa set up and after cath; perhaps Thursday/friday            Chart reviewed, including current vitals, notes, labs and imaging  Labs ordered and medications adjusted as outlined above  Coordinate care with care team/consultants  Discussed with patient results of tests, management plan as outlined above, and the need for ongoing hospitalization  D/w RN     MDM high                    Supplementary Documentation:   DVT Mechanical Prophylaxis:   SCDs,    DVT Pharmacologic Prophylaxis   Medication    heparin (Porcine) 38793 units/250mL infusion ACS/AFIB CONTINUOUS         DVT Pharmacologic prophylaxis: Aspirin 81 mg      Code Status: Full Code  Rao: No urinary catheter in place  Rao Duration (in days):   Central line:    FRANCIS: 5/31/2025                            [1]   Current Facility-Administered Medications   Medication Dose Route Frequency    heparin (Porcine) 10394 units/250mL infusion ACS/AFIB CONTINUOUS  200-3,000  Units/hr Intravenous Continuous    [START ON 5/31/2025] chlorhexidine (Hibiclens) 4 % external liquid   Topical On Call    [START ON 5/31/2025] sodium chloride 0.9% infusion   Intravenous On Call    metoclopramide (Reglan) 5 mg/mL injection 5 mg  5 mg Intravenous Daily PRN    insulin degludec (Tresiba) 100 units/mL flextouch 12 Units  12 Units Subcutaneous Daily    atorvastatin (Lipitor) tab 40 mg  40 mg Oral Nightly    clopidogrel (Plavix) tab 75 mg  75 mg Oral Daily    aspirin DR tab 81 mg  81 mg Oral Daily    hydrALAZINE (Apresoline) tab 25 mg  25 mg Oral Q8H BLANE    isosorbide dinitrate (Isordil) tab 20 mg  20 mg Oral TID (Nitrates)    metoprolol succinate (Toprol XL) partial tablet 12.5 mg  12.5 mg Oral Daily Beta Blocker    pantoprazole (Protonix) DR tab 40 mg  40 mg Oral BID AC    calcitriol (Rocaltrol) cap 0.25 mcg  0.25 mcg Oral Q MWF    ipratropium-albuterol (Duoneb) 0.5-2.5 (3) MG/3ML inhalation solution 3 mL  3 mL Nebulization Q6H PRN    HYDROcodone-acetaminophen (Norco) 5-325 MG per tab 1 tablet  1 tablet Oral Q6H PRN    epoetin brittany (Epogen, Procrit) 5,000 Units injection  5,000 Units Subcutaneous Once per day on Monday Wednesday Friday    escitalopram (Lexapro) tab 10 mg  10 mg Oral Daily    glucose (Dex4) 15 GM/59ML oral liquid 15 g  15 g Oral Q15 Min PRN    Or    glucose (Glutose) 40% oral gel 15 g  15 g Oral Q15 Min PRN    Or    glucose-vitamin C (Dex-4) chewable tab 4 tablet  4 tablet Oral Q15 Min PRN    Or    dextrose 50% injection 50 mL  50 mL Intravenous Q15 Min PRN    Or    glucose (Dex4) 15 GM/59ML oral liquid 30 g  30 g Oral Q15 Min PRN    Or    glucose (Glutose) 40% oral gel 30 g  30 g Oral Q15 Min PRN    Or    glucose-vitamin C (Dex-4) chewable tab 8 tablet  8 tablet Oral Q15 Min PRN    acetaminophen (Tylenol Extra Strength) tab 500 mg  500 mg Oral Q4H PRN    melatonin tab 3 mg  3 mg Oral Nightly PRN    polyethylene glycol (PEG 3350) (Miralax) 17 g oral packet 17 g  17 g Oral Daily PRN     sennosides (Senokot) tab 17.2 mg  17.2 mg Oral Nightly PRN    bisacodyl (Dulcolax) 10 MG rectal suppository 10 mg  10 mg Rectal Daily PRN    ondansetron (Zofran) 4 MG/2ML injection 4 mg  4 mg Intravenous Q6H PRN    benzonatate (Tessalon) cap 200 mg  200 mg Oral TID PRN    guaiFENesin (Robitussin) 100 MG/5 ML oral liquid 200 mg  200 mg Oral Q4H PRN    glycerin-hypromellose- (Artificial Tears) 0.2-0.2-1 % ophthalmic solution 1 drop  1 drop Both Eyes QID PRN    sodium chloride (Saline Mist) 0.65 % nasal solution 1 spray  1 spray Each Nare Q3H PRN    insulin aspart (NovoLOG) 100 Units/mL FlexPen 1-7 Units  1-7 Units Subcutaneous TID CC    [Held by provider] insulin aspart (NovoLOG) 100 Units/mL FlexPen 5 Units  5 Units Subcutaneous TID CC and HS   [2]    continuous dose heparin

## 2025-05-30 NOTE — PROGRESS NOTES
[facility] Cardiology Progress Note    Radha Yu Patient Status:  Inpatient    1940 MRN [id number]   Location [facility] 3W/SW Attending Villa Angel MD   Hosp Day # 8 PCP Stef Velasco MD     Subjective:  Denies cp, sob. +Decreased sensation to RLE , no pain at this time       Objective:  /48 (BP Location: Right arm)   Pulse 60   Temp 98.2 °F (36.8 °C) (Oral)   Resp 16   Wt 121 lb 12.8 oz (55.2 kg)   SpO2 98%   BMI 23.03 kg/m²     Telemetry: NSR, 60bpm       Intake/Output:    Intake/Output Summary (Last 24 hours) at 2025 1107  Last data filed at 2025 2300  Gross per 24 hour   Intake 370 ml   Output 100 ml   Net 270 ml       Last 3 Weights   25 0555 121 lb 12.8 oz (55.2 kg)   25 0558 121 lb (54.9 kg)   25 0402 120 lb 14.4 oz (54.8 kg)   25 0617 116 lb 9.6 oz (52.9 kg)   25 2146 123 lb 14.4 oz (56.2 kg)   25 1114 118 lb 13.3 oz (53.9 kg)   25 1830 123 lb 7.3 oz (56 kg)   25 0700 123 lb 7.3 oz (56 kg)   25 1805 123 lb 6.4 oz (56 kg)   25 1121 139 lb 1.8 oz (63.1 kg)       Labs:  Recent Labs   Lab 25  0751 25  0901 25  0655   *  209* 129* 49*  50*   BUN 60*  60* 26* 40*  39*   CREATSERUM 3.31*  3.31* 1.67* 2.68*  2.69*   EGFRCR 13*  13* 30* 17*  17*   CA 7.4*  7.4* 7.8* 7.7*  7.7*   *  134* 137 135*  136   K 4.3  4.3 3.6 4.4  4.5     100 101 103  102   CO2 23.0  23.0 27.0 25.0  26.0     Recent Labs   Lab 25  0751 25  0900 25  0655   RBC 3.15* 2.79* 2.72*   HGB 8.4* 7.4* 7.3*   HCT 26.0* 22.5* 23.0*   MCV 82.5 80.6 84.6   MCH 26.7 26.5 26.8   MCHC 32.3 32.9 31.7   RDW 15.9* 15.9* 15.9*   NEPRELIM 3.14 3.81 5.22   WBC 4.9 5.1 6.7   .0* 141.0* 127.0*         No results for input(s): \"TROP\", \"TROPHS\", \"CK\" in the last 168 hours.      Diagnostics:   25 Echo  1. Left ventricle: The cavity size was normal. Wall thickness was mildly       increased. Systolic function was moderately reduced. The estimated      ejection fraction was 35-40%, by biplane method of disks. Doppler      parameters are consistent with abnormal left ventricular relaxation -      grade 1 diastolic dysfunction.   2. Left atrium: The atrium was markedly dilated.   3. Right atrium: The atrium was dilated.   4. Atrial septum: A patent foramen ovale cannot be excluded.   5. Aortic valve: There was mild regurgitation.   6. Mitral valve: The annulus was mildly to moderately calcified. The      leaflets were normal thickness. There was moderate to severe      regurgitation.   7. Tricuspid valve: There was mild-moderate regurgitation.   Impressions:  No previous study from [facility] was   available for comparison.     5/27/25 L/RHC   Findings:  1) Left Ventriculography at 30 degrees CEE: LVEF 40 to 45%, hypokinesia of the mid to distal inferior/inferoapical walls.  2) Hemodynamics: LVEDP 18 mmHg  3) Coronaries:  Left main coronary artery: Large size vessel with no angiographically significant disease.  Left anterior descending artery: Large size, Type IV vessel with 50% proximal, 80% mid segment stenosis.  90% proximal first diagonal branch disease which originates from the diseased segment of the mid LAD.   Circumflex artery: Large size non-dominant vessel with luminal irregularities.  Angiographically significant disease.  Small OM1 with focal 80% ostial stenosis.  OM 2  is medium caliber, diffuse 70 to 80% disease of proximal segment.  Right coronary artery: Large size dominant vessel with 100%  of the proximal segment with reconstitution of the PDA via left-to-right septal collaterals.      Aortobifemoral angiogram  Mildly dilated infrarenal aorta  Left renal artery proximal 80% stenosis  Nonvisualization of the right renal artery  Severely, diffusely calcified but patent bilateral iliac arteries     RIGHT HEART CATHETERIZATION HEMODYNAMICS:  Right Atrial  Pressure: 14/13/12 mmHg  Right Ventricular Pressure: 36/10/14 mmHg  Pulmonary Artery Pressure: 37/18/24 mmHg  Wedge Pressure: 23/22/21 mmHg  Colleen CO: 4.7 L/min; Colleen CI: 3.1 L/min/m²  Transpulmonary Gradient (PAmean - Wedge): 13 mmHg  Pulmonary Vascular Resistance (PA-wedge/CO): 0.6 CONRAD  Ao saturation 91.2%  PA saturation 51.5%  Hgb 7.6  Heart Rate 58      Review of Systems   Respiratory: Negative.     Cardiovascular: Negative.      Physical Exam:    General: Alert and oriented x 3. No apparent distress.   HEENT: Normocephalic, anicteric sclera, neck supple, no thyromegaly or adenopathy.  Neck: No JVD, carotids 2+, no bruits.  Cardiac: Regular rate & rhythm. S1, S2 normal. No pericardial rub, S3, or extra cardiac sounds. +Murmur   Lungs: Clear without wheezes, rales, rhonchi or dullness.  Normal excursions and effort.  Abdomen: Soft, non-tender. No organosplenomegally, mass or rebound, BS-present.  Extremities: Without clubbing or cyanosis.  No left lower extremity edema, no right lower extremity edema. +Cooler RLE w/ absent DP & PT pulses   Neurologic: Alert and oriented, normal affect. No focal defects  Skin: Warm and dry.    Right groin: C/D/I. Soft. Non-tender. No signs of bruising, bleeding,  hematoma, or infection noted     Medications:  Scheduled Medications[1]  Medication Infusions[2]    Assessment:  84-year-old female with a history of PVD s/p left femoral-popliteal bypass 2015, s/p right TCAR and total occluded left ICA, HTN, HLD, DM, and CKD who presents with shortness of breath     Acute hypoxic respiratory failure  - Chest x-ray w/ evidence of moderate pulmonary edema, noted diffuse wheezing initially on exam in setting of long-term tobacco use  - Viral resp panel negative   - S/p nebs & steroids . Pulm following   - SP02 stable on RA     NSTEMI   Multivessel coronary artery disease   - Peak TnI 426, ECG w/ lateral T wave inversion  - Echo w/ EF 35-40%, grade 1dd, bi-atrial dilation, PFO can't be  excluded, mod - severe MR, mild-mod TR   - L/RHC w/ severe multivessel CAD. Surgical turndown  - 5/28 S/p successful bifurcation PCI to mid LAD and first diagonal branch   - On ASA, plavix, atorvastatin, toprol xl     New cardiomyopathy, EF 35-40%   Mod-severe mitral regurgitation   Mild-mod tricuspid regurgitation   - Volume removal per HD   - Compensated on exam   - GDMT: toprol xl , isordil/hydralazine , Not on acei/arb/arni/mra due to CKD     ESRD   - Started HD this admission  - Nephrology following     PVD s/p left femoropopliteal bypass 9/2015  - 80% proximal left renal artery stenosis on angiogram   - 5/29 RRT called due to pt reporting decreased sensation to RLE, occasional pain, & absent R PT/DP pulses   - STAT CTA results reviewed w/ MD- plan for percutaneous revascularization of right SFA with possible IVL vs atherectomy   - On asa, plavix, atorvastatin     Moderate right ICA stenosis s/p right TCAR 2/2019, totally occluded left ICA  - On asa, plavix, atorvastatin     HTN   - Stable on toprol xl , isordil/hydralazine    HLD  - LDL 48, on atorvastatin     DMII  - HgbA1c = 6.5%. On insulin per PMD     Acute on chronic anemia   - Hgb 7.3  - On PPI. GI following     Plan:    Denies anginal sx . Compensated on exam  Denies RLE pain this AM   5/29 RRT called due to pt reporting decreased sensation to RLE, occasional pain, & absent R PT/DP pulses   STAT CTA results reviewed w/ MD- plan for percutaneous revascularization of right SFA with possible IVL vs atherectomy w/ MAC today w/ Dr. Osborne . Start heparin gtt   Hgb 7.3 . Plan for 1 unit PRBC transfusion   Continue asa, plavix, toprol xl, isordil, hydralazine , atorvastatin .     Case/plan d/w Dr. Aquino & RN     Jeana Almaraz, MSN, FPA-APRN, FNP-BC, CCK   5/30/2025  8:30 AM  Ph [phone] ([place])  Ph [phone] ([place])             [1]    [START ON 5/31/2025] chlorhexidine   Topical On Call    [START ON 5/31/2025] sodium chloride   Intravenous On Call     insulin degludec  12 Units Subcutaneous Daily    atorvastatin  40 mg Oral Nightly    clopidogrel  75 mg Oral Daily    aspirin  81 mg Oral Daily    hydrALAZINE  25 mg Oral Q8H BLANE    isosorbide dinitrate  20 mg Oral TID (Nitrates)    metoprolol succinate  12.5 mg Oral Daily Beta Blocker    pantoprazole  40 mg Oral BID AC    calcitriol  0.25 mcg Oral Q MWF    epoetin brittany  5,000 Units Subcutaneous Once per day on Monday Wednesday Friday    escitalopram  10 mg Oral Daily    insulin aspart  1-7 Units Subcutaneous TID CC    [Held by provider] insulin aspart  5 Units Subcutaneous TID CC and HS   [2]    continuous dose heparin

## 2025-05-30 NOTE — CARDIAC REHAB
Cardiac Rehab Phase I    Activity:  Distance   Assistance needed   Patient tolerated activity .    Education:  Handouts provided and reviewed: [facility] Booklet.       Diet: Healthy Cardiac diet reviewed.    Disease Process: Disease process reviewed.    Reviewed the following: SITE CARE: Reviewed      RISK FACTORS: Reviwed      SMOKING CESSATION: Reviewed      HOME EXERCISE ACTIVITY: Reviewed      OUTPATIENT CARDIAC REHAB: Referred to Cardiac Rehabilitation

## 2025-05-30 NOTE — CM/SW NOTE
08:25AM  Clinical updates sent to [facility] via [employer].    Per RN rounds, plan for fem pop today around 3:20PM.   [facility] [employer] [facility] [facility].    02:30PM  Received update from [facility] - onsite HD approved for [facility].    SW spoke to [facility]/ [place] - Medicar set on WILL CALL through 6/3. PCS completed and will need date added day of actual DC.    PLAN:  [facility] w/ onsite HD, Medicar on WC, PCS needs date- pending med clear         SW/CM to remain available for support and/or discharge planning.         MS IdaW, LSW [id number]

## 2025-05-30 NOTE — PROGRESS NOTES
Elliott otoole called and discussed AHA HF Guidelines and follow up. Appt discussed  and family is supportive with care and follow up. Patient to [facility] with HD for now. Home with nephew and family. Elliott does the cooking and his Dad was HD patient so he has renal and low sodium cookbooks at home for her.Nephew verbalized understanding. HF pamphlet left at bedside.

## 2025-05-30 NOTE — PROCEDURES
[facility] Vascular Procedure Note  Radha Yu Patient Status:  Inpatient    1940 MRN [id number]   Location [facility] INTERVENTIONAL SUITES Attending Madison Crump MD   Hosp Day # 8 PCP Stef Velasco MD       Cardiologist: Lizzie Osborne MD  Primary Proceduralist: Lizzie Osborne MD  Procedure Performed: Peripheral angiogram, unsuccessful intervention to the RLE  Date of Procedure: 2025   Indication: Critical Limb Ischemia    Summary of procedure:        Peripheral angiogram:    Distal aorta: Patent    Right  Common iliac:  Non-obstructive     External iliac: 60 to 70%  stenosis    Internal iliac:  Non-obstructive     Common femoral artery:  Non-obstructive     SFA: 100% occluded    Popliteal: Poorly visualized    Below-knee runoff: Poorly visualized          Assessment:  Critical limb ischemia  Severely calcified peripheral vascular disease    Recommendations:  Continue to medical therapy.  IV fluids  Will possibly take a relook after discussion with patient and family      Description of Procedure:   After written informed consent was obtained from the patient, patient was brought to the cardiac catheterization laboratory.  Patient was prepped and draped in the usual sterile fashion. Lidocaine 1% was used to infiltrate the left groin followed by access using modified sterile technique and a micropuncture method.  A 6 Swedish sheath was inserted.  We had difficulty getting up and around the horn.  Using an 035 glide advantage and an Omni Flush catheter we were able to engage the right iliac artery and advanced the glide advantage of.  We tried to advance the 6 x 45 sheath however we were only able to engage the right common iliac artery proximally.  We made multiple attempts to cross the SFA into the popliteal artery however we were unable to get past the mid segment.  At this time we decided to abort the procedure.      Specimen sent to: No specimen collected  Estimated blood loss: 10  cc  Closure:  Manual        Lizzie Osborne MD  05/30/25

## 2025-05-30 NOTE — PROGRESS NOTES
[facility]  Nephrology Daily Progress Note    Radha Yu  [id number]  84 year old      HPI:   Radha Yu is a 84 year old female.  Back from attempted angioplasty of RLE but was unsuccessful. Currently minimal discomfort in R foot.        ROS:     Constitutional:  Negative for decreased activity, fever, irritability and lethargy  Endocrine:  Negative for abnormal sleep patterns, increased activity, polydipsia and polyphagia  Cardiovascular:  Negative for cool extremity and irregular heartbeat/palpitations  Gastrointestinal:  Negative for abdominal pain, constipation, decreased appetite, diarrhea and vomiting  Genitourinary:  Negative for dysuria and hematuria  Hema/Lymph:  Negative for easy bleeding and easy bruising  Integumentary:  Negative for pruritus and rash  Musculoskeletal:  Negative for bone/joint symptoms  Neurological:  Negative for gait disturbance  Psychiatric:  Negative for inappropriate interaction and psychiatric symptoms  Respiratory:  Negative for cough, dyspnea and wheezing      PHYSICAL EXAM:   Temp:  [97.8 °F (36.6 °C)-98.4 °F (36.9 °C)] 98.4 °F (36.9 °C)  Pulse:  [60-73] 72  Resp:  [16-18] 16  BP: (111-151)/() 146/51  SpO2:  [93 %-98 %] 98 %  Patient Weight for the past 72 hrs:   Weight   05/28/25 0402 120 lb 14.4 oz (54.8 kg)   05/29/25 0558 121 lb (54.9 kg)   05/30/25 0555 121 lb 12.8 oz (55.2 kg)       Constitutional: appears well hydrated alert and responsive no acute distress noted  Neck/Thyroid: neck is supple without adenopathy  Lymphatic: no abnormal cervical, supraclavicular or axillary adenopathy is noted  Respiratory: normal to inspection lungs are clear to auscultation bilaterally normal respiratory effort  Cardiovascular: regular rate and rhythm no murmurs, gallups, or rubs  Abdomen: soft non-tender non-distended no organomegaly noted no masses  Musculoskeletal: full ROM all extremities good strength  no deformities  Extremities: no edema,R foot cool and  cyanotic.   Neurological: exam appropriate for age reflexes and motor skills appropriate for age    Labs:  Lab Results   Component Value Date    WBC 6.7 05/30/2025    HGB 7.3 05/30/2025    HCT 23.0 05/30/2025    .0 05/30/2025    CREATSERUM 2.69 05/30/2025    CREATSERUM 2.68 05/30/2025    BUN 39 05/30/2025    BUN 40 05/30/2025     05/30/2025     05/30/2025    K 4.5 05/30/2025    K 4.4 05/30/2025     05/30/2025     05/30/2025    CO2 26.0 05/30/2025    CO2 25.0 05/30/2025    GLU 50 05/30/2025    GLU 49 05/30/2025    CA 7.7 05/30/2025    CA 7.7 05/30/2025    ALB 3.5 05/30/2025    PTT 37.7 05/30/2025    MG 1.9 05/30/2025    PHOS 4.1 05/30/2025     Recent Labs   Lab 05/28/25  0751 05/29/25  0900 05/30/25  0655   WBC 4.9 5.1 6.7   HGB 8.4* 7.4* 7.3*   HCT 26.0* 22.5* 23.0*   .0* 141.0* 127.0*     Recent Labs   Lab 05/26/25  0551 05/27/25  0246 05/28/25  0751 05/29/25  0901 05/30/25  0655   GLU 88   < > 209*  209* 129* 49*  50*   BUN 75*   < > 60*  60* 26* 40*  39*   CREATSERUM 3.58*   < > 3.31*  3.31* 1.67* 2.68*  2.69*   CA 7.6*   < > 7.4*  7.4* 7.8* 7.7*  7.7*   ALB 3.6   < > 3.6 3.6 3.5   *   < > 134*  134* 137 135*  136   K 4.7   < > 4.3  4.3 3.6 4.4  4.5      < > 100  100 101 103  102   CO2 23.0   < > 23.0  23.0 27.0 25.0  26.0   ALKPHO 73  --   --   --   --    AST 16  --   --   --   --    ALT <7*  --   --   --   --    BILT 0.2  --   --   --   --    TP 5.7  --   --   --   --    PHOS 3.9   < > 4.1 1.9* 4.1    < > = values in this interval not displayed.       Imaging  CTA ABDOMEN/PELVIS LOWER EXT BILAT W RUNOFF (ALD=53200)  Result Date: 5/29/2025  CONCLUSION:   1. RIGHT lower extremity:  An approximate 8 cm segment of the proximal superficial femoral artery demonstrates high-grade stenosis and/or near complete occlusion at and below the femoral bifurcation.  There is partial distal reconstitution of the SFA, but with other multifocal high-grade stenoses  and occlusions throughout the mid-distal SFA.  The profunda femoris artery is grossly patent.  Moderate multifocal non flow-limiting atherosclerotic stenoses of the popliteal artery, which is otherwise patent.  Single-vessel runoff via the peroneal artery.  Up to moderate multiple common and external iliac artery atherosclerotic stenoses, but with no high-grade stenosis or occlusion of these vessels.  2. LEFT lower extremity:  Left femoral to left popliteal artery bypass is patent.  Mild scattered non flow-limiting granulation tissue at the mid to distal aspect of the bypass graft.  Native superficial femoral artery is essentially completely occluded.   The profunda femoris artery is patent.  The popliteal artery is grossly patent and demonstrates mild-to-moderate multifocal atherosclerotic stenoses.  Two vessel runoff via the posterior tibial and peroneal arteries, but with partial thready flow throughout the posterior tibial artery.  3. Moderate to severe atherosclerotic stenosis at the ostium of the superior mesenteric artery and both renal arteries.  There is also moderate stenosis at the ostium of the celiac axis. 4. Congestive failure and/or fluid overload with small left greater than right pleural effusions and severe anasarca. 5. Excreted contrast material within the renal collecting systems and urinary bladder.  There is also Kong's suspicion of contrast in the gallbladder.  Please note that contrast in the renal collecting systems limits sensitivity for detection of urinary tract calculi.  Extensive renal hilar vascular calcifications are seen.  There is also right greater than left renal atrophy. 6. Fatty atrophy of the pancreas with mild 6 mm pancreatic ductal dilation.  Please correlate with any available prior imaging to ensure stability of this finding.  If unavailable, request correlation with obstructive laboratory parameters and consider further evaluation with MRCP if laboratory abnormalities  are present. 7. Indeterminate 2.4 cm left adrenal nodule, which should be correlated with prior outside institution imaging to ensure stability. 8. Colonic diverticulosis. 9. Hysterectomy. 10. Left proximal femoral internal fixation; resultant streak artifacts limit evaluation of the pelvic structures.  There are also chronic L2 and L5 vertebral body compression fractures. 11. Lesser incidental findings as above.   elm-remote  Dictated by (CST): Nasir Cash MD on 5/29/2025 at 11:08 AM     Finalized by (CST): Nasir Cash MD on 5/29/2025 at 11:42 AM              Medications:  Current Hospital Medications[1]    Allergies:  Allergies[2]    Input/Output:    Intake/Output Summary (Last 24 hours) at 5/30/2025 1644  Last data filed at 5/30/2025 1310  Gross per 24 hour   Intake 720 ml   Output 100 ml   Net 620 ml          ASSESSMENT/PLAN:   Assessment   Problem List[3]    Discussed with nephew and Dr. Najjar and the plan is to do a R fem pop tomorrow.  Will obtain a stat renal panel to see whether HD is needed tonight preop.  Otherwise vol status looks good.  Discussed with RN.              5/30/2025  Lukasz Sharma MD               [1]   Current Facility-Administered Medications:     heparin (Porcine) 19956 units/250mL infusion ACS/AFIB CONTINUOUS, 200-3,000 Units/hr, Intravenous, Continuous    [START ON 5/31/2025] chlorhexidine (Hibiclens) 4 % external liquid, , Topical, On Call    [START ON 5/31/2025] sodium chloride 0.9% infusion, , Intravenous, On Call    metoclopramide (Reglan) 5 mg/mL injection 5 mg, 5 mg, Intravenous, Daily PRN    sodium chloride 0.9% infusion, , Intravenous, Continuous    insulin degludec (Tresiba) 100 units/mL flextouch 12 Units, 12 Units, Subcutaneous, Daily    atorvastatin (Lipitor) tab 40 mg, 40 mg, Oral, Nightly    clopidogrel (Plavix) tab 75 mg, 75 mg, Oral, Daily    aspirin DR tab 81 mg, 81 mg, Oral, Daily    hydrALAZINE (Apresoline) tab 25 mg, 25 mg, Oral, Q8H BLANE    isosorbide  dinitrate (Isordil) tab 20 mg, 20 mg, Oral, TID (Nitrates)    metoprolol succinate (Toprol XL) partial tablet 12.5 mg, 12.5 mg, Oral, Daily Beta Blocker    pantoprazole (Protonix) DR tab 40 mg, 40 mg, Oral, BID AC    calcitriol (Rocaltrol) cap 0.25 mcg, 0.25 mcg, Oral, Q MWF    ipratropium-albuterol (Duoneb) 0.5-2.5 (3) MG/3ML inhalation solution 3 mL, 3 mL, Nebulization, Q6H PRN    HYDROcodone-acetaminophen (Norco) 5-325 MG per tab 1 tablet, 1 tablet, Oral, Q6H PRN    epoetin brittany (Epogen, Procrit) 5,000 Units injection, 5,000 Units, Subcutaneous, Once per day on Monday Wednesday Friday    escitalopram (Lexapro) tab 10 mg, 10 mg, Oral, Daily    glucose (Dex4) 15 GM/59ML oral liquid 15 g, 15 g, Oral, Q15 Min PRN **OR** glucose (Glutose) 40% oral gel 15 g, 15 g, Oral, Q15 Min PRN **OR** glucose-vitamin C (Dex-4) chewable tab 4 tablet, 4 tablet, Oral, Q15 Min PRN **OR** dextrose 50% injection 50 mL, 50 mL, Intravenous, Q15 Min PRN **OR** glucose (Dex4) 15 GM/59ML oral liquid 30 g, 30 g, Oral, Q15 Min PRN **OR** glucose (Glutose) 40% oral gel 30 g, 30 g, Oral, Q15 Min PRN **OR** glucose-vitamin C (Dex-4) chewable tab 8 tablet, 8 tablet, Oral, Q15 Min PRN    acetaminophen (Tylenol Extra Strength) tab 500 mg, 500 mg, Oral, Q4H PRN    melatonin tab 3 mg, 3 mg, Oral, Nightly PRN    polyethylene glycol (PEG 3350) (Miralax) 17 g oral packet 17 g, 17 g, Oral, Daily PRN    sennosides (Senokot) tab 17.2 mg, 17.2 mg, Oral, Nightly PRN    bisacodyl (Dulcolax) 10 MG rectal suppository 10 mg, 10 mg, Rectal, Daily PRN    ondansetron (Zofran) 4 MG/2ML injection 4 mg, 4 mg, Intravenous, Q6H PRN    benzonatate (Tessalon) cap 200 mg, 200 mg, Oral, TID PRN    guaiFENesin (Robitussin) 100 MG/5 ML oral liquid 200 mg, 200 mg, Oral, Q4H PRN    glycerin-hypromellose- (Artificial Tears) 0.2-0.2-1 % ophthalmic solution 1 drop, 1 drop, Both Eyes, QID PRN    sodium chloride (Saline Mist) 0.65 % nasal solution 1 spray, 1 spray, Each Nare,  Q3H PRN    insulin aspart (NovoLOG) 100 Units/mL FlexPen 1-7 Units, 1-7 Units, Subcutaneous, TID CC    [Held by provider] insulin aspart (NovoLOG) 100 Units/mL FlexPen 5 Units, 5 Units, Subcutaneous, TID CC and HS  [2]   Allergies  Allergen Reactions    Levaquin [Levofloxacin] ITCHING   [3]   Patient Active Problem List  Diagnosis    Closed fracture of left hip (HCC)    Background diabetic retinopathy(362.01)    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HCC)    Essential hypertension, benign    ESRD (end stage renal disease) ([facility])    Occlusion of right carotid artery    Stenosis of left carotid artery    Atherosclerosis of native artery of right lower extremity with ulceration ([facility])    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

## 2025-05-30 NOTE — PLAN OF CARE
Problem: CARDIOVASCULAR - ADULT  Goal: Maintains optimal cardiac output and hemodynamic stability  Description: INTERVENTIONS:  - Monitor vital signs, rhythm, and trends  - Monitor for bleeding, hypotension and signs of decreased cardiac output  - Evaluate effectiveness of vasoactive medications to optimize hemodynamic stability  - Monitor arterial and/or venous puncture sites for bleeding and/or hematoma  - Assess quality of pulses, skin color and temperature  - Assess for signs of decreased coronary artery perfusion - ex. Angina  - Evaluate fluid balance, assess for edema, trend weights  Outcome: Progressing  Goal: Absence of cardiac arrhythmias or at baseline  Description: INTERVENTIONS:  - Continuous cardiac monitoring, monitor vital signs, obtain 12 lead EKG if indicated  - Evaluate effectiveness of antiarrhythmic and heart rate control medications as ordered  - Initiate emergency measures for life threatening arrhythmias  - Monitor electrolytes and administer replacement therapy as ordered  Outcome: Progressing     Problem: RESPIRATORY - ADULT  Goal: Achieves optimal ventilation and oxygenation  Description: INTERVENTIONS:  - Assess for changes in respiratory status  - Assess for changes in mentation and behavior  - Position to facilitate oxygenation and minimize respiratory effort  - Oxygen supplementation based on oxygen saturation or ABGs  - Provide Smoking Cessation handout, if applicable  - Encourage broncho-pulmonary hygiene including cough, deep breathe, Incentive Spirometry  - Assess the need for suctioning and perform as needed  - Assess and instruct to report SOB or any respiratory difficulty  - Respiratory Therapy support as indicated  - Manage/alleviate anxiety  - Monitor for signs/symptoms of CO2 retention  Outcome: Progressing     Problem: GENITOURINARY - ADULT  Goal: Absence of urinary retention  Description: INTERVENTIONS:  - Assess patient’s ability to void and empty bladder  - Monitor  intake/output and perform bladder scan as needed  - Follow urinary retention protocol/standard of care  - Consider collaborating with pharmacy to review patient's medication profile  - Implement strategies to promote bladder emptying  Outcome: Progressing     Problem: METABOLIC/FLUID AND ELECTROLYTES - ADULT  Goal: Glucose maintained within prescribed range  Description: INTERVENTIONS:  - Monitor Blood Glucose as ordered  - Assess for signs and symptoms of hyperglycemia and hypoglycemia  - Administer ordered medications to maintain glucose within target range  - Assess barriers to adequate nutritional intake and initiate nutrition consult as needed  - Instruct patient on self management of diabetes  Outcome: Progressing  Goal: Electrolytes maintained within normal limits  Description: INTERVENTIONS:  - Monitor labs and rhythm and assess patient for signs and symptoms of electrolyte imbalances  - Administer electrolyte replacement as ordered  - Monitor response to electrolyte replacements, including rhythm and repeat lab results as appropriate  - Fluid restriction as ordered  - Instruct patient on fluid and nutrition restrictions as appropriate  Outcome: Progressing  Goal: Hemodynamic stability and optimal renal function maintained  Description: INTERVENTIONS:  - Monitor labs and assess for signs and symptoms of volume excess or deficit  - Monitor intake, output and patient weight  - Monitor urine specific gravity, serum osmolarity and serum sodium as indicated or ordered  - Monitor response to interventions for patient's volume status, including labs, urine output, blood pressure (other measures as available)  - Encourage oral intake as appropriate  - Instruct patient on fluid and nutrition restrictions as appropriate  Outcome: Progressing     Problem: Patient Centered Care  Goal: Patient preferences are identified and integrated in the patient's plan of care  Description: Interventions:  - What would you like us to  know as we care for you?   Problem: Diabetes/Glucose Control  Goal: Glucose maintained within prescribed range  Description: INTERVENTIONS:  - Monitor Blood Glucose as ordered  - Assess for signs and symptoms of hyperglycemia and hypoglycemia  - Administer ordered medications to maintain glucose within target range  - Assess barriers to adequate nutritional intake and initiate nutrition consult as needed  - Instruct patient on self management of diabetes  Outcome: Progressing          - Provide timely, complete, and accurate information to patient/family  - Incorporate patient and family knowledge, values, beliefs, and cultural backgrounds into the planning and delivery of care  - Encourage patient/family to participate in care and decision-making at the level they choose  - Honor patient and family perspectives and choices  Outcome: Progressing

## 2025-05-30 NOTE — PLAN OF CARE
Patient is alert and oriented x3 on room air able to move x1 with walker. Peripheral cath done today, see notes, plan for fem pop with vascular tomorrow. Elliott Bingham at bedside and updated on plan.     Problem: CARDIOVASCULAR - ADULT  Goal: Maintains optimal cardiac output and hemodynamic stability  Description: INTERVENTIONS:  - Monitor vital signs, rhythm, and trends  - Monitor for bleeding, hypotension and signs of decreased cardiac output  - Evaluate effectiveness of vasoactive medications to optimize hemodynamic stability  - Monitor arterial and/or venous puncture sites for bleeding and/or hematoma  - Assess quality of pulses, skin color and temperature  - Assess for signs of decreased coronary artery perfusion - ex. Angina  - Evaluate fluid balance, assess for edema, trend weights  Outcome: Progressing  Goal: Absence of cardiac arrhythmias or at baseline  Description: INTERVENTIONS:  - Continuous cardiac monitoring, monitor vital signs, obtain 12 lead EKG if indicated  - Evaluate effectiveness of antiarrhythmic and heart rate control medications as ordered  - Initiate emergency measures for life threatening arrhythmias  - Monitor electrolytes and administer replacement therapy as ordered  Outcome: Progressing     Problem: RESPIRATORY - ADULT  Goal: Achieves optimal ventilation and oxygenation  Description: INTERVENTIONS:  - Assess for changes in respiratory status  - Assess for changes in mentation and behavior  - Position to facilitate oxygenation and minimize respiratory effort  - Oxygen supplementation based on oxygen saturation or ABGs  - Provide Smoking Cessation handout, if applicable  - Encourage broncho-pulmonary hygiene including cough, deep breathe, Incentive Spirometry  - Assess the need for suctioning and perform as needed  - Assess and instruct to report SOB or any respiratory difficulty  - Respiratory Therapy support as indicated  - Manage/alleviate anxiety  - Monitor for signs/symptoms of CO2  retention  Outcome: Progressing     Problem: GENITOURINARY - ADULT  Goal: Absence of urinary retention  Description: INTERVENTIONS:  - Assess patient’s ability to void and empty bladder  - Monitor intake/output and perform bladder scan as needed  - Follow urinary retention protocol/standard of care  - Consider collaborating with pharmacy to review patient's medication profile  - Implement strategies to promote bladder emptying  Outcome: Progressing     Problem: METABOLIC/FLUID AND ELECTROLYTES - ADULT  Goal: Glucose maintained within prescribed range  Description: INTERVENTIONS:  - Monitor Blood Glucose as ordered  - Assess for signs and symptoms of hyperglycemia and hypoglycemia  - Administer ordered medications to maintain glucose within target range  - Assess barriers to adequate nutritional intake and initiate nutrition consult as needed  - Instruct patient on self management of diabetes  Outcome: Progressing  Goal: Electrolytes maintained within normal limits  Description: INTERVENTIONS:  - Monitor labs and rhythm and assess patient for signs and symptoms of electrolyte imbalances  - Administer electrolyte replacement as ordered  - Monitor response to electrolyte replacements, including rhythm and repeat lab results as appropriate  - Fluid restriction as ordered  - Instruct patient on fluid and nutrition restrictions as appropriate  Outcome: Progressing  Goal: Hemodynamic stability and optimal renal function maintained  Description: INTERVENTIONS:  - Monitor labs and assess for signs and symptoms of volume excess or deficit  - Monitor intake, output and patient weight  - Monitor urine specific gravity, serum osmolarity and serum sodium as indicated or ordered  - Monitor response to interventions for patient's volume status, including labs, urine output, blood pressure (other measures as available)  - Encourage oral intake as appropriate  - Instruct patient on fluid and nutrition restrictions as  appropriate  Outcome: Progressing     Problem: Patient Centered Care  Goal: Patient preferences are identified and integrated in the patient's plan of care  Description: Interventions:  - What would you like us to know as we care for you? I am from home   - Provide timely, complete, and accurate information to patient/family  - Incorporate patient and family knowledge, values, beliefs, and cultural backgrounds into the planning and delivery of care  - Encourage patient/family to participate in care and decision-making at the level they choose  - Honor patient and family perspectives and choices  Outcome: Progressing     Problem: Diabetes/Glucose Control  Goal: Glucose maintained within prescribed range  Description: INTERVENTIONS:  - Monitor Blood Glucose as ordered  - Assess for signs and symptoms of hyperglycemia and hypoglycemia  - Administer ordered medications to maintain glucose within target range  - Assess barriers to adequate nutritional intake and initiate nutrition consult as needed  - Instruct patient on self management of diabetes  Outcome: Progressing

## 2025-05-30 NOTE — PROGRESS NOTES
Heart Failure Nurse  Progress Note    Patient was evaluated by the Heart Failure Nurse  for understanding, verbalization, demonstration, and recall of education related to heart failure, overall adherence to the behaviors necessary to maintain a compensated status, and risk for readmission.     Patient assessment:Patient is very [place]. Spoke briefly at bedside and Hf pamphlet discussed. Patient requested a call be placed to Elliott her nephew whom lives with her.    Patient is able to verbalize signs/symptoms fluid overload/impending HF exacerbation and who to contact with problems                                          ___ yes  __x_ no      Patient is following a 2000 mg sodium diet                                             ___ yes  _x__ no    If no, barriers to 2000 mg sodium diet:___HOH limited cooking will speak with km____________________________________________    Patient informed of 2-Part dietician classes that is free if sign up within 30 days of discharge or $40  ___ yes  __x_ no      Patient is adherent to medication regimen                                              _x__ yes  ___ no    If no, barriers to medication regimen:    Patient has sufficient funds to purchase medication                      _x__ yes  ___ no      Patient has a scale in the home              __x_ yes  ___ no      Patient is adherent to daily weight monitoring                                        _x__ yes   ___ no    If no, barriers to daily weight monitoring:    Symptom Tracker Worksheet reviewed with patient  ___ yes   __x_ no      Patient verbalizes understanding of stoplight/heart failure zones          __x_ yes   ___ no      Patient understands the importance of 7-day follow-up appointment      __x_ yes  ___ no    Appointment Date:      6/17/25 @1pm with Sudha Acosta    Patient has adequate transportation to attend follow-up appointments    ___ yes  ___ no    If no, was referral to Social Work made  ___yes   ___ no      Family/Friend present during education: none    Additional consultations required: none    Augusta Max RN HF  [phone] [id number]

## 2025-05-30 NOTE — PROGRESS NOTES
[facility]  part of [facility]    Progress Note    Radha Yu Patient Status:  Inpatient    1940 MRN [id number]   Location [facility] 3W/SW Attending Madison Crump MD   Hosp Day # 8 PCP Stef Velasco MD       Subjective:   Subjective:  Comfortable on room air  Denied any chest pain or dyspnea or cough  Less pain in lower extremity  No fever  No abdominal pain  Objective:   Blood pressure (!) 139/106, pulse 64, temperature 98.4 °F (36.9 °C), temperature source Oral, resp. rate 16, weight 121 lb 12.8 oz (55.2 kg), SpO2 98%.  Physical Exam  Constitutional:       General: She is not in acute distress.  HENT:      Head: Atraumatic.      Nose: Nose normal.      Mouth/Throat:      Mouth: Mucous membranes are moist.   Cardiovascular:      Rate and Rhythm: Normal rate.      Heart sounds:      No gallop.   Pulmonary:      Effort: No respiratory distress.      Breath sounds: No stridor. No wheezing, rhonchi or rales.   Abdominal:      General: Abdomen is flat. Bowel sounds are normal.      Palpations: Abdomen is soft.   Musculoskeletal:      Right lower leg: No edema.      Left lower leg: No edema.   Neurological:      General: No focal deficit present.      Mental Status: She is oriented to person, place, and time.         Results:   Lab Results   Component Value Date    WBC 6.7 2025    HGB 7.3 (L) 2025    HCT 23.0 (L) 2025    .0 (L) 2025    CREATSERUM 2.69 (H) 2025    CREATSERUM 2.68 (H) 2025    BUN 39 (H) 2025    BUN 40 (H) 2025     2025     (L) 2025    K 4.5 2025    K 4.4 2025     2025     2025    CO2 26.0 2025    CO2 25.0 2025    GLU 50 (L) 2025    GLU 49 (LL) 2025    CA 7.7 (L) 2025    CA 7.7 (L) 2025    ALB 3.5 2025    ALKPHO 73 2025    BILT 0.2 2025    TP 5.7 2025    AST 16 2025    ALT <7 (L)  05/26/2025    PTT 37.7 (H) 05/30/2025    INR 1.16 05/22/2025    TSH 1.993 05/27/2025    MG 1.9 05/30/2025    PHOS 4.1 05/30/2025    TROPHS 426 (HH) 05/22/2025       CTA ABDOMEN/PELVIS LOWER EXT BILAT W RUNOFF (JNF=[id number])  Result Date: 5/29/2025  CONCLUSION:   1. RIGHT lower extremity:  An approximate 8 cm segment of the proximal superficial femoral artery demonstrates high-grade stenosis and/or near complete occlusion at and below the femoral bifurcation.  There is partial distal reconstitution of the SFA, but with other multifocal high-grade stenoses and occlusions throughout the mid-distal SFA.  The profunda femoris artery is grossly patent.  Moderate multifocal non flow-limiting atherosclerotic stenoses of the popliteal artery, which is otherwise patent.  Single-vessel runoff via the peroneal artery.  Up to moderate multiple common and external iliac artery atherosclerotic stenoses, but with no high-grade stenosis or occlusion of these vessels.  2. LEFT lower extremity:  Left femoral to left popliteal artery bypass is patent.  Mild scattered non flow-limiting granulation tissue at the mid to distal aspect of the bypass graft.  Native superficial femoral artery is essentially completely occluded.   The profunda femoris artery is patent.  The popliteal artery is grossly patent and demonstrates mild-to-moderate multifocal atherosclerotic stenoses.  Two vessel runoff via the posterior tibial and peroneal arteries, but with partial thready flow throughout the posterior tibial artery.  3. Moderate to severe atherosclerotic stenosis at the ostium of the superior mesenteric artery and both renal arteries.  There is also moderate stenosis at the ostium of the celiac axis. 4. Congestive failure and/or fluid overload with small left greater than right pleural effusions and severe anasarca. 5. Excreted contrast material within the renal collecting systems and urinary bladder.  There is also Kong's suspicion of contrast in  the gallbladder.  Please note that contrast in the renal collecting systems limits sensitivity for detection of urinary tract calculi.  Extensive renal hilar vascular calcifications are seen.  There is also right greater than left renal atrophy. 6. Fatty atrophy of the pancreas with mild 6 mm pancreatic ductal dilation.  Please correlate with any available prior imaging to ensure stability of this finding.  If unavailable, request correlation with obstructive laboratory parameters and consider further evaluation with MRCP if laboratory abnormalities are present. 7. Indeterminate 2.4 cm left adrenal nodule, which should be correlated with prior outside institution imaging to ensure stability. 8. Colonic diverticulosis. 9. Hysterectomy. 10. Left proximal femoral internal fixation; resultant streak artifacts limit evaluation of the pelvic structures.  There are also chronic L2 and L5 vertebral body compression fractures. 11. Lesser incidental findings as above.   elm-remote  Dictated by (CST): Nasir Cash MD on 5/29/2025 at 11:08 AM     Finalized by (CST): Nasir Cash MD on 5/29/2025 at 11:42 AM          EKG 12 Lead  Result Date: 5/29/2025  Normal sinus rhythm Nonspecific ST and T wave abnormality Abnormal ECG When compared with ECG of 28-MAY-2025 14:22, Premature ventricular complexes are no longer Present NH interval has decreased Confirmed by JENNIFER CRUZ, MARIBEL ([id number]) on 5/29/2025 4:12:29 PM    EKG 12 Lead  Result Date: 5/28/2025  Sinus bradycardia with 1st degree A-V block with occasional Premature ventricular complexes T wave abnormality, consider anterolateral ischemia Abnormal ECG When compared with ECG of 22-MAY-2025 10:07, Premature ventricular complexes are now Present Vent. 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

## 2025-05-31 LAB
ALBUMIN SERPL-MCNC: 3.5 G/DL (ref 3.2–4.8)
ANION GAP SERPL CALC-SCNC: 9 MMOL/L (ref 0–18)
APTT PPP: 150.7 SECONDS (ref 23–36)
APTT PPP: 44.2 SECONDS (ref 23–36)
BLOOD TYPE BARCODE: 6200
BUN BLD-MCNC: 47 MG/DL (ref 9–23)
BUN/CREAT SERPL: 14.9 (ref 10–20)
CALCIUM BLD-MCNC: 7.8 MG/DL (ref 8.7–10.4)
CHLORIDE SERPL-SCNC: 102 MMOL/L (ref 98–112)
CO2 SERPL-SCNC: 22 MMOL/L (ref 21–32)
CREAT BLD-MCNC: 3.16 MG/DL (ref 0.55–1.02)
DEPRECATED RDW RBC AUTO: 48.2 FL (ref 35.1–46.3)
EGFRCR SERPLBLD CKD-EPI 2021: 14 ML/MIN/1.73M2 (ref 60–?)
ERYTHROCYTE [DISTWIDTH] IN BLOOD BY AUTOMATED COUNT: 15.8 % (ref 11–15)
GLUCOSE BLD-MCNC: 225 MG/DL (ref 70–99)
GLUCOSE BLDC GLUCOMTR-MCNC: 106 MG/DL (ref 70–99)
GLUCOSE BLDC GLUCOMTR-MCNC: 163 MG/DL (ref 70–99)
GLUCOSE BLDC GLUCOMTR-MCNC: 221 MG/DL (ref 70–99)
GLUCOSE BLDC GLUCOMTR-MCNC: 92 MG/DL (ref 70–99)
HCT VFR BLD AUTO: 25.3 % (ref 35–48)
HGB BLD-MCNC: 8.3 G/DL (ref 12–16)
MAGNESIUM SERPL-MCNC: 2 MG/DL (ref 1.6–2.6)
MCH RBC QN AUTO: 27.8 PG (ref 26–34)
MCHC RBC AUTO-ENTMCNC: 32.8 G/DL (ref 31–37)
MCV RBC AUTO: 84.6 FL (ref 80–100)
OSMOLALITY SERPL CALC.SUM OF ELEC: 295 MOSM/KG (ref 275–295)
PHOSPHATE SERPL-MCNC: 4.7 MG/DL (ref 2.4–5.1)
PLATELET # BLD AUTO: 124 10(3)UL (ref 150–450)
PLATELET # BLD AUTO: 124 10(3)UL (ref 150–450)
PLATELETS.RETICULATED NFR BLD AUTO: 5.6 % (ref 0–7)
PLATELETS.RETICULATED NFR BLD AUTO: 5.6 % (ref 0–7)
POTASSIUM SERPL-SCNC: 4.8 MMOL/L (ref 3.5–5.1)
RBC # BLD AUTO: 2.99 X10(6)UL (ref 3.8–5.3)
SODIUM SERPL-SCNC: 133 MMOL/L (ref 136–145)
UNIT VOLUME: 350 ML
WBC # BLD AUTO: 5.2 X10(3) UL (ref 4–11)

## 2025-05-31 PROCEDURE — 99233 SBSQ HOSP IP/OBS HIGH 50: CPT | Performed by: FAMILY MEDICINE

## 2025-05-31 PROCEDURE — 99233 SBSQ HOSP IP/OBS HIGH 50: CPT | Performed by: INTERNAL MEDICINE

## 2025-05-31 PROCEDURE — 99232 SBSQ HOSP IP/OBS MODERATE 35: CPT | Performed by: INTERNAL MEDICINE

## 2025-05-31 RX ORDER — ALBUMIN (HUMAN) 12.5 G/50ML
25 SOLUTION INTRAVENOUS
Status: DISPENSED | OUTPATIENT
Start: 2025-05-31 | End: 2025-06-02

## 2025-05-31 RX ORDER — HEPARIN SODIUM 1000 [USP'U]/ML
INJECTION, SOLUTION INTRAVENOUS; SUBCUTANEOUS
Status: DISPENSED
Start: 2025-05-31 | End: 2025-06-01

## 2025-05-31 NOTE — PROGRESS NOTES
[facility]  part of [facility]    Progress Note    Radha Yu Patient Status:  Inpatient    1940 MRN [id number]   Location [facility] 3W/SW Attending Tracy Vega MD   Hosp Day # 9 PCP Stef Velasco MD         Subjective:   Subjective:  Comfortable in bed  Mild pain right leg  Denied abdominal pain  Denied chest pain or dyspnea  On room air  Objective:   Blood pressure 150/52, pulse 64, temperature 98.2 °F (36.8 °C), temperature source Oral, resp. rate 16, weight 123 lb 4.8 oz (55.9 kg), SpO2 96%.  Physical Exam  Constitutional:       General: She is not in acute distress.  Cardiovascular:      Rate and Rhythm: Normal rate.      Heart sounds:      No gallop.   Pulmonary:      Effort: No respiratory distress.      Breath sounds: No stridor. No wheezing, rhonchi or rales.   Abdominal:      General: Abdomen is flat. Bowel sounds are normal.      Palpations: Abdomen is soft.      Tenderness: There is no guarding.   Neurological:      Mental Status: She is oriented to person, place, and time.         Results:   Lab Results   Component Value Date    WBC 5.2 2025    HGB 8.3 (L) 2025    HCT 25.3 (L) 2025    .0 (L) 2025    .0 (L) 2025    CREATSERUM 3.16 (H) 2025    BUN 47 (H) 2025     (L) 2025    K 4.8 2025     2025    CO2 22.0 2025     (H) 2025    CA 7.8 (L) 2025    ALB 3.5 2025    ALKPHO 73 2025    BILT 0.2 2025    TP 5.7 2025    AST 16 2025    ALT <7 (L) 2025    .7 (H) 2025    INR 1.16 2025    TSH 1.993 2025    MG 2.0 2025    PHOS 4.7 2025    TROPHS 426 (HH) 2025       CTA ABDOMEN/PELVIS LOWER EXT BILAT W RUNOFF (BHI=[id number])  Result Date: 2025  CONCLUSION:   1. RIGHT lower extremity:  An approximate 8 cm segment of the proximal superficial femoral artery demonstrates high-grade stenosis  and/or near complete occlusion at and below the femoral bifurcation.  There is partial distal reconstitution of the SFA, but with other multifocal high-grade stenoses and occlusions throughout the mid-distal SFA.  The profunda femoris artery is grossly patent.  Moderate multifocal non flow-limiting atherosclerotic stenoses of the popliteal artery, which is otherwise patent.  Single-vessel runoff via the peroneal artery.  Up to moderate multiple common and external iliac artery atherosclerotic stenoses, but with no high-grade stenosis or occlusion of these vessels.  2. LEFT lower extremity:  Left femoral to left popliteal artery bypass is patent.  Mild scattered non flow-limiting granulation tissue at the mid to distal aspect of the bypass graft.  Native superficial femoral artery is essentially completely occluded.   The profunda femoris artery is patent.  The popliteal artery is grossly patent and demonstrates mild-to-moderate multifocal atherosclerotic stenoses.  Two vessel runoff via the posterior tibial and peroneal arteries, but with partial thready flow throughout the posterior tibial artery.  3. Moderate to severe atherosclerotic stenosis at the ostium of the superior mesenteric artery and both renal arteries.  There is also moderate stenosis at the ostium of the celiac axis. 4. Congestive failure and/or fluid overload with small left greater than right pleural effusions and severe anasarca. 5. Excreted contrast material within the renal collecting systems and urinary bladder.  There is also Kong's suspicion of contrast in the gallbladder.  Please note that contrast in the renal collecting systems limits sensitivity for detection of urinary tract calculi.  Extensive renal hilar vascular calcifications are seen.  There is also right greater than left renal atrophy. 6. Fatty atrophy of the pancreas with mild 6 mm pancreatic ductal dilation.  Please correlate with any available prior imaging to ensure  stability of this finding.  If unavailable, request correlation with obstructive laboratory parameters and consider further evaluation with MRCP if laboratory abnormalities are present. 7. Indeterminate 2.4 cm left adrenal nodule, which should be correlated with prior outside institution imaging to ensure stability. 8. Colonic diverticulosis. 9. Hysterectomy. 10. Left proximal femoral internal fixation; resultant streak artifacts limit evaluation of the pelvic structures.  There are also chronic L2 and L5 vertebral body compression fractures. 11. Lesser incidental findings as above.   elm-remote  Dictated by (CST): Nasir Cash MD on 5/29/2025 at 11:08 AM     Finalized by (CST): Nasir Cash MD on 5/29/2025 at 11:42 AM                Assessment & Plan:       1- acute HFrEF LVEF 45 % , NSTEMI   Beta-blocker, statin, aspirin, Plavix  Severe multivessel's CAD's not a candidate for CABG  Medical therapy     2-severe PVD with acute right common femoral artery occlusion  Per vascular      3-acute on chronic stage IV kidney injury  on hemodialysis  Renal following     4-s/p acute respiratory failure with hypoxia  Resolved / now on room air   pulmonary edema possible underlying COPD  Neb as needed     5-DVT prophylaxis  was on heparin , now on hold with some bleeding issue from the groin site                Kristin Ashford MD  5/31/2025

## 2025-05-31 NOTE — PROGRESS NOTES
[facility]  part of formerly [facility]    Progress Note    Radha Yu Patient Status:  Inpatient    1940 MRN [id number]   Location [facility]5W Attending Villa Angel MD   Hosp Day # 9 PCP Stef Velasco MD     Chief complaint     Subjective:   Radha Yu is a(n) 84 year old female who came in with chest pain and sob.     Yesterday with decreased sensation in RLE and with increased pain.  Today no worsening of the leg.  Discussed  RLE intervention with cardiology today. She denies chest pain.  No n/v.  No fevers/chills.     A comprehensive 10 point review of systems was completed.  Pertinent positives and negatives noted in the the HPI.    Objective:     Blood pressure 138/46, pulse 65, temperature 98.3 °F (36.8 °C), temperature source Oral, resp. rate 18, weight 123 lb 4.8 oz (55.9 kg), SpO2 97%.      Intake/Output Summary (Last 24 hours) at 2025 1301  Last data filed at 2025 0854  Gross per 24 hour   Intake 1155.48 ml   Output 600 ml   Net 555.48 ml         Patient Weight(s) for the past 336 hrs:   Weight   25 0500 123 lb 4.8 oz (55.9 kg)   25 0555 121 lb 12.8 oz (55.2 kg)   25 0558 121 lb (54.9 kg)   25 0402 120 lb 14.4 oz (54.8 kg)   25 0617 116 lb 9.6 oz (52.9 kg)   25 2146 123 lb 14.4 oz (56.2 kg)   25 1114 118 lb 13.3 oz (53.9 kg)           Gen: uncomfortable  Pulm: Lungs clear, normal respiratory effort  CV: Heart with regular rate and rhythm  Abd: Abdomen soft, nontender, nondistended, bowel sounds present  Neuro: No acute focal deficits  MSK: moves extremities  Skin: Warm and dry  Psych: Normal affect  Ext: rt le looks paler compared to left, sensation intact but decreased, rt groin wound looks good, rt pedal pulse heard with doppler          Medicines:     Current Hospital Medications[1]            Blood Sugar Medications            insulin aspart 100 Units/mL Subcutaneous Solution Pen-injector    insulin glargine 100  UNIT/ML Subcutaneous Solution            Blood Pressure and Cardiac Medications            amLODIPine 5 MG Oral Tab            Medication Infusions[2]        Lab Results   Component Value Date    WBC 5.2 05/31/2025    HGB 8.3 (L) 05/31/2025    HCT 25.3 (L) 05/31/2025    .0 (L) 05/31/2025    .0 (L) 05/31/2025    CREATSERUM 3.16 (H) 05/31/2025    BUN 47 (H) 05/31/2025     (L) 05/31/2025    K 4.8 05/31/2025     05/31/2025    CO2 22.0 05/31/2025     (H) 05/31/2025    CA 7.8 (L) 05/31/2025    ALB 3.5 05/31/2025    ALKPHO 73 05/26/2025    BILT 0.2 05/26/2025    TP 5.7 05/26/2025    AST 16 05/26/2025    ALT <7 (L) 05/26/2025    PTT 44.2 (H) 05/31/2025    INR 1.16 05/22/2025    TSH 1.993 05/27/2025    MG 2.0 05/31/2025    PHOS 4.7 05/31/2025       No results found.            Results:     CBC:    Lab Results   Component Value Date    WBC 5.2 05/31/2025    WBC 6.7 05/30/2025    WBC 5.1 05/29/2025     Lab Results   Component Value Date    HGB 8.3 (L) 05/31/2025    HGB 8.7 (L) 05/30/2025    HGB 7.3 (L) 05/30/2025      Lab Results   Component Value Date    .0 (L) 05/31/2025    .0 (L) 05/31/2025    .0 (L) 05/30/2025       Recent Labs   Lab 05/30/25  0655 05/30/25  1733 05/31/25  0556   GLU 49*  50* 88 225*   BUN 40*  39* 38* 47*   CREATSERUM 2.68*  2.69* 3.00* 3.16*   CA 7.7*  7.7* 7.9* 7.8*   *  136 134* 133*   K 4.4  4.5 4.3 4.8     102 101 102   CO2 25.0  26.0 25.0 22.0           Assessment and Plan:       Rt LE pain and decreased sensation, color   - plan stat ct a/p/bl le runoff to rule out dissection - results reviewed - plan percutaneous revascularization of right SFA with possible IVL vs atherectomy as she is high risk for open vascular surgery   -     NSTEMI type 1 with new ischemic cardiomyopathy acute systolic chf  Multivessel CAD  - left heart cath again 5/28 with pci, post op with rt groin wound bleeding that improved after pressure   - on  asa, plavix, statin, bb     Tricuspid and mitral regurg  - volume removal per hd     PVD s/p left femoropopliteal bypass 9/2015  - 80% proximal left renal artery stenosis on angiogram   - On asa, plavix, atorvastatin      HL  - statin     HTN  - elevated - adjust oral meds per cards     Acute on chronic anemia of renal disease  - possibly baseline anemia from ckd  - heparin stopped; gi defers invasive dx till more stable  - hb stable today , ppi     acute copd exacerbation  - pulm consulted signed off no steroids; no abx  - plan nebs scheduled and prn  - plan pulm hygiene  - wean oxygen as able     DEBO on CKD stage 4 ->no esrd  - nephrology consulted  - tolerating HD   - hd cath placed      DM  - hypoglycemic this am. Hold tresiba.   - accuchecks and ISS    Moderate right ICA stenosis s/p right TCAR 2/2019, totally occluded left ICA  - On asa, plavix, atorvastatin                       high mdm; coordinating care with nurse and counseling pt and with her permission her nephew in room  about chf/sob/diet/cath/dialysis         Dispo: poss dc when dialysis/lisa set up and after cath; perhaps Thursday/friday            Chart reviewed, including current vitals, notes, labs and imaging  Labs ordered and medications adjusted as outlined above  Coordinate care with care team/consultants  Discussed with patient results of tests, management plan as outlined above, and the need for ongoing hospitalization  D/w RN     MANSI Vega MD, FAAFP                Supplementary Documentation:   DVT Mechanical Prophylaxis:   SCDs,    DVT Pharmacologic Prophylaxis   Medication    heparin (Porcine) 83420 units/250mL infusion ACS/AFIB CONTINUOUS         DVT Pharmacologic prophylaxis: Aspirin 81 mg      Code Status: Full Code  Rao: External urinary catheter in place  Rao Duration (in days):   Central line:    FRANCIS: 6/1/2025                            [1]   Current Facility-Administered Medications   Medication Dose Route  Frequency    sodium chloride 0.9 % IV bolus 100 mL  100 mL Intravenous Q30 Min PRN    And    albumin human (Albumin) 25% injection 25 g  25 g Intravenous PRN Dialysis    [START ON 6/2/2025] epoetin brittany (Epogen, Procrit) 08869 UNIT/ML injection 10,000 Units  10,000 Units Subcutaneous Once per day on Monday Wednesday Friday    sodium ferric gluconate (Ferrlecit) 125 mg in sodium chloride 0.9% 100mL IVPB premix  125 mg Intravenous Daily    heparin (Porcine) 49100 units/250mL infusion ACS/AFIB CONTINUOUS  200-3,000 Units/hr Intravenous Continuous    metoclopramide (Reglan) 5 mg/mL injection 5 mg  5 mg Intravenous Daily PRN    sodium chloride 0.9% infusion   Intravenous Continuous    insulin degludec (Tresiba) 100 units/mL flextouch 12 Units  12 Units Subcutaneous Daily    atorvastatin (Lipitor) tab 40 mg  40 mg Oral Nightly    clopidogrel (Plavix) tab 75 mg  75 mg Oral Daily    aspirin DR tab 81 mg  81 mg Oral Daily    hydrALAZINE (Apresoline) tab 25 mg  25 mg Oral Q8H BLANE    isosorbide dinitrate (Isordil) tab 20 mg  20 mg Oral TID (Nitrates)    metoprolol succinate (Toprol XL) partial tablet 12.5 mg  12.5 mg Oral Daily Beta Blocker    pantoprazole (Protonix) DR tab 40 mg  40 mg Oral BID AC    calcitriol (Rocaltrol) cap 0.25 mcg  0.25 mcg Oral Q MWF    ipratropium-albuterol (Duoneb) 0.5-2.5 (3) MG/3ML inhalation solution 3 mL  3 mL Nebulization Q6H PRN    HYDROcodone-acetaminophen (Norco) 5-325 MG per tab 1 tablet  1 tablet Oral Q6H PRN    escitalopram (Lexapro) tab 10 mg  10 mg Oral Daily    glucose (Dex4) 15 GM/59ML oral liquid 15 g  15 g Oral Q15 Min PRN    Or    glucose (Glutose) 40% oral gel 15 g  15 g Oral Q15 Min PRN    Or    glucose-vitamin C (Dex-4) chewable tab 4 tablet  4 tablet Oral Q15 Min PRN    Or    dextrose 50% injection 50 mL  50 mL Intravenous Q15 Min PRN    Or    glucose (Dex4) 15 GM/59ML oral liquid 30 g  30 g Oral Q15 Min PRN    Or    glucose (Glutose) 40% oral gel 30 g  30 g Oral Q15 Min PRN     Or    glucose-vitamin C (Dex-4) chewable tab 8 tablet  8 tablet Oral Q15 Min PRN    acetaminophen (Tylenol Extra Strength) tab 500 mg  500 mg Oral Q4H PRN    melatonin tab 3 mg  3 mg Oral Nightly PRN    polyethylene glycol (PEG 3350) (Miralax) 17 g oral packet 17 g  17 g Oral Daily PRN    sennosides (Senokot) tab 17.2 mg  17.2 mg Oral Nightly PRN    bisacodyl (Dulcolax) 10 MG rectal suppository 10 mg  10 mg Rectal Daily PRN    ondansetron (Zofran) 4 MG/2ML injection 4 mg  4 mg Intravenous Q6H PRN    benzonatate (Tessalon) cap 200 mg  200 mg Oral TID PRN    guaiFENesin (Robitussin) 100 MG/5 ML oral liquid 200 mg  200 mg Oral Q4H PRN    glycerin-hypromellose- (Artificial Tears) 0.2-0.2-1 % ophthalmic solution 1 drop  1 drop Both Eyes QID PRN    sodium chloride (Saline Mist) 0.65 % nasal solution 1 spray  1 spray Each Nare Q3H PRN    insulin aspart (NovoLOG) 100 Units/mL FlexPen 1-7 Units  1-7 Units Subcutaneous TID CC    [Held by provider] insulin aspart (NovoLOG) 100 Units/mL FlexPen 5 Units  5 Units Subcutaneous TID CC and HS   [2]    continuous dose heparin Stopped (05/31/25 0602)    sodium chloride Stopped (05/30/25 2100)

## 2025-05-31 NOTE — PLAN OF CARE
Alert and confused.  Vitals stable.  Left femoral site bleeding.  Covered with quick clot, Tegaderm, and fem stop applied.  Pt in dialysis.   Problem: CARDIOVASCULAR - ADULT  Goal: Maintains optimal cardiac output and hemodynamic stability  Description: INTERVENTIONS:  - Monitor vital signs, rhythm, and trends  - Monitor for bleeding, hypotension and signs of decreased cardiac output  - Evaluate effectiveness of vasoactive medications to optimize hemodynamic stability  - Monitor arterial and/or venous puncture sites for bleeding and/or hematoma  - Assess quality of pulses, skin color and temperature  - Assess for signs of decreased coronary artery perfusion - ex. Angina  - Evaluate fluid balance, assess for edema, trend weights  Outcome: Progressing  Goal: Absence of cardiac arrhythmias or at baseline  Description: INTERVENTIONS:  - Continuous cardiac monitoring, monitor vital signs, obtain 12 lead EKG if indicated  - Evaluate effectiveness of antiarrhythmic and heart rate control medications as ordered  - Initiate emergency measures for life threatening arrhythmias  - Monitor electrolytes and administer replacement therapy as ordered  Outcome: Progressing     Problem: RESPIRATORY - ADULT  Goal: Achieves optimal ventilation and oxygenation  Description: INTERVENTIONS:  - Assess for changes in respiratory status  - Assess for changes in mentation and behavior  - Position to facilitate oxygenation and minimize respiratory effort  - Oxygen supplementation based on oxygen saturation or ABGs  - Provide Smoking Cessation handout, if applicable  - Encourage broncho-pulmonary hygiene including cough, deep breathe, Incentive Spirometry  - Assess the need for suctioning and perform as needed  - Assess and instruct to report SOB or any respiratory difficulty  - Respiratory Therapy support as indicated  - Manage/alleviate anxiety  - Monitor for signs/symptoms of CO2 retention  Outcome: Progressing     Problem: GENITOURINARY -  ADULT  Goal: Absence of urinary retention  Description: INTERVENTIONS:  - Assess patient’s ability to void and empty bladder  - Monitor intake/output and perform bladder scan as needed  - Follow urinary retention protocol/standard of care  - Consider collaborating with pharmacy to review patient's medication profile  - Implement strategies to promote bladder emptying  Outcome: Progressing     Problem: Patient Centered Care  Goal: Patient preferences are identified and integrated in the patient's plan of care  Description: Interventions:  - What would you like us to know as we care for you?   - Provide timely, complete, and accurate information to patient/family  - Incorporate patient and family knowledge, values, beliefs, and cultural backgrounds into the planning and delivery of care  - Encourage patient/family to participate in care and decision-making at the level they choose  - Honor patient and family perspectives and choices  Outcome: Progressing     Problem: Diabetes/Glucose Control  Goal: Glucose maintained within prescribed range  Description: INTERVENTIONS:  - Monitor Blood Glucose as ordered  - Assess for signs and symptoms of hyperglycemia and hypoglycemia  - Administer ordered medications to maintain glucose within target range  - Assess barriers to adequate nutritional intake and initiate nutrition consult as needed  - Instruct patient on self management of diabetes  Outcome: Progressing     Problem: Patient/Family Goals  Goal: Patient/Family Long Term Goal  Description: Patient's Long Term Goal:     Interventions:  - See additional Care Plan goals for specific interventions  Outcome: Progressing  Goal: Patient/Family Short Term Goal  Description: Patient's Short Term Goal:     Interventions:   - See additional Care Plan goals for specific interventions  Outcome: Progressing     Problem: METABOLIC/FLUID AND ELECTROLYTES - ADULT  Goal: Glucose maintained within prescribed range  Description:  INTERVENTIONS:  - Monitor Blood Glucose as ordered  - Assess for signs and symptoms of hyperglycemia and hypoglycemia  - Administer ordered medications to maintain glucose within target range  - Assess barriers to adequate nutritional intake and initiate nutrition consult as needed  - Instruct patient on self management of diabetes  Outcome: Progressing  Goal: Electrolytes maintained within normal limits  Description: INTERVENTIONS:  - Monitor labs and rhythm and assess patient for signs and symptoms of electrolyte imbalances  - Administer electrolyte replacement as ordered  - Monitor response to electrolyte replacements, including rhythm and repeat lab results as appropriate  - Fluid restriction as ordered  - Instruct patient on fluid and nutrition restrictions as appropriate  Outcome: Progressing  Goal: Hemodynamic stability and optimal renal function maintained  Description: INTERVENTIONS:  - Monitor labs and assess for signs and symptoms of volume excess or deficit  - Monitor intake, output and patient weight  - Monitor urine specific gravity, serum osmolarity and serum sodium as indicated or ordered  - Monitor response to interventions for patient's volume status, including labs, urine output, blood pressure (other measures as available)  - Encourage oral intake as appropriate  - Instruct patient on fluid and nutrition restrictions as appropriate  Outcome: Progressing

## 2025-05-31 NOTE — PROGRESS NOTES
Per the recommendation of the patient's treating cardiologist, she underwent an attempt at endovascular revascularization earlier today that was unsuccessful.  I subsequently had a prolonged discussion with the patient's nephew in the presence of Dr. Sharma, where I recommended a femoral to popliteal artery bypass in order to perfuse her right lower extremity which appears to be a bit more ischemic today.  I later learned that Dr. Aquino had spoken with another vascular surgeon, Dr. Fishman from [facility], regarding offering another endovascular approach.  Given that the patient now has 2 vascular surgeons, I believe it would be more appropriate for me to sign off and and have Dr. Fishman treat her.,  Therefore the procedure will be canceled for tomorrow.  The patient is already a high risk patient and I think the multiple opinions have caused significant confusion for the family and I prefer that the family should hear one voice.  I have asked the patient treating hospitalist, Dr. Sanabria to inform the family of my decision.

## 2025-05-31 NOTE — PROGRESS NOTES
Progress Note  Radha Yu Patient Status:  Inpatient    1940 MRN [id number]   Location [facility] 3W/SW Attending Tracy Vega MD   Hosp Day # 9 PCP Stef Velasco MD     Subjective:  Called by RN for left groin site bleeding post cath-in the settings of supratherapeutic PTT-heparin gtt was stopped, manual pressure was applied w/o success-fem stop was ordered and applied with 2 hr bedrest.     Resting in bed currently, denies any chest pain or SOB at this time, reports mild discomfort to the LLE.       Objective:  /52 (BP Location: Right arm)   Pulse 64   Temp 98.2 °F (36.8 °C) (Oral)   Resp 16   Wt 123 lb 4.8 oz (55.9 kg)   SpO2 96%   BMI 23.31 kg/m²     Telemetry: SR, HR 60s       Intake/Output:    Intake/Output Summary (Last 24 hours) at 2025 0905  Last data filed at 2025 0854  Gross per 24 hour   Intake 1155.48 ml   Output 600 ml   Net 555.48 ml       Last 3 Weights   25 0500 123 lb 4.8 oz (55.9 kg)   25 0555 121 lb 12.8 oz (55.2 kg)   25 0558 121 lb (54.9 kg)   25 0402 120 lb 14.4 oz (54.8 kg)   25 0617 116 lb 9.6 oz (52.9 kg)   25 2146 123 lb 14.4 oz (56.2 kg)   25 1114 118 lb 13.3 oz (53.9 kg)   25 1830 123 lb 7.3 oz (56 kg)   25 0700 123 lb 7.3 oz (56 kg)   25 1805 123 lb 6.4 oz (56 kg)   25 1121 139 lb 1.8 oz (63.1 kg)       Labs:  Recent Labs   Lab 25  0655 25  1733 25  0556   GLU 49*  50* 88 225*   BUN 40*  39* 38* 47*   CREATSERUM 2.68*  2.69* 3.00* 3.16*   EGFRCR 17*  17* 15* 14*   CA 7.7*  7.7* 7.9* 7.8*   *  136 134* 133*   K 4.4  4.5 4.3 4.8     102 101 102   CO2 25.0  26.0 25.0 22.0     Recent Labs   Lab 25  0751 25  0900 25  0655 25  1733 25  0556   RBC 3.15* 2.79* 2.72*  --  2.99*   HGB 8.4* 7.4* 7.3* 8.7* 8.3*   HCT 26.0* 22.5* 23.0* 26.7* 25.3*   MCV 82.5 80.6 84.6  --  84.6   MCH 26.7 26.5 26.8  --  27.8   MCHC 32.3  32.9 31.7  --  32.8   RDW 15.9* 15.9* 15.9*  --  15.8*   NEPRELIM 3.14 3.81 5.22  --   --    WBC 4.9 5.1 6.7  --  5.2   .0* 141.0* 127.0*  --  124.0*  124.0*         No results for input(s): \"TROP\", \"TROPHS\", \"CK\" in the last 168 hours.    Review of Systems   Constitutional: Negative.   Respiratory: Negative.     Musculoskeletal:  Positive for joint pain.       Physical Exam:    Gen: alert, oriented x 3, NAD  Heent: pupils equal, reactive. Mucous membranes moist.   Neck: no jvd  Cardiac: regular rate and rhythm, normal S1,S2, 1/6 VIOLET, no gallop or rub   Lungs: CTA  Abd: soft, NT/ND +bs  Ext: no edema, left groin site with fem stop in place, no hematoma   Skin: Warm, dry  Neuro: No focal deficits      Medications:    Scheduled Medications[1]  Medication Infusions[2]      Assessment:  Severe PVD s/p left femoral popliteal bypass 2015, s/p right TCAR and total occluded left ICA  S/P peripheral angio 5/30/25 showed % occlusion, external iliac 60-70% stenosis -s/p unsuccessful intervention to the RLE-plan for retrograde approach with Dr Fishman timing to be decided-holding off heparin gtt with recurrent groin bleeding -currently with femstop in place and bedrest  NSTEMI/Multivessel CAD-surgical turn down,  s/p successful bifurcation PCI to mid LAD and first diagonal branch 5/28/25   On asa, plavix, statin, bb  New ischemic cardiomyopathy, LVEF 35-40%  Volume management per HD  GDMT: toprol, isordil/hydralazine, not on ace/arb.arni d/t CKD  Valvular disease with mod-sev MR, mild-mod TR  ESRD-started on HD during this admit  Nephrology following   Hx of mod right ICA stenosis s/p TCAR 2/2019, totally occluded left ICA  On asa, plavix, statin  HTN-controlled   HLP-on statin, LDL48  DM2, A1C 6.5%  Acute on chronic anemia, hgb stable at 8.3 today    Thrombocytopenia, plts 124 trending down    Plan:  Holding off heparin gtt given re bleeding left groin site, femstop in place, strict bedrest-reviewed with   Sravanthi.   Plan for endovascular surgery eval -Dr Fishman of the right SFA and plan for endovascular revascularization with retrograde approach-timing to be decided-reviewed with Dr Aquino-attending cardiologist.  Continue asa, plavix, statin.  Continue bb, isordil, hydralazine.  Volume management per nephrology-plan for HD today.     Plan of care discussed with patient, RN.    Marta Esparza, APRN  5/31/2025  9:05 AM  [phone]      Complicated vascular patient as outlined above.   Holding off on heparin given re-bleeding at left groin site.   Awaiting endovascular surgery evaluation  Watching anemia  Will monitor on telemetry and continue supportive care.    I agree with the findings of the complexity of problems addressed and take responsibility for the plan's risks and complications. I approve of the plan as documented above.     Yaquelin Soto MD  L-2             [1]    sodium chloride   Intravenous On Call    insulin degludec  12 Units Subcutaneous Daily    atorvastatin  40 mg Oral Nightly    clopidogrel  75 mg Oral Daily    aspirin  81 mg Oral Daily    hydrALAZINE  25 mg Oral Q8H BLANE    isosorbide dinitrate  20 mg Oral TID (Nitrates)    metoprolol succinate  12.5 mg Oral Daily Beta Blocker    pantoprazole  40 mg Oral BID AC    calcitriol  0.25 mcg Oral Q MWF    epoetin brittany  5,000 Units Subcutaneous Once per day on Monday Wednesday Friday    escitalopram  10 mg Oral Daily    insulin aspart  1-7 Units Subcutaneous TID CC    [Held by provider] insulin aspart  5 Units Subcutaneous TID CC and HS   [2]    continuous dose heparin Stopped (05/31/25 0602)    sodium chloride Stopped (05/30/25 2100)

## 2025-05-31 NOTE — PLAN OF CARE
Patient denied pain or discomfort. Bedrest until 10pm. Heparin drip restarted at 8pm per MD order. Left groin - C/D/I no new bleeding noted, no hematoma. Up ambulate with walker to the bathroom. Hold off dialysis tonight per Dr. Sharma. Safety precaution maintained. Plan for femoral popliteal endovascular retrograde with Dr. Fishman. NPO since midnight. Patient aware for the plan.     Problem: CARDIOVASCULAR - ADULT  Goal: Maintains optimal cardiac output and hemodynamic stability  Description: INTERVENTIONS:  - Monitor vital signs, rhythm, and trends  - Monitor for bleeding, hypotension and signs of decreased cardiac output  - Evaluate effectiveness of vasoactive medications to optimize hemodynamic stability  - Monitor arterial and/or venous puncture sites for bleeding and/or hematoma  - Assess quality of pulses, skin color and temperature  - Assess for signs of decreased coronary artery perfusion - ex. Angina  - Evaluate fluid balance, assess for edema, trend weights  Outcome: Progressing  Goal: Absence of cardiac arrhythmias or at baseline  Description: INTERVENTIONS:  - Continuous cardiac monitoring, monitor vital signs, obtain 12 lead EKG if indicated  - Evaluate effectiveness of antiarrhythmic and heart rate control medications as ordered  - Initiate emergency measures for life threatening arrhythmias  - Monitor electrolytes and administer replacement therapy as ordered  Outcome: Progressing     Problem: RESPIRATORY - ADULT  Goal: Achieves optimal ventilation and oxygenation  Description: INTERVENTIONS:  - Assess for changes in respiratory status  - Assess for changes in mentation and behavior  - Position to facilitate oxygenation and minimize respiratory effort  - Oxygen supplementation based on oxygen saturation or ABGs  - Provide Smoking Cessation handout, if applicable  - Encourage broncho-pulmonary hygiene including cough, deep breathe, Incentive Spirometry  - Assess the need for suctioning and perform as  needed  - Assess and instruct to report SOB or any respiratory difficulty  - Respiratory Therapy support as indicated  - Manage/alleviate anxiety  - Monitor for signs/symptoms of CO2 retention  Outcome: Progressing     Problem: GENITOURINARY - ADULT  Goal: Absence of urinary retention  Description: INTERVENTIONS:  - Assess patient’s ability to void and empty bladder  - Monitor intake/output and perform bladder scan as needed  - Follow urinary retention protocol/standard of care  - Consider collaborating with pharmacy to review patient's medication profile  - Implement strategies to promote bladder emptying  Outcome: Progressing     Problem: METABOLIC/FLUID AND ELECTROLYTES - ADULT  Goal: Glucose maintained within prescribed range  Description: INTERVENTIONS:  - Monitor Blood Glucose as ordered  - Assess for signs and symptoms of hyperglycemia and hypoglycemia  - Administer ordered medications to maintain glucose within target range  - Assess barriers to adequate nutritional intake and initiate nutrition consult as needed  - Instruct patient on self management of diabetes  Outcome: Progressing  Goal: Electrolytes maintained within normal limits  Description: INTERVENTIONS:  - Monitor labs and rhythm and assess patient for signs and symptoms of electrolyte imbalances  - Administer electrolyte replacement as ordered  - Monitor response to electrolyte replacements, including rhythm and repeat lab results as appropriate  - Fluid restriction as ordered  - Instruct patient on fluid and nutrition restrictions as appropriate  Outcome: Progressing  Goal: Hemodynamic stability and optimal renal function maintained  Description: INTERVENTIONS:  - Monitor labs and assess for signs and symptoms of volume excess or deficit  - Monitor intake, output and patient weight  - Monitor urine specific gravity, serum osmolarity and serum sodium as indicated or ordered  - Monitor response to interventions for patient's volume status,  including labs, urine output, blood pressure (other measures as available)  - Encourage oral intake as appropriate  - Instruct patient on fluid and nutrition restrictions as appropriate  Outcome: Progressing     Problem: Patient Centered Care  Goal: Patient preferences are identified and integrated in the patient's plan of care  Description: Interventions:  - What would you like us to know as we care for you? Home with Zachery Gallagher  - Provide timely, complete, and accurate information to patient/family  - Incorporate patient and family knowledge, values, beliefs, and cultural backgrounds into the planning and delivery of care  - Encourage patient/family to participate in care and decision-making at the level they choose  - Honor patient and family perspectives and choices  Outcome: Progressing     Problem: Diabetes/Glucose Control  Goal: Glucose maintained within prescribed range  Description: INTERVENTIONS:  - Monitor Blood Glucose as ordered  - Assess for signs and symptoms of hyperglycemia and hypoglycemia  - Administer ordered medications to maintain glucose within target range  - Assess barriers to adequate nutritional intake and initiate nutrition consult as needed  - Instruct patient on self management of diabetes  Outcome: Progressing

## 2025-05-31 NOTE — CONSULTS
Vascular Surgery Consultation    Radha Yu Patient Status:  Inpatient    1940 MRN [id number]   Location [facility] 3W/SW Attending Tracy Vega MD   Hosp Day # 9 PCP Stef Velasco MD     Reason for Consultation:  Atherosclerosis with ulcer right first toe, ischemic rest pain    History of Present Illness:  Radha Yu is a a(n) 84 year old female who was admitted to the hospital last week with shortness of breath.  She has multiple comorbidities including renal failure requiring dialysis through a left PermCath.  She lives relatively independently with her nephew.  In conversations with him she has had a ulcer of the right first toe since February.  She has some mild cognitive delay -does not remember when she has had her previous procedures or when she started having the wound on her foot.  Reports she has been too weak to walk on the right leg.  No previous peripheral vascular interventions.  She underwent a complex PCI with cardiology.     She started having more pain in the right foot after the intervention.  There was no extravasation of contrast and no pseudoaneurysm.  No evidence of embolization to the profunda.   I have been asked to provide an additional opinion.  Initial opinion was for femoral artery to popliteal artery bypass.    History:  Past Medical History[1]  Past Surgical History[2]  Family History[3]   reports that she has quit smoking. Her smoking use included cigarettes. She has never used smokeless tobacco. She reports that she does not currently use alcohol. She reports that she does not currently use drugs.    Allergies:  Allergies[4]    Medications:  Current Hospital Medications[5]    Review of Systems:    CONSTITUTIONAL: denies fever, chills  ENT: denies sore throat, nasal drainage/congestion  CHEST/CVS: denies cough, sputum, trouble breathing/SOB, chest pain, ARCHER  GI: denies abdominal pain, heartburn, diarrhea, blood in bm, tarry bm, constipation,    : denies  difficulty urinating, pain, blood in urine, or frequency  SKIN: denies any unusual skin lesions or rashes  MUSCULOSKELETAL: denies back pain, joint pain, leg swelling  NEURO/EYES: denies headaches, passing out, motor dysfunction, difficulty walking, difficulty with speech, temporary blindness, double vision, confusion    Physical Exam:  /46 (BP Location: Right arm)   Pulse 65   Temp 98.3 °F (36.8 °C) (Oral)   Resp 18   Wt 123 lb 4.8 oz (55.9 kg)   SpO2 97%   BMI 23.31 kg/m²   GENERAL: mild cognitive delay  HEENT: ears and throat are clear  NECK: supple, no lymphadenopathy, thyroid wnl  CAROTID: No bruits  RESPIRATORY: no rales, rhonchi, or wheezes B  CARDIO: RRR without murmur, no murmur, no gallop   ABDOMEN: soft, non-tender with no palpable aneurysm or masses  BACK: normal, no tenderness  SKIN: no rashes, no suspicious lesions, warm and dry  EXTREMITIES: no edema, full range of motion, no tenderness  NEURO/PSYCH: orientated x3, normal mood and affect, no sensory or motor deficit    ARTERIAL VASCULAR EXAM    LOWER EXTREMITY     FEMORAL POPLITEAL POST TIB ANT TIB PERONEAL   RIGHT 2       doppler doppler         LEFT 2             doppler doppler           Laboratory Data:  Lab Results   Component Value Date    WBC 5.2 05/31/2025    HGB 8.3 05/31/2025    HCT 25.3 05/31/2025    .0 05/31/2025    .0 05/31/2025    CREATSERUM 3.16 05/31/2025    BUN 47 05/31/2025     05/31/2025    K 4.8 05/31/2025     05/31/2025    CO2 22.0 05/31/2025     05/31/2025    CA 7.8 05/31/2025    ALB 3.5 05/31/2025    PTT 44.2 05/31/2025    MG 2.0 05/31/2025    PHOS 4.7 05/31/2025    PGLU 163 05/31/2025       Impression and Plan:  Atherosclerosis with ulcer and ischemic rest pain-I have reviewed the imaging in detail.  Initial recommendation was for a femoral artery to popliteal artery bypass.  In my clinical opinion this would not be an adequate procedure as she needs improvement of the inflow from  the right iliac artery due to the stenoses throughout the right iliac.  Inadequate inflow would have caused the bypass to fail.   Patient is quite frail at baseline given her renal failure and significant coronary artery disease.  She is at high risk for limb loss and loss of quality of life from any procedure.   As a team, the cardiologists and vascular surgery will determine the best clinical plan for this frail patient.  Tentative ideal plan would be for femoral endarterectomy with iliac stenting and SFA stenting and any tibial interventions to improve.  I discussed the case with the nephew in detail including the complexity in the face of her multiple comorbidities.     Thank you for allowing me to participate in the care of your patient.    Jarvis Fishman MD  5/31/2025  1:49 PM        [1]   Past Medical History:   Anxiety    COPD (chronic obstructive pulmonary disease) (HCC)    Essential hypertension    Hearing impaired person, bilateral    Hyperlipidemia    Kidney disease    Lymphoma (HCC)    Osteoarthritis    Renal disorder    Shortness of breath    Type 1 diabetes mellitus (HCC)    Visual impairment   [2]   Past Surgical History:  Procedure Laterality Date    Appendectomy      Hip surgery  2018    Total abdom hysterectomy     [3] No family history on file.  [4]   Allergies  Allergen Reactions    Levaquin [Levofloxacin] ITCHING   [5]   Current Facility-Administered Medications:     sodium chloride 0.9 % IV bolus 100 mL, 100 mL, Intravenous, Q30 Min PRN **AND** albumin human (Albumin) 25% injection 25 g, 25 g, Intravenous, PRN Dialysis    [START ON 6/2/2025] epoetin brittany (Epogen, Procrit) 12135 UNIT/ML injection 10,000 Units, 10,000 Units, Subcutaneous, Once per day on Monday Wednesday Friday    sodium ferric gluconate (Ferrlecit) 125 mg in sodium chloride 0.9% 100mL IVPB premix, 125 mg, Intravenous, Daily    heparin (Porcine) 03402 units/250mL infusion ACS/AFIB CONTINUOUS, 200-3,000 Units/hr, Intravenous,  Continuous    metoclopramide (Reglan) 5 mg/mL injection 5 mg, 5 mg, Intravenous, Daily PRN    sodium chloride 0.9% infusion, , Intravenous, Continuous    insulin degludec (Tresiba) 100 units/mL flextouch 12 Units, 12 Units, Subcutaneous, Daily    atorvastatin (Lipitor) tab 40 mg, 40 mg, Oral, Nightly    clopidogrel (Plavix) tab 75 mg, 75 mg, Oral, Daily    aspirin DR tab 81 mg, 81 mg, Oral, Daily    hydrALAZINE (Apresoline) tab 25 mg, 25 mg, Oral, Q8H BLANE    isosorbide dinitrate (Isordil) tab 20 mg, 20 mg, Oral, TID (Nitrates)    metoprolol succinate (Toprol XL) partial tablet 12.5 mg, 12.5 mg, Oral, Daily Beta Blocker    pantoprazole (Protonix) DR tab 40 mg, 40 mg, Oral, BID AC    calcitriol (Rocaltrol) cap 0.25 mcg, 0.25 mcg, Oral, Q MWF    ipratropium-albuterol (Duoneb) 0.5-2.5 (3) MG/3ML inhalation solution 3 mL, 3 mL, Nebulization, Q6H PRN    HYDROcodone-acetaminophen (Norco) 5-325 MG per tab 1 tablet, 1 tablet, Oral, Q6H PRN    escitalopram (Lexapro) tab 10 mg, 10 mg, Oral, Daily    glucose (Dex4) 15 GM/59ML oral liquid 15 g, 15 g, Oral, Q15 Min PRN **OR** glucose (Glutose) 40% oral gel 15 g, 15 g, Oral, Q15 Min PRN **OR** glucose-vitamin C (Dex-4) chewable tab 4 tablet, 4 tablet, Oral, Q15 Min PRN **OR** dextrose 50% injection 50 mL, 50 mL, Intravenous, Q15 Min PRN **OR** glucose (Dex4) 15 GM/59ML oral liquid 30 g, 30 g, Oral, Q15 Min PRN **OR** glucose (Glutose) 40% oral gel 30 g, 30 g, Oral, Q15 Min PRN **OR** glucose-vitamin C (Dex-4) chewable tab 8 tablet, 8 tablet, Oral, Q15 Min PRN    acetaminophen (Tylenol Extra Strength) tab 500 mg, 500 mg, Oral, Q4H PRN    melatonin tab 3 mg, 3 mg, Oral, Nightly PRN    polyethylene glycol (PEG 3350) (Miralax) 17 g oral packet 17 g, 17 g, Oral, Daily PRN    sennosides (Senokot) tab 17.2 mg, 17.2 mg, Oral, Nightly PRN    bisacodyl (Dulcolax) 10 MG rectal suppository 10 mg, 10 mg, Rectal, Daily PRN    ondansetron (Zofran) 4 MG/2ML injection 4 mg, 4 mg, Intravenous, Q6H  PRN    benzonatate (Tessalon) cap 200 mg, 200 mg, Oral, TID PRN    guaiFENesin (Robitussin) 100 MG/5 ML oral liquid 200 mg, 200 mg, Oral, Q4H PRN    glycerin-hypromellose- (Artificial Tears) 0.2-0.2-1 % ophthalmic solution 1 drop, 1 drop, Both Eyes, QID PRN    sodium chloride (Saline Mist) 0.65 % nasal solution 1 spray, 1 spray, Each Nare, Q3H PRN    insulin aspart (NovoLOG) 100 Units/mL FlexPen 1-7 Units, 1-7 Units, Subcutaneous, TID CC    [Held by provider] insulin aspart (NovoLOG) 100 Units/mL FlexPen 5 Units, 5 Units, Subcutaneous, TID CC and HS

## 2025-05-31 NOTE — PROGRESS NOTES
Case discussed with sydnie Gallagher, Sravanthi Christopher and Dr Najjar. Failed antegrade endovascular treatment of right SFA today. Given frailty, comorbidities, age, Radha is high risk for adverse outcome with surgery. Will hold off on surgical revascularization of right leg and reattempt Endovascular approach, possibly retrograde with Dr Fishman. Timing to be decided. If no left access site bleeding tomorrow, then restart heparin gtt.    -Fausto Aquino

## 2025-05-31 NOTE — PROGRESS NOTES
[facility]  Nephrology Daily Progress Note    Radha Yu  [id number]  84 year old      HPI:   Radha Yu is a 84 year old female.  Above events noted.  Fempop bypass surgery now on hold.  Pt c/o R foot pain and fatigue but no CP or SOB.       ROS:     Constitutional:  Negative for decreased activity, fever, irritability and lethargy  Endocrine:  Negative for abnormal sleep patterns, increased activity, polydipsia and polyphagia  Cardiovascular:  Negative for cool extremity and irregular heartbeat/palpitations  Gastrointestinal:  Negative for abdominal pain, constipation, decreased appetite, diarrhea and vomiting  Genitourinary:  Negative for dysuria and hematuria  Hema/Lymph:  Negative for easy bleeding and easy bruising  Integumentary:  Negative for pruritus and rash  Musculoskeletal:  Negative for bone/joint symptoms  Neurological:  Negative for gait disturbance  Psychiatric:  Negative for inappropriate interaction and psychiatric symptoms  Respiratory:  Negative for cough, dyspnea and wheezing      PHYSICAL EXAM:   Temp:  [96.9 °F (36.1 °C)-98.4 °F (36.9 °C)] 98.2 °F (36.8 °C)  Pulse:  [60-74] 64  Resp:  [14-16] 16  BP: (114-153)/() 150/52  SpO2:  [95 %-98 %] 96 %  Patient Weight for the past 72 hrs:   Weight   05/29/25 0558 121 lb (54.9 kg)   05/30/25 0555 121 lb 12.8 oz (55.2 kg)   05/31/25 0500 123 lb 4.8 oz (55.9 kg)       Constitutional: appears well hydrated alert and responsive no acute distress noted  Neck/Thyroid: neck is supple without adenopathy  Lymphatic: no abnormal cervical, supraclavicular or axillary adenopathy is noted  Respiratory: normal to inspection lungs are clear to auscultation bilaterally normal respiratory effort  Cardiovascular: regular rate and rhythm no murmurs, gallups, or rubs  Abdomen: soft non-tender non-distended no organomegaly noted no masses  Musculoskeletal: full ROM all extremities good strength  no deformities  Extremities: cool cyanotic R foot.    Neurological: exam appropriate for age reflexes and motor skills appropriate for age    Labs:  Lab Results   Component Value Date    WBC 5.2 05/31/2025    HGB 8.3 05/31/2025    HCT 25.3 05/31/2025    .0 05/31/2025    .0 05/31/2025    CREATSERUM 3.16 05/31/2025    BUN 47 05/31/2025     05/31/2025    K 4.8 05/31/2025     05/31/2025    CO2 22.0 05/31/2025     05/31/2025    CA 7.8 05/31/2025    ALB 3.5 05/31/2025    .7 05/31/2025    MG 2.0 05/31/2025    PHOS 4.7 05/31/2025     Recent Labs   Lab 05/29/25  0900 05/30/25  0655 05/30/25  1733 05/31/25  0556   WBC 5.1 6.7  --  5.2   HGB 7.4* 7.3* 8.7* 8.3*   HCT 22.5* 23.0* 26.7* 25.3*   .0* 127.0*  --  124.0*  124.0*     Recent Labs   Lab 05/26/25  0551 05/27/25  0246 05/30/25  0655 05/30/25  1733 05/31/25  0556   GLU 88   < > 49*  50* 88 225*   BUN 75*   < > 40*  39* 38* 47*   CREATSERUM 3.58*   < > 2.68*  2.69* 3.00* 3.16*   CA 7.6*   < > 7.7*  7.7* 7.9* 7.8*   ALB 3.6   < > 3.5 3.5 3.5   *   < > 135*  136 134* 133*   K 4.7   < > 4.4  4.5 4.3 4.8      < > 103  102 101 102   CO2 23.0   < > 25.0  26.0 25.0 22.0   ALKPHO 73  --   --   --   --    AST 16  --   --   --   --    ALT <7*  --   --   --   --    BILT 0.2  --   --   --   --    TP 5.7  --   --   --   --    PHOS 3.9   < > 4.1 4.2 4.7    < > = values in this interval not displayed.       Imaging  CTA ABDOMEN/PELVIS LOWER EXT BILAT W RUNOFF (ZQF=[id number])  Result Date: 5/29/2025  CONCLUSION:   1. RIGHT lower extremity:  An approximate 8 cm segment of the proximal superficial femoral artery demonstrates high-grade stenosis and/or near complete occlusion at and below the femoral bifurcation.  There is partial distal reconstitution of the SFA, but with other multifocal high-grade stenoses and occlusions throughout the mid-distal SFA.  The profunda femoris artery is grossly patent.  Moderate multifocal non flow-limiting atherosclerotic stenoses of the  popliteal artery, which is otherwise patent.  Single-vessel runoff via the peroneal artery.  Up to moderate multiple common and external iliac artery atherosclerotic stenoses, but with no high-grade stenosis or occlusion of these vessels.  2. LEFT lower extremity:  Left femoral to left popliteal artery bypass is patent.  Mild scattered non flow-limiting granulation tissue at the mid to distal aspect of the bypass graft.  Native superficial femoral artery is essentially completely occluded.   The profunda femoris artery is patent.  The popliteal artery is grossly patent and demonstrates mild-to-moderate multifocal atherosclerotic stenoses.  Two vessel runoff via the posterior tibial and peroneal arteries, but with partial thready flow throughout the posterior tibial artery.  3. Moderate to severe atherosclerotic stenosis at the ostium of the superior mesenteric artery and both renal arteries.  There is also moderate stenosis at the ostium of the celiac axis. 4. Congestive failure and/or fluid overload with small left greater than right pleural effusions and severe anasarca. 5. Excreted contrast material within the renal collecting systems and urinary bladder.  There is also Kong's suspicion of contrast in the gallbladder.  Please note that contrast in the renal collecting systems limits sensitivity for detection of urinary tract calculi.  Extensive renal hilar vascular calcifications are seen.  There is also right greater than left renal atrophy. 6. Fatty atrophy of the pancreas with mild 6 mm pancreatic ductal dilation.  Please correlate with any available prior imaging to ensure stability of this finding.  If unavailable, request correlation with obstructive laboratory parameters and consider further evaluation with MRCP if laboratory abnormalities are present. 7. Indeterminate 2.4 cm left adrenal nodule, which should be correlated with prior outside institution imaging to ensure stability. 8. Colonic  diverticulosis. 9. Hysterectomy. 10. Left proximal femoral internal fixation; resultant streak artifacts limit evaluation of the pelvic structures.  There are also chronic L2 and L5 vertebral body compression fractures. 11. Lesser incidental findings as above.   [id number]  Dictated by (CST): Nasir Cash MD on 5/29/2025 at 11:08 AM     Finalized by (CST): Nasir Cash MD on 5/29/2025 at 11:42 AM              Medications:  Current Hospital Medications[1]    Allergies:  Allergies[2]    Input/Output:    Intake/Output Summary (Last 24 hours) at 5/31/2025 1007  Last data filed at 5/31/2025 0854  Gross per 24 hour   Intake 1155.48 ml   Output 600 ml   Net 555.48 ml          ASSESSMENT/PLAN:   Assessment   Problem List[3]    Status quo.  As per cardiology and vascular surgery. Will arrange for routine HD today with 2 L fluid removal as tolerated.  At some point will be going to [facility] and will then need to be on a MWF schedule.  Hb 8.3.  Epogen and ferrlecit.  Discussed with RN and nephew.,              5/31/2025  Lukasz Shamra MD               [1]   Current Facility-Administered Medications:     heparin (Porcine) 74059 units/250mL infusion ACS/AFIB CONTINUOUS, 200-3,000 Units/hr, Intravenous, Continuous    sodium chloride 0.9% infusion, , Intravenous, On Call    metoclopramide (Reglan) 5 mg/mL injection 5 mg, 5 mg, Intravenous, Daily PRN    sodium chloride 0.9% infusion, , Intravenous, Continuous    insulin degludec (Tresiba) 100 units/mL flextouch 12 Units, 12 Units, Subcutaneous, Daily    atorvastatin (Lipitor) tab 40 mg, 40 mg, Oral, Nightly    clopidogrel (Plavix) tab 75 mg, 75 mg, Oral, Daily    aspirin DR tab 81 mg, 81 mg, Oral, Daily    hydrALAZINE (Apresoline) tab 25 mg, 25 mg, Oral, Q8H BLANE    isosorbide dinitrate (Isordil) tab 20 mg, 20 mg, Oral, TID (Nitrates)    metoprolol succinate (Toprol XL) partial tablet 12.5 mg, 12.5 mg, Oral, Daily Beta Blocker    pantoprazole (Protonix) DR tab 40 mg, 40  mg, Oral, BID AC    calcitriol (Rocaltrol) cap 0.25 mcg, 0.25 mcg, Oral, Q MWF    ipratropium-albuterol (Duoneb) 0.5-2.5 (3) MG/3ML inhalation solution 3 mL, 3 mL, Nebulization, Q6H PRN    HYDROcodone-acetaminophen (Norco) 5-325 MG per tab 1 tablet, 1 tablet, Oral, Q6H PRN    epoetin brittany (Epogen, Procrit) 5,000 Units injection, 5,000 Units, Subcutaneous, Once per day on Monday Wednesday Friday    escitalopram (Lexapro) tab 10 mg, 10 mg, Oral, Daily    glucose (Dex4) 15 GM/59ML oral liquid 15 g, 15 g, Oral, Q15 Min PRN **OR** glucose (Glutose) 40% oral gel 15 g, 15 g, Oral, Q15 Min PRN **OR** glucose-vitamin C (Dex-4) chewable tab 4 tablet, 4 tablet, Oral, Q15 Min PRN **OR** dextrose 50% injection 50 mL, 50 mL, Intravenous, Q15 Min PRN **OR** glucose (Dex4) 15 GM/59ML oral liquid 30 g, 30 g, Oral, Q15 Min PRN **OR** glucose (Glutose) 40% oral gel 30 g, 30 g, Oral, Q15 Min PRN **OR** glucose-vitamin C (Dex-4) chewable tab 8 tablet, 8 tablet, Oral, Q15 Min PRN    acetaminophen (Tylenol Extra Strength) tab 500 mg, 500 mg, Oral, Q4H PRN    melatonin tab 3 mg, 3 mg, Oral, Nightly PRN    polyethylene glycol (PEG 3350) (Miralax) 17 g oral packet 17 g, 17 g, Oral, Daily PRN    sennosides (Senokot) tab 17.2 mg, 17.2 mg, Oral, Nightly PRN    bisacodyl (Dulcolax) 10 MG rectal suppository 10 mg, 10 mg, Rectal, Daily PRN    ondansetron (Zofran) 4 MG/2ML injection 4 mg, 4 mg, Intravenous, Q6H PRN    benzonatate (Tessalon) cap 200 mg, 200 mg, Oral, TID PRN    guaiFENesin (Robitussin) 100 MG/5 ML oral liquid 200 mg, 200 mg, Oral, Q4H PRN    glycerin-hypromellose- (Artificial Tears) 0.2-0.2-1 % ophthalmic solution 1 drop, 1 drop, Both Eyes, QID PRN    sodium chloride (Saline Mist) 0.65 % nasal solution 1 spray, 1 spray, Each Nare, Q3H PRN    insulin aspart (NovoLOG) 100 Units/mL FlexPen 1-7 Units, 1-7 Units, Subcutaneous, TID CC    [Held by provider] insulin aspart (NovoLOG) 100 Units/mL FlexPen 5 Units, 5 Units,  Subcutaneous, TID CC and HS  [2]   Allergies  Allergen Reactions    Levaquin [Levofloxacin] ITCHING   [3]   Patient Active Problem List  Diagnosis    Closed fracture of left hip (HCC)    Background diabetic retinopathy(362.01)    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HCC)    Essential hypertension, benign    ESRD (end stage renal disease) ([facility])    Occlusion of right carotid artery    Stenosis of left carotid artery    Atherosclerosis of native artery of right lower extremity with ulceration ([facility])    Type 2 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 not on chronic dialysis, with long-term current use of insulin (HCC)    Anemia    Depression    Acute on chronic respiratory failure with hypoxia (HCC)    Acute congestive heart failure, unspecified heart failure type (HCC)    NSTEMI (non-ST elevated myocardial infarction) ([facility])    Acute renal failure superimposed on chronic kidney disease, unspecified acute renal failure type, unspecified CKD stage

## 2025-05-31 NOTE — PROGRESS NOTES
AM PTT supratherapeutic heparin stopped at 0600 am. Bleeding to left groin site. [facility] at bedside, RN holding manual pressure. /54 (81). No complain of pain. Bedrest

## 2025-06-01 LAB
ALBUMIN SERPL-MCNC: 3.7 G/DL (ref 3.2–4.8)
ANION GAP SERPL CALC-SCNC: 9 MMOL/L (ref 0–18)
APTT PPP: 44 SECONDS (ref 23–36)
BASOPHILS # BLD AUTO: 0.02 X10(3) UL (ref 0–0.2)
BASOPHILS NFR BLD AUTO: 0.4 %
BUN BLD-MCNC: 23 MG/DL (ref 9–23)
BUN/CREAT SERPL: 10.1 (ref 10–20)
CALCIUM BLD-MCNC: 8.1 MG/DL (ref 8.7–10.4)
CHLORIDE SERPL-SCNC: 104 MMOL/L (ref 98–112)
CO2 SERPL-SCNC: 26 MMOL/L (ref 21–32)
CREAT BLD-MCNC: 2.28 MG/DL (ref 0.55–1.02)
DEPRECATED RDW RBC AUTO: 48.7 FL (ref 35.1–46.3)
EGFRCR SERPLBLD CKD-EPI 2021: 20 ML/MIN/1.73M2 (ref 60–?)
EOSINOPHIL # BLD AUTO: 0.11 X10(3) UL (ref 0–0.7)
EOSINOPHIL NFR BLD AUTO: 2 %
ERYTHROCYTE [DISTWIDTH] IN BLOOD BY AUTOMATED COUNT: 15.8 % (ref 11–15)
GLUCOSE BLD-MCNC: 61 MG/DL (ref 70–99)
GLUCOSE BLDC GLUCOMTR-MCNC: 110 MG/DL (ref 70–99)
GLUCOSE BLDC GLUCOMTR-MCNC: 152 MG/DL (ref 70–99)
GLUCOSE BLDC GLUCOMTR-MCNC: 177 MG/DL (ref 70–99)
GLUCOSE BLDC GLUCOMTR-MCNC: 207 MG/DL (ref 70–99)
GLUCOSE BLDC GLUCOMTR-MCNC: 236 MG/DL (ref 70–99)
GLUCOSE BLDC GLUCOMTR-MCNC: 73 MG/DL (ref 70–99)
HCT VFR BLD AUTO: 24.2 % (ref 35–48)
HGB BLD-MCNC: 8.2 G/DL (ref 12–16)
IMM GRANULOCYTES # BLD AUTO: 0.03 X10(3) UL (ref 0–1)
IMM GRANULOCYTES NFR BLD: 0.5 %
LYMPHOCYTES # BLD AUTO: 0.74 X10(3) UL (ref 1–4)
LYMPHOCYTES NFR BLD AUTO: 13.3 %
MAGNESIUM SERPL-MCNC: 1.9 MG/DL (ref 1.6–2.6)
MCH RBC QN AUTO: 28.6 PG (ref 26–34)
MCHC RBC AUTO-ENTMCNC: 33.9 G/DL (ref 31–37)
MCV RBC AUTO: 84.3 FL (ref 80–100)
MONOCYTES # BLD AUTO: 0.41 X10(3) UL (ref 0.1–1)
MONOCYTES NFR BLD AUTO: 7.4 %
NEUTROPHILS # BLD AUTO: 4.26 X10 (3) UL (ref 1.5–7.7)
NEUTROPHILS # BLD AUTO: 4.26 X10(3) UL (ref 1.5–7.7)
NEUTROPHILS NFR BLD AUTO: 76.4 %
OSMOLALITY SERPL CALC.SUM OF ELEC: 290 MOSM/KG (ref 275–295)
PHOSPHATE SERPL-MCNC: 3.2 MG/DL (ref 2.4–5.1)
PLATELET # BLD AUTO: 133 10(3)UL (ref 150–450)
PLATELET # BLD AUTO: 133 10(3)UL (ref 150–450)
PLATELETS.RETICULATED NFR BLD AUTO: 6.1 % (ref 0–7)
PLATELETS.RETICULATED NFR BLD AUTO: 6.1 % (ref 0–7)
POTASSIUM SERPL-SCNC: 4 MMOL/L (ref 3.5–5.1)
RBC # BLD AUTO: 2.87 X10(6)UL (ref 3.8–5.3)
SODIUM SERPL-SCNC: 139 MMOL/L (ref 136–145)
WBC # BLD AUTO: 5.6 X10(3) UL (ref 4–11)

## 2025-06-01 PROCEDURE — 99233 SBSQ HOSP IP/OBS HIGH 50: CPT | Performed by: INTERNAL MEDICINE

## 2025-06-01 PROCEDURE — 99233 SBSQ HOSP IP/OBS HIGH 50: CPT | Performed by: FAMILY MEDICINE

## 2025-06-01 RX ORDER — HYDROMORPHONE HYDROCHLORIDE 1 MG/ML
0.8 INJECTION, SOLUTION INTRAMUSCULAR; INTRAVENOUS; SUBCUTANEOUS EVERY 2 HOUR PRN
Status: DISCONTINUED | OUTPATIENT
Start: 2025-06-01 | End: 2025-06-09

## 2025-06-01 RX ORDER — HYDROMORPHONE HYDROCHLORIDE 1 MG/ML
0.4 INJECTION, SOLUTION INTRAMUSCULAR; INTRAVENOUS; SUBCUTANEOUS EVERY 2 HOUR PRN
Status: DISCONTINUED | OUTPATIENT
Start: 2025-06-01 | End: 2025-06-09

## 2025-06-01 RX ORDER — HEPARIN SODIUM 1000 [USP'U]/ML
1.5 INJECTION, SOLUTION INTRAVENOUS; SUBCUTANEOUS
Status: COMPLETED | OUTPATIENT
Start: 2025-06-01 | End: 2025-06-02

## 2025-06-01 RX ORDER — HYDROMORPHONE HYDROCHLORIDE 1 MG/ML
1.2 INJECTION, SOLUTION INTRAMUSCULAR; INTRAVENOUS; SUBCUTANEOUS EVERY 2 HOUR PRN
Status: DISCONTINUED | OUTPATIENT
Start: 2025-06-01 | End: 2025-06-09

## 2025-06-01 RX ORDER — ALBUMIN (HUMAN) 12.5 G/50ML
25 SOLUTION INTRAVENOUS
Status: ACTIVE | OUTPATIENT
Start: 2025-06-01 | End: 2025-06-03

## 2025-06-01 NOTE — PROGRESS NOTES
Pulmonary Medicine Inpatient Progress Note                 Subjective:  On RA afebrile  Feels well on RA. Denies resp complaints  Dtr at bedside     ALLERGIES:  Allergies[1]     MEDS:  Home Medications:  Medications Taking[2]  Scheduled Medication:  Scheduled Medications[3]  Continuous Infusing Medication:  Medication Infusions[4]  PRN Medications:  PRN Medications[5]       PHYSICAL EXAM:  /48 (BP Location: Right arm)   Pulse 73   Temp 98.6 °F (37 °C) (Oral)   Resp 16   Wt 121 lb 14.4 oz (55.3 kg)   SpO2 97%   BMI 23.05 kg/m²   CONSTITUTIONAL: alert, oriented, no apparent distress  HEENT: atraumatic normocephalic  MOUTH: mucous membranes are moist. No OP exudates  NECK/THROAT: no JVD. Trachea midline. No obvious thyromegaly  LUNG: clear upper b/l no wheezing, + basilar crackles. Chest symmetric with respiratory motion  HEART: regular rate and rhythm, no obvious murmers or gallops note  ABD: soft non tender. + bowel sounds. No organomegaly noted  EXT: no clubbing, cyanosis, or edema noted       IMAGES:  CXR 5/22/25  Moderate pulmonary edema, significantly progressed since 2/20/2025. Underlying pneumonia can have a similar appearance.       LABS:  Recent Labs   Lab 05/29/25  0900 05/30/25  0655 05/30/25  1733 05/31/25  0556 06/01/25  0643   RBC 2.79* 2.72*  --  2.99* 2.87*   HGB 7.4* 7.3* 8.7* 8.3* 8.2*   HCT 22.5* 23.0* 26.7* 25.3* 24.2*   MCV 80.6 84.6  --  84.6 84.3   MCH 26.5 26.8  --  27.8 28.6   MCHC 32.9 31.7  --  32.8 33.9   RDW 15.9* 15.9*  --  15.8* 15.8*   NEPRELIM 3.81 5.22  --   --  4.26   WBC 5.1 6.7  --  5.2 5.6   .0* 127.0*  --  124.0*  124.0* 133.0*  133.0*       Recent Labs   Lab 05/26/25  0551 05/27/25  0246 05/30/25  1733 05/31/25  0556 06/01/25  0643   GLU 88   < > 88 225* 61*   BUN 75*   < > 38* 47* 23   CREATSERUM 3.58*   < > 3.00* 3.16* 2.28*   EGFRCR 12*   < > 15* 14* 20*   CA 7.6*   < > 7.9* 7.8* 8.1*   ALB 3.6   < > 3.5 3.5 3.7   *   < > 134* 133* 139   K 4.7   < >  4.3 4.8 4.0      < > 101 102 104   CO2 23.0   < > 25.0 22.0 26.0   ALKPHO 73  --   --   --   --    AST 16  --   --   --   --    ALT <7*  --   --   --   --    BILT 0.2  --   --   --   --    TP 5.7  --   --   --   --     < > = values in this interval not displayed.          ASSESSMENT/PLAN:  HFrEF with LVEF 45%, NSTEMI  -found to have severe multivessel dz but not a surgical candidate  -s/p successful bifurcation PCI to mid LAD and first diagonal branch 5/28/25   -on medical tx as per cards  -monitor on tele    Acute hypoxemic resp failure  -CXR with pulm edema now resolved on RA    DEBO on CKD progressed to HD  -s/p HD as per renal    PAD  -vascular following with plan for future surgical intervention    Proph  -DVT: hep     Dispo  -full code  -pt's acute pulmonary and critical care issues have significantly improved. Therefore, our service will sign off at this time. Please don't hesitate to contact us if there are questions or concerns we can address or the patient's clinical status changes which requires our service's attention.      Thank you for the opportunity to care for Radha Yu.     DEVI Shane DO, MPH  Pulmonary Critical Care Medicine  [facility]          [1]   Allergies  Allergen Reactions    Levaquin [Levofloxacin] ITCHING   [2]   Outpatient Medications Marked as Taking for the 5/22/25 encounter (Hospital Encounter)   Medication Sig Dispense Refill    amLODIPine 5 MG Oral Tab Take 1 tablet (5 mg total) by mouth in the morning.      Cranberry 125 MG Oral Tab Take 1 tablet by mouth in the morning.      insulin aspart 100 Units/mL Subcutaneous Solution Pen-injector Inject 5-10 Units into the skin in the morning and 5-10 Units at noon and 5-10 Units in the evening. Inject before meals. Injecting with sliding scale as = 5 units; 150-170= 6 units; 170-190= 7 units; 190-210= 8 units; 210-230= 9 units; 230 and above= 10 units. .      aspirin 81 MG Oral  Tab EC Take 1 tablet (81 mg total) by mouth in the morning.      sodium bicarbonate 650 MG Oral Tab Take 2 tablets (1,300 mg total) by mouth in the morning and 2 tablets (1,300 mg total) before bedtime.      atorvastatin 20 MG Oral Tab Take 1 tablet (20 mg total) by mouth in the morning.      Cholecalciferol 50 MCG (2000 UT) Oral Cap Take 1 capsule by mouth in the morning.      insulin glargine 100 UNIT/ML Subcutaneous Solution Inject 5 Units into the skin nightly. (Patient taking differently: Inject 4 Units into the skin nightly.)      escitalopram 10 MG Oral Tab Take 1 tablet (10 mg total) by mouth in the morning.      Clopidogrel Bisulfate 75 MG Oral Tab Take 1 tablet (75 mg total) by mouth in the morning.     [3]    heparin  1.5 mL Intracatheter Once    [START ON 6/2/2025] epoetin brittany  10,000 Units Subcutaneous Once per day on Monday Wednesday Friday    sodium ferric gluconate  125 mg Intravenous Daily    insulin degludec  12 Units Subcutaneous Daily    atorvastatin  40 mg Oral Nightly    clopidogrel  75 mg Oral Daily    aspirin  81 mg Oral Daily    hydrALAZINE  25 mg Oral Q8H BLANE    isosorbide dinitrate  20 mg Oral TID (Nitrates)    metoprolol succinate  12.5 mg Oral Daily Beta Blocker    pantoprazole  40 mg Oral BID AC    calcitriol  0.25 mcg Oral Q MWF    escitalopram  10 mg Oral Daily    insulin aspart  1-7 Units Subcutaneous TID CC    [Held by provider] insulin aspart  5 Units Subcutaneous TID CC and HS   [4]    continuous dose heparin Stopped (05/31/25 0602)    sodium chloride Stopped (05/30/25 2100)   [5]   sodium chloride **AND** albumin human    sodium chloride **AND** albumin human    metoclopramide    ipratropium-albuterol    HYDROcodone-acetaminophen    glucose **OR** glucose **OR** glucose-vitamin C **OR** dextrose **OR** glucose **OR** glucose **OR** glucose-vitamin C    acetaminophen    melatonin    polyethylene glycol (PEG 3350)    sennosides    bisacodyl    ondansetron    benzonatate     guaiFENesin    glycerin-hypromellose-    sodium chloride

## 2025-06-01 NOTE — PROGRESS NOTES
[facility]  part of [facility]    Progress Note      Subjective:     Patient lying in bed.  Denies any chest pain or shortness of breath..  Complaining of leg pain and received pain medication.  Son at bedside.      Review of Systems:   Constitutional: negative for fatigue, fevers and weight loss  Eyes: negative for irritation, redness and visual disturbance  Ears, nose, mouth, throat, and face: negative for hearing loss and sore throat  Respiratory: negative for cough, hemoptysis and wheezing  Cardiovascular: negative for chest pain, exertional dyspnea, lower extremity edema and palpitations  Gastrointestinal: negative for abdominal pain, diarrhea and nausea  Genitourinary:negative for dysuria, frequency and hematuria  Hematologic/lymphatic: negative for bleeding and easy bruising  Musculoskeletal:negative for back pain, bone pain and muscle weakness  Neurological: negative for gait problems, memory problems and seizures  Behavioral/Psych: negative for anxiety and depression  Endocrine: negative for polyuria and weight loss     Objective:   Temp:  [97.3 °F (36.3 °C)-98.7 °F (37.1 °C)] 98.6 °F (37 °C)  Pulse:  [62-75] 73  Resp:  [14-18] 16  BP: (138-151)/(46-55) 139/48  SpO2:  [96 %-97 %] 97 %  SpO2: 97 %     Intake/Output Summary (Last 24 hours) at 6/1/2025 1043  Last data filed at 6/1/2025 0843  Gross per 24 hour   Intake 580 ml   Output 600 ml   Net -20 ml     Wt Readings from Last 3 Encounters:   06/01/25 121 lb 14.4 oz (55.3 kg)   02/20/25 123 lb 7.3 oz (56 kg)   01/26/25 123 lb 7.3 oz (56 kg)       General appearance: alert, appears stated age and cooperative  Head: Normocephalic, atraumatic  Eyes: conjunctivae/corneas clear  Throat: lips, mucosa, and tongue normal; teeth and gums normal  Neck:  no JVD, supple, symmetrical  Pulmonary:  clear to auscultation bilaterally  Cardiovascular: S1, S2 normal, no rub or gallop, regular rate and rhythm  Abdominal: soft, non-tender; non distended,  bowel sounds normal   Extremities: extremities normal, no edema  Pulses: pedal pulses palpable  Skin: No rashes or lesions  Lymph nodes: Cervical, supraclavicular normal.  Neurologic: Grossly normal  Psychiatric: calm    Medications:  Current Facility-Administered Medications   Medication Dose Route Frequency    sodium chloride 0.9 % IV bolus 100 mL  100 mL Intravenous Q30 Min PRN    And    albumin human (Albumin) 25% injection 25 g  25 g Intravenous PRN Dialysis    [START ON 6/2/2025] epoetin brittany (Epogen, Procrit) 36065 UNIT/ML injection 10,000 Units  10,000 Units Subcutaneous Once per day on Monday Wednesday Friday    sodium ferric gluconate (Ferrlecit) 125 mg in sodium chloride 0.9% 100mL IVPB premix  125 mg Intravenous Daily    heparin (Porcine) 02454 units/250mL infusion ACS/AFIB CONTINUOUS  200-3,000 Units/hr Intravenous Continuous    metoclopramide (Reglan) 5 mg/mL injection 5 mg  5 mg Intravenous Daily PRN    sodium chloride 0.9% infusion   Intravenous Continuous    insulin degludec (Tresiba) 100 units/mL flextouch 12 Units  12 Units Subcutaneous Daily    atorvastatin (Lipitor) tab 40 mg  40 mg Oral Nightly    clopidogrel (Plavix) tab 75 mg  75 mg Oral Daily    aspirin DR tab 81 mg  81 mg Oral Daily    hydrALAZINE (Apresoline) tab 25 mg  25 mg Oral Q8H BLANE    isosorbide dinitrate (Isordil) tab 20 mg  20 mg Oral TID (Nitrates)    metoprolol succinate (Toprol XL) partial tablet 12.5 mg  12.5 mg Oral Daily Beta Blocker    pantoprazole (Protonix) DR tab 40 mg  40 mg Oral BID AC    calcitriol (Rocaltrol) cap 0.25 mcg  0.25 mcg Oral Q MWF    ipratropium-albuterol (Duoneb) 0.5-2.5 (3) MG/3ML inhalation solution 3 mL  3 mL Nebulization Q6H PRN    HYDROcodone-acetaminophen (Norco) 5-325 MG per tab 1 tablet  1 tablet Oral Q6H PRN    escitalopram (Lexapro) tab 10 mg  10 mg Oral Daily    glucose (Dex4) 15 GM/59ML oral liquid 15 g  15 g Oral Q15 Min PRN    Or    glucose (Glutose) 40% oral gel 15 g  15 g Oral Q15 Min PRN     Or    glucose-vitamin C (Dex-4) chewable tab 4 tablet  4 tablet Oral Q15 Min PRN    Or    dextrose 50% injection 50 mL  50 mL Intravenous Q15 Min PRN    Or    glucose (Dex4) 15 GM/59ML oral liquid 30 g  30 g Oral Q15 Min PRN    Or    glucose (Glutose) 40% oral gel 30 g  30 g Oral Q15 Min PRN    Or    glucose-vitamin C (Dex-4) chewable tab 8 tablet  8 tablet Oral Q15 Min PRN    acetaminophen (Tylenol Extra Strength) tab 500 mg  500 mg Oral Q4H PRN    melatonin tab 3 mg  3 mg Oral Nightly PRN    polyethylene glycol (PEG 3350) (Miralax) 17 g oral packet 17 g  17 g Oral Daily PRN    sennosides (Senokot) tab 17.2 mg  17.2 mg Oral Nightly PRN    bisacodyl (Dulcolax) 10 MG rectal suppository 10 mg  10 mg Rectal Daily PRN    ondansetron (Zofran) 4 MG/2ML injection 4 mg  4 mg Intravenous Q6H PRN    benzonatate (Tessalon) cap 200 mg  200 mg Oral TID PRN    guaiFENesin (Robitussin) 100 MG/5 ML oral liquid 200 mg  200 mg Oral Q4H PRN    glycerin-hypromellose- (Artificial Tears) 0.2-0.2-1 % ophthalmic solution 1 drop  1 drop Both Eyes QID PRN    sodium chloride (Saline Mist) 0.65 % nasal solution 1 spray  1 spray Each Nare Q3H PRN    insulin aspart (NovoLOG) 100 Units/mL FlexPen 1-7 Units  1-7 Units Subcutaneous TID CC    [Held by provider] insulin aspart (NovoLOG) 100 Units/mL FlexPen 5 Units  5 Units Subcutaneous TID CC and HS          Results:     Recent Labs   Lab 05/29/25  0900 05/30/25  0655 05/30/25  1733 05/31/25  0556 06/01/25  0643   RBC 2.79* 2.72*  --  2.99* 2.87*   HGB 7.4* 7.3* 8.7* 8.3* 8.2*   HCT 22.5* 23.0* 26.7* 25.3* 24.2*   MCV 80.6 84.6  --  84.6 84.3   NEPRELIM 3.81 5.22  --   --  4.26   WBC 5.1 6.7  --  5.2 5.6   .0* 127.0*  --  124.0*  124.0* 133.0*  133.0*     Recent Labs   Lab 05/26/25  0551 05/27/25  0246 05/30/25  1733 05/31/25  0556 06/01/25  0643   GLU 88   < > 88 225* 61*   BUN 75*   < > 38* 47* 23   CREATSERUM 3.58*   < > 3.00* 3.16* 2.28*   CA 7.6*   < > 7.9* 7.8* 8.1*   ALB  3.6   < > 3.5 3.5 3.7   *   < > 134* 133* 139   K 4.7   < > 4.3 4.8 4.0      < > 101 102 104   CO2 23.0   < > 25.0 22.0 26.0   ALKPHO 73  --   --   --   --    AST 16  --   --   --   --    ALT <7*  --   --   --   --    BILT 0.2  --   --   --   --    TP 5.7  --   --   --   --     < > = values in this interval not displayed.     PTT   Date Value Ref Range Status   05/31/2025 44.2 (H) 23.0 - 36.0 seconds Final     Comment:       The aPTT Heparin Therapeutic Range is approximately 65- 104 seconds. The therapeutic range has been validated against 0.3-0.7 heparin anti-Xa units/mL.     Elevations of the aPTT in patients not receiving anticoagulant therapy (Heparin, etc.), may be seen in Factor deficiency, vitamin K deficiency, factor inhibitors, liver disease, etc.   Clinical correlation is recommended.          INR   Date Value Ref Range Status   05/22/2025 1.16 0.80 - 1.20 Final     Comment:     Therapeutic INR range for patients on warfarin:  2.0-3.0 for most medical conditions and surgical prophylaxis   2.5-3.5 for mechanical heart valves and recurrent thromboembolism    Direct thrombin inhibitors (e.g. argatroban, bivalirudin), factor Xa inhibitors (e.g. apixaban, rivaroxaban), and conditions such as coagulation factor deficiency, vitamin K deficiency, and liver disease will prolong the prothrombin time/INR.    Unfractionated heparin and low molecular weight heparin do not affect the prothrombin time/INR up to a concentration of 1 IU/mL and 1.5 IU/mL, respectively.         No results for input(s): \"BNP\" in the last 168 hours.  Recent Labs   Lab 05/30/25  0655 05/30/25  1733 05/31/25  0556 06/01/25  0643   MG 1.9  --  2.0 1.9   PHOS 4.1 4.2 4.7 3.2        Recent Labs   Lab 05/30/25  1733 05/31/25  0556 06/01/25  0643   PHOS 4.2 4.7 3.2   ALB 3.5 3.5 3.7       No results found.        Assessment and Plan:     Patient is a 84-year-old female with past medical history of-vascular disease s/p left  femoral-popliteal bypass in 2015, hypertension, hyperlipidemia, COPD, CKD stage IV, diabetes, right ICA stenosis s/p right TCAR, CKD stage III presented with shortness of breath, moderate to severe MR who presented for progressive shortness of breath    1.DEBO on CKD/progressive CKD: S/p left dialysis catheter placement and patient was started on dialysis  Next HD tomorrow  S/p tunneled dialysis catheter placement on 5/22     2.  Right leg ischemia and atherosclerosis ulcer:  Vascular surgery input noted.  Recommended femoral endarterectomy with iliac stenting and SFA stenting    3.non-ST elevated MI with new ischemic cardiomyopathy:  S/p left heart cath on 5/28 with PCI.  Course complicated by postop right groin wound bleeding     4.mitral regurgitation:  Thoracic surgery input noted-patient high risk for surgery    Discussed with nursing and family at bedside    Flex Chavez MD

## 2025-06-01 NOTE — PROGRESS NOTES
[facility]  part of [facility]    Progress Note    Radha Yu Patient Status:  Inpatient    1940 MRN [id number]   Location [facility]5W Attending Villa Angel MD   Hosp Day # 10 PCP Stef Velasco MD     Chief complaint     Subjective:   Radha Yu is a(n) 84 year old female who came in with chest pain and sob.     C/o LE pain , just received Norco     A comprehensive 10 point review of systems was completed.  Pertinent positives and negatives noted in the the HPI.    Objective:     Blood pressure 139/48, pulse 73, temperature 98.6 °F (37 °C), temperature source Oral, resp. rate 16, weight 121 lb 14.4 oz (55.3 kg), SpO2 97%.      Intake/Output Summary (Last 24 hours) at 2025 1305  Last data filed at 2025 0843  Gross per 24 hour   Intake 240 ml   Output 600 ml   Net -360 ml         Patient Weight(s) for the past 336 hrs:   Weight   25 0310 121 lb 14.4 oz (55.3 kg)   25 0500 123 lb 4.8 oz (55.9 kg)   25 0555 121 lb 12.8 oz (55.2 kg)   25 0558 121 lb (54.9 kg)   25 0402 120 lb 14.4 oz (54.8 kg)   25 0617 116 lb 9.6 oz (52.9 kg)   25 2146 123 lb 14.4 oz (56.2 kg)   25 1114 118 lb 13.3 oz (53.9 kg)           Gen: uncomfortable  Pulm: Lungs clear, normal respiratory effort  CV: Heart with regular rate and rhythm  Abd: Abdomen soft, nontender, nondistended, bowel sounds present  Neuro: No acute focal deficits  MSK: moves extremities  Skin: Warm and dry  Psych: Normal affect  Ext: rt le looks paler compared to left, sensation intact but decreased, rt groin wound looks good, rt pedal pulse heard with doppler          Medicines:     Current Hospital Medications[1]            Blood Sugar Medications            insulin aspart 100 Units/mL Subcutaneous Solution Pen-injector    insulin glargine 100 UNIT/ML Subcutaneous Solution            Blood Pressure and Cardiac Medications            amLODIPine 5 MG Oral Tab            Medication  Infusions[2]        Lab Results   Component Value Date    WBC 5.6 06/01/2025    HGB 8.2 (L) 06/01/2025    HCT 24.2 (L) 06/01/2025    .0 (L) 06/01/2025    .0 (L) 06/01/2025    CREATSERUM 2.28 (H) 06/01/2025    BUN 23 06/01/2025     06/01/2025    K 4.0 06/01/2025     06/01/2025    CO2 26.0 06/01/2025    GLU 61 (L) 06/01/2025    CA 8.1 (L) 06/01/2025    ALB 3.7 06/01/2025    ALKPHO 73 05/26/2025    BILT 0.2 05/26/2025    TP 5.7 05/26/2025    AST 16 05/26/2025    ALT <7 (L) 05/26/2025    PTT 44.2 (H) 05/31/2025    INR 1.16 05/22/2025    TSH 1.993 05/27/2025    MG 1.9 06/01/2025    PHOS 3.2 06/01/2025       No results found.            Results:     CBC:    Lab Results   Component Value Date    WBC 5.6 06/01/2025    WBC 5.2 05/31/2025    WBC 6.7 05/30/2025     Lab Results   Component Value Date    HGB 8.2 (L) 06/01/2025    HGB 8.3 (L) 05/31/2025    HGB 8.7 (L) 05/30/2025      Lab Results   Component Value Date    .0 (L) 06/01/2025    .0 (L) 06/01/2025    .0 (L) 05/31/2025    .0 (L) 05/31/2025       Recent Labs   Lab 05/30/25  1733 05/31/25  0556 06/01/25  0643   GLU 88 225* 61*   BUN 38* 47* 23   CREATSERUM 3.00* 3.16* 2.28*   CA 7.9* 7.8* 8.1*   * 133* 139   K 4.3 4.8 4.0    102 104   CO2 25.0 22.0 26.0           Assessment and Plan:      RLE ischemia  Rt LE pain and decreased sensation, color   - plan stat ct a/p/bl le runoff to rule out dissection - results reviewed - plan percutaneous revascularization of right SFA with possible IVL vs atherectomy as she is high risk for open vascular surgery   - per vascular : femoral endarterectomy with iliac stenting and SFA stenting and any tibial interventions to improve.   Continue heparin    NSTEMI type 1 with new ischemic cardiomyopathy acute systolic chf  Multivessel CAD  - left heart cath again 5/28 with pci, post op with rt groin wound bleeding that improved after pressure   - on asa, plavix, statin, bb      Tricuspid and mitral regurg  - volume removal per hd     PVD s/p left femoropopliteal bypass 9/2015  - 80% proximal left renal artery stenosis on angiogram   - On asa, plavix, atorvastatin      HL  - statin     HTN  - elevated - adjust oral meds per cards     Acute on chronic anemia of renal disease  - possibly baseline anemia from ckd  - heparin stopped; gi defers invasive dx till more stable  - hb stable today , ppi     acute copd exacerbation  - pulm consulted signed off no steroids; no abx  - plan nebs scheduled and prn  - plan pulm hygiene  - wean oxygen as able     DEBO on CKD stage 4 ->no esrd  - nephrology consulted  - tolerating HD   - hd cath placed      DM  - hypoglycemic this am. Hold tresiba.   - accuchecks and ISS    Moderate right ICA stenosis s/p right TCAR 2/2019, totally occluded left ICA  - On asa, plavix, atorvastatin                       high mdm; coordinating care with nurse and counseling pt and with her permission her nephew in room  about chf/sob/diet/cath/dialysis         Dispo: when stable           Chart reviewed, including current vitals, notes, labs and imaging  Labs ordered and medications adjusted as outlined above  Coordinate care with care team/consultants  Discussed with patient results of tests, management plan as outlined above, and the need for ongoing hospitalization  D/w RN     MDM high        Tracy Vega MD, FAAFP        Supplementary Documentation:   DVT Mechanical Prophylaxis:   SCDs,    DVT Pharmacologic Prophylaxis   Medication    heparin (Porcine) 1000 UNIT/ML injection 1,500 Units    heparin (Porcine) 83660 units/250mL infusion ACS/AFIB CONTINUOUS         DVT Pharmacologic prophylaxis: Aspirin 81 mg      Code Status: Full Code  Rao: External urinary catheter in place  Rao Duration (in days):   Central line:    FRANCIS: 6/3/2025         [1]   Current Facility-Administered Medications   Medication Dose Route Frequency    sodium chloride 0.9 % IV bolus 100 mL  100 mL  Intravenous Q30 Min PRN    And    albumin human (Albumin) 25% injection 25 g  25 g Intravenous PRN Dialysis    heparin (Porcine) 1000 UNIT/ML injection 1,500 Units  1.5 mL Intracatheter Once    sodium chloride 0.9 % IV bolus 100 mL  100 mL Intravenous Q30 Min PRN    And    albumin human (Albumin) 25% injection 25 g  25 g Intravenous PRN Dialysis    [START ON 6/2/2025] epoetin brittany (Epogen, Procrit) 63977 UNIT/ML injection 10,000 Units  10,000 Units Subcutaneous Once per day on Monday Wednesday Friday    sodium ferric gluconate (Ferrlecit) 125 mg in sodium chloride 0.9% 100mL IVPB premix  125 mg Intravenous Daily    heparin (Porcine) 19747 units/250mL infusion ACS/AFIB CONTINUOUS  200-3,000 Units/hr Intravenous Continuous    metoclopramide (Reglan) 5 mg/mL injection 5 mg  5 mg Intravenous Daily PRN    sodium chloride 0.9% infusion   Intravenous Continuous    insulin degludec (Tresiba) 100 units/mL flextouch 12 Units  12 Units Subcutaneous Daily    atorvastatin (Lipitor) tab 40 mg  40 mg Oral Nightly    clopidogrel (Plavix) tab 75 mg  75 mg Oral Daily    aspirin DR tab 81 mg  81 mg Oral Daily    hydrALAZINE (Apresoline) tab 25 mg  25 mg Oral Q8H BLANE    isosorbide dinitrate (Isordil) tab 20 mg  20 mg Oral TID (Nitrates)    metoprolol succinate (Toprol XL) partial tablet 12.5 mg  12.5 mg Oral Daily Beta Blocker    pantoprazole (Protonix) DR tab 40 mg  40 mg Oral BID AC    calcitriol (Rocaltrol) cap 0.25 mcg  0.25 mcg Oral Q MWF    ipratropium-albuterol (Duoneb) 0.5-2.5 (3) MG/3ML inhalation solution 3 mL  3 mL Nebulization Q6H PRN    HYDROcodone-acetaminophen (Norco) 5-325 MG per tab 1 tablet  1 tablet Oral Q6H PRN    escitalopram (Lexapro) tab 10 mg  10 mg Oral Daily    glucose (Dex4) 15 GM/59ML oral liquid 15 g  15 g Oral Q15 Min PRN    Or    glucose (Glutose) 40% oral gel 15 g  15 g Oral Q15 Min PRN    Or    glucose-vitamin C (Dex-4) chewable tab 4 tablet  4 tablet Oral Q15 Min PRN    Or    dextrose 50% injection 50  mL  50 mL Intravenous Q15 Min PRN    Or    glucose (Dex4) 15 GM/59ML oral liquid 30 g  30 g Oral Q15 Min PRN    Or    glucose (Glutose) 40% oral gel 30 g  30 g Oral Q15 Min PRN    Or    glucose-vitamin C (Dex-4) chewable tab 8 tablet  8 tablet Oral Q15 Min PRN    acetaminophen (Tylenol Extra Strength) tab 500 mg  500 mg Oral Q4H PRN    melatonin tab 3 mg  3 mg Oral Nightly PRN    polyethylene glycol (PEG 3350) (Miralax) 17 g oral packet 17 g  17 g Oral Daily PRN    sennosides (Senokot) tab 17.2 mg  17.2 mg Oral Nightly PRN    bisacodyl (Dulcolax) 10 MG rectal suppository 10 mg  10 mg Rectal Daily PRN    ondansetron (Zofran) 4 MG/2ML injection 4 mg  4 mg Intravenous Q6H PRN    benzonatate (Tessalon) cap 200 mg  200 mg Oral TID PRN    guaiFENesin (Robitussin) 100 MG/5 ML oral liquid 200 mg  200 mg Oral Q4H PRN    glycerin-hypromellose- (Artificial Tears) 0.2-0.2-1 % ophthalmic solution 1 drop  1 drop Both Eyes QID PRN    sodium chloride (Saline Mist) 0.65 % nasal solution 1 spray  1 spray Each Nare Q3H PRN    insulin aspart (NovoLOG) 100 Units/mL FlexPen 1-7 Units  1-7 Units Subcutaneous TID CC    [Held by provider] insulin aspart (NovoLOG) 100 Units/mL FlexPen 5 Units  5 Units Subcutaneous TID CC and HS   [2]    continuous dose heparin Stopped (05/31/25 0602)    sodium chloride Stopped (05/30/25 2100)

## 2025-06-01 NOTE — PLAN OF CARE
Patient denied pain or discomfort. Back from dialysis - x1 albumin given, 300 ml removed. NO bleeding noted throughout the shift. CHG bath and antwon care provided. Safety precaution maintained. Plan for femoral endarterectomy with iliac stenting and SFA stenting.     Problem: CARDIOVASCULAR - ADULT  Goal: Maintains optimal cardiac output and hemodynamic stability  Description: INTERVENTIONS:  - Monitor vital signs, rhythm, and trends  - Monitor for bleeding, hypotension and signs of decreased cardiac output  - Evaluate effectiveness of vasoactive medications to optimize hemodynamic stability  - Monitor arterial and/or venous puncture sites for bleeding and/or hematoma  - Assess quality of pulses, skin color and temperature  - Assess for signs of decreased coronary artery perfusion - ex. Angina  - Evaluate fluid balance, assess for edema, trend weights  Outcome: Progressing  Goal: Absence of cardiac arrhythmias or at baseline  Description: INTERVENTIONS:  - Continuous cardiac monitoring, monitor vital signs, obtain 12 lead EKG if indicated  - Evaluate effectiveness of antiarrhythmic and heart rate control medications as ordered  - Initiate emergency measures for life threatening arrhythmias  - Monitor electrolytes and administer replacement therapy as ordered  Outcome: Progressing     Problem: RESPIRATORY - ADULT  Goal: Achieves optimal ventilation and oxygenation  Description: INTERVENTIONS:  - Assess for changes in respiratory status  - Assess for changes in mentation and behavior  - Position to facilitate oxygenation and minimize respiratory effort  - Oxygen supplementation based on oxygen saturation or ABGs  - Provide Smoking Cessation handout, if applicable  - Encourage broncho-pulmonary hygiene including cough, deep breathe, Incentive Spirometry  - Assess the need for suctioning and perform as needed  - Assess and instruct to report SOB or any respiratory difficulty  - Respiratory Therapy support as indicated  -  Manage/alleviate anxiety  - Monitor for signs/symptoms of CO2 retention  Outcome: Progressing     Problem: GENITOURINARY - ADULT  Goal: Absence of urinary retention  Description: INTERVENTIONS:  - Assess patient’s ability to void and empty bladder  - Monitor intake/output and perform bladder scan as needed  - Follow urinary retention protocol/standard of care  - Consider collaborating with pharmacy to review patient's medication profile  - Implement strategies to promote bladder emptying  Outcome: Progressing     Problem: METABOLIC/FLUID AND ELECTROLYTES - ADULT  Goal: Glucose maintained within prescribed range  Description: INTERVENTIONS:  - Monitor Blood Glucose as ordered  - Assess for signs and symptoms of hyperglycemia and hypoglycemia  - Administer ordered medications to maintain glucose within target range  - Assess barriers to adequate nutritional intake and initiate nutrition consult as needed  - Instruct patient on self management of diabetes  Outcome: Progressing  Goal: Electrolytes maintained within normal limits  Description: INTERVENTIONS:  - Monitor labs and rhythm and assess patient for signs and symptoms of electrolyte imbalances  - Administer electrolyte replacement as ordered  - Monitor response to electrolyte replacements, including rhythm and repeat lab results as appropriate  - Fluid restriction as ordered  - Instruct patient on fluid and nutrition restrictions as appropriate  Outcome: Progressing  Goal: Hemodynamic stability and optimal renal function maintained  Description: INTERVENTIONS:  - Monitor labs and assess for signs and symptoms of volume excess or deficit  - Monitor intake, output and patient weight  - Monitor urine specific gravity, serum osmolarity and serum sodium as indicated or ordered  - Monitor response to interventions for patient's volume status, including labs, urine output, blood pressure (other measures as available)  - Encourage oral intake as appropriate  - Instruct  patient on fluid and nutrition restrictions as appropriate  Outcome: Progressing     Problem: Patient Centered Care  Goal: Patient preferences are identified and integrated in the patient's plan of care  Description: Interventions:  - What would you like us to know as we care for you? Home with nephew  - Provide timely, complete, and accurate information to patient/family  - Incorporate patient and family knowledge, values, beliefs, and cultural backgrounds into the planning and delivery of care  - Encourage patient/family to participate in care and decision-making at the level they choose  - Honor patient and family perspectives and choices  Outcome: Progressing     Problem: Diabetes/Glucose Control  Goal: Glucose maintained within prescribed range  Description: INTERVENTIONS:  - Monitor Blood Glucose as ordered  - Assess for signs and symptoms of hyperglycemia and hypoglycemia  - Administer ordered medications to maintain glucose within target range  - Assess barriers to adequate nutritional intake and initiate nutrition consult as needed  - Instruct patient on self management of diabetes  Outcome: Progressing

## 2025-06-01 NOTE — PLAN OF CARE
Alert and oriented. Norco given for pain.  L Groin site dressing clean, dry, and intact.  Heparin Restarted.  Call light and belongings at bedside.  Problem: CARDIOVASCULAR - ADULT  Goal: Maintains optimal cardiac output and hemodynamic stability  Description: INTERVENTIONS:  - Monitor vital signs, rhythm, and trends  - Monitor for bleeding, hypotension and signs of decreased cardiac output  - Evaluate effectiveness of vasoactive medications to optimize hemodynamic stability  - Monitor arterial and/or venous puncture sites for bleeding and/or hematoma  - Assess quality of pulses, skin color and temperature  - Assess for signs of decreased coronary artery perfusion - ex. Angina  - Evaluate fluid balance, assess for edema, trend weights  Outcome: Progressing  Goal: Absence of cardiac arrhythmias or at baseline  Description: INTERVENTIONS:  - Continuous cardiac monitoring, monitor vital signs, obtain 12 lead EKG if indicated  - Evaluate effectiveness of antiarrhythmic and heart rate control medications as ordered  - Initiate emergency measures for life threatening arrhythmias  - Monitor electrolytes and administer replacement therapy as ordered  Outcome: Progressing     Problem: RESPIRATORY - ADULT  Goal: Achieves optimal ventilation and oxygenation  Description: INTERVENTIONS:  - Assess for changes in respiratory status  - Assess for changes in mentation and behavior  - Position to facilitate oxygenation and minimize respiratory effort  - Oxygen supplementation based on oxygen saturation or ABGs  - Provide Smoking Cessation handout, if applicable  - Encourage broncho-pulmonary hygiene including cough, deep breathe, Incentive Spirometry  - Assess the need for suctioning and perform as needed  - Assess and instruct to report SOB or any respiratory difficulty  - Respiratory Therapy support as indicated  - Manage/alleviate anxiety  - Monitor for signs/symptoms of CO2 retention  Outcome: Progressing     Problem:  GENITOURINARY - ADULT  Goal: Absence of urinary retention  Description: INTERVENTIONS:  - Assess patient’s ability to void and empty bladder  - Monitor intake/output and perform bladder scan as needed  - Follow urinary retention protocol/standard of care  - Consider collaborating with pharmacy to review patient's medication profile  - Implement strategies to promote bladder emptying  Outcome: Progressing     Problem: Patient Centered Care  Goal: Patient preferences are identified and integrated in the patient's plan of care  Description: Interventions:  - What would you like us to know as we care for you?   - Provide timely, complete, and accurate information to patient/family  - Incorporate patient and family knowledge, values, beliefs, and cultural backgrounds into the planning and delivery of care  - Encourage patient/family to participate in care and decision-making at the level they choose  - Honor patient and family perspectives and choices  Outcome: Progressing     Problem: Diabetes/Glucose Control  Goal: Glucose maintained within prescribed range  Description: INTERVENTIONS:  - Monitor Blood Glucose as ordered  - Assess for signs and symptoms of hyperglycemia and hypoglycemia  - Administer ordered medications to maintain glucose within target range  - Assess barriers to adequate nutritional intake and initiate nutrition consult as needed  - Instruct patient on self management of diabetes  Outcome: Progressing     Problem: Patient/Family Goals  Goal: Patient/Family Long Term Goal  Description: Patient's Long Term Goal:     Interventions:  - See additional Care Plan goals for specific interventions  Outcome: Progressing  Goal: Patient/Family Short Term Goal  Description: Patient's Short Term Goal:     Interventions:   - See additional Care Plan goals for specific interventions  Outcome: Progressing     Problem: METABOLIC/FLUID AND ELECTROLYTES - ADULT  Goal: Glucose maintained within prescribed  range  Description: INTERVENTIONS:  - Monitor Blood Glucose as ordered  - Assess for signs and symptoms of hyperglycemia and hypoglycemia  - Administer ordered medications to maintain glucose within target range  - Assess barriers to adequate nutritional intake and initiate nutrition consult as needed  - Instruct patient on self management of diabetes  Outcome: Progressing  Goal: Electrolytes maintained within normal limits  Description: INTERVENTIONS:  - Monitor labs and rhythm and assess patient for signs and symptoms of electrolyte imbalances  - Administer electrolyte replacement as ordered  - Monitor response to electrolyte replacements, including rhythm and repeat lab results as appropriate  - Fluid restriction as ordered  - Instruct patient on fluid and nutrition restrictions as appropriate  Outcome: Progressing  Goal: Hemodynamic stability and optimal renal function maintained  Description: INTERVENTIONS:  - Monitor labs and assess for signs and symptoms of volume excess or deficit  - Monitor intake, output and patient weight  - Monitor urine specific gravity, serum osmolarity and serum sodium as indicated or ordered  - Monitor response to interventions for patient's volume status, including labs, urine output, blood pressure (other measures as available)  - Encourage oral intake as appropriate  - Instruct patient on fluid and nutrition restrictions as appropriate  Outcome: Progressing

## 2025-06-01 NOTE — PROGRESS NOTES
Progress Note  Radha Yu Patient Status:  Inpatient    1940 MRN [id number]   Location [facility] 3W/SW Attending Tracy Vega MD   Hosp Day # 10 PCP Stef Velasco MD     Subjective:  No acute events overnight.  Feels better this morning, report only mild numbness to the right leg, no significant pain at this time. Denies chest pain or SOB at this time.     Objective:  /48 (BP Location: Right arm)   Pulse 73   Temp 98.6 °F (37 °C) (Oral)   Resp 16   Wt 121 lb 14.4 oz (55.3 kg)   SpO2 97%   BMI 23.05 kg/m²     Telemetry: SR, HR 70s      Intake/Output:    Intake/Output Summary (Last 24 hours) at 2025 0907  Last data filed at 2025 0843  Gross per 24 hour   Intake 580 ml   Output 600 ml   Net -20 ml       Last 3 Weights   25 0310 121 lb 14.4 oz (55.3 kg)   25 0500 123 lb 4.8 oz (55.9 kg)   25 0555 121 lb 12.8 oz (55.2 kg)   25 0558 121 lb (54.9 kg)   25 0402 120 lb 14.4 oz (54.8 kg)   25 0617 116 lb 9.6 oz (52.9 kg)   25 2146 123 lb 14.4 oz (56.2 kg)   25 1114 118 lb 13.3 oz (53.9 kg)   25 1830 123 lb 7.3 oz (56 kg)   25 0700 123 lb 7.3 oz (56 kg)   25 1805 123 lb 6.4 oz (56 kg)   25 1121 139 lb 1.8 oz (63.1 kg)       Labs:  Recent Labs   Lab 25  1733 25  0556 25  0643   GLU 88 225* 61*   BUN 38* 47* 23   CREATSERUM 3.00* 3.16* 2.28*   EGFRCR 15* 14* 20*   CA 7.9* 7.8* 8.1*   * 133* 139   K 4.3 4.8 4.0    102 104   CO2 25.0 22.0 26.0     Recent Labs   Lab 25  0900 25  0655 25  1733 25  0556 25  0643   RBC 2.79* 2.72*  --  2.99* 2.87*   HGB 7.4* 7.3* 8.7* 8.3* 8.2*   HCT 22.5* 23.0* 26.7* 25.3* 24.2*   MCV 80.6 84.6  --  84.6 84.3   MCH 26.5 26.8  --  27.8 28.6   MCHC 32.9 31.7  --  32.8 33.9   RDW 15.9* 15.9*  --  15.8* 15.8*   NEPRELIM 3.81 5.22  --   --  4.26   WBC 5.1 6.7  --  5.2 5.6   .0* 127.0*  --  124.0*  124.0* 133.0*  133.0*          No results for input(s): \"TROP\", \"TROPHS\", \"CK\" in the last 168 hours.    Review of Systems   Constitutional: Negative.   Cardiovascular: Negative.    Respiratory: Negative.         Physical Exam:    Gen: alert, oriented x 3, NAD  Heent: pupils equal, reactive. Mucous membranes moist.   Neck: no jvd  Cardiac: regular rate and rhythm, normal S1,S2, 1/6 VIOLET, no gallop or rub   Lungs: CTA  Abd: soft, NT/ND +bs  Ext: no edema, left groin with dry dressing, no bleeding or hematoma   Skin: Warm, dry  Neuro: No focal deficits    Medications:    Scheduled Medications[1]  Medication Infusions[2]      Assessment:  Severe PVD s/p left femoral popliteal bypass 2015, s/p right TCAR and total occluded left ICA  S/P peripheral angio 5/30/25 showed % occlusion, external iliac 60-70% stenosis -s/p unsuccessful intervention to the RLE-plan for possible femoral endarterectomy with iliac stenting and SFA stenting with Dr Fishman timing to be decided-holding off heparin gtt with recurrent groin bleeding -currently stable   NSTEMI/Multivessel CAD-surgical turn down,  s/p successful bifurcation PCI to mid LAD and first diagonal branch 5/28/25   On asa, plavix, statin, bb  New ischemic cardiomyopathy, LVEF 35-40%  Volume management per HD  GDMT: toprol, isordil/hydralazine, not on ace/arb.arni d/t CKD  Valvular disease with mod-sev MR, mild-mod TR  ESRD-started on HD during this admit  Nephrology following   Hx of mod right ICA stenosis s/p TCAR 2/2019, totally occluded left ICA  On asa, plavix, statin  HTN-controlled   HLP-on statin, LDL48  DM2, A1C 6.5%  Acute on chronic anemia, hgb stable at 8.2 today    Thrombocytopenia, plts stable at 133    Plan:  Dr Sravanthi treadwell noted-plan for possible femoral endarterectomy with iliac stenting and SFA stenting and tibial interventions-timing to be decided.  Continue asa, plavix, statin.  Holding off heparin gtt given recurrent groin bleeding.  Continue bb, isordil, hydralazine.  Volume  management per nephrology.      Plan of care discussed with patient, RN.    FAHAD Hoff  6/1/2025  9:07 AM  [phone]      Plan for possible femoral endartectomy with iliac stenting and SFA stenting and tibial interventions communicated with patient and family- Timing to be determined  Heparin held for now as outlined above but asa/plavix continuing  Fluid management per renal. HD to continue  Will try to maximize GDMT for ischemic CMP as able   Will check labs as well with anemia.   Continue supportive care.     I agree with the findings of the complexity of problems addressed and take responsibility for the plan's risks and complications. I approve of the plan as documented above.     Yaquelin Soto MD  L-3         [1]    [START ON 6/2/2025] epoetin brittany  10,000 Units Subcutaneous Once per day on Monday Wednesday Friday    sodium ferric gluconate  125 mg Intravenous Daily    insulin degludec  12 Units Subcutaneous Daily    atorvastatin  40 mg Oral Nightly    clopidogrel  75 mg Oral Daily    aspirin  81 mg Oral Daily    hydrALAZINE  25 mg Oral Q8H BLANE    isosorbide dinitrate  20 mg Oral TID (Nitrates)    metoprolol succinate  12.5 mg Oral Daily Beta Blocker    pantoprazole  40 mg Oral BID AC    calcitriol  0.25 mcg Oral Q MWF    escitalopram  10 mg Oral Daily    insulin aspart  1-7 Units Subcutaneous TID CC    [Held by provider] insulin aspart  5 Units Subcutaneous TID CC and HS   [2]    continuous dose heparin Stopped (05/31/25 0602)    sodium chloride Stopped (05/30/25 2100)

## 2025-06-02 LAB
ANION GAP SERPL CALC-SCNC: 7 MMOL/L (ref 0–18)
ANTIBODY SCREEN: NEGATIVE
APTT PPP: 160.3 SECONDS (ref 23–36)
APTT PPP: 68.9 SECONDS (ref 23–36)
APTT PPP: 91 SECONDS (ref 23–36)
BUN BLD-MCNC: 33 MG/DL (ref 9–23)
BUN/CREAT SERPL: 12 (ref 10–20)
CALCIUM BLD-MCNC: 8 MG/DL (ref 8.7–10.4)
CHLORIDE SERPL-SCNC: 102 MMOL/L (ref 98–112)
CO2 SERPL-SCNC: 27 MMOL/L (ref 21–32)
CREAT BLD-MCNC: 2.75 MG/DL (ref 0.55–1.02)
DEPRECATED RDW RBC AUTO: 50.2 FL (ref 35.1–46.3)
EGFRCR SERPLBLD CKD-EPI 2021: 16 ML/MIN/1.73M2 (ref 60–?)
ERYTHROCYTE [DISTWIDTH] IN BLOOD BY AUTOMATED COUNT: 16 % (ref 11–15)
GLUCOSE BLD-MCNC: 64 MG/DL (ref 70–99)
GLUCOSE BLDC GLUCOMTR-MCNC: 153 MG/DL (ref 70–99)
GLUCOSE BLDC GLUCOMTR-MCNC: 262 MG/DL (ref 70–99)
GLUCOSE BLDC GLUCOMTR-MCNC: 295 MG/DL (ref 70–99)
GLUCOSE BLDC GLUCOMTR-MCNC: 89 MG/DL (ref 70–99)
HCT VFR BLD AUTO: 23.1 % (ref 35–48)
HCT VFR BLD AUTO: 26.8 % (ref 35–48)
HGB BLD-MCNC: 7.2 G/DL (ref 12–16)
HGB BLD-MCNC: 8.7 G/DL (ref 12–16)
MCH RBC QN AUTO: 26.8 PG (ref 26–34)
MCHC RBC AUTO-ENTMCNC: 31.2 G/DL (ref 31–37)
MCV RBC AUTO: 85.9 FL (ref 80–100)
OSMOLALITY SERPL CALC.SUM OF ELEC: 287 MOSM/KG (ref 275–295)
PLATELET # BLD AUTO: 131 10(3)UL (ref 150–450)
PLATELETS.RETICULATED NFR BLD AUTO: 6.2 % (ref 0–7)
POTASSIUM SERPL-SCNC: 4 MMOL/L (ref 3.5–5.1)
RBC # BLD AUTO: 2.69 X10(6)UL (ref 3.8–5.3)
RH BLOOD TYPE: POSITIVE
SODIUM SERPL-SCNC: 136 MMOL/L (ref 136–145)
WBC # BLD AUTO: 3 X10(3) UL (ref 4–11)

## 2025-06-02 PROCEDURE — 90935 HEMODIALYSIS ONE EVALUATION: CPT | Performed by: INTERNAL MEDICINE

## 2025-06-02 PROCEDURE — 99233 SBSQ HOSP IP/OBS HIGH 50: CPT | Performed by: FAMILY MEDICINE

## 2025-06-02 PROCEDURE — 99232 SBSQ HOSP IP/OBS MODERATE 35: CPT | Performed by: INTERNAL MEDICINE

## 2025-06-02 RX ORDER — ALBUMIN (HUMAN) 12.5 G/50ML
25 SOLUTION INTRAVENOUS
Status: ACTIVE | OUTPATIENT
Start: 2025-06-04 | End: 2025-06-05

## 2025-06-02 RX ORDER — SODIUM CHLORIDE 9 MG/ML
INJECTION, SOLUTION INTRAVENOUS ONCE
Status: COMPLETED | OUTPATIENT
Start: 2025-06-02 | End: 2025-06-02

## 2025-06-02 RX ORDER — HEPARIN SODIUM 1000 [USP'U]/ML
1.5 INJECTION, SOLUTION INTRAVENOUS; SUBCUTANEOUS ONCE
Status: DISCONTINUED | OUTPATIENT
Start: 2025-06-04 | End: 2025-06-03

## 2025-06-02 NOTE — PLAN OF CARE
Aox4. Room air. 1-2 assist with walker. PRN dilaudid for BLE pain mgmt. Heparin gtt. HD this AM.    Problem: CARDIOVASCULAR - ADULT  Goal: Maintains optimal cardiac output and hemodynamic stability  Description: INTERVENTIONS:  - Monitor vital signs, rhythm, and trends  - Monitor for bleeding, hypotension and signs of decreased cardiac output  - Evaluate effectiveness of vasoactive medications to optimize hemodynamic stability  - Monitor arterial and/or venous puncture sites for bleeding and/or hematoma  - Assess quality of pulses, skin color and temperature  - Assess for signs of decreased coronary artery perfusion - ex. Angina  - Evaluate fluid balance, assess for edema, trend weights  Outcome: Progressing

## 2025-06-02 NOTE — PROGRESS NOTES
[facility]  part of [facility]    Progress Note    Radha Yu Patient Status:  Inpatient    1940 MRN [id number]   Location [facility] 3W/SW Attending Tracy Vega MD   Hosp Day # 11 PCP Stef Velasco MD       Subjective:   Radha Yu is a(n) 84 year old female who I am seeing for ESRD      Seen on dialysis.    Objective:   /51 (BP Location: Right arm)   Pulse 62   Temp 97.7 °F (36.5 °C) (Oral)   Resp 18   Wt 121 lb 12.8 oz (55.2 kg)   SpO2 97%   BMI 23.03 kg/m²      Intake/Output Summary (Last 24 hours) at 2025 1029  Last data filed at 2025 2142  Gross per 24 hour   Intake 518.07 ml   Output --   Net 518.07 ml     Wt Readings from Last 1 Encounters:   25 121 lb 12.8 oz (55.2 kg)       Exam  Gen: No acute distress, Heent: NC AT, mucous memb clear, neck supple  Pulm: Lungs clear, normal respiratory effort  CV: Heart with regular rate and rhythm, no edema  Abd: Abdomen soft, nontender, nondistended, no organomegaly, bowel sounds present  Skin: no symptoms reported  Psych: confused  Left IJ in place  Assessment and Plan:     1 - ESRD  The patient is now being maintained on dialysis 3 times a week as a life-sustaining modality.    Plan next dialysis on Wednesday.    2 - R LE ischemia   Vascular surgery is following her.  She will need a femoral endarterectomy with iliac stenting and SFA stenting and tibial intervention to improve    3 - NSTEMI w ischemic cardiomyopathy/mitral regurgitation  Status post left heart cath May 28 with PCI.  She developed postop right groin wound bleeding.  She is on aspirin, Plavix, statin    4 - Anemia  On Epogen and Ferrlecit        Results:     Recent Labs   Lab 25  0900 25  0655 25  1733 25  0556 25  0643 25  0705   RBC 2.79* 2.72*  --  2.99* 2.87* 2.69*   HGB 7.4* 7.3*   < > 8.3* 8.2* 7.2*   HCT 22.5* 23.0*   < > 25.3* 24.2* 23.1*   MCV 80.6 84.6  --  84.6 84.3 85.9   MCH 26.5 26.8   --  27.8 28.6 26.8   MCHC 32.9 31.7  --  32.8 33.9 31.2   RDW 15.9* 15.9*  --  15.8* 15.8* 16.0*   NEPRELIM 3.81 5.22  --   --  4.26  --    WBC 5.1 6.7  --  5.2 5.6 3.0*   .0* 127.0*  --  124.0*  124.0* 133.0*  133.0* 131.0*    < > = values in this interval not displayed.         Recent Labs   Lab 05/31/25  0556 06/01/25  0643 06/02/25  0705   * 61* 64*   BUN 47* 23 33*   CREATSERUM 3.16* 2.28* 2.75*   CA 7.8* 8.1* 8.0*   * 139 136   K 4.8 4.0 4.0    104 102   CO2 22.0 26.0 27.0          No results found.        TEODORO HAJI MD  6/2/2025

## 2025-06-02 NOTE — PROGRESS NOTES
[facility]  part of [facility]    Progress Note    Radha Yu Patient Status:  Inpatient    1940 MRN [id number]   Location [facility] 3W/SW Attending Tracy Vega MD   Hosp Day # 11 PCP Stef Velasco MD       Subjective:   Subjective:  Comfortable on room air  No fever or chills  Objective:   Blood pressure 128/46, pulse 71, temperature 98 °F (36.7 °C), temperature source Oral, resp. rate 16, weight 121 lb 12.8 oz (55.2 kg), SpO2 96%.  Physical Exam  Constitutional:       General: She is not in acute distress.     Appearance: She is ill-appearing.   HENT:      Head: Atraumatic.      Mouth/Throat:      Mouth: Mucous membranes are dry.   Eyes:      General: No scleral icterus.  Cardiovascular:      Rate and Rhythm: Normal rate.      Heart sounds:      No gallop.   Pulmonary:      Effort: No respiratory distress.      Breath sounds: No stridor. No wheezing, rhonchi or rales.   Abdominal:      General: Abdomen is flat. Bowel sounds are normal. There is no distension.      Palpations: Abdomen is soft.   Musculoskeletal:      Cervical back: No rigidity.   Neurological:      General: No focal deficit present.      Mental Status: She is oriented to person, place, and time.         Results:   Lab Results   Component Value Date    WBC 3.0 (L) 2025    HGB 7.2 (L) 2025    HCT 23.1 (L) 2025    .0 (L) 2025    CREATSERUM 2.75 (H) 2025    BUN 33 (H) 2025     2025    K 4.0 2025     2025    CO2 27.0 2025    GLU 64 (L) 2025    CA 8.0 (L) 2025    ALB 3.7 2025    ALKPHO 73 2025    BILT 0.2 2025    TP 5.7 2025    AST 16 2025    ALT <7 (L) 2025    PTT 91.0 (H) 2025    INR 1.16 2025    TSH 1.993 2025    MG 1.9 2025    PHOS 3.2 2025    TROPHS 426 () 2025           Assessment & Plan:       1- acute HFrEF LVEF 45 % , NSTEMI    Beta-blocker, statin, aspirin, Plavix  Severe multivessel's CAD's not a candidate for CABG  Medical therapy     2-severe PVD with acute right common femoral artery occlusion  Interventional cardiology and vascular following     3-acute on chronic stage IV kidney injury  on hemodialysis  Renal following     4-s/p acute respiratory failure with hypoxia  Resolved / now on room air   S/p pulmonary edema / resolved   Copd with extensive lifelong history of smoking  Neb as needed     5-DVT prophylaxis  On heparin     6- Full code   Setting limits is appropriate               Kristin Ashford MD  6/2/2025

## 2025-06-02 NOTE — PAYOR COMM NOTE
--------------  CONTINUED STAY REVIEW    Payor: ADI SAAVEDRA O  Subscriber #:  [id number]  Authorization Number: [id number]    Admit date: 5/22/25  Admit time:  2:36 PM    6/2/25     NEPHROLOGY     Heent: NC AT, mucous memb clear, neck supple  Pulm: Lungs clear, normal respiratory effort  CV: Heart with regular rate and rhythm, no edema  Abd: Abdomen soft, nontender, nondistended, no organomegaly, bowel sounds present  Skin: no symptoms reported  Psych: confused  Left IJ in place  Assessment and Plan:      1 - ESRD  The patient is now being maintained on dialysis 3 times a week as a life-sustaining modality.     Plan next dialysis on Wednesday.     2 - R LE ischemia   Vascular surgery is following her.  She will need a femoral endarterectomy with iliac stenting and SFA stenting and tibial intervention to improve     3 - NSTEMI w ischemic cardiomyopathy/mitral regurgitation  Status post left heart cath May 28 with PCI.  She developed postop right groin wound bleeding.  She is on aspirin, Plavix, statin     4 - Anemia  On Epogen and Ferrlecit     Lab 05/29/25  0900 05/30/25  0655 05/31/25  0556 06/01/25  0643 06/02/25  0705   RBC 2.79* 2.72* 2.99* 2.87* 2.69*   HGB 7.4* 7.3* 8.3* 8.2* 7.2*   HCT 22.5* 23.0* 25.3* 24.2* 23.1*   MCV 80.6 84.6 84.6 84.3 85.9   MCH 26.5 26.8 27.8 28.6 26.8   MCHC 32.9 31.7 32.8 33.9 31.2   RDW 15.9* 15.9* 15.8* 15.8* 16.0*   NEPRELIM 3.81 5.22  --  4.26  --    WBC 5.1 6.7 5.2 5.6 3.0*   .0* 127.0* 124.0*  124.0* 133.0*  133.0* 131.0*     Lab 05/31/25  0556 06/01/25  0643 06/02/25  0705   * 61* 64*   BUN 47* 23 33*   CREATSERUM 3.16* 2.28* 2.75*   CA 7.8* 8.1* 8.0*   * 139 136   K 4.8 4.0 4.0    104 102   CO2 22.0 26.0 27.0                    MEDICATIONS ADMINISTERED IN LAST 1 DAY:  aspirin DR tab 81 mg       Date Action Dose Route User    6/2/2025 1135 Given 81 mg Oral Deanne Miller, RN          atorvastatin (Lipitor) tab 40 mg       Date Action Dose Route User     6/1/2025 2143 Given 40 mg Oral Dianne Brock RN          calcitriol (Rocaltrol) cap 0.25 mcg       Date Action Dose Route User    6/2/2025 1135 Given 0.25 mcg Oral Deanne Miller RN          clopidogrel (Plavix) tab 75 mg       Date Action Dose Route User    6/2/2025 1135 Given 75 mg Oral Deanne Miller RN          heparin (Porcine) 95006 units/250mL infusion ACS/AFIB CONTINUOUS       Date Action Dose Route User    6/2/2025 1044 Hi-Risk Rate/Dose Change 300 Units/hr Intravenous Deanne Miller RN    6/2/2025 0452 Hi-Risk Rate/Dose Change 400 Units/hr Intravenous Dianne Brock RN    6/2/2025 0451 Hi-Risk Restarted 600 Units/hr Intravenous Dianne Brock RN    6/1/2025 2032 Hi-Risk Rate/Dose Change 600 Units/hr Intravenous Dianne Brock RN    6/1/2025 1326 New Bag 500 Units/hr Intravenous Shanell Gastelum RN          escitalopram (Lexapro) tab 10 mg       Date Action Dose Route User    6/2/2025 1135 Given 10 mg Oral Deanne Miller RN          heparin (Porcine) 1000 UNIT/ML injection 1,500 Units       Date Action Dose Route User    6/2/2025 1100 Given by Other 1,500 Units Intracatheter Deanne Miller RN          hydrALAZINE (Apresoline) tab 25 mg       Date Action Dose Route User    6/1/2025 2143 Given 25 mg Oral Dianne Brock RN    6/1/2025 1318 Given 25 mg Oral Shanell Gastelum RN          HYDROcodone-acetaminophen (Norco) 5-325 MG per tab 1 tablet       Date Action Dose Route User    6/1/2025 1854 Given 1 tablet Oral Shanell Gastelum RN          HYDROmorphone (Dilaudid) 1 MG/ML injection 0.8 mg       Date Action Dose Route User    6/2/2025 0730 Given 0.8 mg Intravenous Dianne Brock RN    6/2/2025 0227 Given 0.8 mg Intravenous Dianne Brock RN    6/1/2025 2137 Given 0.8 mg Intravenous Dianne Brock RN          insulin aspart (NovoLOG) 100 Units/mL FlexPen 1-7 Units       Date Action Dose Route User    6/1/2025 1654 Given 2 Units Subcutaneous (Right Upper Arm) Shanell Gastelum, TOM    6/1/2025 1212 Given 1 Units Subcutaneous  (Right Upper Arm) Shanell Gastelum RN          isosorbide dinitrate (Isordil) tab 20 mg       Date Action Dose Route User    6/2/2025 1135 Given 20 mg Oral Deanne Miller RN    6/1/2025 1656 Given 20 mg Oral Shanell Gastelum RN    6/1/2025 1318 Given 20 mg Oral Shanell Gastelum RN          pantoprazole (Protonix) DR tab 40 mg       Date Action Dose Route User    6/2/2025 0453 Given 40 mg Oral Dianne Brcok RN    6/1/2025 1656 Given 40 mg Oral Shanell Gastelum RN            Vitals (last day)       Date/Time Temp Pulse Resp BP SpO2 Weight O2 Device O2 Flow Rate (L/min) Who    06/02/25 1124 98 °F (36.7 °C) 71 16 128/46 96 % -- None (Room air) -- MM    06/02/25 0454 97.7 °F (36.5 °C) 62 18 125/51 97 % 121 lb 12.8 oz (55.2 kg) None (Room air) -- ML    06/01/25 2142 99.1 °F (37.3 °C) 68 20 143/47 97 % -- None (Room air) -- ML    06/01/25 0836 98.6 °F (37 °C) 73 16 139/48 97 % -- None (Room air) -- AA    06/01/25 0516 98.7 °F (37.1 °C) 75 14 151/55 96 % -- None (Room air) -- SS     Blood Transfusion Record       Product Unit Status Volume Start End            Transfuse RBC       25  [id number]  [id number] Completed 05/30/25 1335 350 mL 05/30/25 0935 05/30/25 1310       25  [id number]  [id number] Completed 05/23/25 1303 500 mL 05/23/25 1030 05/23/25 1300

## 2025-06-02 NOTE — PLAN OF CARE
Problem: CARDIOVASCULAR - ADULT  Goal: Maintains optimal cardiac output and hemodynamic stability  Description: INTERVENTIONS:  - Monitor vital signs, rhythm, and trends  - Monitor for bleeding, hypotension and signs of decreased cardiac output  - Evaluate effectiveness of vasoactive medications to optimize hemodynamic stability  - Monitor arterial and/or venous puncture sites for bleeding and/or hematoma  - Assess quality of pulses, skin color and temperature  - Assess for signs of decreased coronary artery perfusion - ex. Angina  - Evaluate fluid balance, assess for edema, trend weights  Outcome: Progressing  Goal: Absence of cardiac arrhythmias or at baseline  Description: INTERVENTIONS:  - Continuous cardiac monitoring, monitor vital signs, obtain 12 lead EKG if indicated  - Evaluate effectiveness of antiarrhythmic and heart rate control medications as ordered  - Initiate emergency measures for life threatening arrhythmias  - Monitor electrolytes and administer replacement therapy as ordered  Outcome: Progressing     Problem: RESPIRATORY - ADULT  Goal: Achieves optimal ventilation and oxygenation  Description: INTERVENTIONS:  - Assess for changes in respiratory status  - Assess for changes in mentation and behavior  - Position to facilitate oxygenation and minimize respiratory effort  - Oxygen supplementation based on oxygen saturation or ABGs  - Provide Smoking Cessation handout, if applicable  - Encourage broncho-pulmonary hygiene including cough, deep breathe, Incentive Spirometry  - Assess the need for suctioning and perform as needed  - Assess and instruct to report SOB or any respiratory difficulty  - Respiratory Therapy support as indicated  - Manage/alleviate anxiety  - Monitor for signs/symptoms of CO2 retention  Outcome: Progressing     Problem: GENITOURINARY - ADULT  Goal: Absence of urinary retention  Description: INTERVENTIONS:  - Assess patient’s ability to void and empty bladder  - Monitor  intake/output and perform bladder scan as needed  - Follow urinary retention protocol/standard of care  - Consider collaborating with pharmacy to review patient's medication profile  - Implement strategies to promote bladder emptying  Outcome: Progressing     Problem: Patient Centered Care  Goal: Patient preferences are identified and integrated in the patient's plan of care  Description: Interventions:  - What would you like us to know as we care for you?   - Provide timely, complete, and accurate information to patient/family  - Incorporate patient and family knowledge, values, beliefs, and cultural backgrounds into the planning and delivery of care  - Encourage patient/family to participate in care and decision-making at the level they choose  - Honor patient and family perspectives and choices  Outcome: Progressing     Problem: Diabetes/Glucose Control  Goal: Glucose maintained within prescribed range  Description: INTERVENTIONS:  - Monitor Blood Glucose as ordered  - Assess for signs and symptoms of hyperglycemia and hypoglycemia  - Administer ordered medications to maintain glucose within target range  - Assess barriers to adequate nutritional intake and initiate nutrition consult as needed  - Instruct patient on self management of diabetes  Outcome: Progressing     Problem: Patient/Family Goals  Goal: Patient/Family Long Term Goal  Description: Patient's Long Term Goal:     Interventions:  - See additional Care Plan goals for specific interventions  Outcome: Progressing  Goal: Patient/Family Short Term Goal  Description: Patient's Short Term Goal:     Interventions:   - See additional Care Plan goals for specific interventions  Outcome: Progressing     Problem: METABOLIC/FLUID AND ELECTROLYTES - ADULT  Goal: Glucose maintained within prescribed range  Description: INTERVENTIONS:  - Monitor Blood Glucose as ordered  - Assess for signs and symptoms of hyperglycemia and hypoglycemia  - Administer ordered  medications to maintain glucose within target range  - Assess barriers to adequate nutritional intake and initiate nutrition consult as needed  - Instruct patient on self management of diabetes  Outcome: Progressing  Goal: Electrolytes maintained within normal limits  Description: INTERVENTIONS:  - Monitor labs and rhythm and assess patient for signs and symptoms of electrolyte imbalances  - Administer electrolyte replacement as ordered  - Monitor response to electrolyte replacements, including rhythm and repeat lab results as appropriate  - Fluid restriction as ordered  - Instruct patient on fluid and nutrition restrictions as appropriate  Outcome: Progressing  Goal: Hemodynamic stability and optimal renal function maintained  Description: INTERVENTIONS:  - Monitor labs and assess for signs and symptoms of volume excess or deficit  - Monitor intake, output and patient weight  - Monitor urine specific gravity, serum osmolarity and serum sodium as indicated or ordered  - Monitor response to interventions for patient's volume status, including labs, urine output, blood pressure (other measures as available)  - Encourage oral intake as appropriate  - Instruct patient on fluid and nutrition restrictions as appropriate  Outcome: Progressing

## 2025-06-02 NOTE — PROGRESS NOTES
Radha Yu  [id number]  06/02/25    Patient seen and examined. Plan for right common femoral endarterectomy with iliac/SFA stenting tomorrow with Dr. Vines. NPO at midnight. Type & screen ordered. No need to hold aspirin, plavix, or heparin prior to procedure.     Mame Garcia MD  Vascular Surgery  [facility]

## 2025-06-02 NOTE — PROGRESS NOTES
[facility] Cardiology Progress Note    Radha Yu Patient Status:  Inpatient    1940 MRN [id number]   Location [facility] 3W/SW Attending Villa Angel MD   Hosp Day # 11 PCP Stef Velasco MD     Subjective:  Denies cp, sob. Intermittent RLE pain/numbness - presently asymptomatic       Objective:  /46 (BP Location: Right arm)   Pulse 71   Temp 98 °F (36.7 °C) (Oral)   Resp 16   Wt 121 lb 12.8 oz (55.2 kg)   SpO2 96%   BMI 23.03 kg/m²     Telemetry: NSR, 71 bpm       Intake/Output:    Intake/Output Summary (Last 24 hours) at 2025 1139  Last data filed at 2025 2142  Gross per 24 hour   Intake 518.07 ml   Output --   Net 518.07 ml       Last 3 Weights   25 0454 121 lb 12.8 oz (55.2 kg)   25 0310 121 lb 14.4 oz (55.3 kg)   25 0500 123 lb 4.8 oz (55.9 kg)   25 0555 121 lb 12.8 oz (55.2 kg)   25 0558 121 lb (54.9 kg)   25 0402 120 lb 14.4 oz (54.8 kg)   25 0617 116 lb 9.6 oz (52.9 kg)   25 2146 123 lb 14.4 oz (56.2 kg)   25 1114 118 lb 13.3 oz (53.9 kg)   25 1830 123 lb 7.3 oz (56 kg)   25 0700 123 lb 7.3 oz (56 kg)   25 1805 123 lb 6.4 oz (56 kg)   25 1121 139 lb 1.8 oz (63.1 kg)       Labs:  Recent Labs   Lab 25  0556 25  0643 25  0705   * 61* 64*   BUN 47* 23 33*   CREATSERUM 3.16* 2.28* 2.75*   EGFRCR 14* 20* 16*   CA 7.8* 8.1* 8.0*   * 139 136   K 4.8 4.0 4.0    104 102   CO2 22.0 26.0 27.0     Recent Labs   Lab 25  0900 25  0655 25  1733 25  0556 25  0643 25  0705   RBC 2.79* 2.72*  --  2.99* 2.87* 2.69*   HGB 7.4* 7.3*   < > 8.3* 8.2* 7.2*   HCT 22.5* 23.0*   < > 25.3* 24.2* 23.1*   MCV 80.6 84.6  --  84.6 84.3 85.9   MCH 26.5 26.8  --  27.8 28.6 26.8   MCHC 32.9 31.7  --  32.8 33.9 31.2   RDW 15.9* 15.9*  --  15.8* 15.8* 16.0*   NEPRELIM 3.81 5.22  --   --  4.26  --    WBC 5.1 6.7  --  5.2 5.6 3.0*   .0* 127.0*   --  124.0*  124.0* 133.0*  133.0* 131.0*    < > = values in this interval not displayed.         No results for input(s): \"TROP\", \"TROPHS\", \"CK\" in the last 168 hours.      Diagnostics:   5/23/25 Echo  1. Left ventricle: The cavity size was normal. Wall thickness was mildly      increased. Systolic function was moderately reduced. The estimated      ejection fraction was 35-40%, by biplane method of disks. Doppler      parameters are consistent with abnormal left ventricular relaxation -      grade 1 diastolic dysfunction.   2. Left atrium: The atrium was markedly dilated.   3. Right atrium: The atrium was dilated.   4. Atrial septum: A patent foramen ovale cannot be excluded.   5. Aortic valve: There was mild regurgitation.   6. Mitral valve: The annulus was mildly to moderately calcified. The      leaflets were normal thickness. There was moderate to severe      regurgitation.   7. Tricuspid valve: There was mild-moderate regurgitation.   Impressions:  No previous study from [facility] was   available for comparison.     5/27/25 L/RHC   Findings:  1) Left Ventriculography at 30 degrees CEE: LVEF 40 to 45%, hypokinesia of the mid to distal inferior/inferoapical walls.  2) Hemodynamics: LVEDP 18 mmHg  3) Coronaries:  Left main coronary artery: Large size vessel with no angiographically significant disease.  Left anterior descending artery: Large size, Type IV vessel with 50% proximal, 80% mid segment stenosis.  90% proximal first diagonal branch disease which originates from the diseased segment of the mid LAD.   Circumflex artery: Large size non-dominant vessel with luminal irregularities.  Angiographically significant disease.  Small OM1 with focal 80% ostial stenosis.  OM 2  is medium caliber, diffuse 70 to 80% disease of proximal segment.  Right coronary artery: Large size dominant vessel with 100%  of the proximal segment with reconstitution of the PDA via left-to-right septal collaterals.       Aortobifemoral angiogram  Mildly dilated infrarenal aorta  Left renal artery proximal 80% stenosis  Nonvisualization of the right renal artery  Severely, diffusely calcified but patent bilateral iliac arteries     RIGHT HEART CATHETERIZATION HEMODYNAMICS:  Right Atrial Pressure: 14/13/12 mmHg  Right Ventricular Pressure: 36/10/14 mmHg  Pulmonary Artery Pressure: 37/18/24 mmHg  Wedge Pressure: 23/22/21 mmHg  Colleen CO: 4.7 L/min; Colleen CI: 3.1 L/min/m²  Transpulmonary Gradient (PAmean - Wedge): 13 mmHg  Pulmonary Vascular Resistance (PA-wedge/CO): 0.6 CONRAD  Ao saturation 91.2%  PA saturation 51.5%  Hgb 7.6  Heart Rate 58      Review of Systems   Respiratory: Negative.     Cardiovascular: Negative.      Physical Exam:    General: Alert and oriented x 3. No apparent distress.   HEENT: Normocephalic, anicteric sclera, neck supple, no thyromegaly or adenopathy.  Neck: No JVD, carotids 2+, no bruits.  Cardiac: Regular rate & rhythm. S1, S2 normal. No pericardial rub, S3, or extra cardiac sounds. +Murmur   Lungs: Clear without wheezes, rales, rhonchi or dullness.  Normal excursions and effort.  Abdomen: Soft, non-tender. No organosplenomegally, mass or rebound, BS-present.  Extremities: Without clubbing or cyanosis.  No left lower extremity edema, no right lower extremity edema. Absent R DP pulse, R PT present by doppler   Neurologic: Alert and oriented, normal affect. No focal defects  Skin: Warm and dry.    Right groin: C/D/I. Soft. Non-tender. No signs of bruising, bleeding,  hematoma, or infection noted     Medications:  Scheduled Medications[1]  Medication Infusions[2]    Assessment:  84-year-old female with a history of PVD s/p left femoral-popliteal bypass 2015, s/p right TCAR and total occluded left ICA, HTN, HLD, DM, and CKD who presents with shortness of breath     Acute hypoxic respiratory failure  - Chest x-ray w/ evidence of moderate pulmonary edema, noted diffuse wheezing initially on exam in setting of  long-term tobacco use  - Viral resp panel negative   - S/p nebs & steroids . Pulm following   - SP02 stable on RA     NSTEMI   Multivessel coronary artery disease   - Peak TnI 426, ECG w/ lateral T wave inversion  - Echo w/ EF 35-40%, grade 1dd, bi-atrial dilation, PFO can't be excluded, mod - severe MR, mild-mod TR   - L/RHC w/ severe multivessel CAD. Surgical turndown  - 5/28 S/p successful bifurcation PCI to mid LAD and first diagonal branch   - On ASA, plavix, atorvastatin, toprol xl     New cardiomyopathy, EF 35-40%   Mod-severe mitral regurgitation   Mild-mod tricuspid regurgitation   - Volume removal per HD   - Compensated on exam   - GDMT: toprol xl , isordil/hydralazine , Not on acei/arb/arni/mra due to CKD     ESRD   - Started HD this admission  - Nephrology following     PVD s/p left femoropopliteal bypass 9/2015  - 80% proximal left renal artery stenosis on angiogram   - 5/29 RRT called due to pt reporting decreased sensation to RLE, occasional pain, & absent R PT/DP pulses   - CTA results reviewed.  [facility] following - tentative plan for right common femoral endarterectomy with iliac/SFA stenting tomorrow   - On asa, plavix, atorvastatin . Off of heparin gtt due to recurrent groin bleed     Moderate right ICA stenosis s/p right TCAR 2/2019, totally occluded left ICA  - On asa, plavix, atorvastatin     HTN   - Well controlled on toprol xl , isordil/hydralazine    HLD  - LDL 48, on atorvastatin     DMII  - HgbA1c = 6.5%. On insulin per PMD     Acute on chronic anemia   - Hgb 7.2  - On PPI. GI following     Plan:    Denies anginal sx . Remains compensated on exam   Hgb 7.2. No overt signs of bleeding noted. Goal hgb > 8. Transfuse 1 unit PRBC  Right groin stable. No bruising, bleeding or hematoma noted   Reports intermittent RLE pain/numbness. Presently asymptomatic   CTA results reviewed . [facility] following  -  tentative plan for right common femoral endarterectomy with  iliac/SFA stenting tomorrow  Continue asa, plavix, toprol xl, isordil, hydralazine , atorvastatin     Case/plan d/w Dr. Aquino & RN       Jeana Almaraz, MSN, FPA-APRN, FNP-BC, CCK   6/2/2025  11:42 AM  Ph [phone] ([place])  Ph [phone] ([place])      Cardiologist Addendum:  Radha Yu was seen and examined independently. I agree with the above documented findings of the complexity of problems addressed by ADA Taylor.  I take responsibility for the plan's potential risks/complications. Pt seen, examined - Feels well today. Denies chest pain, SOB or RLE pain/numbness. Good perfusion on RLE, normal color and warmth. Appreciate Vascular Surgery's input in this frail, elderly patient. Surgical risk is high, however planning hybrid iliac/SFA stenting and CFA endarterectomy tomorrow. Transfuse 1 U today. Currently on heparin, DAPT -  hold heparin if obvious, persistent bleeding.    Thank you for allowing me to take part in the care of Radha Yu. Please call with any questions of concerns.    L3    Fausto Aquino,   [facility] [place]   Interventional Cardiac and Vascular Services      June 02, 2025  4:38 PM           [1]    [START ON 6/4/2025] heparin  1.5 mL Intracatheter Once    epoetin brittany  10,000 Units Subcutaneous Once per day on Monday Wednesday Friday    sodium ferric gluconate  125 mg Intravenous Daily    insulin degludec  12 Units Subcutaneous Daily    atorvastatin  40 mg Oral Nightly    clopidogrel  75 mg Oral Daily    aspirin  81 mg Oral Daily    hydrALAZINE  25 mg Oral Q8H BLANE    isosorbide dinitrate  20 mg Oral TID (Nitrates)    metoprolol succinate  12.5 mg Oral Daily Beta Blocker    pantoprazole  40 mg Oral BID AC    calcitriol  0.25 mcg Oral Q MWF    escitalopram  10 mg Oral Daily    insulin aspart  1-7 Units Subcutaneous TID CC    [Held by provider] insulin aspart  5 Units Subcutaneous TID CC and HS   [2]    continuous dose heparin 300 Units/hr (06/02/25 1044)

## 2025-06-02 NOTE — PROGRESS NOTES
[facility]  part of [facility]    Progress Note    Radha Yu Patient Status:  Inpatient    1940 MRN [id number]   Location [facility]5W Attending Villa Angel MD   Hosp Day # 11 PCP Stef Velasco MD     Chief complaint     Subjective:   Radha Yu is a(n) 84 year old female who came in with chest pain and sob.     Feels better this am.    A comprehensive 10 point review of systems was completed.  Pertinent positives and negatives noted in the the HPI.    Objective:     Blood pressure 128/46, pulse 71, temperature 98 °F (36.7 °C), temperature source Oral, resp. rate 16, weight 121 lb 12.8 oz (55.2 kg), SpO2 96%.      Intake/Output Summary (Last 24 hours) at 2025 1254  Last data filed at 2025 1124  Gross per 24 hour   Intake 758.07 ml   Output 2000 ml   Net -1241.93 ml         Patient Weight(s) for the past 336 hrs:   Weight   25 0454 121 lb 12.8 oz (55.2 kg)   25 0310 121 lb 14.4 oz (55.3 kg)   25 0500 123 lb 4.8 oz (55.9 kg)   25 0555 121 lb 12.8 oz (55.2 kg)   25 0558 121 lb (54.9 kg)   25 0402 120 lb 14.4 oz (54.8 kg)   25 0617 116 lb 9.6 oz (52.9 kg)   25 2146 123 lb 14.4 oz (56.2 kg)   25 1114 118 lb 13.3 oz (53.9 kg)           Gen: uncomfortable  Pulm: Lungs clear, normal respiratory effort  CV: Heart with regular rate and rhythm  Abd: Abdomen soft, nontender, nondistended, bowel sounds present  Neuro: No acute focal deficits  MSK: moves extremities  Skin: Warm and dry  Psych: Normal affect  Ext: rt le looks paler compared to left, sensation intact but decreased, rt groin wound looks good, rt pedal pulse heard with doppler          Medicines:     Current Hospital Medications[1]            Blood Sugar Medications            insulin aspart 100 Units/mL Subcutaneous Solution Pen-injector    insulin glargine 100 UNIT/ML Subcutaneous Solution            Blood Pressure and Cardiac Medications            amLODIPine  5 MG Oral Tab            Medication Infusions[2]        Lab Results   Component Value Date    WBC 3.0 (L) 06/02/2025    HGB 7.2 (L) 06/02/2025    HCT 23.1 (L) 06/02/2025    .0 (L) 06/02/2025    CREATSERUM 2.75 (H) 06/02/2025    BUN 33 (H) 06/02/2025     06/02/2025    K 4.0 06/02/2025     06/02/2025    CO2 27.0 06/02/2025    GLU 64 (L) 06/02/2025    CA 8.0 (L) 06/02/2025    ALB 3.7 06/01/2025    ALKPHO 73 05/26/2025    BILT 0.2 05/26/2025    TP 5.7 05/26/2025    AST 16 05/26/2025    ALT <7 (L) 05/26/2025    PTT 91.0 (H) 06/02/2025    INR 1.16 05/22/2025    TSH 1.993 05/27/2025    MG 1.9 06/01/2025    PHOS 3.2 06/01/2025       No results found.            Results:     CBC:    Lab Results   Component Value Date    WBC 3.0 (L) 06/02/2025    WBC 5.6 06/01/2025    WBC 5.2 05/31/2025     Lab Results   Component Value Date    HGB 7.2 (L) 06/02/2025    HGB 8.2 (L) 06/01/2025    HGB 8.3 (L) 05/31/2025      Lab Results   Component Value Date    .0 (L) 06/02/2025    .0 (L) 06/01/2025    .0 (L) 06/01/2025       Recent Labs   Lab 05/31/25  0556 06/01/25  0643 06/02/25  0705   * 61* 64*   BUN 47* 23 33*   CREATSERUM 3.16* 2.28* 2.75*   CA 7.8* 8.1* 8.0*   * 139 136   K 4.8 4.0 4.0    104 102   CO2 22.0 26.0 27.0           Assessment and Plan:      RLE ischemia  Rt LE pain and decreased sensation, color   - plan stat ct a/p/bl le runoff to rule out dissection - results reviewed - plan percutaneous revascularization of right SFA with possible IVL vs atherectomy as she is high risk for open vascular surgery   - per vascular : femoral endarterectomy with iliac stenting and SFA stenting and any tibial interventions to improve.   Continue heparin  Plan for right common femoral endarterectomy with iliac/SFA stenting tomorrow with Dr. Vines.     NSTEMI type 1 with new ischemic cardiomyopathy acute systolic chf  Multivessel CAD  - left heart cath again 5/28 with pci, post op with  rt groin wound bleeding that improved after pressure   - on asa, plavix, statin, bb     Tricuspid and mitral regurg  - volume removal per hd     PVD s/p left femoropopliteal bypass 9/2015  - 80% proximal left renal artery stenosis on angiogram   - On asa, plavix, atorvastatin      HL  - statin     HTN  - elevated - adjust oral meds per cards     Acute on chronic anemia of renal disease  - possibly baseline anemia from ckd  - heparin stopped; gi defers invasive dx till more stable  - hb stable today , ppi     acute copd exacerbation  - pulm consulted signed off no steroids; no abx  - plan nebs scheduled and prn  - plan pulm hygiene  - wean oxygen as able     DEBO on CKD stage 4 ->no esrd  - nephrology consulted  - tolerating HD   - hd cath placed      DM  - hypoglycemic this am. Hold tresiba.   - accuchecks and ISS    Moderate right ICA stenosis s/p right TCAR 2/2019, totally occluded left ICA  - On asa, plavix, atorvastatin                       high mdm; coordinating care with nurse and counseling pt and with her permission her nephew in room  about chf/sob/diet/cath/dialysis         Dispo: when stable           Chart reviewed, including current vitals, notes, labs and imaging  Labs ordered and medications adjusted as outlined above  Coordinate care with care team/consultants  Discussed with patient results of tests, management plan as outlined above, and the need for ongoing hospitalization  D/w RN     MANSI Vega MD, FAAFP        Supplementary Documentation:   DVT Mechanical Prophylaxis:   SCDs,    DVT Pharmacologic Prophylaxis   Medication    [START ON 6/4/2025] heparin (Porcine) 1000 UNIT/ML injection 1,500 Units    heparin (Porcine) 38826 units/250mL infusion ACS/AFIB CONTINUOUS         DVT Pharmacologic prophylaxis: Aspirin 81 mg      Code Status: Full Code  Rao: No urinary catheter in place  Rao Duration (in days):   Central line:    FRANCIS: 6/5/2025         [1]   Current  Facility-Administered Medications   Medication Dose Route Frequency    [START ON 6/4/2025] sodium chloride 0.9 % IV bolus 100 mL  100 mL Intravenous Q30 Min PRN    And    [START ON 6/4/2025] albumin human (Albumin) 25% injection 25 g  25 g Intravenous PRN Dialysis    [START ON 6/4/2025] heparin (Porcine) 1000 UNIT/ML injection 1,500 Units  1.5 mL Intracatheter Once    sodium chloride 0.9 % IV bolus 100 mL  100 mL Intravenous Q30 Min PRN    And    albumin human (Albumin) 25% injection 25 g  25 g Intravenous PRN Dialysis    HYDROmorphone (Dilaudid) 1 MG/ML injection 0.4 mg  0.4 mg Intravenous Q2H PRN    Or    HYDROmorphone (Dilaudid) 1 MG/ML injection 0.8 mg  0.8 mg Intravenous Q2H PRN    Or    HYDROmorphone (Dilaudid) 1 MG/ML injection 1.2 mg  1.2 mg Intravenous Q2H PRN    epoetin brittany (Epogen, Procrit) 15177 UNIT/ML injection 10,000 Units  10,000 Units Subcutaneous Once per day on Monday Wednesday Friday    sodium ferric gluconate (Ferrlecit) 125 mg in sodium chloride 0.9% 100mL IVPB premix  125 mg Intravenous Daily    heparin (Porcine) 00158 units/250mL infusion ACS/AFIB CONTINUOUS  200-3,000 Units/hr Intravenous Continuous    metoclopramide (Reglan) 5 mg/mL injection 5 mg  5 mg Intravenous Daily PRN    insulin degludec (Tresiba) 100 units/mL flextouch 12 Units  12 Units Subcutaneous Daily    atorvastatin (Lipitor) tab 40 mg  40 mg Oral Nightly    clopidogrel (Plavix) tab 75 mg  75 mg Oral Daily    aspirin DR tab 81 mg  81 mg Oral Daily    hydrALAZINE (Apresoline) tab 25 mg  25 mg Oral Q8H BLANE    isosorbide dinitrate (Isordil) tab 20 mg  20 mg Oral TID (Nitrates)    metoprolol succinate (Toprol XL) partial tablet 12.5 mg  12.5 mg Oral Daily Beta Blocker    pantoprazole (Protonix) DR tab 40 mg  40 mg Oral BID AC    calcitriol (Rocaltrol) cap 0.25 mcg  0.25 mcg Oral Q MWF    ipratropium-albuterol (Duoneb) 0.5-2.5 (3) MG/3ML inhalation solution 3 mL  3 mL Nebulization Q6H PRN    HYDROcodone-acetaminophen (Norco) 5-325  MG per tab 1 tablet  1 tablet Oral Q6H PRN    escitalopram (Lexapro) tab 10 mg  10 mg Oral Daily    glucose (Dex4) 15 GM/59ML oral liquid 15 g  15 g Oral Q15 Min PRN    Or    glucose (Glutose) 40% oral gel 15 g  15 g Oral Q15 Min PRN    Or    glucose-vitamin C (Dex-4) chewable tab 4 tablet  4 tablet Oral Q15 Min PRN    Or    dextrose 50% injection 50 mL  50 mL Intravenous Q15 Min PRN    Or    glucose (Dex4) 15 GM/59ML oral liquid 30 g  30 g Oral Q15 Min PRN    Or    glucose (Glutose) 40% oral gel 30 g  30 g Oral Q15 Min PRN    Or    glucose-vitamin C (Dex-4) chewable tab 8 tablet  8 tablet Oral Q15 Min PRN    acetaminophen (Tylenol Extra Strength) tab 500 mg  500 mg Oral Q4H PRN    melatonin tab 3 mg  3 mg Oral Nightly PRN    polyethylene glycol (PEG 3350) (Miralax) 17 g oral packet 17 g  17 g Oral Daily PRN    sennosides (Senokot) tab 17.2 mg  17.2 mg Oral Nightly PRN    bisacodyl (Dulcolax) 10 MG rectal suppository 10 mg  10 mg Rectal Daily PRN    ondansetron (Zofran) 4 MG/2ML injection 4 mg  4 mg Intravenous Q6H PRN    benzonatate (Tessalon) cap 200 mg  200 mg Oral TID PRN    guaiFENesin (Robitussin) 100 MG/5 ML oral liquid 200 mg  200 mg Oral Q4H PRN    glycerin-hypromellose- (Artificial Tears) 0.2-0.2-1 % ophthalmic solution 1 drop  1 drop Both Eyes QID PRN    sodium chloride (Saline Mist) 0.65 % nasal solution 1 spray  1 spray Each Nare Q3H PRN    insulin aspart (NovoLOG) 100 Units/mL FlexPen 1-7 Units  1-7 Units Subcutaneous TID CC    [Held by provider] insulin aspart (NovoLOG) 100 Units/mL FlexPen 5 Units  5 Units Subcutaneous TID CC and HS   [2]    continuous dose heparin 300 Units/hr (06/02/25 1044)

## 2025-06-03 ENCOUNTER — ANESTHESIA (OUTPATIENT)
Dept: INTERVENTIONAL RADIOLOGY/VASCULAR | Facility: HOSPITAL | Age: 85
End: 2025-06-03
Payer: MEDICARE

## 2025-06-03 ENCOUNTER — APPOINTMENT (OUTPATIENT)
Dept: INTERVENTIONAL RADIOLOGY/VASCULAR | Facility: HOSPITAL | Age: 85
End: 2025-06-03
Attending: SURGERY
Payer: MEDICARE

## 2025-06-03 PROBLEM — Z51.5 PALLIATIVE CARE ENCOUNTER: Status: ACTIVE | Noted: 2025-06-03

## 2025-06-03 PROBLEM — Z71.89 GOALS OF CARE, COUNSELING/DISCUSSION: Status: ACTIVE | Noted: 2025-06-03

## 2025-06-03 LAB
ALBUMIN SERPL-MCNC: 3.6 G/DL (ref 3.2–4.8)
ANION GAP SERPL CALC-SCNC: 7 MMOL/L (ref 0–18)
ANION GAP SERPL CALC-SCNC: 8 MMOL/L (ref 0–18)
APTT PPP: 53.4 SECONDS (ref 23–36)
BASE EXCESS BLD CALC-SCNC: -4.1 MMOL/L (ref ?–2)
BASE EXCESS BLDA CALC-SCNC: -4 MMOL/L (ref ?–30)
BASOPHILS # BLD AUTO: 0.04 X10(3) UL (ref 0–0.2)
BASOPHILS NFR BLD AUTO: 0.7 %
BUN BLD-MCNC: 29 MG/DL (ref 9–23)
BUN BLD-MCNC: 30 MG/DL (ref 9–23)
BUN/CREAT SERPL: 11.8 (ref 10–20)
BUN/CREAT SERPL: 11.8 (ref 10–20)
CA-I BLD-SCNC: 1.06 MMOL/L (ref 0.95–1.32)
CA-I BLDA-SCNC: 1.1 MMOL/L (ref 1.12–1.32)
CALCIUM BLD-MCNC: 7.4 MG/DL (ref 8.7–10.4)
CALCIUM BLD-MCNC: 8.5 MG/DL (ref 8.7–10.4)
CHLORIDE SERPL-SCNC: 103 MMOL/L (ref 98–112)
CHLORIDE SERPL-SCNC: 108 MMOL/L (ref 98–112)
CO2 BLDA-SCNC: 23 MMOL/L (ref 22–32)
CO2 SERPL-SCNC: 22 MMOL/L (ref 21–32)
CO2 SERPL-SCNC: 25 MMOL/L (ref 21–32)
COHGB MFR BLD: 1.5 % (ref 0–3)
CREAT BLD-MCNC: 2.46 MG/DL (ref 0.55–1.02)
CREAT BLD-MCNC: 2.54 MG/DL (ref 0.55–1.02)
DEPRECATED RDW RBC AUTO: 48.4 FL (ref 35.1–46.3)
DEPRECATED RDW RBC AUTO: 48.9 FL (ref 35.1–46.3)
EGFRCR SERPLBLD CKD-EPI 2021: 18 ML/MIN/1.73M2 (ref 60–?)
EGFRCR SERPLBLD CKD-EPI 2021: 19 ML/MIN/1.73M2 (ref 60–?)
EOSINOPHIL # BLD AUTO: 0.16 X10(3) UL (ref 0–0.7)
EOSINOPHIL NFR BLD AUTO: 2.9 %
ERYTHROCYTE [DISTWIDTH] IN BLOOD BY AUTOMATED COUNT: 15.3 % (ref 11–15)
ERYTHROCYTE [DISTWIDTH] IN BLOOD BY AUTOMATED COUNT: 15.9 % (ref 11–15)
GLUCOSE BLD-MCNC: 158 MG/DL (ref 70–99)
GLUCOSE BLD-MCNC: 65 MG/DL (ref 70–99)
GLUCOSE BLDA-MCNC: 72 MG/DL (ref 70–99)
GLUCOSE BLDC GLUCOMTR-MCNC: 113 MG/DL (ref 70–99)
GLUCOSE BLDC GLUCOMTR-MCNC: 130 MG/DL (ref 70–99)
GLUCOSE BLDC GLUCOMTR-MCNC: 203 MG/DL (ref 70–99)
GLUCOSE BLDC GLUCOMTR-MCNC: 71 MG/DL (ref 70–99)
GLUCOSE BLDC GLUCOMTR-MCNC: 81 MG/DL (ref 70–99)
HCO3 BLDA-SCNC: 21.5 MEQ/L (ref 22–26)
HCO3 BLDA-SCNC: 21.7 MEQ/L (ref 21–27)
HCT VFR BLD AUTO: 28.5 % (ref 35–48)
HCT VFR BLD AUTO: 30.8 % (ref 35–48)
HCT VFR BLDA CALC: 26 % (ref 34–50)
HGB BLD-MCNC: 10.1 G/DL (ref 12–16)
HGB BLD-MCNC: 11 G/DL (ref 12–16)
HGB BLD-MCNC: 9.1 G/DL (ref 12–16)
IMM GRANULOCYTES # BLD AUTO: 0.01 X10(3) UL (ref 0–1)
IMM GRANULOCYTES NFR BLD: 0.2 %
ISTAT ACTIVATED CLOTTING TIME: 204 SECONDS (ref 125–137)
LACTATE BLD-SCNC: 0.6 MMOL/L (ref 0.5–2)
LYMPHOCYTES # BLD AUTO: 1.24 X10(3) UL (ref 1–4)
LYMPHOCYTES NFR BLD AUTO: 22.3 %
MCH RBC QN AUTO: 27.1 PG (ref 26–34)
MCH RBC QN AUTO: 28.3 PG (ref 26–34)
MCHC RBC AUTO-ENTMCNC: 31.9 G/DL (ref 31–37)
MCHC RBC AUTO-ENTMCNC: 32.8 G/DL (ref 31–37)
MCV RBC AUTO: 84.8 FL (ref 80–100)
MCV RBC AUTO: 86.3 FL (ref 80–100)
METHGB MFR BLD: 0.3 % SAT (ref 0.4–1.5)
MONOCYTES # BLD AUTO: 0.5 X10(3) UL (ref 0.1–1)
MONOCYTES NFR BLD AUTO: 9 %
NEUTROPHILS # BLD AUTO: 3.62 X10 (3) UL (ref 1.5–7.7)
NEUTROPHILS # BLD AUTO: 3.62 X10(3) UL (ref 1.5–7.7)
NEUTROPHILS NFR BLD AUTO: 64.9 %
O2 CT BLD-SCNC: 15.5 VOL% (ref 15–23)
OSMOLALITY SERPL CALC.SUM OF ELEC: 286 MOSM/KG (ref 275–295)
OSMOLALITY SERPL CALC.SUM OF ELEC: 293 MOSM/KG (ref 275–295)
PCO2 BLDA: 36.6 MMHG (ref 35–45)
PCO2 BLDA: 41 MM HG (ref 35–45)
PH BLDA: 7.33 [PH] (ref 7.35–7.45)
PH BLDA: 7.38 [PH] (ref 7.35–7.45)
PHOSPHATE SERPL-MCNC: 3.1 MG/DL (ref 2.4–5.1)
PLATELET # BLD AUTO: 142 10(3)UL (ref 150–450)
PLATELET # BLD AUTO: 149 10(3)UL (ref 150–450)
PLATELETS.RETICULATED NFR BLD AUTO: 5.1 % (ref 0–7)
PLATELETS.RETICULATED NFR BLD AUTO: 6.7 % (ref 0–7)
PO2 BLDA: 188 MMHG (ref 80–105)
PO2 BLDA: 258 MM HG (ref 80–100)
POTASSIUM BLD-SCNC: 4.6 MMOL/L (ref 3.6–5.1)
POTASSIUM SERPL-SCNC: 4.5 MMOL/L (ref 3.5–5.1)
POTASSIUM SERPL-SCNC: 4.8 MMOL/L (ref 3.5–5.1)
PUNCTURE CHARGE: NO
RBC # BLD AUTO: 3.36 X10(6)UL (ref 3.8–5.3)
RBC # BLD AUTO: 3.57 X10(6)UL (ref 3.8–5.3)
SAO2 % BLDA: 100 % (ref 92–100)
SAO2 % BLDA: 96.5 % (ref 94–100)
SODIUM BLD-SCNC: 132 MMOL/L (ref 135–145)
SODIUM BLDA-SCNC: 135 MMOL/L (ref 136–145)
SODIUM BLDA-SCNC: 4.1 MMOL/L (ref 3.6–5.1)
SODIUM SERPL-SCNC: 136 MMOL/L (ref 136–145)
SODIUM SERPL-SCNC: 137 MMOL/L (ref 136–145)
WBC # BLD AUTO: 13.6 X10(3) UL (ref 4–11)
WBC # BLD AUTO: 5.6 X10(3) UL (ref 4–11)

## 2025-06-03 PROCEDURE — 047K3D1 DILATION OF RIGHT FEMORAL ARTERY WITH INTRALUMINAL DEVICE, USING DRUG-COATED BALLOON, PERCUTANEOUS APPROACH: ICD-10-PCS | Performed by: SURGERY

## 2025-06-03 PROCEDURE — 99232 SBSQ HOSP IP/OBS MODERATE 35: CPT | Performed by: INTERNAL MEDICINE

## 2025-06-03 PROCEDURE — 04UK0KZ SUPPLEMENT RIGHT FEMORAL ARTERY WITH NONAUTOLOGOUS TISSUE SUBSTITUTE, OPEN APPROACH: ICD-10-PCS | Performed by: SURGERY

## 2025-06-03 PROCEDURE — 99233 SBSQ HOSP IP/OBS HIGH 50: CPT | Performed by: FAMILY MEDICINE

## 2025-06-03 PROCEDURE — 99223 1ST HOSP IP/OBS HIGH 75: CPT | Performed by: NURSE PRACTITIONER

## 2025-06-03 PROCEDURE — 04CK0ZZ EXTIRPATION OF MATTER FROM RIGHT FEMORAL ARTERY, OPEN APPROACH: ICD-10-PCS | Performed by: SURGERY

## 2025-06-03 PROCEDURE — 36430 TRANSFUSION BLD/BLD COMPNT: CPT | Performed by: STUDENT IN AN ORGANIZED HEALTH CARE EDUCATION/TRAINING PROGRAM

## 2025-06-03 PROCEDURE — B41F1ZZ FLUOROSCOPY OF RIGHT LOWER EXTREMITY ARTERIES USING LOW OSMOLAR CONTRAST: ICD-10-PCS | Performed by: SURGERY

## 2025-06-03 PROCEDURE — 047C3DZ DILATION OF RIGHT COMMON ILIAC ARTERY WITH INTRALUMINAL DEVICE, PERCUTANEOUS APPROACH: ICD-10-PCS | Performed by: SURGERY

## 2025-06-03 PROCEDURE — 04QK0ZZ REPAIR RIGHT FEMORAL ARTERY, OPEN APPROACH: ICD-10-PCS | Performed by: SURGERY

## 2025-06-03 PROCEDURE — 047H3EZ DILATION OF RIGHT EXTERNAL ILIAC ARTERY WITH TWO INTRALUMINAL DEVICES, PERCUTANEOUS APPROACH: ICD-10-PCS | Performed by: SURGERY

## 2025-06-03 RX ORDER — ROCURONIUM BROMIDE 10 MG/ML
INJECTION, SOLUTION INTRAVENOUS AS NEEDED
Status: DISCONTINUED | OUTPATIENT
Start: 2025-06-03 | End: 2025-06-03 | Stop reason: SURG

## 2025-06-03 RX ORDER — HYDRALAZINE HYDROCHLORIDE 20 MG/ML
10 INJECTION INTRAMUSCULAR; INTRAVENOUS ONCE
Status: COMPLETED | OUTPATIENT
Start: 2025-06-03 | End: 2025-06-03

## 2025-06-03 RX ORDER — DEXAMETHASONE SODIUM PHOSPHATE 4 MG/ML
VIAL (ML) INJECTION AS NEEDED
Status: DISCONTINUED | OUTPATIENT
Start: 2025-06-03 | End: 2025-06-03 | Stop reason: SURG

## 2025-06-03 RX ORDER — IOPAMIDOL 612 MG/ML
200 INJECTION, SOLUTION INTRAVASCULAR
Status: COMPLETED | OUTPATIENT
Start: 2025-06-03 | End: 2025-06-03

## 2025-06-03 RX ORDER — HYDROMORPHONE HYDROCHLORIDE 1 MG/ML
0.6 INJECTION, SOLUTION INTRAMUSCULAR; INTRAVENOUS; SUBCUTANEOUS EVERY 5 MIN PRN
Status: DISCONTINUED | OUTPATIENT
Start: 2025-06-03 | End: 2025-06-03 | Stop reason: HOSPADM

## 2025-06-03 RX ORDER — SODIUM CHLORIDE 9 MG/ML
INJECTION, SOLUTION INTRAVENOUS CONTINUOUS PRN
Status: DISCONTINUED | OUTPATIENT
Start: 2025-06-03 | End: 2025-06-03 | Stop reason: SURG

## 2025-06-03 RX ORDER — HYDROMORPHONE HYDROCHLORIDE 1 MG/ML
0.4 INJECTION, SOLUTION INTRAMUSCULAR; INTRAVENOUS; SUBCUTANEOUS EVERY 5 MIN PRN
Status: DISCONTINUED | OUTPATIENT
Start: 2025-06-03 | End: 2025-06-03 | Stop reason: HOSPADM

## 2025-06-03 RX ORDER — NALOXONE HYDROCHLORIDE 0.4 MG/ML
0.08 INJECTION, SOLUTION INTRAMUSCULAR; INTRAVENOUS; SUBCUTANEOUS AS NEEDED
Status: DISCONTINUED | OUTPATIENT
Start: 2025-06-03 | End: 2025-06-03 | Stop reason: HOSPADM

## 2025-06-03 RX ORDER — PROTAMINE SULFATE 10 MG/ML
INJECTION, SOLUTION INTRAVENOUS AS NEEDED
Status: DISCONTINUED | OUTPATIENT
Start: 2025-06-03 | End: 2025-06-03 | Stop reason: SURG

## 2025-06-03 RX ORDER — SODIUM CHLORIDE, SODIUM LACTATE, POTASSIUM CHLORIDE, CALCIUM CHLORIDE 600; 310; 30; 20 MG/100ML; MG/100ML; MG/100ML; MG/100ML
INJECTION, SOLUTION INTRAVENOUS CONTINUOUS
Status: DISCONTINUED | OUTPATIENT
Start: 2025-06-03 | End: 2025-06-04

## 2025-06-03 RX ORDER — HEPARIN SODIUM 5000 [USP'U]/ML
5000 INJECTION, SOLUTION INTRAVENOUS; SUBCUTANEOUS EVERY 8 HOURS SCHEDULED
Status: DISCONTINUED | OUTPATIENT
Start: 2025-06-04 | End: 2025-06-09

## 2025-06-03 RX ORDER — HYDROMORPHONE HYDROCHLORIDE 1 MG/ML
0.2 INJECTION, SOLUTION INTRAMUSCULAR; INTRAVENOUS; SUBCUTANEOUS EVERY 5 MIN PRN
Status: DISCONTINUED | OUTPATIENT
Start: 2025-06-03 | End: 2025-06-03 | Stop reason: HOSPADM

## 2025-06-03 RX ORDER — LIDOCAINE HYDROCHLORIDE 10 MG/ML
INJECTION, SOLUTION INFILTRATION; PERINEURAL
Status: COMPLETED | OUTPATIENT
Start: 2025-06-03 | End: 2025-06-03

## 2025-06-03 RX ORDER — LIDOCAINE HYDROCHLORIDE 20 MG/ML
INJECTION, SOLUTION EPIDURAL; INFILTRATION; INTRACAUDAL; PERINEURAL
Status: COMPLETED
Start: 2025-06-03 | End: 2025-06-03

## 2025-06-03 RX ORDER — DEXTROSE MONOHYDRATE AND SODIUM CHLORIDE 5; .9 G/100ML; G/100ML
INJECTION, SOLUTION INTRAVENOUS CONTINUOUS
Status: DISCONTINUED | OUTPATIENT
Start: 2025-06-03 | End: 2025-06-04

## 2025-06-03 RX ORDER — ONDANSETRON 2 MG/ML
INJECTION INTRAMUSCULAR; INTRAVENOUS AS NEEDED
Status: DISCONTINUED | OUTPATIENT
Start: 2025-06-03 | End: 2025-06-03 | Stop reason: SURG

## 2025-06-03 RX ORDER — HEPARIN SODIUM 1000 [USP'U]/ML
INJECTION, SOLUTION INTRAVENOUS; SUBCUTANEOUS AS NEEDED
Status: DISCONTINUED | OUTPATIENT
Start: 2025-06-03 | End: 2025-06-03 | Stop reason: SURG

## 2025-06-03 RX ORDER — ONDANSETRON 2 MG/ML
4 INJECTION INTRAMUSCULAR; INTRAVENOUS EVERY 6 HOURS PRN
Status: DISCONTINUED | OUTPATIENT
Start: 2025-06-03 | End: 2025-06-09

## 2025-06-03 RX ORDER — HYDRALAZINE HYDROCHLORIDE 20 MG/ML
10 INJECTION INTRAMUSCULAR; INTRAVENOUS EVERY 6 HOURS PRN
Status: DISCONTINUED | OUTPATIENT
Start: 2025-06-03 | End: 2025-06-09

## 2025-06-03 RX ORDER — GLYCOPYRROLATE 0.2 MG/ML
INJECTION, SOLUTION INTRAMUSCULAR; INTRAVENOUS AS NEEDED
Status: DISCONTINUED | OUTPATIENT
Start: 2025-06-03 | End: 2025-06-03 | Stop reason: SURG

## 2025-06-03 RX ORDER — LIDOCAINE HYDROCHLORIDE 10 MG/ML
INJECTION, SOLUTION EPIDURAL; INFILTRATION; INTRACAUDAL; PERINEURAL AS NEEDED
Status: DISCONTINUED | OUTPATIENT
Start: 2025-06-03 | End: 2025-06-03 | Stop reason: SURG

## 2025-06-03 RX ORDER — SODIUM CHLORIDE, SODIUM LACTATE, POTASSIUM CHLORIDE, CALCIUM CHLORIDE 600; 310; 30; 20 MG/100ML; MG/100ML; MG/100ML; MG/100ML
INJECTION, SOLUTION INTRAVENOUS CONTINUOUS
Status: DISCONTINUED | OUTPATIENT
Start: 2025-06-03 | End: 2025-06-03 | Stop reason: HOSPADM

## 2025-06-03 RX ADMIN — SODIUM CHLORIDE: 9 INJECTION, SOLUTION INTRAVENOUS at 11:58:00

## 2025-06-03 RX ADMIN — ROCURONIUM BROMIDE 20 MG: 10 INJECTION, SOLUTION INTRAVENOUS at 14:02:00

## 2025-06-03 RX ADMIN — ROCURONIUM BROMIDE 10 MG: 10 INJECTION, SOLUTION INTRAVENOUS at 15:51:00

## 2025-06-03 RX ADMIN — SODIUM CHLORIDE: 9 INJECTION, SOLUTION INTRAVENOUS at 13:35:00

## 2025-06-03 RX ADMIN — SODIUM CHLORIDE: 9 INJECTION, SOLUTION INTRAVENOUS at 16:30:00

## 2025-06-03 RX ADMIN — ROCURONIUM BROMIDE 50 MG: 10 INJECTION, SOLUTION INTRAVENOUS at 12:19:00

## 2025-06-03 RX ADMIN — ONDANSETRON 4 MG: 2 INJECTION INTRAMUSCULAR; INTRAVENOUS at 16:00:00

## 2025-06-03 RX ADMIN — HEPARIN SODIUM 1000 UNITS: 1000 INJECTION, SOLUTION INTRAVENOUS; SUBCUTANEOUS at 14:28:00

## 2025-06-03 RX ADMIN — HEPARIN SODIUM 1000 UNITS: 1000 INJECTION, SOLUTION INTRAVENOUS; SUBCUTANEOUS at 13:59:00

## 2025-06-03 RX ADMIN — HEPARIN SODIUM 4000 UNITS: 1000 INJECTION, SOLUTION INTRAVENOUS; SUBCUTANEOUS at 13:30:00

## 2025-06-03 RX ADMIN — PROTAMINE SULFATE 20 MG: 10 INJECTION, SOLUTION INTRAVENOUS at 16:14:00

## 2025-06-03 RX ADMIN — PROTAMINE SULFATE 10 MG: 10 INJECTION, SOLUTION INTRAVENOUS at 16:21:00

## 2025-06-03 RX ADMIN — DEXAMETHASONE SODIUM PHOSPHATE 4 MG: 4 MG/ML VIAL (ML) INJECTION at 16:00:00

## 2025-06-03 RX ADMIN — SODIUM CHLORIDE: 9 INJECTION, SOLUTION INTRAVENOUS at 12:25:00

## 2025-06-03 RX ADMIN — SODIUM CHLORIDE: 9 INJECTION, SOLUTION INTRAVENOUS at 15:23:00

## 2025-06-03 RX ADMIN — ROCURONIUM BROMIDE 30 MG: 10 INJECTION, SOLUTION INTRAVENOUS at 13:05:00

## 2025-06-03 RX ADMIN — HEPARIN SODIUM 1000 UNITS: 1000 INJECTION, SOLUTION INTRAVENOUS; SUBCUTANEOUS at 14:58:00

## 2025-06-03 RX ADMIN — LIDOCAINE HYDROCHLORIDE 1 ML: 10 INJECTION, SOLUTION INFILTRATION; PERINEURAL at 12:14:00

## 2025-06-03 RX ADMIN — LIDOCAINE HYDROCHLORIDE 25 MG: 10 INJECTION, SOLUTION EPIDURAL; INFILTRATION; INTRACAUDAL; PERINEURAL at 12:19:00

## 2025-06-03 RX ADMIN — PROTAMINE SULFATE 20 MG: 10 INJECTION, SOLUTION INTRAVENOUS at 16:19:00

## 2025-06-03 RX ADMIN — GLYCOPYRROLATE 0.2 MG: 0.2 INJECTION, SOLUTION INTRAMUSCULAR; INTRAVENOUS at 12:56:00

## 2025-06-03 NOTE — PROGRESS NOTES
Progress Note  Radha Yu Patient Status:  Inpatient    1940 MRN [id number]   Location [facility] INTERVENTIONAL SUITES Attending Tracy Vega MD   Hosp Day # 12 PCP Stef Velasco MD     Subjective:  Attempted to see patient this afternoon - pt off the floor for vascular procedure    Objective:  /46 (BP Location: Right arm)   Pulse 68   Temp 97.5 °F (36.4 °C) (Oral)   Resp 16   Wt 122 lb 3.2 oz (55.4 kg)   SpO2 95%   BMI 23.10 kg/m²         Intake/Output:    Intake/Output Summary (Last 24 hours) at 6/3/2025 1443  Last data filed at 6/3/2025 1335  Gross per 24 hour   Intake 1172 ml   Output 150 ml   Net 1022 ml       Last 3 Weights   25 0336 122 lb 3.2 oz (55.4 kg)   25 0454 121 lb 12.8 oz (55.2 kg)   25 0310 121 lb 14.4 oz (55.3 kg)   25 0500 123 lb 4.8 oz (55.9 kg)   25 0555 121 lb 12.8 oz (55.2 kg)   25 0558 121 lb (54.9 kg)   25 0402 120 lb 14.4 oz (54.8 kg)   25 0617 116 lb 9.6 oz (52.9 kg)   25 2146 123 lb 14.4 oz (56.2 kg)   25 1114 118 lb 13.3 oz (53.9 kg)   25 1830 123 lb 7.3 oz (56 kg)   25 0700 123 lb 7.3 oz (56 kg)   25 1805 123 lb 6.4 oz (56 kg)   25 1121 139 lb 1.8 oz (63.1 kg)       Labs:  Recent Labs   Lab 25  0643 25  0705 25  0712   GLU 61* 64* 65*   BUN 23 33* 29*   CREATSERUM 2.28* 2.75* 2.46*   EGFRCR 20* 16* 19*   CA 8.1* 8.0* 8.5*    136 136   K 4.0 4.0 4.5    102 103   CO2 26.0 27.0 25.0     Recent Labs   Lab 25  0655 25  1733 25  0643 25  0705 25  2308 25  0712   RBC 2.72*   < > 2.87* 2.69*  --  3.36*   HGB 7.3*   < > 8.2* 7.2* 8.7* 9.1*   HCT 23.0*   < > 24.2* 23.1* 26.8* 28.5*   MCV 84.6   < > 84.3 85.9  --  84.8   MCH 26.8   < > 28.6 26.8  --  27.1   MCHC 31.7   < > 33.9 31.2  --  31.9   RDW 15.9*   < > 15.8* 16.0*  --  15.9*   NEPRELIM 5.22  --  4.26  --   --  3.62   WBC 6.7   < > 5.6 3.0*  --  5.6   PLT  127.0*   < > 133.0*  133.0* 131.0*  --  149.0*    < > = values in this interval not displayed.         No results for input(s): \"TROP\", \"TROPHS\", \"CK\" in the last 168 hours.    Diagnostics:    No results found.   Review of Systems   Reason unable to perform ROS: unable to perform.       Physical Exam:     Medications:    Scheduled Medications[1]  Medication Infusions[2]      Assessment:  Acute Hypoxic Respiratory Failure - Multifactorial (Pulm Edema, Longstanding Tobacco Abuse)   CXR w/pulm edema on admit  RVP negative  S/P nebs, steroids  Pulm following  NSTEMI, Multivessel CAD s/p Complex PCI to LAD, Diag Bifurcation on 5/28/25  Trop 224 > 235 > 426   Echo LVEF 35-40%, G1DD, biatrial dilation, mod-sev MR, mild-mod TR, mild AI, PFO cannot be excluded  5/27 R/LHC w/severe multivessel CAD; elev biventricular filling pressures, CO 4.7/CI 3.1  CV surgery eval - not a surgical candidate  5/28 PCI to mid LAD, diag   On asa, plavix, bb, high intensity statin   Acute HFmrEF, Ischemic Cardiomyopathy  proBNP 29,411 on admit  Echo as above  Volume mgmt per HD/nephrology  GDMT - on BB, hydralazine/isordil  Not on ACEI/ARB/ARNI/MRA/SGLT2i d/t ESRD  Valvular Dysfunction - Modere-Severe MR, Mild-Mod TR, Mild AI  DEBO on CKD, now ESRD   HD initiated this admission - Cr stable  Nephrology following  Severe PAD, RLE Rest Pain s/p PCI  Hx Prior L Fem-Pop Bypass 2015, R Popliteal PTA 2015  RLE ischemic rest pain s/p PCI  CTA w/R prox SFA high grade stenosis, moderate iliac disease; L fem-pop bypass patent; moderate bilateral renal artery stenosis, superior mesenteric artery stenosis  Vascular surgery  Severe Carotid Stenosis s/p R TCAR 2019, Chronic L ICA Occlusion  Followed at [place] for Vascular  Moderate Bilateral Renal Artery Stenosis, Superior Mesenteric Artery Stenosis   Acute on Chronic Anemia  Hgb stable today s/p PRBC 6/2  S/P 3 units PRBC this admission   On PPI   GI consulted   Hypertension   On toprol, hydralazine,  isordil  Hyperlipidemia - LDL 48, on atorvastatin 40mg  DMII - A1C 6.5    Plan:  Continue DAPT, high intensity statin for recent PCI  Continue hydralazine, isordil, toprol  IV heparin gtt - plan for fem endarterectomy, iliac stenting today with Dr Vines      Plan of care discussed with patient, RN.    Marta Hatch, APRN  6/3/2025  2:43 PM             [1]    [START ON 6/4/2025] heparin  1.5 mL Intracatheter Once    epoetin brittany  10,000 Units Subcutaneous Once per day on Monday Wednesday Friday    sodium ferric gluconate  125 mg Intravenous Daily    insulin degludec  12 Units Subcutaneous Daily    atorvastatin  40 mg Oral Nightly    clopidogrel  75 mg Oral Daily    aspirin  81 mg Oral Daily    hydrALAZINE  25 mg Oral Q8H BLANE    isosorbide dinitrate  20 mg Oral TID (Nitrates)    metoprolol succinate  12.5 mg Oral Daily Beta Blocker    pantoprazole  40 mg Oral BID AC    calcitriol  0.25 mcg Oral Q MWF    escitalopram  10 mg Oral Daily    insulin aspart  1-7 Units Subcutaneous TID CC    [Held by provider] insulin aspart  5 Units Subcutaneous TID CC and HS   [2]    dextrose 5%-sodium chloride 0.9% 50 mL/hr at 06/03/25 0625    continuous dose heparin 300 Units/hr (06/02/25 1044)

## 2025-06-03 NOTE — PROGRESS NOTES
[facility]  part of [facility]     Progress Note    Radha Yu Patient Status:  Inpatient    1940 MRN [id number]   Location [facility] 2W/SW Attending Madison Crump MD   Hosp Day # 12 PCP Stef Velasco MD       Subjective:   Patient seen and examined.  Resting in bed.  Denies significant dyspnea at rest    Objective:   Blood pressure 149/46, pulse 68, temperature 97.5 °F (36.4 °C), temperature source Oral, resp. rate 16, weight 122 lb 3.2 oz (55.4 kg), SpO2 95%.  Intake/Output:   Last 3 shifts: I/O last 3 completed shifts:  In: 972 [P.O.:410; I.V.:46; Blood:516]  Out:  [Other:]   This shift: I/O this shift:  In: 500 [I.V.:500]  Out: 150 [Urine:150]     Vent Settings:      Hemodynamic parameters (last 24 hours):      Scheduled Meds: Current Hospital Medications[1]    Continuous Infusions: Medication Infusions[2]    Physical Exam  Constitutional: no acute distress  Eyes: PERRL  ENT: nares pateint  Neck: supple, no JVD  Cardio: RRR, S1 S2  Respiratory: Faint crackles  GI: abdomen soft, non tender, active bowel sounds, no organomegaly  Extremities: no clubbing, cyanosis, edema  Neurologic: no gross motor deficits  Skin: warm, dry      Results:     Lab Results   Component Value Date    WBC 5.6 2025    HGB 9.1 2025    HCT 28.5 2025    .0 2025    CREATSERUM 2.46 2025    BUN 29 2025     2025    K 4.5 2025     2025    CO2 25.0 2025    GLU 65 2025    CA 8.5 2025    ALB 3.6 2025    PTT 53.4 2025    PHOS 3.1 2025         No results found.              Assessment   1.  Acute hypoxemic respiratory failure  2.  Pulmonary edema  3.  Non-ST elevation myocardial infarction  4.  Wheezing, resolved  5.  Peripheral vascular disease  6.  Acute kidney injury on chronic kidney disease  7.  Prior history of right ICA stenosis status post TCAR  8.  Hypertension  9.  Diabetes  mellitus     Plan   -Patient presents with worsening dyspnea symptoms.  Concern for pulmonary edema on chest x-ray cannot rule out underlying pneumonia.  Wheezing on examination but no history of known lung disease.  - No significant wheezing.  Steroid therapy discontinued  - Nebulizer treatments  - Viral respiratory panel negative  -Status post left and right heart catheterization on 5/27/2025 with mild to moderate LV dysfunction and severe multivessel coronary disease seen.  Eval by CT surgery.  Poor candidate for CABG. medical therapy per cardiology  - Patient with evidence of severe peripheral vascular disease with acute right common femoral artery occlusion.  Plan for femoral endarterectomy and iliac stenting later today.  On heparin gtt.  - Eval by nephrology.  Dialysis catheter placed and started on dialysis.          Neelam Cherry DO  Pulmonary Critical Care Medicine  [facility]          [1]   Current Facility-Administered Medications   Medication Dose Route Frequency    dextrose 5%-sodium chloride 0.9% infusion   Intravenous Continuous    hydrALAzine (Apresoline) 20 mg/mL injection 10 mg  10 mg Intravenous Q6H PRN    [START ON 6/4/2025] sodium chloride 0.9 % IV bolus 100 mL  100 mL Intravenous Q30 Min PRN    And    [START ON 6/4/2025] albumin human (Albumin) 25% injection 25 g  25 g Intravenous PRN Dialysis    [START ON 6/4/2025] heparin (Porcine) 1000 UNIT/ML injection 1,500 Units  1.5 mL Intracatheter Once    HYDROmorphone (Dilaudid) 1 MG/ML injection 0.4 mg  0.4 mg Intravenous Q2H PRN    Or    HYDROmorphone (Dilaudid) 1 MG/ML injection 0.8 mg  0.8 mg Intravenous Q2H PRN    Or    HYDROmorphone (Dilaudid) 1 MG/ML injection 1.2 mg  1.2 mg Intravenous Q2H PRN    epoetin brittany (Epogen, Procrit) 19699 UNIT/ML injection 10,000 Units  10,000 Units Subcutaneous Once per day on Monday Wednesday Friday    sodium ferric gluconate (Ferrlecit) 125 mg in sodium chloride 0.9% 100mL IVPB premix  125 mg Intravenous  Daily    heparin (Porcine) 04219 units/250mL infusion ACS/AFIB CONTINUOUS  200-3,000 Units/hr Intravenous Continuous    metoclopramide (Reglan) 5 mg/mL injection 5 mg  5 mg Intravenous Daily PRN    insulin degludec (Tresiba) 100 units/mL flextouch 12 Units  12 Units Subcutaneous Daily    atorvastatin (Lipitor) tab 40 mg  40 mg Oral Nightly    clopidogrel (Plavix) tab 75 mg  75 mg Oral Daily    aspirin DR tab 81 mg  81 mg Oral Daily    hydrALAZINE (Apresoline) tab 25 mg  25 mg Oral Q8H BLANE    isosorbide dinitrate (Isordil) tab 20 mg  20 mg Oral TID (Nitrates)    metoprolol succinate (Toprol XL) partial tablet 12.5 mg  12.5 mg Oral Daily Beta Blocker    pantoprazole (Protonix) DR tab 40 mg  40 mg Oral BID AC    calcitriol (Rocaltrol) cap 0.25 mcg  0.25 mcg Oral Q MWF    ipratropium-albuterol (Duoneb) 0.5-2.5 (3) MG/3ML inhalation solution 3 mL  3 mL Nebulization Q6H PRN    HYDROcodone-acetaminophen (Norco) 5-325 MG per tab 1 tablet  1 tablet Oral Q6H PRN    escitalopram (Lexapro) tab 10 mg  10 mg Oral Daily    glucose (Dex4) 15 GM/59ML oral liquid 15 g  15 g Oral Q15 Min PRN    Or    glucose (Glutose) 40% oral gel 15 g  15 g Oral Q15 Min PRN    Or    glucose-vitamin C (Dex-4) chewable tab 4 tablet  4 tablet Oral Q15 Min PRN    Or    dextrose 50% injection 50 mL  50 mL Intravenous Q15 Min PRN    Or    glucose (Dex4) 15 GM/59ML oral liquid 30 g  30 g Oral Q15 Min PRN    Or    glucose (Glutose) 40% oral gel 30 g  30 g Oral Q15 Min PRN    Or    glucose-vitamin C (Dex-4) chewable tab 8 tablet  8 tablet Oral Q15 Min PRN    acetaminophen (Tylenol Extra Strength) tab 500 mg  500 mg Oral Q4H PRN    melatonin tab 3 mg  3 mg Oral Nightly PRN    polyethylene glycol (PEG 3350) (Miralax) 17 g oral packet 17 g  17 g Oral Daily PRN    sennosides (Senokot) tab 17.2 mg  17.2 mg Oral Nightly PRN    bisacodyl (Dulcolax) 10 MG rectal suppository 10 mg  10 mg Rectal Daily PRN    ondansetron (Zofran) 4 MG/2ML injection 4 mg  4 mg  Intravenous Q6H PRN    benzonatate (Tessalon) cap 200 mg  200 mg Oral TID PRN    guaiFENesin (Robitussin) 100 MG/5 ML oral liquid 200 mg  200 mg Oral Q4H PRN    glycerin-hypromellose- (Artificial Tears) 0.2-0.2-1 % ophthalmic solution 1 drop  1 drop Both Eyes QID PRN    sodium chloride (Saline Mist) 0.65 % nasal solution 1 spray  1 spray Each Nare Q3H PRN    insulin aspart (NovoLOG) 100 Units/mL FlexPen 1-7 Units  1-7 Units Subcutaneous TID CC    [Held by provider] insulin aspart (NovoLOG) 100 Units/mL FlexPen 5 Units  5 Units Subcutaneous TID CC and HS     Facility-Administered Medications Ordered in Other Encounters   Medication Dose Route Frequency    norepinephrine (Levophed) 4 mg/250mL infusion premix   Intravenous Continuous PRN    fentaNYL (Sublimaze) 50 mcg/mL injection   Intravenous PRN    sodium chloride 0.9% infusion   Intravenous Continuous PRN    sodium chloride 0.9% infusion   Intravenous Continuous PRN    ceFAZolin (Ancef) 2g in 10mL IV syringe premix   Intravenous PRN    glycopyrrolate (Robinul) 0.2 MG/ML injection   Intravenous PRN    norepinephrine  BOLUS FROM BAG infusion   Intravenous PRN    lidocaine PF (Xylocaine-MPF) 1% injection   Intravenous PRN    propofol (Diprivan) 10 MG/ML injection   Intravenous PRN    rocuronium (Zemuron) 50 mg/5mL injection   Intravenous PRN    heparin (Porcine) 1000 UNIT/ML injection   Intravenous PRN   [2]    dextrose 5%-sodium chloride 0.9% 50 mL/hr at 06/03/25 0625    continuous dose heparin 300 Units/hr (06/02/25 1044)

## 2025-06-03 NOTE — OR NURSING
Meds:    Gelfoam/Thrombin given to patient PRN    1 gm Vanco mixed with saline for irrigation, given PRN

## 2025-06-03 NOTE — ANESTHESIA PROCEDURE NOTES
Airway  Date/Time: 6/3/2025 12:20 PM  Reason: Elective      General Information and Staff   Patient location during procedure: OR  Anesthesiologist: Mary Diaz MD  Performed: anesthesiologist   Performed by: Mike Horan DO  Authorized by: Mike Horan DO        Indications and Patient Condition  Indications for airway management: anesthesia  Sedation level: deep      Preoxygenated: yesPatient position: sniffing    Mask difficulty assessment: 1 - vent by mask    Final Airway Details    Final airway type: endotracheal airway    Successful airway: ETT  Cuffed: yes   Successful intubation technique: direct laryngoscopy  Endotracheal tube insertion site: oral  Blade type: dwyer.  Blade size: #3  ETT size (mm): 7.0    Cormack-Lehane Classification: grade I - full view of glottis  Placement verified by: capnometry   Measured from: lips  ETT to lips (cm): 22  Number of attempts at approach: 1

## 2025-06-03 NOTE — PROGRESS NOTES
[facility]  part of [facility]    Progress Note    Radha Yu Patient Status:  Inpatient    1940 MRN [id number]   Location [facility] 3W/SW Attending Tracy Vega MD   Hosp Day # 12 PCP Stef Velasco MD       Subjective:   Radha Yu is a(n) 84 year old female who I am seeing for ESRD      Resting    Objective:   BP (!) 172/63 (BP Location: Right arm)   Pulse 68   Temp 97.5 °F (36.4 °C) (Oral)   Resp 18   Wt 122 lb 3.2 oz (55.4 kg)   SpO2 97%   BMI 23.10 kg/m²      Intake/Output Summary (Last 24 hours) at 6/3/2025 1054  Last data filed at 6/3/2025 0600  Gross per 24 hour   Intake 912 ml   Output 2000 ml   Net -1088 ml     Wt Readings from Last 1 Encounters:   25 122 lb 3.2 oz (55.4 kg)       Exam  Gen: No acute distress, Heent: NC AT, mucous memb clear, neck supple  Pulm: Lungs clear, normal respiratory effort  CV: Heart with regular rate and rhythm, no edema  Abd: Abdomen soft, nontender, nondistended, no organomegaly, bowel sounds present  Skin: no symptoms reported  Psych: confused    Assessment and Plan:         1 - ESRD  The patient is now being maintained on dialysis 3 times a week as a life-sustaining modality.     Plan next dialysis on Wednesday.     2 - R LE ischemia   Vascular surgery is following her.  She will need a femoral endarterectomy with iliac stenting and SFA stenting and tibial intervention to improve, plan for today     3 - NSTEMI w ischemic cardiomyopathy/mitral regurgitation  Status post left heart cath May 28 with PCI.  She developed postop right groin wound bleeding.  She is on aspirin, Plavix, statin     4 - Anemia  On Epogen and Ferrlecit             Results:     Recent Labs   Lab 25  0655 25  1733 25  0643 25  0705 25  2308 25  0712   RBC 2.72*   < > 2.87* 2.69*  --  3.36*   HGB 7.3*   < > 8.2* 7.2* 8.7* 9.1*   HCT 23.0*   < > 24.2* 23.1* 26.8* 28.5*   MCV 84.6   < > 84.3 85.9  --  84.8   MCH  26.8   < > 28.6 26.8  --  27.1   MCHC 31.7   < > 33.9 31.2  --  31.9   RDW 15.9*   < > 15.8* 16.0*  --  15.9*   NEPRELIM 5.22  --  4.26  --   --  3.62   WBC 6.7   < > 5.6 3.0*  --  5.6   .0*   < > 133.0*  133.0* 131.0*  --  149.0*    < > = values in this interval not displayed.         Recent Labs   Lab 06/01/25  0643 06/02/25  0705 06/03/25  0712   GLU 61* 64* 65*   BUN 23 33* 29*   CREATSERUM 2.28* 2.75* 2.46*   CA 8.1* 8.0* 8.5*    136 136   K 4.0 4.0 4.5    102 103   CO2 26.0 27.0 25.0          No results found.        TEODORO HAJI MD  6/3/2025

## 2025-06-03 NOTE — OPERATIVE REPORT
[facility]     [facility]  ATTENDING PHYSICIAN: Tracy Vega MD  OPERATING PHYSICIAN: Chely Vines MD  CSN: [id number]     LOCATION:  OR  MRN: [id number]    YOB: 1940  ADMISSION DATE: 5/22/2025  OPERATION DATE: 6/3/2025                OPERATIVE REPORT     PRE-OPERATIVE DIAGNOSIS: right lower extremity chronic limb threatening ischemia      POST-OPERATIVE DIAGNOSIS: Same as above.     PROCEDURE PERFORMED:   right femoral endarterectomy with bovine patch angioplasty   Right common iliac stent 8x39mm VBX   Right external iliac stent 7x10 and 8x5cm Viabahn  Right SFA DCB angioplasty using 6x250 In.Pact  Right proximal SFA stent 6x10cm Viabahn  Radiographic supervision and interpretation     ANESTHESIA: General    SURGEON: Chely Vines MD    ASSISTANT: Ang White    EBL: 600 ml    SPECIMENS: * No specimens in log *    COMPLICATIONS: None    SUMMARY OF CASE: Excellent doppler signals in the SFA and profunda at the conclusion of the case.     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 A femoral endarterectomy was recommended. We have discussed the risks and benefits of the femoral endarterectomy procedure in detail. The patient understood and all questions were answered. They have elected to proceed.    DESCRIPTION OF PROCEDURE: The patient was placed supine on the operating table. The arms were tucked. Normal bony prominences were padded. The anesthesia team placed appropriate lines and general anesthesia was induced. A maloney catheter was placed under sterile technique. The patient's lower abdomen and both lower extremities were circumferentially prepped and draped in the usual sterile fashion. Preoperative antibiotics were administered prior to skin incision.    A vertical skin incision overlying the right  common femoral artery pulse was made. The incision was deepened through the subcutaneous tissues with electrocautery. The encountered lymphatics were ligated and divided. The common femoral artery was then exposed and sharply dissected circumferentially. The dissection was extended proximally to the inguinal ligament and distally to include the superficial femoral and profunda femoris arteries. The common femoral, superficial femoral, and profunda femoris arteries were encircled with silastic vessel loops. Minor branches of the common femoral artery were identified and spared.  Then, 80 U/kg of heparin were administered intravenously and, after three minutes, the common femoral, superficial femoral, and profunda femoris arteries were clamped with atraumatic vascular clamps.  An arteriotomy was performed on the anterior surface of the common femoral artery with a #11 blade scalpel and extended into the bifurcation using a Pott’s scissors.  The arteriotomy was carried approximately 3 cm onto the SFA in order to give a runway for angiogram.  The clamped arteries were then flushed with concentrated heparinized saline and re-clamped.  The plaque was heavily calcified. The endarterectomy plane was developed with a [place] elevator. The plaque was transected proximally  while distally the plaque was feathered off, leaving a smooth endpoint. The endarterectomized surface was gently irrigated with heparinized saline solution. All remaining free debris was removed with a fine forceps. The distal endarterectomy endpoint was inspected. Tacking sutures using few interrupted 7-0 prolene sutures were used to tack down the distal endpoint to secure the distal intima. The arteriotomy was closed using  patch angioplasty. Bovine patch angioplasty was performed using a continuous 5-0 Prolene suture. The suture was started at the apex of the arteriotomy in the common femoral artery and ran on each side. The common femoral, superficial  femoral, and profunda femoris arteries were back-bled. The common femoral artery was forward-bled. The femoral lumen was then irrigated with heparinized saline.  Flow was first re-established into the profunda femoris artery and then into the superficial femoral artery.  2 5-0 Prolene stay sutures were placed in the patch.  The patch was first accessed in a retrograde fashion towards the iliac artery using a micropuncture needle, wire and sheath.  This was exchanged over a glide advantage wire for a short 7 Congolese sheath.  Angiogram confirmed severe stenosis in the proximal external iliac artery and heavy calcification of the common iliac artery.  An 018 glide advantage wire was placed.  An 8 x 39 VBX stent was placed in the mid to distal common iliac artery and deployed in good position.  A 7 x 10 Viabahn was then placed in the external iliac artery and deployed.  An additional 8 x 5 Viabahn was placed in the external iliac artery and deployed into the patch.  These were then postdilated using an 8 mm [place] balloon.  Completion angiography confirmed widely patent iliac arteries.  The sheath was then pulled and the stay sutures were tied.  The patch was then accessed in an antegrade fashion using a micropuncture needle, wire and sheath.  There is significant difficulty getting into the SFA given the disease.  I was eventually able to get in using an 018 glide advantage wire and the micropuncture sheath was advanced over the wire.  This was then exchanged over an 035 glide advantage for a short 7 Congolese sheath.  I had to use an 014 glide advantage wire followed by an 014 [place] cross catheter in order to get through the SFA into the popliteal artery.  Initial balloon angioplasty was performed using a 2 x 150 coyote balloon.  This was then followed by a 4 x 150 coyote balloon of the entire SFA.  I then placed a 5 x 150 David balloon in the distal SFA and popliteal artery and perform balloon angioplasty.  There was  an area of heavy calcification which did resolve following angioplasty.  At this point I attempted to insert a 6 x 250 drug-coated balloon however there was a branch of the SFA proximally that did sheared off and bled.  The proximal femoral artery was then clamped and I placed a 6 x 10 Viabahn into the proximal SFA and deployed it.  The hole in the artery was then repaired using multiple 5-0 Prolene sutures.  Once the bleeding was controlled the sheath was reinserted and a 6 x 250 drug-coated balloon was advanced into the distal SFA and inflated.  There was good resolution of the areas of stenoses.  Completion angiography confirmed a widely patent SFA with brisk runoff into the peroneal artery.  The patient had single-vessel runoff.  At this point the patient was felt to be maximally revascularized.  All wires and catheters were removed.  The 7 Amharic sheath was removed and the stay sutures were tied.  Protamine was then given to reverse the effects of heparin.  The suture line was checked for hemostasis. Needle hole bleeding and hemostasis was achieved with pressure and topical application of surgicel, thrombin Gelfoam and Floseal. Doppler evaluation revealed excellent flow signals in the common femoral, superficial femoral, and profunda femoris arteries.  The wound was then irrigated with antibiotic solution.  After ensuring hemostasis, the wounds were closed using two layers of 2-0 and 3-0 Vicryl for the soft tissues. The skin was closed with staples. A sterile dressing was applied. The right foot was inspected and was noted to be warm with acceptable Doppler flow signals. The patient was then awakened and taken to the postanesthesia care unit in a stable condition.       Chely Vines MD  06/03/25  5:15 PM

## 2025-06-03 NOTE — PLAN OF CARE
Problem: CARDIOVASCULAR - ADULT  Goal: Maintains optimal cardiac output and hemodynamic stability  Description: INTERVENTIONS:  - Monitor vital signs, rhythm, and trends  - Monitor for bleeding, hypotension and signs of decreased cardiac output  - Evaluate effectiveness of vasoactive medications to optimize hemodynamic stability  - Monitor arterial and/or venous puncture sites for bleeding and/or hematoma  - Assess quality of pulses, skin color and temperature  - Assess for signs of decreased coronary artery perfusion - ex. Angina  - Evaluate fluid balance, assess for edema, trend weights  Outcome: Progressing  Goal: Absence of cardiac arrhythmias or at baseline  Description: INTERVENTIONS:  - Continuous cardiac monitoring, monitor vital signs, obtain 12 lead EKG if indicated  - Evaluate effectiveness of antiarrhythmic and heart rate control medications as ordered  - Initiate emergency measures for life threatening arrhythmias  - Monitor electrolytes and administer replacement therapy as ordered  Outcome: Progressing     Problem: RESPIRATORY - ADULT  Goal: Achieves optimal ventilation and oxygenation  Description: INTERVENTIONS:  - Assess for changes in respiratory status  - Assess for changes in mentation and behavior  - Position to facilitate oxygenation and minimize respiratory effort  - Oxygen supplementation based on oxygen saturation or ABGs  - Provide Smoking Cessation handout, if applicable  - Encourage broncho-pulmonary hygiene including cough, deep breathe, Incentive Spirometry  - Assess the need for suctioning and perform as needed  - Assess and instruct to report SOB or any respiratory difficulty  - Respiratory Therapy support as indicated  - Manage/alleviate anxiety  - Monitor for signs/symptoms of CO2 retention  Outcome: Progressing     Problem: Diabetes/Glucose Control  Goal: Glucose maintained within prescribed range  Description: INTERVENTIONS:  - Monitor Blood Glucose as ordered  - Assess for  signs and symptoms of hyperglycemia and hypoglycemia  - Administer ordered medications to maintain glucose within target range  - Assess barriers to adequate nutritional intake and initiate nutrition consult as needed  - Instruct patient on self management of diabetes  Outcome: Progressing     Problem: METABOLIC/FLUID AND ELECTROLYTES - ADULT  Goal: Glucose maintained within prescribed range  Description: INTERVENTIONS:  - Monitor Blood Glucose as ordered  - Assess for signs and symptoms of hyperglycemia and hypoglycemia  - Administer ordered medications to maintain glucose within target range  - Assess barriers to adequate nutritional intake and initiate nutrition consult as needed  - Instruct patient on self management of diabetes  Outcome: Progressing

## 2025-06-03 NOTE — PLAN OF CARE
Notified son Elliott of pt's plan to go to room 231 post surgical procedure. All belongings brought to room.

## 2025-06-03 NOTE — ANESTHESIA PROCEDURE NOTES
Arterial Line    Date/Time: 6/3/2025 12:14 PM    Performed by: Mike Horan DO  Authorized by: Mike Horan DO    General Information and Staff    Procedure Start:  6/3/2025 12:14 PM  Procedure End:  6/3/2025 12:18 PM  Anesthesiologist:  Mike Horan DO  Performed By:  Anesthesiologist  Patient Location:  OR  Indication: continuous blood pressure monitoring and blood sampling needed    Site Identification: surface landmarks    Preanesthetic Checklist: 2 patient identifiers, IV checked, risks and benefits discussed, monitors and equipment checked, pre-op evaluation, timeout performed, anesthesia consent and sterile technique used    Procedure Details    Catheter Size:  20 G  Catheter Length:  1 and 3/4 inch  Catheter Type:  Arrow  Seldinger Technique?: Yes    Laterality:  Right  Site:  Radial artery  Site Prep: chlorhexidine    Line Secured:  Wrist Brace, tape and Tegaderm  Monitoring Device: Clearsight Noninvasive    Assessment    Events: patient tolerated procedure well with no complications      Medications  6/3/2025 12:14 PM  lidocaine injection 1% - Intradermal   1 mL - 6/3/2025 12:14:00 PM    Additional Comments

## 2025-06-03 NOTE — ANESTHESIA PROCEDURE NOTES
Peripheral IV  Date/Time: 6/3/2025 12:25 PM  Inserted by: Mike Horan DO    Placement  Needle size: 18 G  Laterality: right  Location: arm  Local anesthetic: none  Site prep: alcohol  Technique: ultrasound guided  Attempts: 1

## 2025-06-03 NOTE — ANESTHESIA PREPROCEDURE EVALUATION
Anesthesia PreOp Note    HPI:     Radha Yu is a 84 year old female who presents for preoperative consultation requested by: * No surgeons listed *    Date of Surgery: 6/3/2025    * No procedures listed *  Indication: * No pre-op diagnosis entered *    Relevant Problems   No relevant active problems       NPO:                         History Review:  Patient Active Problem List    Diagnosis Date Noted    Goals of care, counseling/discussion 06/03/2025    Palliative care encounter 06/03/2025    Acute on chronic respiratory failure with hypoxia ([facility]) 05/22/2025    Acute congestive heart failure, unspecified heart failure type ([facility]) 05/22/2025    NSTEMI (non-ST elevated myocardial infarction) ([facility]) 05/22/2025    Acute renal failure superimposed on chronic kidney disease, unspecified acute renal failure type, unspecified CKD stage 05/22/2025    Occlusion of right carotid artery 03/25/2025    Stenosis of left carotid artery 03/25/2025    Atherosclerosis of native artery of right lower extremity with ulceration ([facility]) 03/25/2025    Type 2 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 not on chronic dialysis, with long-term current use of insulin ([facility]) 03/25/2025    Anemia 03/25/2025    Depression 03/25/2025    ESRD (end stage renal disease) ([facility]) 01/24/2025    Closed fracture of left hip ([facility]) 01/30/2020    Background diabetic retinopathy(362.01) 06/03/2014    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[facility]) 03/06/2008    Essential hypertension, benign 02/17/2008       Past Medical History[1]    Past Surgical History[2]    Prescriptions Prior to Admission[3]  Current Medications and Prescriptions Ordered in Epic[4]    Allergies[5]    Family History[6]  Social Hx on file[7]    Available pre-op labs reviewed.  Lab Results   Component Value Date    WBC 5.6 06/03/2025    RBC 3.36 (L) 06/03/2025    HGB 9.1 (L) 06/03/2025    HCT 28.5 (L) 06/03/2025    MCV 84.8 06/03/2025    MCH 27.1 06/03/2025    MCHC 31.9 06/03/2025    RDW 15.9 (H)  06/03/2025    .0 (L) 06/03/2025     Lab Results   Component Value Date     06/03/2025    K 4.5 06/03/2025     06/03/2025    CO2 25.0 06/03/2025    BUN 29 (H) 06/03/2025    CREATSERUM 2.46 (H) 06/03/2025    GLU 65 (L) 06/03/2025    PGLU 81 06/03/2025    CA 8.5 (L) 06/03/2025     Lab Results   Component Value Date    INR 1.16 05/22/2025       Vital Signs:  Body mass index is 23.1 kg/m².   weight is 55.4 kg (122 lb 3.2 oz). Her oral temperature is 97.5 °F (36.4 °C). Her blood pressure is 172/63 (abnormal) and her pulse is 68. Her respiration is 18 and oxygen saturation is 97%.   Vitals:    06/03/25 0336 06/03/25 0510 06/03/25 0718 06/03/25 0857   BP:  (!) 173/50 (!) 172/50 (!) 172/63   Pulse:  71 68    Resp:  18  18   Temp:  97.6 °F (36.4 °C)  97.5 °F (36.4 °C)   TempSrc:  Axillary  Oral   SpO2:  95%  97%   Weight: 55.4 kg (122 lb 3.2 oz)           Anesthesia Evaluation      Airway   Mallampati: II  TM distance: >3 FB  Neck ROM: full  Dental      Pulmonary    (+) COPD  Cardiovascular   (+) hypertension, CAD, CABG/stent, CHF    ROS comment: 5/23/25 Echo  1. Left ventricle: The cavity size was normal. Wall thickness was mildly      increased. Systolic function was moderately reduced. The estimated      ejection fraction was 35-40%, by biplane method of disks. Doppler      parameters are consistent with abnormal left ventricular relaxation -      grade 1 diastolic dysfunction.   2. Left atrium: The atrium was markedly dilated.   3. Right atrium: The atrium was dilated.   4. Atrial septum: A patent foramen ovale cannot be excluded.   5. Aortic valve: There was mild regurgitation.   6. Mitral valve: The annulus was mildly to moderately calcified. The      leaflets were normal thickness. There was moderate to severe      regurgitation.   7. Tricuspid valve: There was mild-moderate regurgitation.   Impressions:  No previous study from [facility] was   available for comparison.      5/27/25  L/RHC   Findings:  1) Left Ventriculography at 30 degrees CEE: LVEF 40 to 45%, hypokinesia of the mid to distal inferior/inferoapical walls.  2) Hemodynamics: LVEDP 18 mmHg  3) Coronaries:  · Left main coronary artery: Large size vessel with no angiographically significant disease.  · Left anterior descending artery: Large size, Type IV vessel with 50% proximal, 80% mid segment stenosis.  90% proximal first diagonal branch disease which originates from the diseased segment of the mid LAD.   · Circumflex artery: Large size non-dominant vessel with luminal irregularities.  Angiographically significant disease.  Small OM1 with focal 80% ostial stenosis.  OM 2  is medium caliber, diffuse 70 to 80% disease of proximal segment.  · Right coronary artery: Large size dominant vessel with 100%  of the proximal segment with reconstitution of the PDA via left-to-right septal collaterals.     RIGHT HEART CATHETERIZATION HEMODYNAMICS:  Right Atrial Pressure: 14/13/12 mmHg  Right Ventricular Pressure: 36/10/14 mmHg  Pulmonary Artery Pressure: 37/18/24 mmHg  Wedge Pressure: 23/22/21 mmHg  Colleen CO: 4.7 L/min; Colleen CI: 3.1 L/min/m²  Transpulmonary Gradient (PAmean - Wedge): 13 mmHg  Pulmonary Vascular Resistance (PA-wedge/CO): 0.6 CONRAD  Ao saturation 91.2%  PA saturation 51.5%  Hgb 7.6  Heart Rate 58    Occulsion of L carotid a        Neuro/Psych    (+)  anxiety/panic attacks,        GI/Hepatic/Renal    (+) chronic renal disease ESRD and dialysis    Endo/Other    (+) diabetes mellitus  Abdominal   (-) obese                 Anesthesia Plan:   ASA:  4  Plan:   General  Monitors and Lines:   BIS, Additonal IV and A-line  Airway:  ETT  Plan Comments: 84-year-old female with a history of PVD s/p left femoral-popliteal bypass 2015, s/p right TCAR and total occluded left ICA, HTN, HLD, DM, and CKD who presents for right common femoral endarterectomy with iliac/SFA stenting     Per CT surg, very high risk for open heart surgery. Therefore  recent PCI completed 5/28    Discussion had with patient and family over the phone regarding high risk for patient. Understand risks listed below and wish to proceed.   Informed Consent Plan and Risks Discussed With:  Patient and healthcare power of   Use of Blood Products Discussed With:  Patient  Blood Product Use Consented    Consent Comment: I have discussed the anesthetic plan, major risks and alternatives with the patient and answered all questions.  Minor and major side effects were discussed with patient, including but not limited to: injury to teeth/gums/lips, aspiration, nausea/vomiting postoperatively, anaphylaxis, heart attack, stroke, post-operative ventilation and death. The patient desires to proceed with surgery and anesthesia as planned. All questions answered.        I have informed Radha Yu and/or legal guardian or family member of the nature of the anesthetic plan, benefits, risks including possible dental damage if relevant, major complications, and any alternative forms of anesthetic management.   All of the patient's questions were answered to the best of my ability. The patient desires the anesthetic management as planned.  Mike Horan DO  6/3/2025 10:26 AM  Present on Admission:   ESRD (end stage renal disease) ([facility])           [1]   Past Medical History:   Anxiety    COPD (chronic obstructive pulmonary disease) (HCC)    Essential hypertension    Hearing impaired person, bilateral    Hyperlipidemia    Kidney disease    Lymphoma (HCC)    Osteoarthritis    Renal disorder    Shortness of breath    Type 1 diabetes mellitus (HCC)    Visual impairment   [2]   Past Surgical History:  Procedure Laterality Date    Appendectomy      Hip surgery  2018    Total abdom hysterectomy     [3]   Medications Prior to Admission   Medication Sig Dispense Refill Last Dose/Taking    amLODIPine 5 MG Oral Tab Take 1 tablet (5 mg total) by mouth in the morning.   5/21/2025 at  9:00 AM    Cranberry 125 MG  Oral Tab Take 1 tablet by mouth in the morning.   5/21/2025 at  9:00 PM    insulin aspart 100 Units/mL Subcutaneous Solution Pen-injector Inject 5-10 Units into the skin in the morning and 5-10 Units at noon and 5-10 Units in the evening. Inject before meals. Injecting with sliding scale as = 5 units; 150-170= 6 units; 170-190= 7 units; 190-210= 8 units; 210-230= 9 units; 230 and above= 10 units. .   5/22/2025 at  8:30 AM    aspirin 81 MG Oral Tab EC Take 1 tablet (81 mg total) by mouth in the morning.   5/21/2025 at  9:00 AM    sodium bicarbonate 650 MG Oral Tab Take 2 tablets (1,300 mg total) by mouth in the morning and 2 tablets (1,300 mg total) before bedtime.   5/21/2025 at  9:00 PM    atorvastatin 20 MG Oral Tab Take 1 tablet (20 mg total) by mouth in the morning.   5/21/2025 at  9:00 AM    Cholecalciferol 50 MCG (2000 UT) Oral Cap Take 1 capsule by mouth in the morning.   5/21/2025 at  9:00 AM    insulin glargine 100 UNIT/ML Subcutaneous Solution Inject 5 Units into the skin nightly. (Patient taking differently: Inject 4 Units into the skin nightly.)   5/21/2025 at  9:00 PM    escitalopram 10 MG Oral Tab Take 1 tablet (10 mg total) by mouth in the morning.   5/21/2025 at  9:00 AM    Clopidogrel Bisulfate 75 MG Oral Tab Take 1 tablet (75 mg total) by mouth in the morning.   5/21/2025 at  9:00 AM   [4]   Current Facility-Administered Medications Ordered in Epic   Medication Dose Route Frequency Provider Last Rate Last Admin    dextrose 5%-sodium chloride 0.9% infusion   Intravenous Continuous Savana Jones MD 50 mL/hr at 06/03/25 0625 New Bag at 06/03/25 0625    hydrALAzine (Apresoline) 20 mg/mL injection 10 mg  10 mg Intravenous Q6H PRN Tracy Vega MD        [START ON 6/4/2025] sodium chloride 0.9 % IV bolus 100 mL  100 mL Intravenous Q30 Min PRN Rivka Romero MD        And    [START ON 6/4/2025] albumin human (Albumin) 25% injection 25 g  25 g Intravenous PRN Dialysis Rivka Romero MD         [START ON 6/4/2025] heparin (Porcine) 1000 UNIT/ML injection 1,500 Units  1.5 mL Intracatheter Once Rivka Romero MD        sodium chloride 0.9 % IV bolus 100 mL  100 mL Intravenous Q30 Min PRN Flex Parra MD        And    albumin human (Albumin) 25% injection 25 g  25 g Intravenous PRN Dialysis Flex Parra MD        HYDROmorphone (Dilaudid) 1 MG/ML injection 0.4 mg  0.4 mg Intravenous Q2H PRN Tracy Vega MD        Or    HYDROmorphone (Dilaudid) 1 MG/ML injection 0.8 mg  0.8 mg Intravenous Q2H PRN Tracy Vega MD   0.8 mg at 06/02/25 0730    Or    HYDROmorphone (Dilaudid) 1 MG/ML injection 1.2 mg  1.2 mg Intravenous Q2H PRN Tracy Vega MD        epoetin brittany (Epogen, Procrit) 70767 UNIT/ML injection 10,000 Units  10,000 Units Subcutaneous Once per day on Monday Wednesday Friday Lukasz Sharma MD   10,000 Units at 06/02/25 1605    sodium ferric gluconate (Ferrlecit) 125 mg in sodium chloride 0.9% 100mL IVPB premix  125 mg Intravenous Daily Lukasz Sahrma  mL/hr at 06/02/25 1321 125 mg at 06/02/25 1321    heparin (Porcine) 25143 units/250mL infusion ACS/AFIB CONTINUOUS  200-3,000 Units/hr Intravenous Continuous Jeana Almaraz APN 3 mL/hr at 06/02/25 1044 300 Units/hr at 06/02/25 1044    metoclopramide (Reglan) 5 mg/mL injection 5 mg  5 mg Intravenous Daily PRN Madison Crump MD        insulin degludec (Tresiba) 100 units/mL flextouch 12 Units  12 Units Subcutaneous Daily Madison Crump MD   12 Units at 06/02/25 1321    atorvastatin (Lipitor) tab 40 mg  40 mg Oral Nightly Jeana Almaraz, APN   40 mg at 06/02/25 2033    clopidogrel (Plavix) tab 75 mg  75 mg Oral Daily Fausto Aquino DO   75 mg at 06/03/25 0858    aspirin DR tab 81 mg  81 mg Oral Daily Kenia Setu, DO   81 mg at 06/03/25 0858    hydrALAZINE (Apresoline) tab 25 mg  25 mg Oral Q8H [facility] Mary Sánchez APRN   25 mg at 06/03/25 0512    isosorbide dinitrate (Isordil) tab 20 mg  20 mg Oral TID  (Nitrates) Mary Sánchez APRN   20 mg at 06/03/25 0858    metoprolol succinate (Toprol XL) partial tablet 12.5 mg  12.5 mg Oral Daily Beta Blocker Winifred Meyers APRN   12.5 mg at 06/03/25 0512    pantoprazole (Protonix) DR tab 40 mg  40 mg Oral BID AC Avril Horan PA-C   40 mg at 06/03/25 0512    calcitriol (Rocaltrol) cap 0.25 mcg  0.25 mcg Oral Q MWF Becki Krueger MD   0.25 mcg at 06/02/25 1135    ipratropium-albuterol (Duoneb) 0.5-2.5 (3) MG/3ML inhalation solution 3 mL  3 mL Nebulization Q6H PRN Madison Crump MD        HYDROcodone-acetaminophen (Norco) 5-325 MG per tab 1 tablet  1 tablet Oral Q6H PRN Humble Guallpa MD   1 tablet at 06/01/25 1854    escitalopram (Lexapro) tab 10 mg  10 mg Oral Daily Madison Crump MD   10 mg at 06/03/25 0858    glucose (Dex4) 15 GM/59ML oral liquid 15 g  15 g Oral Q15 Min PRN Madison Crump MD   15 g at 05/26/25 0853    Or    glucose (Glutose) 40% oral gel 15 g  15 g Oral Q15 Min PRN Madison Crump MD        Or    glucose-vitamin C (Dex-4) chewable tab 4 tablet  4 tablet Oral Q15 Min PRN Madison Crump MD        Or    dextrose 50% injection 50 mL  50 mL Intravenous Q15 Min PRN Madison Crump MD   50 mL at 05/30/25 0810    Or    glucose (Dex4) 15 GM/59ML oral liquid 30 g  30 g Oral Q15 Min PRN Madison Crump MD   30 g at 05/24/25 0654    Or    glucose (Glutose) 40% oral gel 30 g  30 g Oral Q15 Min PRN Madison Crump MD        Or    glucose-vitamin C (Dex-4) chewable tab 8 tablet  8 tablet Oral Q15 Min PRN Madison Crump MD        acetaminophen (Tylenol Extra Strength) tab 500 mg  500 mg Oral Q4H PRN Madison Crump MD   500 mg at 06/02/25 2033    melatonin tab 3 mg  3 mg Oral Nightly PRN Madison Crump MD   3 mg at 06/02/25 2033    polyethylene glycol (PEG 3350) (Miralax) 17 g oral packet 17 g  17 g Oral Daily PRN Madison Crump MD        sennosides (Senokot)  tab 17.2 mg  17.2 mg Oral Nightly PRN Madison Crump MD   17.2 mg at 05/22/25 1843    bisacodyl (Dulcolax) 10 MG rectal suppository 10 mg  10 mg Rectal Daily PRN Madison Crump MD        ondansetron (Zofran) 4 MG/2ML injection 4 mg  4 mg Intravenous Q6H PRN Madison Crump MD        benzonatate (Tessalon) cap 200 mg  200 mg Oral TID PRN Madison Crump MD        guaiFENesin (Robitussin) 100 MG/5 ML oral liquid 200 mg  200 mg Oral Q4H PRN Madison Crump MD        glycerin-hypromellose- (Artificial Tears) 0.2-0.2-1 % ophthalmic solution 1 drop  1 drop Both Eyes QID PRN Madison Crump MD        sodium chloride (Saline Mist) 0.65 % nasal solution 1 spray  1 spray Each Nare Q3H PRN Madison Crump MD        insulin aspart (NovoLOG) 100 Units/mL FlexPen 1-7 Units  1-7 Units Subcutaneous TID CC Madison Crump MD   5 Units at 06/02/25 1741    [Held by provider] insulin aspart (NovoLOG) 100 Units/mL FlexPen 5 Units  5 Units Subcutaneous TID CC and HS Madison Crump MD   5 Units at 05/24/25 0920     No current Epic-ordered outpatient medications on file.   [5]   Allergies  Allergen Reactions    Levaquin [Levofloxacin] ITCHING   [6] No family history on file.  [7]   Social History  Socioeconomic History    Marital status:    Tobacco Use    Smoking status: Former     Types: Cigarettes    Smokeless tobacco: Never   Vaping Use    Vaping status: Never Used   Substance and Sexual Activity    Alcohol use: Not Currently    Drug use: Not Currently

## 2025-06-03 NOTE — CM/SW NOTE
07:45AM  Clinical updates sent to [facility] yesterday  in AM.  Pt's auth  yesterday .    Will need to re-submit auth when closer to [place].  Will need updated PT/OT notes for auth.    JAYASHREE sent message to MD to inquire about GOC discussion and/or Palliative Care consult.    11:40AM  Received [facility] for [facility].  Per liaison Tess, preferred [facility] at [facility] is [facility].    [facility] sent to [facility] in [place]. Pending responses.    12:50PM  Received Vmail from [facility]/ [facility] - SW attempted to return call. No answer. Left Vmail.    UPDATE: Received f/up call from [facility] (phone #: [phone]) - confirmed they can accept for [facility]. She spoke w/ pt's nephew Elliott and he is agreeable as well.    [facility] reserved in [facility]. [facility] updated on [facility] agency.    PLAN: [facility] w/ onsite HD and [facility], [facility] on WC, [facility]CS needs date- pending PT/OT updates, new ins auth, & med clear         SW/CM to remain available for support and/or discharge planning.         Ida, MSW, LSW [id number]

## 2025-06-03 NOTE — IVS NOTE
Inpatient Throughput Communication:    Called inpatient RN Pat and notified of scheduled procedure on 6/3/25.     Verified that appropriate consent is signed: Yes  Access Site Hair Clipped and skin prepped: Yes  Patient has functional IV site: Yes  Patient received all pre-treatment medications: Yes  NPO since midnight

## 2025-06-03 NOTE — PROGRESS NOTES
Vascular Surgery Progress Note    No acute events overnight. Patient seen and examined in cath lab holding area. Denies any significant pain currently. Right groin soft with bruising but no hematoma. Dry gangrene to the tip of the right great toe. Foot is warm. Discussed plan for femoral endarterectomy, iliac stenting and possible lower extremity angiogram with intervention.  Risks discussed and she elects to proceed.     Chely Vines MD  06/03/25  11:26 AM

## 2025-06-03 NOTE — PROGRESS NOTES
[facility]  part of [facility]    Progress Note    Radha Yu Patient Status:  Inpatient    1940 MRN [id number]   Location [facility]5W Attending Villa Angel MD   Hosp Day # 12 PCP Stef Velasco MD     Chief complaint     Subjective:   Radha Yu is a(n) 84 year old female who came in with chest pain and sob.     NO NEW ISSUES    A comprehensive 10 point review of systems was completed.  Pertinent positives and negatives noted in the the HPI.    Objective:     Blood pressure 149/46, pulse 68, temperature 97.5 °F (36.4 °C), temperature source Oral, resp. rate 16, weight 122 lb 3.2 oz (55.4 kg), SpO2 95%.      Intake/Output Summary (Last 24 hours) at 6/3/2025 1205  Last data filed at 6/3/2025 0600  Gross per 24 hour   Intake 672 ml   Output --   Net 672 ml         Patient Weight(s) for the past 336 hrs:   Weight   25 0336 122 lb 3.2 oz (55.4 kg)   25 0454 121 lb 12.8 oz (55.2 kg)   25 0310 121 lb 14.4 oz (55.3 kg)   25 0500 123 lb 4.8 oz (55.9 kg)   25 0555 121 lb 12.8 oz (55.2 kg)   25 0558 121 lb (54.9 kg)   25 0402 120 lb 14.4 oz (54.8 kg)   25 0617 116 lb 9.6 oz (52.9 kg)   25 2146 123 lb 14.4 oz (56.2 kg)   25 1114 118 lb 13.3 oz (53.9 kg)           Gen: uncomfortable  Pulm: Lungs clear, normal respiratory effort  CV: Heart with regular rate and rhythm  Abd: Abdomen soft, nontender, nondistended, bowel sounds present  Neuro: No acute focal deficits  MSK: moves extremities  Skin: Warm and dry  Psych: Normal affect  Ext: rt le looks paler compared to left, sensation intact but decreased, rt groin wound looks good, rt pedal pulse heard with doppler          Medicines:     Current Hospital Medications[1]            Blood Sugar Medications            insulin aspart 100 Units/mL Subcutaneous Solution Pen-injector    insulin glargine 100 UNIT/ML Subcutaneous Solution            Blood Pressure and Cardiac Medications             amLODIPine 5 MG Oral Tab            Medication Infusions[2]        Lab Results   Component Value Date    WBC 5.6 06/03/2025    HGB 9.1 (L) 06/03/2025    HCT 28.5 (L) 06/03/2025    .0 (L) 06/03/2025    CREATSERUM 2.46 (H) 06/03/2025    BUN 29 (H) 06/03/2025     06/03/2025    K 4.5 06/03/2025     06/03/2025    CO2 25.0 06/03/2025    GLU 65 (L) 06/03/2025    CA 8.5 (L) 06/03/2025    ALB 3.6 06/03/2025    ALKPHO 73 05/26/2025    BILT 0.2 05/26/2025    TP 5.7 05/26/2025    AST 16 05/26/2025    ALT <7 (L) 05/26/2025    PTT 53.4 (H) 06/03/2025    INR 1.16 05/22/2025    TSH 1.993 05/27/2025    MG 1.9 06/01/2025    PHOS 3.1 06/03/2025       No results found.            Results:     CBC:    Lab Results   Component Value Date    WBC 5.6 06/03/2025    WBC 3.0 (L) 06/02/2025    WBC 5.6 06/01/2025     Lab Results   Component Value Date    HGB 9.1 (L) 06/03/2025    HGB 8.7 (L) 06/02/2025    HGB 7.2 (L) 06/02/2025      Lab Results   Component Value Date    .0 (L) 06/03/2025    .0 (L) 06/02/2025    .0 (L) 06/01/2025    .0 (L) 06/01/2025       Recent Labs   Lab 06/01/25  0643 06/02/25  0705 06/03/25  0712   GLU 61* 64* 65*   BUN 23 33* 29*   CREATSERUM 2.28* 2.75* 2.46*   CA 8.1* 8.0* 8.5*    136 136   K 4.0 4.0 4.5    102 103   CO2 26.0 27.0 25.0           Assessment and Plan:      RLE ischemia  Rt LE pain and decreased sensation, color   - plan stat ct a/p/bl le runoff to rule out dissection - results reviewed - plan percutaneous revascularization of right SFA with possible IVL vs atherectomy as she is high risk for open vascular surgery   - per vascular : femoral endarterectomy with iliac stenting and SFA stenting and any tibial interventions to improve.   Continue heparin  Plan for right common femoral endarterectomy with iliac/SFA stenting toDAY with Dr. Vines.     NSTEMI type 1 with new ischemic cardiomyopathy acute systolic chf  Multivessel CAD  - left heart  cath again 5/28 with pci, post op with rt groin wound bleeding that improved after pressure   - on asa, plavix, statin, bb     Tricuspid and mitral regurg  - volume removal per hd     PVD s/p left femoropopliteal bypass 9/2015  - 80% proximal left renal artery stenosis on angiogram   - On asa, plavix, atorvastatin      HL  - statin     HTN  - elevated - adjust oral meds per cards     Acute on chronic anemia of renal disease  - possibly baseline anemia from ckd  - heparin stopped; gi defers invasive dx till more stable  - hb stable today , ppi     acute copd exacerbation  - pulm consulted signed off no steroids; no abx  - plan nebs scheduled and prn  - plan pulm hygiene  - wean oxygen as able     DEBO on CKD stage 4 ->no esrd  - nephrology consulted  - tolerating HD   - hd cath placed      DM  - hypoglycemic this am. Hold tresiba.   - accuchecks and ISS    Moderate right ICA stenosis s/p right TCAR 2/2019, totally occluded left ICA  - On asa, plavix, atorvastatin                       high mdm; coordinating care with nurse and counseling pt and with her permission her nephew in room  about chf/sob/diet/cath/dialysis         Dispo: when stable           Chart reviewed, including current vitals, notes, labs and imaging  Labs ordered and medications adjusted as outlined above  Coordinate care with care team/consultants  Discussed with patient results of tests, management plan as outlined above, and the need for ongoing hospitalization  D/w TOM Vega MD, FAAFP        Supplementary Documentation:   DVT Mechanical Prophylaxis:   SCDs,    DVT Pharmacologic Prophylaxis   Medication    [START ON 6/4/2025] heparin (Porcine) 1000 UNIT/ML injection 1,500 Units    heparin (Porcine) 08410 units/250mL infusion ACS/AFIB CONTINUOUS         DVT Pharmacologic prophylaxis: Aspirin 81 mg      Code Status: Full Code  Rao: External urinary catheter in place  Rao Duration (in days):   Central line:    FRANCIS:  6/5/2025         [1]   Current Facility-Administered Medications   Medication Dose Route Frequency    dextrose 5%-sodium chloride 0.9% infusion   Intravenous Continuous    hydrALAzine (Apresoline) 20 mg/mL injection 10 mg  10 mg Intravenous Q6H PRN    [START ON 6/4/2025] sodium chloride 0.9 % IV bolus 100 mL  100 mL Intravenous Q30 Min PRN    And    [START ON 6/4/2025] albumin human (Albumin) 25% injection 25 g  25 g Intravenous PRN Dialysis    [START ON 6/4/2025] heparin (Porcine) 1000 UNIT/ML injection 1,500 Units  1.5 mL Intracatheter Once    HYDROmorphone (Dilaudid) 1 MG/ML injection 0.4 mg  0.4 mg Intravenous Q2H PRN    Or    HYDROmorphone (Dilaudid) 1 MG/ML injection 0.8 mg  0.8 mg Intravenous Q2H PRN    Or    HYDROmorphone (Dilaudid) 1 MG/ML injection 1.2 mg  1.2 mg Intravenous Q2H PRN    epoetin brittany (Epogen, Procrit) 79311 UNIT/ML injection 10,000 Units  10,000 Units Subcutaneous Once per day on Monday Wednesday Friday    sodium ferric gluconate (Ferrlecit) 125 mg in sodium chloride 0.9% 100mL IVPB premix  125 mg Intravenous Daily    heparin (Porcine) 56398 units/250mL infusion ACS/AFIB CONTINUOUS  200-3,000 Units/hr Intravenous Continuous    metoclopramide (Reglan) 5 mg/mL injection 5 mg  5 mg Intravenous Daily PRN    insulin degludec (Tresiba) 100 units/mL flextouch 12 Units  12 Units Subcutaneous Daily    atorvastatin (Lipitor) tab 40 mg  40 mg Oral Nightly    clopidogrel (Plavix) tab 75 mg  75 mg Oral Daily    aspirin DR tab 81 mg  81 mg Oral Daily    hydrALAZINE (Apresoline) tab 25 mg  25 mg Oral Q8H BLANE    isosorbide dinitrate (Isordil) tab 20 mg  20 mg Oral TID (Nitrates)    metoprolol succinate (Toprol XL) partial tablet 12.5 mg  12.5 mg Oral Daily Beta Blocker    pantoprazole (Protonix) DR tab 40 mg  40 mg Oral BID AC    calcitriol (Rocaltrol) cap 0.25 mcg  0.25 mcg Oral Q MWF    ipratropium-albuterol (Duoneb) 0.5-2.5 (3) MG/3ML inhalation solution 3 mL  3 mL Nebulization Q6H PRN     HYDROcodone-acetaminophen (Norco) 5-325 MG per tab 1 tablet  1 tablet Oral Q6H PRN    escitalopram (Lexapro) tab 10 mg  10 mg Oral Daily    glucose (Dex4) 15 GM/59ML oral liquid 15 g  15 g Oral Q15 Min PRN    Or    glucose (Glutose) 40% oral gel 15 g  15 g Oral Q15 Min PRN    Or    glucose-vitamin C (Dex-4) chewable tab 4 tablet  4 tablet Oral Q15 Min PRN    Or    dextrose 50% injection 50 mL  50 mL Intravenous Q15 Min PRN    Or    glucose (Dex4) 15 GM/59ML oral liquid 30 g  30 g Oral Q15 Min PRN    Or    glucose (Glutose) 40% oral gel 30 g  30 g Oral Q15 Min PRN    Or    glucose-vitamin C (Dex-4) chewable tab 8 tablet  8 tablet Oral Q15 Min PRN    acetaminophen (Tylenol Extra Strength) tab 500 mg  500 mg Oral Q4H PRN    melatonin tab 3 mg  3 mg Oral Nightly PRN    polyethylene glycol (PEG 3350) (Miralax) 17 g oral packet 17 g  17 g Oral Daily PRN    sennosides (Senokot) tab 17.2 mg  17.2 mg Oral Nightly PRN    bisacodyl (Dulcolax) 10 MG rectal suppository 10 mg  10 mg Rectal Daily PRN    ondansetron (Zofran) 4 MG/2ML injection 4 mg  4 mg Intravenous Q6H PRN    benzonatate (Tessalon) cap 200 mg  200 mg Oral TID PRN    guaiFENesin (Robitussin) 100 MG/5 ML oral liquid 200 mg  200 mg Oral Q4H PRN    glycerin-hypromellose- (Artificial Tears) 0.2-0.2-1 % ophthalmic solution 1 drop  1 drop Both Eyes QID PRN    sodium chloride (Saline Mist) 0.65 % nasal solution 1 spray  1 spray Each Nare Q3H PRN    insulin aspart (NovoLOG) 100 Units/mL FlexPen 1-7 Units  1-7 Units Subcutaneous TID CC    [Held by provider] insulin aspart (NovoLOG) 100 Units/mL FlexPen 5 Units  5 Units Subcutaneous TID CC and HS   [2]    dextrose 5%-sodium chloride 0.9% 50 mL/hr at 06/03/25 0625    continuous dose heparin 300 Units/hr (06/02/25 1044)

## 2025-06-03 NOTE — CONSULTS
[facility]  part of [facility]  Palliative Care Initial Consult Note    Radha Yu Patient Status:  Inpatient    1940 MRN [id number]   Location [facility] 3W/SW Attending Tracy Vega MD   Hosp Day # 12 PCP Stef Velasco MD     Date of Consult: 6/3/2025  Patient seen at: [facility]    The  Cures Act makes medical notes like these available to patients in the interest of transparency. Please be advised this is a medical document. Medical documents are intended to carry relevant information, facts as evident, and the clinical opinion of the practitioner. The medical note is intended as peer to peer communication and may appear blunt or direct. It is written in medical language and may contain abbreviations or verbiage that are unfamiliar.     Reason for Consultation: was asked to see Radha by Dr Vega for evaluation of Palliative Care needs and Goals of care discussion.     Subjective     History of Present Illness: Radha Yu is a 84 year old female with history of PAD, HTN, HL, CKD h/o HD in 2019,COPD, anemia, Lymphoma, anxiety, DM  who was admitted on 2025 for increased SOB. Work up in our hospital revealed possible NSTEMI, acute on chronic CHF. History was obtained from Epic, Radha and her nephew Elliott.      This is the 2nd hospitalization in the past 6 months.    Today is day #12 of hospitalization.     When I entered the room, Radha is in the bed awake and alert, appears to be forgetful, she did not know the month, I reminded her she was going for procedure today after she was stating she was very hungry.      Palliative Care symptom needs assessed:   Pertinent items are noted in HPI/below    Fatigue:  Yes  Dyspnea: denies  Current O2 therapy: RA  Cough: denies  Pain Present: + pain bilateral lower extremities  Non-verbal signs of pain present: NO  Appetite: NPO for procedure this morning.   Constipation: denies  Diarrhea:  denies  Nausea/Vomiting: denies  Depression: h/o depression/anxiety    Palliative Care Social History:   Marital Status:   Children: no children- has a stepson whom she does not see often  Living Situation Prior to Admit: Lives with her Nephew Elliott for the last 20 years.  Is Patient Confused: on exam this morning she is forgetful.   Occupational History:retired.     Substance History:  Smoking history: former smoker of cigarettes- stopped smoking last December 2024 as per Elliott  Hx of Substance Use/Abuse: No    Spiritual Assessment:   [facility]  pt declines  visit    Past Medical History/Past Surgical History: obtained from [facility]  Past Medical History[1]  Past Surgical History[2]    Nutritional Status:  BMI:23 Weight: 122lbs  Weight changes: wts stable  Current Appetite: good appetite  Dysphagia: denies    Family History: obtained from [facility]  Family History[3]    Allergies:  Allergies[4]    Medications:   Current Hospital Medications[5]    Functional Status History:  Falls: no  ADLs: Radha lives with her nephew Elliott, as per Elliott, Radha is fairly independent, she ambulates with walker, dresses herself and toilets herself. Radha can make simple meals for herself, however Elliott and family do most of the cooking. Radha is cognitively intact-Elliott stated she is foggy more often in the morning.    Palliative Performance Scale:   Prior to admission: (pt/family reported) 60 %  Observed during hospitalization: 40 %  % Ambulation Activity Level Self-Care Intake Consciousness   100 Full  Normal  No Disease Full Normal Full   90 Full  Normal  Some Disease Full Normal Full   80 Full  Normal w/effort  Some Disease Full Normal or reduced Full   70 Reduced  Can't Perform Job  Some Disease Full Normal or reduced Full   60 Reduced  Can't Perform Hobby   Significant Disease Occ Assist Normal or reduced Full or confused   50 Mainly sit/lie Can't do any work  Extensive Disease Partial Assist Normal or  reduced Full or confused   40 Mainly in bed Can't do any work  Extensive Disease Mainly Assist Normal or reduced Full or confused   30 Bed Bound Can't do any work  Extensive Disease Max Assist  Total Care Reduced  Drowsy/confused   20 Bed Bound Can't do any work  Extensive Disease Max Assist  Total Care Minimal  Drowsy/confused   10 Bed Bound Can't do any work  Extensive Disease Max Assist  Total Care Mouth Care  Drowsy/confused   0 Death        Objective      Vital Signs:  Blood pressure (!) 172/63, pulse 68, temperature 97.5 °F (36.4 °C), temperature source Oral, resp. rate 18, weight 122 lb 3.2 oz (55.4 kg), SpO2 97%.  Body mass index is 23.1 kg/m².  Present Level of pain: stated her feet and legs are numb, pain can be severe in lower extremities.   Non-verbal signs of pain present: No    General: Radha is in the bed she appears elderly and chronically ill, frail. She is awake and alert- + forgetfulness.  HEENT: dry oral MM, dentures out.   Cardiac: Regular rate and rhythm, S1, S2 normal, + murmur  Port right upper chest  Temporary HD catheter left upper chest.   Lungs: Clear without wheezes, rales, rhonchi or dullness.  Normal excursions and effort. On RA  Abdomen: Soft, non-tender, + bowel sounds, no rebound or guarding. + BM  Extremities: Without clubbing, cyanosis. Edema not present  Neurologic: Alert and oriented to person and place-was not aware of the month. Flat affect.  Skin: Warm and dry.    Hematology:  Lab Results   Component Value Date    WBC 5.6 06/03/2025    HGB 9.1 (L) 06/03/2025    HCT 28.5 (L) 06/03/2025    .0 (L) 06/03/2025       Coags:  Lab Results   Component Value Date    INR 1.16 05/22/2025    PTT 53.4 (H) 06/03/2025       Chemistry:  Lab Results   Component Value Date    CREATSERUM 2.46 (H) 06/03/2025    BUN 29 (H) 06/03/2025     06/03/2025    K 4.5 06/03/2025     06/03/2025    CO2 25.0 06/03/2025    GLU 65 (L) 06/03/2025    CA 8.5 (L) 06/03/2025    ALB 3.6 06/03/2025     ALKPHO 73 2025    BILT 0.2 2025    TP 5.7 2025    AST 16 2025    ALT <7 (L) 2025    MG 1.9 2025    PHOS 3.1 2025       Imaging:  No results found.  CARD ECHO 2D DOPPLER (CPT=93306)  Result Date: 2025  Transthoracic Echocardiogram Name:Radha Yu Date:    2025    :     1940    Ht:     (60in)     BP: MRN:     [id number]       Age:     84years       Wt:     (123lb)    HR: Loc:     [facility]          Gndr:    F             BSA:    1.52m^2 Sonographer: Annalise LIMA Ordering:    Jarvis Bergeron Consulting:  Morgan Santos ---------------------------------------------------------------------------- Procedure information:  A transthoracic complete 2D study was performed. Additional evaluation included M-mode, complete spectral Doppler, and color Doppler.  Image quality was adequate. ECG rhythm:   Normal sinus ---------------------------------------------------------------------------- Conclusions: 1. Left ventricle: The cavity size was normal. Wall thickness was mildly    increased. Systolic function was moderately reduced. The estimated    ejection fraction was 35-40%, by biplane method of disks. Doppler    parameters are consistent with abnormal left ventricular relaxation -    grade 1 diastolic dysfunction. 2. Left atrium: The atrium was markedly dilated. 3. Right atrium: The atrium was dilated. 4. Atrial septum: A patent foramen ovale cannot be excluded. 5. Aortic valve: There was mild regurgitation. 6. Mitral valve: The annulus was mildly to moderately calcified. The    leaflets were normal thickness. There was moderate to severe    regurgitation. 7. Tricuspid valve: There was mild-moderate regurgitation. Impressions:  No previous study from [facility] was available for comparison. * ---------------------------------------------------------------------------- * Findings: Left ventricle:  The cavity size was normal. Wall thickness was  mildly increased. Systolic function was moderately reduced. The estimated ejection fraction was 35-40%, by biplane method of disks. No diagnostic evidence for diffuse regional wall motion abnormalities. Doppler parameters are consistent with abnormal left ventricular relaxation - grade 1 diastolic dysfunction. Left atrium:  Well visualized. The atrium was markedly dilated. Right ventricle:  The cavity size was normal. Systolic function was normal. Right atrium:  Well visualized. The atrium was dilated. Mitral valve:  Well visualized. The annulus was mildly to moderately calcified. The leaflets were normal thickness. Leaflet separation was normal. No evidence for prolapse.  Doppler:  Transvalvular velocity was within the normal range. There was no evidence for stenosis. There was moderate to severe regurgitation. Aortic valve:  Well visualized.  The valve was probably trileaflet. The leaflets were mildly thickened and mildly calcified. Cusp separation was normal.  Doppler:  Transvalvular velocity was within the normal range. There was no evidence for stenosis. There was mild regurgitation. Tricuspid valve:  Well visualized. The annulus is normal-sized. The valve is structurally normal. The leaflets are normal thickness. Leaflet separation was normal. No echocardiographic evidence for tricuspid prolapse.  Doppler: Transvalvular velocity was within the normal range. There was no evidence for stenosis. There was mild-moderate regurgitation. Pulmonic valve:   Well visualized. The annulus is normal-sized. The valve is structurally normal. The leaflets are normal thickness. Cusp separation was normal. No evidence for prolapse.  Doppler:  Transvalvular velocity was within the normal range. There was no evidence for stenosis. There was no significant regurgitation. Pericardium:   There was no pericardial effusion. Pleura:  No evidence of pleural fluid accumulation. Aorta: Aortic root: The aortic root was normal-sized. The  aortic root was normal. Ascending aorta: The ascending aorta was normal. The ascending aorta was normal. Pulmonary arteries: The main pulmonary artery was normal-sized. Moderate pulmonary hypertension was present. Systemic veins:  Central venous respirophasic diameter changes are blunted (< 50%). Inferior vena cava: The IVC was normal-sized. The IVC was normal-sized. Atrial septum:  A patent foramen ovale cannot be excluded. ---------------------------------------------------------------------------- Measurements  Left ventricle                    Value        Ref  IVS thickness, ED, PLAX       (H) 1.4   cm     0.6 - 0.9  LV ID, ED, PLAX                   4.2   cm     3.8 - 5.2  LV ID, ES, PLAX                   3.3   cm     2.2 - 3.5  LV PW thickness, ED, PLAX     (H) 1.1   cm     0.6 - 0.9  IVS/LV PW ratio, ED, PLAX         1.27         ---------  LV PW/LV ID ratio, ED, PLAX       0.26         ---------  LV ejection fraction          (L) 44    %      54 - 74  Stroke volume/bsa, 2D             36    ml/m^2 ---------  LV end-diastolic volume, 1-p      86    ml     48 - 140  A4C  LV ejection fraction, 1-p A4C (L) 37    %      46 - 78  Stroke volume, 1-p A4C            32    ml     ---------  LV end-diastolic volume/bsa,      57    ml/m^2 30 - 82  1-p A4C  Stroke volume/bsa, 1-p A4C        21    ml/m^2 ---------  LV end-diastolic volume, 2-p      93    ml     46 - 106  LV end-systolic volume, 2-p   (H) 54    ml     14 - 42  LV ejection fraction, 2-p     (L) 42    %      54 - 74  Stroke volume, 2-p                39    ml     ---------  LV end-diastolic volume/bsa,      61    ml/m^2 29 - 61  2-p  LV end-systolic volume/bsa,   (H) 36    ml/m^2 8 - 24  2-p  Stroke volume/bsa, 2-p            25.5  ml/m^2 ---------  LV e', lateral                (L) 6.1   cm/sec >=10.0  LV E/e', lateral              (H) 22           <=13  LV e', medial                 (L) 5.9   cm/sec >=7.0  LV E/e', medial                   23            ---------  LV e', average                    6.0   cm/sec ---------  LV E/e', average              (H) 22           <=14  LVOT                              Value        Ref  LVOT ID                           2.1   cm     ---------  LVOT peak velocity, S             0.73  m/sec  ---------  LVOT VTI, S                       16.0  cm     ---------  LVOT peak gradient, S             2     mm Hg  ---------  LVOT mean gradient, S             1     mm Hg  ---------  Stroke volume (SV), LVOT DP       55    ml     ---------  Stroke index (SV/bsa), LVOT       37    ml/m^2 ---------  DP  Aortic valve                      Value        Ref  Aortic leaflet separation, MM     1.3   cm     ---------  Aortic pressure half-time         218   ms     ---------  Aortic regurg velocity, ED        3.14  m/sec  ---------  Aortic regurg deceleration        420   cm/s^2 ---------  Aortic regurg pressure            219   ms     ---------  half-time  Aortic regurg gradient, ED        39    mm Hg  ---------  Ascending aorta                   Value        Ref  Ascending aorta ID            (H) 3.7   cm     1.9 - 3.5  Left atrium                       Value        Ref  LA volume, S                  (H) 88    ml     22 - 52  LA volume/bsa, S              (H) 58    ml/m^2 16 - 34  LA volume, ES, 1-p A4C        (H) 94    ml     22 - 52  LA volume, ES, 1-p A2C        (H) 75    ml     22 - 52  LA volume, ES, A/L                91    ml     ---------  LA volume/bsa, ES, A/L        (H) 60    ml/m^2 16 - 34  Mitral valve                      Value        Ref  Mitral E-wave peak velocity       1.33  m/sec  ---------  Mitral A-wave peak velocity       1.62  m/sec  ---------  Mitral deceleration time          180   ms     ---------  Mitral peak gradient, D           7     mm Hg  ---------  Mitral E/A ratio, peak            0.8          ---------  Pulmonary artery                  Value        Ref  PA pressure, S, DP                51    mm Hg  ---------   Tricuspid valve                   Value        Ref  Tricuspid regurg peak         (H) 3.26  m/sec  <=2.8  velocity  Tricuspid peak RV-RA gradient     43    mm Hg  ---------  Systemic veins                    Value        Ref  Estimated CVP                     8     mm Hg  ---------  Inferior vena cava                Value        Ref  ID                                1.9   cm     <=2.1  Right ventricle                   Value        Ref  TAPSE, 2D                         2.22  cm     >=1.70  TAPSE, MM                         2.22  cm     >=1.70  RV pressure, S, DP                51    mm Hg  ---------  RV s', lateral                    10.8  cm/sec >=9.5 Legend: (L)  and  (H)  sonny values outside specified reference range. ---------------------------------------------------------------------------- Prepared and electronically signed by Ovidio Amaya MD 05/24/2025 10:12          Summary of Discussion      I discussed reason for palliative care consultation with Radha and then spoke with her Nephew Elliott over the phone.     I informed the patient/family that having palliative care support does not limit medical treatment options or decisions to those who wish to continue curative or restorative medical therapies. I discussed the benefits of palliative care to include assistance with arising symptom management needs, an extra layer of support, to ensure GOC are respected throughout healthcare continuum.        Outpatient/Community Palliative Care Services:  Usually visit once per 4 weeks  Focus on GOC and symptom management   Palliative Care criteria:  Not altered by prognosis   Does not limit curative or restorative therapies      Outpatient Hospice services:  24/7 phone triage services   RN visit one or more times per week depending on need  Home health aid to assist in ADLs/hygiene   Hospice criteria:  Less than six-month prognosis   Must forego most life-prolonging  measures/treatments   Focus solely on comfort    Must sign onto hospice benefit with agency     Prognostic awareness/understanding:   Elliott stated his aunt Radha has lived with him for 20yrs, she is fairly independent at home, no longer cooks.   Elliott stated he helps his aunt Radha with decision making. They have not filled out HCPOA pw. Discussed we can fill out prior to discharge when she is more alert. Elliott stated they were working on filling out HCPOA pw last hospitalization however Radha was \"foggy and forgetful\" at that time also. Radha has not children of her own she has a step son whom she doesn't see very often. She has not living family left. Discussed with Elliott he would be considered HC surrogate for his Aunt at this time which he is agreeable to and has been in the past.   We discussed the disease trajectory of  advanced age. CAD-multivessel disease- not surgical candidate, ESRD on HD 3 times weekly, severe PVD/PAD, valve disease, carotid artery stenosis with associated symptoms and decline over time.   I let Elliott and Radha aware she was going for her right common femoral endarterectomy with iliac/SFA stenting today at 11am. Radha stated she was very hungry discussed she will be able to eat after the procedure when cleared to eat.   Hopes/goals:   Radha is hoping to continue to be able to function and discharge to home eventually.   Discussed with Elliott discharge to [facility] with [facility] he was agreeable to this and stated overall goal was for his aunt to return to home with him.    Fears/concerns:   Radha expressed concern over her pain in legs and she was very hungry.     Advance Care Planning   A voluntarily discussion and explanation regarding Advance Care Planning (ACP) took place today with the patient and her nephew Elliott over the phone-left blank HCPOA and POLST forms at the bedside for Elliott and patient to review. We discussed the risks vs benefits of life sustaining treatments in the setting of Radha Yu's comorbid medical conditions. Items addressed:  patient preferences , code status , and Health Care Power of  (HCPOA) . This resulted in a better understanding of pt's expressed wishes/preferences  and confirmation of code status . Radha stated she didn't know what she wanted, Elliott stated his aunt Radha would want chest compressions/CPR. He did not think she would want breathing tube placed in event of crisis situation but will continue the discussion.   Summary:   Total time dedicated to ACP >16 minutes.      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   -pt declined  support at this time  -pt and family to continue with ongoing GOC discussions  -not ready to set limits at this time.  -I encourage setting limits prior to a crisis occurring to prevent any suffering.   -Provided emotional support to pt/family who are coping adequately    Advance care planning  -see above for details  -Pt's nephew Elliott Becker is HC surrogate [phone]  -left blank HCPOA and POLST at the bedside for pt and family to review. Will follow up tomorrow if pt is less forgetful and more alert will fill out HCPOA pw prior to discharge.     Palliative Performance Scale 40%    Discussed today's visit with Pat Frye  RN and Ida TUCKER    Palliative Care Follow Up: Palliative care team will continue to follow.  Feel free to contact our team with any questions or concerns.    Thank you for allowing Palliative Care services to participate in the care of Radha Yu.    A total of 75 minutes were spent on this consult, which included all of the following: chart review, direct face to face contact, history taking, physical examination, counseling and coordinating care, and documentation.     Corina AU Nu, APRN [id number]  6/3/2025  11:02AM  Palliative Care Services         [1]   Past Medical History:   Anxiety    COPD (chronic obstructive pulmonary disease) (HCC)    Essential hypertension    Hearing impaired person, bilateral    Hyperlipidemia    Kidney disease    Lymphoma (HCC)    Osteoarthritis    Renal disorder    Shortness of breath    Type 1 diabetes mellitus (HCC)    Visual impairment   [2]   Past Surgical History:  Procedure Laterality Date    Appendectomy      Hip surgery  2018    Total abdom hysterectomy     [3] No family history on file.  [4]   Allergies  Allergen Reactions    Levaquin [Levofloxacin] ITCHING   [5]   Current Facility-Administered Medications:     dextrose 5%-sodium chloride 0.9% infusion, , Intravenous, Continuous    hydrALAzine (Apresoline) 20 mg/mL injection 10 mg, 10 mg, Intravenous, Q6H PRN    [START ON 6/4/2025] sodium chloride 0.9 % IV bolus 100 mL, 100 mL, Intravenous, Q30 Min PRN **AND** [START ON 6/4/2025] albumin human (Albumin) 25% injection 25 g, 25 g, Intravenous, PRN Dialysis    [START ON 6/4/2025] heparin (Porcine) 1000 UNIT/ML injection 1,500 Units, 1.5 mL, Intracatheter, Once    sodium chloride 0.9 % IV bolus 100 mL, 100 mL, Intravenous, Q30 Min PRN **AND** albumin human (Albumin) 25% injection 25 g, 25 g, Intravenous, PRN Dialysis    HYDROmorphone (Dilaudid) 1 MG/ML injection 0.4 mg, 0.4 mg, Intravenous, Q2H PRN **OR** HYDROmorphone (Dilaudid) 1 MG/ML injection 0.8 mg, 0.8 mg, Intravenous,  Q2H PRN **OR** HYDROmorphone (Dilaudid) 1 MG/ML injection 1.2 mg, 1.2 mg, Intravenous, Q2H PRN    epoetin brittany (Epogen, Procrit) 41518 UNIT/ML injection 10,000 Units, 10,000 Units, Subcutaneous, Once per day on Monday Wednesday Friday    sodium ferric gluconate (Ferrlecit) 125 mg in sodium chloride 0.9% 100mL IVPB premix, 125 mg, Intravenous, Daily    heparin (Porcine) 06976 units/250mL infusion ACS/AFIB CONTINUOUS, 200-3,000 Units/hr, Intravenous, Continuous    metoclopramide (Reglan) 5 mg/mL injection 5 mg, 5 mg, Intravenous, Daily PRN    insulin degludec (Tresiba) 100 units/mL flextouch 12 Units, 12 Units, Subcutaneous, Daily    atorvastatin (Lipitor) tab 40 mg, 40 mg, Oral, Nightly    clopidogrel (Plavix) tab 75 mg, 75 mg, Oral, Daily    aspirin DR tab 81 mg, 81 mg, Oral, Daily    hydrALAZINE (Apresoline) tab 25 mg, 25 mg, Oral, Q8H BLANE    isosorbide dinitrate (Isordil) tab 20 mg, 20 mg, Oral, TID (Nitrates)    metoprolol succinate (Toprol XL) partial tablet 12.5 mg, 12.5 mg, Oral, Daily Beta Blocker    pantoprazole (Protonix) DR tab 40 mg, 40 mg, Oral, BID AC    calcitriol (Rocaltrol) cap 0.25 mcg, 0.25 mcg, Oral, Q MWF    ipratropium-albuterol (Duoneb) 0.5-2.5 (3) MG/3ML inhalation solution 3 mL, 3 mL, Nebulization, Q6H PRN    HYDROcodone-acetaminophen (Norco) 5-325 MG per tab 1 tablet, 1 tablet, Oral, Q6H PRN    escitalopram (Lexapro) tab 10 mg, 10 mg, Oral, Daily    glucose (Dex4) 15 GM/59ML oral liquid 15 g, 15 g, Oral, Q15 Min PRN **OR** glucose (Glutose) 40% oral gel 15 g, 15 g, Oral, Q15 Min PRN **OR** glucose-vitamin C (Dex-4) chewable tab 4 tablet, 4 tablet, Oral, Q15 Min PRN **OR** dextrose 50% injection 50 mL, 50 mL, Intravenous, Q15 Min PRN **OR** glucose (Dex4) 15 GM/59ML oral liquid 30 g, 30 g, Oral, Q15 Min PRN **OR** glucose (Glutose) 40% oral gel 30 g, 30 g, Oral, Q15 Min PRN **OR** glucose-vitamin C (Dex-4) chewable tab 8 tablet, 8 tablet, Oral, Q15 Min PRN    acetaminophen (Tylenol Extra  Strength) tab 500 mg, 500 mg, Oral, Q4H PRN    melatonin tab 3 mg, 3 mg, Oral, Nightly PRN    polyethylene glycol (PEG 3350) (Miralax) 17 g oral packet 17 g, 17 g, Oral, Daily PRN    sennosides (Senokot) tab 17.2 mg, 17.2 mg, Oral, Nightly PRN    bisacodyl (Dulcolax) 10 MG rectal suppository 10 mg, 10 mg, Rectal, Daily PRN    ondansetron (Zofran) 4 MG/2ML injection 4 mg, 4 mg, Intravenous, Q6H PRN    benzonatate (Tessalon) cap 200 mg, 200 mg, Oral, TID PRN    guaiFENesin (Robitussin) 100 MG/5 ML oral liquid 200 mg, 200 mg, Oral, Q4H PRN    glycerin-hypromellose- (Artificial Tears) 0.2-0.2-1 % ophthalmic solution 1 drop, 1 drop, Both Eyes, QID PRN    sodium chloride (Saline Mist) 0.65 % nasal solution 1 spray, 1 spray, Each Nare, Q3H PRN    insulin aspart (NovoLOG) 100 Units/mL FlexPen 1-7 Units, 1-7 Units, Subcutaneous, TID CC    [Held by provider] insulin aspart (NovoLOG) 100 Units/mL FlexPen 5 Units, 5 Units, Subcutaneous, TID CC and HS

## 2025-06-03 NOTE — ANESTHESIA POSTPROCEDURE EVALUATION
Patient: Radha Yu    Procedure Summary       Date: 06/03/25 Room / Location: [facility]    Anesthesia Start: 1158 Anesthesia Stop: 1712    Procedure: CATH PV HYBRID Diagnosis: (.)    Scheduled Providers: Chely Vines MD; Mike Horan DO Anesthesiologist: Mike Horan DO    Anesthesia Type: general ASA Status: 4            Anesthesia Type: general    Vitals Value Taken Time   BP 84/48 06/03/25 17:15   Temp 97.5 06/03/25 17:18   Pulse 71 06/03/25 17:17   Resp 15 06/03/25 17:17   SpO2 99 % 06/03/25 17:17   Vitals shown include unfiled device data.    [facility] AN Post Evaluation:   Patient Evaluated in PACU  Patient Participation: complete - patient participated  Level of Consciousness: sleepy but conscious  Pain Management: adequate  Airway Patency:patent  Yes    Nausea/Vomiting: none  Cardiovascular Status: acceptable  Respiratory Status: acceptable  Postoperative Hydration acceptable      Mike Horan DO  6/3/2025 5:18 PM

## 2025-06-04 LAB
ANION GAP SERPL CALC-SCNC: 9 MMOL/L (ref 0–18)
BLOOD TYPE BARCODE: 6200
BLOOD TYPE BARCODE: 6200
BUN BLD-MCNC: 31 MG/DL (ref 9–23)
BUN/CREAT SERPL: 10.8 (ref 10–20)
CALCIUM BLD-MCNC: 7.7 MG/DL (ref 8.7–10.4)
CHLORIDE SERPL-SCNC: 105 MMOL/L (ref 98–112)
CO2 SERPL-SCNC: 23 MMOL/L (ref 21–32)
CREAT BLD-MCNC: 2.88 MG/DL (ref 0.55–1.02)
DEPRECATED RDW RBC AUTO: 50.7 FL (ref 35.1–46.3)
EGFRCR SERPLBLD CKD-EPI 2021: 15 ML/MIN/1.73M2 (ref 60–?)
ERYTHROCYTE [DISTWIDTH] IN BLOOD BY AUTOMATED COUNT: 15.7 % (ref 11–15)
GLUCOSE BLD-MCNC: 176 MG/DL (ref 70–99)
GLUCOSE BLDC GLUCOMTR-MCNC: 167 MG/DL (ref 70–99)
GLUCOSE BLDC GLUCOMTR-MCNC: 257 MG/DL (ref 70–99)
GLUCOSE BLDC GLUCOMTR-MCNC: 306 MG/DL (ref 70–99)
GLUCOSE BLDC GLUCOMTR-MCNC: 327 MG/DL (ref 70–99)
HCT VFR BLD AUTO: 28 % (ref 35–48)
HGB BLD-MCNC: 8.9 G/DL (ref 12–16)
MCH RBC QN AUTO: 28.1 PG (ref 26–34)
MCHC RBC AUTO-ENTMCNC: 31.8 G/DL (ref 31–37)
MCV RBC AUTO: 88.3 FL (ref 80–100)
OSMOLALITY SERPL CALC.SUM OF ELEC: 295 MOSM/KG (ref 275–295)
PLATELET # BLD AUTO: 115 10(3)UL (ref 150–450)
PLATELETS.RETICULATED NFR BLD AUTO: 6.3 % (ref 0–7)
POTASSIUM SERPL-SCNC: 4.9 MMOL/L (ref 3.5–5.1)
RBC # BLD AUTO: 3.17 X10(6)UL (ref 3.8–5.3)
SODIUM SERPL-SCNC: 137 MMOL/L (ref 136–145)
UNIT VOLUME: 350 ML
UNIT VOLUME: 350 ML
WBC # BLD AUTO: 6.9 X10(3) UL (ref 4–11)

## 2025-06-04 PROCEDURE — 99233 SBSQ HOSP IP/OBS HIGH 50: CPT | Performed by: INTERNAL MEDICINE

## 2025-06-04 PROCEDURE — 99232 SBSQ HOSP IP/OBS MODERATE 35: CPT | Performed by: NURSE PRACTITIONER

## 2025-06-04 PROCEDURE — 99233 SBSQ HOSP IP/OBS HIGH 50: CPT | Performed by: FAMILY MEDICINE

## 2025-06-04 RX ORDER — HEPARIN SODIUM 1000 [USP'U]/ML
1500 INJECTION, SOLUTION INTRAVENOUS; SUBCUTANEOUS ONCE
Status: COMPLETED | OUTPATIENT
Start: 2025-06-04 | End: 2025-06-04

## 2025-06-04 RX ORDER — HEPARIN SODIUM 1000 [USP'U]/ML
1.5 INJECTION, SOLUTION INTRAVENOUS; SUBCUTANEOUS
Status: DISCONTINUED | OUTPATIENT
Start: 2025-06-06 | End: 2025-06-09

## 2025-06-04 RX ORDER — HYDROCODONE BITARTRATE AND ACETAMINOPHEN 5; 325 MG/1; MG/1
2 TABLET ORAL EVERY 6 HOURS PRN
Refills: 0 | Status: DISCONTINUED | OUTPATIENT
Start: 2025-06-04 | End: 2025-06-09

## 2025-06-04 RX ORDER — ALBUMIN (HUMAN) 12.5 G/50ML
25 SOLUTION INTRAVENOUS
Status: ACTIVE | OUTPATIENT
Start: 2025-06-06 | End: 2025-06-07

## 2025-06-04 RX ORDER — SENNA AND DOCUSATE SODIUM 50; 8.6 MG/1; MG/1
2 TABLET, FILM COATED ORAL DAILY
Status: DISCONTINUED | OUTPATIENT
Start: 2025-06-04 | End: 2025-06-09

## 2025-06-04 NOTE — PALLIATIVE CARE NOTE
[facility]  part of [facility]  Palliative Care Progress Note    Radha Yu Patient Status:  Inpatient    1940 MRN [id number]   Location [facility] 2W/SW Attending Tracy Vega MD   Hosp Day # 13 PCP Stef Velasco MD     The  Cures Act makes medical notes like these available to patients in the interest of transparency. Please be advised this is a medical document. Medical documents are intended to carry relevant information, facts as evident, and the clinical opinion of the practitioner. The medical note is intended as peer to peer communication and may appear blunt or direct. It is written in medical language and may contain abbreviations or verbiage that are unfamiliar.     Subjective     When I entered the room, Radha was in the bed she is awake and alert, stated she is experiencing neck pain and pain in right groin area, she stated pain is 7-8/10 and describes as burning type of pain. Adjusted pillow to support her neck which helped relieve the neck pain. Right groin prevena vac noted intact and secure. Pt is s/p  Right Femoral endarterectomy with bovine patch angioplasty POD #1  Pt did take Norco 5/325 X1 this morning about 0745.     Review of pertinent medication requirements in past 24 hours: Norco 5/325 X1, Dilaudid 0.8mg ivp X1, Zofran 4mg ivp X1    Palliative Care symptom needs assessed:   Pertinent items are noted in HPI/below    Fatigue:  Yes  Dyspnea: denies  Current O2 therapy: RA  Cough: denies  Pain Present: neck and right groin pain-see above  Non-verbal signs of pain present: Yes  Appetite: stated she feels hungry  Constipation: denies  Diarrhea: denies  Nausea/Vomiting: denies  Depression: h/o depression/anxiety    Allergies:  Allergies[1]    Medications:   Current Hospital Medications[2]    Objective     Vital Signs:  Blood pressure (!) 89/43, pulse 75, temperature 97.2 °F (36.2 °C), temperature source Temporal, resp. rate (!) 9, weight 130  lb (59 kg), SpO2 (!) 89%.  Body mass index is 24.58 kg/m².  Present Level of pain: 7-8/10  Non-verbal signs of pain present: No    Physical Exam:  General: Radha is in the bed she appears elderly and chronically ill, frail. She is awake and alert- + forgetfulness. On levophed for hypotension  HEENT: dry oral MM, dentures out.   Cardiac: Regular rate and rhythm, S1, S2 normal, + murmur  Port right upper chest  Temporary HD catheter left upper chest.   Lungs: Clear without wheezes, rales, rhonchi or dullness.  Normal excursions and effort. On RA  Abdomen: Soft, non-tender, + bowel sounds, no rebound or guarding. + BM  Extremities: Without clubbing, cyanosis. Edema not present  Right groin Prevena vac noted to right groin incision  Neurologic: Alert and oriented X3. Flat affect-forgetful  Skin: Warm and dry.    Prior to admission Palliative performance scale PPSv2 (%): 60    Hematology:  Lab Results   Component Value Date    WBC 6.9 06/04/2025    HGB 8.9 (L) 06/04/2025    HCT 28.0 (L) 06/04/2025    .0 (L) 06/04/2025       Coags:  Lab Results   Component Value Date    INR 1.16 05/22/2025    PTT 53.4 (H) 06/03/2025       Chemistry:  Lab Results   Component Value Date    CREATSERUM 2.88 (H) 06/04/2025    BUN 31 (H) 06/04/2025     06/04/2025    K 4.9 06/04/2025     06/04/2025    CO2 23.0 06/04/2025     (H) 06/04/2025    CA 7.7 (L) 06/04/2025    ALB 3.6 06/03/2025    ALKPHO 73 05/26/2025    BILT 0.2 05/26/2025    TP 5.7 05/26/2025    AST 16 05/26/2025    ALT <7 (L) 05/26/2025    MG 1.9 06/01/2025    PHOS 3.1 06/03/2025       Imaging:  No results found.      Summary of Discussion    6/4/25 pt and her nephew Elliott not ready to set limits at this time, they are agreeable to CPC upon discharge.   I encouraged setting limits prior to crisis occurring. Blank POLST form left with family yesterday to review to help with decision making.     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 SW to help with dc planning.   -pt declined  support at this time  -pt and family to continue with ongoing GOC discussions  -not ready to set limits at this time.  -I encourage setting limits prior to a crisis occurring to prevent any suffering.   -Provided emotional support to pt/family who are coping adequately     Advance care planning  -see above for details  -Pt's nephew Elliott Becker is HC surrogate [phone]  -left blank HCPOA and POLST at the bedside for pt and family to review.      Palliative Performance Scale 40%       Discussed today's visit with  Dr Vega and Delmy RN and Miguelina RN    Palliative Care Follow Up: Palliative care team will sign off for now as GOC have been established. Feel free to contact our team with any questions or concerns.    Thank you for allowing Palliative Care services to participate in the care of Radha Yu.    A total of 35 minutes were spent on this follow-up, which included all of the following: chart review, direct face to face contact, history taking, physical examination, counseling and coordinating  care, and documentation.     Corina Judge, [id number]  6/4/2025  8:54AM  Palliative Care Services       [1]   Allergies  Allergen Reactions    Levaquin [Levofloxacin] ITCHING   [2]   Current Facility-Administered Medications:     HYDROcodone-acetaminophen (Norco) 5-325 MG per tab 2 tablet, 2 tablet, Oral, Q6H PRN    hydrALAzine (Apresoline) 20 mg/mL injection 10 mg, 10 mg, Intravenous, Q6H PRN    ondansetron (Zofran) 4 MG/2ML injection 4 mg, 4 mg, Intravenous, Q6H PRN    heparin (Porcine) 5000 UNIT/ML injection 5,000 Units, 5,000 Units, Subcutaneous, Q8H BLANE    norepinephrine (Levophed) 32 mg in dextrose 5% 250 mL infusion, 0.5-50 mcg/min, Intravenous, Continuous    norepinephrine (Levophed) 4 mg/250mL infusion premix, 0.5-50 mcg/min, Intravenous, Continuous    sodium chloride 0.9 % IV bolus 100 mL, 100 mL, Intravenous, Q30 Min PRN **AND** albumin human (Albumin) 25% injection 25 g, 25 g, Intravenous, PRN Dialysis    HYDROmorphone (Dilaudid) 1 MG/ML injection 0.4 mg, 0.4 mg, Intravenous, Q2H PRN **OR** HYDROmorphone (Dilaudid) 1 MG/ML injection 0.8 mg, 0.8 mg, Intravenous, Q2H PRN **OR** HYDROmorphone (Dilaudid) 1 MG/ML injection 1.2 mg, 1.2 mg, Intravenous, Q2H PRN    epoetin brittany (Epogen, Procrit) 76972 UNIT/ML injection 10,000 Units, 10,000 Units, Subcutaneous, Once per day on Monday Wednesday Friday    sodium ferric gluconate (Ferrlecit) 125 mg in sodium chloride 0.9% 100mL IVPB premix, 125 mg, Intravenous, Daily    metoclopramide (Reglan) 5 mg/mL injection 5 mg, 5 mg, Intravenous, Daily PRN    insulin degludec (Tresiba) 100 units/mL flextouch 12 Units, 12 Units, Subcutaneous, Daily    atorvastatin (Lipitor) tab 40 mg, 40 mg, Oral, Nightly    clopidogrel (Plavix) tab 75 mg, 75 mg, Oral, Daily    aspirin DR tab 81 mg, 81 mg, Oral, Daily    hydrALAZINE (Apresoline) tab 25 mg, 25 mg, Oral, Q8H BLANE    isosorbide dinitrate (Isordil) tab 20 mg, 20 mg, Oral, TID (Nitrates)    metoprolol succinate (Toprol XL) partial  tablet 12.5 mg, 12.5 mg, Oral, Daily Beta Blocker    pantoprazole (Protonix) DR tab 40 mg, 40 mg, Oral, BID AC    calcitriol (Rocaltrol) cap 0.25 mcg, 0.25 mcg, Oral, Q MWF    ipratropium-albuterol (Duoneb) 0.5-2.5 (3) MG/3ML inhalation solution 3 mL, 3 mL, Nebulization, Q6H PRN    HYDROcodone-acetaminophen (Norco) 5-325 MG per tab 1 tablet, 1 tablet, Oral, Q6H PRN    escitalopram (Lexapro) tab 10 mg, 10 mg, Oral, Daily    glucose (Dex4) 15 GM/59ML oral liquid 15 g, 15 g, Oral, Q15 Min PRN **OR** glucose (Glutose) 40% oral gel 15 g, 15 g, Oral, Q15 Min PRN **OR** glucose-vitamin C (Dex-4) chewable tab 4 tablet, 4 tablet, Oral, Q15 Min PRN **OR** dextrose 50% injection 50 mL, 50 mL, Intravenous, Q15 Min PRN **OR** glucose (Dex4) 15 GM/59ML oral liquid 30 g, 30 g, Oral, Q15 Min PRN **OR** glucose (Glutose) 40% oral gel 30 g, 30 g, Oral, Q15 Min PRN **OR** glucose-vitamin C (Dex-4) chewable tab 8 tablet, 8 tablet, Oral, Q15 Min PRN    acetaminophen (Tylenol Extra Strength) tab 500 mg, 500 mg, Oral, Q4H PRN    melatonin tab 3 mg, 3 mg, Oral, Nightly PRN    polyethylene glycol (PEG 3350) (Miralax) 17 g oral packet 17 g, 17 g, Oral, Daily PRN    sennosides (Senokot) tab 17.2 mg, 17.2 mg, Oral, Nightly PRN    bisacodyl (Dulcolax) 10 MG rectal suppository 10 mg, 10 mg, Rectal, Daily PRN    ondansetron (Zofran) 4 MG/2ML injection 4 mg, 4 mg, Intravenous, Q6H PRN    benzonatate (Tessalon) cap 200 mg, 200 mg, Oral, TID PRN    guaiFENesin (Robitussin) 100 MG/5 ML oral liquid 200 mg, 200 mg, Oral, Q4H PRN    glycerin-hypromellose- (Artificial Tears) 0.2-0.2-1 % ophthalmic solution 1 drop, 1 drop, Both Eyes, QID PRN    sodium chloride (Saline Mist) 0.65 % nasal solution 1 spray, 1 spray, Each Nare, Q3H PRN    insulin aspart (NovoLOG) 100 Units/mL FlexPen 1-7 Units, 1-7 Units, Subcutaneous, TID CC    [Held by provider] insulin aspart (NovoLOG) 100 Units/mL FlexPen 5 Units, 5 Units, Subcutaneous, TID CC and HS

## 2025-06-04 NOTE — PHYSICAL THERAPY NOTE
PHYSICAL THERAPY EVALUATION - INPATIENT     Room Number: 231/231-A  Evaluation Date: 6/4/2025  Type of Evaluation: Initial   Physician Order: PT Eval and Treat    Presenting Problem: Acute on Chronic  CHF ,anemia  and Respiratory failure / NSTEMI / CKD ESRD new to dialysis this admission / weakness and declining status in home  Co-Morbidities : Prior left hip sx with shorter limb post operatively / Left shoulder fx 2017 / PVD with left femo popliteal Bypass / Moderate right ICA stenosis  right TCAR 2019  / left total occlusion  Left ICA  Reason for Therapy: Mobility Dysfunction and Discharge Planning    PHYSICAL THERAPY ASSESSMENT   Patient is a 84 year old female admitted 5/22/2025 for acute on chronic CHF, anemia and resp failure, new to dialysis this admission. Seen today for re-evaluation after RLE endarterectomy and angioplasty as well as stent placement, Prevena wound vac to right groin.  Prior to admission, patient's baseline is primarly homebound with 5 VALERIE. She has 24 hr care between her nephew and his spouse as well as another nephew. She is normally independent but limited mobility at home with RW, has help with bathing and IADL.  Patient is currently functioning below baseline with bed mobility, transfers, gait, stair negotiation, maintaining seated position, standing prolonged periods, and performing household tasks.  Patient is requiring moderate assist and maximum assist as a result of the following impairments: decreased functional strength, decreased endurance/aerobic capacity, pain, impaired sitting and standing balance, decreased muscular endurance, medical status, limited RLE ROM, and increased O2 needs from baseline.  Physical Therapy will continue to follow for duration of hospitalization.    Patient will benefit from continued skilled PT Services to promote return to prior level of function and safety with continuous assistance and gradual rehabilitative therapy .    PLAN DURING  HOSPITALIZATION  Nursing Mobility Recommendation : Lift Equipment  PT Device Recommendation: Rolling walker, Gait belt  PT Treatment Plan: Bed mobility, Body mechanics, Endurance, Energy conservation, Family education, Gait training, Range of motion, Strengthening, Transfer training, Balance training, Stoop training, Stair training  Rehab Potential : Fair  Frequency (Obs): 3-5x/week     PHYSICAL THERAPY MEDICAL/SOCIAL HISTORY   History related to current admission: Radha Yu is a(n) 84 year old female with a PMH of PAD, HTN, HL, CKD stage 3 who presents with increased sob.  She was extremely tachypneic and wheezing, saturating 90% on room air.  She was given a nebulizer treatment and her oxygen sats improved to 98%.  In the ED she had a cxr that was consistent with fluid overload.  She also has a significant tobacco use history (stopped in January) and was still wheezing on exam.  Her trop was elevated and she had some EKG changes that were new.  She will be admitted.      Problem List  Principal Problem:    Acute on chronic respiratory failure with hypoxia (HCC)  Active Problems:    ESRD (end stage renal disease) (HCC)    Acute congestive heart failure, unspecified heart failure type (HCC)    NSTEMI (non-ST elevated myocardial infarction) (HCC)    Acute renal failure superimposed on chronic kidney disease, unspecified acute renal failure type, unspecified CKD stage    Goals of care, counseling/discussion    Palliative care encounter      HOME SITUATION  Type of Home: House  Home Layout: One level  Stairs to Enter : 5   Railing: Yes              Lives With: Family (Nephew present in room.  Per nephew between his spouse and his brother pt has 24hr supported care .)    Drives: No   Patient Regularly Uses: Rolling walker (Per nephew and pt , indep dressing and tolieting , assisted showering with shower chair and nephew help ---Indep ambulation in home short distance with RW yet prior to admission was declining with  significant difficulty inability to negotiate stairs)     Prior Level of [place]: Patient lives in a one level house with family support. She is normally independent with a RW for short distances but mostly homebound. She has help with some basic ADL as well as IADL. Was not able to perform stairs at time of admit.    SUBJECTIVE  \"It hurts a bit\" indicating groin site    PHYSICAL THERAPY EXAMINATION   OBJECTIVE  Precautions: Cardiac, Limb alert - right, Bed/chair alarm, Hard of hearing (R arterial line, VERY [place])  Fall Risk: High fall risk      PAIN ASSESSMENT  Rating: Unable to rate  Location: incisional pain after procedure  Management Techniques: Activity promotion, Repositioning    COGNITION  Overall Cognitive Status:  A&Ox4, limited responses due to [place]    RANGE OF MOTION AND STRENGTH ASSESSMENT  Upper extremity ROM and strength are within functional limits     Lower extremity ROM is within functional limits     Lower extremity strength is limited, difficulty picking up legs, pt reports they feel heavy and are weak - some pain with mvmt as well due to procedure/incision    BALANCE  Static Sitting: Good  Dynamic Sitting: Fair +  Static Standing: Poor +  Dynamic Standing: Poor    ACTIVITY TOLERANCE  Pulse: 70  Heart Rate Source: Monitor     BP: 106/41  BP Location: Left arm  BP Method: Automatic  Patient Position: Sitting    O2 WALK  Oxygen Therapy  SPO2% on Oxygen at Rest: 94  At rest oxygen flow (liters per minute): 3    AM-PAC '6-Clicks' INPATIENT SHORT FORM - BASIC MOBILITY  How much difficulty does the patient currently have...  Patient Difficulty: Turning over in bed (including adjusting bedclothes, sheets and blankets)?: A Lot   Patient Difficulty: Sitting down on and standing up from a chair with arms (e.g., wheelchair, bedside commode, etc.): A Lot   Patient Difficulty: Moving from lying on back to sitting on the side of the bed?: A Lot   How much help from another person does the patient currently  need...   Help from Another: Moving to and from a bed to a chair (including a wheelchair)?: Total   Help from Another: Need to walk in hospital room?: Total   Help from Another: Climbing 3-5 steps with a railing?: Total     AM-PAC Score:  Raw Score: 9   Approx Degree of Impairment: 81.38%   Standardized Score (AM-PAC Scale): 30.55   CMS Modifier (G-Code): CM    FUNCTIONAL ABILITY STATUS  Functional Mobility/Gait Assessment  Gait Assistance: Not tested  Distance (ft): 0.    Rolling: moderate assist  Supine to Sit: moderate assist  Sit to Supine: maximum assist  Sit to Stand: NT today, pt feeling weak and having dialysis soon    Exercise/Education Provided:  Bed mobility  Energy conservation  Functional activity tolerated  Neuromuscular re-educate  Posture  ROM  Strengthening    Skilled Therapy Provided: RN approves participation in therapy session, reports pt hearing aids are not working so need to speak loudly. Patient presents in bed and agreeable to therapy. Benefits from increased time and prior demonstration for activity. She is able to move both arms and legs against gravity but reports right leg is a bit sore and hard to move. She is able to sit EOB x 5 min and work on deep breathing, weight shifting and LE exs. She is returned supine in bed with all needs in reach and extra covers, reports she is very cold. She is on track to dc to L[facility]. RN aware of session.    The patient's Approx Degree of Impairment: 81.38% has been calculated based on documentation in the [facility] '6 clicks' Inpatient Basic Mobility Short Form.  Research supports that patients with this level of impairment may benefit from LTC. However, anticipate she will progress and most benefit from skilled therapy/GR/LISE at discharge to maximize return to f.  Final disposition will be made by interdisciplinary medical team.    Patient End of Session: In bed, Needs met, Call light within reach, RN aware of session/findings, All patient questions and  concerns addressed, Hospital anti-slip socks, Alarm set, Discussed recommendations with /    CURRENT GOALS  Goals to be met by: 6/20/25  Patient Goal Patient's self-stated goal is: to go back home with family if possible   Goal #1 Patient is able to demonstrate supine - sit EOB @ level: minimum assistance     Goal #1   Current Status    Goal #2 Patient is able to demonstrate transfers Sit to/from Stand at assistance level: minimum assistance with walker - rolling     Goal #2  Current Status    Goal #3 Patient is able to ambulate 25 feet with assist device: walker - rolling at assistance level: moderate assistance   Goal #3   Current Status    Goal #4 Patient will negotiate bed to/from chair transfers with RW and moderate assistance   Goal #4   Current Status    Goal #5 Patient to demonstrate independence with home activity/exercise instructions provided to patient in preparation for discharge.   Goal #5   Current Status    Goal #6    Goal #6  Current Status        Re-evaluation  Therapeutic Activity:  16 minutes

## 2025-06-04 NOTE — PROGRESS NOTES
[facility]  part of [facility]     Progress Note    Radha Yu Patient Status:  Inpatient    1940 MRN [id number]   Location [facility] 2W/SW Attending Madison Crump MD   Hosp Day # 13 PCP Stef Velasco MD       Subjective:   Patient seen and examined.  Resting in bed.  Status post femoral endarterectomy yesterday with stenting.  Admits to some right groin discomfort.  Wound VAC in place in right groin.  Weaned off norepinephrine.  Denies dyspnea    Objective:   Blood pressure (!) 89/43, pulse 63, temperature 97.2 °F (36.2 °C), temperature source Temporal, resp. rate 12, weight 130 lb (59 kg), SpO2 100%.  Intake/Output:   Last 3 shifts: I/O last 3 completed shifts:  In: 5408.1 [P.O.:670; I.V.:3512.1; Blood:1216; IV PIGGYBACK:10]  Out: 1275 [Urine:1275]   This shift: I/O this shift:  In: 680 [P.O.:680]  Out: 45 [Urine:45]     Vent Settings:      Hemodynamic parameters (last 24 hours):      Scheduled Meds: Current Hospital Medications[1]    Continuous Infusions: Medication Infusions[2]    Physical Exam  Constitutional: no acute distress  Eyes: PERRL  ENT: nares pateint  Neck: supple, no JVD  Cardio: RRR, S1 S2  Respiratory: Faint crackles  GI: abdomen soft, non tender, active bowel sounds, no organomegaly  Extremities: no clubbing, cyanosis, edema + right groin wound VAC in place  Neurologic: no gross motor deficits  Skin: warm, dry      Results:     Lab Results   Component Value Date    WBC 6.9 2025    HGB 8.9 2025    HCT 28.0 2025    .0 2025    CREATSERUM 2.88 2025    BUN 31 2025     2025    K 4.9 2025     2025    CO2 23.0 2025     2025    CA 7.7 2025         No results found.              Assessment   1.  Acute hypoxemic respiratory failure  2.  Pulmonary edema  3.  Non-ST elevation myocardial infarction  4.  POD #1 status post right femoral endarterectomy and stent  placement  5.  Peripheral vascular disease  6.  Acute kidney injury on chronic kidney disease  7.  Prior history of right ICA stenosis status post TCAR  8.  Hypertension  9.  Diabetes mellitus     Plan   -Patient presents with worsening dyspnea symptoms.    - Status post right femoral endarterectomy and right common iliac stent and external iliac stent placement on 6/3/2025.  Wound VAC placed.  Further recommendations per vascular surgery  - Nebulizer treatments  - Viral respiratory panel negative  -Status post left and right heart catheterization on 5/27/2025 with mild to moderate LV dysfunction and severe multivessel coronary disease seen.  Eval by CT surgery.  Poor candidate for CABG. medical therapy per cardiology  - Eval by nephrology.  Dialysis catheter placed and started on dialysis.  Renal replacement therapy per nephrology  - DVT prophylaxis: Heparin  - Possible transfer to floor later today          Neelam Cherry DO  Pulmonary Critical Care Medicine  [facility]          [1]   Current Facility-Administered Medications   Medication Dose Route Frequency    HYDROcodone-acetaminophen (Norco) 5-325 MG per tab 2 tablet  2 tablet Oral Q6H PRN    senna-docusate (Senokot-S) 8.6-50 MG per tab 2 tablet  2 tablet Oral Daily    hydrALAzine (Apresoline) 20 mg/mL injection 10 mg  10 mg Intravenous Q6H PRN    ondansetron (Zofran) 4 MG/2ML injection 4 mg  4 mg Intravenous Q6H PRN    heparin (Porcine) 5000 UNIT/ML injection 5,000 Units  5,000 Units Subcutaneous Q8H BLANE    sodium chloride 0.9 % IV bolus 100 mL  100 mL Intravenous Q30 Min PRN    And    albumin human (Albumin) 25% injection 25 g  25 g Intravenous PRN Dialysis    HYDROmorphone (Dilaudid) 1 MG/ML injection 0.4 mg  0.4 mg Intravenous Q2H PRN    Or    HYDROmorphone (Dilaudid) 1 MG/ML injection 0.8 mg  0.8 mg Intravenous Q2H PRN    Or    HYDROmorphone (Dilaudid) 1 MG/ML injection 1.2 mg  1.2 mg Intravenous Q2H PRN    epoetin brittany (Epogen, Procrit) 52602  UNIT/ML injection 10,000 Units  10,000 Units Subcutaneous Once per day on Monday Wednesday Friday    sodium ferric gluconate (Ferrlecit) 125 mg in sodium chloride 0.9% 100mL IVPB premix  125 mg Intravenous Daily    metoclopramide (Reglan) 5 mg/mL injection 5 mg  5 mg Intravenous Daily PRN    insulin degludec (Tresiba) 100 units/mL flextouch 12 Units  12 Units Subcutaneous Daily    atorvastatin (Lipitor) tab 40 mg  40 mg Oral Nightly    clopidogrel (Plavix) tab 75 mg  75 mg Oral Daily    aspirin DR tab 81 mg  81 mg Oral Daily    metoprolol succinate (Toprol XL) partial tablet 12.5 mg  12.5 mg Oral Daily Beta Blocker    pantoprazole (Protonix) DR tab 40 mg  40 mg Oral BID AC    calcitriol (Rocaltrol) cap 0.25 mcg  0.25 mcg Oral Q MWF    ipratropium-albuterol (Duoneb) 0.5-2.5 (3) MG/3ML inhalation solution 3 mL  3 mL Nebulization Q6H PRN    HYDROcodone-acetaminophen (Norco) 5-325 MG per tab 1 tablet  1 tablet Oral Q6H PRN    escitalopram (Lexapro) tab 10 mg  10 mg Oral Daily    glucose (Dex4) 15 GM/59ML oral liquid 15 g  15 g Oral Q15 Min PRN    Or    glucose (Glutose) 40% oral gel 15 g  15 g Oral Q15 Min PRN    Or    glucose-vitamin C (Dex-4) chewable tab 4 tablet  4 tablet Oral Q15 Min PRN    Or    dextrose 50% injection 50 mL  50 mL Intravenous Q15 Min PRN    Or    glucose (Dex4) 15 GM/59ML oral liquid 30 g  30 g Oral Q15 Min PRN    Or    glucose (Glutose) 40% oral gel 30 g  30 g Oral Q15 Min PRN    Or    glucose-vitamin C (Dex-4) chewable tab 8 tablet  8 tablet Oral Q15 Min PRN    acetaminophen (Tylenol Extra Strength) tab 500 mg  500 mg Oral Q4H PRN    melatonin tab 3 mg  3 mg Oral Nightly PRN    polyethylene glycol (PEG 3350) (Miralax) 17 g oral packet 17 g  17 g Oral Daily PRN    sennosides (Senokot) tab 17.2 mg  17.2 mg Oral Nightly PRN    bisacodyl (Dulcolax) 10 MG rectal suppository 10 mg  10 mg Rectal Daily PRN    ondansetron (Zofran) 4 MG/2ML injection 4 mg  4 mg Intravenous Q6H PRN    benzonatate  (Tessalon) cap 200 mg  200 mg Oral TID PRN    guaiFENesin (Robitussin) 100 MG/5 ML oral liquid 200 mg  200 mg Oral Q4H PRN    glycerin-hypromellose- (Artificial Tears) 0.2-0.2-1 % ophthalmic solution 1 drop  1 drop Both Eyes QID PRN    sodium chloride (Saline Mist) 0.65 % nasal solution 1 spray  1 spray Each Nare Q3H PRN    insulin aspart (NovoLOG) 100 Units/mL FlexPen 1-7 Units  1-7 Units Subcutaneous TID CC    [Held by provider] insulin aspart (NovoLOG) 100 Units/mL FlexPen 5 Units  5 Units Subcutaneous TID CC and HS   [2]

## 2025-06-04 NOTE — PROGRESS NOTES
[facility]  part of [facility]    Progress Note    Radha Yu Patient Status:  Inpatient    1940 MRN [id number]   Location [facility]5W Attending Villa Angel MD   Hosp Day # 13 PCP Stef Velasco MD     Chief complaint     Subjective:   Radha Yu is a(n) 84 year old female who came in with chest pain and sob.     NO NEW ISSUES, doing well, s/p procedure, wound vac in place, off levo, diet advance. Heparin is dc     A comprehensive 10 point review of systems was completed.  Pertinent positives and negatives noted in the the HPI.    Objective:     Blood pressure (!) 89/43, pulse 66, temperature 97.2 °F (36.2 °C), temperature source Temporal, resp. rate 16, weight 130 lb (59 kg), SpO2 93%.      Intake/Output Summary (Last 24 hours) at 2025 1120  Last data filed at 2025 1102  Gross per 24 hour   Intake 5591.06 ml   Output 1370 ml   Net 4221.06 ml         Patient Weight(s) for the past 336 hrs:   Weight   25 0400 130 lb (59 kg)   25 2000 132 lb (59.9 kg)   25 0336 122 lb 3.2 oz (55.4 kg)   25 0454 121 lb 12.8 oz (55.2 kg)   25 0310 121 lb 14.4 oz (55.3 kg)   25 0500 123 lb 4.8 oz (55.9 kg)   25 0555 121 lb 12.8 oz (55.2 kg)   25 0558 121 lb (54.9 kg)   25 0402 120 lb 14.4 oz (54.8 kg)   25 0617 116 lb 9.6 oz (52.9 kg)   25 2146 123 lb 14.4 oz (56.2 kg)   25 1114 118 lb 13.3 oz (53.9 kg)           Gen: uncomfortable  Pulm: Lungs clear, normal respiratory effort  CV: Heart with regular rate and rhythm  Abd: Abdomen soft, nontender, nondistended, bowel sounds present  Neuro: No acute focal deficits  MSK: moves extremities  Skin: Warm and dry  Psych: Normal affect  Ext: rt le looks paler compared to left, sensation intact but decreased, rt groin wound looks good, rt pedal pulse heard with doppler          Medicines:     Current Hospital Medications[1]            Blood Sugar Medications             insulin aspart 100 Units/mL Subcutaneous Solution Pen-injector    insulin glargine 100 UNIT/ML Subcutaneous Solution            Blood Pressure and Cardiac Medications            amLODIPine 5 MG Oral Tab            Medication Infusions[2]        Lab Results   Component Value Date    WBC 6.9 06/04/2025    HGB 8.9 (L) 06/04/2025    HCT 28.0 (L) 06/04/2025    .0 (L) 06/04/2025    CREATSERUM 2.88 (H) 06/04/2025    BUN 31 (H) 06/04/2025     06/04/2025    K 4.9 06/04/2025     06/04/2025    CO2 23.0 06/04/2025     (H) 06/04/2025    CA 7.7 (L) 06/04/2025    ALB 3.6 06/03/2025    ALKPHO 73 05/26/2025    BILT 0.2 05/26/2025    TP 5.7 05/26/2025    AST 16 05/26/2025    ALT <7 (L) 05/26/2025    PTT 53.4 (H) 06/03/2025    INR 1.16 05/22/2025    TSH 1.993 05/27/2025    MG 1.9 06/01/2025    PHOS 3.1 06/03/2025       No results found.            Results:     CBC:    Lab Results   Component Value Date    WBC 6.9 06/04/2025    WBC 13.6 (H) 06/03/2025    WBC 5.6 06/03/2025     Lab Results   Component Value Date    HGB 8.9 (L) 06/04/2025    HGB 10.1 (L) 06/03/2025    HGB 9.1 (L) 06/03/2025      Lab Results   Component Value Date    .0 (L) 06/04/2025    .0 (L) 06/03/2025    .0 (L) 06/03/2025       Recent Labs   Lab 06/03/25  0712 06/03/25 2003 06/04/25  0248   GLU 65* 158* 176*   BUN 29* 30* 31*   CREATSERUM 2.46* 2.54* 2.88*   CA 8.5* 7.4* 7.7*    137 137   K 4.5 4.8 4.9    108 105   CO2 25.0 22.0 23.0           Assessment and Plan:      RLE ischemia  Rt LE pain and decreased sensation, color   - plan stat ct a/p/bl le runoff to rule out dissection - results reviewed - plan percutaneous revascularization of right SFA with possible IVL vs atherectomy as she is high risk for open vascular surgery   - per vascular : femoral endarterectomy with iliac stenting and SFA stenting and any tibial interventions to improve.   Heparin is dc   S/p right common femoral endarterectomy with  iliac/SFA stenting with Dr. Vines. On 6/3    NSTEMI type 1 with new ischemic cardiomyopathy acute systolic chf  Multivessel CAD  - left heart cath again 5/28 with pci, post op with rt groin wound bleeding that improved after pressure   - on asa, plavix, statin, bb     Tricuspid and mitral regurg  - volume removal per hd     PVD s/p left femoropopliteal bypass 9/2015  - 80% proximal left renal artery stenosis on angiogram   - On asa, plavix, atorvastatin      HL  - statin     HTN  - elevated - adjust oral meds per cards     Acute on chronic anemia of renal disease  - possibly baseline anemia from ckd  - heparin stopped; gi defers invasive dx till more stable  - hb stable today , ppi     acute copd exacerbation  - pulm consulted signed off no steroids; no abx  - plan nebs scheduled and prn  - plan pulm hygiene  - wean oxygen as able     ESRD   -On HD     DM  - hypoglycemic this am. Hold tresiba.   - accuchecks and ISS    Moderate right ICA stenosis s/p right TCAR 2/2019, totally occluded left ICA  - On asa, plavix, atorvastatin                       high mdm; coordinating care with nurse and counseling pt and with her permission her nephew in room  about chf/sob/diet/cath/dialysis         Dispo: when stable           Chart reviewed, including current vitals, notes, labs and imaging  Labs ordered and medications adjusted as outlined above  Coordinate care with care team/consultants  Discussed with patient results of tests, management plan as outlined above, and the need for ongoing hospitalization  D/w RN     MDM high        Tracy Vega MD, FAAFP        Supplementary Documentation:   DVT Mechanical Prophylaxis:   SCDs, Early ambuation  DVT Pharmacologic Prophylaxis   Medication    [START ON 6/6/2025] heparin (Porcine) 1000 UNIT/ML injection 1,500 Units    heparin (Porcine) 5000 UNIT/ML injection 5,000 Units         DVT Pharmacologic prophylaxis: Aspirin 81 mg      Code Status: Full Code  Rao: Rao catheter in  place  [place] Duration (in days):   Central line:    FRANCIS: 6/5/2025         [1]   Current Facility-Administered Medications   Medication Dose Route Frequency    HYDROcodone-acetaminophen (Norco) 5-325 MG per tab 2 tablet  2 tablet Oral Q6H PRN    senna-docusate (Senokot-S) 8.6-50 MG per tab 2 tablet  2 tablet Oral Daily    [START ON 6/6/2025] sodium chloride 0.9 % IV bolus 100 mL  100 mL Intravenous Q30 Min PRN    And    [START ON 6/6/2025] albumin human (Albumin) 25% injection 25 g  25 g Intravenous PRN Dialysis    [START ON 6/6/2025] heparin (Porcine) 1000 UNIT/ML injection 1,500 Units  1.5 mL Intracatheter PRN Dialysis    hydrALAzine (Apresoline) 20 mg/mL injection 10 mg  10 mg Intravenous Q6H PRN    ondansetron (Zofran) 4 MG/2ML injection 4 mg  4 mg Intravenous Q6H PRN    heparin (Porcine) 5000 UNIT/ML injection 5,000 Units  5,000 Units Subcutaneous Q8H BLANE    sodium chloride 0.9 % IV bolus 100 mL  100 mL Intravenous Q30 Min PRN    And    albumin human (Albumin) 25% injection 25 g  25 g Intravenous PRN Dialysis    HYDROmorphone (Dilaudid) 1 MG/ML injection 0.4 mg  0.4 mg Intravenous Q2H PRN    Or    HYDROmorphone (Dilaudid) 1 MG/ML injection 0.8 mg  0.8 mg Intravenous Q2H PRN    Or    HYDROmorphone (Dilaudid) 1 MG/ML injection 1.2 mg  1.2 mg Intravenous Q2H PRN    epoetin brittany (Epogen, Procrit) 90689 UNIT/ML injection 10,000 Units  10,000 Units Subcutaneous Once per day on Monday Wednesday Friday    sodium ferric gluconate (Ferrlecit) 125 mg in sodium chloride 0.9% 100mL IVPB premix  125 mg Intravenous Daily    metoclopramide (Reglan) 5 mg/mL injection 5 mg  5 mg Intravenous Daily PRN    insulin degludec (Tresiba) 100 units/mL flextouch 12 Units  12 Units Subcutaneous Daily    atorvastatin (Lipitor) tab 40 mg  40 mg Oral Nightly    clopidogrel (Plavix) tab 75 mg  75 mg Oral Daily    aspirin DR tab 81 mg  81 mg Oral Daily    metoprolol succinate (Toprol XL) partial tablet 12.5 mg  12.5 mg Oral Daily Beta Blocker     pantoprazole (Protonix) DR tab 40 mg  40 mg Oral BID AC    calcitriol (Rocaltrol) cap 0.25 mcg  0.25 mcg Oral Q MWF    ipratropium-albuterol (Duoneb) 0.5-2.5 (3) MG/3ML inhalation solution 3 mL  3 mL Nebulization Q6H PRN    HYDROcodone-acetaminophen (Norco) 5-325 MG per tab 1 tablet  1 tablet Oral Q6H PRN    escitalopram (Lexapro) tab 10 mg  10 mg Oral Daily    glucose (Dex4) 15 GM/59ML oral liquid 15 g  15 g Oral Q15 Min PRN    Or    glucose (Glutose) 40% oral gel 15 g  15 g Oral Q15 Min PRN    Or    glucose-vitamin C (Dex-4) chewable tab 4 tablet  4 tablet Oral Q15 Min PRN    Or    dextrose 50% injection 50 mL  50 mL Intravenous Q15 Min PRN    Or    glucose (Dex4) 15 GM/59ML oral liquid 30 g  30 g Oral Q15 Min PRN    Or    glucose (Glutose) 40% oral gel 30 g  30 g Oral Q15 Min PRN    Or    glucose-vitamin C (Dex-4) chewable tab 8 tablet  8 tablet Oral Q15 Min PRN    acetaminophen (Tylenol Extra Strength) tab 500 mg  500 mg Oral Q4H PRN    melatonin tab 3 mg  3 mg Oral Nightly PRN    polyethylene glycol (PEG 3350) (Miralax) 17 g oral packet 17 g  17 g Oral Daily PRN    sennosides (Senokot) tab 17.2 mg  17.2 mg Oral Nightly PRN    bisacodyl (Dulcolax) 10 MG rectal suppository 10 mg  10 mg Rectal Daily PRN    ondansetron (Zofran) 4 MG/2ML injection 4 mg  4 mg Intravenous Q6H PRN    benzonatate (Tessalon) cap 200 mg  200 mg Oral TID PRN    guaiFENesin (Robitussin) 100 MG/5 ML oral liquid 200 mg  200 mg Oral Q4H PRN    glycerin-hypromellose- (Artificial Tears) 0.2-0.2-1 % ophthalmic solution 1 drop  1 drop Both Eyes QID PRN    sodium chloride (Saline Mist) 0.65 % nasal solution 1 spray  1 spray Each Nare Q3H PRN    insulin aspart (NovoLOG) 100 Units/mL FlexPen 1-7 Units  1-7 Units Subcutaneous TID CC    [Held by provider] insulin aspart (NovoLOG) 100 Units/mL FlexPen 5 Units  5 Units Subcutaneous TID CC and HS   [2]

## 2025-06-04 NOTE — OCCUPATIONAL THERAPY NOTE
OCCUPATIONAL THERAPY RE-EVALUATION - INPATIENT     Room Number: 231-A  Evaluation Date: 6/4/2025  Type of Evaluation: RE-eval  Presenting Problem: Acute on chronic CHFPresenting problem: acute on chronic respiratory failure with hypoxia   Co-morbidities: PAD, HTN, HL, CKD stage 3     OT orders for re-eval as pt is now s/p R femoral endarterectomy 6/3 with DR. Vines    Physician Order: IP Consult to Occupational Therapy  Reason for Therapy: ADL/IADL Dysfunction and Discharge Planning    OCCUPATIONAL THERAPY ASSESSMENT   Patient is a 84 year old female admitted 5/22/2025 for acute on chronic respiratory failure with hypoxia.OT orders rcvd for re-eval as pt is now s/p R femoral endarerectomy with DR. Vines on 6/3      Prior to admission, patient's baseline is from home with family who provide PRN assist for ADLs (mainly bathing). Pt reports being mod I for mobility using RW. Patient is currently functioning below baseline with toileting, lower body dressing, grooming, transfers, static standing balance, dynamic standing balance, and functional standing tolerance.  Patient is requiring stand-by assist, contact guard assist, and minimal assist as a result of the following impairments: decreased functional strength, decreased endurance, impaired standing balance, decreased muscular endurance, and medical status. Occupational Therapy will continue to follow for duration of hospitalization.    Patient will benefit from continued skilled OT Services to promote return to prior level of function and safety with continuous assistance and gradual rehabilitative therapy.    PLAN DURING HOSPITALIZATION  OT Device Recommendations: None  OT Treatment Plan: Balance activities, ADL training, Functional transfer training, Endurance training, Patient/Family education, Patient/Family training, Equipment eval/education, Compensatory technique education     OCCUPATIONAL THERAPY MEDICAL/SOCIAL HISTORY   Problem List  Principal Problem:     Acute on chronic respiratory failure with hypoxia ([facility])  Active Problems:    ESRD (end stage renal disease) ([facility])    Acute congestive heart failure, unspecified heart failure type ([facility])    NSTEMI (non-ST elevated myocardial infarction) ([facility])    Acute renal failure superimposed on chronic kidney disease, unspecified acute renal failure type, unspecified CKD stage    Goals of care, counseling/discussion    Palliative care encounter    HOME SITUATION  Type of Home: House  Home Layout: One level  Lives With: Family (Nephew present in room.  Per nephew between his spouse and his brother pt has 24hr supported care .)  Shower/Tub and Equipment: Tub-shower combo; Tub transfer bench  Drives: No  Patient Regularly Uses: Rolling walker (Per nephew and pt , indep dressing and tolieting , assisted showering with shower chair and nephew help ---Indep ambulation in home short distance with RW yet prior to admission was declining with significant difficulty inability to negotiate stairs)    Prior Level of [place]: Prior to admission pt was from home with family who provide / supervision/assist. Pt reports being independent with dressing and toileting; required assist for bathing. Pt was mod I for mobility using RW.    SUBJECTIVE  I can't hear you!     Patient is very hard of hearing   OCCUPATIONAL THERAPY EXAMINATION      OBJECTIVE  Precautions: Cardiac; Bed/chair alarm, hard of hearing (has hearing aides that were not helping), groin incision, art line     Fall Risk: High fall risk      PAIN ASSESSMENT  Ratin      ACTIVITY TOLERANCE                         O2 SATURATIONS       COGNITION  Orientation Level:  oriented to place, oriented to person, disoriented to time, and disoriented to situation  Following Commands:  follows one step commands with increased time and follows one step commands with repetition    RANGE OF MOTION   Upper extremity ROM is within functional limits     STRENGTH ASSESSMENT  Upper extremity  strength is within functional limits     COORDINATION  Gross Motor: WFL   Fine Motor: WFL     ACTIVITIES OF DAILY LIVING ASSESSMENT  AM-PAC ‘6-Clicks’ Inpatient Daily Activity Short Form  How much help from another person does the patient currently need…  -   Putting on and taking off regular lower body clothing?: A Lot  -   Bathing (including washing, rinsing, drying)?: A Lot  -   Toileting, which includes using toilet, bedpan or urinal? : A Lot  -   Putting on and taking off regular upper body clothing?: A Little  -   Taking care of personal grooming such as brushing teeth?: A Little  -   Eating meals?: A Little    AM-PAC Score:  Score: 15  Approx Degree of Impairment: 56.46%  Standardized Score (AM-PAC Scale): 34.69  CMS Modifier (G-Code): CK    FUNCTIONAL TRANSFER ASSESSMENT  Bed Mobiltiy\": Mod a x2    FUNCTIONAL MOBILITY  Did not complete OOB activities; pt scheduled for HD after therapy; kept at bed level    BALANCE ASSESSMENT  Static Sitting: SBA-Min A  Dynamic Sitting: Min A    FUNCTIONAL ADL ASSESSMENT  Eating: Supervision (set up)  Grooming Seated: Supervision    Skilled Therapy Provided:   RN cleared pt for participation. Pt received in bed; very hard of hearing; educated on role of therapy and goals for session; care coordinated with PT; pt limited by p ain; x2 person assist to transition to EOB; tolerating <10 minutes at EOB; was able to complete simple [facility] grooming task with setup and cues to initiate and sustain engagement; Min A for sitting balance; VSS throughout; pt returned to bed and boosted for comfort; continue to recommend GR at d/c.       EDUCATION PROVIDED  Patient Education : Role of Occupational Therapy; Plan of Care; Functional Transfer Techniques; Fall Prevention; Posture/Positioning; Energy Conservation; Proper Body Mechanics  Patient's Response to Education: Returned Demonstration; Verbalized Understanding    The patient's Approx Degree of Impairment: 56.46% has been calculated based  on documentation in the [facility] '6 clicks' Inpatient Daily Activity Short Form.  Research supports that patients with this level of impairment may benefit from rehab.  Final disposition will be made by interdisciplinary medical team.     Patient End of Session: In bed    OT Goals  Patients self stated goal is: none stated      Patient will complete functional transfer with SBA  Comment:     Patient will complete toileting with SBA  Comment:     Patient will tolerate standing for 5 minutes in prep for adls with SBA   Comment:    Patient will complete item retrieval with SBA  Comment:          Goals  on:   Frequency: 3x/week      Re-eval

## 2025-06-04 NOTE — PROGRESS NOTES
Vascular Surgery Progress Note    No acute events overnight. Patient has remained off norepi. She complains of a little pain in the right groin, but no pain in the foot. Right groin is soft with Prevena VAC in place. No hematoma or erythema. Triphasic peroneal signal. Biphasic DP and PT signals. Foot is very warm. Dry gangrene to the tip of the great toe.   83 y/o female s/p right femoral endarterectomy with iliac and SFA angioplasty and stenting   - ok to stop iv fluids   - remove maloney   - diet as tolerated   - out of bed, work with PT   - ok to transfer out of ICU from my standpoint     Chely Vines MD  06/04/25  9:38 AM

## 2025-06-04 NOTE — PROGRESS NOTES
[facility]  part of [facility]    Progress Note    Radha Yu Patient Status:  Inpatient    1940 MRN [id number]   Location Health system 2W/SW Attending Tracy Vega MD   Hosp Day # 13 PCP Stef eVlasco MD       Subjective:   Radha Yu is a(n) 84 year old female who I am seeing for ESRD      Patient is resting    Objective:   BP (!) 89/43   Pulse 68   Temp 97.2 °F (36.2 °C) (Temporal)   Resp 16   Wt 130 lb (59 kg)   SpO2 96%   BMI 24.58 kg/m²      Intake/Output Summary (Last 24 hours) at 2025 1021  Last data filed at 2025 1009  Gross per 24 hour   Intake 5591.06 ml   Output 1320 ml   Net 4271.06 ml     Wt Readings from Last 1 Encounters:   25 130 lb (59 kg)       Exam  Gen: No acute distress, Heent: NC AT, mucous memb clear, neck supple  Pulm: Lungs clear, normal respiratory effort  CV: Heart with regular rate and rhythm, no edema  Abd: Abdomen soft, nontender, nondistended, no organomegaly, bowel sounds present  Skin: no symptoms reported  Psych: confused    Assessment and Plan:       1 - ESRD  The patient is now being maintained on dialysis 3 times a week as a life-sustaining modality.     Plan next dialysis today     2 - R LE ischemia   Vascular surgery is following her.  She had a femoral endarterectomy with iliac stenting and SFA stenting     3 - NSTEMI w ischemic cardiomyopathy/mitral regurgitation  Status post left heart cath May 28 with PCI.  She developed postop right groin wound bleeding.  She is on aspirin, Plavix, statin     4 - Anemia  On Epogen and Ferrlecit               Results:     Recent Labs   Lab 25  0655 25  1733 25  0643 25  0705 25  0712 25  0248   RBC 2.72*   < > 2.87*   < > 3.36* 3.57* 3.17*   HGB 7.3*   < > 8.2*   < > 9.1* 10.1* 8.9*   HCT 23.0*   < > 24.2*   < > 28.5* 30.8* 28.0*   MCV 84.6   < > 84.3   < > 84.8 86.3 88.3   MCH 26.8   < > 28.6   < > 27.1 28.3 28.1   MCHC 31.7    < > 33.9   < > 31.9 32.8 31.8   RDW 15.9*   < > 15.8*   < > 15.9* 15.3* 15.7*   NEPRELIM 5.22  --  4.26  --  3.62  --   --    WBC 6.7   < > 5.6   < > 5.6 13.6* 6.9   .0*   < > 133.0*  133.0*   < > 149.0* 142.0* 115.0*    < > = values in this interval not displayed.         Recent Labs   Lab 06/03/25  0712 06/03/25 2003 06/04/25  0248   GLU 65* 158* 176*   BUN 29* 30* 31*   CREATSERUM 2.46* 2.54* 2.88*   CA 8.5* 7.4* 7.7*    137 137   K 4.5 4.8 4.9    108 105   CO2 25.0 22.0 23.0          No results found.        TEODORO HAJI MD  6/4/2025

## 2025-06-04 NOTE — PROGRESS NOTES
[facility] Cardiology Progress Note    Radha Yu Patient Status:  Inpatient    1940 MRN [id number]   Location [facility] 3W/SW Attending Villa Anegl MD   Hosp Day # 13 PCP Stef Velasco MD     Subjective:  Patient seen and examined  Feels well this morning  Underwent successful right external and common iliac stenting, right common femoral artery endarterectomy and right SFA stenting with vascular surgery yesterday  Patient states right lower extremity feels great      Objective:  BP (!) 89/43   Pulse 75   Temp 97.2 °F (36.2 °C) (Temporal)   Resp (!) 9   Wt 130 lb (59 kg)   SpO2 (!) 89%   BMI 24.58 kg/m²     Telemetry: NSR, 71 bpm       Intake/Output:    Intake/Output Summary (Last 24 hours) at 2025 0843  Last data filed at 2025 0600  Gross per 24 hour   Intake 4736.06 ml   Output 1275 ml   Net 3461.06 ml       Last 3 Weights   25 0400 130 lb (59 kg)   25 2000 132 lb (59.9 kg)   25 0336 122 lb 3.2 oz (55.4 kg)   25 0454 121 lb 12.8 oz (55.2 kg)   25 0310 121 lb 14.4 oz (55.3 kg)   25 0500 123 lb 4.8 oz (55.9 kg)   25 0555 121 lb 12.8 oz (55.2 kg)   25 0558 121 lb (54.9 kg)   25 0402 120 lb 14.4 oz (54.8 kg)   25 0617 116 lb 9.6 oz (52.9 kg)   25 2146 123 lb 14.4 oz (56.2 kg)   25 1114 118 lb 13.3 oz (53.9 kg)   25 1830 123 lb 7.3 oz (56 kg)   25 0700 123 lb 7.3 oz (56 kg)   25 1805 123 lb 6.4 oz (56 kg)   25 1121 139 lb 1.8 oz (63.1 kg)       Labs:  Recent Labs   Lab 25  0712 25  0248   GLU 65* 158* 176*   BUN 29* 30* 31*   CREATSERUM 2.46* 2.54* 2.88*   EGFRCR 19* 18* 15*   CA 8.5* 7.4* 7.7*    137 137   K 4.5 4.8 4.9    108 105   CO2 25.0 22.0 23.0     Recent Labs   Lab 25  0655 25  1733 25  0643 25  0705 25  0712 25  0248   RBC 2.72*   < > 2.87*   < > 3.36* 3.57* 3.17*   HGB 7.3*   < >  8.2*   < > 9.1* 10.1* 8.9*   HCT 23.0*   < > 24.2*   < > 28.5* 30.8* 28.0*   MCV 84.6   < > 84.3   < > 84.8 86.3 88.3   MCH 26.8   < > 28.6   < > 27.1 28.3 28.1   MCHC 31.7   < > 33.9   < > 31.9 32.8 31.8   RDW 15.9*   < > 15.8*   < > 15.9* 15.3* 15.7*   NEPRELIM 5.22  --  4.26  --  3.62  --   --    WBC 6.7   < > 5.6   < > 5.6 13.6* 6.9   .0*   < > 133.0*  133.0*   < > 149.0* 142.0* 115.0*    < > = values in this interval not displayed.         No results for input(s): \"TROP\", \"TROPHS\", \"CK\" in the last 168 hours.      Diagnostics:   5/23/25 Echo  1. Left ventricle: The cavity size was normal. Wall thickness was mildly      increased. Systolic function was moderately reduced. The estimated      ejection fraction was 35-40%, by biplane method of disks. Doppler      parameters are consistent with abnormal left ventricular relaxation -      grade 1 diastolic dysfunction.   2. Left atrium: The atrium was markedly dilated.   3. Right atrium: The atrium was dilated.   4. Atrial septum: A patent foramen ovale cannot be excluded.   5. Aortic valve: There was mild regurgitation.   6. Mitral valve: The annulus was mildly to moderately calcified. The      leaflets were normal thickness. There was moderate to severe      regurgitation.   7. Tricuspid valve: There was mild-moderate regurgitation.   Impressions:  No previous study from [facility] was   available for comparison.     5/27/25 L/RHC   Findings:  1) Left Ventriculography at 30 degrees CEE: LVEF 40 to 45%, hypokinesia of the mid to distal inferior/inferoapical walls.  2) Hemodynamics: LVEDP 18 mmHg  3) Coronaries:  Left main coronary artery: Large size vessel with no angiographically significant disease.  Left anterior descending artery: Large size, Type IV vessel with 50% proximal, 80% mid segment stenosis.  90% proximal first diagonal branch disease which originates from the diseased segment of the mid LAD.   Circumflex artery: Large size  non-dominant vessel with luminal irregularities.  Angiographically significant disease.  Small OM1 with focal 80% ostial stenosis.  OM 2  is medium caliber, diffuse 70 to 80% disease of proximal segment.  Right coronary artery: Large size dominant vessel with 100%  of the proximal segment with reconstitution of the PDA via left-to-right septal collaterals.      Aortobifemoral angiogram  Mildly dilated infrarenal aorta  Left renal artery proximal 80% stenosis  Nonvisualization of the right renal artery  Severely, diffusely calcified but patent bilateral iliac arteries     RIGHT HEART CATHETERIZATION HEMODYNAMICS:  Right Atrial Pressure: 14/13/12 mmHg  Right Ventricular Pressure: 36/10/14 mmHg  Pulmonary Artery Pressure: 37/18/24 mmHg  Wedge Pressure: 23/22/21 mmHg  Colleen CO: 4.7 L/min; Colleen CI: 3.1 L/min/m²  Transpulmonary Gradient (PAmean - Wedge): 13 mmHg  Pulmonary Vascular Resistance (PA-wedge/CO): 0.6 CONRAD  Ao saturation 91.2%  PA saturation 51.5%  Hgb 7.6  Heart Rate 58      Review of Systems   Respiratory: Negative.     Cardiovascular: Negative.      Physical Exam:    General: Alert and oriented x 3. No apparent distress.   HEENT: Normocephalic, anicteric sclera, neck supple, no thyromegaly or adenopathy.  Neck: No JVD, carotids 2+, no bruits.  Cardiac: Regular rate & rhythm. S1, S2 normal. No pericardial rub, S3, or extra cardiac sounds. +Murmur   Lungs: Clear without wheezes, rales, rhonchi or dullness.  Normal excursions and effort.  Abdomen: Soft, non-tender. No organosplenomegally, mass or rebound, BS-present.  Extremities: Right great toe ischemic wound.  Warm right lower extremity, normal color  Neurologic: Alert and oriented, normal affect. No focal defects  Skin: Warm and dry.    Right groin: Wound VAC present    Medications:  Scheduled Medications[1]  Medication Infusions[2]    Assessment:  84-year-old female with a history of PVD s/p left femoral-popliteal bypass 2015, s/p right TCAR and total  occluded left ICA, HTN, HLD, DM, and CKD who presents with shortness of breath     Acute hypoxic respiratory failure  - Chest x-ray w/ evidence of moderate pulmonary edema, noted diffuse wheezing initially on exam in setting of long-term tobacco use  - Viral resp panel negative   - S/p nebs & steroids . Pulm following   - SP02 stable on RA     NSTEMI   Multivessel coronary artery disease   - Peak TnI 426, ECG w/ lateral T wave inversion  - Echo w/ EF 35-40%, grade 1dd, bi-atrial dilation, PFO can't be excluded, mod - severe MR, mild-mod TR   - L/RHC w/ severe multivessel CAD. Surgical turndown  - 5/28 S/p successful bifurcation PCI to mid LAD and first diagonal branch   - On ASA, plavix, atorvastatin, toprol xl     New cardiomyopathy, EF 35-40%   Mod-severe mitral regurgitation   Mild-mod tricuspid regurgitation   - Volume removal per HD   - Compensated on exam   - GDMT: toprol xl , isordil/hydralazine , Not on acei/arb/arni/mra due to CKD     ESRD   - Started HD this admission  - Nephrology following     PVD s/p left femoropopliteal bypass 9/2015  - 80% proximal left renal artery stenosis on angiogram   - 5/29 RRT called due to pt reporting decreased sensation to RLE, occasional pain, & absent R PT/DP pulses   - CTA results reviewed.  [facility] following - tentative plan for right common femoral endarterectomy with iliac/SFA stenting tomorrow   - On asa, plavix, atorvastatin . Off of heparin gtt due to recurrent groin bleed     Moderate right ICA stenosis s/p right TCAR 2/2019, totally occluded left ICA  - On asa, plavix, atorvastatin     HTN   - Well controlled on toprol xl , isordil/hydralazine    HLD  - LDL 48, on atorvastatin     DMII  - HgbA1c = 6.5%. On insulin per PMD     Acute on chronic anemia   - Hgb 7.2  - On PPI. GI following     Plan:    Status post right common and external iliac stenting, right common femoral endarterectomy and SFA stenting with vascular surgery yesterday  Excellent  results, warm right lower extremity with no pain  Continue asa, plavix, statin  Hold hydralazine/Isordil due to hypotension overnight  Reassess blood pressure after dialysis and consider restarting BP medications    Thank you for allowing me to take part in the care of Radha Yu. Please call with any questions of concerns.    L3    Fausto Aquino,   [facility]   Interventional Cardiac and Vascular Services               [1]    heparin  5,000 Units Subcutaneous Q8H BLANE    epoetin brittany  10,000 Units Subcutaneous Once per day on Monday Wednesday Friday    sodium ferric gluconate  125 mg Intravenous Daily    insulin degludec  12 Units Subcutaneous Daily    atorvastatin  40 mg Oral Nightly    clopidogrel  75 mg Oral Daily    aspirin  81 mg Oral Daily    hydrALAZINE  25 mg Oral Q8H BLANE    isosorbide dinitrate  20 mg Oral TID (Nitrates)    metoprolol succinate  12.5 mg Oral Daily Beta Blocker    pantoprazole  40 mg Oral BID AC    calcitriol  0.25 mcg Oral Q MWF    escitalopram  10 mg Oral Daily    insulin aspart  1-7 Units Subcutaneous TID CC    [Held by provider] insulin aspart  5 Units Subcutaneous TID CC and HS   [2]    norepinephrine      norepinephrine Stopped (06/04/25 0156)

## 2025-06-04 NOTE — PLAN OF CARE
Problem: Patient Centered Care  Goal: Patient preferences are identified and integrated in the patient's plan of care  Description: Interventions:  - What would you like us to know as we care for you? \"I am very hungary & in a lot of pain.\"  - Provide timely, complete, and accurate information to patient/family  - Incorporate patient and family knowledge, values, beliefs, and cultural backgrounds into the planning and delivery of care  - Encourage patient/family to participate in care and decision-making at the level they choose  - Honor patient and family perspectives and choices  Outcome: Progressing    Problem: CARDIOVASCULAR - ADULT  Goal: Maintains optimal cardiac output and hemodynamic stability  Description: INTERVENTIONS:  - Monitor vital signs, rhythm, and trends  - Monitor for bleeding, hypotension and signs of decreased cardiac output  - Evaluate effectiveness of vasoactive medications to optimize hemodynamic stability  - Monitor arterial and/or venous puncture sites for bleeding and/or hematoma  - Assess quality of pulses, skin color and temperature  - Assess for signs of decreased coronary artery perfusion - ex. Angina  - Evaluate fluid balance, assess for edema, trend weights  Outcome: Progressing  Goal: Absence of cardiac arrhythmias or at baseline  Description: INTERVENTIONS:  - Continuous cardiac monitoring, monitor vital signs, obtain 12 lead EKG if indicated  - Evaluate effectiveness of antiarrhythmic and heart rate control medications as ordered  - Initiate emergency measures for life threatening arrhythmias  - Monitor electrolytes and administer replacement therapy as ordered  Outcome: Progressing     Problem: RESPIRATORY - ADULT  Goal: Achieves optimal ventilation and oxygenation  Description: INTERVENTIONS:  - Assess for changes in respiratory status  - Assess for changes in mentation and behavior  - Position to facilitate oxygenation and minimize respiratory effort  - Oxygen supplementation  based on oxygen saturation or ABGs  - Provide Smoking Cessation handout, if applicable  - Encourage broncho-pulmonary hygiene including cough, deep breathe, Incentive Spirometry  - Assess the need for suctioning and perform as needed  - Assess and instruct to report SOB or any respiratory difficulty  - Respiratory Therapy support as indicated  - Manage/alleviate anxiety  - Monitor for signs/symptoms of CO2 retention  Outcome: Progressing     Problem: GENITOURINARY - ADULT  Goal: Absence of urinary retention  Description: INTERVENTIONS:  - Assess patient’s ability to void and empty bladder  - Monitor intake/output and perform bladder scan as needed  - Follow urinary retention protocol/standard of care  - Consider collaborating with pharmacy to review patient's medication profile  - Implement strategies to promote bladder emptying  Outcome: Progressing     Problem: METABOLIC/FLUID AND ELECTROLYTES - ADULT  Goal: Glucose maintained within prescribed range  Description: INTERVENTIONS:  - Monitor Blood Glucose as ordered  - Assess for signs and symptoms of hyperglycemia and hypoglycemia  - Administer ordered medications to maintain glucose within target range  - Assess barriers to adequate nutritional intake and initiate nutrition consult as needed  - Instruct patient on self management of diabetes  Outcome: Progressing  Goal: Electrolytes maintained within normal limits  Description: INTERVENTIONS:  - Monitor labs and rhythm and assess patient for signs and symptoms of electrolyte imbalances  - Administer electrolyte replacement as ordered  - Monitor response to electrolyte replacements, including rhythm and repeat lab results as appropriate  - Fluid restriction as ordered  - Instruct patient on fluid and nutrition restrictions as appropriate  Outcome: Progressing  Goal: Hemodynamic stability and optimal renal function maintained  Description: INTERVENTIONS:  - Monitor labs and assess for signs and symptoms of volume  excess or deficit  - Monitor intake, output and patient weight  - Monitor urine specific gravity, serum osmolarity and serum sodium as indicated or ordered  - Monitor response to interventions for patient's volume status, including labs, urine output, blood pressure (other measures as available)  - Encourage oral intake as appropriate  - Instruct patient on fluid and nutrition restrictions as appropriate  Outcome: Progressing     Problem: GASTROINTESTINAL - ADULT  Goal: Minimal or absence of nausea and vomiting  Description: INTERVENTIONS:  - Maintain adequate hydration with IV or PO as ordered and tolerated  - Nasogastric tube to low intermittent suction as ordered  - Evaluate effectiveness of ordered antiemetic medications  - Provide nonpharmacologic comfort measures as appropriate  - Advance diet as tolerated, if ordered  - Obtain nutritional consult as needed  - Evaluate fluid balance  Outcome: Progressing  Goal: Maintains or returns to baseline bowel function  Description: INTERVENTIONS:  - Assess bowel function  - Maintain adequate hydration with IV or PO as ordered and tolerated  - Evaluate effectiveness of GI medications  - Encourage mobilization and activity  - Obtain nutritional consult as needed  - Establish a toileting routine/schedule  - Consider collaborating with pharmacy to review patient's medication profile  Outcome: Progressing  Goal: Maintains adequate nutritional intake (undernourished)  Description: INTERVENTIONS:  - Monitor percentage of each meal consumed  - Identify factors contributing to decreased intake, treat as appropriate  - Assist with meals as needed  - Monitor I&O, WT and lab values  - Obtain nutritional consult as needed  - Optimize oral hygiene and moisture  - Encourage food from home; allow for food preferences  - Enhance eating environment  Outcome: Progressing     Problem: MUSCULOSKELETAL - ADULT  Goal: Return mobility to safest level of function  Description: INTERVENTIONS:  -  Assess patient stability and activity tolerance for standing, transferring and ambulating w/ or w/o assistive devices  - Assist with transfers and ambulation using safe patient handling equipment as needed  - Ensure adequate protection for wounds/incisions during mobilization  - Obtain PT/OT consults as needed  - Advance activity as appropriate  - Communicate ordered activity level and limitations with patient/family  Outcome: Progressing     Problem: Diabetes/Glucose Control  Goal: Glucose maintained within prescribed range  Description: INTERVENTIONS:  - Monitor Blood Glucose as ordered  - Assess for signs and symptoms of hyperglycemia and hypoglycemia  - Administer ordered medications to maintain glucose within target range  - Assess barriers to adequate nutritional intake and initiate nutrition consult as needed  - Instruct patient on self management of diabetes  Outcome: Progressing     Problem: SKIN/TISSUE INTEGRITY - ADULT  Goal: Skin integrity remains intact  Description: INTERVENTIONS  - Assess and document risk factors for pressure ulcer development  - Assess and document skin integrity  - Monitor for areas of redness and/or skin breakdown  - Initiate interventions, skin care algorithm/standards of care as needed  Outcome: Progressing  Goal: Incision(s), wounds(s) or drain site(s) healing without S/S of infection  Description: INTERVENTIONS:  - Assess and document risk factors for pressure ulcer development  - Assess and document skin integrity  - Assess and document dressing/incision, wound bed, drain sites and surrounding tissue  - Implement wound care per orders  - Initiate isolation precautions as appropriate  - Initiate Pressure Ulcer prevention bundle as indicated  Outcome: Progressing  Goal: Oral mucous membranes remain intact  Description: INTERVENTIONS  - Assess oral mucosa and hygiene practices  - Implement preventative oral hygiene regimen  - Implement oral medicated treatments as  ordered  Outcome: Progressing    Alicia Whyte, RN, BSN, CCRN

## 2025-06-04 NOTE — PLAN OF CARE
Problem: CARDIOVASCULAR - ADULT  Goal: Maintains optimal cardiac output and hemodynamic stability  Description: INTERVENTIONS:  - Monitor vital signs, rhythm, and trends  - Monitor for bleeding, hypotension and signs of decreased cardiac output  - Evaluate effectiveness of vasoactive medications to optimize hemodynamic stability  - Monitor arterial and/or venous puncture sites for bleeding and/or hematoma  - Assess quality of pulses, skin color and temperature  - Assess for signs of decreased coronary artery perfusion - ex. Angina  - Evaluate fluid balance, assess for edema, trend weights  Outcome: Progressing     Problem: RESPIRATORY - ADULT  Goal: Achieves optimal ventilation and oxygenation  Description: INTERVENTIONS:  - Assess for changes in respiratory status  - Assess for changes in mentation and behavior  - Position to facilitate oxygenation and minimize respiratory effort  - Oxygen supplementation based on oxygen saturation or ABGs  - Provide Smoking Cessation handout, if applicable  - Encourage broncho-pulmonary hygiene including cough, deep breathe, Incentive Spirometry  - Assess the need for suctioning and perform as needed  - Assess and instruct to report SOB or any respiratory difficulty  - Respiratory Therapy support as indicated  - Manage/alleviate anxiety  - Monitor for signs/symptoms of CO2 retention  Outcome: Progressing     Problem: GENITOURINARY - ADULT  Goal: Absence of urinary retention  Description: INTERVENTIONS:  - Assess patient’s ability to void and empty bladder  - Monitor intake/output and perform bladder scan as needed  - Follow urinary retention protocol/standard of care  - Consider collaborating with pharmacy to review patient's medication profile  - Implement strategies to promote bladder emptying  Outcome: Progressing     Problem: Diabetes/Glucose Control  Goal: Glucose maintained within prescribed range  Description: INTERVENTIONS:  - Monitor Blood Glucose as ordered  - Assess  for signs and symptoms of hyperglycemia and hypoglycemia  - Administer ordered medications to maintain glucose within target range  - Assess barriers to adequate nutritional intake and initiate nutrition consult as needed  - Instruct patient on self management of diabetes  Outcome: Progressing     Problem: SKIN/TISSUE INTEGRITY - ADULT  Goal: Skin integrity remains intact  Description: INTERVENTIONS  - Assess and document risk factors for pressure ulcer development  - Assess and document skin integrity  - Monitor for areas of redness and/or skin breakdown  - Initiate interventions, skin care algorithm/standards of care as needed  Outcome: Progressing     Problem: HEMATOLOGIC - ADULT  Goal: Free from bleeding injury  Description: (Example usage: patient with low platelets)  INTERVENTIONS:  - Avoid intramuscular injections, enemas and rectal medication administration  - Ensure safe mobilization of patient  - Hold pressure on venipuncture sites to achieve adequate hemostasis  - Assess for signs and symptoms of internal bleeding  - Monitor lab trends  - Patient is to report abnormal signs of bleeding to staff  - Avoid use of toothpicks and dental floss  - Use electric shaver for shaving  - Use soft bristle tooth brush  - Limit straining and forceful nose blowing  Outcome: Progressing

## 2025-06-04 NOTE — CM/SW NOTE
Sw received MDO for DC Planning    SW requested department  ([facility]) to abby submit insurance auth for LISE. Will need updated PT/OT notes to obtain auth. [place] relayed info to Rns and PT/OT.    PLAN: [facility] w/ onsite HD and [facility], PCS needs date- pending PT/OT updates, new ins auth, & med clear     SW to remain available for support and/or discharge planning.    Diamante Johnson MSW, LSW   [id number]

## 2025-06-04 NOTE — PLAN OF CARE
Problem: CARDIOVASCULAR - ADULT  Goal: Maintains optimal cardiac output and hemodynamic stability  Description: INTERVENTIONS:  - Monitor vital signs, rhythm, and trends  - Monitor for bleeding, hypotension and signs of decreased cardiac output  - Evaluate effectiveness of vasoactive medications to optimize hemodynamic stability  - Monitor arterial and/or venous puncture sites for bleeding and/or hematoma  - Assess quality of pulses, skin color and temperature  - Assess for signs of decreased coronary artery perfusion - ex. Angina  - Evaluate fluid balance, assess for edema, trend weights  Outcome: Progressing  Goal: Absence of cardiac arrhythmias or at baseline  Description: INTERVENTIONS:  - Continuous cardiac monitoring, monitor vital signs, obtain 12 lead EKG if indicated  - Evaluate effectiveness of antiarrhythmic and heart rate control medications as ordered  - Initiate emergency measures for life threatening arrhythmias  - Monitor electrolytes and administer replacement therapy as ordered  Outcome: Progressing     Problem: RESPIRATORY - ADULT  Goal: Achieves optimal ventilation and oxygenation  Description: INTERVENTIONS:  - Assess for changes in respiratory status  - Assess for changes in mentation and behavior  - Position to facilitate oxygenation and minimize respiratory effort  - Oxygen supplementation based on oxygen saturation or ABGs  - Provide Smoking Cessation handout, if applicable  - Encourage broncho-pulmonary hygiene including cough, deep breathe, Incentive Spirometry  - Assess the need for suctioning and perform as needed  - Assess and instruct to report SOB or any respiratory difficulty  - Respiratory Therapy support as indicated  - Manage/alleviate anxiety  - Monitor for signs/symptoms of CO2 retention  Outcome: Progressing     Problem: METABOLIC/FLUID AND ELECTROLYTES - ADULT  Goal: Glucose maintained within prescribed range  Description: INTERVENTIONS:  - Monitor Blood Glucose as  ordered  - Assess for signs and symptoms of hyperglycemia and hypoglycemia  - Administer ordered medications to maintain glucose within target range  - Assess barriers to adequate nutritional intake and initiate nutrition consult as needed  - Instruct patient on self management of diabetes  Outcome: Progressing  Goal: Electrolytes maintained within normal limits  Description: INTERVENTIONS:  - Monitor labs and rhythm and assess patient for signs and symptoms of electrolyte imbalances  - Administer electrolyte replacement as ordered  - Monitor response to electrolyte replacements, including rhythm and repeat lab results as appropriate  - Fluid restriction as ordered  - Instruct patient on fluid and nutrition restrictions as appropriate  Outcome: Progressing  Goal: Hemodynamic stability and optimal renal function maintained  Description: INTERVENTIONS:  - Monitor labs and assess for signs and symptoms of volume excess or deficit  - Monitor intake, output and patient weight  - Monitor urine specific gravity, serum osmolarity and serum sodium as indicated or ordered  - Monitor response to interventions for patient's volume status, including labs, urine output, blood pressure (other measures as available)  - Encourage oral intake as appropriate  - Instruct patient on fluid and nutrition restrictions as appropriate  Outcome: Progressing

## 2025-06-05 LAB
ALBUMIN SERPL-MCNC: 3.4 G/DL (ref 3.2–4.8)
ANION GAP SERPL CALC-SCNC: 7 MMOL/L (ref 0–18)
BASOPHILS # BLD AUTO: 0.03 X10(3) UL (ref 0–0.2)
BASOPHILS NFR BLD AUTO: 0.4 %
BUN BLD-MCNC: 25 MG/DL (ref 9–23)
BUN/CREAT SERPL: 11.6 (ref 10–20)
CALCIUM BLD-MCNC: 8.2 MG/DL (ref 8.7–10.4)
CHLORIDE SERPL-SCNC: 104 MMOL/L (ref 98–112)
CO2 SERPL-SCNC: 27 MMOL/L (ref 21–32)
CREAT BLD-MCNC: 2.16 MG/DL (ref 0.55–1.02)
DEPRECATED RDW RBC AUTO: 49.1 FL (ref 35.1–46.3)
EGFRCR SERPLBLD CKD-EPI 2021: 22 ML/MIN/1.73M2 (ref 60–?)
EOSINOPHIL # BLD AUTO: 0.06 X10(3) UL (ref 0–0.7)
EOSINOPHIL NFR BLD AUTO: 0.8 %
ERYTHROCYTE [DISTWIDTH] IN BLOOD BY AUTOMATED COUNT: 16.1 % (ref 11–15)
GLUCOSE BLD-MCNC: 54 MG/DL (ref 70–99)
GLUCOSE BLDC GLUCOMTR-MCNC: 144 MG/DL (ref 70–99)
GLUCOSE BLDC GLUCOMTR-MCNC: 144 MG/DL (ref 70–99)
GLUCOSE BLDC GLUCOMTR-MCNC: 257 MG/DL (ref 70–99)
GLUCOSE BLDC GLUCOMTR-MCNC: 291 MG/DL (ref 70–99)
GLUCOSE BLDC GLUCOMTR-MCNC: 319 MG/DL (ref 70–99)
GLUCOSE BLDC GLUCOMTR-MCNC: 52 MG/DL (ref 70–99)
GLUCOSE BLDC GLUCOMTR-MCNC: 53 MG/DL (ref 70–99)
GLUCOSE BLDC GLUCOMTR-MCNC: 68 MG/DL (ref 70–99)
HCT VFR BLD AUTO: 23.5 % (ref 35–48)
HGB BLD-MCNC: 7.8 G/DL (ref 12–16)
IMM GRANULOCYTES # BLD AUTO: 0.04 X10(3) UL (ref 0–1)
IMM GRANULOCYTES NFR BLD: 0.5 %
LYMPHOCYTES # BLD AUTO: 0.77 X10(3) UL (ref 1–4)
LYMPHOCYTES NFR BLD AUTO: 10.1 %
MCH RBC QN AUTO: 28.5 PG (ref 26–34)
MCHC RBC AUTO-ENTMCNC: 33.2 G/DL (ref 31–37)
MCV RBC AUTO: 85.8 FL (ref 80–100)
MONOCYTES # BLD AUTO: 0.52 X10(3) UL (ref 0.1–1)
MONOCYTES NFR BLD AUTO: 6.9 %
NEUTROPHILS # BLD AUTO: 6.17 X10 (3) UL (ref 1.5–7.7)
NEUTROPHILS # BLD AUTO: 6.17 X10(3) UL (ref 1.5–7.7)
NEUTROPHILS NFR BLD AUTO: 81.3 %
OSMOLALITY SERPL CALC.SUM OF ELEC: 288 MOSM/KG (ref 275–295)
PHOSPHATE SERPL-MCNC: 3.1 MG/DL (ref 2.4–5.1)
PLATELET # BLD AUTO: 130 10(3)UL (ref 150–450)
PLATELETS.RETICULATED NFR BLD AUTO: 6.1 % (ref 0–7)
POTASSIUM SERPL-SCNC: 4 MMOL/L (ref 3.5–5.1)
RBC # BLD AUTO: 2.74 X10(6)UL (ref 3.8–5.3)
SODIUM SERPL-SCNC: 138 MMOL/L (ref 136–145)
WBC # BLD AUTO: 7.6 X10(3) UL (ref 4–11)

## 2025-06-05 PROCEDURE — 99232 SBSQ HOSP IP/OBS MODERATE 35: CPT | Performed by: INTERNAL MEDICINE

## 2025-06-05 PROCEDURE — 99233 SBSQ HOSP IP/OBS HIGH 50: CPT | Performed by: FAMILY MEDICINE

## 2025-06-05 RX ORDER — LOSARTAN POTASSIUM 25 MG/1
25 TABLET ORAL DAILY
Status: DISCONTINUED | OUTPATIENT
Start: 2025-06-05 | End: 2025-06-08

## 2025-06-05 NOTE — OCCUPATIONAL THERAPY NOTE
OCCUPATIONAL THERAPY TREATMENT NOTE - INPATIENT        Room Number: 231/231-A     Presenting Problem: Acute on chronic CHF    Problem List  Principal Problem:    Acute on chronic respiratory failure with hypoxia ([facility])  Active Problems:    ESRD (end stage renal disease) (HCC)    Acute congestive heart failure, unspecified heart failure type (HCC)    NSTEMI (non-ST elevated myocardial infarction) ([facility])    Acute renal failure superimposed on chronic kidney disease, unspecified acute renal failure type, unspecified CKD stage    Goals of care, counseling/discussion    Palliative care encounter      OCCUPATIONAL THERAPY ASSESSMENT   Patient demonstrates good  progress this session, goals remain in progress.    Patient is requiring moderate assist as a result of the following impairments: decreased functional strength, decreased functional reach, decreased endurance, pain, impaired stand balance, decreased muscular endurance, and increased O2 needs from baseline.    Patient continues to function below baseline. Occupational Therapy will continue to follow patient for duration of hospitalization.    Patient continues to benefit from continued skilled OT services: to promote return to prior level of function and safety with continuous assistance and gradual rehabilitative therapy.     PLAN DURING HOSPITALIZATION  OT Device Recommendations: None  OT Treatment Plan: Balance activities, ADL training, Functional transfer training, Endurance training, Patient/Family education, Patient/Family training, Equipment eval/education, Compensatory technique education     SUBJECTIVE  \"It feels good to sit up\"    OBJECTIVE  [facility]    PAIN ASSESSMENT  Ratin  Location: toes  Management Techniques: Relaxation (nurse aware)    ACTIVITY TOLERANCE  Pulse: 81  Heart Rate Source: Monitor      O2 SATURATIONS  Oxygen Therapy  SPO2% Ambulation on Room Air: 78 (seated EOB on RA- questionable read. Nurspresent and addressing. NC on 2 then 5L provided.  new pulse oximeter reading 100% after increased rest period)    ACTIVITIES OF DAILY LIVING ASSESSMENT  AM-PAC ‘6-Clicks’ Inpatient Daily Activity Short Form  How much help from another person does the patient currently need…  -   Putting on and taking off regular lower body clothing?: A Lot  -   Bathing (including washing, rinsing, drying)?: A Lot  -   Toileting, which includes using toilet, bedpan or urinal? : A Lot  -   Putting on and taking off regular upper body clothing?: A Little  -   Taking care of personal grooming such as brushing teeth?: A Little  -   Eating meals?: None    AM-PAC Score:  Score: 16  Approx Degree of Impairment: 53.32%  Standardized Score (AM-PAC Scale): 35.96  CMS Modifier (G-Code): CK    FUNCTIONAL TRANSFER ASSESSMENT  Sit to Stand: Edge of Bed  Edge of Bed: -- (sit to stand iwth Min A bed to chair xfer Mod A; flexed posture, assist to manage R/W, assist to descend into chair)  Chair: Moderate Assist    BED MOBILITY  Supine to Sit : Maximum Assist    BALANCE ASSESSMENT  Static Sitting: Supervision  Static Standing: Contact Guard Assist    FUNCTIONAL ADL ASSESSMENT  Eating: Supervision (set up)  Grooming Seated: Supervision    THERAPEUTIC EXERCISE  Instructed pt in AROM of UE/LEs with coordinated PLB as well as to sit up for meals    Skilled Therapy Provided: Pt seen in cooperation with PT. Pt received in bed, agreeable to xferring to chair.   Pt is very [place]. Pt is very pleasant and motivated.   Pt's limitations include limited activity tolerance, demo SOB with bed side activity and ultimately requiring supplemental O2, weakness.       EDUCATION PROVIDED  Patient Education : -- (activity promotion including up for meals, pressure relief and AROM of extremities, coordination of PLB)  Patient's Response to Education: Verbalized Understanding; Requires Further Education    The patient's Approx Degree of Impairment: 53.32% has been calculated based on documentation in the [facility] '6 clicks'  Inpatient Daily Activity Short Form.  Research supports that patients with this level of impairment may benefit from LISE.  Final disposition will be made by interdisciplinary medical team.    Patient End of Session: Up in chair, Needs met, Call light within reach, RN aware of session/findings, All patient questions and concerns addressed    OT Goals:  Patients self stated goal is: none stated      Patient will complete functional transfer with SBA  Comment: Progressing    Patient will complete toileting with SBA  Comment: Progressing    Patient will tolerate standing for 5 minutes in prep for adls with SBA   Comment: Not Met    Patient will complete item retrieval with SBA  Comment: Not Met          Goals  on:   Frequency: 3x/week    Self-Care Home Management: 25 minutes

## 2025-06-05 NOTE — PHYSICAL THERAPY NOTE
PHYSICAL THERAPY TREATMENT NOTE - INPATIENT     Room Number: 231/231-A       Presenting Problem: Acute on Chronic  CHF ,anemia  and Respiratory failure / NSTEMI / CKD ESRD new to dialysis this admission / weakness and declining status in home  Co-Morbidities : Prior left hip sx with shorter limb post operatively / Left shoulder fx 2017 / PVD with left femo popliteal Bypass / Moderate right ICA stenosis  right TCAR 2019  / left total occlusion  Left ICA    Problem List  Principal Problem:    Acute on chronic respiratory failure with hypoxia ([facility])  Active Problems:    ESRD (end stage renal disease) ([facility])    Acute congestive heart failure, unspecified heart failure type (HCC)    NSTEMI (non-ST elevated myocardial infarction) ([facility])    Acute renal failure superimposed on chronic kidney disease, unspecified acute renal failure type, unspecified CKD stage    Goals of care, counseling/discussion    Palliative care encounter      PHYSICAL THERAPY ASSESSMENT   Patient demonstrates good  progress this session, goals  remain in progress. Patient is cooperative and motivated throughout session, wants to get back to independent level.    Patient is requiring moderate assist as a result of the following impairments: decreased functional strength, decreased endurance/aerobic capacity, pain, decreased muscular endurance, medical status, limited RLE ROM, and increased O2 needs from baseline.     Patient continues to function below baseline with bed mobility, transfers, gait, stair negotiation, maintaining seated position, standing prolonged periods, and performing household tasks.  Next session anticipate patient to progress bed mobility, transfers, gait, and standing prolonged periods.  Physical Therapy will continue to follow patient for duration of hospitalization.    Patient continues to benefit from continued skilled PT services: to promote return to prior level of function and safety with continuous assistance and gradual  rehabilitative therapy .    PLAN DURING HOSPITALIZATION  Nursing Mobility Recommendation : 1 Assist  PT Device Recommendation: Rolling walker, Gait belt  PT Treatment Plan: Bed mobility, Body mechanics, Endurance, Energy conservation, Family education, Gait training, Range of motion, Strengthening, Transfer training, Balance training, Stoop training, Stair training  Frequency (Obs): 3-5x/week     SUBJECTIVE  \"It feels good to sit up\"    OBJECTIVE  Precautions: Cardiac, Limb alert - right, Bed/chair alarm, Hard of hearing (R arterial line, VERY [place]) Prevena drain R groin    WEIGHT BEARING RESTRICTION   None    PAIN ASSESSMENT   Ratin  Location: right leg - burning and painful; 2/10 right groin at times  Management Techniques: Activity promotion, Repositioning, Other (Comment) (Rn noified)    BALANCE  Static Sitting: Good  Dynamic Sitting: Fair +  Static Standing: Poor +  Dynamic Standing: Poor    ACTIVITY TOLERANCE  Pulse: 86  Heart Rate Source: Monitor        AM-PAC '6-Clicks' INPATIENT SHORT FORM - BASIC MOBILITY  How much difficulty does the patient currently have...  Patient Difficulty: Turning over in bed (including adjusting bedclothes, sheets and blankets)?: A Lot   Patient Difficulty: Sitting down on and standing up from a chair with arms (e.g., wheelchair, bedside commode, etc.): A Lot   Patient Difficulty: Moving from lying on back to sitting on the side of the bed?: A Lot   How much help from another person does the patient currently need...   Help from Another: Moving to and from a bed to a chair (including a wheelchair)?: A Lot   Help from Another: Need to walk in hospital room?: A Lot   Help from Another: Climbing 3-5 steps with a railing?: Total     AM-PAC Score:  Raw Score: 11   Approx Degree of Impairment: 72.57%   Standardized Score (AM-PAC Scale): 33.86   CMS Modifier (G-Code): CL    FUNCTIONAL ABILITY STATUS  Functional Mobility/Gait Assessment  Gait Assistance: Moderate assistance  Distance  (ft): 5  Assistive Device: Rolling walker  Pattern: R Decreased stance time, Shuffle, R Foot drag  Rolling: minimal assist  Supine to Sit: minimal assist and moderate assist  Sit to Supine: moderate assist  Sit to Stand: moderate assist    Skilled Therapy Provided: RN approves participation in therapy session. Patient presents in bed awake and alert and agreeable to therapy. She reports R groin pain is limiting some mobility but she is motivated and participates well in therapy. She is able to perform AROM exs in bed and chair for BLE, deep breathing and weight shifting. She is able to stand with CGA but needs min=mod A to walk due to pain in RLE/antalgic. She is agreeable to stand x 5 reps and walk to chair. She is making progress towards set goals and is on track to dc to rehab to maximize return to plf. RN aware, pt up in chair with all needs in reach.    The patient's Approx Degree of Impairment: 72.57% has been calculated based on documentation in the [facility] '6 clicks' Inpatient Daily Activity Short Form.  Research supports that patients with this level of impairment may benefit from inpatient rehab and this is the recommendation. Final disposition will be made by interdisciplinary medical team.    THERAPEUTIC EXERCISES  Lower Extremity Alternating marching  Ankle pumps  LAQ  Quad sets     Position Sitting       Patient End of Session: Up in chair, Needs met, Call light within reach, RN aware of session/findings, All patient questions and concerns addressed, Hospital anti-slip socks, Ice applied, Alarm set    CURRENT GOALS     Goals to be met by: 6/20/25  Patient Goal Patient's self-stated goal is: to go back home with family if possible   Goal #1 Patient is able to demonstrate supine - sit EOB @ level: minimum assistance      Goal #1   Current Status  mod-min A, improved ability to move legs to edge of bed today   Goal #2 Patient is able to demonstrate transfers Sit to/from Stand at assistance level: minimum  assistance with walker - rolling      Goal #2  Current Status  In progress- mod-min A to stand from bed and chair to RW   Goal #3 Patient is able to ambulate 25 feet with assist device: walker - rolling at assistance level: moderate assistance   Goal #3   Current Status  In progress -walking 5' today but limited by right groin pain of 7/10, no buckling but painful/antalgic gait   Goal #4 Patient will negotiate bed to/from chair transfers with RW and moderate assistance   Goal #4   Current Status  in progress- mod-min A with RW, antalgic RLE   Goal #5 Patient to demonstrate independence with home activity/exercise instructions provided to patient in preparation for discharge.   Goal #5   Current Status  In progress   Goal #6     Goal #6  Current Status       Therapeutic Activity: 23 minutes

## 2025-06-05 NOTE — PROGRESS NOTES
[facility]  part of [facility]    Progress Note    Radha Yu Patient Status:  Inpatient    1940 MRN [id number]   Location [facility] 2W/SW Attending Tracy Vega MD   Hosp Day # 14 PCP Stef Velasco MD       Subjective:   Radha Yu is a(n) 84 year old female who I am seeing for ESRD          Objective:   /41 (BP Location: Right arm)   Pulse 86   Temp 98.1 °F (36.7 °C) (Temporal)   Resp 19   Wt 137 lb (62.1 kg)   SpO2 100%   BMI 25.90 kg/m²      Intake/Output Summary (Last 24 hours) at 2025 1112  Last data filed at 2025 0900  Gross per 24 hour   Intake 960 ml   Output 2150 ml   Net -1190 ml     Wt Readings from Last 1 Encounters:   25 137 lb (62.1 kg)       Exam  Gen: No acute distress, Heent: NC AT, mucous memb clear, neck supple  Pulm: Lungs clear, normal respiratory effort  CV: Heart with regular rate and rhythm, no edema  Abd: Abdomen soft, nontender, nondistended, no organomegaly, bowel sounds present  Skin: no symptoms reported  Psych: alert and oriented    Assessment and Plan:     1 - ESRD  The patient is now being maintained on dialysis 3 times a week as a life-sustaining modality.     Plan next dialysis tomorrow     2 - R LE ischemia   Vascular surgery is following her.  She had a femoral endarterectomy with iliac stenting and SFA stenting     3 - NSTEMI w ischemic cardiomyopathy/mitral regurgitation  Status post left heart cath May 28 with PCI.  She developed postop right groin wound bleeding.  She is on aspirin, Plavix, statin     4 - Anemia  On Epogen and Ferrlecit                    Results:     Recent Labs   Lab 25  0643 25  0705 25  0712 25  2003 25  0248 25  0836   RBC 2.87*   < > 3.36* 3.57* 3.17* 2.74*   HGB 8.2*   < > 9.1* 10.1* 8.9* 7.8*   HCT 24.2*   < > 28.5* 30.8* 28.0* 23.5*   MCV 84.3   < > 84.8 86.3 88.3 85.8   MCH 28.6   < > 27.1 28.3 28.1 28.5   MCHC 33.9   < > 31.9 32.8 31.8 33.2    RDW 15.8*   < > 15.9* 15.3* 15.7* 16.1*   NEPRELIM 4.26  --  3.62  --   --  6.17   WBC 5.6   < > 5.6 13.6* 6.9 7.6   .0*  133.0*   < > 149.0* 142.0* 115.0* 130.0*    < > = values in this interval not displayed.         Recent Labs   Lab 06/03/25 2003 06/04/25  0248 06/05/25  0836   * 176* 54*   BUN 30* 31* 25*   CREATSERUM 2.54* 2.88* 2.16*   CA 7.4* 7.7* 8.2*    137 138   K 4.8 4.9 4.0    105 104   CO2 22.0 23.0 27.0          No results found.        TEODORO HAJI MD  6/5/2025

## 2025-06-05 NOTE — CM/SW NOTE
ADDENDUM: Insurance denied LISE auth until the patient is medically ready.  Auth will need to be re-submitted once the patient is medically ready for discharge.          Sent 06/05/2025 09:57:12    We are offering the treating practitioner an option to speak with a Medical Director before final determination. Provider to call: [phone] Option 5. Deadline is: 6/5/25 2:00 PM CDT If no response, MD will render determination.     Member ID is : [id number]     Social work alerted the Attending physician of the above.     SW/CM to remain available for support and/or discharge planning.     Regina Fuentes MSW, LSW  Discharge Planner [id number]

## 2025-06-05 NOTE — CM/SW NOTE
JAYASHREE received call from Tamra westbrook/ [facility] - inquiring about status.    JAYASHREE informed Tamra that pt has not yet discharged.    Tamra requesting call when pt is cleared so they can f/up for [facility]. Tamra can be reached via phone #: [phone].      ANTONIA Prieto, LSW [id number]

## 2025-06-05 NOTE — PROGRESS NOTES
Vascular Surgery Progress Note    No acute events overnight. Patient states discomfort in groin is significantly improved. She is AO x3 and comfortable. Right groin is soft with Prevena in place. Right foot warm, well perfused.   85 y/o female POD #2 s/p right femoral endarterectomy with iliac stenting and SFA angioplasty and stenting.   - Ok for discharge from vascular standpoint   - patient will need to be discharged with VAC in place   - VAC will need to be removed on Monday; this can be done in our office  - Need to keep all urine catheters away from the right groin due to high risk for wound infection  - will sign off; please do not hesitate to call with questions     Chely Vines MD  06/05/25  3:19 PM

## 2025-06-05 NOTE — PLAN OF CARE
Problem: CARDIOVASCULAR - ADULT  Goal: Maintains optimal cardiac output and hemodynamic stability  Description: INTERVENTIONS:  - Monitor vital signs, rhythm, and trends  - Monitor for bleeding, hypotension and signs of decreased cardiac output  - Evaluate effectiveness of vasoactive medications to optimize hemodynamic stability  - Monitor arterial and/or venous puncture sites for bleeding and/or hematoma  - Assess quality of pulses, skin color and temperature  - Assess for signs of decreased coronary artery perfusion - ex. Angina  - Evaluate fluid balance, assess for edema, trend weights  Outcome: Progressing     Problem: RESPIRATORY - ADULT  Goal: Achieves optimal ventilation and oxygenation  Description: INTERVENTIONS:  - Assess for changes in respiratory status  - Assess for changes in mentation and behavior  - Position to facilitate oxygenation and minimize respiratory effort  - Oxygen supplementation based on oxygen saturation or ABGs  - Provide Smoking Cessation handout, if applicable  - Encourage broncho-pulmonary hygiene including cough, deep breathe, Incentive Spirometry  - Assess the need for suctioning and perform as needed  - Assess and instruct to report SOB or any respiratory difficulty  - Respiratory Therapy support as indicated  - Manage/alleviate anxiety  - Monitor for signs/symptoms of CO2 retention  Outcome: Progressing     Problem: GENITOURINARY - ADULT  Goal: Absence of urinary retention  Description: INTERVENTIONS:  - Assess patient’s ability to void and empty bladder  - Monitor intake/output and perform bladder scan as needed  - Follow urinary retention protocol/standard of care  - Consider collaborating with pharmacy to review patient's medication profile  - Implement strategies to promote bladder emptying  Outcome: Progressing     Problem: Diabetes/Glucose Control  Goal: Glucose maintained within prescribed range  Description: INTERVENTIONS:  - Monitor Blood Glucose as ordered  - Assess  for signs and symptoms of hyperglycemia and hypoglycemia  - Administer ordered medications to maintain glucose within target range  - Assess barriers to adequate nutritional intake and initiate nutrition consult as needed  - Instruct patient on self management of diabetes  Outcome: Progressing     Problem: HEMATOLOGIC - ADULT  Goal: Free from bleeding injury  Description: (Example usage: patient with low platelets)  INTERVENTIONS:  - Avoid intramuscular injections, enemas and rectal medication administration  - Ensure safe mobilization of patient  - Hold pressure on venipuncture sites to achieve adequate hemostasis  - Assess for signs and symptoms of internal bleeding  - Monitor lab trends  - Patient is to report abnormal signs of bleeding to staff  - Avoid use of toothpicks and dental floss  - Use electric shaver for shaving  - Use soft bristle tooth brush  - Limit straining and forceful nose blowing  Outcome: Progressing

## 2025-06-05 NOTE — PROGRESS NOTES
[facility]  part of [facility]     Progress Note    Radha Yu Patient Status:  Inpatient    1940 MRN [id number]   Location [facility] 2W/SW Attending Madison Crump MD   Hosp Day # 14 PCP Stef Velasco MD       Subjective:   Patient seen and examined.  Resting in bed.  Mild groin discomfort.  Denies lower extremity discomfort or pain    Objective:   Blood pressure 150/45, pulse 70, temperature 98.1 °F (36.7 °C), temperature source Temporal, resp. rate 15, weight 137 lb (62.1 kg), SpO2 99%.  Intake/Output:   Last 3 shifts: I/O last 3 completed shifts:  In: 3841.1 [P.O.:2005; I.V.:1826.1; IV PIGGYBACK:10]  Out: 2420 [Urine:420; Other:]   This shift: No intake/output data recorded.     Vent Settings:      Hemodynamic parameters (last 24 hours):      Scheduled Meds: Current Hospital Medications[1]    Continuous Infusions: Medication Infusions[2]    Physical Exam  Constitutional: no acute distress  Eyes: PERRL  ENT: nares pateint  Neck: supple, no JVD  Cardio: RRR, S1 S2  Respiratory: Faint crackles  GI: abdomen soft, non tender, active bowel sounds, no organomegaly  Extremities: no clubbing, cyanosis, edema + right groin wound VAC in place  Neurologic: no gross motor deficits  Skin: warm, dry      Results:     Lab Results   Component Value Date    WBC 7.6 2025    HGB 7.8 2025    HCT 23.5 2025    .0 2025    CREATSERUM 2.16 2025    BUN 25 2025     2025    K 4.0 2025     2025    CO2 27.0 2025    GLU 54 2025    CA 8.2 2025    ALB 3.4 2025    PHOS 3.1 2025         No results found.              Assessment   1.  Acute hypoxemic respiratory failure  2.  Pulmonary edema  3.  Non-ST elevation myocardial infarction  4.  POD #1 status post right femoral endarterectomy and stent placement  5.  Peripheral vascular disease  6.  Acute kidney injury on chronic kidney disease  7.   Prior history of right ICA stenosis status post TCAR  8.  Hypertension  9.  Diabetes mellitus     Plan   -Patient presents with worsening dyspnea symptoms.    - Status post right femoral endarterectomy and right common iliac stent and external iliac stent placement on 6/3/2025.  Wound VAC placed.  Further recommendations per vascular surgery  - Nebulizer treatments  - Viral respiratory panel negative  -Status post left and right heart catheterization on 5/27/2025 with mild to moderate LV dysfunction and severe multivessel coronary disease seen.  Eval by CT surgery.  Poor candidate for CABG. medical therapy per cardiology  - Eval by nephrology.  Dialysis catheter placed and started on dialysis.  Renal replacement therapy per nephrology  - DVT prophylaxis: Heparin  - Ok to transfer to floor later today          Neelam Cherry DO  Pulmonary Critical Care Medicine  [facility]          [1]   Current Facility-Administered Medications   Medication Dose Route Frequency    insulin aspart (NovoLOG) 100 Units/mL FlexPen 1-7 Units  1-7 Units Subcutaneous TID CC and HS    HYDROcodone-acetaminophen (Norco) 5-325 MG per tab 2 tablet  2 tablet Oral Q6H PRN    senna-docusate (Senokot-S) 8.6-50 MG per tab 2 tablet  2 tablet Oral Daily    [START ON 6/6/2025] sodium chloride 0.9 % IV bolus 100 mL  100 mL Intravenous Q30 Min PRN    And    [START ON 6/6/2025] albumin human (Albumin) 25% injection 25 g  25 g Intravenous PRN Dialysis    [START ON 6/6/2025] heparin (Porcine) 1000 UNIT/ML injection 1,500 Units  1.5 mL Intracatheter PRN Dialysis    hydrALAzine (Apresoline) 20 mg/mL injection 10 mg  10 mg Intravenous Q6H PRN    ondansetron (Zofran) 4 MG/2ML injection 4 mg  4 mg Intravenous Q6H PRN    heparin (Porcine) 5000 UNIT/ML injection 5,000 Units  5,000 Units Subcutaneous Q8H BLANE    sodium chloride 0.9 % IV bolus 100 mL  100 mL Intravenous Q30 Min PRN    And    albumin human (Albumin) 25% injection 25 g  25 g Intravenous PRN  Dialysis    HYDROmorphone (Dilaudid) 1 MG/ML injection 0.4 mg  0.4 mg Intravenous Q2H PRN    Or    HYDROmorphone (Dilaudid) 1 MG/ML injection 0.8 mg  0.8 mg Intravenous Q2H PRN    Or    HYDROmorphone (Dilaudid) 1 MG/ML injection 1.2 mg  1.2 mg Intravenous Q2H PRN    epoetin brittany (Epogen, Procrit) 28983 UNIT/ML injection 10,000 Units  10,000 Units Subcutaneous Once per day on Monday Wednesday Friday    metoclopramide (Reglan) 5 mg/mL injection 5 mg  5 mg Intravenous Daily PRN    atorvastatin (Lipitor) tab 40 mg  40 mg Oral Nightly    clopidogrel (Plavix) tab 75 mg  75 mg Oral Daily    aspirin DR tab 81 mg  81 mg Oral Daily    metoprolol succinate (Toprol XL) partial tablet 12.5 mg  12.5 mg Oral Daily Beta Blocker    pantoprazole (Protonix) DR tab 40 mg  40 mg Oral BID AC    calcitriol (Rocaltrol) cap 0.25 mcg  0.25 mcg Oral Q MWF    ipratropium-albuterol (Duoneb) 0.5-2.5 (3) MG/3ML inhalation solution 3 mL  3 mL Nebulization Q6H PRN    HYDROcodone-acetaminophen (Norco) 5-325 MG per tab 1 tablet  1 tablet Oral Q6H PRN    escitalopram (Lexapro) tab 10 mg  10 mg Oral Daily    glucose (Dex4) 15 GM/59ML oral liquid 15 g  15 g Oral Q15 Min PRN    Or    glucose (Glutose) 40% oral gel 15 g  15 g Oral Q15 Min PRN    Or    glucose-vitamin C (Dex-4) chewable tab 4 tablet  4 tablet Oral Q15 Min PRN    Or    dextrose 50% injection 50 mL  50 mL Intravenous Q15 Min PRN    Or    glucose (Dex4) 15 GM/59ML oral liquid 30 g  30 g Oral Q15 Min PRN    Or    glucose (Glutose) 40% oral gel 30 g  30 g Oral Q15 Min PRN    Or    glucose-vitamin C (Dex-4) chewable tab 8 tablet  8 tablet Oral Q15 Min PRN    acetaminophen (Tylenol Extra Strength) tab 500 mg  500 mg Oral Q4H PRN    melatonin tab 3 mg  3 mg Oral Nightly PRN    polyethylene glycol (PEG 3350) (Miralax) 17 g oral packet 17 g  17 g Oral Daily PRN    sennosides (Senokot) tab 17.2 mg  17.2 mg Oral Nightly PRN    bisacodyl (Dulcolax) 10 MG rectal suppository 10 mg  10 mg Rectal Daily PRN     ondansetron (Zofran) 4 MG/2ML injection 4 mg  4 mg Intravenous Q6H PRN    benzonatate (Tessalon) cap 200 mg  200 mg Oral TID PRN    guaiFENesin (Robitussin) 100 MG/5 ML oral liquid 200 mg  200 mg Oral Q4H PRN    glycerin-hypromellose- (Artificial Tears) 0.2-0.2-1 % ophthalmic solution 1 drop  1 drop Both Eyes QID PRN    sodium chloride (Saline Mist) 0.65 % nasal solution 1 spray  1 spray Each Nare Q3H PRN   [2]

## 2025-06-05 NOTE — OCCUPATIONAL THERAPY NOTE
New OT orders rcvd despite OT re-evaluating the patient yesterday. Followup date 6/6. DC recs: LISE. No change in status from last therapy. Continue with  OT POC.

## 2025-06-05 NOTE — PROGRESS NOTES
[facility]  part of [facility]    Progress Note    Radha Yu Patient Status:  Inpatient    1940 MRN [id number]   Location [facility]5W Attending Villa Angel MD   Hosp Day # 14 PCP Stef Velasco MD     Chief complaint     Subjective:   Radha Yu is a(n) 84 year old female who came in with chest pain and sob.     NO NEW ISSUES, doing well, s/p procedure, wound vac in place, off levo, diet advance. Heparin is dc   No new issues, had episode of hypoglycemia, no symptoms,    A comprehensive 10 point review of systems was completed.  Pertinent positives and negatives noted in the the HPI.    Objective:     Blood pressure (!) 142/93, pulse 76, temperature 98.9 °F (37.2 °C), temperature source Temporal, resp. rate 16, weight 137 lb (62.1 kg), SpO2 94%.      Intake/Output Summary (Last 24 hours) at 2025 1241  Last data filed at 2025 1200  Gross per 24 hour   Intake 990 ml   Output 2150 ml   Net -1160 ml         Patient Weight(s) for the past 336 hrs:   Weight   25 0800 137 lb (62.1 kg)   25 0400 130 lb (59 kg)   25 2000 132 lb (59.9 kg)   25 0336 122 lb 3.2 oz (55.4 kg)   25 0454 121 lb 12.8 oz (55.2 kg)   25 0310 121 lb 14.4 oz (55.3 kg)   25 0500 123 lb 4.8 oz (55.9 kg)   25 0555 121 lb 12.8 oz (55.2 kg)   25 0558 121 lb (54.9 kg)   25 0402 120 lb 14.4 oz (54.8 kg)   25 0617 116 lb 9.6 oz (52.9 kg)   25 2146 123 lb 14.4 oz (56.2 kg)           Gen: uncomfortable  Pulm: Lungs clear, normal respiratory effort  CV: Heart with regular rate and rhythm  Abd: Abdomen soft, nontender, nondistended, bowel sounds present  Neuro: No acute focal deficits  MSK: moves extremities  Skin: Warm and dry  Psych: Normal affect  Ext: rt le looks paler compared to left, sensation intact but decreased, rt groin wound looks good, rt pedal pulse heard with doppler          Medicines:     Current Hospital  Medications[1]            Blood Sugar Medications            insulin aspart 100 Units/mL Subcutaneous Solution Pen-injector    insulin glargine 100 UNIT/ML Subcutaneous Solution            Blood Pressure and Cardiac Medications            amLODIPine 5 MG Oral Tab            Medication Infusions[2]        Lab Results   Component Value Date    WBC 7.6 06/05/2025    HGB 7.8 (L) 06/05/2025    HCT 23.5 (L) 06/05/2025    .0 (L) 06/05/2025    CREATSERUM 2.16 (H) 06/05/2025    BUN 25 (H) 06/05/2025     06/05/2025    K 4.0 06/05/2025     06/05/2025    CO2 27.0 06/05/2025    GLU 54 (L) 06/05/2025    CA 8.2 (L) 06/05/2025    ALB 3.4 06/05/2025    ALKPHO 73 05/26/2025    BILT 0.2 05/26/2025    TP 5.7 05/26/2025    AST 16 05/26/2025    ALT <7 (L) 05/26/2025    PTT 53.4 (H) 06/03/2025    INR 1.16 05/22/2025    TSH 1.993 05/27/2025    MG 1.9 06/01/2025    PHOS 3.1 06/05/2025       No results found.            Results:     CBC:    Lab Results   Component Value Date    WBC 7.6 06/05/2025    WBC 6.9 06/04/2025    WBC 13.6 (H) 06/03/2025     Lab Results   Component Value Date    HGB 7.8 (L) 06/05/2025    HGB 8.9 (L) 06/04/2025    HGB 10.1 (L) 06/03/2025      Lab Results   Component Value Date    .0 (L) 06/05/2025    .0 (L) 06/04/2025    .0 (L) 06/03/2025       Recent Labs   Lab 06/03/25 2003 06/04/25  0248 06/05/25  0836   * 176* 54*   BUN 30* 31* 25*   CREATSERUM 2.54* 2.88* 2.16*   CA 7.4* 7.7* 8.2*    137 138   K 4.8 4.9 4.0    105 104   CO2 22.0 23.0 27.0           Assessment and Plan:      RLE ischemia  Rt LE pain and decreased sensation, color   - plan stat ct a/p/bl le runoff to rule out dissection - results reviewed - plan percutaneous revascularization of right SFA with possible IVL vs atherectomy as she is high risk for open vascular surgery   - per vascular : femoral endarterectomy with iliac stenting and SFA stenting and any tibial interventions to improve.    Heparin is dc   S/p right common femoral endarterectomy with iliac/SFA stenting with Dr. Vines. On 6/3    NSTEMI type 1 with new ischemic cardiomyopathy acute systolic chf  Multivessel CAD  - left heart cath again 5/28 with pci, post op with rt groin wound bleeding that improved after pressure   - on asa, plavix, statin, bb     Tricuspid and mitral regurg  - volume removal per hd     PVD s/p left femoropopliteal bypass 9/2015  - 80% proximal left renal artery stenosis on angiogram   - On asa, plavix, atorvastatin      HL  - statin     HTN  - elevated - adjust oral meds per cards     Acute on chronic anemia of renal disease  - possibly baseline anemia from ckd  - heparin stopped; gi defers invasive dx till more stable  - hb stable today , ppi     acute copd exacerbation  - pulm consulted signed off no steroids; no abx  - plan nebs scheduled and prn  - plan pulm hygiene  - wean oxygen as able     ESRD   -On HD     DM  - hypoglycemic this am. Tresiba and scheuled insulin dc  - accuchecks and ISS    Moderate right ICA stenosis s/p right TCAR 2/2019, totally occluded left ICA  - On asa, plavix, atorvastatin                       high mdm; coordinating care with nurse and counseling pt and with her permission her nephew in room  about chf/sob/diet/cath/dialysis         Dispo: when stable           Chart reviewed, including current vitals, notes, labs and imaging  Labs ordered and medications adjusted as outlined above  Coordinate care with care team/consultants  Discussed with patient results of tests, management plan as outlined above, and the need for ongoing hospitalization  D/w RN     MDM high        Tracy Vega MD, FAAFP        Supplementary Documentation:   DVT Mechanical Prophylaxis:   SCDs, Early ambuation  DVT Pharmacologic Prophylaxis   Medication    [START ON 6/6/2025] heparin (Porcine) 1000 UNIT/ML injection 1,500 Units    heparin (Porcine) 5000 UNIT/ML injection 5,000 Units         DVT Pharmacologic  prophylaxis: Aspirin 81 mg      Code Status: Full Code  Rao: No urinary catheter in place  Rao Duration (in days):   Central line:    FRANCIS: 6/5/2025         [1]   Current Facility-Administered Medications   Medication Dose Route Frequency    insulin aspart (NovoLOG) 100 Units/mL FlexPen 1-7 Units  1-7 Units Subcutaneous TID CC and HS    losartan (Cozaar) tab 25 mg  25 mg Oral Daily    HYDROcodone-acetaminophen (Norco) 5-325 MG per tab 2 tablet  2 tablet Oral Q6H PRN    senna-docusate (Senokot-S) 8.6-50 MG per tab 2 tablet  2 tablet Oral Daily    [START ON 6/6/2025] sodium chloride 0.9 % IV bolus 100 mL  100 mL Intravenous Q30 Min PRN    And    [START ON 6/6/2025] albumin human (Albumin) 25% injection 25 g  25 g Intravenous PRN Dialysis    [START ON 6/6/2025] heparin (Porcine) 1000 UNIT/ML injection 1,500 Units  1.5 mL Intracatheter PRN Dialysis    hydrALAzine (Apresoline) 20 mg/mL injection 10 mg  10 mg Intravenous Q6H PRN    ondansetron (Zofran) 4 MG/2ML injection 4 mg  4 mg Intravenous Q6H PRN    heparin (Porcine) 5000 UNIT/ML injection 5,000 Units  5,000 Units Subcutaneous Q8H BLANE    sodium chloride 0.9 % IV bolus 100 mL  100 mL Intravenous Q30 Min PRN    And    albumin human (Albumin) 25% injection 25 g  25 g Intravenous PRN Dialysis    HYDROmorphone (Dilaudid) 1 MG/ML injection 0.4 mg  0.4 mg Intravenous Q2H PRN    Or    HYDROmorphone (Dilaudid) 1 MG/ML injection 0.8 mg  0.8 mg Intravenous Q2H PRN    Or    HYDROmorphone (Dilaudid) 1 MG/ML injection 1.2 mg  1.2 mg Intravenous Q2H PRN    epoetin brittany (Epogen, Procrit) 80095 UNIT/ML injection 10,000 Units  10,000 Units Subcutaneous Once per day on Monday Wednesday Friday    metoclopramide (Reglan) 5 mg/mL injection 5 mg  5 mg Intravenous Daily PRN    atorvastatin (Lipitor) tab 40 mg  40 mg Oral Nightly    clopidogrel (Plavix) tab 75 mg  75 mg Oral Daily    aspirin DR tab 81 mg  81 mg Oral Daily    metoprolol succinate (Toprol XL) partial tablet 12.5 mg  12.5 mg  Oral Daily Beta Blocker    pantoprazole (Protonix) DR tab 40 mg  40 mg Oral BID AC    calcitriol (Rocaltrol) cap 0.25 mcg  0.25 mcg Oral Q MWF    ipratropium-albuterol (Duoneb) 0.5-2.5 (3) MG/3ML inhalation solution 3 mL  3 mL Nebulization Q6H PRN    HYDROcodone-acetaminophen (Norco) 5-325 MG per tab 1 tablet  1 tablet Oral Q6H PRN    escitalopram (Lexapro) tab 10 mg  10 mg Oral Daily    glucose (Dex4) 15 GM/59ML oral liquid 15 g  15 g Oral Q15 Min PRN    Or    glucose (Glutose) 40% oral gel 15 g  15 g Oral Q15 Min PRN    Or    glucose-vitamin C (Dex-4) chewable tab 4 tablet  4 tablet Oral Q15 Min PRN    Or    dextrose 50% injection 50 mL  50 mL Intravenous Q15 Min PRN    Or    glucose (Dex4) 15 GM/59ML oral liquid 30 g  30 g Oral Q15 Min PRN    Or    glucose (Glutose) 40% oral gel 30 g  30 g Oral Q15 Min PRN    Or    glucose-vitamin C (Dex-4) chewable tab 8 tablet  8 tablet Oral Q15 Min PRN    acetaminophen (Tylenol Extra Strength) tab 500 mg  500 mg Oral Q4H PRN    melatonin tab 3 mg  3 mg Oral Nightly PRN    polyethylene glycol (PEG 3350) (Miralax) 17 g oral packet 17 g  17 g Oral Daily PRN    sennosides (Senokot) tab 17.2 mg  17.2 mg Oral Nightly PRN    bisacodyl (Dulcolax) 10 MG rectal suppository 10 mg  10 mg Rectal Daily PRN    ondansetron (Zofran) 4 MG/2ML injection 4 mg  4 mg Intravenous Q6H PRN    benzonatate (Tessalon) cap 200 mg  200 mg Oral TID PRN    guaiFENesin (Robitussin) 100 MG/5 ML oral liquid 200 mg  200 mg Oral Q4H PRN    glycerin-hypromellose- (Artificial Tears) 0.2-0.2-1 % ophthalmic solution 1 drop  1 drop Both Eyes QID PRN    sodium chloride (Saline Mist) 0.65 % nasal solution 1 spray  1 spray Each Nare Q3H PRN   [2]

## 2025-06-05 NOTE — CM/SW NOTE
Department  asked to send updates   [facility] ID [id number]    Clin updts sent via portal    Assigned SW/CM to follow up with patient/family on discharge plan.     Amy Miller, DSC

## 2025-06-05 NOTE — PROGRESS NOTES
[facility], [place]  Cardiology Progress Note    Radha Yu Patient Status:  Inpatient    1940 MRN [id number]   Location [facility] 2W/SW Attending Tracy Vega MD   Hosp Day # 14 PCP Stef Velasco MD     Subjective     No issues overnight.  No cardiac complaints this morning.    Objective:     Patient Vitals for the past 24 hrs:   BP Temp Temp src Pulse Resp SpO2 Weight   25 1035 -- -- -- 86 -- -- --   25 1000 124/41 -- -- 81 19 100 % --   25 0800 150/45 98.1 °F (36.7 °C) Temporal 70 15 99 % 137 lb (62.1 kg)   25 0600 143/41 -- -- 62 19 100 % --   25 0500 -- -- -- 65 17 98 % --   25 0400 139/46 97.2 °F (36.2 °C) Temporal 76 18 100 % --   25 0300 -- -- -- 64 21 97 % --   25 0200 120/66 -- -- 68 23 98 % --   25 0100 (!) 140/104 -- -- 65 26 (!) 87 % --   25 0000 116/50 97 °F (36.1 °C) Temporal 66 19 99 % --   25 2300 100/55 -- -- 67 14 100 % --   25 2200 100/37 -- -- 67 19 100 % --   25 2100 100/35 -- -- 66 22 99 % --   25 2045 107/34 -- -- 67 21 99 % --   25 2030 106/36 -- -- 64 24 100 % --   25 125/45 -- -- 67 26 100 % --   25 2000 130/39 97.9 °F (36.6 °C) Temporal 68 25 99 % --   25 1800 119/37 -- -- 67 23 99 % --   25 1600 113/43 98.6 °F (37 °C) Temporal 60 20 100 % --   25 1500 114/38 -- -- 64 16 97 % --   25 1400 96/38 -- -- 64 21 98 % --   25 1300 -- -- -- 63 20 97 % --   25 1200 95/39 97.6 °F (36.4 °C) Temporal 57 19 96 % --       Intake/Output:   Last 3 shifts:   Intake/Output                   25 07 - 25 0659 25 07 - 25 0659 25 07 - 25 0659       Intake    P.O.  550  1455  360    P.O. 550 1455 360    I.V.  3476.1  --  --    I.V. 100 -- --    Volume (mL) Norepinephrine 243.2 -- --    Volume (mL) (dextrose 5%-sodium chloride 0.9% infusion) 779.2 -- --    Volume (mL) (sodium chloride 0.9%  infusion) 300 -- --    Volume (mL) (sodium chloride 0.9% infusion) 1250 -- --    Volume (mL) (lactated ringers infusion) 803.7 -- --    Blood  700  --  --    Volume (Transfuse RBC) 350 -- --    Volume (Transfuse RBC) 350 -- --    IV PIGGYBACK  10  --  --    Volume (mL) (ceFAZolin (Ancef) 2g in 10mL IV syringe premix) 10 -- --    Total Intake 4736.1 1455 360       Output    Urine  1275  245  --    Urine Occurrence -- 0 x 0 x    Incontinent Urine Occurrence 1 x -- --    Output (mL) ([REMOVED] External Urinary Catheter) 150 -- --    Output (mL) ([REMOVED] Urethral Catheter Latex) 1125 245 --    Emesis/NG output  --  0  --    Emesis -- 0 --    Other  --  2000  --    HD Output (Hemodialysis Catheter Permacath (tunneled) Left Jugular) -- 2000 --    Total Output 1275 2245 --       Net I/O     3461.1 -790 360             Vent Settings:      Hemodynamic parameters (last 24 hours):      Scheduled Meds: Scheduled Medications[1]    Continuous Infusions: Medication Infusions[2]    Results:     Lab Results   Component Value Date    WBC 7.6 06/05/2025    HGB 7.8 (L) 06/05/2025    HCT 23.5 (L) 06/05/2025    .0 (L) 06/05/2025    CREATSERUM 2.16 (H) 06/05/2025    BUN 25 (H) 06/05/2025     06/05/2025    K 4.0 06/05/2025     06/05/2025    CO2 27.0 06/05/2025    GLU 54 (L) 06/05/2025    CA 8.2 (L) 06/05/2025    ALB 3.4 06/05/2025    ALKPHO 73 05/26/2025    BILT 0.2 05/26/2025    TP 5.7 05/26/2025    AST 16 05/26/2025    ALT <7 (L) 05/26/2025    PTT 53.4 (H) 06/03/2025    INR 1.16 05/22/2025    TSH 1.993 05/27/2025    MG 1.9 06/01/2025    PHOS 3.1 06/05/2025       Recent Labs   Lab 06/03/25 2003 06/04/25  0248 06/05/25  0836   * 176* 54*   BUN 30* 31* 25*   CREATSERUM 2.54* 2.88* 2.16*   CA 7.4* 7.7* 8.2*    137 138   K 4.8 4.9 4.0    105 104   CO2 22.0 23.0 27.0     Recent Labs   Lab 06/01/25  0643 06/02/25  0705 06/03/25  0712 06/03/25 2003 06/04/25  0248 06/05/25  0836   RBC 2.87*   < > 3.36*  3.57* 3.17* 2.74*   HGB 8.2*   < > 9.1* 10.1* 8.9* 7.8*   HCT 24.2*   < > 28.5* 30.8* 28.0* 23.5*   MCV 84.3   < > 84.8 86.3 88.3 85.8   MCH 28.6   < > 27.1 28.3 28.1 28.5   MCHC 33.9   < > 31.9 32.8 31.8 33.2   RDW 15.8*   < > 15.9* 15.3* 15.7* 16.1*   NEPRELIM 4.26  --  3.62  --   --  6.17   WBC 5.6   < > 5.6 13.6* 6.9 7.6   .0*  133.0*   < > 149.0* 142.0* 115.0* 130.0*    < > = values in this interval not displayed.       No results for input(s): \"BNPML\" in the last 168 hours.    No results for input(s): \"TROP\", \"CK\" in the last 168 hours.    No results found.      CARD ECHO 2D DOPPLER (CPT=93306)  Result Date: 2025  Transthoracic Echocardiogram Name:Radha Yu Date:    2025    :     1940    Ht:     (60in)     BP: MRN:     [id number]       Age:     84years       Wt:     (123lb)    HR: Loc:     EM          Gndr:    F             BSA:    1.52m^2 Sonographer: Annalise LIMA Ordering:    Jarvis Bergeron Consulting:  Morgan Santos ---------------------------------------------------------------------------- Procedure information:  A transthoracic complete 2D study was performed. Additional evaluation included M-mode, complete spectral Doppler, and color Doppler.  Image quality was adequate. ECG rhythm:   Normal sinus ---------------------------------------------------------------------------- Conclusions: 1. Left ventricle: The cavity size was normal. Wall thickness was mildly    increased. Systolic function was moderately reduced. The estimated    ejection fraction was 35-40%, by biplane method of disks. Doppler    parameters are consistent with abnormal left ventricular relaxation -    grade 1 diastolic dysfunction. 2. Left atrium: The atrium was markedly dilated. 3. Right atrium: The atrium was dilated. 4. Atrial septum: A patent foramen ovale cannot be excluded. 5. Aortic valve: There was mild regurgitation. 6. Mitral valve: The annulus was mildly to moderately calcified. The     leaflets were normal thickness. There was moderate to severe    regurgitation. 7. Tricuspid valve: There was mild-moderate regurgitation. Impressions:  No previous study from [facility] was available for comparison. * ---------------------------------------------------------------------------- * Findings: Left ventricle:  The cavity size was normal. Wall thickness was mildly increased. Systolic function was moderately reduced. The estimated ejection fraction was 35-40%, by biplane method of disks. No diagnostic evidence for diffuse regional wall motion abnormalities. Doppler parameters are consistent with abnormal left ventricular relaxation - grade 1 diastolic dysfunction. Left atrium:  Well visualized. The atrium was markedly dilated. Right ventricle:  The cavity size was normal. Systolic function was normal. Right atrium:  Well visualized. The atrium was dilated. Mitral valve:  Well visualized. The annulus was mildly to moderately calcified. The leaflets were normal thickness. Leaflet separation was normal. No evidence for prolapse.  Doppler:  Transvalvular velocity was within the normal range. There was no evidence for stenosis. There was moderate to severe regurgitation. Aortic valve:  Well visualized.  The valve was probably trileaflet. The leaflets were mildly thickened and mildly calcified. Cusp separation was normal.  Doppler:  Transvalvular velocity was within the normal range. There was no evidence for stenosis. There was mild regurgitation. Tricuspid valve:  Well visualized. The annulus is normal-sized. The valve is structurally normal. The leaflets are normal thickness. Leaflet separation was normal. No echocardiographic evidence for tricuspid prolapse.  Doppler: Transvalvular velocity was within the normal range. There was no evidence for stenosis. There was mild-moderate regurgitation. Pulmonic valve:   Well visualized. The annulus is normal-sized. The valve is structurally normal. The  leaflets are normal thickness. Cusp separation was normal. No evidence for prolapse.  Doppler:  Transvalvular velocity was within the normal range. There was no evidence for stenosis. There was no significant regurgitation. Pericardium:   There was no pericardial effusion. Pleura:  No evidence of pleural fluid accumulation. Aorta: Aortic root: The aortic root was normal-sized. The aortic root was normal. Ascending aorta: The ascending aorta was normal. The ascending aorta was normal. Pulmonary arteries: The main pulmonary artery was normal-sized. Moderate pulmonary hypertension was present. Systemic veins:  Central venous respirophasic diameter changes are blunted (< 50%). Inferior vena cava: The IVC was normal-sized. The IVC was normal-sized. Atrial septum:  A patent foramen ovale cannot be excluded. ---------------------------------------------------------------------------- Measurements  Left ventricle                    Value        Ref  IVS thickness, ED, PLAX       (H) 1.4   cm     0.6 - 0.9  LV ID, ED, PLAX                   4.2   cm     3.8 - 5.2  LV ID, ES, PLAX                   3.3   cm     2.2 - 3.5  LV PW thickness, ED, PLAX     (H) 1.1   cm     0.6 - 0.9  IVS/LV PW ratio, ED, PLAX         1.27         ---------  LV PW/LV ID ratio, ED, PLAX       0.26         ---------  LV ejection fraction          (L) 44    %      54 - 74  Stroke volume/bsa, 2D             36    ml/m^2 ---------  LV end-diastolic volume, 1-p      86    ml     48 - 140  A4C  LV ejection fraction, 1-p A4C (L) 37    %      46 - 78  Stroke volume, 1-p A4C            32    ml     ---------  LV end-diastolic volume/bsa,      57    ml/m^2 30 - 82  1-p A4C  Stroke volume/bsa, 1-p A4C        21    ml/m^2 ---------  LV end-diastolic volume, 2-p      93    ml     46 - 106  LV end-systolic volume, 2-p   (H) 54    ml     14 - 42  LV ejection fraction, 2-p     (L) 42    %      54 - 74  Stroke volume, 2-p                39    ml     ---------  LV  end-diastolic volume/bsa,      61    ml/m^2 29 - 61  2-p  LV end-systolic volume/bsa,   (H) 36    ml/m^2 8 - 24  2-p  Stroke volume/bsa, 2-p            25.5  ml/m^2 ---------  LV e', lateral                (L) 6.1   cm/sec >=10.0  LV E/e', lateral              (H) 22           <=13  LV e', medial                 (L) 5.9   cm/sec >=7.0  LV E/e', medial                   23           ---------  LV e', average                    6.0   cm/sec ---------  LV E/e', average              (H) 22           <=14  LVOT                              Value        Ref  LVOT ID                           2.1   cm     ---------  LVOT peak velocity, S             0.73  m/sec  ---------  LVOT VTI, S                       16.0  cm     ---------  LVOT peak gradient, S             2     mm Hg  ---------  LVOT mean gradient, S             1     mm Hg  ---------  Stroke volume (SV), LVOT DP       55    ml     ---------  Stroke index (SV/bsa), LVOT       37    ml/m^2 ---------  DP  Aortic valve                      Value        Ref  Aortic leaflet separation, MM     1.3   cm     ---------  Aortic pressure half-time         218   ms     ---------  Aortic regurg velocity, ED        3.14  m/sec  ---------  Aortic regurg deceleration        420   cm/s^2 ---------  Aortic regurg pressure            219   ms     ---------  half-time  Aortic regurg gradient, ED        39    mm Hg  ---------  Ascending aorta                   Value        Ref  Ascending aorta ID            (H) 3.7   cm     1.9 - 3.5  Left atrium                       Value        Ref  LA volume, S                  (H) 88    ml     22 - 52  LA volume/bsa, S              (H) 58    ml/m^2 16 - 34  LA volume, ES, 1-p A4C        (H) 94    ml     22 - 52  LA volume, ES, 1-p A2C        (H) 75    ml     22 - 52  LA volume, ES, A/L                91    ml     ---------  LA volume/bsa, ES, A/L        (H) 60    ml/m^2 16 - 34  Mitral valve                      Value        Ref  Mitral E-wave  peak velocity       1.33  m/sec  ---------  Mitral A-wave peak velocity       1.62  m/sec  ---------  Mitral deceleration time          180   ms     ---------  Mitral peak gradient, D           7     mm Hg  ---------  Mitral E/A ratio, peak            0.8          ---------  Pulmonary artery                  Value        Ref  PA pressure, S, DP                51    mm Hg  ---------  Tricuspid valve                   Value        Ref  Tricuspid regurg peak         (H) 3.26  m/sec  <=2.8  velocity  Tricuspid peak RV-RA gradient     43    mm Hg  ---------  Systemic veins                    Value        Ref  Estimated CVP                     8     mm Hg  ---------  Inferior vena cava                Value        Ref  ID                                1.9   cm     <=2.1  Right ventricle                   Value        Ref  TAPSE, 2D                         2.22  cm     >=1.70  TAPSE, MM                         2.22  cm     >=1.70  RV pressure, S, DP                51    mm Hg  ---------  RV s', lateral                    10.8  cm/sec >=9.5 Legend: (L)  and  (H)  sonny values outside specified reference range. ---------------------------------------------------------------------------- Prepared and electronically signed by Ovidio Amaya MD 05/24/2025 10:12        Exam:     General: Alert and oriented x 3. No apparent distress.   HEENT: Normocephalic, anicteric sclera, neck supple, no thyromegaly or adenopathy.  Neck: No JVD, carotids 2+, no bruits.  Cardiac: Regular rate and rhythm. S1, S2 normal. No murmur, pericardial rub, S3, or extra cardiac sounds.  Lungs: Clear without wheezes, rales, rhonchi or dullness.  Normal excursions and effort.  Abdomen: Soft, non-tender. No organosplenomegally, mass or rebound, BS-present.  Extremities: Without clubbing or cyanosis. No edema.    Neurologic: Alert and oriented, normal affect. No focal defects  Skin: Warm and dry.     Assessment and Plan:   Assessment:  84-year-old female with a  history of PVD s/p left femoral-popliteal bypass 2015, s/p right TCAR and total occluded left ICA, HTN, HLD, DM, and CKD who presents with shortness of breath      Acute hypoxic respiratory failure  - Chest x-ray w/ evidence of moderate pulmonary edema, noted diffuse wheezing initially on exam in setting of long-term tobacco use  - Viral resp panel negative   - S/p nebs & steroids . Pulm following   - SP02 stable on RA      NSTEMI   Multivessel coronary artery disease   - Peak TnI 426, ECG w/ lateral T wave inversion  - Echo w/ EF 35-40%, grade 1dd, bi-atrial dilation, PFO can't be excluded, mod - severe MR, mild-mod TR   - L/RHC w/ severe multivessel CAD. 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  [facility] following - tentative plan for right common femoral endarterectomy with iliac/SFA stenting tomorrow   - On asa, plavix, atorvastatin . Off of heparin gtt due to recurrent groin bleed      Moderate right ICA stenosis s/p right TCAR 2/2019, totally occluded left ICA  - On asa, plavix, atorvastatin      HTN   - Well controlled on toprol xl , isordil/hydralazine     HLD  - LDL 48, on atorvastatin      DMII  - HgbA1c = 6.5%. On insulin per PMD      Acute on chronic anemia   - Hgb 7.2  - On PPI. GI following      Plan:     Status post right common and external iliac stenting, right common femoral  endarterectomy and SFA stenting with vascular surgery yesterday  Excellent results, warm right lower extremity with no pain  Continue asa, plavix, statin  Start losartan 25 mg daily, monitor potassium closely given dialysis patient  Discharge planning likely to rehab facility      Jarvis Bergeron MD  [facility]  6/5/2025         [1]    insulin aspart  1-7 Units Subcutaneous TID CC and HS    losartan  25 mg Oral Daily    senna-docusate  2 tablet Oral Daily    heparin  5,000 Units Subcutaneous Q8H BLANE    epoetin brittany  10,000 Units Subcutaneous Once per day on Monday Wednesday Friday    atorvastatin  40 mg Oral Nightly    clopidogrel  75 mg Oral Daily    aspirin  81 mg Oral Daily    metoprolol succinate  12.5 mg Oral Daily Beta Blocker    pantoprazole  40 mg Oral BID AC    calcitriol  0.25 mcg Oral Q MWF    escitalopram  10 mg Oral Daily   [2]

## 2025-06-05 NOTE — CM/SW NOTE
Department  notified care team (KOTA/ JULIO) of P2P offer via portal.    Sent 06/05/2025 09:57:12    We are offering the treating practitioner an option to speak with a Medical Director before final determination. Provider to call: [phone] Option 5. Deadline is: 6/5/25 2:00 PM CDT If no response, MD will render determination.      Assigned SW/CM to follow up with patient/family on discharge plan.     Amy Miller, DSC

## 2025-06-06 LAB
ALBUMIN SERPL-MCNC: 3.1 G/DL (ref 3.2–4.8)
ANION GAP SERPL CALC-SCNC: 9 MMOL/L (ref 0–18)
ANTIBODY SCREEN: NEGATIVE
BASOPHILS # BLD AUTO: 0.03 X10(3) UL (ref 0–0.2)
BASOPHILS NFR BLD AUTO: 0.5 %
BUN BLD-MCNC: 34 MG/DL (ref 9–23)
BUN/CREAT SERPL: 13.1 (ref 10–20)
CALCIUM BLD-MCNC: 7.8 MG/DL (ref 8.7–10.4)
CHLORIDE SERPL-SCNC: 102 MMOL/L (ref 98–112)
CO2 SERPL-SCNC: 24 MMOL/L (ref 21–32)
CREAT BLD-MCNC: 2.59 MG/DL (ref 0.55–1.02)
DEPRECATED RDW RBC AUTO: 51.8 FL (ref 35.1–46.3)
EGFRCR SERPLBLD CKD-EPI 2021: 18 ML/MIN/1.73M2 (ref 60–?)
EOSINOPHIL # BLD AUTO: 0.08 X10(3) UL (ref 0–0.7)
EOSINOPHIL NFR BLD AUTO: 1.3 %
ERYTHROCYTE [DISTWIDTH] IN BLOOD BY AUTOMATED COUNT: 16.3 % (ref 11–15)
GLUCOSE BLD-MCNC: 65 MG/DL (ref 70–99)
GLUCOSE BLDC GLUCOMTR-MCNC: 272 MG/DL (ref 70–99)
GLUCOSE BLDC GLUCOMTR-MCNC: 300 MG/DL (ref 70–99)
GLUCOSE BLDC GLUCOMTR-MCNC: 326 MG/DL (ref 70–99)
GLUCOSE BLDC GLUCOMTR-MCNC: 73 MG/DL (ref 70–99)
GLUCOSE BLDC GLUCOMTR-MCNC: 98 MG/DL (ref 70–99)
HCT VFR BLD AUTO: 23.1 % (ref 35–48)
HGB BLD-MCNC: 7.4 G/DL (ref 12–16)
HGB BLD-MCNC: 9 G/DL (ref 12–16)
IMM GRANULOCYTES # BLD AUTO: 0.03 X10(3) UL (ref 0–1)
IMM GRANULOCYTES NFR BLD: 0.5 %
LYMPHOCYTES # BLD AUTO: 0.91 X10(3) UL (ref 1–4)
LYMPHOCYTES NFR BLD AUTO: 14.5 %
MCH RBC QN AUTO: 28.4 PG (ref 26–34)
MCHC RBC AUTO-ENTMCNC: 32 G/DL (ref 31–37)
MCV RBC AUTO: 88.5 FL (ref 80–100)
MONOCYTES # BLD AUTO: 0.56 X10(3) UL (ref 0.1–1)
MONOCYTES NFR BLD AUTO: 8.9 %
NEUTROPHILS # BLD AUTO: 4.68 X10 (3) UL (ref 1.5–7.7)
NEUTROPHILS # BLD AUTO: 4.68 X10(3) UL (ref 1.5–7.7)
NEUTROPHILS NFR BLD AUTO: 74.3 %
OSMOLALITY SERPL CALC.SUM OF ELEC: 286 MOSM/KG (ref 275–295)
PHOSPHATE SERPL-MCNC: 3.4 MG/DL (ref 2.4–5.1)
PLATELET # BLD AUTO: 114 10(3)UL (ref 150–450)
PLATELETS.RETICULATED NFR BLD AUTO: 4.6 % (ref 0–7)
POTASSIUM SERPL-SCNC: 4.3 MMOL/L (ref 3.5–5.1)
RBC # BLD AUTO: 2.61 X10(6)UL (ref 3.8–5.3)
RH BLOOD TYPE: POSITIVE
SODIUM SERPL-SCNC: 135 MMOL/L (ref 136–145)
WBC # BLD AUTO: 6.3 X10(3) UL (ref 4–11)

## 2025-06-06 PROCEDURE — 99233 SBSQ HOSP IP/OBS HIGH 50: CPT | Performed by: HOSPITALIST

## 2025-06-06 PROCEDURE — 99232 SBSQ HOSP IP/OBS MODERATE 35: CPT | Performed by: INTERNAL MEDICINE

## 2025-06-06 PROCEDURE — 99233 SBSQ HOSP IP/OBS HIGH 50: CPT | Performed by: INTERNAL MEDICINE

## 2025-06-06 RX ORDER — SODIUM CHLORIDE 9 MG/ML
INJECTION, SOLUTION INTRAVENOUS ONCE
Status: COMPLETED | OUTPATIENT
Start: 2025-06-06 | End: 2025-06-06

## 2025-06-06 NOTE — PAYOR COMM NOTE
--------------  CONTINUED STAY REVIEW    Payor: [employer]  Subscriber #:  [id number]  Authorization Number: [id number]    Admit date: 5/22/25  Admit time:  2:36 PM    6/6/25   CBC:    Lab Results   Component Value Date     WBC 6.3 06/06/2025     WBC 7.6 06/05/2025     WBC 6.9 06/04/2025      Lab Results   Component Value Date     HGB 7.4 (L) 06/06/2025     HGB 7.8 (L) 06/05/2025     HGB 8.9 (L) 06/04/2025      Lab Results   Component Value Date     .0 (L) 06/06/2025     .0 (L) 06/05/2025     .0 (L) 06/04/2025      Lab 06/04/25  0248 06/05/25  0836 06/06/25  0529   * 54* 65*   BUN 31* 25* 34*   CREATSERUM 2.88* 2.16* 2.59*   CA 7.7* 8.2* 7.8*    138 135*   K 4.9 4.0 4.3    104 102   CO2 23.0 27.0 24.0       Assessment and Plan:      RLE ischemia  Rt LE pain and decreased sensation, color   - plan stat ct a/p/bl le runoff to rule out dissection - results reviewed - plan percutaneous revascularization of right SFA with possible IVL vs atherectomy as she is high risk for open vascular surgery   - per vascular : femoral endarterectomy with iliac stenting and SFA stenting and any tibial interventions to improve.   Heparin is dc   S/p right common femoral endarterectomy with iliac/SFA stenting with Dr. Vines on 6/3 - wound vac will be removed on Monday      NSTEMI type 1 with new ischemic cardiomyopathy acute systolic chf  Multivessel CAD  - left heart cath again 5/28 with pci, post op with rt groin wound bleeding that improved after pressure   - on asa, plavix, statin, bb     Tricuspid and mitral regurg  - volume removal per hd     PVD s/p left femoropopliteal bypass 9/2015  - 80% proximal left renal artery stenosis on angiogram   - On asa, plavix, atorvastatin      HL  - statin     HTN  - elevated - adjust oral meds per cards     Acute on chronic anemia of renal disease  - possibly baseline anemia from ckd  - heparin stopped; gi defers invasive dx till more stable  - hb stable  today , ppi     acute copd exacerbation  - pulm consulted signed off no steroids; no abx  - plan nebs scheduled and prn  - plan pulm hygiene  - wean oxygen as able     ESRD   -On HD      DM  - hypoglycemic this am. Tresiba and scheuled insulin dc  - accuchecks and ISS     Moderate right ICA stenosis s/p right TCAR 2/2019, totally occluded left ICA  - On asa, plavix, atorvastatin                      MEDICATIONS ADMINISTERED IN LAST 1 DAY:  acetaminophen (Tylenol Extra Strength) tab 500 mg       Date Action Dose Route User    6/6/2025 1042 Given 500 mg Oral Nolvia Michelle RN          aspirin DR tab 81 mg       Date Action Dose Route User    6/6/2025 1033 Given 81 mg Oral Nolvia Michelle RN          atorvastatin (Lipitor) tab 40 mg       Date Action Dose Route User    6/5/2025 2106 Given 40 mg Oral Eileen Duvall RN          calcitriol (Rocaltrol) cap 0.25 mcg       Date Action Dose Route User    6/6/2025 1038 Given 0.25 mcg Oral Nolvia Michelle RN          clopidogrel (Plavix) tab 75 mg       Date Action Dose Route User    6/6/2025 1033 Given 75 mg Oral Nolvia Michelle RN          escitalopram (Lexapro) tab 10 mg       Date Action Dose Route User    6/6/2025 1033 Given 10 mg Oral Nolvia Michelle RN          heparin (Porcine) 1000 UNIT/ML injection 1,500 Units       Date Action Dose Route User    6/6/2025 0835 Given by Other 1,500 Units Intracatheter Nolvia Michelle RN          heparin (Porcine) 5000 UNIT/ML injection 5,000 Units       Date Action Dose Route User    6/6/2025 0555 Given 5,000 Units Subcutaneous (Right Lower Abdomen) Gilberto Josue RN    6/5/2025 2107 Given 5,000 Units Subcutaneous (Right Lower Abdomen) Eileen Duvall RN    6/5/2025 1508 Given 5,000 Units Subcutaneous (Bilateral Upper Abdomen) Javi Hanna RN          insulin aspart (NovoLOG) 100 Units/mL FlexPen 1-7 Units       Date Action Dose Route User    6/5/2025 2109 Given 4 Units Subcutaneous (Left Lower Abdomen) Eileen Duvall RN     6/5/2025 1722 Given 6 Units Subcutaneous (Bilateral Upper Abdomen) Javi Hanna RN          losartan (Cozaar) tab 25 mg       Date Action Dose Route User    6/6/2025 1033 Given 25 mg Oral Nolvia Michelle RN    6/5/2025 1232 Given 25 mg Oral Gabriela Carl RN          metoprolol succinate (Toprol XL) partial tablet 12.5 mg       Date Action Dose Route User    6/6/2025 0555 Given 12.5 mg Oral Gilberto Josue RN          pantoprazole (Protonix) DR tab 40 mg       Date Action Dose Route User    6/6/2025 1042 Given 40 mg Oral Nolvia Michelle RN    6/5/2025 1508 Given 40 mg Oral Javi Hanna RN          senna-docusate (Senokot-S) 8.6-50 MG per tab 2 tablet       Date Action Dose Route User    6/6/2025 1033 Given 2 tablet Oral Nolvia Michelle RN            Vitals (last day)       Date/Time Temp Pulse Resp BP SpO2 Weight O2 Device O2 Flow Rate (L/min) [facility]    06/06/25 1029 97.6 °F (36.4 °C) 91 16 131/39 99 % -- None (Room air) --     06/06/25 0005 96.8 °F (36 °C) 70 21 138/41 100 % -- None (Room air) --     06/05/25 2000 98.8 °F (37.1 °C) 71 17 133/40 98 % -- None (Room air) --     06/05/25 1700 -- 66 20 167/47 100 % -- -- --     06/05/25 1600 99 °F (37.2 °C) 67 19 158/47 100 % -- None (Room air) --     06/05/25 1500 -- 72 16 141/47 90 % -- -- --     06/05/25 1400 -- 67 15 137/45 86 % -- -- --     06/05/25 1300 -- 70 16 -- 100 % -- -- --     06/05/25 1200 98.9 °F (37.2 °C) 76 16 142/93 94 % -- Nasal cannula 2 L/min     06/05/25 1035 -- -- -- -- -- -- Nasal cannula 2 L/min     06/05/25 0800 98.1 °F (36.7 °C) 70 15 150/45 99 % 137 lb (62.1 kg) None (Room air) --     06/05/25 0400 97.2 °F (36.2 °C) 76 18 139/46 100 % -- -- --     06/05/25 0300 -- 64 21 -- 97 % -- -- --     06/05/25 0200 -- 68 23 120/66 98 % -- -- --     06/05/25 0100 -- 65 26 140/104 87 % -- -- --     06/05/25 0000 97 °F (36.1 °C) 66 19 116/50 99 % -- None (Room air) --      Blood Transfusion Record       Product  Unit Status Volume Start End            Transfuse RBC       25  [id number]  [id number] Completed 06/03/25 1718 350 mL 06/03/25 1606 06/03/25 1616       25  [id number]  [id number] Completed 06/03/25 1718 350 mL 06/03/25 1552 06/03/25 1602       25  [id number]  [id number] Completed 06/02/25 2230 516 mL 06/02/25 1759 06/02/25 2225       25  [id number]  [id number] Completed 05/30/25 1335 350 mL 05/30/25 0935 05/30/25 1310       25  [id number]  [id number] Completed 05/23/25 1303 500 mL 05/23/25 1030 05/23/25 1300

## 2025-06-06 NOTE — PROGRESS NOTES
[facility]  part of [facility]    Progress Note    Radha Yu Patient Status:  Inpatient    1940 MRN [id number]   Location [facility]5W Attending Villa Angel MD   Hosp Day # 15 PCP Stef Velasco MD     Chief complaint     Subjective:   Radha Yu is a(n) 84 year old female who came in with chest pain and sob.     Feeling fine.  No acute events overnight.  No chest pain or sob.  No n/v.  Tolerating diet.  Post op pain controlled.     A comprehensive 10 point review of systems was completed.  Pertinent positives and negatives noted in the the HPI.    Objective:     Blood pressure 131/39, pulse 91, temperature 97.6 °F (36.4 °C), temperature source Oral, resp. rate 16, weight 137 lb (62.1 kg), SpO2 99%.      Intake/Output Summary (Last 24 hours) at 2025 1053  Last data filed at 2025 1007  Gross per 24 hour   Intake 660 ml   Output 0 ml   Net 660 ml         Patient Weight(s) for the past 336 hrs:   Weight   25 0800 137 lb (62.1 kg)   25 0400 130 lb (59 kg)   25 2000 132 lb (59.9 kg)   25 0336 122 lb 3.2 oz (55.4 kg)   25 0454 121 lb 12.8 oz (55.2 kg)   25 0310 121 lb 14.4 oz (55.3 kg)   25 0500 123 lb 4.8 oz (55.9 kg)   25 0555 121 lb 12.8 oz (55.2 kg)   25 0558 121 lb (54.9 kg)   25 0402 120 lb 14.4 oz (54.8 kg)   25 0617 116 lb 9.6 oz (52.9 kg)           Gen: uncomfortable  Pulm: Lungs clear, normal respiratory effort  CV: Heart with regular rate and rhythm  Abd: Abdomen soft, nontender, nondistended, bowel sounds present  Neuro: No acute focal deficits  MSK: moves extremities  Skin: Warm and dry  Psych: Normal affect  Ext: rt le looks paler compared to left, sensation intact but decreased, rt groin wound looks good, rt pedal pulse heard with doppler          Medicines:     Current Hospital Medications[1]            Blood Sugar Medications            insulin aspart 100 Units/mL Subcutaneous Solution  Pen-injector    insulin glargine 100 UNIT/ML Subcutaneous Solution            Blood Pressure and Cardiac Medications            amLODIPine 5 MG Oral Tab            Medication Infusions[2]        Lab Results   Component Value Date    WBC 6.3 06/06/2025    HGB 7.4 (L) 06/06/2025    HCT 23.1 (L) 06/06/2025    .0 (L) 06/06/2025    CREATSERUM 2.59 (H) 06/06/2025    BUN 34 (H) 06/06/2025     (L) 06/06/2025    K 4.3 06/06/2025     06/06/2025    CO2 24.0 06/06/2025    GLU 65 (L) 06/06/2025    CA 7.8 (L) 06/06/2025    ALB 3.1 (L) 06/06/2025    ALKPHO 73 05/26/2025    BILT 0.2 05/26/2025    TP 5.7 05/26/2025    AST 16 05/26/2025    ALT <7 (L) 05/26/2025    PTT 53.4 (H) 06/03/2025    INR 1.16 05/22/2025    TSH 1.993 05/27/2025    MG 1.9 06/01/2025    PHOS 3.4 06/06/2025       No results found.            Results:     CBC:    Lab Results   Component Value Date    WBC 6.3 06/06/2025    WBC 7.6 06/05/2025    WBC 6.9 06/04/2025     Lab Results   Component Value Date    HGB 7.4 (L) 06/06/2025    HGB 7.8 (L) 06/05/2025    HGB 8.9 (L) 06/04/2025      Lab Results   Component Value Date    .0 (L) 06/06/2025    .0 (L) 06/05/2025    .0 (L) 06/04/2025       Recent Labs   Lab 06/04/25  0248 06/05/25  0836 06/06/25  0529   * 54* 65*   BUN 31* 25* 34*   CREATSERUM 2.88* 2.16* 2.59*   CA 7.7* 8.2* 7.8*    138 135*   K 4.9 4.0 4.3    104 102   CO2 23.0 27.0 24.0           Assessment and Plan:      RLE ischemia  Rt LE pain and decreased sensation, color   - plan stat ct a/p/bl le runoff to rule out dissection - results reviewed - plan percutaneous revascularization of right SFA with possible IVL vs atherectomy as she is high risk for open vascular surgery   - per vascular : femoral endarterectomy with iliac stenting and SFA stenting and any tibial interventions to improve.   Heparin is dc   S/p right common femoral endarterectomy with iliac/SFA stenting with Dr. Vines on 6/3 - wound vac  will be removed on Monday and insurance auth has been resubmitted     NSTEMI type 1 with new ischemic cardiomyopathy acute systolic chf  Multivessel CAD  - left heart cath again 5/28 with pci, post op with rt groin wound bleeding that improved after pressure   - on asa, plavix, statin, bb     Tricuspid and mitral regurg  - volume removal per hd     PVD s/p left femoropopliteal bypass 9/2015  - 80% proximal left renal artery stenosis on angiogram   - On asa, plavix, atorvastatin      HL  - statin     HTN  - elevated - adjust oral meds per cards     Acute on chronic anemia of renal disease  - possibly baseline anemia from ckd  - heparin stopped; gi defers invasive dx till more stable  - hb stable today , ppi     acute copd exacerbation  - pulm consulted signed off no steroids; no abx  - plan nebs scheduled and prn  - plan pulm hygiene  - wean oxygen as able     ESRD   -On HD     DM  - hypoglycemic this am. Tresiba and scheuled insulin dc  - accuchecks and ISS    Moderate right ICA stenosis s/p right TCAR 2/2019, totally occluded left ICA  - On asa, plavix, atorvastatin                       high mdm; coordinating care with nurse and counseling pt and with her permission her nephew in room  about chf/sob/diet/cath/dialysis         Dispo: when stable           Chart reviewed, including current vitals, notes, labs and imaging  Labs ordered and medications adjusted as outlined above  Coordinate care with care team/consultants  Discussed with patient results of tests, management plan as outlined above, and the need for ongoing hospitalization  D/w RN     MDM high              Supplementary Documentation:   DVT Mechanical Prophylaxis:   SCDs, Early ambuation  DVT Pharmacologic Prophylaxis   Medication    heparin (Porcine) 1000 UNIT/ML injection 1,500 Units    heparin (Porcine) 5000 UNIT/ML injection 5,000 Units         DVT Pharmacologic prophylaxis: Aspirin 81 mg      Code Status: Full Code  Rao: No urinary catheter in  place  Rao Duration (in days):   Central line:    FRANCIS: 6/6/2025         [1]   Current Facility-Administered Medications   Medication Dose Route Frequency    insulin aspart (NovoLOG) 100 Units/mL FlexPen 1-7 Units  1-7 Units Subcutaneous TID CC and HS    losartan (Cozaar) tab 25 mg  25 mg Oral Daily    HYDROcodone-acetaminophen (Norco) 5-325 MG per tab 2 tablet  2 tablet Oral Q6H PRN    senna-docusate (Senokot-S) 8.6-50 MG per tab 2 tablet  2 tablet Oral Daily    sodium chloride 0.9 % IV bolus 100 mL  100 mL Intravenous Q30 Min PRN    And    albumin human (Albumin) 25% injection 25 g  25 g Intravenous PRN Dialysis    heparin (Porcine) 1000 UNIT/ML injection 1,500 Units  1.5 mL Intracatheter PRN Dialysis    hydrALAzine (Apresoline) 20 mg/mL injection 10 mg  10 mg Intravenous Q6H PRN    ondansetron (Zofran) 4 MG/2ML injection 4 mg  4 mg Intravenous Q6H PRN    heparin (Porcine) 5000 UNIT/ML injection 5,000 Units  5,000 Units Subcutaneous Q8H BLANE    HYDROmorphone (Dilaudid) 1 MG/ML injection 0.4 mg  0.4 mg Intravenous Q2H PRN    Or    HYDROmorphone (Dilaudid) 1 MG/ML injection 0.8 mg  0.8 mg Intravenous Q2H PRN    Or    HYDROmorphone (Dilaudid) 1 MG/ML injection 1.2 mg  1.2 mg Intravenous Q2H PRN    epoetin brittany (Epogen, Procrit) 60166 UNIT/ML injection 10,000 Units  10,000 Units Subcutaneous Once per day on Monday Wednesday Friday    metoclopramide (Reglan) 5 mg/mL injection 5 mg  5 mg Intravenous Daily PRN    atorvastatin (Lipitor) tab 40 mg  40 mg Oral Nightly    clopidogrel (Plavix) tab 75 mg  75 mg Oral Daily    aspirin DR tab 81 mg  81 mg Oral Daily    metoprolol succinate (Toprol XL) partial tablet 12.5 mg  12.5 mg Oral Daily Beta Blocker    pantoprazole (Protonix) DR tab 40 mg  40 mg Oral BID AC    calcitriol (Rocaltrol) cap 0.25 mcg  0.25 mcg Oral Q MWF    ipratropium-albuterol (Duoneb) 0.5-2.5 (3) MG/3ML inhalation solution 3 mL  3 mL Nebulization Q6H PRN    HYDROcodone-acetaminophen (Norco) 5-325 MG per tab  1 tablet  1 tablet Oral Q6H PRN    escitalopram (Lexapro) tab 10 mg  10 mg Oral Daily    glucose (Dex4) 15 GM/59ML oral liquid 15 g  15 g Oral Q15 Min PRN    Or    glucose (Glutose) 40% oral gel 15 g  15 g Oral Q15 Min PRN    Or    glucose-vitamin C (Dex-4) chewable tab 4 tablet  4 tablet Oral Q15 Min PRN    Or    dextrose 50% injection 50 mL  50 mL Intravenous Q15 Min PRN    Or    glucose (Dex4) 15 GM/59ML oral liquid 30 g  30 g Oral Q15 Min PRN    Or    glucose (Glutose) 40% oral gel 30 g  30 g Oral Q15 Min PRN    Or    glucose-vitamin C (Dex-4) chewable tab 8 tablet  8 tablet Oral Q15 Min PRN    acetaminophen (Tylenol Extra Strength) tab 500 mg  500 mg Oral Q4H PRN    melatonin tab 3 mg  3 mg Oral Nightly PRN    polyethylene glycol (PEG 3350) (Miralax) 17 g oral packet 17 g  17 g Oral Daily PRN    sennosides (Senokot) tab 17.2 mg  17.2 mg Oral Nightly PRN    bisacodyl (Dulcolax) 10 MG rectal suppository 10 mg  10 mg Rectal Daily PRN    ondansetron (Zofran) 4 MG/2ML injection 4 mg  4 mg Intravenous Q6H PRN    benzonatate (Tessalon) cap 200 mg  200 mg Oral TID PRN    guaiFENesin (Robitussin) 100 MG/5 ML oral liquid 200 mg  200 mg Oral Q4H PRN    glycerin-hypromellose- (Artificial Tears) 0.2-0.2-1 % ophthalmic solution 1 drop  1 drop Both Eyes QID PRN    sodium chloride (Saline Mist) 0.65 % nasal solution 1 spray  1 spray Each Nare Q3H PRN   [2]

## 2025-06-06 NOTE — PROGRESS NOTES
[facility] Cardiology Progress Note    Radha Yu Patient Status:  Inpatient    1940 MRN [id number]   Location [facility] 3W/SW Attending Villa Angel MD   Hosp Day # 15 PCP Stef Velasco MD     Subjective:  Denies cp, sob.  Denies leg pain  Nephew at bedside       Objective:  /39 (BP Location: Right arm)   Pulse 91   Temp 97.6 °F (36.4 °C) (Oral)   Resp 16   Wt 137 lb (62.1 kg)   SpO2 99%   BMI 25.90 kg/m²     Telemetry: NSR, 91 bpm       Intake/Output:    Intake/Output Summary (Last 24 hours) at 2025 1331  Last data filed at 2025 1100  Gross per 24 hour   Intake 720 ml   Output 2000 ml   Net -1280 ml       Last 3 Weights   25 0800 137 lb (62.1 kg)   25 0400 130 lb (59 kg)   25 2000 132 lb (59.9 kg)   25 0336 122 lb 3.2 oz (55.4 kg)   25 0454 121 lb 12.8 oz (55.2 kg)   25 0310 121 lb 14.4 oz (55.3 kg)   25 0500 123 lb 4.8 oz (55.9 kg)   25 0555 121 lb 12.8 oz (55.2 kg)   25 0558 121 lb (54.9 kg)   25 0402 120 lb 14.4 oz (54.8 kg)   25 0617 116 lb 9.6 oz (52.9 kg)   25 2146 123 lb 14.4 oz (56.2 kg)   25 1114 118 lb 13.3 oz (53.9 kg)   25 1830 123 lb 7.3 oz (56 kg)   25 0700 123 lb 7.3 oz (56 kg)   25 1805 123 lb 6.4 oz (56 kg)   25 1121 139 lb 1.8 oz (63.1 kg)       Labs:  Recent Labs   Lab 25  25  0529   * 54* 65*   BUN 31* 25* 34*   CREATSERUM 2.88* 2.16* 2.59*   EGFRCR 15* 22* 18*   CA 7.7* 8.2* 7.8*    138 135*   K 4.9 4.0 4.3    104 102   CO2 23.0 27.0 24.0     Recent Labs   Lab 25  0529   RBC 3.36*   < > 3.17* 2.74* 2.61*   HGB 9.1*   < > 8.9* 7.8* 7.4*   HCT 28.5*   < > 28.0* 23.5* 23.1*   MCV 84.8   < > 88.3 85.8 88.5   MCH 27.1   < > 28.1 28.5 28.4   MCHC 31.9   < > 31.8 33.2 32.0   RDW 15.9*   < > 15.7* 16.1* 16.3*   NEPRELIM 3.62  --   --   6.17 4.68   WBC 5.6   < > 6.9 7.6 6.3   .0*   < > 115.0* 130.0* 114.0*    < > = values in this interval not displayed.         No results for input(s): \"TROP\", \"TROPHS\", \"CK\" in the last 168 hours.      Diagnostics:   5/23/25 Echo  1. Left ventricle: The cavity size was normal. Wall thickness was mildly      increased. Systolic function was moderately reduced. The estimated      ejection fraction was 35-40%, by biplane method of disks. Doppler      parameters are consistent with abnormal left ventricular relaxation -      grade 1 diastolic dysfunction.   2. Left atrium: The atrium was markedly dilated.   3. Right atrium: The atrium was dilated.   4. Atrial septum: A patent foramen ovale cannot be excluded.   5. Aortic valve: There was mild regurgitation.   6. Mitral valve: The annulus was mildly to moderately calcified. The      leaflets were normal thickness. There was moderate to severe      regurgitation.   7. Tricuspid valve: There was mild-moderate regurgitation.   Impressions:  No previous study from [facility] was   available for comparison.     5/27/25 L/RHC   Findings:  1) Left Ventriculography at 30 degrees CEE: LVEF 40 to 45%, hypokinesia of the mid to distal inferior/inferoapical walls.  2) Hemodynamics: LVEDP 18 mmHg  3) Coronaries:  Left main coronary artery: Large size vessel with no angiographically significant disease.  Left anterior descending artery: Large size, Type IV vessel with 50% proximal, 80% mid segment stenosis.  90% proximal first diagonal branch disease which originates from the diseased segment of the mid LAD.   Circumflex artery: Large size non-dominant vessel with luminal irregularities.  Angiographically significant disease.  Small OM1 with focal 80% ostial stenosis.  OM 2  is medium caliber, diffuse 70 to 80% disease of proximal segment.  Right coronary artery: Large size dominant vessel with 100%  of the proximal segment with reconstitution of the PDA via  left-to-right septal collaterals.      Aortobifemoral angiogram  Mildly dilated infrarenal aorta  Left renal artery proximal 80% stenosis  Nonvisualization of the right renal artery  Severely, diffusely calcified but patent bilateral iliac arteries     RIGHT HEART CATHETERIZATION HEMODYNAMICS:  Right Atrial Pressure: 14/13/12 mmHg  Right Ventricular Pressure: 36/10/14 mmHg  Pulmonary Artery Pressure: 37/18/24 mmHg  Wedge Pressure: 23/22/21 mmHg  Colleen CO: 4.7 L/min; Colleen CI: 3.1 L/min/m²  Transpulmonary Gradient (PAmean - Wedge): 13 mmHg  Pulmonary Vascular Resistance (PA-wedge/CO): 0.6 CONRAD  Ao saturation 91.2%  PA saturation 51.5%  Hgb 7.6  Heart Rate 58      Review of Systems   Respiratory: Negative.     Cardiovascular: Negative.      Physical Exam:    General: Alert and oriented x 3. No apparent distress.   HEENT: Normocephalic, anicteric sclera, neck supple, no thyromegaly or adenopathy.  Neck: No JVD, carotids 2+, no bruits.  Cardiac: Regular rate & rhythm. S1, S2 normal. No pericardial rub, S3, or extra cardiac sounds. +Murmur   Lungs: Clear without wheezes, rales, rhonchi or dullness.  Normal excursions and effort.  Abdomen: Soft, non-tender. No organosplenomegally, mass or rebound, BS-present.  Extremities: Without clubbing or cyanosis.  No left lower extremity edema, no right lower extremity edema.  Warm right leg with DP, PT pulses by doppler - no pain . Right groin wound vac   Neurologic: Alert and oriented, normal affect. No focal defects  Skin: Warm and dry.        Medications:  Scheduled Medications[1]  Medication Infusions[2]    Assessment:  84-year-old female with a history of PVD s/p left femoral-popliteal bypass 2015, s/p right TCAR and total occluded left ICA, HTN, HLD, DM, and CKD who presents with shortness of breath     Acute hypoxic respiratory failure  - Chest x-ray w/ evidence of moderate pulmonary edema, noted diffuse wheezing initially on exam in setting of long-term tobacco use  - Viral  resp panel negative   - S/p nebs & steroids . Pulm following   - SP02 stable on RA     NSTEMI   Multivessel coronary artery disease   - Peak TnI 426, ECG w/ lateral T wave inversion  - Echo w/ EF 35-40%, grade 1dd, bi-atrial dilation, PFO can't be excluded, mod - severe MR, mild-mod TR   - L/RHC w/ severe multivessel CAD. Surgical turndown  - 5/28 S/p successful bifurcation PCI to mid LAD and first diagonal branch   - On ASA, plavix, atorvastatin, losartan, toprol xl     New cardiomyopathy, EF 35-40%   Mod-severe mitral regurgitation   Mild-mod tricuspid regurgitation   - Volume removal per HD   - Compensated on exam   - GDMT: toprol xl, losartan    ESRD   - Started HD this admission  - Nephrology following     PVD s/p left femoropopliteal bypass 9/2015  - 80% proximal left renal artery stenosis on angiogram   - 5/29 RRT called due to pt reporting decreased sensation to RLE, occasional pain, & absent R PT/DP pulses   - CTA results reviewed.    - 6/3 S/p successful right external and common iliac stenting, right common femoral artery endarterectomy and right SFA stenting.   - [facility] following   - On asa, plavix, atorvastatin     Moderate right ICA stenosis s/p right TCAR 2/2019, totally occluded left ICA  - On asa, plavix, atorvastatin     HTN   - Well controlled on toprol xl , losartan    HLD  - LDL 48, on atorvastatin     DMII  - HgbA1c = 6.5%. On insulin per PMD     Acute on chronic anemia   - Hgb 7.4   - On PPI. GI following     Plan:    Denies anginal sx . Remains compensated on exam.  Volume removal per HD  Hgb 7.4. No overt signs of bleeding noted. Goal hgb > 8. Transfuse 1 unit PRBC  6/3 S/p successful right external and common iliac stenting, right common femoral artery endarterectomy and right SFA stenting.  Presently denying BLE pain  Continue asa, plavix, toprol xl, losartan,  atorvastatin   Blood pressure remains stable    Jaena Almaraz, MSN, FPA-APRN, FNP-BC, CCK   6/6/2025  1:36 PM  Ph  [phone] ([place])  Ph [phone] ([place])           [1]    insulin aspart  1-7 Units Subcutaneous TID CC and HS    losartan  25 mg Oral Daily    senna-docusate  2 tablet Oral Daily    heparin  5,000 Units Subcutaneous Q8H BLANE    epoetin brittany  10,000 Units Subcutaneous Once per day on Monday Wednesday Friday    atorvastatin  40 mg Oral Nightly    clopidogrel  75 mg Oral Daily    aspirin  81 mg Oral Daily    metoprolol succinate  12.5 mg Oral Daily Beta Blocker    pantoprazole  40 mg Oral BID AC    calcitriol  0.25 mcg Oral Q MWF    escitalopram  10 mg Oral Daily   [2]

## 2025-06-06 NOTE — PROGRESS NOTES
[facility]  part of [facility]     Progress Note    Radha Yu Patient Status:  Inpatient    1940 MRN [id number]   Location [facility] 2W/SW Attending Madison Crump MD   Hosp Day # 15 PCP Stef Velasco MD       Subjective:   Patient seen and examined.  Resting in bed.  Mild groin discomfort.  Denies lower extremity discomfort or pain    Objective:   Blood pressure 131/39, pulse 91, temperature 97.6 °F (36.4 °C), temperature source Oral, resp. rate 16, weight 137 lb (62.1 kg), SpO2 99%.  Intake/Output:   Last 3 shifts: I/O last 3 completed shifts:  In: 890 [P.O.:890]  Out:  [Other:]   This shift: I/O this shift:  In: 720 [P.O.:720]  Out:  [Other:]     Vent Settings:      Hemodynamic parameters (last 24 hours):      Scheduled Meds: Current Hospital Medications[1]    Continuous Infusions: Medication Infusions[2]    Physical Exam  Constitutional: no acute distress  Eyes: PERRL  ENT: nares pateint  Neck: supple, no JVD  Cardio: RRR, S1 S2  Respiratory: Faint crackles  GI: abdomen soft, non tender, active bowel sounds, no organomegaly  Extremities: no clubbing, cyanosis, edema + right groin wound VAC in place  Neurologic: no gross motor deficits  Skin: warm, dry      Results:     Lab Results   Component Value Date    WBC 6.3 2025    HGB 7.4 2025    HCT 23.1 2025    .0 2025    CREATSERUM 2.59 2025    BUN 34 2025     2025    K 4.3 2025     2025    CO2 24.0 2025    GLU 65 2025    CA 7.8 2025    ALB 3.1 2025    PHOS 3.4 2025         No results found.              Assessment   1.  Acute hypoxemic respiratory failure  2.  Pulmonary edema  3.  Non-ST elevation myocardial infarction  4.  POD #1 status post right femoral endarterectomy and stent placement  5.  Peripheral vascular disease  6.  Acute kidney injury on chronic kidney disease  7.  Prior history of right  ICA stenosis status post TCAR  8.  Hypertension  9.  Diabetes mellitus     Plan   -Patient presents with worsening dyspnea symptoms.    - Status post right femoral endarterectomy and right common iliac stent and external iliac stent placement on 6/3/2025.  Wound VAC placed.  Further recommendations per vascular surgery  - Nebulizer treatments  - Viral respiratory panel negative  -Status post left and right heart catheterization on 5/27/2025 with mild to moderate LV dysfunction and severe multivessel coronary disease seen.  Eval by CT surgery.  Poor candidate for CABG. medical therapy per cardiology  - Eval by nephrology.  Dialysis catheter placed and started on dialysis.  Renal replacement therapy per nephrology  - DVT prophylaxis: Heparin            Neelam Cherry, DO  Pulmonary Critical Care Medicine  [facility]          [1]   Current Facility-Administered Medications   Medication Dose Route Frequency    insulin aspart (NovoLOG) 100 Units/mL FlexPen 1-7 Units  1-7 Units Subcutaneous TID CC and HS    losartan (Cozaar) tab 25 mg  25 mg Oral Daily    HYDROcodone-acetaminophen (Norco) 5-325 MG per tab 2 tablet  2 tablet Oral Q6H PRN    senna-docusate (Senokot-S) 8.6-50 MG per tab 2 tablet  2 tablet Oral Daily    sodium chloride 0.9 % IV bolus 100 mL  100 mL Intravenous Q30 Min PRN    And    albumin human (Albumin) 25% injection 25 g  25 g Intravenous PRN Dialysis    heparin (Porcine) 1000 UNIT/ML injection 1,500 Units  1.5 mL Intracatheter PRN Dialysis    hydrALAzine (Apresoline) 20 mg/mL injection 10 mg  10 mg Intravenous Q6H PRN    ondansetron (Zofran) 4 MG/2ML injection 4 mg  4 mg Intravenous Q6H PRN    heparin (Porcine) 5000 UNIT/ML injection 5,000 Units  5,000 Units Subcutaneous Q8H BLANE    HYDROmorphone (Dilaudid) 1 MG/ML injection 0.4 mg  0.4 mg Intravenous Q2H PRN    Or    HYDROmorphone (Dilaudid) 1 MG/ML injection 0.8 mg  0.8 mg Intravenous Q2H PRN    Or    HYDROmorphone (Dilaudid) 1 MG/ML injection 1.2  mg  1.2 mg Intravenous Q2H PRN    epoetin brittany (Epogen, Procrit) 63552 UNIT/ML injection 10,000 Units  10,000 Units Subcutaneous Once per day on Monday Wednesday Friday    metoclopramide (Reglan) 5 mg/mL injection 5 mg  5 mg Intravenous Daily PRN    atorvastatin (Lipitor) tab 40 mg  40 mg Oral Nightly    clopidogrel (Plavix) tab 75 mg  75 mg Oral Daily    aspirin DR tab 81 mg  81 mg Oral Daily    metoprolol succinate (Toprol XL) partial tablet 12.5 mg  12.5 mg Oral Daily Beta Blocker    pantoprazole (Protonix) DR tab 40 mg  40 mg Oral BID AC    calcitriol (Rocaltrol) cap 0.25 mcg  0.25 mcg Oral Q MWF    ipratropium-albuterol (Duoneb) 0.5-2.5 (3) MG/3ML inhalation solution 3 mL  3 mL Nebulization Q6H PRN    HYDROcodone-acetaminophen (Norco) 5-325 MG per tab 1 tablet  1 tablet Oral Q6H PRN    escitalopram (Lexapro) tab 10 mg  10 mg Oral Daily    glucose (Dex4) 15 GM/59ML oral liquid 15 g  15 g Oral Q15 Min PRN    Or    glucose (Glutose) 40% oral gel 15 g  15 g Oral Q15 Min PRN    Or    glucose-vitamin C (Dex-4) chewable tab 4 tablet  4 tablet Oral Q15 Min PRN    Or    dextrose 50% injection 50 mL  50 mL Intravenous Q15 Min PRN    Or    glucose (Dex4) 15 GM/59ML oral liquid 30 g  30 g Oral Q15 Min PRN    Or    glucose (Glutose) 40% oral gel 30 g  30 g Oral Q15 Min PRN    Or    glucose-vitamin C (Dex-4) chewable tab 8 tablet  8 tablet Oral Q15 Min PRN    acetaminophen (Tylenol Extra Strength) tab 500 mg  500 mg Oral Q4H PRN    melatonin tab 3 mg  3 mg Oral Nightly PRN    polyethylene glycol (PEG 3350) (Miralax) 17 g oral packet 17 g  17 g Oral Daily PRN    sennosides (Senokot) tab 17.2 mg  17.2 mg Oral Nightly PRN    bisacodyl (Dulcolax) 10 MG rectal suppository 10 mg  10 mg Rectal Daily PRN    ondansetron (Zofran) 4 MG/2ML injection 4 mg  4 mg Intravenous Q6H PRN    benzonatate (Tessalon) cap 200 mg  200 mg Oral TID PRN    guaiFENesin (Robitussin) 100 MG/5 ML oral liquid 200 mg  200 mg Oral Q4H PRN     glycerin-hypromellose- (Artificial Tears) 0.2-0.2-1 % ophthalmic solution 1 drop  1 drop Both Eyes QID PRN    sodium chloride (Saline Mist) 0.65 % nasal solution 1 spray  1 spray Each Nare Q3H PRN   [2]

## 2025-06-06 NOTE — PROGRESS NOTES
[facility]  part of [facility]    Progress Note    Radha Yu Patient Status:  Inpatient    1940 MRN [id number]   Location [facility] 3W/SW Attending Madison Crump MD   Hosp Day # 15 PCP Stef Velasco MD       Subjective:   Radha Yu is a(n) 84 year old female who I am seeing for ESRD    Resting, just completed her dialysis treatment      Objective:   /48 (BP Location: Right arm)   Pulse 70   Temp 96.8 °F (36 °C) (Temporal)   Resp 16   Wt 137 lb (62.1 kg)   SpO2 98%   BMI 25.90 kg/m²      Intake/Output Summary (Last 24 hours) at 2025 1024  Last data filed at 2025 1007  Gross per 24 hour   Intake 660 ml   Output 0 ml   Net 660 ml     Wt Readings from Last 1 Encounters:   25 137 lb (62.1 kg)       Exam  Gen: No acute distress, Heent: NC AT, mucous memb clear, neck supple  Pulm: Lungs clear, normal respiratory effort  CV: Heart with regular rate and rhythm, no edema  Abd: Abdomen soft, nontender, nondistended, no organomegaly, bowel sounds present  Skin: no symptoms reported  Psych: alert and oriented    Assessment and Plan:       1 - ESRD  The patient is now being maintained on dialysis 3 times a week as a life-sustaining modality.     Plan next dialysis Friday     2 - R LE ischemia   Vascular surgery is following her.  She had a femoral endarterectomy with iliac stenting and SFA stenting  She had a wound VAC placed on her right groin     3 - NSTEMI w ischemic cardiomyopathy/mitral regurgitation  Status post left heart cath May 28 with PCI.  She developed postop right groin wound bleeding.  She is on aspirin, Plavix, statin     4 - Anemia  On Epogen and Ferrlecit               Results:     Recent Labs   Lab 25  0712 25  2003 25  0248 25  0836 25  0529   RBC 3.36*   < > 3.17* 2.74* 2.61*   HGB 9.1*   < > 8.9* 7.8* 7.4*   HCT 28.5*   < > 28.0* 23.5* 23.1*   MCV 84.8   < > 88.3 85.8 88.5   MCH 27.1   < > 28.1 28.5  28.4   MCHC 31.9   < > 31.8 33.2 32.0   RDW 15.9*   < > 15.7* 16.1* 16.3*   NEPRELIM 3.62  --   --  6.17 4.68   WBC 5.6   < > 6.9 7.6 6.3   .0*   < > 115.0* 130.0* 114.0*    < > = values in this interval not displayed.         Recent Labs   Lab 06/04/25  0248 06/05/25  0836 06/06/25  0529   * 54* 65*   BUN 31* 25* 34*   CREATSERUM 2.88* 2.16* 2.59*   CA 7.7* 8.2* 7.8*    138 135*   K 4.9 4.0 4.3    104 102   CO2 23.0 27.0 24.0          No results found.        TEODORO HAJI MD  6/6/2025

## 2025-06-06 NOTE — PLAN OF CARE
Double RN skin check done prior to transfer off Unit. Skin check performed by this RN and TOM Garcia.    Wounds are as follows: Right and left groin incision sites from procedure.     Will remain for any further questions or concerns.     Problem: CARDIOVASCULAR - ADULT  Goal: Maintains optimal cardiac output and hemodynamic stability  Description: INTERVENTIONS:  - Monitor vital signs, rhythm, and trends  - Monitor for bleeding, hypotension and signs of decreased cardiac output  - Evaluate effectiveness of vasoactive medications to optimize hemodynamic stability  - Monitor arterial and/or venous puncture sites for bleeding and/or hematoma  - Assess quality of pulses, skin color and temperature  - Assess for signs of decreased coronary artery perfusion - ex. Angina  - Evaluate fluid balance, assess for edema, trend weights  Outcome: Progressing  Goal: Absence of cardiac arrhythmias or at baseline  Description: INTERVENTIONS:  - Continuous cardiac monitoring, monitor vital signs, obtain 12 lead EKG if indicated  - Evaluate effectiveness of antiarrhythmic and heart rate control medications as ordered  - Initiate emergency measures for life threatening arrhythmias  - Monitor electrolytes and administer replacement therapy as ordered  Outcome: Progressing     Problem: RESPIRATORY - ADULT  Goal: Achieves optimal ventilation and oxygenation  Description: INTERVENTIONS:  - Assess for changes in respiratory status  - Assess for changes in mentation and behavior  - Position to facilitate oxygenation and minimize respiratory effort  - Oxygen supplementation based on oxygen saturation or ABGs  - Provide Smoking Cessation handout, if applicable  - Encourage broncho-pulmonary hygiene including cough, deep breathe, Incentive Spirometry  - Assess the need for suctioning and perform as needed  - Assess and instruct to report SOB or any respiratory difficulty  - Respiratory Therapy support as indicated  - Manage/alleviate anxiety  -  Monitor for signs/symptoms of CO2 retention  Outcome: Progressing     Problem: GENITOURINARY - ADULT  Goal: Absence of urinary retention  Description: INTERVENTIONS:  - Assess patient’s ability to void and empty bladder  - Monitor intake/output and perform bladder scan as needed  - Follow urinary retention protocol/standard of care  - Consider collaborating with pharmacy to review patient's medication profile  - Implement strategies to promote bladder emptying  Outcome: Progressing     Problem: Patient Centered Care  Goal: Patient preferences are identified and integrated in the patient's plan of care  Description: Interventions:  - What would you like us to know as we care for you?   - Provide timely, complete, and accurate information to patient/family  - Incorporate patient and family knowledge, values, beliefs, and cultural backgrounds into the planning and delivery of care  - Encourage patient/family to participate in care and decision-making at the level they choose  - Honor patient and family perspectives and choices  Outcome: Progressing     Problem: Diabetes/Glucose Control  Goal: Glucose maintained within prescribed range  Description: INTERVENTIONS:  - Monitor Blood Glucose as ordered  - Assess for signs and symptoms of hyperglycemia and hypoglycemia  - Administer ordered medications to maintain glucose within target range  - Assess barriers to adequate nutritional intake and initiate nutrition consult as needed  - Instruct patient on self management of diabetes  Outcome: Progressing     Problem: Patient/Family Goals  Goal: Patient/Family Long Term Goal  Description: Patient's Long Term Goal:     Interventions:  - See additional Care Plan goals for specific interventions  Outcome: Progressing  Goal: Patient/Family Short Term Goal  Description: Patient's Short Term Goal:     Interventions:   - See additional Care Plan goals for specific interventions  Outcome: Progressing     Problem: METABOLIC/FLUID AND  ELECTROLYTES - ADULT  Goal: Glucose maintained within prescribed range  Description: INTERVENTIONS:  - Monitor Blood Glucose as ordered  - Assess for signs and symptoms of hyperglycemia and hypoglycemia  - Administer ordered medications to maintain glucose within target range  - Assess barriers to adequate nutritional intake and initiate nutrition consult as needed  - Instruct patient on self management of diabetes  Outcome: Progressing  Goal: Electrolytes maintained within normal limits  Description: INTERVENTIONS:  - Monitor labs and rhythm and assess patient for signs and symptoms of electrolyte imbalances  - Administer electrolyte replacement as ordered  - Monitor response to electrolyte replacements, including rhythm and repeat lab results as appropriate  - Fluid restriction as ordered  - Instruct patient on fluid and nutrition restrictions as appropriate  Outcome: Progressing  Goal: Hemodynamic stability and optimal renal function maintained  Description: INTERVENTIONS:  - Monitor labs and assess for signs and symptoms of volume excess or deficit  - Monitor intake, output and patient weight  - Monitor urine specific gravity, serum osmolarity and serum sodium as indicated or ordered  - Monitor response to interventions for patient's volume status, including labs, urine output, blood pressure (other measures as available)  - Encourage oral intake as appropriate  - Instruct patient on fluid and nutrition restrictions as appropriate  Outcome: Progressing     Problem: SKIN/TISSUE INTEGRITY - ADULT  Goal: Incision(s), wounds(s) or drain site(s) healing without S/S of infection  Description: INTERVENTIONS:  - Assess and document risk factors for pressure ulcer development  - Assess and document skin integrity  - Assess and document dressing/incision, wound bed, drain sites and surrounding tissue  - Implement wound care per orders  - Initiate isolation precautions as appropriate  - Initiate Pressure Ulcer prevention  bundle as indicated  Outcome: Progressing  Goal: Oral mucous membranes remain intact  Description: INTERVENTIONS  - Assess oral mucosa and hygiene practices  - Implement preventative oral hygiene regimen  - Implement oral medicated treatments as ordered  Outcome: Progressing     Problem: HEMATOLOGIC - ADULT  Goal: Free from bleeding injury  Description: (Example usage: patient with low platelets)  INTERVENTIONS:  - Avoid intramuscular injections, enemas and rectal medication administration  - Ensure safe mobilization of patient  - Hold pressure on venipuncture sites to achieve adequate hemostasis  - Assess for signs and symptoms of internal bleeding  - Monitor lab trends  - Patient is to report abnormal signs of bleeding to staff  - Avoid use of toothpicks and dental floss  - Use electric shaver for shaving  - Use soft bristle tooth brush  - Limit straining and forceful nose blowing  Outcome: Progressing     Problem: Integumentary status not within defined limits  Goal: Pt's integumentary status will be adequate for discharge  Outcome: Progressing     Problem: GASTROINTESTINAL - ADULT  Goal: Minimal or absence of nausea and vomiting  Description: INTERVENTIONS:  - Maintain adequate hydration with IV or PO as ordered and tolerated  - Nasogastric tube to low intermittent suction as ordered  - Evaluate effectiveness of ordered antiemetic medications  - Provide nonpharmacologic comfort measures as appropriate  - Advance diet as tolerated, if ordered  - Obtain nutritional consult as needed  - Evaluate fluid balance  Outcome: Progressing  Goal: Maintains or returns to baseline bowel function  Description: INTERVENTIONS:  - Assess bowel function  - Maintain adequate hydration with IV or PO as ordered and tolerated  - Evaluate effectiveness of GI medications  - Encourage mobilization and activity  - Obtain nutritional consult as needed  - Establish a toileting routine/schedule  - Consider collaborating with pharmacy to  review patient's medication profile  Outcome: Progressing  Goal: Maintains adequate nutritional intake (undernourished)  Description: INTERVENTIONS:  - Monitor percentage of each meal consumed  - Identify factors contributing to decreased intake, treat as appropriate  - Assist with meals as needed  - Monitor I&O, WT and lab values  - Obtain nutritional consult as needed  - Optimize oral hygiene and moisture  - Encourage food from home; allow for food preferences  - Enhance eating environment  Outcome: Progressing     Problem: MUSCULOSKELETAL - ADULT  Goal: Return mobility to safest level of function  Description: INTERVENTIONS:  - Assess patient stability and activity tolerance for standing, transferring and ambulating w/ or w/o assistive devices  - Assist with transfers and ambulation using safe patient handling equipment as needed  - Ensure adequate protection for wounds/incisions during mobilization  - Obtain PT/OT consults as needed  - Advance activity as appropriate  - Communicate ordered activity level and limitations with patient/family  Outcome: Progressing

## 2025-06-06 NOTE — PLAN OF CARE
Transfer from [facility]. Wound vac in place on R groin. Blood sugar was 65 with lab draw. Retook sugar with accucheck machine and sugar was 73. Will give juice to prevent hypoglycemic event. MD Jones notified.   Problem: CARDIOVASCULAR - ADULT  Goal: Maintains optimal cardiac output and hemodynamic stability  Description: INTERVENTIONS:  - Monitor vital signs, rhythm, and trends  - Monitor for bleeding, hypotension and signs of decreased cardiac output  - Evaluate effectiveness of vasoactive medications to optimize hemodynamic stability  - Monitor arterial and/or venous puncture sites for bleeding and/or hematoma  - Assess quality of pulses, skin color and temperature  - Assess for signs of decreased coronary artery perfusion - ex. Angina  - Evaluate fluid balance, assess for edema, trend weights  Outcome: Progressing  Goal: Absence of cardiac arrhythmias or at baseline  Description: INTERVENTIONS:  - Continuous cardiac monitoring, monitor vital signs, obtain 12 lead EKG if indicated  - Evaluate effectiveness of antiarrhythmic and heart rate control medications as ordered  - Initiate emergency measures for life threatening arrhythmias  - Monitor electrolytes and administer replacement therapy as ordered  Outcome: Progressing     Problem: RESPIRATORY - ADULT  Goal: Achieves optimal ventilation and oxygenation  Description: INTERVENTIONS:  - Assess for changes in respiratory status  - Assess for changes in mentation and behavior  - Position to facilitate oxygenation and minimize respiratory effort  - Oxygen supplementation based on oxygen saturation or ABGs  - Provide Smoking Cessation handout, if applicable  - Encourage broncho-pulmonary hygiene including cough, deep breathe, Incentive Spirometry  - Assess the need for suctioning and perform as needed  - Assess and instruct to report SOB or any respiratory difficulty  - Respiratory Therapy support as indicated  - Manage/alleviate anxiety  - Monitor for signs/symptoms of  CO2 retention  Outcome: Progressing     Problem: GENITOURINARY - ADULT  Goal: Absence of urinary retention  Description: INTERVENTIONS:  - Assess patient’s ability to void and empty bladder  - Monitor intake/output and perform bladder scan as needed  - Follow urinary retention protocol/standard of care  - Consider collaborating with pharmacy to review patient's medication profile  - Implement strategies to promote bladder emptying  Outcome: Progressing     Problem: Patient Centered Care  Goal: Patient preferences are identified and integrated in the patient's plan of care  Description: Interventions:  - What would you like us to know as we care for you?   - Provide timely, complete, and accurate information to patient/family  - Incorporate patient and family knowledge, values, beliefs, and cultural backgrounds into the planning and delivery of care  - Encourage patient/family to participate in care and decision-making at the level they choose  - Honor patient and family perspectives and choices  Outcome: Progressing     Problem: Diabetes/Glucose Control  Goal: Glucose maintained within prescribed range  Description: INTERVENTIONS:  - Monitor Blood Glucose as ordered  - Assess for signs and symptoms of hyperglycemia and hypoglycemia  - Administer ordered medications to maintain glucose within target range  - Assess barriers to adequate nutritional intake and initiate nutrition consult as needed  - Instruct patient on self management of diabetes  Outcome: Progressing     Problem: Patient/Family Goals  Goal: Patient/Family Long Term Goal  Description: Patient's Long Term Goal:    Interventions:  - See additional Care Plan goals for specific interventions  Outcome: Progressing  Goal: Patient/Family Short Term Goal  Description: Patient's Short Term Goal:     Interventions:   - See additional Care Plan goals for specific interventions  Outcome: Progressing     Problem: METABOLIC/FLUID AND ELECTROLYTES - ADULT  Goal:  Glucose maintained within prescribed range  Description: INTERVENTIONS:  - Monitor Blood Glucose as ordered  - Assess for signs and symptoms of hyperglycemia and hypoglycemia  - Administer ordered medications to maintain glucose within target range  - Assess barriers to adequate nutritional intake and initiate nutrition consult as needed  - Instruct patient on self management of diabetes  Outcome: Progressing  Goal: Electrolytes maintained within normal limits  Description: INTERVENTIONS:  - Monitor labs and rhythm and assess patient for signs and symptoms of electrolyte imbalances  - Administer electrolyte replacement as ordered  - Monitor response to electrolyte replacements, including rhythm and repeat lab results as appropriate  - Fluid restriction as ordered  - Instruct patient on fluid and nutrition restrictions as appropriate  Outcome: Progressing  Goal: Hemodynamic stability and optimal renal function maintained  Description: INTERVENTIONS:  - Monitor labs and assess for signs and symptoms of volume excess or deficit  - Monitor intake, output and patient weight  - Monitor urine specific gravity, serum osmolarity and serum sodium as indicated or ordered  - Monitor response to interventions for patient's volume status, including labs, urine output, blood pressure (other measures as available)  - Encourage oral intake as appropriate  - Instruct patient on fluid and nutrition restrictions as appropriate  Outcome: Progressing     Problem: SKIN/TISSUE INTEGRITY - ADULT  Goal: Skin integrity remains intact  Description: INTERVENTIONS  - Assess and document risk factors for pressure ulcer development  - Assess and document skin integrity  - Monitor for areas of redness and/or skin breakdown  - Initiate interventions, skin care algorithm/standards of care as needed  Outcome: Progressing  Goal: Incision(s), wounds(s) or drain site(s) healing without S/S of infection  Description: INTERVENTIONS:  - Assess and document  risk factors for pressure ulcer development  - Assess and document skin integrity  - Assess and document dressing/incision, wound bed, drain sites and surrounding tissue  - Implement wound care per orders  - Initiate isolation precautions as appropriate  - Initiate Pressure Ulcer prevention bundle as indicated  Outcome: Progressing  Goal: Oral mucous membranes remain intact  Description: INTERVENTIONS  - Assess oral mucosa and hygiene practices  - Implement preventative oral hygiene regimen  - Implement oral medicated treatments as ordered  Outcome: Progressing     Problem: HEMATOLOGIC - ADULT  Goal: Free from bleeding injury  Description: (Example usage: patient with low platelets)  INTERVENTIONS:  - Avoid intramuscular injections, enemas and rectal medication administration  - Ensure safe mobilization of patient  - Hold pressure on venipuncture sites to achieve adequate hemostasis  - Assess for signs and symptoms of internal bleeding  - Monitor lab trends  - Patient is to report abnormal signs of bleeding to staff  - Avoid use of toothpicks and dental floss  - Use electric shaver for shaving  - Use soft bristle tooth brush  - Limit straining and forceful nose blowing  Outcome: Progressing     Problem: Integumentary status not within defined limits  Goal: Pt's integumentary status will be adequate for discharge  Outcome: Progressing     Problem: GASTROINTESTINAL - ADULT  Goal: Minimal or absence of nausea and vomiting  Description: INTERVENTIONS:  - Maintain adequate hydration with IV or PO as ordered and tolerated  - Nasogastric tube to low intermittent suction as ordered  - Evaluate effectiveness of ordered antiemetic medications  - Provide nonpharmacologic comfort measures as appropriate  - Advance diet as tolerated, if ordered  - Obtain nutritional consult as needed  - Evaluate fluid balance  Outcome: Progressing  Goal: Maintains or returns to baseline bowel function  Description: INTERVENTIONS:  - Assess bowel  function  - Maintain adequate hydration with IV or PO as ordered and tolerated  - Evaluate effectiveness of GI medications  - Encourage mobilization and activity  - Obtain nutritional consult as needed  - Establish a toileting routine/schedule  - Consider collaborating with pharmacy to review patient's medication profile  Outcome: Progressing  Goal: Maintains adequate nutritional intake (undernourished)  Description: INTERVENTIONS:  - Monitor percentage of each meal consumed  - Identify factors contributing to decreased intake, treat as appropriate  - Assist with meals as needed  - Monitor I&O, WT and lab values  - Obtain nutritional consult as needed  - Optimize oral hygiene and moisture  - Encourage food from home; allow for food preferences  - Enhance eating environment  Outcome: Progressing     Problem: MUSCULOSKELETAL - ADULT  Goal: Return mobility to safest level of function  Description: INTERVENTIONS:  - Assess patient stability and activity tolerance for standing, transferring and ambulating w/ or w/o assistive devices  - Assist with transfers and ambulation using safe patient handling equipment as needed  - Ensure adequate protection for wounds/incisions during mobilization  - Obtain PT/OT consults as needed  - Advance activity as appropriate  - Communicate ordered activity level and limitations with patient/family  Outcome: Progressing

## 2025-06-06 NOTE — PLAN OF CARE
Alert and oriented x3. Can be forgetful. Hard of hearing. Bilateral hearing aids but only wanted one in the right ear, other in the case. ACHS.  Stand pivot x2 with walker.   Woundvac on right groin. Diabetic ulcer on right big toe, open to air. Betadine applied.  Mepilex on sacrum for redness and mepilex on left heel for redness.  Dialysis took out 2L today.  1 Unit of RBC given today.    Problem: CARDIOVASCULAR - ADULT  Goal: Maintains optimal cardiac output and hemodynamic stability  Description: INTERVENTIONS:  - Monitor vital signs, rhythm, and trends  - Monitor for bleeding, hypotension and signs of decreased cardiac output  - Evaluate effectiveness of vasoactive medications to optimize hemodynamic stability  - Monitor arterial and/or venous puncture sites for bleeding and/or hematoma  - Assess quality of pulses, skin color and temperature  - Assess for signs of decreased coronary artery perfusion - ex. Angina  - Evaluate fluid balance, assess for edema, trend weights  Outcome: Progressing  Goal: Absence of cardiac arrhythmias or at baseline  Description: INTERVENTIONS:  - Continuous cardiac monitoring, monitor vital signs, obtain 12 lead EKG if indicated  - Evaluate effectiveness of antiarrhythmic and heart rate control medications as ordered  - Initiate emergency measures for life threatening arrhythmias  - Monitor electrolytes and administer replacement therapy as ordered  Outcome: Progressing     Problem: RESPIRATORY - ADULT  Goal: Achieves optimal ventilation and oxygenation  Description: INTERVENTIONS:  - Assess for changes in respiratory status  - Assess for changes in mentation and behavior  - Position to facilitate oxygenation and minimize respiratory effort  - Oxygen supplementation based on oxygen saturation or ABGs  - Provide Smoking Cessation handout, if applicable  - Encourage broncho-pulmonary hygiene including cough, deep breathe, Incentive Spirometry  - Assess the need for suctioning and  perform as needed  - Assess and instruct to report SOB or any respiratory difficulty  - Respiratory Therapy support as indicated  - Manage/alleviate anxiety  - Monitor for signs/symptoms of CO2 retention  Outcome: Progressing     Problem: GENITOURINARY - ADULT  Goal: Absence of urinary retention  Description: INTERVENTIONS:  - Assess patient’s ability to void and empty bladder  - Monitor intake/output and perform bladder scan as needed  - Follow urinary retention protocol/standard of care  - Consider collaborating with pharmacy to review patient's medication profile  - Implement strategies to promote bladder emptying  Outcome: Progressing     Problem: METABOLIC/FLUID AND ELECTROLYTES - ADULT  Goal: Glucose maintained within prescribed range  Description: INTERVENTIONS:  - Monitor Blood Glucose as ordered  - Assess for signs and symptoms of hyperglycemia and hypoglycemia  - Administer ordered medications to maintain glucose within target range  - Assess barriers to adequate nutritional intake and initiate nutrition consult as needed  - Instruct patient on self management of diabetes  Outcome: Progressing  Goal: Electrolytes maintained within normal limits  Description: INTERVENTIONS:  - Monitor labs and rhythm and assess patient for signs and symptoms of electrolyte imbalances  - Administer electrolyte replacement as ordered  - Monitor response to electrolyte replacements, including rhythm and repeat lab results as appropriate  - Fluid restriction as ordered  - Instruct patient on fluid and nutrition restrictions as appropriate  Outcome: Progressing  Goal: Hemodynamic stability and optimal renal function maintained  Description: INTERVENTIONS:  - Monitor labs and assess for signs and symptoms of volume excess or deficit  - Monitor intake, output and patient weight  - Monitor urine specific gravity, serum osmolarity and serum sodium as indicated or ordered  - Monitor response to interventions for patient's volume  status, including labs, urine output, blood pressure (other measures as available)  - Encourage oral intake as appropriate  - Instruct patient on fluid and nutrition restrictions as appropriate  Outcome: Progressing     Problem: GASTROINTESTINAL - ADULT  Goal: Minimal or absence of nausea and vomiting  Description: INTERVENTIONS:  - Maintain adequate hydration with IV or PO as ordered and tolerated  - Nasogastric tube to low intermittent suction as ordered  - Evaluate effectiveness of ordered antiemetic medications  - Provide nonpharmacologic comfort measures as appropriate  - Advance diet as tolerated, if ordered  - Obtain nutritional consult as needed  - Evaluate fluid balance  Outcome: Progressing  Goal: Maintains or returns to baseline bowel function  Description: INTERVENTIONS:  - Assess bowel function  - Maintain adequate hydration with IV or PO as ordered and tolerated  - Evaluate effectiveness of GI medications  - Encourage mobilization and activity  - Obtain nutritional consult as needed  - Establish a toileting routine/schedule  - Consider collaborating with pharmacy to review patient's medication profile  Outcome: Progressing  Goal: Maintains adequate nutritional intake (undernourished)  Description: INTERVENTIONS:  - Monitor percentage of each meal consumed  - Identify factors contributing to decreased intake, treat as appropriate  - Assist with meals as needed  - Monitor I&O, WT and lab values  - Obtain nutritional consult as needed  - Optimize oral hygiene and moisture  - Encourage food from home; allow for food preferences  - Enhance eating environment  Outcome: Progressing     Problem: MUSCULOSKELETAL - ADULT  Goal: Return mobility to safest level of function  Description: INTERVENTIONS:  - Assess patient stability and activity tolerance for standing, transferring and ambulating w/ or w/o assistive devices  - Assist with transfers and ambulation using safe patient handling equipment as needed  -  Ensure adequate protection for wounds/incisions during mobilization  - Obtain PT/OT consults as needed  - Advance activity as appropriate  - Communicate ordered activity level and limitations with patient/family  Outcome: Progressing     Problem: Patient Centered Care  Goal: Patient preferences are identified and integrated in the patient's plan of care  Description: Interventions:  - What would you like us to know as we care for you?   - Provide timely, complete, and accurate information to patient/family  - Incorporate patient and family knowledge, values, beliefs, and cultural backgrounds into the planning and delivery of care  - Encourage patient/family to participate in care and decision-making at the level they choose  - Honor patient and family perspectives and choices  Outcome: Progressing     Problem: Diabetes/Glucose Control  Goal: Glucose maintained within prescribed range  Description: INTERVENTIONS:  - Monitor Blood Glucose as ordered  - Assess for signs and symptoms of hyperglycemia and hypoglycemia  - Administer ordered medications to maintain glucose within target range  - Assess barriers to adequate nutritional intake and initiate nutrition consult as needed  - Instruct patient on self management of diabetes  Outcome: Progressing     Problem: Patient/Family Goals  Goal: Patient/Family Long Term Goal  Description: Patient's Long Term Goal: Discharge    Interventions:  - Monitor vs and labs  -Continuous cardiac monitoring  - See additional Care Plan goals for specific interventions  Outcome: Progressing  Goal: Patient/Family Short Term Goal  Description: Patient's Short Term Goal: Increase hemoglobin    Interventions:   - Blood transfusion today  - monitor vitals and labs  - See additional Care Plan goals for specific interventions  Outcome: Progressing     Problem: SKIN/TISSUE INTEGRITY - ADULT  Goal: Skin integrity remains intact  Description: INTERVENTIONS  - Assess and document risk factors for  pressure ulcer development  - Assess and document skin integrity  - Monitor for areas of redness and/or skin breakdown  - Initiate interventions, skin care algorithm/standards of care as needed  Outcome: Progressing  Goal: Incision(s), wounds(s) or drain site(s) healing without S/S of infection  Description: INTERVENTIONS:  - Assess and document risk factors for pressure ulcer development  - Assess and document skin integrity  - Assess and document dressing/incision, wound bed, drain sites and surrounding tissue  - Implement wound care per orders  - Initiate isolation precautions as appropriate  - Initiate Pressure Ulcer prevention bundle as indicated  Outcome: Progressing  Goal: Oral mucous membranes remain intact  Description: INTERVENTIONS  - Assess oral mucosa and hygiene practices  - Implement preventative oral hygiene regimen  - Implement oral medicated treatments as ordered  Outcome: Progressing     Problem: HEMATOLOGIC - ADULT  Goal: Free from bleeding injury  Description: (Example usage: patient with low platelets)  INTERVENTIONS:  - Avoid intramuscular injections, enemas and rectal medication administration  - Ensure safe mobilization of patient  - Hold pressure on venipuncture sites to achieve adequate hemostasis  - Assess for signs and symptoms of internal bleeding  - Monitor lab trends  - Patient is to report abnormal signs of bleeding to staff  - Avoid use of toothpicks and dental floss  - Use electric shaver for shaving  - Use soft bristle tooth brush  - Limit straining and forceful nose blowing  Outcome: Progressing

## 2025-06-07 LAB
ALBUMIN SERPL-MCNC: 3.1 G/DL (ref 3.2–4.8)
ANION GAP SERPL CALC-SCNC: 9 MMOL/L (ref 0–18)
BASOPHILS # BLD AUTO: 0.03 X10(3) UL (ref 0–0.2)
BASOPHILS NFR BLD AUTO: 0.5 %
BLOOD TYPE BARCODE: 6200
BUN BLD-MCNC: 29 MG/DL (ref 9–23)
BUN/CREAT SERPL: 12.6 (ref 10–20)
CALCIUM BLD-MCNC: 7.8 MG/DL (ref 8.7–10.4)
CHLORIDE SERPL-SCNC: 99 MMOL/L (ref 98–112)
CO2 SERPL-SCNC: 29 MMOL/L (ref 21–32)
CREAT BLD-MCNC: 2.3 MG/DL (ref 0.55–1.02)
DEPRECATED RDW RBC AUTO: 50.4 FL (ref 35.1–46.3)
EGFRCR SERPLBLD CKD-EPI 2021: 20 ML/MIN/1.73M2 (ref 60–?)
EOSINOPHIL # BLD AUTO: 0.08 X10(3) UL (ref 0–0.7)
EOSINOPHIL NFR BLD AUTO: 1.4 %
ERYTHROCYTE [DISTWIDTH] IN BLOOD BY AUTOMATED COUNT: 16.1 % (ref 11–15)
GLUCOSE BLD-MCNC: 87 MG/DL (ref 70–99)
GLUCOSE BLDC GLUCOMTR-MCNC: 112 MG/DL (ref 70–99)
GLUCOSE BLDC GLUCOMTR-MCNC: 193 MG/DL (ref 70–99)
GLUCOSE BLDC GLUCOMTR-MCNC: 277 MG/DL (ref 70–99)
GLUCOSE BLDC GLUCOMTR-MCNC: 332 MG/DL (ref 70–99)
HCT VFR BLD AUTO: 27.9 % (ref 35–48)
HGB BLD-MCNC: 9.2 G/DL (ref 12–16)
IMM GRANULOCYTES # BLD AUTO: 0.02 X10(3) UL (ref 0–1)
IMM GRANULOCYTES NFR BLD: 0.4 %
LYMPHOCYTES # BLD AUTO: 1.18 X10(3) UL (ref 1–4)
LYMPHOCYTES NFR BLD AUTO: 21.3 %
MAGNESIUM SERPL-MCNC: 1.8 MG/DL (ref 1.6–2.6)
MCH RBC QN AUTO: 29.6 PG (ref 26–34)
MCHC RBC AUTO-ENTMCNC: 33 G/DL (ref 31–37)
MCV RBC AUTO: 89.7 FL (ref 80–100)
MONOCYTES # BLD AUTO: 0.58 X10(3) UL (ref 0.1–1)
MONOCYTES NFR BLD AUTO: 10.5 %
NEUTROPHILS # BLD AUTO: 3.66 X10 (3) UL (ref 1.5–7.7)
NEUTROPHILS # BLD AUTO: 3.66 X10(3) UL (ref 1.5–7.7)
NEUTROPHILS NFR BLD AUTO: 65.9 %
OSMOLALITY SERPL CALC.SUM OF ELEC: 289 MOSM/KG (ref 275–295)
PHOSPHATE SERPL-MCNC: 2.7 MG/DL (ref 2.4–5.1)
PLATELET # BLD AUTO: 118 10(3)UL (ref 150–450)
PLATELETS.RETICULATED NFR BLD AUTO: 4.2 % (ref 0–7)
POTASSIUM SERPL-SCNC: 4.4 MMOL/L (ref 3.5–5.1)
RBC # BLD AUTO: 3.11 X10(6)UL (ref 3.8–5.3)
SODIUM SERPL-SCNC: 137 MMOL/L (ref 136–145)
UNIT VOLUME: 350 ML
WBC # BLD AUTO: 5.6 X10(3) UL (ref 4–11)

## 2025-06-07 PROCEDURE — 99233 SBSQ HOSP IP/OBS HIGH 50: CPT | Performed by: HOSPITALIST

## 2025-06-07 PROCEDURE — 99232 SBSQ HOSP IP/OBS MODERATE 35: CPT | Performed by: INTERNAL MEDICINE

## 2025-06-07 RX ORDER — ACETAMINOPHEN 500 MG
500 TABLET ORAL EVERY 4 HOURS PRN
Qty: 30 TABLET | Refills: 0 | Status: SHIPPED | OUTPATIENT
Start: 2025-06-07

## 2025-06-07 RX ORDER — METOPROLOL SUCCINATE 25 MG/1
12.5 TABLET, EXTENDED RELEASE ORAL
Qty: 30 TABLET | Refills: 0 | Status: SHIPPED | OUTPATIENT
Start: 2025-06-08

## 2025-06-07 RX ORDER — CALCITRIOL 0.25 UG/1
0.25 CAPSULE, LIQUID FILLED ORAL
Qty: 30 CAPSULE | Refills: 0 | Status: SHIPPED | OUTPATIENT
Start: 2025-06-09

## 2025-06-07 RX ORDER — LOSARTAN POTASSIUM 25 MG/1
25 TABLET ORAL DAILY
Qty: 30 TABLET | Refills: 0 | Status: SHIPPED | OUTPATIENT
Start: 2025-06-08 | End: 2025-06-08

## 2025-06-07 RX ORDER — SENNA AND DOCUSATE SODIUM 50; 8.6 MG/1; MG/1
2 TABLET, FILM COATED ORAL DAILY
Qty: 30 TABLET | Refills: 0 | Status: SHIPPED | OUTPATIENT
Start: 2025-06-08

## 2025-06-07 RX ORDER — PANTOPRAZOLE SODIUM 40 MG/1
40 TABLET, DELAYED RELEASE ORAL
Qty: 60 TABLET | Refills: 0 | Status: SHIPPED | OUTPATIENT
Start: 2025-06-07

## 2025-06-07 NOTE — PROGRESS NOTES
Progress Note  Radha Yu Patient Status:  Inpatient    1940 MRN [id number]   Location [facility] 3W/SW Attending Madison Crump MD   Hosp Day # 16 PCP Stef Velasco MD     Subjective:  She is sitting in chair comfortably. Denies chest discomfort. Denies shortness of breath. On room air. Denies pain in legs.     Objective:  BP (!) 164/61 (BP Location: Right arm)   Pulse 79   Temp 97.9 °F (36.6 °C) (Oral)   Resp 18   Wt 137 lb (62.1 kg)   SpO2 96%   BMI 25.90 kg/m²     Intake/Output:    Intake/Output Summary (Last 24 hours) at 2025 1359  Last data filed at 2025 1018  Gross per 24 hour   Intake 1346.67 ml   Output 100 ml   Net 1246.67 ml       Last 3 Weights   25 0800 137 lb (62.1 kg)   25 0400 130 lb (59 kg)   25 2000 132 lb (59.9 kg)   25 0336 122 lb 3.2 oz (55.4 kg)   25 0454 121 lb 12.8 oz (55.2 kg)   25 0310 121 lb 14.4 oz (55.3 kg)   25 0500 123 lb 4.8 oz (55.9 kg)   25 0555 121 lb 12.8 oz (55.2 kg)   25 0558 121 lb (54.9 kg)   25 0402 120 lb 14.4 oz (54.8 kg)   25 0617 116 lb 9.6 oz (52.9 kg)   25 2146 123 lb 14.4 oz (56.2 kg)   25 1114 118 lb 13.3 oz (53.9 kg)   25 1830 123 lb 7.3 oz (56 kg)   25 0700 123 lb 7.3 oz (56 kg)   25 1805 123 lb 6.4 oz (56 kg)   25 1121 139 lb 1.8 oz (63.1 kg)       Labs:  Recent Labs   Lab 2536 25  0549   GLU 54* 65* 87   BUN 25* 34* 29*   CREATSERUM 2.16* 2.59* 2.30*   EGFRCR 22* 18* 20*   CA 8.2* 7.8* 7.8*    135* 137   K 4.0 4.3 4.4    102 99   CO2 27.0 24.0 29.0     Recent Labs   Lab 25  0549   RBC 2.74* 2.61*  --  3.11*   HGB 7.8* 7.4* 9.0* 9.2*   HCT 23.5* 23.1*  --  27.9*   MCV 85.8 88.5  --  89.7   MCH 28.5 28.4  --  29.6   MCHC 33.2 32.0  --  33.0   RDW 16.1* 16.3*  --  16.1*   NEPRELIM 6.17 4.68  --  3.66   WBC 7.6 6.3  --  5.6   PLT  130.0* 114.0*  --  118.0*         No results for input(s): \"TROP\", \"TROPHS\", \"CK\" in the last 168 hours.    Diagnostics:   Telemetry: NSR, brief PAT. PVCs    5/23/25 Echo  1. Left ventricle: The cavity size was normal. Wall thickness was mildly      increased. Systolic function was moderately reduced. The estimated      ejection fraction was 35-40%, by biplane method of disks. Doppler      parameters are consistent with abnormal left ventricular relaxation -      grade 1 diastolic dysfunction.   2. Left atrium: The atrium was markedly dilated.   3. Right atrium: The atrium was dilated.   4. Atrial septum: A patent foramen ovale cannot be excluded.   5. Aortic valve: There was mild regurgitation.   6. Mitral valve: The annulus was mildly to moderately calcified. The      leaflets were normal thickness. There was moderate to severe      regurgitation.   7. Tricuspid valve: There was mild-moderate regurgitation.   Impressions:  No previous study from [facility] was   available for comparison.      5/27/25 L/RHC   Findings:  1) Left Ventriculography at 30 degrees CEE: LVEF 40 to 45%, hypokinesia of the mid to distal inferior/inferoapical walls.  2) Hemodynamics: LVEDP 18 mmHg  3) Coronaries:  Left main coronary artery: Large size vessel with no angiographically significant disease.  Left anterior descending artery: Large size, Type IV vessel with 50% proximal, 80% mid segment stenosis.  90% proximal first diagonal branch disease which originates from the diseased segment of the mid LAD.   Circumflex artery: Large size non-dominant vessel with luminal irregularities.  Angiographically significant disease.  Small OM1 with focal 80% ostial stenosis.  OM 2  is medium caliber, diffuse 70 to 80% disease of proximal segment.  Right coronary artery: Large size dominant vessel with 100%  of the proximal segment with reconstitution of the PDA via left-to-right septal collaterals.      Aortobifemoral  angiogram  Mildly dilated infrarenal aorta  Left renal artery proximal 80% stenosis  Nonvisualization of the right renal artery  Severely, diffusely calcified but patent bilateral iliac arteries     RIGHT HEART CATHETERIZATION HEMODYNAMICS:  Right Atrial Pressure: 14/13/12 mmHg  Right Ventricular Pressure: 36/10/14 mmHg  Pulmonary Artery Pressure: 37/18/24 mmHg  Wedge Pressure: 23/22/21 mmHg  Colleen CO: 4.7 L/min; Colleen CI: 3.1 L/min/m²  Transpulmonary Gradient (PAmean - Wedge): 13 mmHg  Pulmonary Vascular Resistance (PA-wedge/CO): 0.6 CONRAD  Ao saturation 91.2%  PA saturation 51.5%  Hgb 7.6  Heart Rate 58    Review of Systems   Cardiovascular:  Negative for chest pain.   Respiratory:  Negative for shortness of breath.        Physical Exam:  General: Alert and oriented in no apparent distress.  HEENT: Pupils equal. Mucous membranes moist.   Neck: No JVD  Cardiac:  Normal S1 S2, Regular. 1/6 systolic murmur  Lungs: Clear without wheezes or crackles    Abdomen: Soft, non-tender, ND  Extremities: Without clubbing, cyanosis or edema. R groin wound vac  Neurologic: No focal deficits. Normal affect.  Skin: Warm and dry,    Medications:  Scheduled Medications[1]  Medication Infusions[2]    Assessment:    Acute hypoxic respiratory failure, resolved - pulmonary edema on presentation in setting of volume overload  NSTEMI, multivessel coronary artery disease s/p bifurcation PCI BRENNA mid LAD and first diagonal branch on 5/28/25  HsTrop peaked at 426, EKG with lateral TWI, TTE with new cardiomyopathy EF 35-40%  L/R cardiac cath with severe multivessel CAD. Evaluated by CV surgery and turned down for CABG due to high mortality risk. S/p [facility] 5/28 with successful bifurcation PCI to mid LAD and first diag branch  Continue ASA, plavix, BB, statin  Acute HFrEF, new ischemic cardiomyopathy LVEF 35-40%, mod-severe MR, mod TR  Hypervolemic on presentation. Started on dialysis this admission. Volume management with HD. Currently  euvolemic.  GDMT: Toprol-XL. Losartan.   ESRD, started on HD this admission - nephrology following.  PVD, history prior left fem-pop bypass 2015, s/p successful right external and common iliac stenting, right CFA endarterectomy and right SFA stenting on 6/3/25 - ASA, plavix, statin  R ICA stenosis s/p TCAR 2019, total occlusion L ICA - ASA, plavix, statin  HTN - controlled  Hyperlipidemia - statin  DM2  Acute on chronic anemia - hgb has stabilized. GI following.     Plan:    Compensated, euvolemic on exam. Volume removal with HD per nephrology.   Continue DAPT ASA, plavix. Statin. Toprol-XL and Losartan.  Stable for discharge from cardiac standpoint. Cardiology will sign off. Please call with any further questions.     Plan of care discussed with patient, RN.    GELACIO Chaudhry  6/7/2025  1:59 PM    Patient seen and examined independently.  Note reviewed and labs reviewed. Agree with above assessment and plan.    Acute hypoxic respiratory failure secondary to congestive heart failure  Acute congestive heart failure with reduced ejection fraction, decompensated  Ischemic cardiomyopathy  NSTEMI with multivessel coronary disease status post PCI  Peripheral arterial disease  End-stage renal disease on hemodialysis      Recommendations  Currently doing well.  Continue dual antiplatelet therapy as well as high intensity statin  Currently appears euvolemic.  We defer volume status to nephrology  Remains stable  Okay from cardiac standpoint to be discharged    Lizzie Osborne MD  Cardiologist  [facility]  6/7/2025 2:26 PM      Note to the patient: The 21st Century Cures Act makes medical notes like these available to patients in the interest of transparency. However, be advised that this is a medical document. It is intended as peer to peer communication. It is written in medical language and may contain abbreviations or verbiage that are unfamiliar. It may appear blunt or direct. Medical documents are  intended to carry relevant information, facts as evident, and clinical opinion of the practitioner.     Disclaimer: Components of this note were documented using voice recognition system and are subject to errors not corrected at proofreading. Contact the author of this note for any clarifications.              [1]    insulin aspart  1-7 Units Subcutaneous TID CC and HS    losartan  25 mg Oral Daily    senna-docusate  2 tablet Oral Daily    heparin  5,000 Units Subcutaneous Q8H BLANE    epoetin brittany  10,000 Units Subcutaneous Once per day on Monday Wednesday Friday    atorvastatin  40 mg Oral Nightly    clopidogrel  75 mg Oral Daily    aspirin  81 mg Oral Daily    metoprolol succinate  12.5 mg Oral Daily Beta Blocker    pantoprazole  40 mg Oral BID AC    calcitriol  0.25 mcg Oral Q MWF    escitalopram  10 mg Oral Daily   [2]

## 2025-06-07 NOTE — PROGRESS NOTES
[facility]  part of [facility]    Progress Note    Radha Yu Patient Status:  Inpatient    1940 MRN [id number]   Location [facility] 3W/SW Attending Madison Crump MD   Hosp Day # 16 PCP Stef Velasco MD       Subjective:   Radha Yu is a(n) 84 year old female     ROS:     Constitutiona was okay looking forward to going to rehab  ENMT:  Negative for ear drainage, hearing loss and nasal drainage  Eyes:  Negative for eye discharge and vision loss  Cardiovascular:  Negative for chest pain, sob, irregular heartbeat/palpitations  Respiratory:  Negative for cough, dyspnea and wheezing  Gastrointestinal:  Negative for abdominal pain, constipation, decreased appetite, diarrhea and vomiting  Genitourinary:  Negative for dysuria and hematuria  Endocrine:  Negative for abnormal sleep patterns, increased activity, polydipsia and polyphagia  Hema/Lymph:  Negative for easy bleeding and easy bruising  Integumentary:  Negative for pruritus and rash  Musculoskeletal:  Negative for bone/joint symptoms  Neurological:  Negative for gait disturbance  Psychiatric:  Negative for inappropriate interaction and psychiatric symptoms      Vitals:    25 1430   BP: (!) 162/50   Pulse: 76   Resp: 18   Temp: 98.1 °F (36.7 °C)           PHYSICAL EXAM:   Constitutional: appears well hydrated alert and responsive no acute distress noted  Head/Face: normocephalic  Eyes/Vision: normal extraocular motion is intact  Nose/Mouth/Throat:mucous membranes are moist and no oral lesions are noted  Neck/Thyroid: neck is supple without adenopathy  Lymphatic: no abnormal cervical, supraclavicular adenopathy is noted  Respiratory:  lungs are clear to auscultation bilaterally, normal respiratory effort  Cardiovascular: regular rate and rhythm no murmurs, gallups, or rubs  Abdomen: soft, non-tender, non-distended, BS normal  Vascular: well perfused femoral, and pedal pulses normal  Skin/Hair: no unusual rashes  present, no abnormal bruising noted  Back/Spine: no abnormalities noted  Musculoskeletal: full ROM all extremities good strength  no deformities  Extremities: no edema, cyanosis  Neurological:  Grossly normal    Results:     Laboratory Data:  Lab Results   Component Value Date    WBC 5.6 06/07/2025    HGB 9.2 (L) 06/07/2025    HCT 27.9 (L) 06/07/2025    .0 (L) 06/07/2025    CREATSERUM 2.30 (H) 06/07/2025    BUN 29 (H) 06/07/2025     06/07/2025    K 4.4 06/07/2025    CL 99 06/07/2025    CO2 29.0 06/07/2025    GLU 87 06/07/2025    CA 7.8 (L) 06/07/2025    ALB 3.1 (L) 06/07/2025    ALKPHO 73 05/26/2025    BILT 0.2 05/26/2025    TP 5.7 05/26/2025    AST 16 05/26/2025    ALT <7 (L) 05/26/2025    PTT 53.4 (H) 06/03/2025    INR 1.16 05/22/2025    TSH 1.993 05/27/2025    MG 1.8 06/07/2025    PHOS 2.7 06/07/2025       Imaging:  [unfilled]   No results found.      ASSESSMENT/PLAN:   Assessment       1 ESRD labs okay next dialysis on Monday    2 weakness discussed with brea Gallagher to go to rehab when it is approved     #3 femoral endarterectomy with iliac stenting stable  #4 MI stable  #5 anemia on Epogen and iron  6/7/2025  Jarvis Gates MD

## 2025-06-07 NOTE — PROGRESS NOTES
[facility]  part of [facility]     Progress Note    Radha Yu Patient Status:  Inpatient    1940 MRN [id number]   Location [facility] 2W/SW Attending Madison Crump MD   Hosp Day # 16 PCP Stef Velasco MD       Subjective:   Patient seen and examined.  Resting in bed.  Denies dyspnea    Objective:   Blood pressure (!) 164/61, pulse 79, temperature 97.9 °F (36.6 °C), temperature source Oral, resp. rate 18, weight 137 lb (62.1 kg), SpO2 96%.  Intake/Output:   Last 3 shifts: I/O last 3 completed shifts:  In: 1826.7 [P.O.:1470; I.V.:5; Blood:351.7]  Out: 2100 [Urine:100; Other:2000]   This shift: No intake/output data recorded.     Vent Settings:      Hemodynamic parameters (last 24 hours):      Scheduled Meds: Current Hospital Medications[1]    Continuous Infusions: Medication Infusions[2]    Physical Exam  Constitutional: no acute distress  Eyes: PERRL  ENT: nares pateint  Neck: supple, no JVD  Cardio: RRR, S1 S2  Respiratory: Faint crackles  GI: abdomen soft, non tender, active bowel sounds, no organomegaly  Extremities: no clubbing, cyanosis, edema + right groin wound VAC in place  Neurologic: no gross motor deficits  Skin: warm, dry      Results:     Lab Results   Component Value Date    WBC 5.6 2025    HGB 9.2 2025    HCT 27.9 2025    .0 2025    CREATSERUM 2.30 2025    BUN 29 2025     2025    K 4.4 2025    CL 99 2025    CO2 29.0 2025    GLU 87 2025    CA 7.8 2025    ALB 3.1 2025    MG 1.8 2025    PHOS 2.7 2025         No results found.              Assessment   1.  Acute hypoxemic respiratory failure  2.  Pulmonary edema, improved  3.  Non-ST elevation myocardial infarction  4.  Status post right femoral endarterectomy and stent placement  5.  Peripheral vascular disease  6.  Acute kidney injury on chronic kidney disease  7.  Prior history of right ICA stenosis  status post TCAR  8.  Hypertension  9.  Diabetes mellitus     Plan   -Patient presents with worsening dyspnea symptoms.    - Status post right femoral endarterectomy and right common iliac stent and external iliac stent placement on 6/3/2025.  Wound VAC placed.  Further recommendations per vascular surgery  - Nebulizer treatments  - Viral respiratory panel negative  -Status post left and right heart catheterization on 5/27/2025 with mild to moderate LV dysfunction and severe multivessel coronary disease seen.  Eval by CT surgery.  Poor candidate for CABG. medical therapy per cardiology  - Eval by nephrology.  Dialysis catheter placed and started on dialysis.  Renal replacement therapy per nephrology  - DVT prophylaxis: Heparin  - No active pulmonary issues will sign off            Neelam Cherry DO  Pulmonary Critical Care Medicine  [facility]          [1]   Current Facility-Administered Medications   Medication Dose Route Frequency    insulin aspart (NovoLOG) 100 Units/mL FlexPen 1-7 Units  1-7 Units Subcutaneous TID CC and HS    losartan (Cozaar) tab 25 mg  25 mg Oral Daily    HYDROcodone-acetaminophen (Norco) 5-325 MG per tab 2 tablet  2 tablet Oral Q6H PRN    senna-docusate (Senokot-S) 8.6-50 MG per tab 2 tablet  2 tablet Oral Daily    sodium chloride 0.9 % IV bolus 100 mL  100 mL Intravenous Q30 Min PRN    And    albumin human (Albumin) 25% injection 25 g  25 g Intravenous PRN Dialysis    heparin (Porcine) 1000 UNIT/ML injection 1,500 Units  1.5 mL Intracatheter PRN Dialysis    hydrALAzine (Apresoline) 20 mg/mL injection 10 mg  10 mg Intravenous Q6H PRN    ondansetron (Zofran) 4 MG/2ML injection 4 mg  4 mg Intravenous Q6H PRN    heparin (Porcine) 5000 UNIT/ML injection 5,000 Units  5,000 Units Subcutaneous Q8H BLANE    HYDROmorphone (Dilaudid) 1 MG/ML injection 0.4 mg  0.4 mg Intravenous Q2H PRN    Or    HYDROmorphone (Dilaudid) 1 MG/ML injection 0.8 mg  0.8 mg Intravenous Q2H PRN    Or    HYDROmorphone  (Dilaudid) 1 MG/ML injection 1.2 mg  1.2 mg Intravenous Q2H PRN    epoetin brittany (Epogen, Procrit) 17403 UNIT/ML injection 10,000 Units  10,000 Units Subcutaneous Once per day on Monday Wednesday Friday    metoclopramide (Reglan) 5 mg/mL injection 5 mg  5 mg Intravenous Daily PRN    atorvastatin (Lipitor) tab 40 mg  40 mg Oral Nightly    clopidogrel (Plavix) tab 75 mg  75 mg Oral Daily    aspirin DR tab 81 mg  81 mg Oral Daily    metoprolol succinate (Toprol XL) partial tablet 12.5 mg  12.5 mg Oral Daily Beta Blocker    pantoprazole (Protonix) DR tab 40 mg  40 mg Oral BID AC    calcitriol (Rocaltrol) cap 0.25 mcg  0.25 mcg Oral Q MWF    ipratropium-albuterol (Duoneb) 0.5-2.5 (3) MG/3ML inhalation solution 3 mL  3 mL Nebulization Q6H PRN    HYDROcodone-acetaminophen (Norco) 5-325 MG per tab 1 tablet  1 tablet Oral Q6H PRN    escitalopram (Lexapro) tab 10 mg  10 mg Oral Daily    glucose (Dex4) 15 GM/59ML oral liquid 15 g  15 g Oral Q15 Min PRN    Or    glucose (Glutose) 40% oral gel 15 g  15 g Oral Q15 Min PRN    Or    glucose-vitamin C (Dex-4) chewable tab 4 tablet  4 tablet Oral Q15 Min PRN    Or    dextrose 50% injection 50 mL  50 mL Intravenous Q15 Min PRN    Or    glucose (Dex4) 15 GM/59ML oral liquid 30 g  30 g Oral Q15 Min PRN    Or    glucose (Glutose) 40% oral gel 30 g  30 g Oral Q15 Min PRN    Or    glucose-vitamin C (Dex-4) chewable tab 8 tablet  8 tablet Oral Q15 Min PRN    acetaminophen (Tylenol Extra Strength) tab 500 mg  500 mg Oral Q4H PRN    melatonin tab 3 mg  3 mg Oral Nightly PRN    polyethylene glycol (PEG 3350) (Miralax) 17 g oral packet 17 g  17 g Oral Daily PRN    sennosides (Senokot) tab 17.2 mg  17.2 mg Oral Nightly PRN    bisacodyl (Dulcolax) 10 MG rectal suppository 10 mg  10 mg Rectal Daily PRN    ondansetron (Zofran) 4 MG/2ML injection 4 mg  4 mg Intravenous Q6H PRN    benzonatate (Tessalon) cap 200 mg  200 mg Oral TID PRN    guaiFENesin (Robitussin) 100 MG/5 ML oral liquid 200 mg  200 mg  Oral Q4H PRN    glycerin-hypromellose- (Artificial Tears) 0.2-0.2-1 % ophthalmic solution 1 drop  1 drop Both Eyes QID PRN    sodium chloride (Saline Mist) 0.65 % nasal solution 1 spray  1 spray Each Nare Q3H PRN   [2]

## 2025-06-07 NOTE — PLAN OF CARE
Pt alert and oriented x3. X2 S&P. L permacath in place. R groin wound vac in place. Pt and family updated on plan of care. Plan to discharge to [facility] pending insurance auth. Safety precautions in place. Call light within reach.    Problem: CARDIOVASCULAR - ADULT  Goal: Maintains optimal cardiac output and hemodynamic stability  Description: INTERVENTIONS:  - Monitor vital signs, rhythm, and trends  - Monitor for bleeding, hypotension and signs of decreased cardiac output  - Evaluate effectiveness of vasoactive medications to optimize hemodynamic stability  - Monitor arterial and/or venous puncture sites for bleeding and/or hematoma  - Assess quality of pulses, skin color and temperature  - Assess for signs of decreased coronary artery perfusion - ex. Angina  - Evaluate fluid balance, assess for edema, trend weights  Outcome: Progressing  Goal: Absence of cardiac arrhythmias or at baseline  Description: INTERVENTIONS:  - Continuous cardiac monitoring, monitor vital signs, obtain 12 lead EKG if indicated  - Evaluate effectiveness of antiarrhythmic and heart rate control medications as ordered  - Initiate emergency measures for life threatening arrhythmias  - Monitor electrolytes and administer replacement therapy as ordered  Outcome: Progressing     Problem: RESPIRATORY - ADULT  Goal: Achieves optimal ventilation and oxygenation  Description: INTERVENTIONS:  - Assess for changes in respiratory status  - Assess for changes in mentation and behavior  - Position to facilitate oxygenation and minimize respiratory effort  - Oxygen supplementation based on oxygen saturation or ABGs  - Provide Smoking Cessation handout, if applicable  - Encourage broncho-pulmonary hygiene including cough, deep breathe, Incentive Spirometry  - Assess the need for suctioning and perform as needed  - Assess and instruct to report SOB or any respiratory difficulty  - Respiratory Therapy support as indicated  - Manage/alleviate anxiety  -  Monitor for signs/symptoms of CO2 retention  Outcome: Progressing     Problem: GENITOURINARY - ADULT  Goal: Absence of urinary retention  Description: INTERVENTIONS:  - Assess patient’s ability to void and empty bladder  - Monitor intake/output and perform bladder scan as needed  - Follow urinary retention protocol/standard of care  - Consider collaborating with pharmacy to review patient's medication profile  - Implement strategies to promote bladder emptying  Outcome: Progressing     Problem: Patient Centered Care  Goal: Patient preferences are identified and integrated in the patient's plan of care  Description: Interventions:  - What would you like us to know as we care for you?  - Provide timely, complete, and accurate information to patient/family  - Incorporate patient and family knowledge, values, beliefs, and cultural backgrounds into the planning and delivery of care  - Encourage patient/family to participate in care and decision-making at the level they choose  - Honor patient and family perspectives and choices  Outcome: Progressing     Problem: Diabetes/Glucose Control  Goal: Glucose maintained within prescribed range  Description: INTERVENTIONS:  - Monitor Blood Glucose as ordered  - Assess for signs and symptoms of hyperglycemia and hypoglycemia  - Administer ordered medications to maintain glucose within target range  - Assess barriers to adequate nutritional intake and initiate nutrition consult as needed  - Instruct patient on self management of diabetes  Outcome: Progressing     Problem: Patient/Family Goals  Goal: Patient/Family Long Term Goal  Description: Patient's Long Term Goal: discharge    Interventions:  - Monitor vital signs  - Monitor wound dressings  - Monitor labs  - See additional Care Plan goals for specific interventions  Outcome: Progressing  Goal: Patient/Family Short Term Goal  Description: Patient's Short Term Goal: feel better    Interventions:   - Continue ADLs  - Ambulate as  tolerated  - See additional Care Plan goals for specific interventions  Outcome: Progressing     Problem: METABOLIC/FLUID AND ELECTROLYTES - ADULT  Goal: Glucose maintained within prescribed range  Description: INTERVENTIONS:  - Monitor Blood Glucose as ordered  - Assess for signs and symptoms of hyperglycemia and hypoglycemia  - Administer ordered medications to maintain glucose within target range  - Assess barriers to adequate nutritional intake and initiate nutrition consult as needed  - Instruct patient on self management of diabetes  Outcome: Progressing  Goal: Electrolytes maintained within normal limits  Description: INTERVENTIONS:  - Monitor labs and rhythm and assess patient for signs and symptoms of electrolyte imbalances  - Administer electrolyte replacement as ordered  - Monitor response to electrolyte replacements, including rhythm and repeat lab results as appropriate  - Fluid restriction as ordered  - Instruct patient on fluid and nutrition restrictions as appropriate  Outcome: Progressing  Goal: Hemodynamic stability and optimal renal function maintained  Description: INTERVENTIONS:  - Monitor labs and assess for signs and symptoms of volume excess or deficit  - Monitor intake, output and patient weight  - Monitor urine specific gravity, serum osmolarity and serum sodium as indicated or ordered  - Monitor response to interventions for patient's volume status, including labs, urine output, blood pressure (other measures as available)  - Encourage oral intake as appropriate  - Instruct patient on fluid and nutrition restrictions as appropriate  Outcome: Progressing     Problem: SKIN/TISSUE INTEGRITY - ADULT  Goal: Skin integrity remains intact  Description: INTERVENTIONS  - Assess and document risk factors for pressure ulcer development  - Assess and document skin integrity  - Monitor for areas of redness and/or skin breakdown  - Initiate interventions, skin care algorithm/standards of care as  needed  Outcome: Progressing  Goal: Incision(s), wounds(s) or drain site(s) healing without S/S of infection  Description: INTERVENTIONS:  - Assess and document risk factors for pressure ulcer development  - Assess and document skin integrity  - Assess and document dressing/incision, wound bed, drain sites and surrounding tissue  - Implement wound care per orders  - Initiate isolation precautions as appropriate  - Initiate Pressure Ulcer prevention bundle as indicated  Outcome: Progressing  Goal: Oral mucous membranes remain intact  Description: INTERVENTIONS  - Assess oral mucosa and hygiene practices  - Implement preventative oral hygiene regimen  - Implement oral medicated treatments as ordered  Outcome: Progressing     Problem: HEMATOLOGIC - ADULT  Goal: Free from bleeding injury  Description: (Example usage: patient with low platelets)  INTERVENTIONS:  - Avoid intramuscular injections, enemas and rectal medication administration  - Ensure safe mobilization of patient  - Hold pressure on venipuncture sites to achieve adequate hemostasis  - Assess for signs and symptoms of internal bleeding  - Monitor lab trends  - Patient is to report abnormal signs of bleeding to staff  - Avoid use of toothpicks and dental floss  - Use electric shaver for shaving  - Use soft bristle tooth brush  - Limit straining and forceful nose blowing  Outcome: Progressing     Problem: Integumentary status not within defined limits  Goal: Pt's integumentary status will be adequate for discharge  Outcome: Progressing     Problem: GASTROINTESTINAL - ADULT  Goal: Minimal or absence of nausea and vomiting  Description: INTERVENTIONS:  - Maintain adequate hydration with IV or PO as ordered and tolerated  - Nasogastric tube to low intermittent suction as ordered  - Evaluate effectiveness of ordered antiemetic medications  - Provide nonpharmacologic comfort measures as appropriate  - Advance diet as tolerated, if ordered  - Obtain nutritional  consult as needed  - Evaluate fluid balance  Outcome: Progressing  Goal: Maintains or returns to baseline bowel function  Description: INTERVENTIONS:  - Assess bowel function  - Maintain adequate hydration with IV or PO as ordered and tolerated  - Evaluate effectiveness of GI medications  - Encourage mobilization and activity  - Obtain nutritional consult as needed  - Establish a toileting routine/schedule  - Consider collaborating with pharmacy to review patient's medication profile  Outcome: Progressing  Goal: Maintains adequate nutritional intake (undernourished)  Description: INTERVENTIONS:  - Monitor percentage of each meal consumed  - Identify factors contributing to decreased intake, treat as appropriate  - Assist with meals as needed  - Monitor I&O, WT and lab values  - Obtain nutritional consult as needed  - Optimize oral hygiene and moisture  - Encourage food from home; allow for food preferences  - Enhance eating environment  Outcome: Progressing     Problem: MUSCULOSKELETAL - ADULT  Goal: Return mobility to safest level of function  Description: INTERVENTIONS:  - Assess patient stability and activity tolerance for standing, transferring and ambulating w/ or w/o assistive devices  - Assist with transfers and ambulation using safe patient handling equipment as needed  - Ensure adequate protection for wounds/incisions during mobilization  - Obtain PT/OT consults as needed  - Advance activity as appropriate  - Communicate ordered activity level and limitations with patient/family  Outcome: Progressing

## 2025-06-07 NOTE — OCCUPATIONAL THERAPY NOTE
OCCUPATIONAL THERAPY TREATMENT NOTE - INPATIENT        Room Number: 335/335-A     Presenting Problem: Acute on chronic CHF    Problem List  Principal Problem:    Acute on chronic respiratory failure with hypoxia ([facility])  Active Problems:    ESRD (end stage renal disease) ([facility])    Acute congestive heart failure, unspecified heart failure type ([facility])    NSTEMI (non-ST elevated myocardial infarction) ([facility])    Acute renal failure superimposed on chronic kidney disease, unspecified acute renal failure type, unspecified CKD stage    Goals of care, counseling/discussion    Palliative care encounter      OCCUPATIONAL THERAPY ASSESSMENT   Patient demonstrates fair progress this session, goals remain in progress.    Patient is requiring moderate assist and maximum assist as a result of the following impairments: decreased functional strength, decreased functional reach, decreased endurance, impaired standing balance, impaired coordination, impaired motor planning, and decreased muscular endurance.    Patient continues to function below baseline with ADLs and functional transfers.  Next session anticipate patient to progress toileting, lower body dressing, bed mobility, transfers, static standing balance, dynamic standing balance, functional standing tolerance, and energy conservation strategies.  Occupational Therapy will continue to follow patient for duration of hospitalization.    Patient continues to benefit from continued skilled OT services: to promote return to prior level of function and safety with continuous assistance and gradual rehabilitative therapy.     PLAN DURING HOSPITALIZATION  OT Device Recommendations: None  OT Treatment Plan: Balance activities, ADL training, Functional transfer training, Endurance training, Patient/Family education, Patient/Family training, Equipment eval/education, Compensatory technique education     SUBJECTIVE  Pt pleasant and cooperative.    OBJECTIVE  Precautions: Cardiac, Limb alert  - right, Bed/chair alarm, Hard of hearing (R arterial line, VERY [place])    WEIGHT BEARING RESTRICTION     PAIN ASSESSMENT  Ratin  Location: denies  Management Techniques: Relaxation (nurse aware)    ACTIVITY TOLERANCE                      O2 SATURATIONS       ACTIVITIES OF DAILY LIVING ASSESSMENT  AM-PAC ‘6-Clicks’ Inpatient Daily Activity Short Form  How much help from another person does the patient currently need…  -   Putting on and taking off regular lower body clothing?: A Lot  -   Bathing (including washing, rinsing, drying)?: A Lot  -   Toileting, which includes using toilet, bedpan or urinal? : A Lot  -   Putting on and taking off regular upper body clothing?: A Little  -   Taking care of personal grooming such as brushing teeth?: A Little  -   Eating meals?: None    AM-PAC Score:  Score: 16  Approx Degree of Impairment: 53.32%  Standardized Score (AM-PAC Scale): 35.96  CMS Modifier (G-Code): CK    FUNCTIONAL TRANSFER ASSESSMENT  Sit to Stand: Edge of Bed; Chair  Edge of Bed: Moderate Assist (min A x 2)  Chair: Moderate Assist (min A x 2)    BED MOBILITY  Supine to Sit : Moderate Assist    BALANCE ASSESSMENT  Static Sitting: Supervision  Static Standing: Contact Guard Assist    FUNCTIONAL ADL ASSESSMENT  Eating: Not Tested  Grooming Seated: Not Tested  LB Dressing Seated: Maximum Assist  LB Dressing Standing: Maximum Assist  Toileting Standing: Maximum Assist    THERAPEUTIC EXERCISE       Skilled Therapy Provided: Pt received semi supine in bed, pleasant and cooperative for OT session. Pt agreeable for OOB activity in preparation for ADLs and functional transfers. Pt verbalizing needing to use the bathroom. Pt performs bed mobility at mod A to sit EOB with cues provided for hand placement. Sit to stand transfer performed at mod A (min A x 2) and pt side step transfers to rolling commode. Rolling commode placed over toilet and pt has a large BM. Clean pull-ups were donned at max A. Pt was able to transfer  to bedside chair when pt then verbalizes needing to urinate. Pt sat onto rolling commode and was instructed to scoot further back, however started to urinate. Half went into the toilet, half went on the floor and onto clean pull-ups. Floor was cleaned and clean pull-ups were donned again. Pt stands to RW and step transfers to bedside chair with RW at min A x 2.     EDUCATION PROVIDED  Patient Education : Role of Occupational Therapy; Plan of Care; Discharge Recommendations; Functional Transfer Techniques; Fall Prevention; Posture/Positioning; Energy Conservation; Proper Body Mechanics  Patient's Response to Education: Verbalized Understanding; Requires Further Education    The patient's Approx Degree of Impairment: 53.32% has been calculated based on documentation in the [facility] '6 clicks' Inpatient Daily Activity Short Form.  Research supports that patients with this level of impairment may benefit from rehab.  Final disposition will be made by interdisciplinary medical team.    Patient End of Session: Up in chair, Needs met, Call light within reach, RN aware of session/findings, All patient questions and concerns addressed, Hospital anti-slip socks, Alarm set    OT Goals:    Patients self stated goal is: none stated       Patient will complete functional transfer with SBA  Comment: Progressing    Patient will complete toileting with SBA  Comment: Progressing    Patient will tolerate standing for 5 minutes in prep for adls with SBA   Comment: Not Met    Patient will complete item retrieval with SBA  Comment: Not Met       OT Session Time: 25 minutes  Self-Care Home Management: 25 minutes

## 2025-06-07 NOTE — PLAN OF CARE
Problem: CARDIOVASCULAR - ADULT  Goal: Maintains optimal cardiac output and hemodynamic stability  Description: INTERVENTIONS:  - Monitor vital signs, rhythm, and trends  - Monitor for bleeding, hypotension and signs of decreased cardiac output  - Evaluate effectiveness of vasoactive medications to optimize hemodynamic stability  - Monitor arterial and/or venous puncture sites for bleeding and/or hematoma  - Assess quality of pulses, skin color and temperature  - Assess for signs of decreased coronary artery perfusion - ex. Angina  - Evaluate fluid balance, assess for edema, trend weights  Outcome: Progressing  Goal: Absence of cardiac arrhythmias or at baseline  Description: INTERVENTIONS:  - Continuous cardiac monitoring, monitor vital signs, obtain 12 lead EKG if indicated  - Evaluate effectiveness of antiarrhythmic and heart rate control medications as ordered  - Initiate emergency measures for life threatening arrhythmias  - Monitor electrolytes and administer replacement therapy as ordered  Outcome: Progressing     Problem: RESPIRATORY - ADULT  Goal: Achieves optimal ventilation and oxygenation  Description: INTERVENTIONS:  - Assess for changes in respiratory status  - Assess for changes in mentation and behavior  - Position to facilitate oxygenation and minimize respiratory effort  - Oxygen supplementation based on oxygen saturation or ABGs  - Provide Smoking Cessation handout, if applicable  - Encourage broncho-pulmonary hygiene including cough, deep breathe, Incentive Spirometry  - Assess the need for suctioning and perform as needed  - Assess and instruct to report SOB or any respiratory difficulty  - Respiratory Therapy support as indicated  - Manage/alleviate anxiety  - Monitor for signs/symptoms of CO2 retention  Outcome: Progressing     Problem: GENITOURINARY - ADULT  Goal: Absence of urinary retention  Description: INTERVENTIONS:  - Assess patient’s ability to void and empty bladder  - Monitor  intake/output and perform bladder scan as needed  - Follow urinary retention protocol/standard of care  - Consider collaborating with pharmacy to review patient's medication profile  - Implement strategies to promote bladder emptying  Outcome: Progressing     Problem: METABOLIC/FLUID AND ELECTROLYTES - ADULT  Goal: Glucose maintained within prescribed range  Description: INTERVENTIONS:  - Monitor Blood Glucose as ordered  - Assess for signs and symptoms of hyperglycemia and hypoglycemia  - Administer ordered medications to maintain glucose within target range  - Assess barriers to adequate nutritional intake and initiate nutrition consult as needed  - Instruct patient on self management of diabetes  Outcome: Progressing  Goal: Electrolytes maintained within normal limits  Description: INTERVENTIONS:  - Monitor labs and rhythm and assess patient for signs and symptoms of electrolyte imbalances  - Administer electrolyte replacement as ordered  - Monitor response to electrolyte replacements, including rhythm and repeat lab results as appropriate  - Fluid restriction as ordered  - Instruct patient on fluid and nutrition restrictions as appropriate  Outcome: Progressing  Goal: Hemodynamic stability and optimal renal function maintained  Description: INTERVENTIONS:  - Monitor labs and assess for signs and symptoms of volume excess or deficit  - Monitor intake, output and patient weight  - Monitor urine specific gravity, serum osmolarity and serum sodium as indicated or ordered  - Monitor response to interventions for patient's volume status, including labs, urine output, blood pressure (other measures as available)  - Encourage oral intake as appropriate  - Instruct patient on fluid and nutrition restrictions as appropriate  Outcome: Progressing     Problem: MUSCULOSKELETAL - ADULT  Goal: Return mobility to safest level of function  Description: INTERVENTIONS:  - Assess patient stability and activity tolerance for  standing, transferring and ambulating w/ or w/o assistive devices  - Assist with transfers and ambulation using safe patient handling equipment as needed  - Ensure adequate protection for wounds/incisions during mobilization  - Obtain PT/OT consults as needed  - Advance activity as appropriate  - Communicate ordered activity level and limitations with patient/family  Outcome: Progressing     Problem: Patient Centered Care  Goal: Patient preferences are identified and integrated in the patient's plan of care  Description: Interventions:  - What would you like us to know as we care for you?   - Provide timely, complete, and accurate information to patient/family  - Incorporate patient and family knowledge, values, beliefs, and cultural backgrounds into the planning and delivery of care  - Encourage patient/family to participate in care and decision-making at the level they choose  - Honor patient and family perspectives and choices  Outcome: Progressing     Problem: Diabetes/Glucose Control  Goal: Glucose maintained within prescribed range  Description: INTERVENTIONS:  - Monitor Blood Glucose as ordered  - Assess for signs and symptoms of hyperglycemia and hypoglycemia  - Administer ordered medications to maintain glucose within target range  - Assess barriers to adequate nutritional intake and initiate nutrition consult as needed  - Instruct patient on self management of diabetes  Outcome: Progressing       Problem: SKIN/TISSUE INTEGRITY - ADULT  Goal: Skin integrity remains intact  Description: INTERVENTIONS  - Assess and document risk factors for pressure ulcer development  - Assess and document skin integrity  - Monitor for areas of redness and/or skin breakdown  - Initiate interventions, skin care algorithm/standards of care as needed  Outcome: Progressing  Goal: Incision(s), wounds(s) or drain site(s) healing without S/S of infection  Description: INTERVENTIONS:  - Assess and document risk factors for pressure  ulcer development  - Assess and document skin integrity  - Assess and document dressing/incision, wound bed, drain sites and surrounding tissue  - Implement wound care per orders  - Initiate isolation precautions as appropriate  - Initiate Pressure Ulcer prevention bundle as indicated  Outcome: Progressing  Goal: Oral mucous membranes remain intact  Description: INTERVENTIONS  - Assess oral mucosa and hygiene practices  - Implement preventative oral hygiene regimen  - Implement oral medicated treatments as ordered  Outcome: Progressing     Problem: HEMATOLOGIC - ADULT  Goal: Free from bleeding injury  Description: (Example usage: patient with low platelets)  INTERVENTIONS:  - Avoid intramuscular injections, enemas and rectal medication administration  - Ensure safe mobilization of patient  - Hold pressure on venipuncture sites to achieve adequate hemostasis  - Assess for signs and symptoms of internal bleeding  - Monitor lab trends  - Patient is to report abnormal signs of bleeding to staff  - Avoid use of toothpicks and dental floss  - Use electric shaver for shaving  - Use soft bristle tooth brush  - Limit straining and forceful nose blowing  Outcome: Progressing     Problem: Integumentary status not within defined limits  Goal: Pt's integumentary status will be adequate for discharge  Outcome: Progressing

## 2025-06-07 NOTE — PHYSICAL THERAPY NOTE
PHYSICAL THERAPY TREATMENT NOTE - INPATIENT     Room Number: 335/335-A       Presenting Problem: Acute on Chronic  CHF ,anemia  and Respiratory failure / NSTEMI / CKD ESRD new to dialysis this admission / weakness and declining status in home  Co-Morbidities : Prior left hip sx with shorter limb post operatively / Left shoulder fx 2017 / PVD with left femo popliteal Bypass / Moderate right ICA stenosis  right TCAR 2019  / left total occlusion  Left ICA    Problem List  Principal Problem:    Acute on chronic respiratory failure with hypoxia (Formerly [facility])  Active Problems:    ESRD (end stage renal disease) (Formerly [facility])    Acute congestive heart failure, unspecified heart failure type (HCC)    NSTEMI (non-ST elevated myocardial infarction) (Formerly [facility])    Acute renal failure superimposed on chronic kidney disease, unspecified acute renal failure type, unspecified CKD stage    Goals of care, counseling/discussion    Palliative care encounter      PHYSICAL THERAPY ASSESSMENT   Patient demonstrates fair progress this session, goals  remain in progress.      Patient is requiring moderate assist as a result of the following impairments: decreased functional strength, decreased endurance/aerobic capacity, and decreased muscular endurance.     Patient continues to function below baseline with bed mobility, transfers, gait, maintaining seated position, and standing prolonged periods.  Next session anticipate patient to progress bed mobility, transfers, gait, maintaining seated position, and standing prolonged periods.  Physical Therapy will continue to follow patient for duration of hospitalization.    Patient continues to benefit from continued skilled PT services: to promote return to prior level of function and safety with continuous assistance and gradual rehabilitative therapy .    PLAN DURING HOSPITALIZATION  Nursing Mobility Recommendation :  (1-2A)  PT Device Recommendation: Rolling walker, Gait belt  PT Treatment Plan: Bed mobility, Body  mechanics, Endurance, Energy conservation, Patient education, Gait training, Strengthening, Transfer training, Balance training  Frequency (Obs): 3-5x/week     SUBJECTIVE  I have to go to the bathroom.    OBJECTIVE  Precautions: Cardiac, Limb alert - right, Bed/chair alarm, Hard of hearing (R arterial line, VERY [place])    WEIGHT BEARING RESTRICTION       PAIN ASSESSMENT   Ratin  Location: denies  Management Techniques: Activity promotion, Repositioning, Other (Comment) (Rn noified)    BALANCE  Static Sitting: Fair -  Dynamic Sitting: Poor +  Static Standing: Poor  Dynamic Standing: Poor    ACTIVITY TOLERANCE  Pulse: 89  Heart Rate Source: Monitor                    O2 WALK       AM-PAC '6-Clicks' INPATIENT SHORT FORM - BASIC MOBILITY  How much difficulty does the patient currently have...  Patient Difficulty: Turning over in bed (including adjusting bedclothes, sheets and blankets)?: A Lot   Patient Difficulty: Sitting down on and standing up from a chair with arms (e.g., wheelchair, bedside commode, etc.): A Lot   Patient Difficulty: Moving from lying on back to sitting on the side of the bed?: A Lot   How much help from another person does the patient currently need...   Help from Another: Moving to and from a bed to a chair (including a wheelchair)?: A Lot   Help from Another: Need to walk in hospital room?: A Lot   Help from Another: Climbing 3-5 steps with a railing?: A Lot     AM-PAC Score:  Raw Score: 12   Approx Degree of Impairment: 68.66%   Standardized Score (AM-PAC Scale): 35.33   CMS Modifier (G-Code): CL    FUNCTIONAL ABILITY STATUS  Functional Mobility/Gait Assessment  Gait Assistance: Minimum assistance, Moderate assistance  Distance (ft): 7  Assistive Device: Rolling walker  Pattern: Shuffle (antalgic appearing gait as pt has LLD)  Rolling: moderate assist  Supine to Sit: moderate assist  Sit to Supine: NT  Sit to Stand: moderate assist    Skilled Therapy Provided: Pt ok to be seen per RN. Pt  motivated to transfer and use bathroom. Rolling chair provided. Pt modA for bed mobility, minAx2 for pivot to rolling chair. Pt min to modA for sit<>stand and for standing during pericares--2 occasions. Pt required rest after standing x1-2 minutes. Pt dependent for donning brief. Pt amb x7 feet with RW and modA with cues for positioning and safe transfer. Pt with dec ecc control. Pt provided cues for breathing pattern, easily fatigued. Pt positioned up in chair with needs met at end of session. RN aware of pt's BM and urination.    The patient's Approx Degree of Impairment: 68.66% has been calculated based on documentation in the [facility] '6 clicks' Inpatient Daily Activity Short Form.  Research supports that patients with this level of impairment may benefit from gradual rehab.  Final disposition will be made by interdisciplinary medical team.    THERAPEUTIC EXERCISES  Lower Extremity Ankle pumps     Position Sitting       Patient End of Session: Up in chair, Needs met, Call light within reach, RN aware of session/findings, All patient questions and concerns addressed, Hospital anti-slip socks    CURRENT GOALS   Goals to be met by: 6/20/25  Patient Goal Patient's self-stated goal is: to go back home with family if possible   Goal #1 Patient is able to demonstrate supine - sit EOB @ level: minimum assistance      Goal #1   Current Status  modA   Goal #2 Patient is able to demonstrate transfers Sit to/from Stand at assistance level: minimum assistance with walker - rolling      Goal #2  Current Status  In progress- min to modA, assist inc with fatigue   Goal #3 Patient is able to ambulate 25 feet with assist device: walker - rolling at assistance level: moderate assistance   Goal #3   Current Status  7' with min to modA with RW   Goal #4 Patient will negotiate bed to/from chair transfers with RW and moderate assistance   Goal #4   Current Status MET, new goal Reji   Goal #5 Patient to demonstrate independence with home  activity/exercise instructions provided to patient in preparation for discharge.   Goal #5   Current Status  In progress   Goal #6     Goal #6  Current Status         Therapeutic Activity: 25 minutes

## 2025-06-07 NOTE — PROGRESS NOTES
[facility]  part of [facility]    Progress Note    Radha Yu Patient Status:  Inpatient    1940 MRN [id number]   Location [facility]5W Attending Villa Angel MD   Hosp Day # 16 PCP Stef Velasco MD     Chief complaint     Subjective:   Radha Yu is a(n) 84 year old female who came in with chest pain and sob.     Feeling fine.  No acute events overnight.  No chest pain or sob.  No n/v.      A comprehensive 10 point review of systems was completed.  Pertinent positives and negatives noted in the the HPI.    Objective:     Blood pressure (!) 164/61, pulse 79, temperature 97.9 °F (36.6 °C), temperature source Oral, resp. rate 18, weight 137 lb (62.1 kg), SpO2 96%.      Intake/Output Summary (Last 24 hours) at 2025 1157  Last data filed at 2025 1018  Gross per 24 hour   Intake 1346.67 ml   Output 100 ml   Net 1246.67 ml         Patient Weight(s) for the past 336 hrs:   Weight   25 0800 137 lb (62.1 kg)   25 0400 130 lb (59 kg)   25 2000 132 lb (59.9 kg)   25 0336 122 lb 3.2 oz (55.4 kg)   25 0454 121 lb 12.8 oz (55.2 kg)   25 0310 121 lb 14.4 oz (55.3 kg)   25 0500 123 lb 4.8 oz (55.9 kg)   25 0555 121 lb 12.8 oz (55.2 kg)   25 0558 121 lb (54.9 kg)   25 0402 120 lb 14.4 oz (54.8 kg)   25 0617 116 lb 9.6 oz (52.9 kg)           Gen: uncomfortable  Pulm: Lungs clear, normal respiratory effort  CV: Heart with regular rate and rhythm  Abd: Abdomen soft, nontender, nondistended, bowel sounds present  Neuro: No acute focal deficits  MSK: moves extremities  Skin: Warm and dry  Psych: Normal affect  Ext: rt le looks paler compared to left, sensation intact but decreased, rt groin wound looks good, rt pedal pulse heard with doppler          Medicines:     Current Hospital Medications[1]            Blood Sugar Medications            insulin aspart 100 Units/mL Subcutaneous Solution Pen-injector    insulin glargine  100 UNIT/ML Subcutaneous Solution            Blood Pressure and Cardiac Medications            amLODIPine 5 MG Oral Tab            Medication Infusions[2]        Lab Results   Component Value Date    WBC 5.6 06/07/2025    HGB 9.2 (L) 06/07/2025    HCT 27.9 (L) 06/07/2025    .0 (L) 06/07/2025    CREATSERUM 2.30 (H) 06/07/2025    BUN 29 (H) 06/07/2025     06/07/2025    K 4.4 06/07/2025    CL 99 06/07/2025    CO2 29.0 06/07/2025    GLU 87 06/07/2025    CA 7.8 (L) 06/07/2025    ALB 3.1 (L) 06/07/2025    ALKPHO 73 05/26/2025    BILT 0.2 05/26/2025    TP 5.7 05/26/2025    AST 16 05/26/2025    ALT <7 (L) 05/26/2025    PTT 53.4 (H) 06/03/2025    INR 1.16 05/22/2025    TSH 1.993 05/27/2025    MG 1.8 06/07/2025    PHOS 2.7 06/07/2025       No results found.            Results:     CBC:    Lab Results   Component Value Date    WBC 5.6 06/07/2025    WBC 6.3 06/06/2025    WBC 7.6 06/05/2025     Lab Results   Component Value Date    HGB 9.2 (L) 06/07/2025    HGB 9.0 (L) 06/06/2025    HGB 7.4 (L) 06/06/2025      Lab Results   Component Value Date    .0 (L) 06/07/2025    .0 (L) 06/06/2025    .0 (L) 06/05/2025       Recent Labs   Lab 06/05/25  0836 06/06/25  0529 06/07/25  0549   GLU 54* 65* 87   BUN 25* 34* 29*   CREATSERUM 2.16* 2.59* 2.30*   CA 8.2* 7.8* 7.8*    135* 137   K 4.0 4.3 4.4    102 99   CO2 27.0 24.0 29.0           Assessment and Plan:      RLE ischemia  Rt LE pain and decreased sensation, color   - plan stat ct a/p/bl le runoff to rule out dissection - results reviewed - plan percutaneous revascularization of right SFA with possible IVL vs atherectomy as she is high risk for open vascular surgery   - per vascular : femoral endarterectomy with iliac stenting and SFA stenting and any tibial interventions to improve.   Heparin is dc   S/p right common femoral endarterectomy with iliac/SFA stenting with Dr. Vines on 6/3 - wound vac will be removed on Monday and insurance auth  has been resubmitted     NSTEMI type 1 with new ischemic cardiomyopathy acute systolic chf  Multivessel CAD  - left heart cath again 5/28 with pci, post op with rt groin wound bleeding that improved after pressure   - on asa, plavix, statin, bb     Tricuspid and mitral regurg  - volume removal per hd     PVD s/p left femoropopliteal bypass 9/2015  - 80% proximal left renal artery stenosis on angiogram   - On asa, plavix, atorvastatin      HL  - statin     HTN  - elevated - adjust oral meds per cards     Acute on chronic anemia of renal disease  - possibly baseline anemia from ckd  - heparin stopped; gi defers invasive dx till more stable  - hb stable today , ppi     acute copd exacerbation  - pulm consulted signed off no steroids; no abx  - plan nebs scheduled and prn  - plan pulm hygiene  - wean oxygen as able     ESRD   -On HD     DM  - hypoglycemic this am. Tresiba and scheuled insulin dc  - accuchecks and ISS    Moderate right ICA stenosis s/p right TCAR 2/2019, totally occluded left ICA  - On asa, plavix, atorvastatin                       high mdm; coordinating care with nurse and counseling pt and with her permission her nephew in room  about chf/sob/diet/cath/dialysis                 Greater than 55 minutes spent, Greater than 50% spent counseling and in coordination of care, reviewing labs, discussing results with patient, coordinating care with care team and ordering labs for tomorrow and adjusting medications as needed                Supplementary Documentation:   DVT Mechanical Prophylaxis:   SCDs, Early ambuation  DVT Pharmacologic Prophylaxis   Medication    heparin (Porcine) 1000 UNIT/ML injection 1,500 Units    heparin (Porcine) 5000 UNIT/ML injection 5,000 Units         DVT Pharmacologic prophylaxis: Aspirin 81 mg      Code Status: Full Code  Rao: External urinary catheter in place  Rao Duration (in days):   Central line:    FRANCIS: 6/8/2025         [1]   Current Facility-Administered Medications    Medication Dose Route Frequency    insulin aspart (NovoLOG) 100 Units/mL FlexPen 1-7 Units  1-7 Units Subcutaneous TID CC and HS    losartan (Cozaar) tab 25 mg  25 mg Oral Daily    HYDROcodone-acetaminophen (Norco) 5-325 MG per tab 2 tablet  2 tablet Oral Q6H PRN    senna-docusate (Senokot-S) 8.6-50 MG per tab 2 tablet  2 tablet Oral Daily    sodium chloride 0.9 % IV bolus 100 mL  100 mL Intravenous Q30 Min PRN    And    albumin human (Albumin) 25% injection 25 g  25 g Intravenous PRN Dialysis    heparin (Porcine) 1000 UNIT/ML injection 1,500 Units  1.5 mL Intracatheter PRN Dialysis    hydrALAzine (Apresoline) 20 mg/mL injection 10 mg  10 mg Intravenous Q6H PRN    ondansetron (Zofran) 4 MG/2ML injection 4 mg  4 mg Intravenous Q6H PRN    heparin (Porcine) 5000 UNIT/ML injection 5,000 Units  5,000 Units Subcutaneous Q8H BLANE    HYDROmorphone (Dilaudid) 1 MG/ML injection 0.4 mg  0.4 mg Intravenous Q2H PRN    Or    HYDROmorphone (Dilaudid) 1 MG/ML injection 0.8 mg  0.8 mg Intravenous Q2H PRN    Or    HYDROmorphone (Dilaudid) 1 MG/ML injection 1.2 mg  1.2 mg Intravenous Q2H PRN    epoetin brittany (Epogen, Procrit) 48961 UNIT/ML injection 10,000 Units  10,000 Units Subcutaneous Once per day on Monday Wednesday Friday    metoclopramide (Reglan) 5 mg/mL injection 5 mg  5 mg Intravenous Daily PRN    atorvastatin (Lipitor) tab 40 mg  40 mg Oral Nightly    clopidogrel (Plavix) tab 75 mg  75 mg Oral Daily    aspirin DR tab 81 mg  81 mg Oral Daily    metoprolol succinate (Toprol XL) partial tablet 12.5 mg  12.5 mg Oral Daily Beta Blocker    pantoprazole (Protonix) DR tab 40 mg  40 mg Oral BID AC    calcitriol (Rocaltrol) cap 0.25 mcg  0.25 mcg Oral Q MWF    ipratropium-albuterol (Duoneb) 0.5-2.5 (3) MG/3ML inhalation solution 3 mL  3 mL Nebulization Q6H PRN    HYDROcodone-acetaminophen (Norco) 5-325 MG per tab 1 tablet  1 tablet Oral Q6H PRN    escitalopram (Lexapro) tab 10 mg  10 mg Oral Daily    glucose (Dex4) 15 GM/59ML oral  liquid 15 g  15 g Oral Q15 Min PRN    Or    glucose (Glutose) 40% oral gel 15 g  15 g Oral Q15 Min PRN    Or    glucose-vitamin C (Dex-4) chewable tab 4 tablet  4 tablet Oral Q15 Min PRN    Or    dextrose 50% injection 50 mL  50 mL Intravenous Q15 Min PRN    Or    glucose (Dex4) 15 GM/59ML oral liquid 30 g  30 g Oral Q15 Min PRN    Or    glucose (Glutose) 40% oral gel 30 g  30 g Oral Q15 Min PRN    Or    glucose-vitamin C (Dex-4) chewable tab 8 tablet  8 tablet Oral Q15 Min PRN    acetaminophen (Tylenol Extra Strength) tab 500 mg  500 mg Oral Q4H PRN    melatonin tab 3 mg  3 mg Oral Nightly PRN    polyethylene glycol (PEG 3350) (Miralax) 17 g oral packet 17 g  17 g Oral Daily PRN    sennosides (Senokot) tab 17.2 mg  17.2 mg Oral Nightly PRN    bisacodyl (Dulcolax) 10 MG rectal suppository 10 mg  10 mg Rectal Daily PRN    ondansetron (Zofran) 4 MG/2ML injection 4 mg  4 mg Intravenous Q6H PRN    benzonatate (Tessalon) cap 200 mg  200 mg Oral TID PRN    guaiFENesin (Robitussin) 100 MG/5 ML oral liquid 200 mg  200 mg Oral Q4H PRN    glycerin-hypromellose- (Artificial Tears) 0.2-0.2-1 % ophthalmic solution 1 drop  1 drop Both Eyes QID PRN    sodium chloride (Saline Mist) 0.65 % nasal solution 1 spray  1 spray Each Nare Q3H PRN   [2]

## 2025-06-07 NOTE — CM/SW NOTE
Received inquiry by nurse on insurance status for snf at [facility].  GARRETT spoke w/Karol-liaison who stated medical clearance was pending and needs updates.  PT/OT last saw pt 6/5.  CM informed nurse to have PT/OT see for updated therapy notes for insurance to review.      GARRETT/JAYASHREE for follow up:   [email or web address] [facility] to submit and needs updates    DC PLAN;   [place], needs prior insurance auth    RN case manager to remain available for support and/or discharge planning.     Elena Jorge BS, RN, CCM  PRN RN CM  Ext.  [phone]

## 2025-06-08 LAB
ALBUMIN SERPL-MCNC: 3.1 G/DL (ref 3.2–4.8)
ANION GAP SERPL CALC-SCNC: 9 MMOL/L (ref 0–18)
BASOPHILS # BLD AUTO: 0.02 X10(3) UL (ref 0–0.2)
BASOPHILS NFR BLD AUTO: 0.5 %
BUN BLD-MCNC: 40 MG/DL (ref 9–23)
BUN/CREAT SERPL: 14.9 (ref 10–20)
CALCIUM BLD-MCNC: 8 MG/DL (ref 8.7–10.4)
CHLORIDE SERPL-SCNC: 99 MMOL/L (ref 98–112)
CO2 SERPL-SCNC: 26 MMOL/L (ref 21–32)
CREAT BLD-MCNC: 2.68 MG/DL (ref 0.55–1.02)
DEPRECATED RDW RBC AUTO: 52.1 FL (ref 35.1–46.3)
EGFRCR SERPLBLD CKD-EPI 2021: 17 ML/MIN/1.73M2 (ref 60–?)
EOSINOPHIL # BLD AUTO: 0.09 X10(3) UL (ref 0–0.7)
EOSINOPHIL NFR BLD AUTO: 2 %
ERYTHROCYTE [DISTWIDTH] IN BLOOD BY AUTOMATED COUNT: 16.4 % (ref 11–15)
GLUCOSE BLD-MCNC: 122 MG/DL (ref 70–99)
GLUCOSE BLDC GLUCOMTR-MCNC: 141 MG/DL (ref 70–99)
GLUCOSE BLDC GLUCOMTR-MCNC: 247 MG/DL (ref 70–99)
GLUCOSE BLDC GLUCOMTR-MCNC: 269 MG/DL (ref 70–99)
GLUCOSE BLDC GLUCOMTR-MCNC: 315 MG/DL (ref 70–99)
HCT VFR BLD AUTO: 28.9 % (ref 35–48)
HGB BLD-MCNC: 9.4 G/DL (ref 12–16)
IMM GRANULOCYTES # BLD AUTO: 0.02 X10(3) UL (ref 0–1)
IMM GRANULOCYTES NFR BLD: 0.5 %
LYMPHOCYTES # BLD AUTO: 1.21 X10(3) UL (ref 1–4)
LYMPHOCYTES NFR BLD AUTO: 27.4 %
MCH RBC QN AUTO: 28.8 PG (ref 26–34)
MCHC RBC AUTO-ENTMCNC: 32.5 G/DL (ref 31–37)
MCV RBC AUTO: 88.7 FL (ref 80–100)
MONOCYTES # BLD AUTO: 0.45 X10(3) UL (ref 0.1–1)
MONOCYTES NFR BLD AUTO: 10.2 %
NEUTROPHILS # BLD AUTO: 2.63 X10 (3) UL (ref 1.5–7.7)
NEUTROPHILS # BLD AUTO: 2.63 X10(3) UL (ref 1.5–7.7)
NEUTROPHILS NFR BLD AUTO: 59.4 %
OSMOLALITY SERPL CALC.SUM OF ELEC: 289 MOSM/KG (ref 275–295)
PHOSPHATE SERPL-MCNC: 2.9 MG/DL (ref 2.4–5.1)
PLATELET # BLD AUTO: 147 10(3)UL (ref 150–450)
PLATELETS.RETICULATED NFR BLD AUTO: 4.6 % (ref 0–7)
POTASSIUM SERPL-SCNC: 4.6 MMOL/L (ref 3.5–5.1)
RBC # BLD AUTO: 3.26 X10(6)UL (ref 3.8–5.3)
SODIUM SERPL-SCNC: 134 MMOL/L (ref 136–145)
WBC # BLD AUTO: 4.4 X10(3) UL (ref 4–11)

## 2025-06-08 PROCEDURE — 99232 SBSQ HOSP IP/OBS MODERATE 35: CPT | Performed by: INTERNAL MEDICINE

## 2025-06-08 PROCEDURE — 99233 SBSQ HOSP IP/OBS HIGH 50: CPT | Performed by: HOSPITALIST

## 2025-06-08 RX ORDER — METOPROLOL SUCCINATE 25 MG/1
25 TABLET, EXTENDED RELEASE ORAL
Status: DISCONTINUED | OUTPATIENT
Start: 2025-06-08 | End: 2025-06-09

## 2025-06-08 RX ORDER — LOSARTAN POTASSIUM 50 MG/1
50 TABLET ORAL DAILY
Status: DISCONTINUED | OUTPATIENT
Start: 2025-06-09 | End: 2025-06-09

## 2025-06-08 RX ORDER — LOSARTAN POTASSIUM 50 MG/1
50 TABLET ORAL DAILY
Qty: 30 TABLET | Refills: 0 | Status: SHIPPED | OUTPATIENT
Start: 2025-06-09

## 2025-06-08 RX ORDER — HEPARIN SODIUM 1000 [USP'U]/ML
1.5 INJECTION, SOLUTION INTRAVENOUS; SUBCUTANEOUS
Status: DISCONTINUED | OUTPATIENT
Start: 2025-06-08 | End: 2025-06-09

## 2025-06-08 RX ORDER — ALBUMIN (HUMAN) 12.5 G/50ML
25 SOLUTION INTRAVENOUS
Status: DISCONTINUED | OUTPATIENT
Start: 2025-06-08 | End: 2025-06-09

## 2025-06-08 NOTE — PLAN OF CARE
Problem: CARDIOVASCULAR - ADULT  Goal: Maintains optimal cardiac output and hemodynamic stability  Description: INTERVENTIONS:  - Monitor vital signs, rhythm, and trends  - Monitor for bleeding, hypotension and signs of decreased cardiac output  - Evaluate effectiveness of vasoactive medications to optimize hemodynamic stability  - Monitor arterial and/or venous puncture sites for bleeding and/or hematoma  - Assess quality of pulses, skin color and temperature  - Assess for signs of decreased coronary artery perfusion - ex. Angina  - Evaluate fluid balance, assess for edema, trend weights  Outcome: Progressing  Goal: Absence of cardiac arrhythmias or at baseline  Description: INTERVENTIONS:  - Continuous cardiac monitoring, monitor vital signs, obtain 12 lead EKG if indicated  - Evaluate effectiveness of antiarrhythmic and heart rate control medications as ordered  - Initiate emergency measures for life threatening arrhythmias  - Monitor electrolytes and administer replacement therapy as ordered  Outcome: Progressing     Problem: RESPIRATORY - ADULT  Goal: Achieves optimal ventilation and oxygenation  Description: INTERVENTIONS:  - Assess for changes in respiratory status  - Assess for changes in mentation and behavior  - Position to facilitate oxygenation and minimize respiratory effort  - Oxygen supplementation based on oxygen saturation or ABGs  - Provide Smoking Cessation handout, if applicable  - Encourage broncho-pulmonary hygiene including cough, deep breathe, Incentive Spirometry  - Assess the need for suctioning and perform as needed  - Assess and instruct to report SOB or any respiratory difficulty  - Respiratory Therapy support as indicated  - Manage/alleviate anxiety  - Monitor for signs/symptoms of CO2 retention  Outcome: Progressing     Problem: GENITOURINARY - ADULT  Goal: Absence of urinary retention  Description: INTERVENTIONS:  - Assess patient’s ability to void and empty bladder  - Monitor  intake/output and perform bladder scan as needed  - Follow urinary retention protocol/standard of care  - Consider collaborating with pharmacy to review patient's medication profile  - Implement strategies to promote bladder emptying  Outcome: Progressing     Problem: METABOLIC/FLUID AND ELECTROLYTES - ADULT  Goal: Glucose maintained within prescribed range  Description: INTERVENTIONS:  - Monitor Blood Glucose as ordered  - Assess for signs and symptoms of hyperglycemia and hypoglycemia  - Administer ordered medications to maintain glucose within target range  - Assess barriers to adequate nutritional intake and initiate nutrition consult as needed  - Instruct patient on self management of diabetes  Outcome: Progressing  Goal: Electrolytes maintained within normal limits  Description: INTERVENTIONS:  - Monitor labs and rhythm and assess patient for signs and symptoms of electrolyte imbalances  - Administer electrolyte replacement as ordered  - Monitor response to electrolyte replacements, including rhythm and repeat lab results as appropriate  - Fluid restriction as ordered  - Instruct patient on fluid and nutrition restrictions as appropriate  Outcome: Progressing  Goal: Hemodynamic stability and optimal renal function maintained  Description: INTERVENTIONS:  - Monitor labs and assess for signs and symptoms of volume excess or deficit  - Monitor intake, output and patient weight  - Monitor urine specific gravity, serum osmolarity and serum sodium as indicated or ordered  - Monitor response to interventions for patient's volume status, including labs, urine output, blood pressure (other measures as available)  - Encourage oral intake as appropriate  - Instruct patient on fluid and nutrition restrictions as appropriate  Outcome: Progressing     Problem: GASTROINTESTINAL - ADULT  Goal: Minimal or absence of nausea and vomiting  Description: INTERVENTIONS:  - Maintain adequate hydration with IV or PO as ordered and  tolerated  - Nasogastric tube to low intermittent suction as ordered  - Evaluate effectiveness of ordered antiemetic medications  - Provide nonpharmacologic comfort measures as appropriate  - Advance diet as tolerated, if ordered  - Obtain nutritional consult as needed  - Evaluate fluid balance  Outcome: Progressing  Goal: Maintains or returns to baseline bowel function  Description: INTERVENTIONS:  - Assess bowel function  - Maintain adequate hydration with IV or PO as ordered and tolerated  - Evaluate effectiveness of GI medications  - Encourage mobilization and activity  - Obtain nutritional consult as needed  - Establish a toileting routine/schedule  - Consider collaborating with pharmacy to review patient's medication profile  Outcome: Progressing  Goal: Maintains adequate nutritional intake (undernourished)  Description: INTERVENTIONS:  - Monitor percentage of each meal consumed  - Identify factors contributing to decreased intake, treat as appropriate  - Assist with meals as needed  - Monitor I&O, WT and lab values  - Obtain nutritional consult as needed  - Optimize oral hygiene and moisture  - Encourage food from home; allow for food preferences  - Enhance eating environment  Outcome: Progressing     Problem: MUSCULOSKELETAL - ADULT  Goal: Return mobility to safest level of function  Description: INTERVENTIONS:  - Assess patient stability and activity tolerance for standing, transferring and ambulating w/ or w/o assistive devices  - Assist with transfers and ambulation using safe patient handling equipment as needed  - Ensure adequate protection for wounds/incisions during mobilization  - Obtain PT/OT consults as needed  - Advance activity as appropriate  - Communicate ordered activity level and limitations with patient/family  Outcome: Progressing     Problem: Patient Centered Care  Goal: Patient preferences are identified and integrated in the patient's plan of care  Description: Interventions:  - What  would you like us to know as we care for you?   - Provide timely, complete, and accurate information to patient/family  - Incorporate patient and family knowledge, values, beliefs, and cultural backgrounds into the planning and delivery of care  - Encourage patient/family to participate in care and decision-making at the level they choose  - Honor patient and family perspectives and choices  Outcome: Progressing     Problem: Diabetes/Glucose Control  Goal: Glucose maintained within prescribed range  Description: INTERVENTIONS:  - Monitor Blood Glucose as ordered  - Assess for signs and symptoms of hyperglycemia and hypoglycemia  - Administer ordered medications to maintain glucose within target range  - Assess barriers to adequate nutritional intake and initiate nutrition consult as needed  - Instruct patient on self management of diabetes  Outcome: Progressing     Problem: Patient/Family Goals  Goal: Patient/Family Long Term Goal  Description: Patient's Long Term Goal: discharge    Interventions:  - Monitor vital signs  - Monitor wound dressings  - Monitor labs  - See additional Care Plan goals for specific interventions  Outcome: Progressing  Goal: Patient/Family Short Term Goal  Description: Patient's Short Term Goal: feel better    Interventions:   - Continue ADLs  - Ambulate as tolerated  - See additional Care Plan goals for specific interventions  Outcome: Progressing     Problem: SKIN/TISSUE INTEGRITY - ADULT  Goal: Skin integrity remains intact  Description: INTERVENTIONS  - Assess and document risk factors for pressure ulcer development  - Assess and document skin integrity  - Monitor for areas of redness and/or skin breakdown  - Initiate interventions, skin care algorithm/standards of care as needed  Outcome: Progressing  Goal: Incision(s), wounds(s) or drain site(s) healing without S/S of infection  Description: INTERVENTIONS:  - Assess and document risk factors for pressure ulcer development  - Assess  and document skin integrity  - Assess and document dressing/incision, wound bed, drain sites and surrounding tissue  - Implement wound care per orders  - Initiate isolation precautions as appropriate  - Initiate Pressure Ulcer prevention bundle as indicated  Outcome: Progressing  Goal: Oral mucous membranes remain intact  Description: INTERVENTIONS  - Assess oral mucosa and hygiene practices  - Implement preventative oral hygiene regimen  - Implement oral medicated treatments as ordered  Outcome: Progressing     Problem: HEMATOLOGIC - ADULT  Goal: Free from bleeding injury  Description: (Example usage: patient with low platelets)  INTERVENTIONS:  - Avoid intramuscular injections, enemas and rectal medication administration  - Ensure safe mobilization of patient  - Hold pressure on venipuncture sites to achieve adequate hemostasis  - Assess for signs and symptoms of internal bleeding  - Monitor lab trends  - Patient is to report abnormal signs of bleeding to staff  - Avoid use of toothpicks and dental floss  - Use electric shaver for shaving  - Use soft bristle tooth brush  - Limit straining and forceful nose blowing  Outcome: Progressing     Problem: Integumentary status not within defined limits  Goal: Pt's integumentary status will be adequate for discharge  Outcome: Progressing

## 2025-06-08 NOTE — PLAN OF CARE
Patient alert and oriented x 3 on room air. Patient reports pain, see MAR. Plan is [facility] pending insurance authorization. Dialysis tomorrow. Call light within reach and safety precautions in place.     Problem: CARDIOVASCULAR - ADULT  Goal: Maintains optimal cardiac output and hemodynamic stability  Description: INTERVENTIONS:  - Monitor vital signs, rhythm, and trends  - Monitor for bleeding, hypotension and signs of decreased cardiac output  - Evaluate effectiveness of vasoactive medications to optimize hemodynamic stability  - Monitor arterial and/or venous puncture sites for bleeding and/or hematoma  - Assess quality of pulses, skin color and temperature  - Assess for signs of decreased coronary artery perfusion - ex. Angina  - Evaluate fluid balance, assess for edema, trend weights  Outcome: Progressing  Goal: Absence of cardiac arrhythmias or at baseline  Description: INTERVENTIONS:  - Continuous cardiac monitoring, monitor vital signs, obtain 12 lead EKG if indicated  - Evaluate effectiveness of antiarrhythmic and heart rate control medications as ordered  - Initiate emergency measures for life threatening arrhythmias  - Monitor electrolytes and administer replacement therapy as ordered  Outcome: Progressing     Problem: RESPIRATORY - ADULT  Goal: Achieves optimal ventilation and oxygenation  Description: INTERVENTIONS:  - Assess for changes in respiratory status  - Assess for changes in mentation and behavior  - Position to facilitate oxygenation and minimize respiratory effort  - Oxygen supplementation based on oxygen saturation or ABGs  - Provide Smoking Cessation handout, if applicable  - Encourage broncho-pulmonary hygiene including cough, deep breathe, Incentive Spirometry  - Assess the need for suctioning and perform as needed  - Assess and instruct to report SOB or any respiratory difficulty  - Respiratory Therapy support as indicated  - Manage/alleviate anxiety  - Monitor for signs/symptoms of  CO2 retention  Outcome: Progressing     Problem: GENITOURINARY - ADULT  Goal: Absence of urinary retention  Description: INTERVENTIONS:  - Assess patient’s ability to void and empty bladder  - Monitor intake/output and perform bladder scan as needed  - Follow urinary retention protocol/standard of care  - Consider collaborating with pharmacy to review patient's medication profile  - Implement strategies to promote bladder emptying  Outcome: Progressing     Problem: Patient Centered Care  Goal: Patient preferences are identified and integrated in the patient's plan of care  Description: Interventions:  - What would you like us to know as we care for you? I am from home with my nephew.   - Provide timely, complete, and accurate information to patient/family  - Incorporate patient and family knowledge, values, beliefs, and cultural backgrounds into the planning and delivery of care  - Encourage patient/family to participate in care and decision-making at the level they choose  - Honor patient and family perspectives and choices  Outcome: Progressing     Problem: Diabetes/Glucose Control  Goal: Glucose maintained within prescribed range  Description: INTERVENTIONS:  - Monitor Blood Glucose as ordered  - Assess for signs and symptoms of hyperglycemia and hypoglycemia  - Administer ordered medications to maintain glucose within target range  - Assess barriers to adequate nutritional intake and initiate nutrition consult as needed  - Instruct patient on self management of diabetes  Outcome: Progressing     Problem: Patient/Family Goals  Goal: Patient/Family Long Term Goal  Description: Patient's Long Term Goal: discharge    Interventions:  - Monitor vital signs  - Monitor wound dressings  - Monitor labs  - See additional Care Plan goals for specific interventions  Outcome: Progressing  Goal: Patient/Family Short Term Goal  Description: Patient's Short Term Goal: feel better    Interventions:   - Continue ADLs  - Ambulate  as tolerated  - See additional Care Plan goals for specific interventions  Outcome: Progressing     Problem: METABOLIC/FLUID AND ELECTROLYTES - ADULT  Goal: Glucose maintained within prescribed range  Description: INTERVENTIONS:  - Monitor Blood Glucose as ordered  - Assess for signs and symptoms of hyperglycemia and hypoglycemia  - Administer ordered medications to maintain glucose within target range  - Assess barriers to adequate nutritional intake and initiate nutrition consult as needed  - Instruct patient on self management of diabetes  Outcome: Progressing  Goal: Electrolytes maintained within normal limits  Description: INTERVENTIONS:  - Monitor labs and rhythm and assess patient for signs and symptoms of electrolyte imbalances  - Administer electrolyte replacement as ordered  - Monitor response to electrolyte replacements, including rhythm and repeat lab results as appropriate  - Fluid restriction as ordered  - Instruct patient on fluid and nutrition restrictions as appropriate  Outcome: Progressing  Goal: Hemodynamic stability and optimal renal function maintained  Description: INTERVENTIONS:  - Monitor labs and assess for signs and symptoms of volume excess or deficit  - Monitor intake, output and patient weight  - Monitor urine specific gravity, serum osmolarity and serum sodium as indicated or ordered  - Monitor response to interventions for patient's volume status, including labs, urine output, blood pressure (other measures as available)  - Encourage oral intake as appropriate  - Instruct patient on fluid and nutrition restrictions as appropriate  Outcome: Progressing     Problem: SKIN/TISSUE INTEGRITY - ADULT  Goal: Skin integrity remains intact  Description: INTERVENTIONS  - Assess and document risk factors for pressure ulcer development  - Assess and document skin integrity  - Monitor for areas of redness and/or skin breakdown  - Initiate interventions, skin care algorithm/standards of care as  needed  Outcome: Progressing  Goal: Incision(s), wounds(s) or drain site(s) healing without S/S of infection  Description: INTERVENTIONS:  - Assess and document risk factors for pressure ulcer development  - Assess and document skin integrity  - Assess and document dressing/incision, wound bed, drain sites and surrounding tissue  - Implement wound care per orders  - Initiate isolation precautions as appropriate  - Initiate Pressure Ulcer prevention bundle as indicated  Outcome: Progressing  Goal: Oral mucous membranes remain intact  Description: INTERVENTIONS  - Assess oral mucosa and hygiene practices  - Implement preventative oral hygiene regimen  - Implement oral medicated treatments as ordered  Outcome: Progressing     Problem: HEMATOLOGIC - ADULT  Goal: Free from bleeding injury  Description: (Example usage: patient with low platelets)  INTERVENTIONS:  - Avoid intramuscular injections, enemas and rectal medication administration  - Ensure safe mobilization of patient  - Hold pressure on venipuncture sites to achieve adequate hemostasis  - Assess for signs and symptoms of internal bleeding  - Monitor lab trends  - Patient is to report abnormal signs of bleeding to staff  - Avoid use of toothpicks and dental floss  - Use electric shaver for shaving  - Use soft bristle tooth brush  - Limit straining and forceful nose blowing  Outcome: Progressing     Problem: Integumentary status not within defined limits  Goal: Pt's integumentary status will be adequate for discharge  Outcome: Progressing     Problem: GASTROINTESTINAL - ADULT  Goal: Minimal or absence of nausea and vomiting  Description: INTERVENTIONS:  - Maintain adequate hydration with IV or PO as ordered and tolerated  - Nasogastric tube to low intermittent suction as ordered  - Evaluate effectiveness of ordered antiemetic medications  - Provide nonpharmacologic comfort measures as appropriate  - Advance diet as tolerated, if ordered  - Obtain nutritional  consult as needed  - Evaluate fluid balance  Outcome: Progressing  Goal: Maintains or returns to baseline bowel function  Description: INTERVENTIONS:  - Assess bowel function  - Maintain adequate hydration with IV or PO as ordered and tolerated  - Evaluate effectiveness of GI medications  - Encourage mobilization and activity  - Obtain nutritional consult as needed  - Establish a toileting routine/schedule  - Consider collaborating with pharmacy to review patient's medication profile  Outcome: Progressing  Goal: Maintains adequate nutritional intake (undernourished)  Description: INTERVENTIONS:  - Monitor percentage of each meal consumed  - Identify factors contributing to decreased intake, treat as appropriate  - Assist with meals as needed  - Monitor I&O, WT and lab values  - Obtain nutritional consult as needed  - Optimize oral hygiene and moisture  - Encourage food from home; allow for food preferences  - Enhance eating environment  Outcome: Progressing     Problem: MUSCULOSKELETAL - ADULT  Goal: Return mobility to safest level of function  Description: INTERVENTIONS:  - Assess patient stability and activity tolerance for standing, transferring and ambulating w/ or w/o assistive devices  - Assist with transfers and ambulation using safe patient handling equipment as needed  - Ensure adequate protection for wounds/incisions during mobilization  - Obtain PT/OT consults as needed  - Advance activity as appropriate  - Communicate ordered activity level and limitations with patient/family  Outcome: Progressing

## 2025-06-08 NOTE — PROGRESS NOTES
[facility]  part of [facility]    Progress Note    Radha Yu Patient Status:  Inpatient    1940 MRN [id number]   Location [facility]5W Attending Villa Angel MD   Hosp Day # 17 PCP Stef Velasco MD     Chief complaint     Subjective:   Radha Yu is a(n) 84 year old female who came in with chest pain and sob.     Feeling well.  Slept well.  Appetite is good.  No acute events overnight.  No chest pain or sob.  No n/v.      A comprehensive 10 point review of systems was completed.  Pertinent positives and negatives noted in the the HPI.    Objective:     Blood pressure (!) 179/63, pulse 82, temperature 98 °F (36.7 °C), temperature source Oral, resp. rate 17, weight 135 lb (61.2 kg), SpO2 98%.      Intake/Output Summary (Last 24 hours) at 2025 1155  Last data filed at 2025 1030  Gross per 24 hour   Intake 1365 ml   Output 450 ml   Net 915 ml         Patient Weight(s) for the past 336 hrs:   Weight   25 0423 135 lb (61.2 kg)   25 0800 137 lb (62.1 kg)   25 0400 130 lb (59 kg)   25 2000 132 lb (59.9 kg)   25 0336 122 lb 3.2 oz (55.4 kg)   25 0454 121 lb 12.8 oz (55.2 kg)   25 0310 121 lb 14.4 oz (55.3 kg)   25 0500 123 lb 4.8 oz (55.9 kg)   25 0555 121 lb 12.8 oz (55.2 kg)   25 0558 121 lb (54.9 kg)   25 0402 120 lb 14.4 oz (54.8 kg)   25 0617 116 lb 9.6 oz (52.9 kg)           Gen: uncomfortable  Pulm: Lungs clear, normal respiratory effort  CV: Heart with regular rate and rhythm  Abd: Abdomen soft, nontender, nondistended, bowel sounds present  Neuro: No acute focal deficits  MSK: moves extremities  Skin: Warm and dry  Psych: Normal affect  Ext: rt le looks paler compared to left, sensation intact but decreased, rt groin wound looks good, rt pedal pulse heard with doppler          Medicines:     Current Hospital Medications[1]            Blood Sugar Medications            insulin aspart 100  Units/mL Subcutaneous Solution Pen-injector    insulin glargine 100 UNIT/ML Subcutaneous Solution            Blood Pressure and Cardiac Medications            losartan 25 MG Oral Tab    metoprolol succinate ER 25 MG Oral Tablet 24 Hr    amLODIPine 5 MG Oral Tab            Medication Infusions[2]        Lab Results   Component Value Date    WBC 4.4 06/08/2025    HGB 9.4 (L) 06/08/2025    HCT 28.9 (L) 06/08/2025    .0 (L) 06/08/2025    CREATSERUM 2.68 (H) 06/08/2025    BUN 40 (H) 06/08/2025     (L) 06/08/2025    K 4.6 06/08/2025    CL 99 06/08/2025    CO2 26.0 06/08/2025     (H) 06/08/2025    CA 8.0 (L) 06/08/2025    ALB 3.1 (L) 06/08/2025    ALKPHO 73 05/26/2025    BILT 0.2 05/26/2025    TP 5.7 05/26/2025    AST 16 05/26/2025    ALT <7 (L) 05/26/2025    PTT 53.4 (H) 06/03/2025    INR 1.16 05/22/2025    TSH 1.993 05/27/2025    MG 1.8 06/07/2025    PHOS 2.9 06/08/2025       No results found.            Results:     CBC:    Lab Results   Component Value Date    WBC 4.4 06/08/2025    WBC 5.6 06/07/2025    WBC 6.3 06/06/2025     Lab Results   Component Value Date    HGB 9.4 (L) 06/08/2025    HGB 9.2 (L) 06/07/2025    HGB 9.0 (L) 06/06/2025      Lab Results   Component Value Date    .0 (L) 06/08/2025    .0 (L) 06/07/2025    .0 (L) 06/06/2025       Recent Labs   Lab 06/06/25  0529 06/07/25  0549 06/08/25  0530   GLU 65* 87 122*   BUN 34* 29* 40*   CREATSERUM 2.59* 2.30* 2.68*   CA 7.8* 7.8* 8.0*   * 137 134*   K 4.3 4.4 4.6    99 99   CO2 24.0 29.0 26.0           Assessment and Plan:      RLE ischemia  Rt LE pain and decreased sensation, color   - plan stat ct a/p/bl le runoff to rule out dissection - results reviewed - plan percutaneous revascularization of right SFA with possible IVL vs atherectomy as she is high risk for open vascular surgery   - per vascular : femoral endarterectomy with iliac stenting and SFA stenting and any tibial interventions to improve.    Heparin is dc   S/p right common femoral endarterectomy with iliac/SFA stenting with Dr. Vines on 6/3 - wound vac will be removed on Monday and insurance auth has been resubmitted     NSTEMI type 1 with new ischemic cardiomyopathy acute systolic chf  Multivessel CAD  - left heart cath again 5/28 with pci, post op with rt groin wound bleeding that improved after pressure   - on asa, plavix, statin, bb     Tricuspid and mitral regurg  - volume removal per hd     PVD s/p left femoropopliteal bypass 9/2015  - 80% proximal left renal artery stenosis on angiogram   - On asa, plavix, atorvastatin      HL  - statin     HTN  - elevated - adjust oral meds - increased losartan today     Acute on chronic anemia of renal disease  - possibly baseline anemia from ckd  - heparin stopped; gi defers invasive dx till more stable  - hb stable today , ppi     acute copd exacerbation  - pulm consulted signed off no steroids; no abx  - plan nebs scheduled and prn  - plan pulm hygiene  - wean oxygen as able     ESRD   -On HD     DM  - hypoglycemic this am. Tresiba and scheuled insulin dc  - accuchecks and ISS    Moderate right ICA stenosis s/p right TCAR 2/2019, totally occluded left ICA  - On asa, plavix, atorvastatin                       high mdm; coordinating care with nurse and counseling pt and with her permission her nephew in room  about chf/sob/diet/cath/dialysis                 Greater than 55 minutes spent, Greater than 50% spent counseling and in coordination of care, reviewing labs, discussing results with patient, coordinating care with care team and ordering labs for tomorrow and adjusting medications as needed                Supplementary Documentation:   DVT Mechanical Prophylaxis:   SCDs, Early ambuation  DVT Pharmacologic Prophylaxis   Medication    heparin (Porcine) 1000 UNIT/ML injection 1,500 Units    heparin (Porcine) 5000 UNIT/ML injection 5,000 Units         DVT Pharmacologic prophylaxis: Aspirin 81 mg      Code  Status: Full Code  Rao: External urinary catheter in place  Rao Duration (in days):   Central line:    FRANCIS: 6/8/2025         [1]   Current Facility-Administered Medications   Medication Dose Route Frequency    insulin aspart (NovoLOG) 100 Units/mL FlexPen 1-7 Units  1-7 Units Subcutaneous TID CC and HS    losartan (Cozaar) tab 25 mg  25 mg Oral Daily    HYDROcodone-acetaminophen (Norco) 5-325 MG per tab 2 tablet  2 tablet Oral Q6H PRN    senna-docusate (Senokot-S) 8.6-50 MG per tab 2 tablet  2 tablet Oral Daily    heparin (Porcine) 1000 UNIT/ML injection 1,500 Units  1.5 mL Intracatheter PRN Dialysis    hydrALAzine (Apresoline) 20 mg/mL injection 10 mg  10 mg Intravenous Q6H PRN    ondansetron (Zofran) 4 MG/2ML injection 4 mg  4 mg Intravenous Q6H PRN    heparin (Porcine) 5000 UNIT/ML injection 5,000 Units  5,000 Units Subcutaneous Q8H BLANE    HYDROmorphone (Dilaudid) 1 MG/ML injection 0.4 mg  0.4 mg Intravenous Q2H PRN    Or    HYDROmorphone (Dilaudid) 1 MG/ML injection 0.8 mg  0.8 mg Intravenous Q2H PRN    Or    HYDROmorphone (Dilaudid) 1 MG/ML injection 1.2 mg  1.2 mg Intravenous Q2H PRN    epoetin brittany (Epogen, Procrit) 85241 UNIT/ML injection 10,000 Units  10,000 Units Subcutaneous Once per day on Monday Wednesday Friday    metoclopramide (Reglan) 5 mg/mL injection 5 mg  5 mg Intravenous Daily PRN    atorvastatin (Lipitor) tab 40 mg  40 mg Oral Nightly    clopidogrel (Plavix) tab 75 mg  75 mg Oral Daily    aspirin DR tab 81 mg  81 mg Oral Daily    metoprolol succinate (Toprol XL) partial tablet 12.5 mg  12.5 mg Oral Daily Beta Blocker    pantoprazole (Protonix) DR tab 40 mg  40 mg Oral BID AC    calcitriol (Rocaltrol) cap 0.25 mcg  0.25 mcg Oral Q MWF    ipratropium-albuterol (Duoneb) 0.5-2.5 (3) MG/3ML inhalation solution 3 mL  3 mL Nebulization Q6H PRN    HYDROcodone-acetaminophen (Norco) 5-325 MG per tab 1 tablet  1 tablet Oral Q6H PRN    escitalopram (Lexapro) tab 10 mg  10 mg Oral Daily    glucose  (Dex4) 15 GM/59ML oral liquid 15 g  15 g Oral Q15 Min PRN    Or    glucose (Glutose) 40% oral gel 15 g  15 g Oral Q15 Min PRN    Or    glucose-vitamin C (Dex-4) chewable tab 4 tablet  4 tablet Oral Q15 Min PRN    Or    dextrose 50% injection 50 mL  50 mL Intravenous Q15 Min PRN    Or    glucose (Dex4) 15 GM/59ML oral liquid 30 g  30 g Oral Q15 Min PRN    Or    glucose (Glutose) 40% oral gel 30 g  30 g Oral Q15 Min PRN    Or    glucose-vitamin C (Dex-4) chewable tab 8 tablet  8 tablet Oral Q15 Min PRN    acetaminophen (Tylenol Extra Strength) tab 500 mg  500 mg Oral Q4H PRN    melatonin tab 3 mg  3 mg Oral Nightly PRN    polyethylene glycol (PEG 3350) (Miralax) 17 g oral packet 17 g  17 g Oral Daily PRN    sennosides (Senokot) tab 17.2 mg  17.2 mg Oral Nightly PRN    bisacodyl (Dulcolax) 10 MG rectal suppository 10 mg  10 mg Rectal Daily PRN    ondansetron (Zofran) 4 MG/2ML injection 4 mg  4 mg Intravenous Q6H PRN    benzonatate (Tessalon) cap 200 mg  200 mg Oral TID PRN    guaiFENesin (Robitussin) 100 MG/5 ML oral liquid 200 mg  200 mg Oral Q4H PRN    glycerin-hypromellose- (Artificial Tears) 0.2-0.2-1 % ophthalmic solution 1 drop  1 drop Both Eyes QID PRN    sodium chloride (Saline Mist) 0.65 % nasal solution 1 spray  1 spray Each Nare Q3H PRN   [2]

## 2025-06-08 NOTE — PROGRESS NOTES
[facility]  part of [facility]    Progress Note    Radha Yu Patient Status:  Inpatient    1940 MRN [id number]   Location [facility] 3W/SW Attending Madison Crump MD   Hosp Day # 17 PCP Stef Velasco MD       Subjective:   Radha Yu is a(n) 84 year old female     ROS:     Constitutional: Feels okay  ENMT:  Negative for ear drainage, hearing loss and nasal drainage  Eyes:  Negative for eye discharge and vision loss  Cardiovascular:  Negative for chest pain, sob, irregular heartbeat/palpitations  Respiratory:  Negative for cough, dyspnea and wheezing  Gastrointestinal:  Negative for abdominal pain, constipation, decreased appetite, diarrhea and vomiting  Genitourinary:  Negative for dysuria and hematuria  Endocrine:  Negative for abnormal sleep patterns, increased activity, polydipsia and polyphagia  Hema/Lymph:  Negative for easy bleeding and easy bruising  Integumentary:  Negative for pruritus and rash  Musculoskeletal:  Negative for bone/joint symptoms  Neurological:  Negative for gait disturbance  Psychiatric:  Negative for inappropriate interaction and psychiatric symptoms      Vitals:    25 1328   BP: 132/43   Pulse: 81   Resp: 16   Temp: 97.9 °F (36.6 °C)           PHYSICAL EXAM:   Constitutional: appears well hydrated alert and responsive no acute distress noted  Head/Face: normocephalic  Eyes/Vision: normal extraocular motion is intact  Nose/Mouth/Throat:mucous membranes are moist and no oral lesions are noted  Neck/Thyroid: neck is supple without adenopathy  Lymphatic: no abnormal cervical, supraclavicular adenopathy is noted  Respiratory:  lungs are clear to auscultation bilaterally, normal respiratory effort  Cardiovascular: regular rate and rhythm no murmurs, gallups, or rubs  Abdomen: soft, non-tender, non-distended, BS normal  Vascular: well perfused femoral, and pedal pulses normal  Skin/Hair: no unusual rashes present, no abnormal bruising  noted  Back/Spine: no abnormalities noted  Musculoskeletal: full ROM all extremities good strength  no deformities  Extremities: no edema, cyanosis  Neurological:  Grossly normal    Results:     Laboratory Data:  Lab Results   Component Value Date    WBC 4.4 06/08/2025    HGB 9.4 (L) 06/08/2025    HCT 28.9 (L) 06/08/2025    .0 (L) 06/08/2025    CREATSERUM 2.68 (H) 06/08/2025    BUN 40 (H) 06/08/2025     (L) 06/08/2025    K 4.6 06/08/2025    CL 99 06/08/2025    CO2 26.0 06/08/2025     (H) 06/08/2025    CA 8.0 (L) 06/08/2025    ALB 3.1 (L) 06/08/2025    ALKPHO 73 05/26/2025    BILT 0.2 05/26/2025    TP 5.7 05/26/2025    AST 16 05/26/2025    ALT <7 (L) 05/26/2025    PTT 53.4 (H) 06/03/2025    INR 1.16 05/22/2025    TSH 1.993 05/27/2025    MG 1.8 06/07/2025    PHOS 2.9 06/08/2025       Imaging:  [unfilled]   No results found.      ASSESSMENT/PLAN:   Assessment       1 ESRD dialysis tomorrow  #2 weakness to go to rehab when approval    #3 femoral endarterectomy stable  #4 MI stable  #5 anemia on Epogen iron continue present plan    #6 hypertension increase beta-blocker to 25 mg twice a day         6/8/2025  Jarvis Gates MD

## 2025-06-08 NOTE — CM/SW NOTE
JAYASHREE sent updated notes via aidin to [facility]. Patient requires insurance auth which the hospital has to initiate. SW is to ask [facility] to start auth on Monday 6/9 for [facility].     Plan: Pending insurance auth, DC to [facility], *[facility] to start auth     SW/CM to remain available for support and/or discharge planning.     ANTONIA Perez, LSW   x [phone]

## 2025-06-09 VITALS
WEIGHT: 133 LBS | SYSTOLIC BLOOD PRESSURE: 117 MMHG | BODY MASS INDEX: 25 KG/M2 | OXYGEN SATURATION: 98 % | TEMPERATURE: 99 F | DIASTOLIC BLOOD PRESSURE: 37 MMHG | RESPIRATION RATE: 20 BRPM | HEART RATE: 71 BPM

## 2025-06-09 LAB
ANION GAP SERPL CALC-SCNC: 9 MMOL/L (ref 0–18)
BUN BLD-MCNC: 31 MG/DL (ref 9–23)
BUN/CREAT SERPL: 14.4 (ref 10–20)
CALCIUM BLD-MCNC: 7.9 MG/DL (ref 8.7–10.4)
CHLORIDE SERPL-SCNC: 100 MMOL/L (ref 98–112)
CO2 SERPL-SCNC: 27 MMOL/L (ref 21–32)
CREAT BLD-MCNC: 2.16 MG/DL (ref 0.55–1.02)
DEPRECATED RDW RBC AUTO: 54.6 FL (ref 35.1–46.3)
EGFRCR SERPLBLD CKD-EPI 2021: 22 ML/MIN/1.73M2 (ref 60–?)
ERYTHROCYTE [DISTWIDTH] IN BLOOD BY AUTOMATED COUNT: 17 % (ref 11–15)
GLUCOSE BLD-MCNC: 143 MG/DL (ref 70–99)
GLUCOSE BLDC GLUCOMTR-MCNC: 172 MG/DL (ref 70–99)
GLUCOSE BLDC GLUCOMTR-MCNC: 172 MG/DL (ref 70–99)
HCT VFR BLD AUTO: 28.9 % (ref 35–48)
HGB BLD-MCNC: 9.2 G/DL (ref 12–16)
MAGNESIUM SERPL-MCNC: 1.8 MG/DL (ref 1.6–2.6)
MCH RBC QN AUTO: 28.7 PG (ref 26–34)
MCHC RBC AUTO-ENTMCNC: 31.8 G/DL (ref 31–37)
MCV RBC AUTO: 90 FL (ref 80–100)
NT-PROBNP SERPL-MCNC: ABNORMAL PG/ML (ref ?–450)
OSMOLALITY SERPL CALC.SUM OF ELEC: 291 MOSM/KG (ref 275–295)
PLATELET # BLD AUTO: 161 10(3)UL (ref 150–450)
PLATELETS.RETICULATED NFR BLD AUTO: 4.5 % (ref 0–7)
POTASSIUM SERPL-SCNC: 3.8 MMOL/L (ref 3.5–5.1)
RBC # BLD AUTO: 3.21 X10(6)UL (ref 3.8–5.3)
SODIUM SERPL-SCNC: 136 MMOL/L (ref 136–145)
WBC # BLD AUTO: 3.8 X10(3) UL (ref 4–11)

## 2025-06-09 PROCEDURE — 90935 HEMODIALYSIS ONE EVALUATION: CPT | Performed by: INTERNAL MEDICINE

## 2025-06-09 PROCEDURE — 99233 SBSQ HOSP IP/OBS HIGH 50: CPT | Performed by: HOSPITALIST

## 2025-06-09 RX ORDER — METOPROLOL SUCCINATE 25 MG/1
25 TABLET, EXTENDED RELEASE ORAL
Qty: 60 TABLET | Refills: 0 | Status: SHIPPED | OUTPATIENT
Start: 2025-06-09

## 2025-06-09 NOTE — CM/SW NOTE
06/09/25 1417   Prior Authorization - Destination   Destination Type Skilled nursing facility   Service Provider [facility]   Payer Communication Destination Comments approved 6/9-6/11   Prior Authorization Status Approved   Prior Authorization Start Date 06/09/25   Prior Authorization Number [id number]     Ins auth approved 6/9-6/11.     SW/CM to remain available for support and/or discharge planning.     Regina Fuentes MSW, LSW  Discharge Planner [id number]

## 2025-06-09 NOTE — DISCHARGE SUMMARY
[facility]  part of [facility]    Discharge Summary    Radha Yu Patient Status:  Inpatient    1940 MRN [id number]   Location [facility] 3W/SW Attending Madison Crump MD   Hosp Day # 18 PCP Stef Velasco MD     Date of Admission: 2025      Date of Discharge: 25      Admitting Diagnosis: NSTEMI (non-ST elevated myocardial infarction) ([facility]) [I21.4]  Acute on chronic respiratory failure with hypoxia ([facility]) [J96.21]  Acute congestive heart failure, unspecified heart failure type ([facility]) [I50.9]  Acute renal failure superimposed on chronic kidney disease, unspecified acute renal failure type, unspecified CKD stage [N17.9, N18.9]    Hospital Discharge Diagnoses: NSTEMI    Lace+ Score: 80  59-90 High Risk  29-58 Medium Risk  0-28   Low Risk.    TCM Follow-Up Recommendation:  LACE > 58: High Risk of readmission after discharge from the hospital.          Problem List: Problem List[1]      Physical Exam:     Gen: No acute distress  Pulm: Lungs clear, normal respiratory effort  CV: Heart with regular rate and rhythm  Abd: Abdomen soft, nontender, nondistended, bowel sounds present  Neuro: No acute focal deficits  MSK: moves extremities  Skin: Warm and dry  Psych: Normal affect  Ext: no c/c/e      History of Present Illness: Radha Yu is a(n) 84 year old female with a PMH of PAD, HTN, HL, CKD stage 3 who presents with increased sob.  She was extremely tachypneic and wheezing, saturating 90% on room air.  She was given a nebulizer treatment and her oxygen sats improved to 98%.  In the ED she had a cxr that was consistent with fluid overload.  She also has a significant tobacco use history (stopped in January) and was still wheezing on exam.  Her trop was elevated and she had some EKG changes that were new.  She will be admitted.     Hospital Course:     RLE ischemia  Rt LE pain and decreased sensation, color   - plan stat ct a/p/bl le runoff to rule out dissection -  results reviewed - plan percutaneous revascularization of right SFA with possible IVL vs atherectomy as she is high risk for open vascular surgery   - per vascular : femoral endarterectomy with iliac stenting and SFA stenting and any tibial interventions to improve.   Heparin is dc   S/p right common femoral endarterectomy with iliac/SFA stenting with Dr. Vines on 6/3      NSTEMI type 1 with new ischemic cardiomyopathy acute systolic chf  Multivessel CAD  - left heart cath again 5/28 with pci, post op with rt groin wound bleeding that improved after pressure   - on asa, plavix, statin, bb     Tricuspid and mitral regurg  - volume removal per hd     PVD s/p left femoropopliteal bypass 9/2015  - 80% proximal left renal artery stenosis on angiogram   - On asa, plavix, atorvastatin      HL  - statin     HTN  - elevated - adjust oral meds - increased losartan today     Acute on chronic anemia of renal disease  - possibly baseline anemia from ckd  - heparin stopped; gi defers invasive dx till more stable  - hb stable today , ppi     acute copd exacerbation  - pulm consulted signed off no steroids; no abx  - plan nebs   - plan pulm hygiene  - wean oxygen as able - on room air     ESRD   -On HD      DM  - hypoglycemic. Tresiba and scheuled insulin dc  - accuchecks and ISS     Moderate right ICA stenosis s/p right TCAR 2/2019, totally occluded left ICA  - On asa, plavix, atorvastatin     Discharge Condition: Stable    Discharge Medications:      Discharge Medications        START taking these medications        Instructions Prescription details   acetaminophen 500 MG Tabs  Commonly known as: Tylenol Extra Strength      Take 1 tablet (500 mg total) by mouth every 4 (four) hours as needed.   Quantity: 30 tablet  Refills: 0     calcitriol 0.25 MCG Caps  Commonly known as: Rocaltrol      Take 1 capsule (0.25 mcg total) by mouth Every Monday, Wednesday, and Friday.   Quantity: 30 capsule  Refills: 0     losartan 50 MG  Tabs  Commonly known as: Cozaar      Take 1 tablet (50 mg total) by mouth daily.   Quantity: 30 tablet  Refills: 0     metoprolol succinate ER 25 MG Tb24  Commonly known as: Toprol XL      Take 0.5 tablets (12.5 mg total) by mouth Daily Beta Blocker.   Quantity: 30 tablet  Refills: 0     metoprolol succinate ER 25 MG Tb24  Commonly known as: Toprol XL      Take 1 tablet (25 mg total) by mouth 2x Daily(Beta Blocker).   Quantity: 60 tablet  Refills: 0     pantoprazole 40 MG Tbec  Commonly known as: Protonix      Take 1 tablet (40 mg total) by mouth 2 (two) times daily before meals.   Quantity: 60 tablet  Refills: 0     senna-docusate 8.6-50 MG Tabs  Commonly known as: Senokot-S      Take 2 tablets by mouth daily.   Quantity: 30 tablet  Refills: 0            CONTINUE taking these medications        Instructions Prescription details   aspirin 81 MG Tbec      Take 1 tablet (81 mg total) by mouth in the morning.   Refills: 0     atorvastatin 20 MG Tabs  Commonly known as: Lipitor  Notes to patient: This medication is recommended to be taken at bedtime.      Take 1 tablet (20 mg total) by mouth in the morning.   Refills: 0     Cholecalciferol 50 MCG (2000 UT) Caps      Take 1 capsule by mouth in the morning.   Refills: 0     clopidogrel 75 MG Tabs  Commonly known as: Plavix      Take 1 tablet (75 mg total) by mouth in the morning.   Refills: 0     Cranberry 125 MG Tabs      Take 1 tablet by mouth in the morning.   Refills: 0     escitalopram 10 MG Tabs  Commonly known as: Lexapro      Take 1 tablet (10 mg total) by mouth in the morning.   Refills: 0     insulin aspart 100 Units/mL Sopn  Commonly known as: NovoLOG      Inject 5-10 Units into the skin in the morning and 5-10 Units at noon and 5-10 Units in the evening. Inject before meals. Injecting with sliding scale as = 5 units; 150-170= 6 units; 170-190= 7 units; 190-210= 8 units; 210-230= 9 units; 230 and above= 10 units. .   Refills: 0     insulin glargine 100  UNIT/ML Soln  Commonly known as: Lantus      Inject 5 Units into the skin nightly.   Refills: 0     sodium bicarbonate 650 MG Tabs      Take 2 tablets (1,300 mg total) by mouth in the morning and 2 tablets (1,300 mg total) before bedtime.   Refills: 0            STOP taking these medications      amLODIPine 5 MG Tabs  Commonly known as: Norvasc                  Where to Get Your Medications        These medications were sent to [id number] - [place], [place] - [address] [facility] [phone], [phone]  [address] [place] [address], [facility] [address]      Phone: [phone]   calcitriol 0.25 MCG Caps  losartan 50 MG Tabs  metoprolol succinate ER 25 MG Tb24  metoprolol succinate ER 25 MG Tb24  pantoprazole 40 MG Tbec  senna-docusate 8.6-50 MG Tabs       Please  your prescriptions at the location directed by your doctor or nurse    Bring a paper prescription for each of these medications  acetaminophen 500 MG Tabs             Madison Crump MD  6/9/2025  10:11 AM    Greater than 30 minutes spent on preparation and coordination of this discharge       [1]   Patient Active Problem List  Diagnosis    Closed fracture of left hip (HCC)    Background diabetic retinopathy(362.01)    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HCC)    Essential hypertension, benign    ESRD (end stage renal disease) ([facility])    Occlusion of right carotid artery    Stenosis of left carotid artery    Atherosclerosis of native artery of right lower extremity with ulceration ([facility])    Type 2 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 not on chronic dialysis, with long-term current use of insulin ([facility])    Anemia    Depression    Acute on chronic respiratory failure with hypoxia (HCC)    Acute congestive heart failure, unspecified heart failure type (HCC)    NSTEMI (non-ST elevated myocardial infarction) ([facility])    Acute renal failure superimposed on chronic kidney disease, unspecified acute renal failure type, unspecified CKD stage    Goals  of care, counseling/discussion    Palliative care encounter

## 2025-06-09 NOTE — BH RN DISCHARGE NOTE
Patient discharged to [facility] per Mds orders. Discharged via ambulance. Ivs, tele, and wound vac removed.   Handoff report given to [facility] nurse.

## 2025-06-09 NOTE — CM/SW NOTE
06/09/25 1420   Discharge disposition   Expected discharge disposition subacute   Post Acute Care Provider Other  ([facility])   Discharge transportation [facility]     The patient received a MDO for discharge.    The patient will be transported to [facility] via [facility] at 4pm.  PCS complete.    The number to call report is [phone].  The [facility] liaison is aware of transport time.    RN to inform patient and family.    SW/CM to remain available for support and/or discharge planning.     Regina Fuentes MSW, LSW  Discharge Planner [id number]

## 2025-06-09 NOTE — PROGRESS NOTES
[facility]  part of [facility]    Progress Note    Radha Yu Patient Status:  Inpatient    1940 MRN [id number]   Location [facility] 3W/SW Attending Madison Crump MD   Hosp Day # 18 PCP Stef Velasco MD       Subjective:   Radha Yu is a(n) 84 year old female     ROS:     Constitutional: Stable no problems with dialysis today  ENMT:  Negative for ear drainage, hearing loss and nasal drainage  Eyes:  Negative for eye discharge and vision loss  Cardiovascular:  Negative for chest pain, sob, irregular heartbeat/palpitations  Respiratory:  Negative for cough, dyspnea and wheezing  Gastrointestinal:  Negative for abdominal pain, constipation, decreased appetite, diarrhea and vomiting  Genitourinary:  Negative for dysuria and hematuria  Endocrine:  Negative for abnormal sleep patterns, increased activity, polydipsia and polyphagia  Hema/Lymph:  Negative for easy bleeding and easy bruising  Integumentary:  Negative for pruritus and rash  Musculoskeletal:  Negative for bone/joint symptoms  Neurological:  Negative for gait disturbance  Psychiatric:  Negative for inappropriate interaction and psychiatric symptoms      Vitals:    25 1335   BP: 117/37   Pulse: 71   Resp: 20   Temp: 98.6 °F (37 °C)           PHYSICAL EXAM:   Constitutional: appears well hydrated alert and responsive no acute distress noted  Head/Face: normocephalic  Eyes/Vision: normal extraocular motion is intact  Nose/Mouth/Throat:mucous membranes are moist and no oral lesions are noted  Neck/Thyroid: neck is supple without adenopathy  Lymphatic: no abnormal cervical, supraclavicular adenopathy is noted  Respiratory:  lungs are clear to auscultation bilaterally, normal respiratory effort  Cardiovascular: regular rate and rhythm no murmurs, gallups, or rubs  Abdomen: soft, non-tender, non-distended, BS normal  Vascular: well perfused femoral, and pedal pulses normal  Skin/Hair: no unusual rashes present,  no abnormal bruising noted  Back/Spine: no abnormalities noted  Musculoskeletal: full ROM all extremities good strength  no deformities  Extremities: no edema, cyanosis  Neurological:  Grossly normal    Results:     Laboratory Data:  Lab Results   Component Value Date    WBC 3.8 (L) 06/09/2025    HGB 9.2 (L) 06/09/2025    HCT 28.9 (L) 06/09/2025    .0 06/09/2025    CREATSERUM 2.16 (H) 06/09/2025    BUN 31 (H) 06/09/2025     06/09/2025    K 3.8 06/09/2025     06/09/2025    CO2 27.0 06/09/2025     (H) 06/09/2025    CA 7.9 (L) 06/09/2025    ALB 3.1 (L) 06/08/2025    ALKPHO 73 05/26/2025    BILT 0.2 05/26/2025    TP 5.7 05/26/2025    AST 16 05/26/2025    ALT <7 (L) 05/26/2025    PTT 53.4 (H) 06/03/2025    INR 1.16 05/22/2025    TSH 1.993 05/27/2025    MG 1.8 06/09/2025    PHOS 2.9 06/08/2025       Imaging:  [unfilled]   No results found.      ASSESSMENT/PLAN:   Assessment       1 ESRD labs okay had dialysis today next on Wednesday  #2 weakness to go to rehab today  #3 femoral arterectomy stable  #4 MI stable  #5 anemia on iron and Epogen  #6 hypertension better with med adjustment  Okay to go to rehab today discussed with patient             6/9/2025  Jarvis Gates MD  '

## 2025-06-09 NOTE — PLAN OF CARE
Problem: CARDIOVASCULAR - ADULT  Goal: Maintains optimal cardiac output and hemodynamic stability  Description: INTERVENTIONS:  - Monitor vital signs, rhythm, and trends  - Monitor for bleeding, hypotension and signs of decreased cardiac output  - Evaluate effectiveness of vasoactive medications to optimize hemodynamic stability  - Monitor arterial and/or venous puncture sites for bleeding and/or hematoma  - Assess quality of pulses, skin color and temperature  - Assess for signs of decreased coronary artery perfusion - ex. Angina  - Evaluate fluid balance, assess for edema, trend weights  Outcome: Progressing  Goal: Absence of cardiac arrhythmias or at baseline  Description: INTERVENTIONS:  - Continuous cardiac monitoring, monitor vital signs, obtain 12 lead EKG if indicated  - Evaluate effectiveness of antiarrhythmic and heart rate control medications as ordered  - Initiate emergency measures for life threatening arrhythmias  - Monitor electrolytes and administer replacement therapy as ordered  Outcome: Progressing     Problem: RESPIRATORY - ADULT  Goal: Achieves optimal ventilation and oxygenation  Description: INTERVENTIONS:  - Assess for changes in respiratory status  - Assess for changes in mentation and behavior  - Position to facilitate oxygenation and minimize respiratory effort  - Oxygen supplementation based on oxygen saturation or ABGs  - Provide Smoking Cessation handout, if applicable  - Encourage broncho-pulmonary hygiene including cough, deep breathe, Incentive Spirometry  - Assess the need for suctioning and perform as needed  - Assess and instruct to report SOB or any respiratory difficulty  - Respiratory Therapy support as indicated  - Manage/alleviate anxiety  - Monitor for signs/symptoms of CO2 retention  Outcome: Progressing     Problem: GENITOURINARY - ADULT  Goal: Absence of urinary retention  Description: INTERVENTIONS:  - Assess patient’s ability to void and empty bladder  - Monitor  intake/output and perform bladder scan as needed  - Follow urinary retention protocol/standard of care  - Consider collaborating with pharmacy to review patient's medication profile  - Implement strategies to promote bladder emptying  Outcome: Progressing     Problem: METABOLIC/FLUID AND ELECTROLYTES - ADULT  Goal: Glucose maintained within prescribed range  Description: INTERVENTIONS:  - Monitor Blood Glucose as ordered  - Assess for signs and symptoms of hyperglycemia and hypoglycemia  - Administer ordered medications to maintain glucose within target range  - Assess barriers to adequate nutritional intake and initiate nutrition consult as needed  - Instruct patient on self management of diabetes  Outcome: Progressing  Goal: Electrolytes maintained within normal limits  Description: INTERVENTIONS:  - Monitor labs and rhythm and assess patient for signs and symptoms of electrolyte imbalances  - Administer electrolyte replacement as ordered  - Monitor response to electrolyte replacements, including rhythm and repeat lab results as appropriate  - Fluid restriction as ordered  - Instruct patient on fluid and nutrition restrictions as appropriate  Outcome: Progressing  Goal: Hemodynamic stability and optimal renal function maintained  Description: INTERVENTIONS:  - Monitor labs and assess for signs and symptoms of volume excess or deficit  - Monitor intake, output and patient weight  - Monitor urine specific gravity, serum osmolarity and serum sodium as indicated or ordered  - Monitor response to interventions for patient's volume status, including labs, urine output, blood pressure (other measures as available)  - Encourage oral intake as appropriate  - Instruct patient on fluid and nutrition restrictions as appropriate  Outcome: Progressing     Problem: GASTROINTESTINAL - ADULT  Goal: Minimal or absence of nausea and vomiting  Description: INTERVENTIONS:  - Maintain adequate hydration with IV or PO as ordered and  tolerated  - Nasogastric tube to low intermittent suction as ordered  - Evaluate effectiveness of ordered antiemetic medications  - Provide nonpharmacologic comfort measures as appropriate  - Advance diet as tolerated, if ordered  - Obtain nutritional consult as needed  - Evaluate fluid balance  Outcome: Progressing  Goal: Maintains or returns to baseline bowel function  Description: INTERVENTIONS:  - Assess bowel function  - Maintain adequate hydration with IV or PO as ordered and tolerated  - Evaluate effectiveness of GI medications  - Encourage mobilization and activity  - Obtain nutritional consult as needed  - Establish a toileting routine/schedule  - Consider collaborating with pharmacy to review patient's medication profile  Outcome: Progressing  Goal: Maintains adequate nutritional intake (undernourished)  Description: INTERVENTIONS:  - Monitor percentage of each meal consumed  - Identify factors contributing to decreased intake, treat as appropriate  - Assist with meals as needed  - Monitor I&O, WT and lab values  - Obtain nutritional consult as needed  - Optimize oral hygiene and moisture  - Encourage food from home; allow for food preferences  - Enhance eating environment  Outcome: Progressing     Problem: MUSCULOSKELETAL - ADULT  Goal: Return mobility to safest level of function  Description: INTERVENTIONS:  - Assess patient stability and activity tolerance for standing, transferring and ambulating w/ or w/o assistive devices  - Assist with transfers and ambulation using safe patient handling equipment as needed  - Ensure adequate protection for wounds/incisions during mobilization  - Obtain PT/OT consults as needed  - Advance activity as appropriate  - Communicate ordered activity level and limitations with patient/family  Outcome: Progressing     Problem: Patient Centered Care  Goal: Patient preferences are identified and integrated in the patient's plan of care  Description: Interventions:  - What  would you like us to know as we care for you?   - Provide timely, complete, and accurate information to patient/family  - Incorporate patient and family knowledge, values, beliefs, and cultural backgrounds into the planning and delivery of care  - Encourage patient/family to participate in care and decision-making at the level they choose  - Honor patient and family perspectives and choices  Outcome: Progressing     Problem: Diabetes/Glucose Control  Goal: Glucose maintained within prescribed range  Description: INTERVENTIONS:  - Monitor Blood Glucose as ordered  - Assess for signs and symptoms of hyperglycemia and hypoglycemia  - Administer ordered medications to maintain glucose within target range  - Assess barriers to adequate nutritional intake and initiate nutrition consult as needed  - Instruct patient on self management of diabetes  Outcome: Progressing     Problem: Patient/Family Goals  Goal: Patient/Family Long Term Goal  Description: Patient's Long Term Goal: discharge    Interventions:  - Monitor vital signs  - Monitor wound dressings  - Monitor labs  - See additional Care Plan goals for specific interventions  Outcome: Progressing  Goal: Patient/Family Short Term Goal  Description: Patient's Short Term Goal: feel better    Interventions:   - Continue ADLs  - Ambulate as tolerated  - See additional Care Plan goals for specific interventions  Outcome: Progressing     Problem: SKIN/TISSUE INTEGRITY - ADULT  Goal: Skin integrity remains intact  Description: INTERVENTIONS  - Assess and document risk factors for pressure ulcer development  - Assess and document skin integrity  - Monitor for areas of redness and/or skin breakdown  - Initiate interventions, skin care algorithm/standards of care as needed  Outcome: Progressing  Goal: Incision(s), wounds(s) or drain site(s) healing without S/S of infection  Description: INTERVENTIONS:  - Assess and document risk factors for pressure ulcer development  - Assess  and document skin integrity  - Assess and document dressing/incision, wound bed, drain sites and surrounding tissue  - Implement wound care per orders  - Initiate isolation precautions as appropriate  - Initiate Pressure Ulcer prevention bundle as indicated  Outcome: Progressing  Goal: Oral mucous membranes remain intact  Description: INTERVENTIONS  - Assess oral mucosa and hygiene practices  - Implement preventative oral hygiene regimen  - Implement oral medicated treatments as ordered  Outcome: Progressing     Problem: HEMATOLOGIC - ADULT  Goal: Free from bleeding injury  Description: (Example usage: patient with low platelets)  INTERVENTIONS:  - Avoid intramuscular injections, enemas and rectal medication administration  - Ensure safe mobilization of patient  - Hold pressure on venipuncture sites to achieve adequate hemostasis  - Assess for signs and symptoms of internal bleeding  - Monitor lab trends  - Patient is to report abnormal signs of bleeding to staff  - Avoid use of toothpicks and dental floss  - Use electric shaver for shaving  - Use soft bristle tooth brush  - Limit straining and forceful nose blowing  Outcome: Progressing

## 2025-06-09 NOTE — PROGRESS NOTES
[facility]  part of [facility]    Progress Note    Radha Yu Patient Status:  Inpatient    1940 MRN [id number]   Location [facility]5W Attending Villa Angel MD   Hosp Day # 18 PCP Stef Velasco MD     Chief complaint     Subjective:   Radha Yu is a(n) 84 year old female who came in with chest pain and sob.     Seen HD today.  Discussed likely wound vac removal today and possible dc to [facility].  No acute events overnight.  No chest pain or sob.  No n/v.      A comprehensive 10 point review of systems was completed.  Pertinent positives and negatives noted in the the HPI.    Objective:     Blood pressure 155/67, pulse 68, temperature 98.2 °F (36.8 °C), temperature source Oral, resp. rate 20, weight 133 lb (60.3 kg), SpO2 97%.      Intake/Output Summary (Last 24 hours) at 2025 1009  Last data filed at 2025 0600  Gross per 24 hour   Intake 530 ml   Output 900 ml   Net -370 ml         Patient Weight(s) for the past 336 hrs:   Weight   25 0503 133 lb (60.3 kg)   25 0423 135 lb (61.2 kg)   25 0800 137 lb (62.1 kg)   25 0400 130 lb (59 kg)   25 2000 132 lb (59.9 kg)   25 0336 122 lb 3.2 oz (55.4 kg)   25 0454 121 lb 12.8 oz (55.2 kg)   25 0310 121 lb 14.4 oz (55.3 kg)   25 0500 123 lb 4.8 oz (55.9 kg)   25 0555 121 lb 12.8 oz (55.2 kg)   25 0558 121 lb (54.9 kg)   25 0402 120 lb 14.4 oz (54.8 kg)   25 0617 116 lb 9.6 oz (52.9 kg)           Gen: uncomfortable  Pulm: Lungs clear, normal respiratory effort  CV: Heart with regular rate and rhythm  Abd: Abdomen soft, nontender, nondistended, bowel sounds present  Neuro: No acute focal deficits  MSK: moves extremities  Skin: Warm and dry  Psych: Normal affect  Ext: rt le looks paler compared to left, sensation intact but decreased, rt groin wound looks good, rt pedal pulse heard with doppler          Medicines:     Current Hospital  Medications[1]            Blood Sugar Medications            insulin aspart 100 Units/mL Subcutaneous Solution Pen-injector    insulin glargine 100 UNIT/ML Subcutaneous Solution            Blood Pressure and Cardiac Medications            losartan 50 MG Oral Tab    metoprolol succinate ER 25 MG Oral Tablet 24 Hr    amLODIPine 5 MG Oral Tab            Medication Infusions[2]        Lab Results   Component Value Date    WBC 3.8 (L) 06/09/2025    HGB 9.2 (L) 06/09/2025    HCT 28.9 (L) 06/09/2025    .0 06/09/2025    CREATSERUM 2.16 (H) 06/09/2025    BUN 31 (H) 06/09/2025     06/09/2025    K 3.8 06/09/2025     06/09/2025    CO2 27.0 06/09/2025     (H) 06/09/2025    CA 7.9 (L) 06/09/2025    ALB 3.1 (L) 06/08/2025    ALKPHO 73 05/26/2025    BILT 0.2 05/26/2025    TP 5.7 05/26/2025    AST 16 05/26/2025    ALT <7 (L) 05/26/2025    PTT 53.4 (H) 06/03/2025    INR 1.16 05/22/2025    TSH 1.993 05/27/2025    MG 1.8 06/09/2025    PHOS 2.9 06/08/2025       No results found.            Results:     CBC:    Lab Results   Component Value Date    WBC 3.8 (L) 06/09/2025    WBC 4.4 06/08/2025    WBC 5.6 06/07/2025     Lab Results   Component Value Date    HGB 9.2 (L) 06/09/2025    HGB 9.4 (L) 06/08/2025    HGB 9.2 (L) 06/07/2025      Lab Results   Component Value Date    .0 06/09/2025    .0 (L) 06/08/2025    .0 (L) 06/07/2025       Recent Labs   Lab 06/07/25  0549 06/08/25  0530 06/09/25  0858   GLU 87 122* 143*   BUN 29* 40* 31*   CREATSERUM 2.30* 2.68* 2.16*   CA 7.8* 8.0* 7.9*    134* 136   K 4.4 4.6 3.8   CL 99 99 100   CO2 29.0 26.0 27.0           Assessment and Plan:      RLE ischemia  Rt LE pain and decreased sensation, color   - plan stat ct a/p/bl le runoff to rule out dissection - results reviewed - plan percutaneous revascularization of right SFA with possible IVL vs atherectomy as she is high risk for open vascular surgery   - per vascular : femoral endarterectomy with  iliac stenting and SFA stenting and any tibial interventions to improve.   Heparin is dc   S/p right common femoral endarterectomy with iliac/SFA stenting with Dr. Vines on 6/3 - wound vac will be removed on Monday and insurance auth has been resubmitted     NSTEMI type 1 with new ischemic cardiomyopathy acute systolic chf  Multivessel CAD  - left heart cath again 5/28 with pci, post op with rt groin wound bleeding that improved after pressure   - on asa, plavix, statin, bb     Tricuspid and mitral regurg  - volume removal per hd     PVD s/p left femoropopliteal bypass 9/2015  - 80% proximal left renal artery stenosis on angiogram   - On asa, plavix, atorvastatin      HL  - statin     HTN  - elevated - adjust oral meds - increased losartan today     Acute on chronic anemia of renal disease  - possibly baseline anemia from ckd  - heparin stopped; gi defers invasive dx till more stable  - hb stable today , ppi     acute copd exacerbation  - pulm consulted signed off no steroids; no abx  - plan nebs scheduled and prn  - plan pulm hygiene  - wean oxygen as able     ESRD   -On HD     DM  - hypoglycemic this am. Tresiba and scheuled insulin dc  - accuchecks and ISS    Moderate right ICA stenosis s/p right TCAR 2/2019, totally occluded left ICA  - On asa, plavix, atorvastatin                       high mdm; coordinating care with nurse and counseling pt and with her permission her nephew in room  about chf/sob/diet/cath/dialysis                 Greater than 55 minutes spent, Greater than 50% spent counseling and in coordination of care, reviewing labs, discussing results with patient, coordinating care with care team and ordering labs for tomorrow and adjusting medications as needed                Supplementary Documentation:   DVT Mechanical Prophylaxis:   SCDs, Early ambuation  DVT Pharmacologic Prophylaxis   Medication    heparin (Porcine) 1000 UNIT/ML injection 1,500 Units    heparin (Porcine) 1000 UNIT/ML injection  1,500 Units    heparin (Porcine) 5000 UNIT/ML injection 5,000 Units         DVT Pharmacologic prophylaxis: Aspirin 81 mg      Code Status: Full Code  Rao: External urinary catheter in place  Rao Duration (in days):   Central line:    FRANCIS: 6/9/2025         [1]   Current Facility-Administered Medications   Medication Dose Route Frequency    losartan (Cozaar) tab 50 mg  50 mg Oral Daily    metoprolol succinate ER (Toprol XL) 24 hr tab 25 mg  25 mg Oral 2x Daily(Beta Blocker)    heparin (Porcine) 1000 UNIT/ML injection 1,500 Units  1.5 mL Intravenous PRN Dialysis    sodium chloride 0.9 % IV bolus 100 mL  100 mL Intravenous Q30 Min PRN    And    albumin human (Albumin) 25% injection 25 g  25 g Intravenous PRN Dialysis    insulin aspart (NovoLOG) 100 Units/mL FlexPen 1-7 Units  1-7 Units Subcutaneous TID CC and HS    HYDROcodone-acetaminophen (Norco) 5-325 MG per tab 2 tablet  2 tablet Oral Q6H PRN    senna-docusate (Senokot-S) 8.6-50 MG per tab 2 tablet  2 tablet Oral Daily    heparin (Porcine) 1000 UNIT/ML injection 1,500 Units  1.5 mL Intracatheter PRN Dialysis    hydrALAzine (Apresoline) 20 mg/mL injection 10 mg  10 mg Intravenous Q6H PRN    ondansetron (Zofran) 4 MG/2ML injection 4 mg  4 mg Intravenous Q6H PRN    heparin (Porcine) 5000 UNIT/ML injection 5,000 Units  5,000 Units Subcutaneous Q8H BLANE    HYDROmorphone (Dilaudid) 1 MG/ML injection 0.4 mg  0.4 mg Intravenous Q2H PRN    Or    HYDROmorphone (Dilaudid) 1 MG/ML injection 0.8 mg  0.8 mg Intravenous Q2H PRN    Or    HYDROmorphone (Dilaudid) 1 MG/ML injection 1.2 mg  1.2 mg Intravenous Q2H PRN    epoetin brittany (Epogen, Procrit) 61736 UNIT/ML injection 10,000 Units  10,000 Units Subcutaneous Once per day on Monday Wednesday Friday    metoclopramide (Reglan) 5 mg/mL injection 5 mg  5 mg Intravenous Daily PRN    atorvastatin (Lipitor) tab 40 mg  40 mg Oral Nightly    clopidogrel (Plavix) tab 75 mg  75 mg Oral Daily    aspirin DR tab 81 mg  81 mg Oral Daily     pantoprazole (Protonix) DR tab 40 mg  40 mg Oral BID AC    calcitriol (Rocaltrol) cap 0.25 mcg  0.25 mcg Oral Q MWF    ipratropium-albuterol (Duoneb) 0.5-2.5 (3) MG/3ML inhalation solution 3 mL  3 mL Nebulization Q6H PRN    HYDROcodone-acetaminophen (Norco) 5-325 MG per tab 1 tablet  1 tablet Oral Q6H PRN    escitalopram (Lexapro) tab 10 mg  10 mg Oral Daily    glucose (Dex4) 15 GM/59ML oral liquid 15 g  15 g Oral Q15 Min PRN    Or    glucose (Glutose) 40% oral gel 15 g  15 g Oral Q15 Min PRN    Or    glucose-vitamin C (Dex-4) chewable tab 4 tablet  4 tablet Oral Q15 Min PRN    Or    dextrose 50% injection 50 mL  50 mL Intravenous Q15 Min PRN    Or    glucose (Dex4) 15 GM/59ML oral liquid 30 g  30 g Oral Q15 Min PRN    Or    glucose (Glutose) 40% oral gel 30 g  30 g Oral Q15 Min PRN    Or    glucose-vitamin C (Dex-4) chewable tab 8 tablet  8 tablet Oral Q15 Min PRN    acetaminophen (Tylenol Extra Strength) tab 500 mg  500 mg Oral Q4H PRN    melatonin tab 3 mg  3 mg Oral Nightly PRN    polyethylene glycol (PEG 3350) (Miralax) 17 g oral packet 17 g  17 g Oral Daily PRN    sennosides (Senokot) tab 17.2 mg  17.2 mg Oral Nightly PRN    bisacodyl (Dulcolax) 10 MG rectal suppository 10 mg  10 mg Rectal Daily PRN    ondansetron (Zofran) 4 MG/2ML injection 4 mg  4 mg Intravenous Q6H PRN    benzonatate (Tessalon) cap 200 mg  200 mg Oral TID PRN    guaiFENesin (Robitussin) 100 MG/5 ML oral liquid 200 mg  200 mg Oral Q4H PRN    glycerin-hypromellose- (Artificial Tears) 0.2-0.2-1 % ophthalmic solution 1 drop  1 drop Both Eyes QID PRN    sodium chloride (Saline Mist) 0.65 % nasal solution 1 spray  1 spray Each Nare Q3H PRN   [2]

## 2025-06-09 NOTE — CM/SW NOTE
06/09/25 1056   Prior Authorization - Destination   Destination Type Skilled nursing facility   Service Provider [facility]   Payer Communication Destination Comments [facility] ID [id number]   Prior Authorization Status Submitted/Pending     Ins auth pending for [facility].     Social work will follow up.    SW/CM to remain available for support and/or discharge planning.     Regina Fuentes MSW, LSW  Discharge Planner [id number]

## 2025-06-10 ENCOUNTER — INITIAL APN SNF VISIT (OUTPATIENT)
Facility: SKILLED NURSING FACILITY | Age: 85
End: 2025-06-10

## 2025-06-10 VITALS
RESPIRATION RATE: 10 BRPM | WEIGHT: 133 LBS | BODY MASS INDEX: 25 KG/M2 | OXYGEN SATURATION: 92 % | TEMPERATURE: 99 F | HEART RATE: 76 BPM | DIASTOLIC BLOOD PRESSURE: 60 MMHG | SYSTOLIC BLOOD PRESSURE: 159 MMHG

## 2025-06-10 DIAGNOSIS — Z79.4 TYPE 2 DIABETES MELLITUS WITH STAGE 5 CHRONIC KIDNEY DISEASE NOT ON CHRONIC DIALYSIS, WITH LONG-TERM CURRENT USE OF INSULIN (HCC): ICD-10-CM

## 2025-06-10 DIAGNOSIS — I21.4 NSTEMI (NON-ST ELEVATED MYOCARDIAL INFARCTION) (HCC): ICD-10-CM

## 2025-06-10 DIAGNOSIS — N18.5 TYPE 2 DIABETES MELLITUS WITH STAGE 5 CHRONIC KIDNEY DISEASE NOT ON CHRONIC DIALYSIS, WITH LONG-TERM CURRENT USE OF INSULIN (HCC): ICD-10-CM

## 2025-06-10 DIAGNOSIS — I70.538: Primary | ICD-10-CM

## 2025-06-10 DIAGNOSIS — E11.22 TYPE 2 DIABETES MELLITUS WITH STAGE 5 CHRONIC KIDNEY DISEASE NOT ON CHRONIC DIALYSIS, WITH LONG-TERM CURRENT USE OF INSULIN (HCC): ICD-10-CM

## 2025-06-10 DIAGNOSIS — R53.1 WEAKNESS GENERALIZED: ICD-10-CM

## 2025-06-10 DIAGNOSIS — D64.9 ANEMIA, UNSPECIFIED TYPE: ICD-10-CM

## 2025-06-10 DIAGNOSIS — I70.239 ATHEROSCLEROSIS OF NATIVE ARTERY OF RIGHT LOWER EXTREMITY WITH ULCERATION, UNSPECIFIED ULCERATION SITE (HCC): ICD-10-CM

## 2025-06-10 DIAGNOSIS — N18.4 STAGE 4 CHRONIC KIDNEY DISEASE (HCC): ICD-10-CM

## 2025-06-10 DIAGNOSIS — I50.9 CONGESTIVE HEART FAILURE, UNSPECIFIED HF CHRONICITY, UNSPECIFIED HEART FAILURE TYPE (HCC): ICD-10-CM

## 2025-06-10 DIAGNOSIS — R53.81 PHYSICAL DECONDITIONING: ICD-10-CM

## 2025-06-10 PROCEDURE — 3077F SYST BP >= 140 MM HG: CPT | Performed by: NURSE PRACTITIONER

## 2025-06-10 PROCEDURE — 1111F DSCHRG MED/CURRENT MED MERGE: CPT | Performed by: NURSE PRACTITIONER

## 2025-06-10 PROCEDURE — 3078F DIAST BP <80 MM HG: CPT | Performed by: NURSE PRACTITIONER

## 2025-06-10 PROCEDURE — 1159F MED LIST DOCD IN RCRD: CPT | Performed by: NURSE PRACTITIONER

## 2025-06-10 PROCEDURE — 1160F RVW MEDS BY RX/DR IN RCRD: CPT | Performed by: NURSE PRACTITIONER

## 2025-06-10 PROCEDURE — 99310 SBSQ NF CARE HIGH MDM 45: CPT | Performed by: NURSE PRACTITIONER

## 2025-06-10 NOTE — PAYOR COMM NOTE
--------------  DISCHARGE REVIEW    Payor: [employer] [facility]  Subscriber #:  [id number]  Authorization Number: [id number]    Admit date: 25  Admit time:   2:36 PM  Discharge Date: 2025  4:46 PM  SNF     Admitting Physician: Madison Fuentes MD  Attending Physician:  No att. providers found  Primary Care Physician: Stef Velasco MD          Discharge Summary Notes        Discharge Summary signed by Madison Fuentes MD at 2025 10:13 AM       Author: Madison Fuentes MD Specialty: HOSPITALIST, Internal Medicine Author Type: Physician    Filed: 2025 10:13 AM Date of Service: 2025 10:11 AM Status: Signed    : Madison Fuentes MD (Physician)           [facility]  part of [facility]    Discharge Summary    Radha Yu Patient Status:  Inpatient    1940 MRN [id number]   Location [facility] [place] 3W/SW Attending Madison Fuentes MD   Hosp Day # 18 PCP Stef Velasco MD     Date of Admission: 2025      Date of Discharge: 25      Admitting Diagnosis: NSTEMI (non-ST elevated myocardial infarction) (HCC) [I21.4]  Acute on chronic respiratory failure with hypoxia (HCC) [J96.21]  Acute congestive heart failure, unspecified heart failure type (HCC) [I50.9]  Acute renal failure superimposed on chronic kidney disease, unspecified acute renal failure type, unspecified CKD stage [N17.9, N18.9]    Hospital Discharge Diagnoses: NSTEMI    Lace+ Score: 80  59-90 High Risk  29-58 Medium Risk  0-28   Low Risk.    TCM Follow-Up Recommendation:  LACE > 58: High Risk of readmission after discharge from the hospital.          Problem List: Problem List[1]      Physical Exam:     Gen: No acute distress  Pulm: Lungs clear, normal respiratory effort  CV: Heart with regular rate and rhythm  Abd: Abdomen soft, nontender, nondistended, bowel sounds present  Neuro: No acute focal deficits  MSK: moves extremities  Skin: Warm and dry  Psych: Normal  affect  Ext: no c/c/e      History of Present Illness: Radha Yu is a(n) 84 year old female with a PMH of PAD, HTN, HL, CKD stage 3 who presents with increased sob.  She was extremely tachypneic and wheezing, saturating 90% on room air.  She was given a nebulizer treatment and her oxygen sats improved to 98%.  In the ED she had a cxr that was consistent with fluid overload.  She also has a significant tobacco use history (stopped in January) and was still wheezing on exam.  Her trop was elevated and she had some EKG changes that were new.  She will be admitted.     Hospital Course:     RLE ischemia  Rt LE pain and decreased sensation, color   - plan stat ct a/p/bl le runoff to rule out dissection - results reviewed - plan percutaneous revascularization of right SFA with possible IVL vs atherectomy as she is high risk for open vascular surgery   - per vascular : femoral endarterectomy with iliac stenting and SFA stenting and any tibial interventions to improve.   Heparin is dc   S/p right common femoral endarterectomy with iliac/SFA stenting with Dr. Vines on 6/3      NSTEMI type 1 with new ischemic cardiomyopathy acute systolic chf  Multivessel CAD  - left heart cath again 5/28 with pci, post op with rt groin wound bleeding that improved after pressure   - on asa, plavix, statin, bb     Tricuspid and mitral regurg  - volume removal per hd     PVD s/p left femoropopliteal bypass 9/2015  - 80% proximal left renal artery stenosis on angiogram   - On asa, plavix, atorvastatin      HL  - statin     HTN  - elevated - adjust oral meds - increased losartan today     Acute on chronic anemia of renal disease  - possibly baseline anemia from ckd  - heparin stopped; gi defers invasive dx till more stable  - hb stable today , ppi     acute copd exacerbation  - pulm consulted signed off no steroids; no abx  - plan nebs   - plan pulm hygiene  - wean oxygen as able - on room air     ESRD   -On HD      DM  - hypoglycemic. Tresiba  and scheuled insulin dc  - accuchecks and ISS     Moderate right ICA stenosis s/p right TCAR 2/2019, totally occluded left ICA  - On asa, plavix, atorvastatin     Discharge Condition: Stable    Discharge Medications:      Discharge Medications        START taking these medications        Instructions Prescription details   acetaminophen 500 MG Tabs  Commonly known as: Tylenol Extra Strength      Take 1 tablet (500 mg total) by mouth every 4 (four) hours as needed.   Quantity: 30 tablet  Refills: 0     calcitriol 0.25 MCG Caps  Commonly known as: Rocaltrol      Take 1 capsule (0.25 mcg total) by mouth Every Monday, Wednesday, and Friday.   Quantity: 30 capsule  Refills: 0     losartan 50 MG Tabs  Commonly known as: Cozaar      Take 1 tablet (50 mg total) by mouth daily.   Quantity: 30 tablet  Refills: 0     metoprolol succinate ER 25 MG Tb24  Commonly known as: Toprol XL      Take 0.5 tablets (12.5 mg total) by mouth Daily Beta Blocker.   Quantity: 30 tablet  Refills: 0     metoprolol succinate ER 25 MG Tb24  Commonly known as: Toprol XL      Take 1 tablet (25 mg total) by mouth 2x Daily(Beta Blocker).   Quantity: 60 tablet  Refills: 0     pantoprazole 40 MG Tbec  Commonly known as: Protonix      Take 1 tablet (40 mg total) by mouth 2 (two) times daily before meals.   Quantity: 60 tablet  Refills: 0     senna-docusate 8.6-50 MG Tabs  Commonly known as: Senokot-S      Take 2 tablets by mouth daily.   Quantity: 30 tablet  Refills: 0            CONTINUE taking these medications        Instructions Prescription details   aspirin 81 MG Tbec      Take 1 tablet (81 mg total) by mouth in the morning.   Refills: 0     atorvastatin 20 MG Tabs  Commonly known as: Lipitor  Notes to patient: This medication is recommended to be taken at bedtime.      Take 1 tablet (20 mg total) by mouth in the morning.   Refills: 0     Cholecalciferol 50 MCG (2000 UT) Caps      Take 1 capsule by mouth in the morning.   Refills: 0     clopidogrel  75 MG Tabs  Commonly known as: Plavix      Take 1 tablet (75 mg total) by mouth in the morning.   Refills: 0     Cranberry 125 MG Tabs      Take 1 tablet by mouth in the morning.   Refills: 0     escitalopram 10 MG Tabs  Commonly known as: Lexapro      Take 1 tablet (10 mg total) by mouth in the morning.   Refills: 0     insulin aspart 100 Units/mL Sopn  Commonly known as: NovoLOG      Inject 5-10 Units into the skin in the morning and 5-10 Units at noon and 5-10 Units in the evening. Inject before meals. Injecting with sliding scale as = 5 units; 150-170= 6 units; 170-190= 7 units; 190-210= 8 units; 210-230= 9 units; 230 and above= 10 units. .   Refills: 0     insulin glargine 100 UNIT/ML Soln  Commonly known as: Lantus      Inject 5 Units into the skin nightly.   Refills: 0     sodium bicarbonate 650 MG Tabs      Take 2 tablets (1,300 mg total) by mouth in the morning and 2 tablets (1,300 mg total) before bedtime.   Refills: 0            STOP taking these medications      amLODIPine 5 MG Tabs  Commonly known as: Norvasc                  Where to Get Your Medications        These medications were sent to [facility] [address] [place], [place] - [address] [place] [address] [phone], [phone]  [address] [place] [id number]      Phone: [phone]   calcitriol 0.25 MCG Caps  losartan 50 MG Tabs  metoprolol succinate ER 25 MG Tb24  metoprolol succinate ER 25 MG Tb24  pantoprazole 40 MG Tbec  senna-docusate 8.6-50 MG Tabs       Please  your prescriptions at the location directed by your doctor or nurse    Bring a paper prescription for each of these medications  acetaminophen 500 MG Tabs             Madison Crump MD  6/9/2025  10:11 AM    Greater than 30 minutes spent on preparation and coordination of this discharge      Electronically signed by Madison Crump MD on 6/9/2025 10:13 AM       Ins auth pending for [facility].      Social work will follow up.     SW/GARRETT  to remain available for support and/or discharge planning.      Regina KNIGHT, ABRAMW  Discharge Planner [id number]               Electronically signed by Regina Fuentes LSW at 6/9/2025 10:57 AM         [1]   Patient Active Problem List  Diagnosis    Closed fracture of left hip (HCC)    Background diabetic retinopathy(362.01)    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HCC)    Essential hypertension, benign    ESRD (end stage renal disease) (HCC)    Occlusion of right carotid artery    Stenosis of left carotid artery    Atherosclerosis of native artery of right lower extremity with ulceration (HCC)    Type 2 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 not on chronic dialysis, with long-term current use of insulin (HCC)    Anemia    Depression    Acute on chronic respiratory failure with hypoxia (HCC)    Acute congestive heart failure, unspecified heart failure type (HCC)    NSTEMI (non-ST elevated myocardial infarction) (HCC)    Acute renal failure superimposed on chronic kidney disease, unspecified acute renal failure type, unspecified CKD stage    Goals of care, counseling/discussion    Palliative care encounter

## 2025-06-10 NOTE — PROGRESS NOTES
6/10/25 APN Initial Encounter 10:00 am  Radha Yu.   1940.    Patient seen for follow up s/p Right Common Femoral Endarterectomy with Iliac Stenting and SFA, ESRD on dialysis, Diabetes, HTN.    Mrs. Yu seen at bedside lying in bed. Patient is awake, alert and orientated X 3.  Patient c/o right groin and left groin pain.  R groin staples intact with right lower leg pain and numbness.  Left dialysis catheter in place and reports she has dialysis on Monday,  and .   Insulin sliding scale confirmed for nursing staff.  Patient reports she feels weak and hopes to return home after rehab.    Hospital Discharge Diagnoses:  NSTEMI  Acute on Chronic respiratory failure w/ Hypoxia  Acute CHF, unspecified type.  ESRD - hemodialysis    Radha Yu  : 1940 . 84 year old  female is admitted to [facility] for hemodialysis 3 x week, [facility] for rehabilitation and strengthening.      [facility] Admission  25 to 25    Chief complaint: on admission to [facility], had SOB with wheezing.      [facility] nose    Radha Yu is a(n) 84 year old female with a PMH of PAD, HTN, HL, CKD stage 3 who presents with increased sob. She was extremely tachypneic and wheezing, saturating 90% on room air. She was given a nebulizer treatment and her oxygen sats improved to 98%. In the ED she had a cxr that was consistent with fluid overload. She also has a significant tobacco use history (stopped in January) and was still wheezing on exam. Her trop was elevated and she had some EKG changes that were new. She will be admitted.     Past Medical History[1]  Past Surgical History[2]  Family History[3]  Short Social Hx on File[4]    Immunization History   Administered Date(s) Administered    FLUZONE 6 months and older PFS 0.5 ml (85460) 2016    Pneumococcal (Prevnar 7) 2014    Pneumococcal Conjugate PCV20 2023    TDAP 2015      ALLERGIES: Levaquin.  Allergies[5]    CODE STATUS:  Full  Code    ADVANCED CARE PLANNING TEAM: Will need family care plan updates with nephritchie Gallagher.  Patient reports she lives with 2 nephews and wife.  Patient's states Elliott is HCPOA.  They live in [place] with stairs to enter.      CURRENT MEDICATIONS - reviewed and updated on SNF EMAR  Tylenol 500 mg q 4 hrs prn  Calcitriol 0.25 mg one q M/W/F.  Losartan 50 mg aily  Metoprolol Succ ER 37.5 mg daily  Pantoprazole 40 mg 2 x daily\  Senna 8.6-50 - 2 tablets daily  ASA 81 mg daily  Atorvastatin 20 mg daily  Cholecalciferol 2000 UT daily  Plavix 75 mg daily  Cranberry 125 mg - 1 daily  Lexapro 10 mg qam  Insulin - Glargine 5 units at hs  Asparte sliding scale as directed  Sodium Bicarb 650 mg - 2 tablets qam and 2 tablets at hs     SUBJECTIVE: today denies chest pain, SOB, cough.  Denies n/v/d/c.  C/O right groin pain, left groin pain and right leg numbness.      PHYSICAL EXAM: 159/60. P 76. RR 20. POx 92%. T 98.6. wght 133#.    GENERAL HEALTH:well developed, well nourished, in no apparent distress   LINES, TUBES, DRAINS:   R chest medi-port not accessed.  Left jugular dialysis catheter.   SKIN: pale, warm, dry  WOUND: . Right groin staples intact, no drainage.  Left groin dressing intact, no drainage  EYES: PERRLA, EOMI, sclera anicteric, conjunctiva normal; there is no nystagmus, no drainage from eyes  HENT: normocephalic; normal nose, no nasal drainage, mucous membranes pink, moist, pharynx no exudate, no visible cerumen.   NECK: supple  BREAST:  HX: left breast cancer  RESPIRATORY: lungs clear  + COPD  CARDIOVASCULAR:  RRR, Rate 76  ABDOMEN:  normal active BS+, soft, nondistended; no bruits; nontender, no guarding, no rebound tenderness.  :Deferred  LYMPHATIC:no lymphedema  MUSCULOSKELETAL: no acute synovitis upper or lower extremity   EXTREMITIES/VASCULAR: R groin staples, L dressing. R leg and heel pain.  NEUROLOGIC:follows commands  PSYCHIATRIC: alert and oriented x 3; affect appropriate  pleasant and  cooperative    MEDICAL DECISION : Patient is making her own decisions; nephews assist w/ medical decisions.  Nephew Elliott Madrid  [phone], per patient is her HCPOA.    Lab Results   Component Value Date    WBC 3.8 (L) 06/09/2025    RBC 3.21 (L) 06/09/2025    HGB 9.2 (L) 06/09/2025    HCT 28.9 (L) 06/09/2025    MCV 90.0 06/09/2025    MCH 28.7 06/09/2025    MCHC 31.8 06/09/2025    RDW 17.0 (H) 06/09/2025    .0 06/09/2025     Lab Results   Component Value Date     (H) 06/09/2025    BUN 31 (H) 06/09/2025    CREATSERUM 2.16 (H) 06/09/2025    BUNCREA 14.4 06/09/2025    ANIONGAP 9 06/09/2025    GFRAA 21 (L) 02/06/2020    GFRAA 21 (L) 02/06/2020    GFRNAA 18 (L) 02/06/2020    GFRNAA 18 (L) 02/06/2020    CA 7.9 (L) 06/09/2025     06/09/2025    K 3.8 06/09/2025     06/09/2025    CO2 27.0 06/09/2025    OSMOCALC 291 06/09/2025     See Plan Below    RLE Ischemia  Right LE pain and, decreased sensation.  S/P right common femoral Endarterectomy      Iliac/SFA stenting - Dr. Vines 6/3/25  Monitor R groin staples  Tylenol 500 q 4 hrs prn  ASA 81 mg daily  Elevate legs on pillow  F/U cardiology  PT / OT eval and treat    NSTEMI type 1 with new ischemic cardiomyopathy  Acute CHF  Multivessel CAD  L heart cath 5/28 /25 w/ pci  Aspirin 81 mg daily  Plavix daily  Atorvastatin 20 mg q hs  Metoprolol Succ 37.5 mg daily  F/U with cardiology    Tricuspid and Mitral Regurgitation  Volume removal by hemodialysis  Dialysis 3 x week at [facility] PVD s/p left femoropopliteal bypass 9/2015  -80% proximal L renal artery stenosis on angiogram  Continue aspirin 81 mg daily  Continue plavix 75 mg daily  Continue atorvastatin 20 mg q hs  F/U with cardiology  PT/OT eval and treat    Hyperlipidemia  Continue atorvastatin 20 mg q hs    HTN  Continue Losartan 50 mg daily  Continue Metoprolol Succ 37.5 mg daily    Anemia  Hospital Hgb 9.2 on 6/9/25  Trend weekly labs  Protonix 40 mg 2 x day    ESRD - continue  hemodialysis 3 x week  F/U with renal    IDDM  Glargine Insulin 5 units nightly  Asparte - follow sliding scale    COPD - no scheduled medications  Oxygen prn    Moderate Right ICA stenosis s/p R TCAR 2019  Totally occluded L ICA  Continue aspirin 81 mg daily  Continue Plavix 75 mg daily  Continue Atorvastatin 20 mg q hs    Weakness:  PT/OT    Disposition:  Patient states she would like to return to her home in [place] with family    Follow Up with [facility] - Dr. Lizzie Osborne  Follow up wit Vascular Surgeon Dr. Vines  Follow up with Renal - Dr. Gates  *Follow-Up with PCP within 1-2 weeks following [facility] discharge.     Future Appointments   Date Time Provider Department Center   6/27/2025 12:30 PM Stef Velasco MD [facility]     *Greater than 45 minutes spent w/ patient and staff, including but not limited to/ reviewing present status, needs, abilities with disciplines, reviewing medical records, vital signs, labs, completing med rec and entering orders to establish plan of care in [facility]  Note to patient: The 21st Century Cures Act makes medical notes like these available to patients in the interest of transparency. However, this is a medical document intended as peer to peer communication. It is written in medical language and may contain abbreviations or verbiage that are unfamiliar. It may appear blunt or direct. Medical documents are intended to carry relevant information, facts as evident, and the clinical opinion of the practitioner who signs the document.    Celsa Calhoun (Daya), APRN  06/10/25   10 am  [facility]          [1]   Past Medical History:   Anxiety    COPD (chronic obstructive pulmonary disease) (HCC)    Essential hypertension    Hearing impaired person, bilateral    Hyperlipidemia    Kidney disease    Lymphoma (HCC)    Osteoarthritis    Renal disorder    Shortness of breath    Type 1 diabetes mellitus (HCC)    Visual impairment   [2]   Past Surgical  History:  Procedure Laterality Date    Appendectomy      Hip surgery  2018    Total abdom hysterectomy     [3] No family history on file.  [4]   Social History  Socioeconomic History    Marital status:    Tobacco Use    Smoking status: Former     Types: Cigarettes    Smokeless tobacco: Never   Vaping Use    Vaping status: Never Used   Substance and Sexual Activity    Alcohol use: Not Currently    Drug use: Not Currently     Social Drivers of Health     Food Insecurity: No Food Insecurity (5/22/2025)    NCSS - Food Insecurity     Worried About Running Out of Food in the Last Year: No     Ran Out of Food in the Last Year: No   Transportation Needs: No Transportation Needs (5/22/2025)    NCSS - Transportation     Lack of Transportation: No   Housing Stability: Not At Risk (5/22/2025)    NCSS - Housing/Utilities     Has Housing: Yes     Worried About Losing Housing: No     Unable to Get Utilities: No   [5]   Allergies  Allergen Reactions    Levaquin [Levofloxacin] ITCHING

## 2025-06-12 ENCOUNTER — SNF VISIT (OUTPATIENT)
Facility: SKILLED NURSING FACILITY | Age: 85
End: 2025-06-12

## 2025-06-12 VITALS
HEART RATE: 65 BPM | RESPIRATION RATE: 20 BRPM | TEMPERATURE: 99 F | OXYGEN SATURATION: 92 % | WEIGHT: 133 LBS | BODY MASS INDEX: 25 KG/M2 | SYSTOLIC BLOOD PRESSURE: 127 MMHG | DIASTOLIC BLOOD PRESSURE: 55 MMHG

## 2025-06-12 DIAGNOSIS — R53.81 PHYSICAL DECONDITIONING: ICD-10-CM

## 2025-06-12 DIAGNOSIS — N18.4 STAGE 4 CHRONIC KIDNEY DISEASE (HCC): ICD-10-CM

## 2025-06-12 DIAGNOSIS — E11.22 TYPE 2 DIABETES MELLITUS WITH STAGE 5 CHRONIC KIDNEY DISEASE NOT ON CHRONIC DIALYSIS, WITH LONG-TERM CURRENT USE OF INSULIN (HCC): ICD-10-CM

## 2025-06-12 DIAGNOSIS — I21.4 NSTEMI (NON-ST ELEVATED MYOCARDIAL INFARCTION) (HCC): ICD-10-CM

## 2025-06-12 DIAGNOSIS — Z79.4 TYPE 2 DIABETES MELLITUS WITH STAGE 5 CHRONIC KIDNEY DISEASE NOT ON CHRONIC DIALYSIS, WITH LONG-TERM CURRENT USE OF INSULIN (HCC): ICD-10-CM

## 2025-06-12 DIAGNOSIS — D64.9 ANEMIA, UNSPECIFIED TYPE: ICD-10-CM

## 2025-06-12 DIAGNOSIS — R53.1 WEAKNESS GENERALIZED: ICD-10-CM

## 2025-06-12 DIAGNOSIS — Z99.2 HEMODIALYSIS PATIENT: ICD-10-CM

## 2025-06-12 DIAGNOSIS — I70.239 ATHEROSCLEROSIS OF NATIVE ARTERY OF RIGHT LOWER EXTREMITY WITH ULCERATION, UNSPECIFIED ULCERATION SITE (HCC): Primary | ICD-10-CM

## 2025-06-12 DIAGNOSIS — N18.5 TYPE 2 DIABETES MELLITUS WITH STAGE 5 CHRONIC KIDNEY DISEASE NOT ON CHRONIC DIALYSIS, WITH LONG-TERM CURRENT USE OF INSULIN (HCC): ICD-10-CM

## 2025-06-12 PROCEDURE — 99309 SBSQ NF CARE MODERATE MDM 30: CPT | Performed by: NURSE PRACTITIONER

## 2025-06-12 PROCEDURE — 1159F MED LIST DOCD IN RCRD: CPT | Performed by: NURSE PRACTITIONER

## 2025-06-12 PROCEDURE — 1160F RVW MEDS BY RX/DR IN RCRD: CPT | Performed by: NURSE PRACTITIONER

## 2025-06-12 PROCEDURE — 3074F SYST BP LT 130 MM HG: CPT | Performed by: NURSE PRACTITIONER

## 2025-06-12 PROCEDURE — 1111F DSCHRG MED/CURRENT MED MERGE: CPT | Performed by: NURSE PRACTITIONER

## 2025-06-12 PROCEDURE — 3078F DIAST BP <80 MM HG: CPT | Performed by: NURSE PRACTITIONER

## 2025-06-12 NOTE — PROGRESS NOTES
25 APN Encounter 10:30 am  Radha Yu.    1940.     Patient seen for follow up visit:  right leg pain s/p R femoral Endarterectomy w/ Iliac Stenting & SFA. NSTEMI,   Weakness/deconditioning,  ESRD on Dialysis.     Met with patient at bedside and during therapy.  She is alert and orientated x 3.  Patient is complaining of Right upper leg pain requesting Tylenol.  Patient reports  right leg pain s worse during therapy.    New Order:  Tylenol scheduled 4 x daily; give first dose 30 minutes prior to therapy    Patient is scheduled to see vascular surgeon  at 2:20 pm.  She is scheduled to see cardiologist 25 at 2 pm .    Patient reports she had dialysis yesterday 25 and will continue every Monday, Wednesday, Friday while at [facility].  Left dialysis catheter in place.  Right mediport not accessed; previous treatment for left breast cancer.    Hospital Discharge Diagnoses:  NSTEMI  Acute on Chronic respiratory failure w/ Hypoxia  Acute CHF, unspecified type.  ESRD - hemodialysis    Radha Yu  : 1940 . 84 year old  female is admitted to [facility] for hemodialysis 3 x week, [facility] for rehabilitation and strengthening.      [facility] Admission  25 to 25    Chief complaint today:  Right leg pain, weakness, tired.      [facility] - hospital nose    Radha Yu is a(n) 84 year old female with a PMH of PAD, HTN, HL, CKD stage 3 who presents with increased sob. She was extremely tachypneic and wheezing, saturating 90% on room air. She was given a nebulizer treatment and her oxygen sats improved to 98%. In the ED she had a cxr that was consistent with fluid overload. She also has a significant tobacco use history (stopped in January) and was still wheezing on exam. Her trop was elevated and she had some EKG changes that were new. She will be admitted.     Past Medical History[1]  Past Surgical History[2]  Family History[3]  Short Social Hx on File[4]    ALLERGIES: Levaquin.      CODE  STATUS:  Full Code    ADVANCED CARE PLANNING TEAM: Will need family care plan updates with nephritchie Gallagher.  Patient reports she lives with 2 nephews and one of their wives. Patient's states Elliott is HCPOA.  They live in [place] with stairs to enter.      CURRENT MEDICATIONS - reviewed and updated on SNF EMAR  Tylenol 500 mg  scheduled 4 times daily; giving 1 dose 30 minutes prior to therapy.   Calcitriol 0.25 mg one q M/W/F.  Losartan 50 mg aily  Metoprolol Succ ER 37.5 mg daily  Pantoprazole 40 mg 2 x daily\  Senna 8.6-50 - 2 tablets daily  ASA 81 mg daily  Atorvastatin 20 mg daily  Cholecalciferol 2000 UT daily  Plavix 75 mg daily  Cranberry 125 mg - 1 daily  Lexapro 10 mg qam  Insulin - Glargine 5 units at hs  Asparte sliding scale as directed  Sodium Bicarb 650 mg - 2 tablets qam and 2 tablets at hs     SUBJECTIVE: denies chest pain, SOB, cough.  Denies n/v/d/c.  C/O right groin pain and right leg pain. C/O right leg numbness.      PHYSICAL EXAM:  127/55. P 65. RR 20. POx 92%. T 98.6. wgt 133#.    GENERAL HEALTH:well developed, well nourished, in no apparent distress   LINES, TUBES, DRAINS:   R chest medi-port - not accessed.  Left jugular dialysis catheter.   SKIN: pale, warm, dry  WOUND: . Right groin staples intact, no drainage.  Left groin dressing intact, no drainage  EYES: PERRLA, EOMI, sclera anicteric, conjunctiva normal; no nystagmus, no drainage from eyes  HENT: no nasal drainage, mucous membranes pink, moist, no visible cerumen.   NECK: supple  BREAST:  HX: left breast cancer  RESPIRATORY: lungs clear  + COPD  CARDIOVASCULAR:  RRR, Rate 65  ABDOMEN:  normal active BS+, soft, nondistended; no bruits; nontender, no guarding, no rebound tenderness.  :Deferred  LYMPHATIC:no lymphedema  MUSCULOSKELETAL: no acute synovitis upper or lower extremity   EXTREMITIES/VASCULAR: R groin staples, L groin dressing.  R leg and heel pain.  NEUROLOGIC:follows commands  PSYCHIATRIC: alert and oriented x 3; affect  appropriate  pleasant and cooperative    MEDICAL DECISION : Patient is making her own decisions; nephews assist w/ medical decisions.  Nephew Elliott Madrid  [phone], per patient is her HCPOA.    Lab Results:  6/11/25:   WBC 5.1. Hgb 9.6. Plts 158.  Na 137. K 4.1. Cl 99. Co2 28. BUN 40. Creat 2.8 - GFR 16    See Plan Below    RLE Ischemia  Right LE pain and, decreased sensation.  S/P right common femoral Endarterectomy      Iliac/SFA stenting - Dr. Vines 6/16/25  Monitor R groin staples  Tylenol 500 q 4 hrs prn  ASA 81 mg daily  Elevate legs on pillow  F/U with Cardiologist 6/17/25  Continue PT / OT    NSTEMI type 1 with new ischemic cardiomyopathy  Acute CHF  Multivessel CAD  L heart cath 5/28 /25 w/ pci  Aspirin 81 mg daily  Plavix daily  Atorvastatin 20 mg q hs  Metoprolol Succ 37.5 mg daily  F/U with cardiology 6/17/25    Tricuspid and Mitral Regurgitation  Volume removal by hemodialysis  Dialysis 3 x week at [facility] PVD s/p left femoropopliteal bypass 9/2015  -80% proximal L renal artery stenosis on angiogram  Continue aspirin 81 mg daily  Continue plavix 75 mg daily  Continue atorvastatin 20 mg q hs  F/U with cardiology 6/17/25  Continue PT/OT     Hyperlipidemia  Continue atorvastatin 20 mg q hs    HTN  Continue Losartan 50 mg daily  Continue Metoprolol Succ 37.5 mg daily    Anemia  Hospital Hgb 9.2 on 6/9/25.  9.6   6/12/25  Trend weekly labs  Protonix 40 mg 2 x day    ESRD - continue hemodialysis 3 x week  F/U with renal    IDDM  Glargine Insulin 5 units nightly  Asparte - follow sliding scale    COPD - no scheduled medications  Oxygen prn    Moderate Right ICA stenosis s/p R TCAR 2019  Totally occluded L ICA  Continue aspirin 81 mg daily  Continue Plavix 75 mg daily  Continue Atorvastatin 20 mg q hs    Weakness:  Continue PT/OT    Disposition:  Patient states she would like to return to her home in [place] with family    Follow Up with [facility] -  6/17/25 at 2 pm   Follow  up wit Vascular Surgeon Dr. Vines, 6/16/25 at 2:20 pm  Follow up withy Renal - Dr. Gates as recommended  *Follow-Up with PCP within 1-2 weeks following [facility] discharge.     Future Appointments   Date Time Provider Department Center   6/27/2025 12:30 PM Stef Velasco MD [facility]     *Greater than 45 minutes spent w/ patient and staff, including but not limited to/ reviewing present status, needs, abilities with disciplines, reviewing medical records, vital signs, labs, completing med rec and entering orders to establish plan of care in [facility]  Note to patient: The 21st Century Cures Act makes medical notes like these available to patients in the interest of transparency. However, this is a medical document intended as peer to peer communication. It is written in medical language and may contain abbreviations or verbiage that are unfamiliar. It may appear blunt or direct. Medical documents are intended to carry relevant information, facts as evident, and the clinical opinion of the practitioner who signs the document.    FAHAD Ramsey (Daya)  06/12/25   10:30 am  [facility]                [1]   Past Medical History:   Anxiety    COPD (chronic obstructive pulmonary disease) (HCC)    Essential hypertension    Hearing impaired person, bilateral    Hyperlipidemia    Kidney disease    Lymphoma (HCC)    Osteoarthritis    Renal disorder    Shortness of breath    Type 1 diabetes mellitus (HCC)    Visual impairment   [2]   Past Surgical History:  Procedure Laterality Date    Appendectomy      Hip surgery  2018    Total abdom hysterectomy     [3] No family history on file.  [4]   Social History  Socioeconomic History    Marital status:    Tobacco Use    Smoking status: Former     Types: Cigarettes    Smokeless tobacco: Never   Vaping Use    Vaping status: Never Used   Substance and Sexual Activity    Alcohol use: Not Currently    Drug use: Not Currently     Social Drivers of Health     Food  Insecurity: No Food Insecurity (5/22/2025)    NCSS - Food Insecurity     Worried About Running Out of Food in the Last Year: No     Ran Out of Food in the Last Year: No   Transportation Needs: No Transportation Needs (5/22/2025)    NCSS - Transportation     Lack of Transportation: No   Housing Stability: Not At Risk (5/22/2025)    NCSS - Housing/Utilities     Has Housing: Yes     Worried About Losing Housing: No     Unable to Get Utilities: No

## 2025-06-17 ENCOUNTER — SNF VISIT (OUTPATIENT)
Facility: SKILLED NURSING FACILITY | Age: 85
End: 2025-06-17

## 2025-06-17 DIAGNOSIS — R53.1 WEAKNESS GENERALIZED: ICD-10-CM

## 2025-06-17 DIAGNOSIS — R53.81 PHYSICAL DECONDITIONING: ICD-10-CM

## 2025-06-17 DIAGNOSIS — Z79.4 TYPE 2 DIABETES MELLITUS WITH STAGE 5 CHRONIC KIDNEY DISEASE NOT ON CHRONIC DIALYSIS, WITH LONG-TERM CURRENT USE OF INSULIN (HCC): ICD-10-CM

## 2025-06-17 DIAGNOSIS — D64.9 ANEMIA, UNSPECIFIED TYPE: ICD-10-CM

## 2025-06-17 DIAGNOSIS — Z99.2 HEMODIALYSIS PATIENT: ICD-10-CM

## 2025-06-17 DIAGNOSIS — N18.4 STAGE 4 CHRONIC KIDNEY DISEASE (HCC): ICD-10-CM

## 2025-06-17 DIAGNOSIS — E11.22 TYPE 2 DIABETES MELLITUS WITH STAGE 5 CHRONIC KIDNEY DISEASE NOT ON CHRONIC DIALYSIS, WITH LONG-TERM CURRENT USE OF INSULIN (HCC): ICD-10-CM

## 2025-06-17 DIAGNOSIS — I21.4 NSTEMI (NON-ST ELEVATED MYOCARDIAL INFARCTION) (HCC): ICD-10-CM

## 2025-06-17 DIAGNOSIS — I70.239 ATHEROSCLEROSIS OF NATIVE ARTERY OF RIGHT LOWER EXTREMITY WITH ULCERATION, UNSPECIFIED ULCERATION SITE (HCC): Primary | ICD-10-CM

## 2025-06-17 DIAGNOSIS — N18.5 TYPE 2 DIABETES MELLITUS WITH STAGE 5 CHRONIC KIDNEY DISEASE NOT ON CHRONIC DIALYSIS, WITH LONG-TERM CURRENT USE OF INSULIN (HCC): ICD-10-CM

## 2025-06-17 PROCEDURE — 3078F DIAST BP <80 MM HG: CPT | Performed by: NURSE PRACTITIONER

## 2025-06-17 PROCEDURE — 1160F RVW MEDS BY RX/DR IN RCRD: CPT | Performed by: NURSE PRACTITIONER

## 2025-06-17 PROCEDURE — 1111F DSCHRG MED/CURRENT MED MERGE: CPT | Performed by: NURSE PRACTITIONER

## 2025-06-17 PROCEDURE — 99309 SBSQ NF CARE MODERATE MDM 30: CPT | Performed by: NURSE PRACTITIONER

## 2025-06-17 PROCEDURE — 3077F SYST BP >= 140 MM HG: CPT | Performed by: NURSE PRACTITIONER

## 2025-06-17 PROCEDURE — 1159F MED LIST DOCD IN RCRD: CPT | Performed by: NURSE PRACTITIONER

## 2025-06-18 VITALS
BODY MASS INDEX: 25 KG/M2 | RESPIRATION RATE: 20 BRPM | DIASTOLIC BLOOD PRESSURE: 68 MMHG | OXYGEN SATURATION: 92 % | WEIGHT: 133 LBS | TEMPERATURE: 98 F | SYSTOLIC BLOOD PRESSURE: 149 MMHG

## 2025-06-18 NOTE — PROGRESS NOTES
25 APN Encounter 11:15 am  Radha Yu.  1940    Patient seen for follow up ESRD new on dialysis, IDDM, s/p R femoral endarterectomy, LHC; bilateral groin sites.     Patient receiving dialysis 3 x week at [facility]; she is new on dialysis.  Left jugular perm cath dressing clean and dry.  Patient also has a right chest mediport used for treatment of lymphoma and left breast cancer.    Bilateral groin dressing intact; clean, dry and intact.   Patient will follow up with [facility].    Patient participating with therapy with bed exercises and sitting exercises. Patient having difficulty with hearing so finding it hard to following instructions and conversing with staff.    Hospital Discharge Diagnoses:  NSTEMI  Acute on Chronic respiratory failure w/ Hypoxia  Acute CHF, unspecified type.  ESRD - hemodialysis    Radha Yu  : 1940 . 84 year old  female is admitted to [facility] for hemodialysis 3 x week, [facility] for rehabilitation and strengthening.      [facility] Admission  25 to 25    Chief complaint today:  Right leg pain, weakness, fatigue     [facility] - hospital nose    Radha Yu is a(n) 84 year old female with a PMH of PAD, HTN, HL, CKD stage 3 who presents with increased sob. She was extremely tachypneic and wheezing, saturating 90% on room air. She was given a nebulizer treatment and her oxygen sats improved to 98%. In the ED she had a cxr that was consistent with fluid overload. She also has a significant tobacco use history (stopped in January) and was still wheezing on exam. Her trop was elevated and she had some EKG changes that were new. She will be admitted.     Past Medical History[1]  Past Surgical History[2]  Family History[3]  Short Social Hx on File[4]    ALLERGIES: Levaquin.      CODE STATUS:  Full Code    ADVANCED CARE PLANNING TEAM: Will need family care plan updates with nephew Elliott.  Patient reports she lives with 2 nephews and one of their wives. Patient's  states Elliott is HCPOA.  They live in [place] with stairs to enter.      CURRENT MEDICATIONS - reviewed and updated on SNF EMAR  Tylenol 500 mg  scheduled 4 times daily; giving 1 dose 30 minutes prior to therapy.   Calcitriol 0.25 mg one q M/W/F.  Losartan 50 mg aily  Metoprolol Succ ER 37.5 mg daily  Pantoprazole 40 mg 2 x daily\  Senna 8.6-50 - 2 tablets daily  ASA 81 mg daily  Atorvastatin 20 mg daily  Cholecalciferol 2000 UT daily  Plavix 75 mg daily  Cranberry 125 mg - 1 daily  Lexapro 10 mg qam  Insulin - Glargine 5 units at hs  Asparte sliding scale as directed  Sodium Bicarb 650 mg - 2 tablets qam and 2 tablets at hs     SUBJECTIVE: denies chest pain, SOB, cough.  Denies n/v/d/c.  C/O right groin pain and right leg pain. C/O right leg numbness.      PHYSICAL EXAM: 149/68. P 86. RR 20. POx 92%. T 97.8. wgt 133#.    GENERAL HEALTH:well developed, well nourished, in no apparent distress   LINES, TUBES, DRAINS:   R chest medi-port - not accessed.  Left jugular dialysis catheter.   SKIN: pale, warm, dry  WOUND: . Right groin staples intact, no drainage.  Left groin dressing intact.   EYES: PERRLA, EOMI, sclera anicteric, conjunctiva normal; no nystagmus, no drainage from eyes  HENT: no nasal drainage, mucous membranes pink, moist, no visible cerumen.   NECK: supple  BREAST:  HX: left breast cancer/lymphoma  RESPIRATORY: lungs clear  + COPD  CARDIOVASCULAR:  RRR, Rate 86  ABDOMEN:  normal active BS+, soft, nondistended; nontender, no guarding, no rebound tenderness.  :Deferred  LYMPHATIC:no lymphedema  MUSCULOSKELETAL: no acute synovitis upper or lower extremity   EXTREMITIES/VASCULAR: R groin staples, L groin dressing.  R leg and heel pain.  NEUROLOGIC:follows commands  PSYCHIATRIC: alert and oriented x 3; affect appropriate  pleasant and cooperative    MEDICAL DECISION : Patient is making her own decisions; nephews assist w/ medical decisions.  Nephew Elliott Madrid  [phone], per patient is her  HCPOA.    Lab Results:  6/11/25:   WBC 5.1. Hgb 9.6. Plts 158.  Na 137. K 4.1. Cl 99. Co2 28. BUN 40. Creat 2.8 - GFR 16  Labs Reviewed:  6/17/25  WBC 4.8. HGB 9.5. PLTS 183  . K 5.0. . CO2 28. BUN 53. CREAT 2.7. GFR 16    See Plan Below    RLE Ischemia  Right LE pain and, decreased sensation.  S/P right common femoral Endarterectomy      Iliac/SFA stenting - Dr. Vines   Monitor R groin staples  Tylenol 500 q 4 hrs prn  ASA 81 mg daily  Elevate legs on pillow  F/U with Cardiologist   Continue PT / OT    NSTEMI type 1 with new ischemic cardiomyopathy  Acute CHF  Multivessel CAD  L heart cath 5/28   Aspirin 81 mg daily  Plavix daily  Atorvastatin 20 mg q hs  Metoprolol Succ 37.5 mg daily  F/U with cardiology     Tricuspid and Mitral Regurgitation  Volume removal by hemodialysis  Dialysis 3 x week at [facility]    PV s/p left femoropopliteal bypass 9/2015  -80% proximal L renal artery stenosis on angiogram  Continue aspirin 81 mg daily  Continue plavix 75 mg daily  Continue atorvastatin 20 mg q hs  F/U with cardiology   Continue PT/OT     Hyperlipidemia  Continue atorvastatin 20 mg q hs    HTN  Continue Losartan 50 mg daily  Continue Metoprolol Succ 37.5 mg daily    Anemia  Hospital Hgb 9.2 on 6/9/25.  9.6   6/12/25.  9.5 on 6/18/25.  Trend weekly labs  Protonix 40 mg 2 x day    ESRD - continue hemodialysis 3 x week  F/U with renal    IDDM  Glargine Insulin 5 units nightly  Asparte - follow sliding scale    COPD - no scheduled medications  Oxygen prn    Moderate Right ICA stenosis s/p R TCAR 2019  Totally occluded L ICA  Continue aspirin 81 mg daily  Continue Plavix 75 mg daily  Continue Atorvastatin 20 mg q hs    Weakness:  Continue PT/OT    Disposition:  Patient states she would like to return to her home in [place] with family    Follow Up with [facility]   Follow up wit Vascular Surgeon Dr. Vines as recommended.  Follow up withy Renal - Dr. Gates as recommended  *Follow-Up with  PCP within 1-2 weeks following [facility] discharge.     Future Appointments   Date Time Provider Department Center   6/27/2025 12:30 PM Stef Velasco MD [facility]     *Greater than 35 minutes spent w/ patient and staff, including but not limited to/ reviewing present status, needs, abilities with disciplines, reviewing medical records, vital signs, labs, completing med rec and entering orders to establish plan of care in [facility]  Note to patient: The 21st Century Cures Act makes medical notes like these available to patients in the interest of transparency. However, this is a medical document intended as peer to peer communication. It is written in medical language and may contain abbreviations or verbiage that are unfamiliar. It may appear blunt or direct. Medical documents are intended to carry relevant information, facts as evident, and the clinical opinion of the practitioner who signs the document.    FAHAD Ramsey (Daya)  06/17/25   11:15 am  [employer]                [1]   Past Medical History:   Anxiety    COPD (chronic obstructive pulmonary disease) (HCC)    Essential hypertension    Hearing impaired person, bilateral    Hyperlipidemia    Kidney disease    Lymphoma (HCC)    Osteoarthritis    Renal disorder    Shortness of breath    Type 1 diabetes mellitus (HCC)    Visual impairment   [2]   Past Surgical History:  Procedure Laterality Date    Appendectomy      Hip surgery  2018    Total abdom hysterectomy     [3] No family history on file.  [4]   Social History  Socioeconomic History    Marital status:    Tobacco Use    Smoking status: Former     Types: Cigarettes    Smokeless tobacco: Never   Vaping Use    Vaping status: Never Used   Substance and Sexual Activity    Alcohol use: Not Currently    Drug use: Not Currently     Social Drivers of Health     Food Insecurity: No Food Insecurity (5/22/2025)    NCSS - Food Insecurity     Worried About Running Out of Food in the Last Year: No      Ran Out of Food in the Last Year: No   Transportation Needs: No Transportation Needs (5/22/2025)    NCSS - Transportation     Lack of Transportation: No   Housing Stability: Not At Risk (5/22/2025)    NCSS - Housing/Utilities     Has Housing: Yes     Worried About Losing Housing: No     Unable to Get Utilities: No

## 2025-06-19 ENCOUNTER — SNF VISIT (OUTPATIENT)
Facility: SKILLED NURSING FACILITY | Age: 85
End: 2025-06-19

## 2025-06-19 VITALS
TEMPERATURE: 98 F | OXYGEN SATURATION: 92 % | SYSTOLIC BLOOD PRESSURE: 175 MMHG | DIASTOLIC BLOOD PRESSURE: 87 MMHG | BODY MASS INDEX: 25 KG/M2 | WEIGHT: 133 LBS | RESPIRATION RATE: 20 BRPM | HEART RATE: 90 BPM

## 2025-06-19 DIAGNOSIS — H91.90 HARD OF HEARING: ICD-10-CM

## 2025-06-19 DIAGNOSIS — Z79.4 TYPE 2 DIABETES MELLITUS WITH STAGE 5 CHRONIC KIDNEY DISEASE NOT ON CHRONIC DIALYSIS, WITH LONG-TERM CURRENT USE OF INSULIN (HCC): ICD-10-CM

## 2025-06-19 DIAGNOSIS — N18.4 STAGE 4 CHRONIC KIDNEY DISEASE (HCC): ICD-10-CM

## 2025-06-19 DIAGNOSIS — Z99.2 HEMODIALYSIS PATIENT: ICD-10-CM

## 2025-06-19 DIAGNOSIS — R53.81 PHYSICAL DECONDITIONING: ICD-10-CM

## 2025-06-19 DIAGNOSIS — E11.22 TYPE 2 DIABETES MELLITUS WITH STAGE 5 CHRONIC KIDNEY DISEASE NOT ON CHRONIC DIALYSIS, WITH LONG-TERM CURRENT USE OF INSULIN (HCC): ICD-10-CM

## 2025-06-19 DIAGNOSIS — N18.5 TYPE 2 DIABETES MELLITUS WITH STAGE 5 CHRONIC KIDNEY DISEASE NOT ON CHRONIC DIALYSIS, WITH LONG-TERM CURRENT USE OF INSULIN (HCC): ICD-10-CM

## 2025-06-19 DIAGNOSIS — I70.239 ATHEROSCLEROSIS OF NATIVE ARTERY OF RIGHT LOWER EXTREMITY WITH ULCERATION, UNSPECIFIED ULCERATION SITE (HCC): Primary | ICD-10-CM

## 2025-06-19 DIAGNOSIS — I21.4 NSTEMI (NON-ST ELEVATED MYOCARDIAL INFARCTION) (HCC): ICD-10-CM

## 2025-06-19 DIAGNOSIS — Z98.890 H/O ENDARTERECTOMY: ICD-10-CM

## 2025-06-19 PROCEDURE — 99309 SBSQ NF CARE MODERATE MDM 30: CPT | Performed by: NURSE PRACTITIONER

## 2025-06-19 PROCEDURE — 1111F DSCHRG MED/CURRENT MED MERGE: CPT | Performed by: NURSE PRACTITIONER

## 2025-06-19 PROCEDURE — 3079F DIAST BP 80-89 MM HG: CPT | Performed by: NURSE PRACTITIONER

## 2025-06-19 PROCEDURE — 3077F SYST BP >= 140 MM HG: CPT | Performed by: NURSE PRACTITIONER

## 2025-06-19 PROCEDURE — 1159F MED LIST DOCD IN RCRD: CPT | Performed by: NURSE PRACTITIONER

## 2025-06-19 PROCEDURE — 1160F RVW MEDS BY RX/DR IN RCRD: CPT | Performed by: NURSE PRACTITIONER

## 2025-06-20 NOTE — PROGRESS NOTES
[facility]  11[facility]  Radha Yu.   1940    Patient seen for follow up s/p R femoral Endarterectomy, stenting and SFA, elevated blood glucose readings, ESRD on dialysis, [place].    Met with patient at bedside reporting her hearing is worse; right hearing aid not working correctly.   Contacted patient's nephew Elliott. He will see her first thing Friday morning to check the , volume and will change the filter.  She only wear one evening though she has 2 with her.     Blood glucose readings have been consistently higher than usual for the past few days.  Increased Glargine to 6 units nightly.  Will continue to monitor.  She is on a sliding scale for coverage.  Accuchecks 4 x daily    S/P R femoral Endarterectomy; right groin steristrips intact covering groin incision.  No redness or drainage noted.  Nephew is concerned about patient's R great toe.  Staff applying Betadine daily to R great toe. Patient will need to f/u with Dr. Vines as recommended.    Patient receiving dialysis M/W/F at [facility]. Left jugular perm cath dressing clean and dry.  Patient also has a right chest mediport used for treatment of lymphoma and left breast cancer.    Hospital Discharge Diagnoses:  NSTEMI  Acute on Chronic respiratory failure w/ Hypoxia  Acute CHF, unspecified type.  ESRD - hemodialysis    Radha Yu  : 1940 . 84 year old  female is admitted to [facility] for hemodialysis 3 x week, [facility] for rehabilitation and strengthening.      [facility] Admission  25 to 25    Chief complaint today:  [place], Right leg pain, weakness, fatigue     [facility] - hospital nose    Radha Yu is a(n) 84 year old female with a PMH of PAD, HTN, HL, CKD stage 3 who presents with increased sob. She was extremely tachypneic and wheezing, saturating 90% on room air. She was given a nebulizer treatment and her oxygen sats improved to 98%. In the ED she had a cxr that was consistent with fluid overload. She also has a significant  tobacco use history (stopped in January) and was still wheezing on exam. Her trop was elevated and she had some EKG changes that were new. She will be admitted.     Past Medical History[1]  Past Surgical History[2]  Family History[3]  Short Social Hx on File[4]    ALLERGIES: Levaquin.      CODE STATUS:  Full Code    ADVANCED CARE PLANNING TEAM: Will need family care plan updates with nephritchie Gallagher.  Patient reports she lives with 2 nephews and one of their wives. Patient's states Elliott is HCPOA.  Family lives in [place] with stairs to enter.      CURRENT MEDICATIONS - reviewed and updated on SNF EMAR  Tylenol 500 mg  scheduled 4 times daily; giving 1 dose 30 minutes prior to therapy.   Calcitriol 0.25 mg one q M/W/F.  Losartan 50 mg aily  Metoprolol Succ ER 37.5 mg daily  Pantoprazole 40 mg 2 x daily\  Senna 8.6-50 - 2 tablets daily  ASA 81 mg daily  Atorvastatin 20 mg daily  Cholecalciferol 2000 UT daily  Plavix 75 mg daily  Cranberry 125 mg - 1 daily  Lexapro 10 mg qam  Insulin - Glargine increased to 6 units from  5 units  Asparte sliding scale as directed  Sodium Bicarb 650 mg - 2 tablets qam and 2 tablets at hs     SUBJECTIVE: denies chest pain, SOB, cough.  Denies n/v/d/c.  C/O right groin pain and right leg pain. C/O intermittent right leg numbness.      PHYSICAL EXAM:  175/87. P 90. RR  20. POx 92%. T 97.8. wgt 133#.    GENERAL HEALTH:well developed, well nourished, in no apparent distress   LINES, TUBES, DRAINS:   R chest medi-port - not accessed.  Left jugular dialysis catheter.   SKIN: pale, warm, dry  WOUND: . Right groin steri-strips intact; no drainage.    EYES: PERRLA, EOMI, sclera anicteric, conjunctiva normal; no nystagmus, no drainage from eyes  HENT: increase [place]; no nasal drainage, mucous membranes pink, moist, no visible cerumen.   NECK: supple  BREAST:  HX: left breast cancer/lymphoma  RESPIRATORY: lungs clear  + COPD  CARDIOVASCULAR:  RRR, Rate 90  ABDOMEN:  normal active BS+, soft,  nondistended; nontender, no guarding, no rebound tenderness.  :Deferred  LYMPHATIC:no lymphedema  MUSCULOSKELETAL: no acute synovitis upper or lower extremity   EXTREMITIES/VASCULAR: R groin steri-strips intact. R leg and heel pain and numbness..  NEUROLOGIC:follows commands  PSYCHIATRIC: alert and oriented x 3; affect appropriate  forgetful, memory deficit.     MEDICAL DECISION : Patient and nephew make medical decisions.   Nephew Elliott Madrid  [phone], per patient is her HCPOA.    Lab Results:  6/11/25:   WBC 5.1. Hgb 9.6. Plts 158.  Na 137. K 4.1. Cl 99. Co2 28. BUN 40. Creat 2.8 - GFR 16  Labs Reviewed:  6/17/25  WBC 4.8. HGB 9.5. PLTS 183  . K 5.0. . CO2 28. BUN 53. CREAT 2.7. GFR 16    See Plan Below    RLE Ischemia  Right LE pain and, decreased sensation.  S/P right common femoral Endarterectomy      Iliac/SFA stenting - Dr. Vines   Monitor R groin   Tylenol 500 q 4 hrs prn  ASA 81 mg daily  Elevate legs on pillow  F/U with Cardiologist and surgeon  Continue PT / OT    NSTEMI type 1 with new ischemic cardiomyopathy  Acute CHF  Multivessel CAD  L heart cath 5/28   Aspirin 81 mg daily  Plavix daily  Atorvastatin 20 mg q hs  Metoprolol Succ 37.5 mg daily  F/U with cardiology     Tricuspid and Mitral Regurgitation  Volume removal by hemodialysis  Dialysis 3 x week at [facility]    PVD s/p left femoropopliteal bypass 9/2015  -80% proximal L renal artery stenosis on angiogram  Continue aspirin 81 mg daily  Continue plavix 75 mg daily  Continue atorvastatin 20 mg q hs  F/U with cardiology   Continue PT/OT    [place], bilateral  Family will bring in new filters   Charge hearing aids as needed   Patient needs assistance morning and night to care for hearing aids    Hyperlipidemia  Continue atorvastatin 20 mg q hs    HTN  Continue Losartan 50 mg daily  Continue Metoprolol Succ 37.5 mg daily    Anemia  Hospital Hgb 9.2 on 6/9/25.  9.6   6/12/25.  9.5 on 6/18/25.  Trend weekly labs  Protonix 40  mg 2 x day    ESRD - continue hemodialysis 3 x week  F/U with renal    IDDM  Glargine Insulin increased to 6 units from 5 nightly  Asparte - follow sliding scale    COPD - no scheduled medications  Oxygen prn    Moderate Right ICA stenosis s/p R TCAR 2019  Totally occluded L ICA  Continue aspirin 81 mg daily  Continue Plavix 75 mg daily  Continue Atorvastatin 20 mg q hs    Weakness:  Continue PT/OT    Disposition:  Patient states she would like to return to her home in [place] with family    Follow Up with [facility]   Follow up wit Vascular Surgeon Dr. Vines as recommended.  Follow up withy Renal - Dr. Gates as recommended  *Follow-Up with PCP within 1-2 weeks following [facility] discharge.     Future Appointments   Date Time Provider Department Center   6/27/2025 12:30 PM Stef Velasco [facility]     *Greater than 35 minutes spent w/ patient and staff, including but not limited to/ reviewing present status, needs, abilities with disciplines, reviewing medical records, vital signs, labs, completing med rec and entering orders to establish plan of care in [facility]  Note to patient: The 21st Century Cures Act makes medical notes like these available to patients in the interest of transparency. However, this is a medical document intended as peer to peer communication. It is written in medical language and may contain abbreviations or verbiage that are unfamiliar. It may appear blunt or direct. Medical documents are intended to carry relevant information, facts as evident, and the clinical opinion of the practitioner who signs the document.    FAHAD Ramsey (Daya)  06/19/25   11:45 am  [facility]                     [1]   Past Medical History:   Anxiety    COPD (chronic obstructive pulmonary disease) (HCC)    Essential hypertension    Hearing impaired person, bilateral    Hyperlipidemia    Kidney disease    Lymphoma (HCC)    Osteoarthritis    Renal disorder    Shortness of breath    Type  1 diabetes mellitus (HCC)    Visual impairment   [2]   Past Surgical History:  Procedure Laterality Date    Appendectomy      Hip surgery  2018    Total abdom hysterectomy     [3] No family history on file.  [4]   Social History  Socioeconomic History    Marital status:    Tobacco Use    Smoking status: Former     Types: Cigarettes    Smokeless tobacco: Never   Vaping Use    Vaping status: Never Used   Substance and Sexual Activity    Alcohol use: Not Currently    Drug use: Not Currently     Social Drivers of Health     Food Insecurity: No Food Insecurity (5/22/2025)    NCSS - Food Insecurity     Worried About Running Out of Food in the Last Year: No     Ran Out of Food in the Last Year: No   Transportation Needs: No Transportation Needs (5/22/2025)    NCSS - Transportation     Lack of Transportation: No   Housing Stability: Not At Risk (5/22/2025)    NCSS - Housing/Utilities     Has Housing: Yes     Worried About Losing Housing: No     Unable to Get Utilities: No

## 2025-06-24 ENCOUNTER — SNF VISIT (OUTPATIENT)
Facility: SKILLED NURSING FACILITY | Age: 85
End: 2025-06-24

## 2025-06-24 DIAGNOSIS — I70.239 ATHEROSCLEROSIS OF NATIVE ARTERY OF RIGHT LOWER EXTREMITY WITH ULCERATION, UNSPECIFIED ULCERATION SITE (HCC): Primary | ICD-10-CM

## 2025-06-24 DIAGNOSIS — H91.90 HARD OF HEARING: ICD-10-CM

## 2025-06-24 DIAGNOSIS — N18.4 STAGE 4 CHRONIC KIDNEY DISEASE (HCC): ICD-10-CM

## 2025-06-24 DIAGNOSIS — R53.81 PHYSICAL DECONDITIONING: ICD-10-CM

## 2025-06-24 DIAGNOSIS — N18.5 TYPE 2 DIABETES MELLITUS WITH STAGE 5 CHRONIC KIDNEY DISEASE NOT ON CHRONIC DIALYSIS, WITH LONG-TERM CURRENT USE OF INSULIN (HCC): ICD-10-CM

## 2025-06-24 DIAGNOSIS — Z79.4 TYPE 2 DIABETES MELLITUS WITH STAGE 5 CHRONIC KIDNEY DISEASE NOT ON CHRONIC DIALYSIS, WITH LONG-TERM CURRENT USE OF INSULIN (HCC): ICD-10-CM

## 2025-06-24 DIAGNOSIS — Z99.2 HEMODIALYSIS PATIENT: ICD-10-CM

## 2025-06-24 DIAGNOSIS — I21.4 NSTEMI (NON-ST ELEVATED MYOCARDIAL INFARCTION) (HCC): ICD-10-CM

## 2025-06-24 DIAGNOSIS — E11.22 TYPE 2 DIABETES MELLITUS WITH STAGE 5 CHRONIC KIDNEY DISEASE NOT ON CHRONIC DIALYSIS, WITH LONG-TERM CURRENT USE OF INSULIN (HCC): ICD-10-CM

## 2025-06-24 DIAGNOSIS — Z98.890 H/O ENDARTERECTOMY: ICD-10-CM

## 2025-06-24 DIAGNOSIS — D64.9 ANEMIA, UNSPECIFIED TYPE: ICD-10-CM

## 2025-06-24 PROCEDURE — 1160F RVW MEDS BY RX/DR IN RCRD: CPT | Performed by: NURSE PRACTITIONER

## 2025-06-24 PROCEDURE — 99309 SBSQ NF CARE MODERATE MDM 30: CPT | Performed by: NURSE PRACTITIONER

## 2025-06-24 PROCEDURE — 1111F DSCHRG MED/CURRENT MED MERGE: CPT | Performed by: NURSE PRACTITIONER

## 2025-06-24 PROCEDURE — 3077F SYST BP >= 140 MM HG: CPT | Performed by: NURSE PRACTITIONER

## 2025-06-24 PROCEDURE — 1159F MED LIST DOCD IN RCRD: CPT | Performed by: NURSE PRACTITIONER

## 2025-06-24 PROCEDURE — 3078F DIAST BP <80 MM HG: CPT | Performed by: NURSE PRACTITIONER

## 2025-06-25 VITALS
RESPIRATION RATE: 16 BRPM | HEART RATE: 69 BPM | TEMPERATURE: 98 F | SYSTOLIC BLOOD PRESSURE: 164 MMHG | DIASTOLIC BLOOD PRESSURE: 58 MMHG | WEIGHT: 133 LBS | BODY MASS INDEX: 25 KG/M2

## 2025-06-25 NOTE — PROGRESS NOTES
25 APRN Encounter 1 pm  Radha Yu.    1940    Follow up visit for s/p NSTEMI, s/p R LE endarterectomy with stenting, weakness, anemia, dialysis.    Patient seen at bedside sitting up in wheelchair for lunch.  Patient's hearing much improved.  Nephew came over the weekend to change filter and recharge them.      NSTEMI: Patient denies chest pain, cough, SOB.    Patient being followed by wound team for multiple skin areas of breakdown including R great toe and bilateral heels.  R groin surgical site; betadine being applied to site.    Anemia:  HGB today 25:  9.9.    Therapy Update: completed R UE exercises 15 reps each x 2 sets with a 2# handweight and L  UE AROM movements with no weight,   2 # wt as tolerated to further enhance her UE strength to assist with ADL's.   Pt completed toileting to promote independence with daily routine. Pt requires CGA for sit to stand transfer and Min A for stand, pivot, toilet transfer from . Pt requires max A for dressing.     Patient continues with hemodialysis via perm catheter every M/W/F.    Patient receiving dialysis M/W/F at [facility]. Left jugular perm cath dressing clean and dry.  Patient also has a right chest mediport used for treatment of lymphoma and left breast cancer.    Hospital Discharge Diagnoses:  NSTEMI  Acute on Chronic respiratory failure w/ Hypoxia  Acute CHF, unspecified type.  ESRD - hemodialysis    Radha Yu  : 1940 . 84 year old  female is admitted to [facility] for hemodialysis 3 x week, [facility] for rehabilitation and strengthening.      [facility] Admission  25 to 25    Chief complaint today:  [place], Right leg pain, weakness, fatigue     HPI - hospital note  Radha Yu is a(n) 84 year old female with a PMH of PAD, HTN, HL, CKD stage 3 who presents with increased sob. She was extremely tachypneic and wheezing, saturating 90% on room air. She was given a nebulizer treatment and her oxygen sats improved to 98%. In the ED she  had a cxr that was consistent with fluid overload. She also has a significant tobacco use history (stopped in January) and was still wheezing on exam. Her trop was elevated and she had some EKG changes that were new. She will be admitted.     Past Medical History[1]  Past Surgical History[2]  Family History[3]  Short Social Hx on File[4]    ALLERGIES: Levaquin.    CODE STATUS:  Full Code    ADVANCED CARE PLANNING TEAM: Will need family care plan updates with nephritchie Gallagher.  Patient reports she lives with 2 nephews and one of their wives. Patient's states Elliott is HCPOA.  Family lives in [place] with stairs to enter.      CURRENT MEDICATIONS - reviewed and updated on SNF EMAR  Tylenol 500 mg  prn giving 1 dose 30 minutes prior to therapy.   Calcitriol 0.25 mg one q M/W/F.  Losartan 50 mg aily  Metoprolol Succ ER 37.5 mg daily  Pantoprazole 40 mg 2 x daily\  Senna 8.6-50 - 2 tablets daily  ASA 81 mg daily  Atorvastatin 20 mg daily  Cholecalciferol 2000 UT daily  Plavix 75 mg daily  Cranberry 125 mg - 1 daily  Lexapro 10 mg qam  Insulin - Glargine increased to 6 units from  5 units  Asparte sliding scale as directed  Sodium Bicarb 650 mg - 2 tablets qam and 2 tablets at hs     SUBJECTIVE: denies chest pain, SOB, cough.  Denies n/v/d/c.  C/O intermittent right groin pain and right leg pain. .      PHYSICAL EXAM: 164/58.  P 69 RR 16. T 97.8. wgt 133#.    GENERAL HEALTH:well developed, well nourished, in no apparent distress   LINES, TUBES, DRAINS:   R chest medi-port - not accessed.  Left jugular dialysis catheter.   SKIN: pale, warm, dry  WOUND: . Right groin; wound nurse following applying Betadine.  Wound team following for multiple skin areas: L & R groin, L leg.  R great toe, bilateral heels..    EYES: PERRLA, sclera anicteric, conjunctiva normal; no nystagmus, no drainage from eyes  HENT: Elim IRA improved; no nasal drainage, mucous membranes pink, moist, no visible cerumen.   NECK: supple  BREAST:  HX: left breast  cancer/lymphoma   RESPIRATORY: lungs clear  + COPD  CARDIOVASCULAR:  RRR, Rate 69  ABDOMEN:  normal active BS+, soft, nondistended; nontender, no guarding, no rebound tenderness.  :Deferred on HD  LYMPHATIC:no lymphedema  MUSCULOSKELETAL: no acute synovitis upper or lower extremity   EXTREMITIES/VASCULAR: R groin site clean. R leg, heel pain and numbness..  NEUROLOGIC:follows commands  PSYCHIATRIC: alert and oriented x 3; affect appropriate  forgetful, memory deficit.     MEDICAL DECISION : Patient and nephew make medical decisions.   Nephew Elliott Madrid  [phone], per patient is her HCPOA.    Lab Results:  6/11/25:   WBC 5.1. Hgb 9.6. Plts 158.  Na 137. K 4.1. Cl 99. Co2 28. BUN 40. Creat 2.8 - GFR 16  Labs Reviewed:  6/17/25  WBC 4.8. HGB 9.5. PLTS 183  . K 5.0. . CO2 28. BUN 53. CREAT 2.7. GFR 16  Labs Reviewed 6/24/25  WBC 4.1. HGB 9.9. PLTS 165.  . K 4.4. . CO2 29. BUN 36. CREAT 2.3. (HD)    See Plan Below    RLE Ischemia  Right LE pain and, decreased sensation.  S/P right common femoral Endarterectomy      Iliac/SFA stenting - Dr. Vines   Continue to monitor R groin site  Tylenol 500 q 4 hrs prn  ASA 81 mg daily  Elevate legs on pillow  F/U with Cardiologist and surgeon  Continue PT / OT    NSTEMI type 1 with new ischemic cardiomyopathy  Acute CHF  Multivessel CAD  L heart cath 5/28   Aspirin 81 mg daily  Plavix daily  Atorvastatin 20 mg q hs  Metoprolol Succ 37.5 mg daily  F/U with cardiology     Tricuspid and Mitral Regurgitation  Volume removal by hemodialysis  Dialysis 3 x week at [facility] PVD s/p left femoropopliteal bypass 9/2015  -80% proximal L renal artery stenosis on angiogram  Continue aspirin 81 mg daily  Continue plavix 75 mg daily  Continue atorvastatin 20 mg q hs  F/U with cardiology   Continue PT/OT    [place], bilateral  Family assists with caring for hearing aids.    Charge hearing aids as needed   Patient needs assistance morning and night to care  for hearing aids    Hyperlipidemia  Continue atorvastatin 20 mg q hs    HTN  Continue Losartan 50 mg daily  Continue Metoprolol Succ 37.5 mg daily    Anemia  Hospital Hgb 9.2 on 6/9/25.  9.6 on  6/12/25.  9.5 on 6/18/25. 6/24 -  9.9   Trend weekly labs  Protonix 40 mg 2 x day    ESRD - continue hemodialysis 3 x week  F/U with renal    IDDM  Glargine Insulin increased to 6 units from 5 nightly  Asparte - follow sliding scale    COPD - no scheduled medications  Oxygen prn    Moderate Right ICA stenosis s/p R TCAR 2019  Totally occluded L ICA  Continue aspirin 81 mg daily  Continue Plavix 75 mg daily  Continue Atorvastatin 20 mg q hs    Weakness:  Continue PT/OT    Disposition:  Patient states she would like to return to her home in [place] with family    Follow Up with [facility]   Follow up wit Vascular Surgeon Dr. Vines as recommended.  Follow up withy Renal - Dr. Gates as recommended  *Follow-Up with PCP within 1-2 weeks following [facility] discharge.     Future Appointments   Date Time Provider Department Center   6/27/2025 12:30 PM Stef Velasco MD [facility]     *Greater than 35 minutes spent w/ patient and staff, including but not limited to/ reviewing present status, needs, abilities with disciplines, reviewing medical records, vital signs, labs, completing med rec and entering orders to establish plan of care in [facility]  Note to patient: The 21st Century Cures Act makes medical notes like these available to patients in the interest of transparency. However, this is a medical document intended as peer to peer communication. It is written in medical language and may contain abbreviations or verbiage that are unfamiliar. It may appear blunt or direct. Medical documents are intended to carry relevant information, facts as evident, and the clinical opinion of the practitioner who signs the document.    FAHAD Ramsey (Daya)  06/24/25   1pm   [facility]                       [1]   Past  Medical History:   Anxiety    COPD (chronic obstructive pulmonary disease) (HCC)    Essential hypertension    Hearing impaired person, bilateral    Hyperlipidemia    Kidney disease    Lymphoma (HCC)    Osteoarthritis    Renal disorder    Shortness of breath    Type 1 diabetes mellitus (HCC)    Visual impairment   [2]   Past Surgical History:  Procedure Laterality Date    Appendectomy      Hip surgery  2018    Total abdom hysterectomy     [3] No family history on file.  [4]   Social History  Socioeconomic History    Marital status:    Tobacco Use    Smoking status: Former     Types: Cigarettes    Smokeless tobacco: Never   Vaping Use    Vaping status: Never Used   Substance and Sexual Activity    Alcohol use: Not Currently    Drug use: Not Currently     Social Drivers of Health     Food Insecurity: No Food Insecurity (5/22/2025)    NCSS - Food Insecurity     Worried About Running Out of Food in the Last Year: No     Ran Out of Food in the Last Year: No   Transportation Needs: No Transportation Needs (5/22/2025)    NCSS - Transportation     Lack of Transportation: No   Housing Stability: Not At Risk (5/22/2025)    NCSS - Housing/Utilities     Has Housing: Yes     Worried About Losing Housing: No     Unable to Get Utilities: No

## 2025-06-26 ENCOUNTER — SNF VISIT (OUTPATIENT)
Facility: SKILLED NURSING FACILITY | Age: 85
End: 2025-06-26

## 2025-06-26 ENCOUNTER — MED REC SCAN ONLY (OUTPATIENT)
Dept: INTERNAL MEDICINE CLINIC | Facility: CLINIC | Age: 85
End: 2025-06-26

## 2025-06-26 VITALS
HEART RATE: 67 BPM | SYSTOLIC BLOOD PRESSURE: 153 MMHG | WEIGHT: 133 LBS | DIASTOLIC BLOOD PRESSURE: 60 MMHG | RESPIRATION RATE: 16 BRPM | TEMPERATURE: 98 F | OXYGEN SATURATION: 97 % | BODY MASS INDEX: 25 KG/M2

## 2025-06-26 DIAGNOSIS — E11.22 TYPE 2 DIABETES MELLITUS WITH STAGE 5 CHRONIC KIDNEY DISEASE NOT ON CHRONIC DIALYSIS, WITH LONG-TERM CURRENT USE OF INSULIN (HCC): ICD-10-CM

## 2025-06-26 DIAGNOSIS — Z98.890 H/O ENDARTERECTOMY: ICD-10-CM

## 2025-06-26 DIAGNOSIS — I21.4 NSTEMI (NON-ST ELEVATED MYOCARDIAL INFARCTION) (HCC): ICD-10-CM

## 2025-06-26 DIAGNOSIS — N18.4 STAGE 4 CHRONIC KIDNEY DISEASE (HCC): ICD-10-CM

## 2025-06-26 DIAGNOSIS — Z79.4 TYPE 2 DIABETES MELLITUS WITH STAGE 5 CHRONIC KIDNEY DISEASE NOT ON CHRONIC DIALYSIS, WITH LONG-TERM CURRENT USE OF INSULIN (HCC): ICD-10-CM

## 2025-06-26 DIAGNOSIS — H91.90 HARD OF HEARING: ICD-10-CM

## 2025-06-26 DIAGNOSIS — D64.9 ANEMIA, UNSPECIFIED TYPE: ICD-10-CM

## 2025-06-26 DIAGNOSIS — I70.239 ATHEROSCLEROSIS OF NATIVE ARTERY OF RIGHT LOWER EXTREMITY WITH ULCERATION, UNSPECIFIED ULCERATION SITE (HCC): Primary | ICD-10-CM

## 2025-06-26 DIAGNOSIS — N18.5 TYPE 2 DIABETES MELLITUS WITH STAGE 5 CHRONIC KIDNEY DISEASE NOT ON CHRONIC DIALYSIS, WITH LONG-TERM CURRENT USE OF INSULIN (HCC): ICD-10-CM

## 2025-06-26 DIAGNOSIS — Z99.2 HEMODIALYSIS PATIENT: ICD-10-CM

## 2025-06-26 DIAGNOSIS — R53.81 PHYSICAL DECONDITIONING: ICD-10-CM

## 2025-06-26 PROCEDURE — 99309 SBSQ NF CARE MODERATE MDM 30: CPT | Performed by: NURSE PRACTITIONER

## 2025-06-26 PROCEDURE — 1160F RVW MEDS BY RX/DR IN RCRD: CPT | Performed by: NURSE PRACTITIONER

## 2025-06-26 PROCEDURE — 3078F DIAST BP <80 MM HG: CPT | Performed by: NURSE PRACTITIONER

## 2025-06-26 PROCEDURE — 1159F MED LIST DOCD IN RCRD: CPT | Performed by: NURSE PRACTITIONER

## 2025-06-26 PROCEDURE — 1111F DSCHRG MED/CURRENT MED MERGE: CPT | Performed by: NURSE PRACTITIONER

## 2025-06-26 PROCEDURE — 3077F SYST BP >= 140 MM HG: CPT | Performed by: NURSE PRACTITIONER

## 2025-06-27 ENCOUNTER — TELEPHONE (OUTPATIENT)
Dept: NEPHROLOGY | Facility: CLINIC | Age: 85
End: 2025-06-27

## 2025-06-27 NOTE — TELEPHONE ENCOUNTER
Danny Gallagher patient was seen by Dr. Sharma in the hospital in May-June and referred her to a dialysis center and nephew is requesting the information for the center to call to check on her appointment. Per nephew contacted medical records and they instructed him to call clinic. Please call

## 2025-06-27 NOTE — TELEPHONE ENCOUNTER
Spoke with Elliott, Radha has not been seen in our nephrology department, he stated pt was discharged from [facility] in May to [facility] and was instructed to go to dialysis in [place].  Asked to call [facility] in [place].  Elliott voiced understanding and agreed to call dialysis center.

## 2025-06-27 NOTE — PROGRESS NOTES
25 APN Encounter 10:30 am  Radha Yu.  1940    Follow up Visit for:  post NSTEMI, post R LE fem endarterectomy, ESRD on HD.    Met with patient sitting in her wheelchair.  Patient states she can hear since nephew changed hearing aid filter and charged them.  Patient has 2 hearing aids but only wears the right.    Patient denies chest pain, palpitations, SOB; on Room air, Pulse Ox 97%    Observed patient's right groin, Island dressing clean, dry and intact covering right groin site  S/P R LE Femoral Endarterectomy.    Patient being followed by wound team for multiple areas of skin breakdown including R great toe and bilateral heels.      Anemia:  HGB today 25:  9.9.    Therapy Update: completed R UE exercises 15 reps each x 2 sets with a 2# handweight and LUE AROM movements with no weight,   2 # wt as tolerated to further enhance her UE strength to assist with ADL's.   Pt requires CGA for sit to stand transfer and Min A for standing, pivot, toilet transfer from . Pt requires max A for dressing.     Patient continues with hemodialysis via perm catheter every M/W/F.    Patient receiving dialysis M/W/F at [facility]. Left jugular perm cath dressing clean and dry.  Patient also has a right chest mediport used for treatment of lymphoma.    Hospital Discharge Diagnoses:  NSTEMI  Acute on Chronic respiratory failure w/ Hypoxia  Acute CHF, unspecified type.  ESRD - hemodialysis    Radha Yu  : 1940 . 84 year old  female is admitted to [facility] for dialysis 3 x week, [facility] for rehabilitation and strengthening.      [facility] Admission  25 to 25    Chief complaint today:  weakness, fatigue     HPI - hospital note  Radha Yu is a(n) 84 year old female with a PMH of PAD, HTN, HL, CKD stage 3 who presents with increased sob. She was extremely tachypneic and wheezing, saturating 90% on room air. She was given a nebulizer treatment and her oxygen sats improved to 98%. In the ED she had a cxr  that was consistent with fluid overload. She also has a significant tobacco use history (stopped in January) and was still wheezing on exam. Her trop was elevated and she had some EKG changes that were new. She will be admitted.     Past Medical History[1]  Past Surgical History[2]  Family History[3]  Short Social Hx on File[4]    ALLERGIES: Levaquin.    CODE STATUS:  Full Code    ADVANCED CARE PLANNING TEAM: Will need family care plan updates with nephritchie Gallagher.  Patient reports she lives with 2 nephews and one of their wives. Elliott is HCPOA.  Family lives in [place] with stairs to enter.      CURRENT MEDICATIONS - reviewed and updated on SNF EMAR  Tylenol 500 mg  prn giving 1 dose 30 minutes prior to therapy.   Calcitriol 0.25 mg one q M/W/F.  Losartan 50 mg aily  Metoprolol Succ ER 37.5 mg daily  Pantoprazole 40 mg 2 x daily\  Senna 8.6-50 - 2 tablets daily  ASA 81 mg daily  Atorvastatin 20 mg daily  Cholecalciferol 2000 UT daily  Plavix 75 mg daily  Cranberry 125 mg - 1 daily  Lexapro 10 mg qam  Insulin - Glargine increased to 6 units from  5 units  Asparte sliding scale as directed  Sodium Bicarb 650 mg - 2 tablets qam and 2 tablets at hs     SUBJECTIVE: denies chest pain, SOB, cough.  Denies n/v/d/c.  C/O intermittent right groin pain and right leg pain.     PHYSICAL EXAM:  143/60. P 67. RR 16. POx 97%. T 97.8. wgt 133#. .     GENERAL HEALTH:well developed, well nourished, in no apparent distress   LINES, TUBES, DRAINS:   R chest medi-port - not accessed.  Left jugular dialysis catheter.   SKIN: pale, warm, dry  WOUND: . Right groin; wound nurse following; Betadine cover w/ Island drsg.   Wound team following for multiple skin areas: L & R groin, L leg.  R great toe, bilateral heels..    EYES: PERRLA, sclera anicteric, conjunctiva normal; no nystagmus, no drainage   HENT: [place] improved; no nasal drainage, mucous membranes pink, moist, no visible cerumen.   NECK: supple  BREAST: deferred  RESPIRATORY:  lungs clear. + COPD  CARDIOVASCULAR:  RRR, Rate 67  ABDOMEN:  active BS+, soft, nondistended; nontender, no guarding, no rebound tenderness.  :Deferred on HD  LYMPHATIC:no lymphedema  MUSCULOSKELETAL:  weakness upper and lower extremities.  EXTREMITIES/VASCULAR: R groin site dressing clean.  Bilateral heel pain.  NEUROLOGIC:follows commands  PSYCHIATRIC: alert and oriented x 3; affect appropriate  forgetful, memory deficit.     MEDICAL DECISION : Patient and nephew make medical decisions.   Nephew Elliott Madrid  [phone], per patient is her HCPOA.    Lab Results:  6/11/25:   WBC 5.1. Hgb 9.6. Plts 158.  Na 137. K 4.1. Cl 99. Co2 28. BUN 40. Creat 2.8 - GFR 16  Labs Reviewed:  6/17/25  WBC 4.8. HGB 9.5. PLTS 183  . K 5.0. . CO2 28. BUN 53. CREAT 2.7. GFR 16  Labs Reviewed 6/24/25  WBC 4.1. HGB 9.9. PLTS 165.  . K 4.4. . CO2 29. BUN 36. CREAT 2.3. (HD)    See Plan Below    RLE Ischemia  Right LE pain and, decreased sensation.  S/P right common femoral Endarterectomy      Iliac/SFA stenting - Dr. Vines   Continue to monitor R groin site  Tylenol 500 q 4 hrs prn  ASA 81 mg daily  Elevate legs on pillow when in bed  F/U with Cardiologist and surgeon  Continue PT / OT    NSTEMI type 1 with new ischemic cardiomyopathy  Acute CHF  Multivessel CAD  L heart cath 5/28   Aspirin 81 mg daily  Plavix daily  Atorvastatin 20 mg q hs  Metoprolol Succ 37.5 mg daily  F/U with cardiology     Tricuspid and Mitral Regurgitation  Volume removal by hemodialysis  Dialysis 3 x week at [facility] s/p left femoropopliteal bypass 9/2015  -80% proximal L renal artery stenosis on angiogram  Continue aspirin 81 mg daily  Continue plavix 75 mg daily  Continue atorvastatin 20 mg q hs  F/U with [facility]   Continue PT/OT    [place], bilateral  Family assists with caring for hearing aids.    Charge hearing aids as needed   Patient needs assistance morning and night to care for hearing  aids    Hyperlipidemia  Continue atorvastatin 20 mg q hs    HTN  Continue Losartan 50 mg daily  Continue Metoprolol Succ 37.5 mg daily    Anemia  Hospital Hgb 9.2 on 6/9/25.  9.6 on  6/12/25.  9.5 on 6/18/25.   9.9 on 6/24/25   Trend weekly labs  Protonix 40 mg 2 x day    ESRD - continue hemodialysis 3 x week  F/U with renal    IDDM  Glargine Insulin increased to 6 units from 5 nightly  Asparte - follow sliding scale    COPD - no scheduled medications  Oxygen prn    Moderate Right ICA stenosis s/p R TCAR 2019  Totally occluded L ICA  Continue aspirin 81 mg daily  Continue Plavix 75 mg daily  Continue Atorvastatin 20 mg q hs    Weakness:  Continue PT/OT    Disposition:  Patient states she would like to return to her home in [place] with family    Follow Up with [facility]   Follow up wit Vascular Surgeon Dr. Vines as recommended.  Follow up withy Renal - Dr. Gates as recommended  *Follow-Up with PCP within 1-2 weeks following [facility] discharge.     Future Appointments   Date Time Provider Department Center   6/27/2025 12:30 PM Stef Velasco MD [facility]     *Greater than 35 minutes spent w/ patient and staff, including but not limited to/ reviewing present status, needs, abilities with disciplines, reviewing medical records, vital signs, labs, completing med rec and entering orders to establish plan of care in [facility]       Note to patient: The 21st Century Cures Act makes medical notes like these available to patients in the interest of transparency. However, this is a medical document intended as peer to peer communication. It is written in medical language and may contain abbreviations or verbiage that are unfamiliar. It may appear blunt or direct. Medical documents are intended to carry relevant information, facts as evident, and the clinical opinion of the practitioner who signs the document.    Celsa Calhoun (Daya), FAHAD  06/26/25   1030 am    [facility]                             [1]    Past Medical History:   Anxiety    COPD (chronic obstructive pulmonary disease) (HCC)    Essential hypertension    Hearing impaired person, bilateral    Hyperlipidemia    Kidney disease    Lymphoma (HCC)    Osteoarthritis    Renal disorder    Shortness of breath    Type 1 diabetes mellitus (HCC)    Visual impairment   [2]   Past Surgical History:  Procedure Laterality Date    Appendectomy      Hip surgery  2018    Total abdom hysterectomy     [3] No family history on file.  [4]   Social History  Socioeconomic History    Marital status:    Tobacco Use    Smoking status: Former     Types: Cigarettes    Smokeless tobacco: Never   Vaping Use    Vaping status: Never Used   Substance and Sexual Activity    Alcohol use: Not Currently    Drug use: Not Currently     Social Drivers of Health     Food Insecurity: No Food Insecurity (5/22/2025)    NCSS - Food Insecurity     Worried About Running Out of Food in the Last Year: No     Ran Out of Food in the Last Year: No   Transportation Needs: No Transportation Needs (5/22/2025)    NCSS - Transportation     Lack of Transportation: No   Housing Stability: Not At Risk (5/22/2025)    NCSS - Housing/Utilities     Has Housing: Yes     Worried About Losing Housing: No     Unable to Get Utilities: No

## 2025-07-01 ENCOUNTER — SNF VISIT (OUTPATIENT)
Facility: SKILLED NURSING FACILITY | Age: 85
End: 2025-07-01

## 2025-07-01 DIAGNOSIS — Z79.4 TYPE 2 DIABETES MELLITUS WITH STAGE 5 CHRONIC KIDNEY DISEASE NOT ON CHRONIC DIALYSIS, WITH LONG-TERM CURRENT USE OF INSULIN (HCC): ICD-10-CM

## 2025-07-01 DIAGNOSIS — N18.5 TYPE 2 DIABETES MELLITUS WITH STAGE 5 CHRONIC KIDNEY DISEASE NOT ON CHRONIC DIALYSIS, WITH LONG-TERM CURRENT USE OF INSULIN (HCC): ICD-10-CM

## 2025-07-01 DIAGNOSIS — N18.4 STAGE 4 CHRONIC KIDNEY DISEASE (HCC): ICD-10-CM

## 2025-07-01 DIAGNOSIS — Z99.2 HEMODIALYSIS PATIENT: ICD-10-CM

## 2025-07-01 DIAGNOSIS — H91.90 HARD OF HEARING: ICD-10-CM

## 2025-07-01 DIAGNOSIS — D64.9 ANEMIA, UNSPECIFIED TYPE: ICD-10-CM

## 2025-07-01 DIAGNOSIS — R53.81 PHYSICAL DECONDITIONING: ICD-10-CM

## 2025-07-01 DIAGNOSIS — I21.4 NSTEMI (NON-ST ELEVATED MYOCARDIAL INFARCTION) (HCC): Primary | ICD-10-CM

## 2025-07-01 DIAGNOSIS — Z98.890 H/O ENDARTERECTOMY: ICD-10-CM

## 2025-07-01 DIAGNOSIS — E11.22 TYPE 2 DIABETES MELLITUS WITH STAGE 5 CHRONIC KIDNEY DISEASE NOT ON CHRONIC DIALYSIS, WITH LONG-TERM CURRENT USE OF INSULIN (HCC): ICD-10-CM

## 2025-07-01 PROCEDURE — 1111F DSCHRG MED/CURRENT MED MERGE: CPT | Performed by: NURSE PRACTITIONER

## 2025-07-01 PROCEDURE — 1160F RVW MEDS BY RX/DR IN RCRD: CPT | Performed by: NURSE PRACTITIONER

## 2025-07-01 PROCEDURE — 3077F SYST BP >= 140 MM HG: CPT | Performed by: NURSE PRACTITIONER

## 2025-07-01 PROCEDURE — 1159F MED LIST DOCD IN RCRD: CPT | Performed by: NURSE PRACTITIONER

## 2025-07-01 PROCEDURE — 3078F DIAST BP <80 MM HG: CPT | Performed by: NURSE PRACTITIONER

## 2025-07-01 PROCEDURE — 99309 SBSQ NF CARE MODERATE MDM 30: CPT | Performed by: NURSE PRACTITIONER

## 2025-07-03 VITALS
HEART RATE: 64 BPM | RESPIRATION RATE: 16 BRPM | SYSTOLIC BLOOD PRESSURE: 154 MMHG | WEIGHT: 133 LBS | DIASTOLIC BLOOD PRESSURE: 61 MMHG | TEMPERATURE: 98 F | OXYGEN SATURATION: 99 % | BODY MASS INDEX: 25 KG/M2

## 2025-07-03 NOTE — PROGRESS NOTES
25 APRN Encounter 5 pm  Radha Yu   1940    Follow up Visit:  s/p NSTEMI, RLE comm fem Endarterectomy, ESRD/HD    Mett with patient at bedside. Patient reports the nephew whom she lives with comes to see her every morning.  Patient states she is hopeful to return home with family as soon as possible.  Patient tolerating hemodialysis well at [facility] every M/W/F via Left Perm catheter.    Patient c/o mild right leg pain s/p Endarterectomy.  R groin dressing clean, dry and intact.  Patient participating well in therapy.  PT  Update:  Transfers from w/c requiring min assist and VC for proper hand/feet placements. Ambulated approx 10 ft x 2 utiliziing a RW requiring CGA/min assist with close w/c follow and VC for erect posture.    NSTEMI:  denies chest pain, SOB, cough.  Pulse Ox 99%.    Patient being followed by wound team for multiple areas of skin breakdown including R great toe and bilateral heels.      Anemia:  HGB today 25 -   9.0.  Patient also has a right chest mediport used for treatment of lymphoma.    Hospital Discharge Diagnoses:  NSTEMI  Acute on Chronic respiratory failure w/ Hypoxia  Acute CHF, unspecified type.  ESRD - hemodialysis    Radha Yu  : 1940 . 85 year old  female is admitted to [facility] for dialysis 3 x week, [facility] for rehabilitation and strengthening.      [facility] Admission  25 to 25    Chief complaint today:  weakness, fatigue, [place]    HPI - hospital note  Radha Yu is a(n) 84 year old female with a PMH of PAD, HTN, HL, CKD stage 3 who presents with increased sob. She was extremely tachypneic and wheezing, saturating 90% on room air. She was given a nebulizer treatment and her oxygen sats improved to 98%. In the ED she had a cxr that was consistent with fluid overload. She also has a significant tobacco use history (stopped in January) and was still wheezing on exam. Her trop was elevated and she had some EKG changes that were new. She will be  admitted.     Past Medical History[1]  Past Surgical History[2]  Family History[3]  Short Social Hx on File[4]    ALLERGIES: Levaquin.    CODE STATUS:  Full Code    ADVANCED CARE PLANNING TEAM: Will need family care plan updates with nephritchie Gallagher.  Patient reports she lives with 2 nephews and one of their wives. Elliott is HCPOA.  Family lives in [place] with stairs to enter.      CURRENT MEDICATIONS - reviewed and updated on SNF EMAR  Tylenol 500 mg  prn giving 1 dose 30 minutes prior to therapy.   Calcitriol 0.25 mg one q M/W/F.  Losartan 50 mg aily  Metoprolol Succ ER 37.5 mg daily  Pantoprazole 40 mg 2 x daily\  Senna 8.6-50 - 2 tablets daily  ASA 81 mg daily  Atorvastatin 20 mg daily  Cholecalciferol 2000 UT daily  Plavix 75 mg daily  Cranberry 125 mg - 1 daily  Lexapro 10 mg qam  Insulin - Glargine increased to 6 units from  5 units  Asparte sliding scale as directed  Sodium Bicarb 650 mg - 2 tablets qam and 2 tablets at hs     SUBJECTIVE: denies chest pain, SOB, cough.  Denies n/v/d/c.  C/O intermittent right groin pain and right leg pain.     PHYSICAL EXAM:  154/61.  P 64. RR 16. POx 99%. T 97.8. wgt 133#. .     GENERAL HEALTH:well developed, well nourished, in no apparent distress   LINES, TUBES, DRAINS:   R chest medi-port - not accessed.  Left jugular dialysis perm  catheter.   SKIN: pale, warm, dry  WOUND: . Right groin; wound nurse following; topical Betadine, cover w/ [place] drsg.   Wound team following for multiple skin areas: L & R groin, L leg.  R great toe, bilateral heels..    EYES: PERRLA, sclera anicteric, conjunctiva normal; no nystagmus, no drainage   HENT: Circle, no nasal drainage, mucous membranes pink, moist, no visible cerumen.   NECK: supple  BREAST: deferred  RESPIRATORY: lungs clear. + COPD  CARDIOVASCULAR:  RRR, Rate 64  ABDOMEN:  active BS+, soft, nondistended; nontender, no guarding, no rebound tenderness.  :Deferred on HD  LYMPHATIC:no lymphedema  MUSCULOSKELETAL:  weakness  upper and lower extremities.  EXTREMITIES/VASCULAR: R groin site dressing clean.  Bilateral heel pain.  NEUROLOGIC:follows commands  PSYCHIATRIC: alert and oriented x 3; affect appropriate  forgetful.    MEDICAL DECISION : Patient and nephew make medical decisions.   Nephew Elliott Madrid  [phone], per patient is her HCPOA.    Lab Results:  6/11/25:   WBC 5.1. Hgb 9.6. Plts 158.  Na 137. K 4.1. Cl 99. Co2 28. BUN 40. Creat 2.8 - GFR 16  Labs Reviewed:  6/17/25  WBC 4.8. HGB 9.5. PLTS 183  . K 5.0. . CO2 28. BUN 53. CREAT 2.7. GFR 16  Labs Reviewed 6/24/25  WBC 4.1. HGB 9.9. PLTS 165.  . K 4.4. . CO2 29. BUN 36. CREAT 2.3. (HD)  7/1/25 LABS REVIEWED: WBC 5.7. HGB 9.. PLTS 145.  . K 4.1. CL 99. CO2 27. BUN 38. CREAT 2.3. GFR 19. DIALYSIS    See Plan Below    RLE Ischemia  Right LE pain and, decreased sensation.  S/P right common femoral Endarterectomy      Iliac/SFA stenting - Dr. Vines   Continue to monitor R groin site  Tylenol 500 q 4 hrs prn  ASA 81 mg daily  Elevate legs on pillow when in bed  F/U with Cardiologist and surgeon  Continue PT / OT    NSTEMI type 1 with new ischemic cardiomyopathy  Acute CHF  Multivessel CAD  L heart cath 5/28   Aspirin 81 mg daily  Plavix daily  Atorvastatin 20 mg q hs  Metoprolol Succ 37.5 mg daily  F/U with cardiology     Tricuspid and Mitral Regurgitation  Volume removal by hemodialysis  Dialysis 3 x week at [facility] s/p left femoropopliteal bypass 9/2015  -80% proximal L renal artery stenosis on angiogram  Continue aspirin 81 mg daily  Continue plavix 75 mg daily  Continue atorvastatin 20 mg q hs  F/U with [facility]   Continue PT/OT    [place], bilateral  Family assists with caring for hearing aids.    Charge hearing aids as needed   Patient needs assistance morning and night to care for hearing aids    Hyperlipidemia  Continue atorvastatin 20 mg q hs    HTN  Continue Losartan 50 mg daily  Continue Metoprolol Succ  37.5 mg daily    Anemia  Hospital Hgb 9.2 on 6/9/25.  9.6 on  6/12/25.  9.5 on 6/18/25.   9.9 on 6/24/25. 9.0 on 7/1/25.   Trend weekly labs  Protonix 40 mg 2 x day    ESRD - continue hemodialysis 3 x week  F/U with renal    IDDM  Glargine Insulin increased to 6 units from 5 nightly  Asparte - follow sliding scale    COPD - no scheduled medications  Oxygen prn    Moderate Right ICA stenosis s/p R TCAR 2019  Totally occluded L ICA  Continue aspirin 81 mg daily  Continue Plavix 75 mg daily  Continue Atorvastatin 20 mg q hs    Weakness:  Continue PT/OT    Disposition:  Patient states she would like to return to her home in [place] with family    Follow Up with [facility]   Follow up wit Vascular Surgeon Dr. Vines as recommended.  Follow up withy Renal - Dr. Gates as recommended  *Follow-Up with PCP within 1-2 weeks following [facility] discharge.     Future Appointments   Date Time Provider Department Center   6/27/2025 12:30 PM Stef Velasco MD [facility]     *Greater than 35 minutes spent w/ patient and staff, including but not limited to/ reviewing present status, needs, abilities with disciplines, reviewing medical records, vital signs, labs, completing med rec and entering orders to establish plan of care in [facility]       Note to patient: The 21st Century Cures Act makes medical notes like these available to patients in the interest of transparency. However, this is a medical document intended as peer to peer communication. It is written in medical language and may contain abbreviations or verbiage that are unfamiliar. It may appear blunt or direct. Medical documents are intended to carry relevant information, facts as evident, and the clinical opinion of the practitioner who signs the document.    Celsa Calhoun (Daya), APRN  07/1/25   5pm    [place]                                 [1]   Past Medical History:   Anxiety    COPD (chronic obstructive pulmonary disease) (HCC)    Essential  hypertension    Hearing impaired person, bilateral    Hyperlipidemia    Kidney disease    Lymphoma (HCC)    Osteoarthritis    Renal disorder    Shortness of breath    Type 1 diabetes mellitus (HCC)    Visual impairment   [2]   Past Surgical History:  Procedure Laterality Date    Appendectomy      Hip surgery  2018    Total abdom hysterectomy     [3] No family history on file.  [4]   Social History  Socioeconomic History    Marital status:    Tobacco Use    Smoking status: Former     Types: Cigarettes    Smokeless tobacco: Never   Vaping Use    Vaping status: Never Used   Substance and Sexual Activity    Alcohol use: Not Currently    Drug use: Not Currently     Social Drivers of Health     Food Insecurity: No Food Insecurity (5/22/2025)    NCSS - Food Insecurity     Worried About Running Out of Food in the Last Year: No     Ran Out of Food in the Last Year: No   Transportation Needs: No Transportation Needs (5/22/2025)    NCSS - Transportation     Lack of Transportation: No   Housing Stability: Not At Risk (5/22/2025)    NCSS - Housing/Utilities     Has Housing: Yes     Worried About Losing Housing: No     Unable to Get Utilities: No

## 2025-07-06 ENCOUNTER — HOSPITAL ENCOUNTER (INPATIENT)
Age: 85
DRG: 291 | End: 2025-07-06
Attending: EMERGENCY MEDICINE | Admitting: STUDENT IN AN ORGANIZED HEALTH CARE EDUCATION/TRAINING PROGRAM

## 2025-07-06 ENCOUNTER — APPOINTMENT (OUTPATIENT)
Dept: GENERAL RADIOLOGY | Age: 85
DRG: 291 | End: 2025-07-06
Attending: EMERGENCY MEDICINE

## 2025-07-06 VITALS
OXYGEN SATURATION: 99 % | WEIGHT: 137.35 LBS | BODY MASS INDEX: 27.69 KG/M2 | SYSTOLIC BLOOD PRESSURE: 157 MMHG | TEMPERATURE: 99.3 F | HEIGHT: 59 IN | DIASTOLIC BLOOD PRESSURE: 77 MMHG | RESPIRATION RATE: 22 BRPM | HEART RATE: 99 BPM

## 2025-07-06 DIAGNOSIS — E11.22 TYPE 2 DIABETES MELLITUS WITH CHRONIC KIDNEY DISEASE ON CHRONIC DIALYSIS, WITH LONG-TERM CURRENT USE OF INSULIN  (CMD): ICD-10-CM

## 2025-07-06 DIAGNOSIS — N18.6 ESRD (END STAGE RENAL DISEASE)  (CMD): ICD-10-CM

## 2025-07-06 DIAGNOSIS — E11.621 TYPE 2 DIABETES MELLITUS WITH RIGHT DIABETIC FOOT ULCER (CMD): ICD-10-CM

## 2025-07-06 DIAGNOSIS — J44.1 COPD WITH ACUTE EXACERBATION  (CMD): Primary | ICD-10-CM

## 2025-07-06 DIAGNOSIS — S81.802D WOUND OF LEFT LOWER EXTREMITY, SUBSEQUENT ENCOUNTER: ICD-10-CM

## 2025-07-06 DIAGNOSIS — Z99.2 TYPE 2 DIABETES MELLITUS WITH CHRONIC KIDNEY DISEASE ON CHRONIC DIALYSIS, WITH LONG-TERM CURRENT USE OF INSULIN  (CMD): ICD-10-CM

## 2025-07-06 DIAGNOSIS — L97.519 TYPE 2 DIABETES MELLITUS WITH RIGHT DIABETIC FOOT ULCER (CMD): ICD-10-CM

## 2025-07-06 DIAGNOSIS — L89.626 PRESSURE INJURY OF DEEP TISSUE OF LEFT HEEL: ICD-10-CM

## 2025-07-06 DIAGNOSIS — I50.32 CHRONIC DIASTOLIC HEART FAILURE  (CMD): ICD-10-CM

## 2025-07-06 DIAGNOSIS — N18.6 TYPE 2 DIABETES MELLITUS WITH CHRONIC KIDNEY DISEASE ON CHRONIC DIALYSIS, WITH LONG-TERM CURRENT USE OF INSULIN  (CMD): ICD-10-CM

## 2025-07-06 DIAGNOSIS — J81.0 ACUTE PULMONARY EDEMA  (CMD): ICD-10-CM

## 2025-07-06 DIAGNOSIS — Z79.4 TYPE 2 DIABETES MELLITUS WITH CHRONIC KIDNEY DISEASE ON CHRONIC DIALYSIS, WITH LONG-TERM CURRENT USE OF INSULIN  (CMD): ICD-10-CM

## 2025-07-06 LAB
ALBUMIN SERPL-MCNC: 2.8 G/DL (ref 3.4–5)
ALBUMIN/GLOB SERPL: 0.8 {RATIO} (ref 1–2.4)
ALP SERPL-CCNC: 98 UNITS/L (ref 45–117)
ALT SERPL-CCNC: 13 UNITS/L
ANION GAP SERPL CALC-SCNC: 7 MMOL/L (ref 7–19)
AST SERPL-CCNC: 18 UNITS/L
ATRIAL RATE (BPM): 84
BACTERIA BLD CULT: NORMAL
BACTERIA BLD CULT: NORMAL
BASOPHILS # BLD: 0.1 K/MCL (ref 0–0.3)
BASOPHILS NFR BLD: 0 %
BILIRUB SERPL-MCNC: 0.4 MG/DL (ref 0.2–1)
BUN SERPL-MCNC: 45 MG/DL (ref 6–20)
BUN/CREAT SERPL: 16 (ref 7–25)
CALCIUM SERPL-MCNC: 8 MG/DL (ref 8.4–10.2)
CHLORIDE SERPL-SCNC: 104 MMOL/L (ref 97–110)
CO2 SERPL-SCNC: 29 MMOL/L (ref 21–32)
CREAT SERPL-MCNC: 2.73 MG/DL (ref 0.51–0.95)
CRP SERPL-MCNC: 28.7 MG/L
DEPRECATED RDW RBC: 54.7 FL (ref 39–50)
EGFRCR SERPLBLD CKD-EPI 2021: 16 ML/MIN/{1.73_M2}
EOSINOPHIL # BLD: 0.1 K/MCL (ref 0–0.5)
EOSINOPHIL NFR BLD: 1 %
ERYTHROCYTE [DISTWIDTH] IN BLOOD: 15.8 % (ref 11–15)
FASTING DURATION TIME PATIENT: ABNORMAL H
FLUAV RNA RESP QL NAA+PROBE: NOT DETECTED
FLUBV RNA RESP QL NAA+PROBE: NOT DETECTED
GLOBULIN SER-MCNC: 3.5 G/DL (ref 2–4)
GLUCOSE BLDC GLUCOMTR-MCNC: 192 MG/DL (ref 70–99)
GLUCOSE BLDC GLUCOMTR-MCNC: 308 MG/DL (ref 70–99)
GLUCOSE BLDC GLUCOMTR-MCNC: 577 MG/DL (ref 70–99)
GLUCOSE BLDC GLUCOMTR-MCNC: 583 MG/DL (ref 70–99)
GLUCOSE SERPL-MCNC: 100 MG/DL (ref 70–99)
HBA1C MFR BLD: 6.3 % (ref 4.5–5.6)
HBV CORE IGG+IGM SER QL: NEGATIVE
HBV SURFACE AG SER QL: NEGATIVE
HCT VFR BLD CALC: 29.5 % (ref 36–46.5)
HGB BLD-MCNC: 8.9 G/DL (ref 12–15.5)
IMM GRANULOCYTES # BLD AUTO: 0.1 K/MCL (ref 0–0.2)
IMM GRANULOCYTES # BLD: 1 %
LYMPHOCYTES # BLD: 0.5 K/MCL (ref 1–4)
LYMPHOCYTES NFR BLD: 4 %
MAGNESIUM SERPL-MCNC: 1.9 MG/DL (ref 1.7–2.4)
MCH RBC QN AUTO: 28.4 PG (ref 26–34)
MCHC RBC AUTO-ENTMCNC: 30.2 G/DL (ref 32–36.5)
MCV RBC AUTO: 94.2 FL (ref 78–100)
MONOCYTES # BLD: 0.5 K/MCL (ref 0.3–0.9)
MONOCYTES NFR BLD: 3 %
NEUTROPHILS # BLD: 12 K/MCL (ref 1.8–7.7)
NEUTROPHILS NFR BLD: 91 %
NRBC BLD MANUAL-RTO: 0 /100 WBC
P AXIS (DEGREES): 66
PLATELET # BLD AUTO: 158 K/MCL (ref 140–450)
POTASSIUM SERPL-SCNC: 4.8 MMOL/L (ref 3.4–5.1)
PR-INTERVAL (MSEC): 212
PROCALCITONIN SERPL IA-MCNC: 0.21 NG/ML
PROT SERPL-MCNC: 6.3 G/DL (ref 6.4–8.2)
QRS-INTERVAL (MSEC): 72
QT-INTERVAL (MSEC): 396
QTC: 468
R AXIS (DEGREES): 15
RAINBOW EXTRA TUBES HOLD SPECIMEN: NORMAL
RAINBOW EXTRA TUBES HOLD SPECIMEN: NORMAL
RBC # BLD: 3.13 MIL/MCL (ref 4–5.2)
REPORT TEXT: NORMAL
RSV AG NPH QL IA.RAPID: NOT DETECTED
SARS-COV-2 RNA RESP QL NAA+PROBE: NOT DETECTED
SERVICE CMNT-IMP: NORMAL
SERVICE CMNT-IMP: NORMAL
SODIUM SERPL-SCNC: 135 MMOL/L (ref 135–145)
T AXIS (DEGREES): 129
TROPONIN I SERPL DL<=0.01 NG/ML-MCNC: 25 NG/L
VENTRICULAR RATE EKG/MIN (BPM): 84
WBC # BLD: 13.2 K/MCL (ref 4.2–11)

## 2025-07-06 PROCEDURE — 96374 THER/PROPH/DIAG INJ IV PUSH: CPT

## 2025-07-06 PROCEDURE — 10002019 HB COUNTER RESP ASSESSMENT

## 2025-07-06 PROCEDURE — 83735 ASSAY OF MAGNESIUM: CPT | Performed by: EMERGENCY MEDICINE

## 2025-07-06 PROCEDURE — 86704 HEP B CORE ANTIBODY TOTAL: CPT | Performed by: INTERNAL MEDICINE

## 2025-07-06 PROCEDURE — 10002801 HB RX 250 W/O HCPCS: Performed by: EMERGENCY MEDICINE

## 2025-07-06 PROCEDURE — 94660 CPAP INITIATION&MGMT: CPT

## 2025-07-06 PROCEDURE — 10004651 HB RX, NO CHARGE ITEM: Performed by: EMERGENCY MEDICINE

## 2025-07-06 PROCEDURE — 86140 C-REACTIVE PROTEIN: CPT | Performed by: STUDENT IN AN ORGANIZED HEALTH CARE EDUCATION/TRAINING PROGRAM

## 2025-07-06 PROCEDURE — A4216 STERILE WATER/SALINE, 10 ML: HCPCS | Performed by: EMERGENCY MEDICINE

## 2025-07-06 PROCEDURE — 5A1D70Z PERFORMANCE OF URINARY FILTRATION, INTERMITTENT, LESS THAN 6 HOURS PER DAY: ICD-10-PCS | Performed by: INTERNAL MEDICINE

## 2025-07-06 PROCEDURE — 94640 AIRWAY INHALATION TREATMENT: CPT

## 2025-07-06 PROCEDURE — 71045 X-RAY EXAM CHEST 1 VIEW: CPT

## 2025-07-06 PROCEDURE — 99285 EMERGENCY DEPT VISIT HI MDM: CPT | Performed by: EMERGENCY MEDICINE

## 2025-07-06 PROCEDURE — 84484 ASSAY OF TROPONIN QUANT: CPT | Performed by: EMERGENCY MEDICINE

## 2025-07-06 PROCEDURE — 87340 HEPATITIS B SURFACE AG IA: CPT | Performed by: INTERNAL MEDICINE

## 2025-07-06 PROCEDURE — 10002803 HB RX 637: Performed by: NURSE PRACTITIONER

## 2025-07-06 PROCEDURE — 87040 BLOOD CULTURE FOR BACTERIA: CPT | Performed by: EMERGENCY MEDICINE

## 2025-07-06 PROCEDURE — 86706 HEP B SURFACE ANTIBODY: CPT | Performed by: INTERNAL MEDICINE

## 2025-07-06 PROCEDURE — 10002801 HB RX 250 W/O HCPCS

## 2025-07-06 PROCEDURE — 93005 ELECTROCARDIOGRAM TRACING: CPT | Performed by: EMERGENCY MEDICINE

## 2025-07-06 PROCEDURE — 10002803 HB RX 637: Performed by: STUDENT IN AN ORGANIZED HEALTH CARE EDUCATION/TRAINING PROGRAM

## 2025-07-06 PROCEDURE — 87154 CUL TYP ID BLD PTHGN 6+ TRGT: CPT | Performed by: EMERGENCY MEDICINE

## 2025-07-06 PROCEDURE — 96375 TX/PRO/DX INJ NEW DRUG ADDON: CPT

## 2025-07-06 PROCEDURE — 10004651 HB RX, NO CHARGE ITEM: Performed by: STUDENT IN AN ORGANIZED HEALTH CARE EDUCATION/TRAINING PROGRAM

## 2025-07-06 PROCEDURE — 83036 HEMOGLOBIN GLYCOSYLATED A1C: CPT | Performed by: STUDENT IN AN ORGANIZED HEALTH CARE EDUCATION/TRAINING PROGRAM

## 2025-07-06 PROCEDURE — 80053 COMPREHEN METABOLIC PANEL: CPT | Performed by: EMERGENCY MEDICINE

## 2025-07-06 PROCEDURE — 36415 COLL VENOUS BLD VENIPUNCTURE: CPT | Performed by: INTERNAL MEDICINE

## 2025-07-06 PROCEDURE — 84145 PROCALCITONIN (PCT): CPT | Performed by: EMERGENCY MEDICINE

## 2025-07-06 PROCEDURE — 85025 COMPLETE CBC W/AUTO DIFF WBC: CPT | Performed by: EMERGENCY MEDICINE

## 2025-07-06 PROCEDURE — 10002807 HB RX 258: Performed by: EMERGENCY MEDICINE

## 2025-07-06 PROCEDURE — 10004180 HB COUNTER-TRANSPORT

## 2025-07-06 PROCEDURE — 94644 CONT INHLJ TX 1ST HOUR: CPT

## 2025-07-06 PROCEDURE — 87637 SARSCOV2&INF A&B&RSV AMP PRB: CPT | Performed by: EMERGENCY MEDICINE

## 2025-07-06 PROCEDURE — 99223 1ST HOSP IP/OBS HIGH 75: CPT | Performed by: INTERNAL MEDICINE

## 2025-07-06 PROCEDURE — 10002800 HB RX 250 W HCPCS: Performed by: EMERGENCY MEDICINE

## 2025-07-06 PROCEDURE — 10006031 HB ROOM CHARGE TELEMETRY

## 2025-07-06 PROCEDURE — 10002800 HB RX 250 W HCPCS: Performed by: STUDENT IN AN ORGANIZED HEALTH CARE EDUCATION/TRAINING PROGRAM

## 2025-07-06 PROCEDURE — 96372 THER/PROPH/DIAG INJ SC/IM: CPT | Performed by: STUDENT IN AN ORGANIZED HEALTH CARE EDUCATION/TRAINING PROGRAM

## 2025-07-06 RX ORDER — 0.9 % SODIUM CHLORIDE 0.9 %
2 VIAL (ML) INJECTION EVERY 12 HOURS SCHEDULED
Status: ACTIVE | OUTPATIENT
Start: 2025-07-06

## 2025-07-06 RX ORDER — LOSARTAN POTASSIUM 50 MG/1
50 TABLET ORAL DAILY
Status: ON HOLD | COMMUNITY

## 2025-07-06 RX ORDER — CALCITRIOL 0.25 UG/1
0.25 CAPSULE, LIQUID FILLED ORAL
Status: ON HOLD | COMMUNITY
Start: 2025-06-11

## 2025-07-06 RX ORDER — DEXTROSE MONOHYDRATE 25 G/50ML
12.5 INJECTION, SOLUTION INTRAVENOUS PRN
Status: ACTIVE | OUTPATIENT
Start: 2025-07-06

## 2025-07-06 RX ORDER — FUROSEMIDE 10 MG/ML
40 INJECTION INTRAMUSCULAR; INTRAVENOUS ONCE
Status: COMPLETED | OUTPATIENT
Start: 2025-07-06 | End: 2025-07-06

## 2025-07-06 RX ORDER — PREDNISONE 20 MG/1
40 TABLET ORAL
Status: DISCONTINUED | OUTPATIENT
Start: 2025-07-06 | End: 2025-07-06

## 2025-07-06 RX ORDER — ACETAMINOPHEN 325 MG/1
650 TABLET ORAL EVERY 4 HOURS PRN
Status: ACTIVE | OUTPATIENT
Start: 2025-07-06

## 2025-07-06 RX ORDER — SODIUM BICARBONATE 650 MG/1
1300 TABLET ORAL 2 TIMES DAILY
Status: ON HOLD | COMMUNITY

## 2025-07-06 RX ORDER — GUAIFENESIN/DEXTROMETHORPHAN 100-10MG/5
15 SYRUP ORAL EVERY 8 HOURS PRN
Status: ON HOLD | COMMUNITY
Start: 2025-06-27

## 2025-07-06 RX ORDER — IPRATROPIUM BROMIDE AND ALBUTEROL SULFATE 2.5; .5 MG/3ML; MG/3ML
3 SOLUTION RESPIRATORY (INHALATION) EVERY 6 HOURS PRN
Status: ON HOLD | COMMUNITY

## 2025-07-06 RX ORDER — ALBUTEROL SULFATE 0.83 MG/ML
2.5 SOLUTION RESPIRATORY (INHALATION)
Status: ACTIVE | OUTPATIENT
Start: 2025-07-06

## 2025-07-06 RX ORDER — NICOTINE POLACRILEX 4 MG
30 LOZENGE BUCCAL PRN
Status: ACTIVE | OUTPATIENT
Start: 2025-07-06

## 2025-07-06 RX ORDER — AMOXICILLIN 250 MG
2 CAPSULE ORAL DAILY
Status: ON HOLD | COMMUNITY

## 2025-07-06 RX ORDER — IPRATROPIUM BROMIDE AND ALBUTEROL SULFATE 2.5; .5 MG/3ML; MG/3ML
3 SOLUTION RESPIRATORY (INHALATION) EVERY 6 HOURS PRN
Status: ACTIVE | OUTPATIENT
Start: 2025-07-06

## 2025-07-06 RX ORDER — HYDRALAZINE HYDROCHLORIDE 50 MG/1
50 TABLET, FILM COATED ORAL EVERY 8 HOURS PRN
Status: DISPENSED | OUTPATIENT
Start: 2025-07-06

## 2025-07-06 RX ORDER — ATORVASTATIN CALCIUM 20 MG/1
20 TABLET, FILM COATED ORAL NIGHTLY
Status: DISPENSED | OUTPATIENT
Start: 2025-07-06

## 2025-07-06 RX ORDER — SODIUM BICARBONATE 650 MG/1
1300 TABLET ORAL 2 TIMES DAILY
Status: ACTIVE | OUTPATIENT
Start: 2025-07-07

## 2025-07-06 RX ORDER — NICOTINE POLACRILEX 4 MG
15 LOZENGE BUCCAL PRN
Status: ACTIVE | OUTPATIENT
Start: 2025-07-06

## 2025-07-06 RX ORDER — PANTOPRAZOLE SODIUM 40 MG/1
40 TABLET, DELAYED RELEASE ORAL EVERY 12 HOURS SCHEDULED
Status: DISPENSED | OUTPATIENT
Start: 2025-07-06

## 2025-07-06 RX ORDER — DEXTROSE MONOHYDRATE 25 G/50ML
25 INJECTION, SOLUTION INTRAVENOUS PRN
Status: ACTIVE | OUTPATIENT
Start: 2025-07-06

## 2025-07-06 RX ORDER — ATORVASTATIN CALCIUM 20 MG/1
20 TABLET, FILM COATED ORAL NIGHTLY
Status: ON HOLD | COMMUNITY

## 2025-07-06 RX ORDER — ESCITALOPRAM OXALATE 10 MG/1
10 TABLET ORAL DAILY
Status: ON HOLD | COMMUNITY

## 2025-07-06 RX ORDER — PREDNISONE 20 MG/1
40 TABLET ORAL
Status: DISCONTINUED | OUTPATIENT
Start: 2025-07-07 | End: 2025-07-06

## 2025-07-06 RX ORDER — ACETAMINOPHEN 500 MG
500 TABLET ORAL EVERY 8 HOURS PRN
Status: ON HOLD | COMMUNITY

## 2025-07-06 RX ORDER — CALCITRIOL 0.25 UG/1
0.25 CAPSULE, LIQUID FILLED ORAL
Status: ACTIVE | OUTPATIENT
Start: 2025-07-07

## 2025-07-06 RX ORDER — INSULIN GLARGINE 100 [IU]/ML
6 INJECTION, SOLUTION SUBCUTANEOUS NIGHTLY
Status: DISPENSED | OUTPATIENT
Start: 2025-07-06

## 2025-07-06 RX ORDER — ALBUTEROL SULFATE 0.83 MG/ML
10 SOLUTION RESPIRATORY (INHALATION) ONCE
Status: COMPLETED | OUTPATIENT
Start: 2025-07-06 | End: 2025-07-06

## 2025-07-06 RX ORDER — ASPIRIN 81 MG/1
81 TABLET ORAL DAILY
Status: DISPENSED | OUTPATIENT
Start: 2025-07-06

## 2025-07-06 RX ORDER — INSULIN LISPRO 100 [IU]/ML
20 INJECTION, SOLUTION INTRAVENOUS; SUBCUTANEOUS ONCE
Status: COMPLETED | OUTPATIENT
Start: 2025-07-06 | End: 2025-07-06

## 2025-07-06 RX ORDER — ESCITALOPRAM OXALATE 10 MG/1
10 TABLET ORAL DAILY
Status: DISPENSED | OUTPATIENT
Start: 2025-07-06

## 2025-07-06 RX ORDER — ASPIRIN 81 MG/1
81 TABLET ORAL DAILY
Status: ON HOLD | COMMUNITY

## 2025-07-06 RX ORDER — 0.9 % SODIUM CHLORIDE 0.9 %
10 VIAL (ML) INJECTION PRN
Status: ACTIVE | OUTPATIENT
Start: 2025-07-06

## 2025-07-06 RX ORDER — HYDRALAZINE HYDROCHLORIDE 50 MG/1
50 TABLET, FILM COATED ORAL EVERY 8 HOURS PRN
Status: ON HOLD | COMMUNITY
Start: 2025-07-03

## 2025-07-06 RX ORDER — IPRATROPIUM BROMIDE AND ALBUTEROL SULFATE 2.5; .5 MG/3ML; MG/3ML
3 SOLUTION RESPIRATORY (INHALATION)
Status: DISPENSED | OUTPATIENT
Start: 2025-07-06

## 2025-07-06 RX ORDER — INSULIN LISPRO 100 [IU]/ML
INJECTION, SOLUTION INTRAVENOUS; SUBCUTANEOUS
Status: ON HOLD | COMMUNITY

## 2025-07-06 RX ORDER — LOSARTAN POTASSIUM 50 MG/1
50 TABLET ORAL DAILY
Status: DISPENSED | OUTPATIENT
Start: 2025-07-06

## 2025-07-06 RX ORDER — CLOPIDOGREL BISULFATE 75 MG/1
75 TABLET ORAL DAILY
Status: ON HOLD | COMMUNITY
Start: 2025-06-09

## 2025-07-06 RX ORDER — IPRATROPIUM BROMIDE AND ALBUTEROL SULFATE 2.5; .5 MG/3ML; MG/3ML
3 SOLUTION RESPIRATORY (INHALATION)
Status: DISCONTINUED | OUTPATIENT
Start: 2025-07-06 | End: 2025-07-06

## 2025-07-06 RX ORDER — INSULIN GLARGINE 100 [IU]/ML
6 INJECTION, SOLUTION SUBCUTANEOUS NIGHTLY
Status: ON HOLD | COMMUNITY

## 2025-07-06 RX ORDER — METOPROLOL SUCCINATE 25 MG/1
37.5 TABLET, EXTENDED RELEASE ORAL DAILY
Status: ON HOLD | COMMUNITY

## 2025-07-06 RX ORDER — CLOPIDOGREL BISULFATE 75 MG/1
75 TABLET ORAL DAILY
Status: DISPENSED | OUTPATIENT
Start: 2025-07-06

## 2025-07-06 RX ORDER — ALBUTEROL SULFATE 0.83 MG/ML
SOLUTION RESPIRATORY (INHALATION)
Status: COMPLETED
Start: 2025-07-06 | End: 2025-07-06

## 2025-07-06 RX ORDER — PANTOPRAZOLE SODIUM 40 MG/1
40 TABLET, DELAYED RELEASE ORAL EVERY 12 HOURS SCHEDULED
Status: ON HOLD | COMMUNITY

## 2025-07-06 RX ORDER — SODIUM CITRATE 4 % (5 ML)
3 SYRINGE (ML) MISCELLANEOUS PRN
Status: ACTIVE | OUTPATIENT
Start: 2025-07-06

## 2025-07-06 RX ADMIN — METOPROLOL SUCCINATE 37.5 MG: 25 TABLET, EXTENDED RELEASE ORAL at 14:51

## 2025-07-06 RX ADMIN — IPRATROPIUM BROMIDE 1 MG: 0.5 SOLUTION RESPIRATORY (INHALATION) at 05:46

## 2025-07-06 RX ADMIN — ASPIRIN 81 MG: 81 TABLET, COATED ORAL at 14:51

## 2025-07-06 RX ADMIN — INSULIN LISPRO 20 UNITS: 100 INJECTION, SOLUTION INTRAVENOUS; SUBCUTANEOUS at 21:07

## 2025-07-06 RX ADMIN — SODIUM CHLORIDE, PRESERVATIVE FREE 2 ML: 5 INJECTION INTRAVENOUS at 20:28

## 2025-07-06 RX ADMIN — IPRATROPIUM BROMIDE AND ALBUTEROL SULFATE 3 ML: 2.5; .5 SOLUTION RESPIRATORY (INHALATION) at 20:03

## 2025-07-06 RX ADMIN — INSULIN LISPRO 10 UNITS: 100 INJECTION, SOLUTION INTRAVENOUS; SUBCUTANEOUS at 20:26

## 2025-07-06 RX ADMIN — METHYLPREDNISOLONE SODIUM SUCCINATE 70 MG: 125 INJECTION, POWDER, FOR SOLUTION INTRAMUSCULAR; INTRAVENOUS at 07:02

## 2025-07-06 RX ADMIN — SODIUM CHLORIDE, PRESERVATIVE FREE 2 ML: 5 INJECTION INTRAVENOUS at 06:56

## 2025-07-06 RX ADMIN — DOXYCYCLINE 100 MG: 100 INJECTION, POWDER, LYOPHILIZED, FOR SOLUTION INTRAVENOUS at 07:25

## 2025-07-06 RX ADMIN — IPRATROPIUM BROMIDE AND ALBUTEROL SULFATE 3 ML: 2.5; .5 SOLUTION RESPIRATORY (INHALATION) at 12:42

## 2025-07-06 RX ADMIN — ESCITALOPRAM OXALATE 10 MG: 10 TABLET ORAL at 14:51

## 2025-07-06 RX ADMIN — LOSARTAN POTASSIUM 50 MG: 50 TABLET, FILM COATED ORAL at 14:51

## 2025-07-06 RX ADMIN — ALBUTEROL SULFATE 10 MG: 2.5 SOLUTION RESPIRATORY (INHALATION) at 06:53

## 2025-07-06 RX ADMIN — ATORVASTATIN CALCIUM 20 MG: 20 TABLET, FILM COATED ORAL at 20:35

## 2025-07-06 RX ADMIN — HYDRALAZINE HYDROCHLORIDE 50 MG: 50 TABLET ORAL at 22:12

## 2025-07-06 RX ADMIN — FUROSEMIDE 40 MG: 10 INJECTION INTRAMUSCULAR; INTRAVENOUS at 06:56

## 2025-07-06 RX ADMIN — INSULIN GLARGINE 6 UNITS: 100 INJECTION, SOLUTION SUBCUTANEOUS at 20:26

## 2025-07-06 RX ADMIN — CLOPIDOGREL BISULFATE 75 MG: 75 TABLET, FILM COATED ORAL at 14:51

## 2025-07-06 RX ADMIN — ALBUTEROL SULFATE 10 MG: 2.5 SOLUTION RESPIRATORY (INHALATION) at 05:46

## 2025-07-06 RX ADMIN — PANTOPRAZOLE SODIUM 40 MG: 40 TABLET, DELAYED RELEASE ORAL at 18:58

## 2025-07-06 SDOH — SOCIAL STABILITY: SOCIAL INSECURITY: HOW OFTEN DOES ANYONE, INCLUDING FAMILY AND FRIENDS, PHYSICALLY HURT YOU?: NEVER

## 2025-07-06 SDOH — SOCIAL STABILITY: SOCIAL INSECURITY: HOW OFTEN DOES ANYONE, INCLUDING FAMILY AND FRIENDS, SCREAM OR CURSE AT YOU?: NEVER

## 2025-07-06 SDOH — ECONOMIC STABILITY: FOOD INSECURITY: WITHIN THE PAST 12 MONTHS, THE FOOD YOU BOUGHT JUST DIDN'T LAST AND YOU DIDN'T HAVE MONEY TO GET MORE.: NEVER TRUE

## 2025-07-06 SDOH — ECONOMIC STABILITY: INCOME INSECURITY: IN THE PAST 12 MONTHS, HAS THE ELECTRIC, GAS, OIL, OR WATER COMPANY THREATENED TO SHUT OFF SERVICE IN YOUR HOME?: NO

## 2025-07-06 SDOH — ECONOMIC STABILITY: HOUSING INSECURITY: WHAT IS YOUR LIVING SITUATION TODAY?: I HAVE A STEADY PLACE TO LIVE

## 2025-07-06 SDOH — ECONOMIC STABILITY: HOUSING INSECURITY: DO YOU HAVE PROBLEMS WITH ANY OF THE FOLLOWING?: NONE OF THE ABOVE

## 2025-07-06 SDOH — SOCIAL STABILITY: SOCIAL NETWORK
HOW OFTEN DO YOU SEE OR TALK TO PEOPLE THAT YOU CARE ABOUT AND FEEL CLOSE TO? (FOR EXAMPLE: TALKING TO FRIENDS ON THE PHONE, VISITING FRIENDS OR FAMILY, GOING TO CHURCH OR CLUB MEETINGS): 1 OR 2 TIMES A WEEK

## 2025-07-06 SDOH — ECONOMIC STABILITY: HOUSING INSECURITY: WHAT IS YOUR LIVING SITUATION TODAY?: HOUSE

## 2025-07-06 SDOH — HEALTH STABILITY: GENERAL: BECAUSE OF A PHYSICAL, MENTAL, OR EMOTIONAL CONDITION, DO YOU HAVE DIFFICULTY DOING ERRANDS ALONE?: NO

## 2025-07-06 SDOH — HEALTH STABILITY: PHYSICAL HEALTH: DO YOU HAVE SERIOUS DIFFICULTY WALKING OR CLIMBING STAIRS?: YES

## 2025-07-06 SDOH — HEALTH STABILITY: GENERAL
BECAUSE OF A PHYSICAL, MENTAL, OR EMOTIONAL CONDITION, DO YOU HAVE SERIOUS DIFFICULTY CONCENTRATING, REMEMBERING OR MAKING DECISIONS?: NO

## 2025-07-06 SDOH — SOCIAL STABILITY: SOCIAL INSECURITY: HOW OFTEN DOES ANYONE, INCLUDING FAMILY AND FRIENDS, INSULT OR TALK DOWN TO YOU?: NEVER

## 2025-07-06 SDOH — SOCIAL STABILITY: SOCIAL INSECURITY: HOW OFTEN DOES ANYONE, INCLUDING FAMILY AND FRIENDS, THREATEN YOU WITH HARM?: NEVER

## 2025-07-06 SDOH — ECONOMIC STABILITY: TRANSPORTATION INSECURITY
IN THE PAST 12 MONTHS, HAS LACK OF RELIABLE TRANSPORTATION KEPT YOU FROM MEDICAL APPOINTMENTS, MEETINGS, WORK OR FROM GETTING THINGS NEEDED FOR DAILY LIVING?: NO

## 2025-07-06 SDOH — ECONOMIC STABILITY: GENERAL

## 2025-07-06 SDOH — HEALTH STABILITY: PHYSICAL HEALTH: DO YOU HAVE DIFFICULTY DRESSING OR BATHING?: YES

## 2025-07-06 SDOH — SOCIAL STABILITY: SOCIAL NETWORK: SUPPORT SYSTEMS: FAMILY MEMBERS

## 2025-07-06 SDOH — ECONOMIC STABILITY: HOUSING INSECURITY: WHAT IS YOUR LIVING SITUATION TODAY?: FAMILY MEMBERS

## 2025-07-06 ASSESSMENT — PAIN SCALES - GENERAL
PAINLEVEL_OUTOF10: 0

## 2025-07-06 ASSESSMENT — COLUMBIA-SUICIDE SEVERITY RATING SCALE - C-SSRS
2. HAVE YOU ACTUALLY HAD ANY THOUGHTS OF KILLING YOURSELF?: NO
IS THE PATIENT ABLE TO COMPLETE C-SSRS: YES
6. HAVE YOU EVER DONE ANYTHING, STARTED TO DO ANYTHING, OR PREPARED TO DO ANYTHING TO END YOUR LIFE?: NO
1. WITHIN THE PAST MONTH, HAVE YOU WISHED YOU WERE DEAD OR WISHED YOU COULD GO TO SLEEP AND NOT WAKE UP?: NO

## 2025-07-06 ASSESSMENT — ACTIVITIES OF DAILY LIVING (ADL)
DRESSING: INDEPENDENT
RECENT_DECLINE_ADL: NO
ADL_SHORT_OF_BREATH: YES
ADL_BEFORE_ADMISSION: NEEDS/REQUIRES ASSISTANCE
TOILETING: NEEDS ASSISTANCE
BATHING: NEEDS ASSISTANCE
ADL_SCORE: 10
FEEDING: INDEPENDENT

## 2025-07-06 ASSESSMENT — ENCOUNTER SYMPTOMS
SHORTNESS OF BREATH: 1
WHEEZING: 1

## 2025-07-06 ASSESSMENT — PATIENT HEALTH QUESTIONNAIRE - PHQ9
1. LITTLE INTEREST OR PLEASURE IN DOING THINGS: NOT AT ALL
IS PATIENT ABLE TO COMPLETE PHQ2 OR PHQ9: YES
CLINICAL INTERPRETATION OF PHQ2 SCORE: NO FURTHER SCREENING NEEDED
2. FEELING DOWN, DEPRESSED OR HOPELESS: NOT AT ALL
SUM OF ALL RESPONSES TO PHQ9 QUESTIONS 1 AND 2: 0
SUM OF ALL RESPONSES TO PHQ9 QUESTIONS 1 AND 2: 0

## 2025-07-06 ASSESSMENT — PULMONARY FUNCTION TESTS
FEV1: 0
FEV1: 0
FEV1_PREDICTED: 0
FEV1/FVC: UNABLE TO OBTAIN, OR GREATER THAN 70%
FEV1: 0

## 2025-07-06 ASSESSMENT — ORIENTATION MEMORY CONCENTRATION TEST (OMCT)
COUNT BACKWARDS FROM 20 TO 1: CORRECT
REPEAT THE NAME AND ADDRESS I ASKED YOU TO REMEMBER: CORRECT
OMCT INTERPRETATION: 0-6: NO SIGNIFICANT IMPAIRMENT
SAY THE MONTHS IN REVERSE ORDER STARTING WITH LAST MONTH: CORRECT
OMCT SCORE: 0
WHAT TIME IS IT (NO WATCH OR CLOCK): CORRECT
WHAT MONTH IS IT NOW: CORRECT
WHAT YEAR IS IT NOW (MUST BE EXACT): CORRECT

## 2025-07-06 ASSESSMENT — LIFESTYLE VARIABLES
HOW OFTEN DO YOU HAVE 6 OR MORE DRINKS ON ONE OCCASION: NEVER
AUDIT-C TOTAL SCORE: 0
HOW OFTEN DO YOU HAVE A DRINK CONTAINING ALCOHOL: NEVER
ALCOHOL_USE_STATUS: NO OR LOW RISK WITH VALIDATED TOOL
HOW MANY STANDARD DRINKS CONTAINING ALCOHOL DO YOU HAVE ON A TYPICAL DAY: 0,1 OR 2

## 2025-07-07 ENCOUNTER — APPOINTMENT (OUTPATIENT)
Dept: DIALYSIS | Age: 85
DRG: 291 | End: 2025-07-07
Attending: INTERNAL MEDICINE

## 2025-07-07 LAB
A BAUMANNII DNA BLD POS QL NAA+NON-PROBE: NOT DETECTED
ANION GAP SERPL CALC-SCNC: 12 MMOL/L (ref 7–19)
B FRAGILIS DNA BLD POS QL NAA+NON-PROBE: NOT DETECTED
BUN SERPL-MCNC: 60 MG/DL (ref 6–20)
BUN/CREAT SERPL: 19 (ref 7–25)
C ALBICANS DNA BLD POS QL NAA+NON-PROBE: NOT DETECTED
C AURIS DNA BLD POS QL NAA+NON-PROBE: NOT DETECTED
C GATTII+NEOFOR DNA BLD POS QL NAA+N-PRB: NOT DETECTED
C GLABRATA DNA BLD POS QL NAA+NON-PROBE: NOT DETECTED
C KRUSEI DNA BLD POS QL NAA+NON-PROBE: NOT DETECTED
C PARAP DNA BLD POS QL NAA+NON-PROBE: NOT DETECTED
C TROPICLS DNA BLD POS QL NAA+NON-PROBE: NOT DETECTED
CALCIUM SERPL-MCNC: 8.2 MG/DL (ref 8.4–10.2)
CHLORIDE SERPL-SCNC: 100 MMOL/L (ref 97–110)
CO2 SERPL-SCNC: 27 MMOL/L (ref 21–32)
CREAT SERPL-MCNC: 3.14 MG/DL (ref 0.51–0.95)
DEPRECATED RDW RBC: 52.8 FL (ref 39–50)
E CLOAC COMP DNA BLD POS NAA+NON-PROBE: NOT DETECTED
E COLI DNA BLD POS QL NAA+NON-PROBE: NOT DETECTED
E FAECALIS DNA BLD POS QL NAA+NON-PROBE: NOT DETECTED
E FAECIUM DNA BLD POS QL NAA+NON-PROBE: NOT DETECTED
EGFRCR SERPLBLD CKD-EPI 2021: 14 ML/MIN/{1.73_M2}
ENTEROBACTERALES DNA BLD POS NAA+N-PRB: NOT DETECTED
ERYTHROCYTE [DISTWIDTH] IN BLOOD: 15.9 % (ref 11–15)
FASTING DURATION TIME PATIENT: ABNORMAL H
GLUCOSE BLDC GLUCOMTR-MCNC: 115 MG/DL (ref 70–99)
GLUCOSE BLDC GLUCOMTR-MCNC: 188 MG/DL (ref 70–99)
GLUCOSE BLDC GLUCOMTR-MCNC: 227 MG/DL (ref 70–99)
GLUCOSE BLDC GLUCOMTR-MCNC: 66 MG/DL (ref 70–99)
GLUCOSE BLDC GLUCOMTR-MCNC: 88 MG/DL (ref 70–99)
GLUCOSE BLDC GLUCOMTR-MCNC: 90 MG/DL (ref 70–99)
GLUCOSE SERPL-MCNC: 71 MG/DL (ref 70–99)
GP B STREP DNA CSF QL NAA+NON-PROBE: NOT DETECTED
HAEM INFLU DNA BLD POS QL NAA+NON-PROBE: NOT DETECTED
HBV SURFACE AB SER-ACNC: <3.1 MUNITS/ML
HCT VFR BLD CALC: 26.1 % (ref 36–46.5)
HGB BLD-MCNC: 8.5 G/DL (ref 12–15.5)
K OXYTOCA DNA BLD POS QL NAA+NON-PROBE: NOT DETECTED
KLEBSIELLA SP DNA BLD POS QL NAA+NON-PRB: NOT DETECTED
KLEBSIELLA SP DNA BLD POS QL NAA+NON-PRB: NOT DETECTED
L MONOCYTOG DNA BLD POS QL NAA+NON-PROBE: NOT DETECTED
MAGNESIUM SERPL-MCNC: 1.9 MG/DL (ref 1.7–2.4)
MCH RBC QN AUTO: 29.7 PG (ref 26–34)
MCHC RBC AUTO-ENTMCNC: 32.6 G/DL (ref 32–36.5)
MCV RBC AUTO: 91.3 FL (ref 78–100)
N MEN DNA BLD POS QL NAA+NON-PROBE: NOT DETECTED
NRBC BLD MANUAL-RTO: 0 /100 WBC
P AERUGINOSA DNA BLD POS NAA+NON-PROBE: NOT DETECTED
PHOSPHATE SERPL-MCNC: 4.7 MG/DL (ref 2.4–4.7)
PLATELET # BLD AUTO: 146 K/MCL (ref 140–450)
POTASSIUM SERPL-SCNC: 4.6 MMOL/L (ref 3.4–5.1)
PROTEUS SP DNA BLD POS QL NAA+NON-PROBE: NOT DETECTED
RBC # BLD: 2.86 MIL/MCL (ref 4–5.2)
S LUGDUNENSIS DNA BLD POS QL NAA+NON-PRB: NOT DETECTED
S MALTOPHILIA DNA BLD POS QL NAA+NON-PRB: NOT DETECTED
S MARCESCENS DNA BLD POS NAA+NON-PROBE: NOT DETECTED
S PNEUM DNA BLD POS QL NAA+NON-PROBE: NOT DETECTED
S PYO DNA BLD POS QL NAA+NON-PROBE: NOT DETECTED
SALMONELLA DNA BLD POS QL NAA+NON-PROBE: NOT DETECTED
SODIUM SERPL-SCNC: 134 MMOL/L (ref 135–145)
STAPHYLOCOCCUS AUREUS: NOT DETECTED
STAPHYLOCOCCUS SPECIES: DETECTED
STREPTOCOCCUS DNA BLD POS NAA+NON-PROBE: NOT DETECTED
WBC # BLD: 8.4 K/MCL (ref 4.2–11)

## 2025-07-07 PROCEDURE — 80048 BASIC METABOLIC PNL TOTAL CA: CPT | Performed by: STUDENT IN AN ORGANIZED HEALTH CARE EDUCATION/TRAINING PROGRAM

## 2025-07-07 PROCEDURE — 10002800 HB RX 250 W HCPCS: Performed by: STUDENT IN AN ORGANIZED HEALTH CARE EDUCATION/TRAINING PROGRAM

## 2025-07-07 PROCEDURE — 10004651 HB RX, NO CHARGE ITEM: Performed by: EMERGENCY MEDICINE

## 2025-07-07 PROCEDURE — 10004180 HB COUNTER-TRANSPORT

## 2025-07-07 PROCEDURE — 97530 THERAPEUTIC ACTIVITIES: CPT

## 2025-07-07 PROCEDURE — A4216 STERILE WATER/SALINE, 10 ML: HCPCS | Performed by: EMERGENCY MEDICINE

## 2025-07-07 PROCEDURE — 97165 OT EVAL LOW COMPLEX 30 MIN: CPT

## 2025-07-07 PROCEDURE — 10002803 HB RX 637: Performed by: STUDENT IN AN ORGANIZED HEALTH CARE EDUCATION/TRAINING PROGRAM

## 2025-07-07 PROCEDURE — 10002803 HB RX 637: Performed by: INTERNAL MEDICINE

## 2025-07-07 PROCEDURE — 96372 THER/PROPH/DIAG INJ SC/IM: CPT | Performed by: STUDENT IN AN ORGANIZED HEALTH CARE EDUCATION/TRAINING PROGRAM

## 2025-07-07 PROCEDURE — 99233 SBSQ HOSP IP/OBS HIGH 50: CPT | Performed by: INTERNAL MEDICINE

## 2025-07-07 PROCEDURE — 10004651 HB RX, NO CHARGE ITEM: Performed by: STUDENT IN AN ORGANIZED HEALTH CARE EDUCATION/TRAINING PROGRAM

## 2025-07-07 PROCEDURE — 10006031 HB ROOM CHARGE TELEMETRY

## 2025-07-07 PROCEDURE — 10002019 HB COUNTER RESP ASSESSMENT

## 2025-07-07 PROCEDURE — 94640 AIRWAY INHALATION TREATMENT: CPT

## 2025-07-07 PROCEDURE — 90935 HEMODIALYSIS ONE EVALUATION: CPT

## 2025-07-07 PROCEDURE — 85027 COMPLETE CBC AUTOMATED: CPT | Performed by: STUDENT IN AN ORGANIZED HEALTH CARE EDUCATION/TRAINING PROGRAM

## 2025-07-07 PROCEDURE — 84100 ASSAY OF PHOSPHORUS: CPT | Performed by: INTERNAL MEDICINE

## 2025-07-07 PROCEDURE — 36415 COLL VENOUS BLD VENIPUNCTURE: CPT | Performed by: STUDENT IN AN ORGANIZED HEALTH CARE EDUCATION/TRAINING PROGRAM

## 2025-07-07 PROCEDURE — 83735 ASSAY OF MAGNESIUM: CPT | Performed by: INTERNAL MEDICINE

## 2025-07-07 RX ORDER — IPRATROPIUM BROMIDE AND ALBUTEROL SULFATE 2.5; .5 MG/3ML; MG/3ML
3 SOLUTION RESPIRATORY (INHALATION)
Status: DISCONTINUED | OUTPATIENT
Start: 2025-07-07 | End: 2025-07-12 | Stop reason: HOSPADM

## 2025-07-07 RX ADMIN — IPRATROPIUM BROMIDE AND ALBUTEROL SULFATE 3 ML: 2.5; .5 SOLUTION RESPIRATORY (INHALATION) at 01:25

## 2025-07-07 RX ADMIN — METOPROLOL SUCCINATE 37.5 MG: 25 TABLET, EXTENDED RELEASE ORAL at 14:30

## 2025-07-07 RX ADMIN — ESCITALOPRAM OXALATE 10 MG: 10 TABLET ORAL at 14:30

## 2025-07-07 RX ADMIN — PANTOPRAZOLE SODIUM 40 MG: 40 TABLET, DELAYED RELEASE ORAL at 14:34

## 2025-07-07 RX ADMIN — IPRATROPIUM BROMIDE AND ALBUTEROL SULFATE 3 ML: 2.5; .5 SOLUTION RESPIRATORY (INHALATION) at 08:55

## 2025-07-07 RX ADMIN — CALCITRIOL 0.25 MCG: 0.25 CAPSULE, LIQUID FILLED ORAL at 14:31

## 2025-07-07 RX ADMIN — LOSARTAN POTASSIUM 50 MG: 50 TABLET, FILM COATED ORAL at 14:30

## 2025-07-07 RX ADMIN — CLOPIDOGREL BISULFATE 75 MG: 75 TABLET, FILM COATED ORAL at 14:30

## 2025-07-07 RX ADMIN — ASPIRIN 81 MG: 81 TABLET, COATED ORAL at 14:32

## 2025-07-07 RX ADMIN — SODIUM CHLORIDE, PRESERVATIVE FREE 2 ML: 5 INJECTION INTRAVENOUS at 21:11

## 2025-07-07 RX ADMIN — INSULIN LISPRO 4 UNITS: 100 INJECTION, SOLUTION INTRAVENOUS; SUBCUTANEOUS at 18:04

## 2025-07-07 RX ADMIN — INSULIN GLARGINE 6 UNITS: 100 INJECTION, SOLUTION SUBCUTANEOUS at 21:09

## 2025-07-07 RX ADMIN — IPRATROPIUM BROMIDE AND ALBUTEROL SULFATE 3 ML: 2.5; .5 SOLUTION RESPIRATORY (INHALATION) at 19:36

## 2025-07-07 RX ADMIN — SODIUM BICARBONATE 1300 MG: 650 TABLET ORAL at 14:34

## 2025-07-07 RX ADMIN — PANTOPRAZOLE SODIUM 40 MG: 40 TABLET, DELAYED RELEASE ORAL at 21:09

## 2025-07-07 RX ADMIN — SODIUM BICARBONATE 1300 MG: 650 TABLET ORAL at 21:09

## 2025-07-07 RX ADMIN — SODIUM CHLORIDE, PRESERVATIVE FREE 2 ML: 5 INJECTION INTRAVENOUS at 10:00

## 2025-07-07 RX ADMIN — ATORVASTATIN CALCIUM 20 MG: 20 TABLET, FILM COATED ORAL at 21:09

## 2025-07-07 ASSESSMENT — ENCOUNTER SYMPTOMS
COLOR CHANGE: 0
BRUISES/BLEEDS EASILY: 0
LIGHT-HEADEDNESS: 0
ABDOMINAL PAIN: 0
ACTIVITY CHANGE: 1
EYE DISCHARGE: 0
CHILLS: 0
EYE ITCHING: 0
CONSTIPATION: 0
COUGH: 0
ABDOMINAL DISTENTION: 0
FATIGUE: 0
FEVER: 0
VOMITING: 0
EYES NEGATIVE: 1
CONFUSION: 0
WEAKNESS: 1

## 2025-07-07 ASSESSMENT — PULMONARY FUNCTION TESTS: FEV1/FVC: UNABLE TO OBTAIN, OR GREATER THAN 70%

## 2025-07-07 ASSESSMENT — ACTIVITIES OF DAILY LIVING (ADL)
GROOMING: INDEPENDENT
PRIOR_ADL: INDEPENDENT
PRIOR_ADL_BATHING: INDEPENDENT
EATING: INDEPENDENT
PRIOR_ADL_TOILETING: INDEPENDENT

## 2025-07-07 ASSESSMENT — COGNITIVE AND FUNCTIONAL STATUS - GENERAL
DAILY_ACTIVITY_RAW_SCORE: 14
HELP NEEDED DRESSING REGULAR LOWER BODY CLOTHING: A LOT
HELP NEEDED DRESSING REGULAR UPPER BODY CLOTHING: A LOT
HELP NEEDED FOR BATHING: A LOT
DAILY_ACTIVITY_CONVERTED_SCORE: 33.39
HELP NEEDED FOR TOILETING: TOTAL
HELP NEEDED FOR PERSONAL GROOMING: A LITTLE

## 2025-07-07 ASSESSMENT — PAIN SCALES - GENERAL
PAINLEVEL_OUTOF10: 0

## 2025-07-08 LAB
ANION GAP SERPL CALC-SCNC: 6 MMOL/L (ref 7–19)
BACTERIA BLD CULT: ABNORMAL
BACTERIA BLD CULT: ABNORMAL
BUN SERPL-MCNC: 32 MG/DL (ref 6–20)
BUN/CREAT SERPL: 15 (ref 7–25)
CALCIUM SERPL-MCNC: 7.8 MG/DL (ref 8.4–10.2)
CHLORIDE SERPL-SCNC: 102 MMOL/L (ref 97–110)
CO2 SERPL-SCNC: 32 MMOL/L (ref 21–32)
CREAT SERPL-MCNC: 2.07 MG/DL (ref 0.51–0.95)
DEPRECATED RDW RBC: 53.2 FL (ref 39–50)
EGFRCR SERPLBLD CKD-EPI 2021: 23 ML/MIN/{1.73_M2}
ERYTHROCYTE [DISTWIDTH] IN BLOOD: 15.7 % (ref 11–15)
FASTING DURATION TIME PATIENT: ABNORMAL H
GLUCOSE BLDC GLUCOMTR-MCNC: 148 MG/DL (ref 70–99)
GLUCOSE BLDC GLUCOMTR-MCNC: 218 MG/DL (ref 70–99)
GLUCOSE BLDC GLUCOMTR-MCNC: 254 MG/DL (ref 70–99)
GLUCOSE BLDC GLUCOMTR-MCNC: 76 MG/DL (ref 70–99)
GLUCOSE SERPL-MCNC: 84 MG/DL (ref 70–99)
GRAM STN SPEC: ABNORMAL
HCT VFR BLD CALC: 27.4 % (ref 36–46.5)
HGB BLD-MCNC: 8.7 G/DL (ref 12–15.5)
MCH RBC QN AUTO: 29.3 PG (ref 26–34)
MCHC RBC AUTO-ENTMCNC: 31.8 G/DL (ref 32–36.5)
MCV RBC AUTO: 92.3 FL (ref 78–100)
NRBC BLD MANUAL-RTO: 0 /100 WBC
PLATELET # BLD AUTO: 142 K/MCL (ref 140–450)
POTASSIUM SERPL-SCNC: 3.9 MMOL/L (ref 3.4–5.1)
RBC # BLD: 2.97 MIL/MCL (ref 4–5.2)
SODIUM SERPL-SCNC: 136 MMOL/L (ref 135–145)
WBC # BLD: 6.3 K/MCL (ref 4.2–11)

## 2025-07-08 PROCEDURE — 10002800 HB RX 250 W HCPCS: Performed by: STUDENT IN AN ORGANIZED HEALTH CARE EDUCATION/TRAINING PROGRAM

## 2025-07-08 PROCEDURE — 10006031 HB ROOM CHARGE TELEMETRY

## 2025-07-08 PROCEDURE — 96372 THER/PROPH/DIAG INJ SC/IM: CPT | Performed by: STUDENT IN AN ORGANIZED HEALTH CARE EDUCATION/TRAINING PROGRAM

## 2025-07-08 PROCEDURE — 10002803 HB RX 637: Performed by: STUDENT IN AN ORGANIZED HEALTH CARE EDUCATION/TRAINING PROGRAM

## 2025-07-08 PROCEDURE — 10004651 HB RX, NO CHARGE ITEM: Performed by: STUDENT IN AN ORGANIZED HEALTH CARE EDUCATION/TRAINING PROGRAM

## 2025-07-08 PROCEDURE — 10004651 HB RX, NO CHARGE ITEM: Performed by: EMERGENCY MEDICINE

## 2025-07-08 PROCEDURE — 94640 AIRWAY INHALATION TREATMENT: CPT

## 2025-07-08 PROCEDURE — 10004180 HB COUNTER-TRANSPORT

## 2025-07-08 PROCEDURE — 97535 SELF CARE MNGMENT TRAINING: CPT

## 2025-07-08 PROCEDURE — 80048 BASIC METABOLIC PNL TOTAL CA: CPT | Performed by: STUDENT IN AN ORGANIZED HEALTH CARE EDUCATION/TRAINING PROGRAM

## 2025-07-08 PROCEDURE — A4216 STERILE WATER/SALINE, 10 ML: HCPCS | Performed by: EMERGENCY MEDICINE

## 2025-07-08 PROCEDURE — 10002019 HB COUNTER RESP ASSESSMENT

## 2025-07-08 PROCEDURE — 10002803 HB RX 637: Performed by: INTERNAL MEDICINE

## 2025-07-08 PROCEDURE — 99233 SBSQ HOSP IP/OBS HIGH 50: CPT | Performed by: INTERNAL MEDICINE

## 2025-07-08 PROCEDURE — 97530 THERAPEUTIC ACTIVITIES: CPT

## 2025-07-08 PROCEDURE — 97162 PT EVAL MOD COMPLEX 30 MIN: CPT

## 2025-07-08 PROCEDURE — 36415 COLL VENOUS BLD VENIPUNCTURE: CPT | Performed by: STUDENT IN AN ORGANIZED HEALTH CARE EDUCATION/TRAINING PROGRAM

## 2025-07-08 PROCEDURE — 85027 COMPLETE CBC AUTOMATED: CPT | Performed by: STUDENT IN AN ORGANIZED HEALTH CARE EDUCATION/TRAINING PROGRAM

## 2025-07-08 RX ADMIN — IPRATROPIUM BROMIDE AND ALBUTEROL SULFATE 3 ML: 2.5; .5 SOLUTION RESPIRATORY (INHALATION) at 21:12

## 2025-07-08 RX ADMIN — IPRATROPIUM BROMIDE AND ALBUTEROL SULFATE 3 ML: 2.5; .5 SOLUTION RESPIRATORY (INHALATION) at 09:27

## 2025-07-08 RX ADMIN — HYDRALAZINE HYDROCHLORIDE 50 MG: 50 TABLET ORAL at 05:52

## 2025-07-08 RX ADMIN — ASPIRIN 81 MG: 81 TABLET, COATED ORAL at 09:45

## 2025-07-08 RX ADMIN — SODIUM BICARBONATE 1300 MG: 650 TABLET ORAL at 20:35

## 2025-07-08 RX ADMIN — INSULIN LISPRO 4 UNITS: 100 INJECTION, SOLUTION INTRAVENOUS; SUBCUTANEOUS at 17:07

## 2025-07-08 RX ADMIN — PANTOPRAZOLE SODIUM 40 MG: 40 TABLET, DELAYED RELEASE ORAL at 09:45

## 2025-07-08 RX ADMIN — METOPROLOL SUCCINATE 37.5 MG: 25 TABLET, EXTENDED RELEASE ORAL at 09:45

## 2025-07-08 RX ADMIN — SODIUM CHLORIDE, PRESERVATIVE FREE 2 ML: 5 INJECTION INTRAVENOUS at 09:47

## 2025-07-08 RX ADMIN — SODIUM CHLORIDE, PRESERVATIVE FREE 2 ML: 5 INJECTION INTRAVENOUS at 20:37

## 2025-07-08 RX ADMIN — ESCITALOPRAM OXALATE 10 MG: 10 TABLET ORAL at 09:45

## 2025-07-08 RX ADMIN — ATORVASTATIN CALCIUM 20 MG: 20 TABLET, FILM COATED ORAL at 20:35

## 2025-07-08 RX ADMIN — INSULIN GLARGINE 6 UNITS: 100 INJECTION, SOLUTION SUBCUTANEOUS at 20:36

## 2025-07-08 RX ADMIN — SODIUM BICARBONATE 1300 MG: 650 TABLET ORAL at 10:32

## 2025-07-08 RX ADMIN — PANTOPRAZOLE SODIUM 40 MG: 40 TABLET, DELAYED RELEASE ORAL at 20:36

## 2025-07-08 RX ADMIN — CLOPIDOGREL BISULFATE 75 MG: 75 TABLET, FILM COATED ORAL at 09:45

## 2025-07-08 RX ADMIN — LOSARTAN POTASSIUM 50 MG: 50 TABLET, FILM COATED ORAL at 09:44

## 2025-07-08 SDOH — ECONOMIC STABILITY: HOUSING INSECURITY: WHAT IS YOUR LIVING SITUATION TODAY?: I HAVE A STEADY PLACE TO LIVE

## 2025-07-08 ASSESSMENT — ENCOUNTER SYMPTOMS
CHILLS: 0
LIGHT-HEADEDNESS: 0
ABDOMINAL PAIN: 0
FEVER: 0
WEAKNESS: 1
CONSTIPATION: 0
COUGH: 0
FATIGUE: 0
BRUISES/BLEEDS EASILY: 0
CONFUSION: 0
EYE ITCHING: 0
EYE DISCHARGE: 0
ACTIVITY CHANGE: 1
EYES NEGATIVE: 1
ABDOMINAL DISTENTION: 0
COLOR CHANGE: 0
VOMITING: 0

## 2025-07-08 ASSESSMENT — PULMONARY FUNCTION TESTS: FEV1/FVC: UNABLE TO OBTAIN, OR GREATER THAN 70%

## 2025-07-08 ASSESSMENT — COGNITIVE AND FUNCTIONAL STATUS - GENERAL
DO YOU HAVE SERIOUS DIFFICULTY WALKING OR CLIMBING STAIRS: YES
HELP NEEDED DRESSING REGULAR LOWER BODY CLOTHING: A LOT
HELP NEEDED DRESSING REGULAR UPPER BODY CLOTHING: A LITTLE
BASIC_MOBILITY_RAW_SCORE: 14
DAILY_ACTIVITY_CONVERTED_SCORE: 35.96
DAILY_ACTIVITY_RAW_SCORE: 16
DO YOU HAVE DIFFICULTY DRESSING OR BATHING: NO
HELP NEEDED FOR PERSONAL GROOMING: A LITTLE
HELP NEEDED FOR BATHING: A LOT
HELP NEEDED FOR TOILETING: A LOT
BASIC_MOBILITY_CONVERTED_SCORE: 35.55

## 2025-07-08 ASSESSMENT — PAIN SCALES - GENERAL
PAINLEVEL_OUTOF10: 0
PAINLEVEL_OUTOF10: 0

## 2025-07-08 ASSESSMENT — ACTIVITIES OF DAILY LIVING (ADL): HOME_MANAGEMENT_TIME_ENTRY: 13

## 2025-07-09 LAB
ANION GAP SERPL CALC-SCNC: 9 MMOL/L (ref 7–19)
BUN SERPL-MCNC: 56 MG/DL (ref 6–20)
BUN/CREAT SERPL: 20 (ref 7–25)
CALCIUM SERPL-MCNC: 7.8 MG/DL (ref 8.4–10.2)
CHLORIDE SERPL-SCNC: 101 MMOL/L (ref 97–110)
CO2 SERPL-SCNC: 29 MMOL/L (ref 21–32)
CREAT SERPL-MCNC: 2.74 MG/DL (ref 0.51–0.95)
DEPRECATED RDW RBC: 52.4 FL (ref 39–50)
EGFRCR SERPLBLD CKD-EPI 2021: 16 ML/MIN/{1.73_M2}
ERYTHROCYTE [DISTWIDTH] IN BLOOD: 15.8 % (ref 11–15)
FASTING DURATION TIME PATIENT: ABNORMAL H
GLUCOSE BLDC GLUCOMTR-MCNC: 123 MG/DL (ref 70–99)
GLUCOSE BLDC GLUCOMTR-MCNC: 166 MG/DL (ref 70–99)
GLUCOSE BLDC GLUCOMTR-MCNC: 286 MG/DL (ref 70–99)
GLUCOSE BLDC GLUCOMTR-MCNC: 347 MG/DL (ref 70–99)
GLUCOSE BLDC GLUCOMTR-MCNC: 357 MG/DL (ref 70–99)
GLUCOSE BLDC GLUCOMTR-MCNC: 64 MG/DL (ref 70–99)
GLUCOSE SERPL-MCNC: 70 MG/DL (ref 70–99)
HCT VFR BLD CALC: 27.3 % (ref 36–46.5)
HGB BLD-MCNC: 8.6 G/DL (ref 12–15.5)
MCH RBC QN AUTO: 28.9 PG (ref 26–34)
MCHC RBC AUTO-ENTMCNC: 31.5 G/DL (ref 32–36.5)
MCV RBC AUTO: 91.6 FL (ref 78–100)
NRBC BLD MANUAL-RTO: 0 /100 WBC
PLATELET # BLD AUTO: 153 K/MCL (ref 140–450)
POTASSIUM SERPL-SCNC: 4 MMOL/L (ref 3.4–5.1)
RBC # BLD: 2.98 MIL/MCL (ref 4–5.2)
SODIUM SERPL-SCNC: 135 MMOL/L (ref 135–145)
WBC # BLD: 6.1 K/MCL (ref 4.2–11)

## 2025-07-09 PROCEDURE — 10006031 HB ROOM CHARGE TELEMETRY

## 2025-07-09 PROCEDURE — 36415 COLL VENOUS BLD VENIPUNCTURE: CPT | Performed by: STUDENT IN AN ORGANIZED HEALTH CARE EDUCATION/TRAINING PROGRAM

## 2025-07-09 PROCEDURE — 80048 BASIC METABOLIC PNL TOTAL CA: CPT | Performed by: STUDENT IN AN ORGANIZED HEALTH CARE EDUCATION/TRAINING PROGRAM

## 2025-07-09 PROCEDURE — 10002800 HB RX 250 W HCPCS: Performed by: STUDENT IN AN ORGANIZED HEALTH CARE EDUCATION/TRAINING PROGRAM

## 2025-07-09 PROCEDURE — 10004651 HB RX, NO CHARGE ITEM: Performed by: EMERGENCY MEDICINE

## 2025-07-09 PROCEDURE — 85027 COMPLETE CBC AUTOMATED: CPT | Performed by: STUDENT IN AN ORGANIZED HEALTH CARE EDUCATION/TRAINING PROGRAM

## 2025-07-09 PROCEDURE — 10002803 HB RX 637: Performed by: INTERNAL MEDICINE

## 2025-07-09 PROCEDURE — 10002803 HB RX 637: Performed by: STUDENT IN AN ORGANIZED HEALTH CARE EDUCATION/TRAINING PROGRAM

## 2025-07-09 PROCEDURE — 99233 SBSQ HOSP IP/OBS HIGH 50: CPT | Performed by: INTERNAL MEDICINE

## 2025-07-09 PROCEDURE — 87040 BLOOD CULTURE FOR BACTERIA: CPT | Performed by: STUDENT IN AN ORGANIZED HEALTH CARE EDUCATION/TRAINING PROGRAM

## 2025-07-09 PROCEDURE — A4216 STERILE WATER/SALINE, 10 ML: HCPCS | Performed by: EMERGENCY MEDICINE

## 2025-07-09 PROCEDURE — 96372 THER/PROPH/DIAG INJ SC/IM: CPT | Performed by: STUDENT IN AN ORGANIZED HEALTH CARE EDUCATION/TRAINING PROGRAM

## 2025-07-09 PROCEDURE — 94640 AIRWAY INHALATION TREATMENT: CPT

## 2025-07-09 PROCEDURE — 90935 HEMODIALYSIS ONE EVALUATION: CPT

## 2025-07-09 PROCEDURE — 10004180 HB COUNTER-TRANSPORT

## 2025-07-09 RX ORDER — INSULIN LISPRO 100 [IU]/ML
5 INJECTION, SOLUTION INTRAVENOUS; SUBCUTANEOUS ONCE
Status: COMPLETED | OUTPATIENT
Start: 2025-07-09 | End: 2025-07-09

## 2025-07-09 RX ADMIN — SODIUM CHLORIDE, PRESERVATIVE FREE 2 ML: 5 INJECTION INTRAVENOUS at 21:05

## 2025-07-09 RX ADMIN — IPRATROPIUM BROMIDE AND ALBUTEROL SULFATE 3 ML: 2.5; .5 SOLUTION RESPIRATORY (INHALATION) at 21:46

## 2025-07-09 RX ADMIN — INSULIN LISPRO 5 UNITS: 100 INJECTION, SOLUTION INTRAVENOUS; SUBCUTANEOUS at 22:33

## 2025-07-09 RX ADMIN — SODIUM BICARBONATE 1300 MG: 650 TABLET ORAL at 21:04

## 2025-07-09 RX ADMIN — PANTOPRAZOLE SODIUM 40 MG: 40 TABLET, DELAYED RELEASE ORAL at 21:04

## 2025-07-09 RX ADMIN — ATORVASTATIN CALCIUM 20 MG: 20 TABLET, FILM COATED ORAL at 21:04

## 2025-07-09 RX ADMIN — SODIUM CHLORIDE, PRESERVATIVE FREE 2 ML: 5 INJECTION INTRAVENOUS at 08:00

## 2025-07-09 ASSESSMENT — ENCOUNTER SYMPTOMS
FEVER: 0
WEAKNESS: 1
CONSTIPATION: 0
BRUISES/BLEEDS EASILY: 0
EYES NEGATIVE: 1
COUGH: 0
ABDOMINAL DISTENTION: 0
EYE DISCHARGE: 0
CHILLS: 0
ABDOMINAL PAIN: 0
VOMITING: 0
LIGHT-HEADEDNESS: 0
CONFUSION: 0
FATIGUE: 0
COLOR CHANGE: 0
EYE ITCHING: 0
ACTIVITY CHANGE: 1

## 2025-07-09 ASSESSMENT — PAIN SCALES - GENERAL
PAINLEVEL_OUTOF10: 0
PAINLEVEL_OUTOF10: 0

## 2025-07-10 LAB
ANION GAP SERPL CALC-SCNC: 8 MMOL/L (ref 7–19)
BUN SERPL-MCNC: 41 MG/DL (ref 6–20)
BUN/CREAT SERPL: 21 (ref 7–25)
CALCIUM SERPL-MCNC: 8.1 MG/DL (ref 8.4–10.2)
CHLORIDE SERPL-SCNC: 101 MMOL/L (ref 97–110)
CO2 SERPL-SCNC: 30 MMOL/L (ref 21–32)
CREAT SERPL-MCNC: 1.99 MG/DL (ref 0.51–0.95)
DEPRECATED RDW RBC: 53.3 FL (ref 39–50)
EGFRCR SERPLBLD CKD-EPI 2021: 24 ML/MIN/{1.73_M2}
ERYTHROCYTE [DISTWIDTH] IN BLOOD: 15.7 % (ref 11–15)
FASTING DURATION TIME PATIENT: ABNORMAL H
GLUCOSE BLDC GLUCOMTR-MCNC: 181 MG/DL (ref 70–99)
GLUCOSE BLDC GLUCOMTR-MCNC: 192 MG/DL (ref 70–99)
GLUCOSE BLDC GLUCOMTR-MCNC: 225 MG/DL (ref 70–99)
GLUCOSE BLDC GLUCOMTR-MCNC: 234 MG/DL (ref 70–99)
GLUCOSE SERPL-MCNC: 240 MG/DL (ref 70–99)
HCT VFR BLD CALC: 26.7 % (ref 36–46.5)
HGB BLD-MCNC: 8.5 G/DL (ref 12–15.5)
MCH RBC QN AUTO: 29.4 PG (ref 26–34)
MCHC RBC AUTO-ENTMCNC: 31.8 G/DL (ref 32–36.5)
MCV RBC AUTO: 92.4 FL (ref 78–100)
NRBC BLD MANUAL-RTO: 0 /100 WBC
PLATELET # BLD AUTO: 165 K/MCL (ref 140–450)
POTASSIUM SERPL-SCNC: 3.9 MMOL/L (ref 3.4–5.1)
RBC # BLD: 2.89 MIL/MCL (ref 4–5.2)
SODIUM SERPL-SCNC: 135 MMOL/L (ref 135–145)
WBC # BLD: 4.9 K/MCL (ref 4.2–11)

## 2025-07-10 PROCEDURE — 94640 AIRWAY INHALATION TREATMENT: CPT

## 2025-07-10 PROCEDURE — 10004651 HB RX, NO CHARGE ITEM: Performed by: STUDENT IN AN ORGANIZED HEALTH CARE EDUCATION/TRAINING PROGRAM

## 2025-07-10 PROCEDURE — 10002803 HB RX 637: Performed by: INTERNAL MEDICINE

## 2025-07-10 PROCEDURE — 10002803 HB RX 637: Performed by: STUDENT IN AN ORGANIZED HEALTH CARE EDUCATION/TRAINING PROGRAM

## 2025-07-10 PROCEDURE — 10006031 HB ROOM CHARGE TELEMETRY

## 2025-07-10 PROCEDURE — 10004651 HB RX, NO CHARGE ITEM: Performed by: EMERGENCY MEDICINE

## 2025-07-10 PROCEDURE — 36415 COLL VENOUS BLD VENIPUNCTURE: CPT | Performed by: STUDENT IN AN ORGANIZED HEALTH CARE EDUCATION/TRAINING PROGRAM

## 2025-07-10 PROCEDURE — A4216 STERILE WATER/SALINE, 10 ML: HCPCS | Performed by: EMERGENCY MEDICINE

## 2025-07-10 PROCEDURE — 10004180 HB COUNTER-TRANSPORT

## 2025-07-10 PROCEDURE — 85027 COMPLETE CBC AUTOMATED: CPT | Performed by: STUDENT IN AN ORGANIZED HEALTH CARE EDUCATION/TRAINING PROGRAM

## 2025-07-10 PROCEDURE — 96372 THER/PROPH/DIAG INJ SC/IM: CPT | Performed by: STUDENT IN AN ORGANIZED HEALTH CARE EDUCATION/TRAINING PROGRAM

## 2025-07-10 PROCEDURE — 10002800 HB RX 250 W HCPCS: Performed by: STUDENT IN AN ORGANIZED HEALTH CARE EDUCATION/TRAINING PROGRAM

## 2025-07-10 PROCEDURE — 80048 BASIC METABOLIC PNL TOTAL CA: CPT | Performed by: STUDENT IN AN ORGANIZED HEALTH CARE EDUCATION/TRAINING PROGRAM

## 2025-07-10 RX ORDER — METOPROLOL SUCCINATE 25 MG/1
37.5 TABLET, EXTENDED RELEASE ORAL DAILY
Qty: 135 TABLET | Refills: 0 | Status: SHIPPED | OUTPATIENT
Start: 2025-07-10

## 2025-07-10 RX ORDER — INSULIN GLARGINE 100 [IU]/ML
4 INJECTION, SOLUTION SUBCUTANEOUS NIGHTLY
Status: SHIPPED | COMMUNITY
Start: 2025-07-10 | End: 2025-07-10

## 2025-07-10 RX ORDER — INSULIN GLARGINE 100 [IU]/ML
4 INJECTION, SOLUTION SUBCUTANEOUS NIGHTLY
Qty: 9 ML | Refills: 0 | Status: SHIPPED | OUTPATIENT
Start: 2025-07-10

## 2025-07-10 RX ORDER — ATORVASTATIN CALCIUM 20 MG/1
20 TABLET, FILM COATED ORAL NIGHTLY
Qty: 90 TABLET | Refills: 0 | Status: SHIPPED | OUTPATIENT
Start: 2025-07-10

## 2025-07-10 RX ORDER — PANTOPRAZOLE SODIUM 40 MG/1
40 TABLET, DELAYED RELEASE ORAL EVERY 12 HOURS SCHEDULED
Qty: 180 TABLET | Refills: 0 | Status: SHIPPED | OUTPATIENT
Start: 2025-07-10

## 2025-07-10 RX ORDER — ALBUTEROL SULFATE 90 UG/1
2 INHALANT RESPIRATORY (INHALATION) EVERY 4 HOURS PRN
Qty: 8.5 G | Refills: 11 | Status: SHIPPED | OUTPATIENT
Start: 2025-07-10

## 2025-07-10 RX ORDER — LOSARTAN POTASSIUM 50 MG/1
50 TABLET ORAL DAILY
Qty: 90 TABLET | Refills: 0 | Status: SHIPPED | OUTPATIENT
Start: 2025-07-10

## 2025-07-10 RX ADMIN — ESCITALOPRAM OXALATE 10 MG: 10 TABLET ORAL at 08:26

## 2025-07-10 RX ADMIN — IPRATROPIUM BROMIDE AND ALBUTEROL SULFATE 3 ML: 2.5; .5 SOLUTION RESPIRATORY (INHALATION) at 21:21

## 2025-07-10 RX ADMIN — METOPROLOL SUCCINATE 37.5 MG: 25 TABLET, EXTENDED RELEASE ORAL at 08:25

## 2025-07-10 RX ADMIN — HYDRALAZINE HYDROCHLORIDE 50 MG: 50 TABLET ORAL at 19:51

## 2025-07-10 RX ADMIN — INSULIN LISPRO 2 UNITS: 100 INJECTION, SOLUTION INTRAVENOUS; SUBCUTANEOUS at 17:01

## 2025-07-10 RX ADMIN — SODIUM CHLORIDE, PRESERVATIVE FREE 2 ML: 5 INJECTION INTRAVENOUS at 08:26

## 2025-07-10 RX ADMIN — PANTOPRAZOLE SODIUM 40 MG: 40 TABLET, DELAYED RELEASE ORAL at 20:24

## 2025-07-10 RX ADMIN — SODIUM BICARBONATE 1300 MG: 650 TABLET ORAL at 20:24

## 2025-07-10 RX ADMIN — CLOPIDOGREL BISULFATE 75 MG: 75 TABLET, FILM COATED ORAL at 08:25

## 2025-07-10 RX ADMIN — INSULIN LISPRO 2 UNITS: 100 INJECTION, SOLUTION INTRAVENOUS; SUBCUTANEOUS at 12:48

## 2025-07-10 RX ADMIN — LOSARTAN POTASSIUM 50 MG: 50 TABLET, FILM COATED ORAL at 08:25

## 2025-07-10 RX ADMIN — SODIUM CHLORIDE, PRESERVATIVE FREE 2 ML: 5 INJECTION INTRAVENOUS at 20:25

## 2025-07-10 RX ADMIN — IPRATROPIUM BROMIDE AND ALBUTEROL SULFATE 3 ML: 2.5; .5 SOLUTION RESPIRATORY (INHALATION) at 09:54

## 2025-07-10 RX ADMIN — ASPIRIN 81 MG: 81 TABLET, COATED ORAL at 08:26

## 2025-07-10 RX ADMIN — INSULIN LISPRO 4 UNITS: 100 INJECTION, SOLUTION INTRAVENOUS; SUBCUTANEOUS at 08:26

## 2025-07-10 RX ADMIN — SODIUM BICARBONATE 1300 MG: 650 TABLET ORAL at 08:25

## 2025-07-10 RX ADMIN — PANTOPRAZOLE SODIUM 40 MG: 40 TABLET, DELAYED RELEASE ORAL at 08:26

## 2025-07-10 RX ADMIN — ATORVASTATIN CALCIUM 20 MG: 20 TABLET, FILM COATED ORAL at 20:24

## 2025-07-10 ASSESSMENT — ENCOUNTER SYMPTOMS
CONSTIPATION: 0
WEAKNESS: 1
LIGHT-HEADEDNESS: 0
VOMITING: 0
CHILLS: 0
EYES NEGATIVE: 1
BRUISES/BLEEDS EASILY: 0
COUGH: 0
COLOR CHANGE: 0
ABDOMINAL DISTENTION: 0
ACTIVITY CHANGE: 1
EYE DISCHARGE: 0
FEVER: 0
FATIGUE: 0
ABDOMINAL PAIN: 0
EYE ITCHING: 0
CONFUSION: 0

## 2025-07-10 ASSESSMENT — PAIN SCALES - GENERAL
PAINLEVEL_OUTOF10: 0
PAINLEVEL_OUTOF10: 0

## 2025-07-11 ENCOUNTER — APPOINTMENT (OUTPATIENT)
Dept: DIALYSIS | Age: 85
DRG: 291 | End: 2025-07-11
Attending: INTERNAL MEDICINE

## 2025-07-11 LAB
ANION GAP SERPL CALC-SCNC: 9 MMOL/L (ref 7–19)
BACTERIA BLD CULT: NORMAL
BUN SERPL-MCNC: 61 MG/DL (ref 6–20)
BUN/CREAT SERPL: 25 (ref 7–25)
CALCIUM SERPL-MCNC: 8 MG/DL (ref 8.4–10.2)
CHLORIDE SERPL-SCNC: 101 MMOL/L (ref 97–110)
CO2 SERPL-SCNC: 29 MMOL/L (ref 21–32)
CREAT SERPL-MCNC: 2.45 MG/DL (ref 0.51–0.95)
DEPRECATED RDW RBC: 51.8 FL (ref 39–50)
EGFRCR SERPLBLD CKD-EPI 2021: 19 ML/MIN/{1.73_M2}
ERYTHROCYTE [DISTWIDTH] IN BLOOD: 15.5 % (ref 11–15)
FASTING DURATION TIME PATIENT: ABNORMAL H
GLUCOSE BLDC GLUCOMTR-MCNC: 167 MG/DL (ref 70–99)
GLUCOSE BLDC GLUCOMTR-MCNC: 210 MG/DL (ref 70–99)
GLUCOSE BLDC GLUCOMTR-MCNC: 265 MG/DL (ref 70–99)
GLUCOSE BLDC GLUCOMTR-MCNC: 282 MG/DL (ref 70–99)
GLUCOSE SERPL-MCNC: 226 MG/DL (ref 70–99)
HCT VFR BLD CALC: 27.8 % (ref 36–46.5)
HGB BLD-MCNC: 8.8 G/DL (ref 12–15.5)
MCH RBC QN AUTO: 28.9 PG (ref 26–34)
MCHC RBC AUTO-ENTMCNC: 31.7 G/DL (ref 32–36.5)
MCV RBC AUTO: 91.1 FL (ref 78–100)
NRBC BLD MANUAL-RTO: 0 /100 WBC
PLATELET # BLD AUTO: 162 K/MCL (ref 140–450)
POTASSIUM SERPL-SCNC: 3.9 MMOL/L (ref 3.4–5.1)
RBC # BLD: 3.05 MIL/MCL (ref 4–5.2)
SODIUM SERPL-SCNC: 135 MMOL/L (ref 135–145)
WBC # BLD: 5.8 K/MCL (ref 4.2–11)

## 2025-07-11 PROCEDURE — 10002803 HB RX 637: Performed by: INTERNAL MEDICINE

## 2025-07-11 PROCEDURE — 94640 AIRWAY INHALATION TREATMENT: CPT

## 2025-07-11 PROCEDURE — 10006031 HB ROOM CHARGE TELEMETRY

## 2025-07-11 PROCEDURE — 85027 COMPLETE CBC AUTOMATED: CPT | Performed by: STUDENT IN AN ORGANIZED HEALTH CARE EDUCATION/TRAINING PROGRAM

## 2025-07-11 PROCEDURE — A4216 STERILE WATER/SALINE, 10 ML: HCPCS | Performed by: EMERGENCY MEDICINE

## 2025-07-11 PROCEDURE — 10002803 HB RX 637: Performed by: STUDENT IN AN ORGANIZED HEALTH CARE EDUCATION/TRAINING PROGRAM

## 2025-07-11 PROCEDURE — 80048 BASIC METABOLIC PNL TOTAL CA: CPT | Performed by: STUDENT IN AN ORGANIZED HEALTH CARE EDUCATION/TRAINING PROGRAM

## 2025-07-11 PROCEDURE — 10004651 HB RX, NO CHARGE ITEM: Performed by: STUDENT IN AN ORGANIZED HEALTH CARE EDUCATION/TRAINING PROGRAM

## 2025-07-11 PROCEDURE — 36415 COLL VENOUS BLD VENIPUNCTURE: CPT | Performed by: STUDENT IN AN ORGANIZED HEALTH CARE EDUCATION/TRAINING PROGRAM

## 2025-07-11 PROCEDURE — 10004651 HB RX, NO CHARGE ITEM: Performed by: EMERGENCY MEDICINE

## 2025-07-11 PROCEDURE — 96372 THER/PROPH/DIAG INJ SC/IM: CPT | Performed by: STUDENT IN AN ORGANIZED HEALTH CARE EDUCATION/TRAINING PROGRAM

## 2025-07-11 PROCEDURE — 10002800 HB RX 250 W HCPCS: Performed by: STUDENT IN AN ORGANIZED HEALTH CARE EDUCATION/TRAINING PROGRAM

## 2025-07-11 PROCEDURE — 10004180 HB COUNTER-TRANSPORT

## 2025-07-11 RX ORDER — INSULIN GLARGINE 100 [IU]/ML
4 INJECTION, SOLUTION SUBCUTANEOUS NIGHTLY
Status: DISCONTINUED | OUTPATIENT
Start: 2025-07-11 | End: 2025-07-12 | Stop reason: HOSPADM

## 2025-07-11 RX ADMIN — ATORVASTATIN CALCIUM 20 MG: 20 TABLET, FILM COATED ORAL at 20:32

## 2025-07-11 RX ADMIN — ESCITALOPRAM OXALATE 10 MG: 10 TABLET ORAL at 08:52

## 2025-07-11 RX ADMIN — CLOPIDOGREL BISULFATE 75 MG: 75 TABLET, FILM COATED ORAL at 08:52

## 2025-07-11 RX ADMIN — SODIUM CHLORIDE, PRESERVATIVE FREE 2 ML: 5 INJECTION INTRAVENOUS at 08:52

## 2025-07-11 RX ADMIN — PANTOPRAZOLE SODIUM 40 MG: 40 TABLET, DELAYED RELEASE ORAL at 08:52

## 2025-07-11 RX ADMIN — ASPIRIN 81 MG: 81 TABLET, COATED ORAL at 08:52

## 2025-07-11 RX ADMIN — INSULIN GLARGINE 4 UNITS: 100 INJECTION, SOLUTION SUBCUTANEOUS at 20:33

## 2025-07-11 RX ADMIN — PANTOPRAZOLE SODIUM 40 MG: 40 TABLET, DELAYED RELEASE ORAL at 20:32

## 2025-07-11 RX ADMIN — INSULIN LISPRO 2 UNITS: 100 INJECTION, SOLUTION INTRAVENOUS; SUBCUTANEOUS at 17:35

## 2025-07-11 RX ADMIN — SODIUM BICARBONATE 1300 MG: 650 TABLET ORAL at 08:52

## 2025-07-11 RX ADMIN — SODIUM BICARBONATE 1300 MG: 650 TABLET ORAL at 20:32

## 2025-07-11 RX ADMIN — IPRATROPIUM BROMIDE AND ALBUTEROL SULFATE 3 ML: 2.5; .5 SOLUTION RESPIRATORY (INHALATION) at 20:47

## 2025-07-11 RX ADMIN — CALCITRIOL 0.25 MCG: 0.25 CAPSULE, LIQUID FILLED ORAL at 08:52

## 2025-07-11 RX ADMIN — LOSARTAN POTASSIUM 50 MG: 50 TABLET, FILM COATED ORAL at 09:08

## 2025-07-11 RX ADMIN — INSULIN LISPRO 6 UNITS: 100 INJECTION, SOLUTION INTRAVENOUS; SUBCUTANEOUS at 12:53

## 2025-07-11 RX ADMIN — METOPROLOL SUCCINATE 37.5 MG: 25 TABLET, EXTENDED RELEASE ORAL at 09:08

## 2025-07-11 RX ADMIN — SODIUM CHLORIDE, PRESERVATIVE FREE 2 ML: 5 INJECTION INTRAVENOUS at 20:32

## 2025-07-11 RX ADMIN — IPRATROPIUM BROMIDE AND ALBUTEROL SULFATE 3 ML: 2.5; .5 SOLUTION RESPIRATORY (INHALATION) at 09:08

## 2025-07-11 RX ADMIN — INSULIN LISPRO 4 UNITS: 100 INJECTION, SOLUTION INTRAVENOUS; SUBCUTANEOUS at 08:51

## 2025-07-11 ASSESSMENT — ENCOUNTER SYMPTOMS
BRUISES/BLEEDS EASILY: 0
CONFUSION: 0
FATIGUE: 0
CHILLS: 0
COUGH: 0
COLOR CHANGE: 0
FEVER: 0
WEAKNESS: 1
VOMITING: 0
EYES NEGATIVE: 1
ABDOMINAL DISTENTION: 0
EYE ITCHING: 0
EYE DISCHARGE: 0
CONSTIPATION: 0
ABDOMINAL PAIN: 0
LIGHT-HEADEDNESS: 0
ACTIVITY CHANGE: 1

## 2025-07-11 ASSESSMENT — PAIN SCALES - GENERAL
PAINLEVEL_OUTOF10: 0
PAINLEVEL_OUTOF10: 0

## 2025-07-12 ENCOUNTER — APPOINTMENT (OUTPATIENT)
Dept: GENERAL RADIOLOGY | Age: 85
DRG: 291 | End: 2025-07-12
Attending: STUDENT IN AN ORGANIZED HEALTH CARE EDUCATION/TRAINING PROGRAM

## 2025-07-12 ENCOUNTER — APPOINTMENT (OUTPATIENT)
Dept: DIALYSIS | Age: 85
DRG: 291 | End: 2025-07-12

## 2025-07-12 VITALS
WEIGHT: 135.14 LBS | TEMPERATURE: 97.7 F | OXYGEN SATURATION: 99 % | HEIGHT: 59 IN | RESPIRATION RATE: 16 BRPM | SYSTOLIC BLOOD PRESSURE: 122 MMHG | BODY MASS INDEX: 27.24 KG/M2 | HEART RATE: 69 BPM | DIASTOLIC BLOOD PRESSURE: 61 MMHG

## 2025-07-12 LAB
ANION GAP SERPL CALC-SCNC: 11 MMOL/L (ref 7–19)
BASOPHILS # BLD: 0 K/MCL (ref 0–0.3)
BASOPHILS NFR BLD: 0 %
BUN SERPL-MCNC: 77 MG/DL (ref 6–20)
BUN/CREAT SERPL: 28 (ref 7–25)
CALCIUM SERPL-MCNC: 8.3 MG/DL (ref 8.4–10.2)
CHLORIDE SERPL-SCNC: 102 MMOL/L (ref 97–110)
CO2 SERPL-SCNC: 26 MMOL/L (ref 21–32)
CREAT SERPL-MCNC: 2.73 MG/DL (ref 0.51–0.95)
DEPRECATED RDW RBC: 52.2 FL (ref 39–50)
EGFRCR SERPLBLD CKD-EPI 2021: 16 ML/MIN/{1.73_M2}
EOSINOPHIL # BLD: 0.2 K/MCL (ref 0–0.5)
EOSINOPHIL NFR BLD: 1 %
ERYTHROCYTE [DISTWIDTH] IN BLOOD: 15.7 % (ref 11–15)
ERYTHROCYTE [DISTWIDTH] IN BLOOD: ABNORMAL %
FASTING DURATION TIME PATIENT: ABNORMAL H
GLUCOSE BLDC GLUCOMTR-MCNC: 105 MG/DL (ref 70–99)
GLUCOSE BLDC GLUCOMTR-MCNC: 137 MG/DL (ref 70–99)
GLUCOSE SERPL-MCNC: 127 MG/DL (ref 70–99)
HCT VFR BLD CALC: 27.7 % (ref 36–46.5)
HCT VFR BLD CALC: 27.7 % (ref 36–46.5)
HGB BLD-MCNC: 8.8 G/DL (ref 12–15.5)
HGB BLD-MCNC: 8.8 G/DL (ref 12–15.5)
IMM GRANULOCYTES # BLD AUTO: 0 K/MCL (ref 0–0.2)
IMM GRANULOCYTES # BLD: 0 %
LYMPHOCYTES # BLD: 1.2 K/MCL (ref 1–4)
LYMPHOCYTES NFR BLD: 10 %
MCH RBC QN AUTO: 29 PG (ref 26–34)
MCH RBC QN AUTO: ABNORMAL PG
MCHC RBC AUTO-ENTMCNC: 31.8 G/DL (ref 32–36.5)
MCHC RBC AUTO-ENTMCNC: ABNORMAL G/DL
MCV RBC AUTO: 91.4 FL (ref 78–100)
MCV RBC AUTO: ABNORMAL FL
MONOCYTES # BLD: 0.6 K/MCL (ref 0.3–0.9)
MONOCYTES NFR BLD: 5 %
NEUTROPHILS # BLD: 10.7 K/MCL (ref 1.8–7.7)
NEUTROPHILS NFR BLD: 84 %
NRBC BLD MANUAL-RTO: 0 /100 WBC
PLATELET # BLD AUTO: 188 K/MCL (ref 140–450)
PLATELET # BLD AUTO: 188 K/MCL (ref 140–450)
POTASSIUM SERPL-SCNC: 4.1 MMOL/L (ref 3.4–5.1)
RBC # BLD: 3.03 MIL/MCL (ref 4–5.2)
RBC # BLD: ABNORMAL 10*6/UL
SODIUM SERPL-SCNC: 135 MMOL/L (ref 135–145)
WBC # BLD: 12.7 K/MCL (ref 4.2–11)
WBC # BLD: 12.7 K/MCL (ref 4.2–11)

## 2025-07-12 PROCEDURE — 85027 COMPLETE CBC AUTOMATED: CPT | Performed by: STUDENT IN AN ORGANIZED HEALTH CARE EDUCATION/TRAINING PROGRAM

## 2025-07-12 PROCEDURE — 10004651 HB RX, NO CHARGE ITEM: Performed by: EMERGENCY MEDICINE

## 2025-07-12 PROCEDURE — A9150 MISC/EXPER NON-PRESCRIPT DRU: HCPCS | Performed by: STUDENT IN AN ORGANIZED HEALTH CARE EDUCATION/TRAINING PROGRAM

## 2025-07-12 PROCEDURE — 36415 COLL VENOUS BLD VENIPUNCTURE: CPT | Performed by: STUDENT IN AN ORGANIZED HEALTH CARE EDUCATION/TRAINING PROGRAM

## 2025-07-12 PROCEDURE — 90935 HEMODIALYSIS ONE EVALUATION: CPT

## 2025-07-12 PROCEDURE — 10002803 HB RX 637: Performed by: STUDENT IN AN ORGANIZED HEALTH CARE EDUCATION/TRAINING PROGRAM

## 2025-07-12 PROCEDURE — 10004651 HB RX, NO CHARGE ITEM: Performed by: STUDENT IN AN ORGANIZED HEALTH CARE EDUCATION/TRAINING PROGRAM

## 2025-07-12 PROCEDURE — A4216 STERILE WATER/SALINE, 10 ML: HCPCS | Performed by: EMERGENCY MEDICINE

## 2025-07-12 PROCEDURE — 71045 X-RAY EXAM CHEST 1 VIEW: CPT

## 2025-07-12 PROCEDURE — 80048 BASIC METABOLIC PNL TOTAL CA: CPT | Performed by: STUDENT IN AN ORGANIZED HEALTH CARE EDUCATION/TRAINING PROGRAM

## 2025-07-12 PROCEDURE — 85025 COMPLETE CBC W/AUTO DIFF WBC: CPT | Performed by: STUDENT IN AN ORGANIZED HEALTH CARE EDUCATION/TRAINING PROGRAM

## 2025-07-12 RX ORDER — DOXYCYCLINE HYCLATE 100 MG
100 TABLET ORAL 2 TIMES DAILY
Qty: 14 TABLET | Refills: 0 | Status: SHIPPED | OUTPATIENT
Start: 2025-07-12 | End: 2025-07-19

## 2025-07-12 RX ADMIN — ASPIRIN 81 MG: 81 TABLET, COATED ORAL at 13:35

## 2025-07-12 RX ADMIN — SODIUM CHLORIDE, PRESERVATIVE FREE 2 ML: 5 INJECTION INTRAVENOUS at 13:36

## 2025-07-12 RX ADMIN — SODIUM BICARBONATE 1300 MG: 650 TABLET ORAL at 13:36

## 2025-07-12 RX ADMIN — ACETAMINOPHEN 650 MG: 325 TABLET ORAL at 10:57

## 2025-07-12 RX ADMIN — METOPROLOL SUCCINATE 37.5 MG: 25 TABLET, EXTENDED RELEASE ORAL at 13:35

## 2025-07-12 RX ADMIN — ESCITALOPRAM OXALATE 10 MG: 10 TABLET ORAL at 13:36

## 2025-07-12 RX ADMIN — PANTOPRAZOLE SODIUM 40 MG: 40 TABLET, DELAYED RELEASE ORAL at 13:36

## 2025-07-12 RX ADMIN — CLOPIDOGREL BISULFATE 75 MG: 75 TABLET, FILM COATED ORAL at 13:36

## 2025-07-12 RX ADMIN — LOSARTAN POTASSIUM 50 MG: 50 TABLET, FILM COATED ORAL at 13:35

## 2025-07-12 ASSESSMENT — ENCOUNTER SYMPTOMS
ACTIVITY CHANGE: 1
ABDOMINAL DISTENTION: 0
BRUISES/BLEEDS EASILY: 0
VOMITING: 0
FEVER: 0
CONSTIPATION: 0
CHILLS: 0
CONFUSION: 0
EYE DISCHARGE: 0
FATIGUE: 0
COLOR CHANGE: 0
ABDOMINAL PAIN: 0
COUGH: 0
EYE ITCHING: 0
EYES NEGATIVE: 1
WEAKNESS: 1
LIGHT-HEADEDNESS: 0

## 2025-07-12 ASSESSMENT — PAIN SCALES - GENERAL
PAINLEVEL_OUTOF10: 3
PAINLEVEL_OUTOF10: 5
PAINLEVEL_OUTOF10: 0

## 2025-07-13 LAB
BACTERIA BLD CULT: NORMAL
BACTERIA BLD CULT: NORMAL

## 2025-07-14 ENCOUNTER — PATIENT OUTREACH (OUTPATIENT)
Age: 85
End: 2025-07-14

## 2025-07-14 LAB
BACTERIA BLD CULT: NORMAL
BACTERIA BLD CULT: NORMAL

## 2025-07-15 ENCOUNTER — MED REC SCAN ONLY (OUTPATIENT)
Dept: INTERNAL MEDICINE CLINIC | Facility: CLINIC | Age: 85
End: 2025-07-15

## 2025-07-15 ENCOUNTER — PATIENT OUTREACH (OUTPATIENT)
Age: 85
End: 2025-07-15

## 2025-07-15 DIAGNOSIS — I21.4 NSTEMI (NON-ST ELEVATED MYOCARDIAL INFARCTION) (HCC): ICD-10-CM

## 2025-07-15 DIAGNOSIS — I50.9 ACUTE CONGESTIVE HEART FAILURE, UNSPECIFIED HEART FAILURE TYPE (HCC): ICD-10-CM

## 2025-07-15 DIAGNOSIS — Z71.89 GOALS OF CARE, COUNSELING/DISCUSSION: ICD-10-CM

## 2025-07-15 DIAGNOSIS — I10 ESSENTIAL HYPERTENSION, BENIGN: ICD-10-CM

## 2025-07-15 DIAGNOSIS — N18.6 ESRD (END STAGE RENAL DISEASE) (HCC): Primary | ICD-10-CM

## 2025-07-15 RX ORDER — ALBUTEROL SULFATE 90 UG/1
2 INHALANT RESPIRATORY (INHALATION) EVERY 4 HOURS PRN
COMMUNITY
Start: 2025-07-10

## 2025-07-15 RX ORDER — DOXYCYCLINE HYCLATE 100 MG
100 TABLET ORAL 2 TIMES DAILY
COMMUNITY
Start: 2025-07-12 | End: 2025-07-19

## 2025-07-15 NOTE — PROGRESS NOTES
Transitions of Care Navigation  Discharge Date: 6/9/25  Contact Date: 7/15/2025    Transitions of Care Assessment:  Nurse Navigator called and spoke with patient's nephew Elliott. Verified ok per HIPAA. Elliott relates difficulties with caring for his aunt while working full time. Elliott states he did speak with the Home Health RN yesterday who has reached out to the  [employer] regarding possibly obtaining medicaid. Elliott states he is thinking of placing her possibly in a Nursing home. Nurse Navigator listened and provided support to Elliott. This RN did discuss briefly with Elliott what options there are as far as placement. We discussed reaching out to [employer] [facility] to obtain more information. Elliott states right now he has to disconnect the call because he is currently at a medical appointment. Elliott asked this RN to call him back later this afternoon. Nurse Navigator to follow up at a later time.

## 2025-07-15 NOTE — PROGRESS NOTES
Transitions of Care Navigation  Discharge Date: 2025  Contact Date: 7/15/2025    2025 5:34 AM CDT - 2025 4:46 PM CDT Hospital Encounter [facility] [address]   [address] [place] [address]   [phone]          Transitions of Care Assessment:        07/15/25 1205   SARITA Assessment   Assessment Type SARITA Initial   Assessment completed with Family   Patient Subjective Spoke with patient's nephew Elliott for the SARITA call. Verified ok per HIPAA. Elliott states right now Radha is at Dialysis. Elliott states he feels she is doing about the same since leaving the Hospital. Ketih states her oxygen saturation has been ranging between 90-92%. Today was at 99% on RA. Tolerating her antibiotic. Elliott states her breathing is about the same.Elliott denies the patient having  chest pain, fever, chills, nausea, vomiting, diarrhea.Continues to have a cough. Using the albuterol inhaler as needed. Home Health RN was there yesterday for start of care.   Chief Complaint Diagnoses   Acute pulmonary edema (CMD)   ESRD (end stage renal disease) (CMD)   Chronic diastolic heart failure (CMD)   Type 2 diabetes mellitus with chronic kidney disease on chronic dialysis, with long-term current use of insulin (CMD)   Type 2 diabetes mellitus with right diabetic foot ulcer (CMD)   Wound of left lower extremity, subsequent encounter   Pressure injury of deep tissue of left heel   SARITA Navigation Initial Assessment   Verify patient name and  with patient/ caregiver Yes   Prior to leaving the hospital were your Discharge Instructions reviewed with you? Yes   Did you receive a copy of your written Discharge Instructions? Yes   What questions do you have about your Discharge Instructions? Denies   Do you feel better or worse since you left the hospital or emergency department? Same   Prior to leaving the hospital was Home Health (HH) arranged for you? Yes   Has HH been out or set up a visit to see you? Been  out  ([facility]   [address]   [facility] [address]   [place] [address]   Phone: tel:[phone]   fax:[phone])   Prior to leaving the hospital or emergency department was Durable Medical Equipment (DME), medical supplies, or infusions arranged for you? Yes  (outpatient dialysis at  [facility] [place], [address] [facility]. Your schedule is Tuesday, Thursday and Saturday at 10:45 am. Your first appointment is Tuesday July 15th. Call the Clinic with any questions. [phone])   Have you received your DME/supplies/infusions? Yes   Do you have questions about using your DME/supplies/infusions? No   Did any of your medications change, during or after your hospital stay or ED visit? Yes   Do you have your new or updated medications? Yes   Do you understand what your medications are for and possible side effects? Yes   Are there any reasons that keep you from taking your medication as prescribed? No   Any concerns about medication refills? No   Were you given a different diet per your Discharge Instructions? Yes   Diet Type Diabetic diet   Reason Diagnosis   Are there any barriers to following that diet? No   Do you have any questions or concerns that have not been discussed? No       Nursing Interventions:    An assessment was completed with the patient's nephew elliott.   Advanced care planning discussed- code status to be updated and I advised Elliott to discuss this with his physicians at upcoming appointments and he verbalized agreement.   [facility] information provided to Elliott to call for placement options. I also encouraged Elliott to continue to work with the Home Health . A [facility] Home Referral was placed and Elliott is aware someone will be in contact with him.    TCM/HFU appointment scheduled for 07/21/2025- patient unable to make sooner appointment d/t dialysis schedule.    All d/c instructions reviewed with pt.  Reviewed when to call  MD vs when to go to ER/call 911.  Educated pt on the importance of taking all meds as prescribed as well as close f/u with PCP/specialists.  Pt verbalized understanding and will contact office with any further questions or concerns.         Ordered Prescriptions  - documented in this encounter  Reconcile with Patient's Chart  Ordered Prescriptions  Prescription Sig Dispense Quantity Refills Last Filled Start Date End Date   doxycycline hyclate (VIBRA-TABS) 100 MG tablet  Take 1 tablet by mouth in the morning and 1 tablet in the evening. Do all this for 7 days. 14 tablet      07/12/2025 07/19/2025   Insulin Pen Needle 31G X 8 MM Misc  Use to inject insulin 5 times daily. Remove needle cover(s) to expose needle before injecting. 100 each  3 07/11/2025 2:15 PM CDT 07/10/2025     albuterol 108 (90 Base) MCG/ACT inhaler  Inhale 2 puffs into the lungs every 4 hours as needed for Shortness of Breath or Wheezing. 8.5 g  11 07/11/2025 2:15 PM CDT 07/10/2025     atorvastatin (LIPITOR) 20 MG tablet  Take 1 tablet by mouth nightly. 90 tablet    07/11/2025 2:15 PM CDT 07/10/2025     pantoprazole (PROTONIX) 40 MG tablet  Take 1 tablet by mouth every 12 hours. 180 tablet    07/11/2025 2:15 PM CDT 07/10/2025     metoPROLOL succinate (TOPROL-XL) 25 MG 24 hr tablet  Take 1.5 tablets by mouth daily. HOLD for SBP <100 or HR <55 135 tablet    07/11/2025 2:15 PM CDT 07/10/2025     losartan (COZAAR) 50 MG tablet  Take 1 tablet by mouth daily. HOLD for SBP <100 90 tablet    07/11/2025 2:15 PM CDT 07/10/2025     Insulin Glargine Solostar 100 UNIT/ML Solution Pen-injector  Inject 4 Units into the skin nightly. Prime 2 units before each dose. 9 mL    07/11/2025 2:15 PM CDT 07/10/2025

## 2025-07-16 PROBLEM — J96.01 ACUTE RESPIRATORY FAILURE WITH HYPOXIA  (CMD): Status: ACTIVE | Noted: 2025-07-16

## 2025-07-21 ENCOUNTER — OFFICE VISIT (OUTPATIENT)
Dept: INTERNAL MEDICINE CLINIC | Facility: CLINIC | Age: 85
End: 2025-07-21

## 2025-07-21 VITALS
OXYGEN SATURATION: 95 % | TEMPERATURE: 98 F | BODY MASS INDEX: 25 KG/M2 | SYSTOLIC BLOOD PRESSURE: 124 MMHG | HEART RATE: 72 BPM | DIASTOLIC BLOOD PRESSURE: 84 MMHG | HEIGHT: 60.98 IN

## 2025-07-21 DIAGNOSIS — N18.6 ESRD NEEDING DIALYSIS (HCC): ICD-10-CM

## 2025-07-21 DIAGNOSIS — Z99.2 ESRD NEEDING DIALYSIS (HCC): ICD-10-CM

## 2025-07-21 DIAGNOSIS — I25.10 CORONARY ARTERY DISEASE INVOLVING NATIVE CORONARY ARTERY OF NATIVE HEART WITHOUT ANGINA PECTORIS: ICD-10-CM

## 2025-07-21 DIAGNOSIS — J96.01 ACUTE RESPIRATORY FAILURE WITH HYPOXIA (HCC): Primary | ICD-10-CM

## 2025-07-21 DIAGNOSIS — I73.9 PVD (PERIPHERAL VASCULAR DISEASE): ICD-10-CM

## 2025-07-21 PROCEDURE — 3079F DIAST BP 80-89 MM HG: CPT | Performed by: INTERNAL MEDICINE

## 2025-07-21 PROCEDURE — 1125F AMNT PAIN NOTED PAIN PRSNT: CPT | Performed by: INTERNAL MEDICINE

## 2025-07-21 PROCEDURE — 3008F BODY MASS INDEX DOCD: CPT | Performed by: INTERNAL MEDICINE

## 2025-07-21 PROCEDURE — 3074F SYST BP LT 130 MM HG: CPT | Performed by: INTERNAL MEDICINE

## 2025-07-21 PROCEDURE — 1159F MED LIST DOCD IN RCRD: CPT | Performed by: INTERNAL MEDICINE

## 2025-07-21 PROCEDURE — 1111F DSCHRG MED/CURRENT MED MERGE: CPT | Performed by: INTERNAL MEDICINE

## 2025-07-21 PROCEDURE — 99495 TRANSJ CARE MGMT MOD F2F 14D: CPT | Performed by: INTERNAL MEDICINE

## 2025-07-21 RX ORDER — INSULIN LISPRO 100 [IU]/ML
INJECTION, SOLUTION INTRAVENOUS; SUBCUTANEOUS
COMMUNITY
End: 2025-07-21

## 2025-07-21 RX ORDER — GLUCOSAM/CHON-MSM1/C/MANG/BOSW 500-416.6
TABLET ORAL
COMMUNITY
Start: 2025-06-12

## 2025-07-21 RX ORDER — PEN NEEDLE, DIABETIC 31 GX5/16"
NEEDLE, DISPOSABLE MISCELLANEOUS
COMMUNITY
Start: 2025-07-10

## 2025-07-30 ENCOUNTER — PATIENT OUTREACH (OUTPATIENT)
Age: 85
End: 2025-07-30

## 2025-07-30 ENCOUNTER — CASE MANAGEMENT (OUTPATIENT)
Dept: CARE COORDINATION | Age: 85
End: 2025-07-30

## 2025-08-07 ENCOUNTER — CASE MANAGEMENT (OUTPATIENT)
Dept: CARE COORDINATION | Age: 85
End: 2025-08-07

## 2025-08-11 ENCOUNTER — MED REC SCAN ONLY (OUTPATIENT)
Dept: INTERNAL MEDICINE CLINIC | Facility: CLINIC | Age: 85
End: 2025-08-11

## 2025-08-15 ENCOUNTER — TELEPHONE (OUTPATIENT)
Dept: INTERNAL MEDICINE CLINIC | Facility: CLINIC | Age: 85
End: 2025-08-15

## (undated) DEVICE — SUTURE VICRYL 0 CP-1

## (undated) DEVICE — GAMMEX® NON-LATEX PI ORTHO SIZE 7, STERILE POLYISOPRENE POWDER-FREE SURGICAL GLOVE: Brand: GAMMEX

## (undated) DEVICE — SUTURE PROLENE 3-0 8687H

## (undated) DEVICE — Device

## (undated) DEVICE — GAMMEX® PI HYBRID SIZE 6.5, STERILE POWDER-FREE SURGICAL GLOVE, POLYISOPRENE AND NEOPRENE BLEND: Brand: GAMMEX

## (undated) DEVICE — 3M™ TEGADERM™ TRANSPARENT FILM DRESSING, 1626W, 4 IN X 4-3/4 IN (10 CM X 12 CM), 50 EACH/CARTON, 4 CARTON/CASE: Brand: 3M™ TEGADERM™

## (undated) DEVICE — 3M™ COBAN™ NL STERILE NON-LATEX SELF-ADHERENT WRAP, 2084S, 4 IN X 5 YD (10 CM X 4,5 M), 18 ROLLS/CASE: Brand: 3M™ COBAN™

## (undated) DEVICE — GAUZE SPONGES,12 PLY: Brand: CURITY

## (undated) DEVICE — SOL  .9 1000ML BTL

## (undated) DEVICE — SUTURE VICRYL 2-0 FS-1

## (undated) DEVICE — SPONGE LAP 18X18 XRAY STRL

## (undated) DEVICE — HIP PINNING: Brand: MEDLINE INDUSTRIES, INC.

## (undated) DEVICE — DRILL BIT 4.2/3-FLTD CALIB STR

## (undated) DEVICE — 3M™ MEDITPORE™ SOFT CLOTH TAPE 6 IN X 10 YD 12 ROLLS/CASE 2966: Brand: 3M™ MEDIPORE™

## (undated) DEVICE — GUIDEWIRE SYNT 3.2X400 357.399

## (undated) DEVICE — WEBRIL COTTON UNDERCAST PADDING: Brand: WEBRIL

## (undated) DEVICE — BATTERY

## (undated) NOTE — [place] [address]  Authorization for Invasive Procedures  Date:            Time:     I hereby authorize Baljinder Campbell, my physician and his/her assistants (if applicable), which may include medical students, residents, and/or fellows, to perform the following surgical operation/ procedure and administer such anesthesia as may be determined necessary by my physician:  Operation/Procedure name (s)  Cardiac Catheterization, Left Ventricular Cineangiography, Bilateral Selective Coronary Angiography and/or Right Heart Catheterization; possible Percutaneous Transluminal Coronary Angioplasty, Coronary Atherectomy, Coronary Stent, Intracoronary Thrombolytic therapy, Antiplatelet therapy and/or Intravascular Ultrasound on [facility]   2.   I recognize that during the surgical operation/procedure, unforeseen conditions may necessitate additional or different procedures than those listed above.  I, therefore, further authorize and request that the above-named surgeon, assistants, or designees perform such procedures as are, in their judgment, necessary and desirable.    3.   My surgeon/physician has discussed prior to my surgery the potential benefits, risks and side effects of this procedure; the likelihood of achieving goals; and potential problems that might occur during recuperation.  They also discussed reasonable alternatives to the procedure, including risks, benefits, and side effects related to the alternatives and risks related to not receiving this procedure.  I have had all my questions answered and I acknowledge that no guarantee has been made as to the result that may be obtained.    4.   Should the need arise during my operation/procedure, which includes change of level of care prior to discharge, I also consent to the administration of blood and/or blood products.  Further, I understand that despite careful testing and screening of blood or blood products by collecting  agencies, I may still be subject to ill effects as a result of receiving a blood transfusion and/or blood products.  The following are some, but not all, of the potential risks that can occur: fever and allergic reactions, hemolytic reactions, transmission of diseases such as Hepatitis, AIDS and Cytomegalovirus (CMV) and fluid overload.  In the event that I wish to have an autologous transfusion of my own blood, or a directed donor transfusion, I will discuss this with my physician.  Check only if Refusing Blood or Blood Products  I understand refusal of blood or blood products as deemed necessary by my physician may have serious consequences to my condition to include possible death. I hereby assume responsibility for my refusal and release the hospital, its personnel, and my physicians from any responsibility for the consequences of my refusal.          o  Refuse      5.   I authorize the use of any specimen, organs, tissues, body parts or foreign objects that may be removed from my body during the operation/procedure for diagnosis, research or teaching purposes and their subsequent disposal by hospital authorities.  I also authorize the release of specimen test results and/or written reports to my treating physician on the hospital medical staff or other referring or consulting physicians involved in my care, at the discretion of the Pathologist or my treating physician.    6.   I consent to the photographing or videotaping of the operations or procedures to be performed, including appropriate portions of my body for medical, scientific, or educational purposes, provided my identity is not revealed by the pictures or by descriptive texts accompanying them.  If the procedure has been photographed/videotaped, the surgeon will obtain the original picture, image, videotape or CD.  The hospital will not be responsible for storage, release or maintenance of the picture, image, tape or CD.    7.   I consent to the  presence of a  or observers in the operating room as deemed necessary by my physician or their designees.    8.   I recognize that in the event my procedure results in extended X-Ray/fluoroscopy time, I may develop a skin reaction.    9. If I have a Do Not Attempt Resuscitation (DNAR) order in place, that status will be suspended while in the operating room, procedural suite, and during the recovery period unless otherwise explicitly stated by me (or a person authorized to consent on my behalf). The surgeon or my attending physician will determine when the applicable recovery period ends for purposes of reinstating the DNAR order.  10. Patients having a sterilization procedure: I understand that if the procedure is successful the results will be permanent and it will therefore be impossible for me to inseminate, conceive, or bear children.  I also understand that the procedure is intended to result in sterility, although the result has not been guaranteed.   11. I acknowledge that my physician has explained sedation/analgesia administration to me including the risk and benefits I consent to the administration of sedation/analgesia as may be necessary or desirable in the judgment of my physician.    I CERTIFY THAT I HAVE READ AND FULLY UNDERSTAND THE ABOVE CONSENT TO OPERATION and/or OTHER PROCEDURE.        ____________________________________       _________________________________      ______________________________  Signature of Patient         Signature of Responsible Person        Printed Name of Responsible Person    ____________________________________      _________________________________      ______________________________       Signature of Witness          Relationship to Patient                       Date                                       Time  Patient Name: Radha Yu  : 1940    Reviewed: 2024   Printed: May 26, 2025  Medical Record #: [id number] Page 1 of 2            STATEMENT OF PHYSICIAN My signature below affirms that prior to the time of the procedure; I have explained to the patient and/or his/her legal representative, the risks and benefits involved in the proposed treatment and any reasonable alternative to the proposed treatment. I have also explained the risks and benefits involved in refusal of the proposed treatment and alternatives to the proposed treatment and have answered the patient's questions. If I have a significant financial interest in a co-management agreement or a significant financial interest in any product or implant, or other significant relationship used in this procedure/surgery, I have disclosed this and had a discussion with my patient.     _______________________________________________________________ _____________________________  (Signature of Physician)                                                                                         (Date)                                   (Time)  Patient Name: Radha Yu  : 1940    Reviewed: 2024   Printed: May 26, 2025  Medical Record #: [id number] Page 2 of 2

## (undated) NOTE — Clinical Note
[facility]  [address], [place], [place]    Authorization for Surgical Operation and Procedure                               1. I hereby authorize * No surgeons listed *, my physician and his/her assistants (if applicable), which may include medical students, residents, and/or fellows, to perform the following surgical operation/ procedure and administer such anesthesia as may be determined necessary by my physician: Operation/Procedure name (s)  on Radha Yu   2.   I recognize that during the surgical operation/procedure, unforeseen conditions may necessitate additional or different procedures than those listed above.  I, therefore, further authorize and request that the above-named surgeon, assistants, or designees perform such procedures as are, in their judgment, necessary and desirable.    3.   My surgeon/physician has discussed prior to my surgery the potential benefits, risks and side effects of this procedure; the likelihood of achieving goals; and potential problems that might occur during recuperation.  They also discussed reasonable alternatives to the procedure, including risks, benefits, and side effects related to the alternatives and risks related to not receiving this procedure.  I have had all my questions answered and I acknowledge that no guarantee has been made as to the result that may be obtained.    4.   Should the need arise during my operation/procedure, which includes change of level of care prior to discharge, I also consent to the administration of blood and/or blood products.  Further, I understand that despite careful testing and screening of blood or blood products by collecting agencies, I may still be subject to ill effects as a result of receiving a blood transfusion and/or blood products.  The following are some, but not all, of the potential risks that can occur: fever and allergic reactions, hemolytic reactions, transmission of diseases such as Hepatitis,  AIDS and Cytomegalovirus (CMV) and fluid overload.  In the event that I wish to have an autologous transfusion of my own blood, or a directed donor transfusion, I will discuss this with my physician.  Check only if Refusing Blood or Blood Products  I understand refusal of blood or blood products as deemed necessary by my physician may have serious consequences to my condition to include possible death. I hereby assume responsibility for my refusal and release the hospital, its personnel, and my physicians from any responsibility for the consequences of my refusal.    o  Refuse   5.   I authorize the use of any specimen, organs, tissues, body parts or foreign objects that may be removed from my body during the operation/procedure for diagnosis, research or teaching purposes and their subsequent disposal by hospital authorities.  I also authorize the release of specimen test results and/or written reports to my treating physician on the hospital medical staff or other referring or consulting physicians involved in my care, at the discretion of the Pathologist or my treating physician.    6.   I consent to the photographing or videotaping of the operations or procedures to be performed, including appropriate portions of my body for medical, scientific, or educational purposes, provided my identity is not revealed by the pictures or by descriptive texts accompanying them.  If the procedure has been photographed/videotaped, the surgeon will obtain the original picture, image, videotape or CD.  The hospital will not be responsible for storage, release or maintenance of the picture, image, tape or CD.    7.   I consent to the presence of a  or observers in the operating room as deemed necessary by my physician or their designees.    8.   I recognize that in the event my procedure results in extended X-Ray/fluoroscopy time, I may develop a skin reaction.    9. If I have a Do Not Attempt Resuscitation  (DNAR) order in place, that status will be suspended while in the operating room, procedural suite, and during the recovery period unless otherwise explicitly stated by me (or a person authorized to consent on my behalf). The surgeon or my attending physician will determine when the applicable recovery period ends for purposes of reinstating the DNAR order.  10. Patients having a sterilization procedure: I understand that if the procedure is successful the results will be permanent and it will therefore be impossible for me to inseminate, conceive, or bear children.  I also understand that the procedure is intended to result in sterility, although the result has not been guaranteed.   11. I acknowledge that my physician has explained sedation/analgesia administration to me including the risk and benefits I consent to the administration of sedation/analgesia as may be necessary or desirable in the judgment of my physician.    I CERTIFY THAT I HAVE READ AND FULLY UNDERSTAND THE ABOVE CONSENT TO OPERATION and/or OTHER PROCEDURE.     ____________________________________  _________________________________        ______________________________  Signature of Patient    Signature of Responsible Person                Printed Name of Responsible Person                                      ____________________________________  _____________________________                ________________________________  Signature of Witness        Date  Time         Relationship to Patient    STATEMENT OF PHYSICIAN My signature below affirms that prior to the time of the procedure; I have explained to the patient and/or his/her legal representative, the risks and benefits involved in the proposed treatment and any reasonable alternative to the proposed treatment. I have also explained the risks and benefits involved in refusal of the proposed treatment and alternatives to the proposed treatment and have answered the patient's questions. If I  have a significant financial interest in a co-management agreement or a significant financial interest in any product or implant, or other significant relationship used in this procedure/surgery, I have disclosed this and had a discussion with my patient.     _____________________________________________________              _____________________________  (Signature of Physician)                                                                                         (Date)                                   (Time)  Patient Name: Radha Yu      : 1940      Printed: 6/3/2025     Medical Record #: [id number]                                      Page 1 of 1

## (undated) NOTE — LETTER
No information on file.  Authorization for Imaging Procedure  Date of Procedure:     I hereby authorize Dr. Hernandez , my physician and his/her assistants (if applicable), which may include medical students, residents, and/or fellows, to perform the following procedure and administer such anesthesia as may be determined necessary by my physician: Placement  of a Tunneled Dialysis catheter on Radha Yu.   2.  I recognize that during the procedure, unforeseen conditions may necessitate additional or different procedures than those listed above. I, therefore, further authorize and request that the above-named physician, assistants, or designees perform such procedures as are, in their judgment, necessary and desirable.    3.  My physician has discussed prior to my procedure the potential benefits, risks and side effects of this procedure; the likelihood of achieving goals; and potential problems that might occur during recuperation. They also discussed reasonable alternatives to the procedure, including risks, benefits, and side effects related to the alternatives and risks related to not receiving this procedure. I have had all my questions answered and I acknowledge that no guarantee has been made as to the result that may be obtained.    4.  Should the need arise during my procedure, which includes change of level of care prior to discharge, I also consent to the administration of blood and/or blood products. Further, I understand that despite careful testing and screening of blood or blood products by collecting agencies, I may still be subject to ill effects as a result of receiving a blood transfusion and/or blood products. The following are some, but not all, of the potential risks that can occur: fever and allergic reactions, hemolytic reactions, transmission of diseases such as Hepatitis, AIDS and Cytomegalovirus (CMV) and fluid overload. In the event that I wish to have an autologous transfusion of my own  blood, or a directed donor transfusion, I will discuss this with my physician.  Check only if Refusing Blood or Blood Products  I understand refusal of blood or blood products as deemed necessary by my physician may have serious consequences to my condition to include possible death. I hereby assume responsibility for my refusal and release the hospital, its personnel, and my physicians from any responsibility for the consequences of my refusal.   [  ] Patient Refuses Blood      5.  I authorize the use of any specimen, organs, tissues, body parts or foreign objects that may be removed from my body during the procedure for diagnosis, research or teaching purposes and their subsequent disposal by hospital authorities. I also authorize the release of specimen test results and/or written reports to my treating physician on the hospital medical staff or other referring or consulting physicians involved in my care, at the discretion of the Pathologist or my treating physician.    6.  I consent to the photographing or videotaping of the procedures to be performed, including appropriate portions of my body for medical, scientific, or educational purposes, provided my identity is not revealed by the pictures or by descriptive texts accompanying them. If the procedure has been photographed/videotaped, the physician will obtain the original picture, image, videotape or CD. The hospital will not be responsible for storage, release or maintenance of the picture, image, tape or CD.   7.  I consent to the presence of a  or observers in the operating room as deemed necessary by my physician or their designees.    8.  I recognize that in the event my procedure results in extended X-Ray/fluoroscopy time, I may develop a skin reaction.    9.  If I have a Do Not Attempt Resuscitation (DNAR) order in place, that status will be suspended while in the operating room, procedural suite, and during the recovery period  unless otherwise explicitly stated by me (or a person authorized to consent on my behalf). The performing physician or my attending physician will determine when the applicable recovery period ends for purposes of reinstating the DNAR order.  10.  I acknowledge that my physician has explained sedation/analgesia administration to me including the risk and benefits I consent to the administration of sedation/analgesia as may be necessary or desirable in the judgment of my physician.      I CERTIFY THAT I HAVE READ AND FULLY UNDERSTAND THE ABOVE CONSENT FOR THE PROCEDURE.   Signature of Patient: _____________________________________________________________  Responsible person in case of minor, unconscious: ____________________________________  Relationship to patient:  __________________________________________________________  Signature of Witness: _______________________________Date: _________Time: __________    Statement of Physician: My signature below affirms that prior to the time of the procedure, I have explained to the patient and/or her guardian, the risks and benefits involved in the proposed treatment and any reasonable alternative to the proposed treatment. I have also explained the risks and benefits involved in the refusal of the proposed treatment and have answered the patient's questions. If I have a significant financial interest in a co-management agreement or a significant financial interest in any product or implant, or other significant relationship used in the procedure/surgery, I have disclosed this and had a discussion with my patient.  Signature of Physician:   _________________________________Date:_____________Time:________    Patient Name: Radha Yu : 1940  Printed: May 22, 2025   Medical Record #: [id number]

## (undated) NOTE — IP AVS SNAPSHOT
[facility]            (For Outpatient Use Only) Initial Admit Date: 1/30/2020   Inpt/Obs Admit Date: Inpt: 1/31/20 / Obs: N/A   Discharge Date:    Juana Mcallister:  [de-identified]   MRN: [de-identified]   CSN: [id number]   CEID: [id number]        [facility] Account Financial Class: Medicare Advantage    February 6, 2020

## (undated) NOTE — LETTER
[facility]  Administration of Anesthesia  Radha LAMAR agree to be cared for by a physician anesthesiologist alone and/or with a nurse anesthetist, who is specially trained to monitor me and give me medicine to put me to sleep or keep me comfortable during my procedure    I understand that my anesthesiologist and/or anesthetist is not an employee or agent of [facility] or [employer]. He or she works for [facility]    As the patient asking for anesthesia services, I agree to:  Allow the anesthesiologist (anesthesia doctor) to give me medicine and do additional procedures as necessary. Some examples are: Starting or using an “IV” to give me medicine, fluids or blood during my procedure, and having a breathing tube placed to help me breathe when I’m asleep (intubation). In the event that my heart stops working properly, I understand that my anesthesiologist will make every effort to sustain my life, unless otherwise directed by [facility] Do Not Resuscitate documents.  Tell my anesthesia doctor before my procedure:  If I am pregnant.  The last time that I ate or drank.  iii. All of the medicines I take (including prescriptions, herbal supplements, and pills I can buy without a prescription (including street drugs/illegal medications). Failure to inform my anesthesiologist about these medicines may increase my risk of anesthetic complications.  iv.If I am allergic to anything or have had a reaction to anesthesia before.  I understand how the anesthesia medicine will help me (benefits).  I understand that with any type of anesthesia medicine there are risks:  The most common risks are: nausea, vomiting, sore throat, muscle soreness, damage to my eyes, mouth, or teeth (from breathing tube placement).  Rare risks include: remembering what happened during my procedure, allergic reactions to medications, injury to my airway, heart, lungs, vision, nerves, or muscles  and in extremely rare instances death.  My doctor has explained to me other choices available to me for my care (alternatives).  Pregnant Patients (“epidural”):  I understand that the risks of having an epidural (medicine given into my back to help control pain during labor), include itching, low blood pressure, difficulty urinating, headache or slowing of the baby’s heart. Very rare risks include infection, bleeding, seizure, irregular heart rhythms and nerve injury.  Regional Anesthesia (“spinal”, “epidural”, & “nerve blocks”):  I understand that rare but potential complications include headache, bleeding, infection, seizure, irregular heart rhythms, and nerve injury.    _____________________________________________________________________________  Patient (or Representative) Signature/Relationship to Patient  Date   Time    _____________________________________________________________________________   Name (if used)    Language/Organization   Time    _____________________________________________________________________________  Nurse Anesthetist Signature     Date   Time  _____________________________________________________________________________  Anesthesiologist Signature     Date   Time  I have discussed the procedure and information above with the patient (or patient’s representative) and answered their questions. The patient or their representative has agreed to have anesthesia services.    _____________________________________________________________________________  Witness        Date   Time  I have verified that the signature is that of the patient or patient’s representative, and that it was signed before the procedure  Patient Name: Radha Yu     : 1940                 Printed: 6/3/2025 at 11:13 AM    Medical Record #: [id number]                                            Page 1 of 1  ----------ANESTHESIA CONSENT----------

## (undated) NOTE — LETTER
[place] [address]  Authorization for Invasive Procedures  Date: 05/30/2025           Time: 1125    I hereby authorize Dr Osborne, my physician and his/her assistants (if applicable), which may include medical students, residents, and/or fellows, to perform the following surgical operation/ procedure and administer such anesthesia as may be determined necessary by my physician:  Operation/Procedure name (s) Right Lower extremity Peripheral angiography, atherectomy, percutaneous transluminal angioplasty (PTA) and/or vascular stenting on Radha Yu  2.   I recognize that during the surgical operation/procedure, unforeseen conditions may necessitate additional or different procedures than those listed above.  I, therefore, further authorize and request that the above-named surgeon, assistants, or designees perform such procedures as are, in their judgment, necessary and desirable.    3.   My surgeon/physician has discussed prior to my surgery the potential benefits, risks and side effects of this procedure; the likelihood of achieving goals; and potential problems that might occur during recuperation.  They also discussed reasonable alternatives to the procedure, including risks, benefits, and side effects related to the alternatives and risks related to not receiving this procedure.  I have had all my questions answered and I acknowledge that no guarantee has been made as to the result that may be obtained.    4.   Should the need arise during my operation/procedure, which includes change of level of care prior to discharge, I also consent to the administration of blood and/or blood products.  Further, I understand that despite careful testing and screening of blood or blood products by collecting agencies, I may still be subject to ill effects as a result of receiving a blood transfusion and/or blood products.  The following are some, but not all, of the potential risks that can occur:  fever and allergic reactions, hemolytic reactions, transmission of diseases such as Hepatitis, AIDS and Cytomegalovirus (CMV) and fluid overload.  In the event that I wish to have an autologous transfusion of my own blood, or a directed donor transfusion, I will discuss this with my physician.     Check only if Refusing Blood or Blood Products  I understand refusal of blood or blood products as deemed necessary by my physician may have serious consequences to my condition to include possible death. I hereby assume responsibility for my refusal and release the hospital, its personnel, and my physicians from any responsibility for the consequences of my refusal.      o  Refuse        5.   I authorize the use of any specimen, organs, tissues, body parts or foreign objects that may be removed from my body during the operation/procedure for diagnosis, research or teaching purposes and their subsequent disposal by hospital authorities.  I also authorize the release of specimen test results and/or written reports to my treating physician on the hospital medical staff or other referring or consulting physicians involved in my care, at the discretion of the Pathologist or my treating physician.    6.   I consent to the photographing or videotaping of the operations or procedures to be performed, including appropriate portions of my body for medical, scientific, or educational purposes, provided my identity is not revealed by the pictures or by descriptive texts accompanying them.  If the procedure has been photographed/videotaped, the surgeon will obtain the original picture, image, videotape or CD.  The hospital will not be responsible for storage, release or maintenance of the picture, image, tape or CD.    7.   I consent to the presence of a  or observers in the operating room as deemed necessary by my physician or their designees.    8.   I recognize that in the event my procedure results in extended  X-Ray/fluoroscopy time, I may develop a skin reaction.    9. If I have a Do Not Attempt Resuscitation (DNAR) order in place, that status will be suspended while in the operating room, procedural suite, and during the recovery period unless otherwise explicitly stated by me (or a person authorized to consent on my behalf). The surgeon or my attending physician will determine when the applicable recovery period ends for purposes of reinstating the DNAR order.  10. Patients having a sterilization procedure: I understand that if the procedure is successful the results will be permanent and it will therefore be impossible for me to inseminate, conceive, or bear children.  I also understand that the procedure is intended to result in sterility, although the result has not been guaranteed.   11. I acknowledge that my physician has explained sedation/analgesia administration to me including the risk and benefits I consent to the administration of sedation/analgesia as may be necessary or desirable in the judgment of my physician.    I CERTIFY THAT I HAVE READ AND FULLY UNDERSTAND THE ABOVE CONSENT TO OPERATION and/or OTHER PROCEDURE.        ____________________________________       _________________________________      ______________________________  Signature of Patient         Signature of Responsible Person        Printed Name of Responsible Person      ____________________________________      _________________________________      ______________________________       Signature of Witness          Relationship to Patient                       Date                                         Time    Patient Name: Radha Yu  : 1940    Reviewed: 2024   Printed: May 30, 2025  Medical Record #: [id number] Page 1 of 2           STATEMENT OF PHYSICIAN My signature below affirms that prior to the time of the procedure; I have explained to the patient and/or his/her legal representative, the risks and benefits  involved in the proposed treatment and any reasonable alternative to the proposed treatment. I have also explained the risks and benefits involved in refusal of the proposed treatment and alternatives to the proposed treatment and have answered the patient's questions. If I have a significant financial interest in a co-management agreement or a significant financial interest in any product or implant, or other significant relationship used in this procedure/surgery, I have disclosed this and had a discussion with my patient.     _______________________________________________________________ _____________________________  (Signature of Physician)                                                                                         (Date)                                   (Time)    Patient Name: Radha Yu  : 1940    Reviewed: 2024   Printed: May 30, 2025  Medical Record #: [id number] Page 2 of 2

## (undated) NOTE — LETTER
[facility]  [address], [place], [place]    Authorization for Surgical Operation and Procedure                               I hereby authorize DR Melecio Dutton, my physician and his/her assistants (if applicable), which may include medical students, residents, and/or fellows, to perform the following surgical operation/ procedure and administer such anesthesia as may be determined necessary by my physician: Operation/Procedure name (s) Placement of Central line dialysis catheter   on Radha Yu   2.   I recognize that during the surgical operation/procedure, unforeseen conditions may necessitate additional or different procedures than those listed above.  I, therefore, further authorize and request that the above-named surgeon, assistants, or designees perform such procedures as are, in their judgment, necessary and desirable.    3.   My surgeon/physician has discussed prior to my surgery the potential benefits, risks and side effects of this procedure; the likelihood of achieving goals; and potential problems that might occur during recuperation.  They also discussed reasonable alternatives to the procedure, including risks, benefits, and side effects related to the alternatives and risks related to not receiving this procedure.  I have had all my questions answered and I acknowledge that no guarantee has been made as to the result that may be obtained.    4.   Should the need arise during my operation/procedure, which includes change of level of care prior to discharge, I also consent to the administration of blood and/or blood products.  Further, I understand that despite careful testing and screening of blood or blood products by collecting agencies, I may still be subject to ill effects as a result of receiving a blood transfusion and/or blood products.  The following are some, but not all, of the potential risks that can occur: fever and allergic reactions, hemolytic reactions,  transmission of diseases such as Hepatitis, AIDS and Cytomegalovirus (CMV) and fluid overload.  In the event that I wish to have an autologous transfusion of my own blood, or a directed donor transfusion, I will discuss this with my physician.  Check only if Refusing Blood or Blood Products  I understand refusal of blood or blood products as deemed necessary by my physician may have serious consequences to my condition to include possible death. I hereby assume responsibility for my refusal and release the hospital, its personnel, and my physicians from any responsibility for the consequences of my refusal.    o  Refuse   5.   I authorize the use of any specimen, organs, tissues, body parts or foreign objects that may be removed from my body during the operation/procedure for diagnosis, research or teaching purposes and their subsequent disposal by hospital authorities.  I also authorize the release of specimen test results and/or written reports to my treating physician on the hospital medical staff or other referring or consulting physicians involved in my care, at the discretion of the Pathologist or my treating physician.    6.   I consent to the photographing or videotaping of the operations or procedures to be performed, including appropriate portions of my body for medical, scientific, or educational purposes, provided my identity is not revealed by the pictures or by descriptive texts accompanying them.  If the procedure has been photographed/videotaped, the surgeon will obtain the original picture, image, videotape or CD.  The hospital will not be responsible for storage, release or maintenance of the picture, image, tape or CD.    7.   I consent to the presence of a  or observers in the operating room as deemed necessary by my physician or their designees.    8.   I recognize that in the event my procedure results in extended X-Ray/fluoroscopy time, I may develop a skin reaction.    9. If  I have a Do Not Attempt Resuscitation (DNAR) order in place, that status will be suspended while in the operating room, procedural suite, and during the recovery period unless otherwise explicitly stated by me (or a person authorized to consent on my behalf). The surgeon or my attending physician will determine when the applicable recovery period ends for purposes of reinstating the DNAR order.  10. Patients having a sterilization procedure: I understand that if the procedure is successful the results will be permanent and it will therefore be impossible for me to inseminate, conceive, or bear children.  I also understand that the procedure is intended to result in sterility, although the result has not been guaranteed.   11. I acknowledge that my physician has explained sedation/analgesia administration to me including the risk and benefits I consent to the administration of sedation/analgesia as may be necessary or desirable in the judgment of my physician.    I CERTIFY THAT I HAVE READ AND FULLY UNDERSTAND THE ABOVE CONSENT TO OPERATION and/or OTHER PROCEDURE.     ____________________________________  _________________________________        ______________________________  Signature of Patient    Signature of Responsible Person                Printed Name of Responsible Person                                      ____________________________________  _____________________________                ________________________________  Signature of Witness        Date  Time         Relationship to Patient    STATEMENT OF PHYSICIAN My signature below affirms that prior to the time of the procedure; I have explained to the patient and/or his/her legal representative, the risks and benefits involved in the proposed treatment and any reasonable alternative to the proposed treatment. I have also explained the risks and benefits involved in refusal of the proposed treatment and alternatives to the proposed treatment and have  answered the patient's questions. If I have a significant financial interest in a co-management agreement or a significant financial interest in any product or implant, or other significant relationship used in this procedure/surgery, I have disclosed this and had a discussion with my patient.     _____________________________________________________              _____________________________  (Signature of Physician)                                                                                         (Date)                                   (Time)  Patient Name: Radha Yu      : 1940      Printed: 2025     Medical Record #: [id number]                                      Page 1 of 1

## (undated) NOTE — ED AVS SNAPSHOT
Azucena Almeida   MRN: [id number]    Department:  [facility] Emergency Department   Date of Visit:  2/16/2018           Disclosure     Insurance plans vary and the physician(s) referred by the ER may not be covered by your plan.  Please contact you within the next three months to obtain basic health screening including reassessment of your blood pressure.     IF THERE IS ANY CHANGE OR WORSENING OF YOUR CONDITION, CALL YOUR PRIMARY CARE PHYSICIAN AT ONCE OR RETURN IMMEDIATELY TO THE EMERGENCY DEPARTMEN

## (undated) NOTE — IP AVS SNAPSHOT
Patient Demographics     Address  [address] [address] Phone  [phone] [facility]) *Preferred*  [phone] [facility])      Emergency Contact(s)     Name Relation Home Work [place] Relative   [phone]      Allergies a Clopidogrel Bisulfate 75 MG Tabs  Commonly known as:  PLAVIX  Next dose due:  TOMORROW MORNING      Take 75 mg by mouth daily. escitalopram 5 MG Tabs  Commonly known as:  LEXAPRO  Next dose due:  TOMORROW MORNING      Take 5 mg by mouth daily. [id number] HYDROcodone-acetaminophen (NORCO) 5-325 MG per tab 1 tablet (Or Linked Group #1) 02/06/20 0930 Given      [id number] PEG 3350 (MIRALAX) powder packet 17 g 02/06/20 0814 Given      [id number] Senna (SENOKOT) tab 17.2 mg 02/05/20 2130 Given      306 [id number] Insulin Aspart Pen (NOVOLOG) 100 UNIT/ML flexpen 9 Units 02/05/20 2131 Given              Recent Vital Signs       Most Recent Value   Vitals  137/58 Filed at 02/06/2020 1400   Pulse  77 Filed at 02/06/2020 1342   Resp  16 Filed at 02/06/2020 13 Ordering provider:  Darwin Macnuso MD  02/05/20 2300 Resulting lab:  [place] [facility]    Specimen Information    Type Source Collected On   Blood — 02/06/20 0501          Components    Component Value Reference Range Flag Lab   Glucose 201 70 - 99 mg/dL H E Blood Urine Small Negative A [facility] [facility]   pH Urine 6.0 5.0 - 8.0 — [facility]   Protein Urine 30  Negative mg/dL A [facility]   Urobilinogen Urine <2.0 <2.0 — [facility]   Nitrite Urine Negative Negative — [facility]   Leukocyte Esterase Urine La Susceptibility      Escherichia coli     Not Specified    Ampicillin <=2  Sensitive    Cefazolin <=4  Sensitive    Ciprofloxacin <=0.25  Sensitive    Gentamicin <=1  Sensitive    Levofloxacin <=0.12  Sensitive    Meropenem <=0.25  Sensitive    Nitrofuranto #Depression:  -continue home meds      FN:  - IVF:[HP.1] per renal[HP.2]  - Diet:[HP.1] renal[HP. 2]     DVT Prophy:[HP.1] per ortho[HP. 2]  Lines:[HP.1] PIV[HP. 2]    Dispo: pending clinical course    Outpatient records or previous hospital records reviewed. Losartan Potassium 25 MG Oral Tab, Take 25 mg by mouth daily. , Disp: , Rfl:   simvastatin 20 MG Oral Tab, Take 20 mg by mouth nightly., Disp: , Rfl:   HYDROcodone-acetaminophen 5-325 MG Oral Tab, Take 1 tablet by mouth every 6 (six) hours as needed for Mennie Flavin  01/31/2020    CA 7.8 01/31/2020    MG 2.2 01/31/2020       No results for input(s): TROP in the last 168 hours.     Additional Diagnostics: ECG: NSR w/ PACs      Radiology: Xr Femur Min 2 Views Left (cpt=73552)    Result Date: 1/31/2020  CONCLUSION Related Notes:  Original Note by Benito Dvaila MD (Physician) filed at 1/31/2020  2:14 PM       1/30/2020  Reji Husbands  91/1940  78year old   female    I was asked in consultation by Dr. Nelson Silvestre to see and evaluate Reji Husbands regarding her left hi History reviewed. No pertinent family history.    Social History    Tobacco Use      Smoking status: Current Every Day Smoker      Smokeless tobacco: Never Used    Alcohol use: Not on file    Drug use: Not on file          REVIEW OF SYSTEMS:   A 12 point re non-operative treatment were discussed with the patient. The patient desired surgery. Surgery recommended was left hip closed reduction intramedulary nail fixation.   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     #Left Hip Fracture s/p left hip reduction  -bao Take 1 tablet (2.5 mg total) by mouth 2 (two) times daily for 21 days. PEG 3350 Pack  Commonly known as:  MIRALAX  Take 17 g by mouth daily.   Start taking on:  February 7, 2020     Sennosides 17.2 MG Tabs  Take 1 tablet (17.2 mg total) by mouth nightly Why:  Urology: if unable to pass void trial  Contact information:  [address]  [address] [place] [address] [phone]                   Disposition: SNF  Discharged Condition: good    Patient had opportunity to ask questions and state Skylar PT Treatment Plan: Bed mobility; Endurance; Patient education;Gait training;Strengthening    SUBJECTIVE  Pt reports being ready for PT RX;cooperative and motivated.     OBJECTIVE  Precautions: None    WEIGHT BEARING RESTRICTION  Weight Bearing Restriction: L Patient End of Session: Up in chair;Call light within reach;RN aware of session/findings;Bracing education provided; All patient questions and concerns addressed    CURRENT GOALS     Patient Goal Patient's self-stated goal is: to get stronger   Goal #1 Nicolas Brace sitting balance activity with emphasis on core stabilization. Pt amb 12 ft with RW and mod a;provided cuing for gait pattern as well as for postural awareness. Ther ex.   The patient's[CK.1] Approx Degree of Impairment: 61.29%[CK.2] has been calculated based -   Moving to and from a bed to a chair (including a wheelchair)?: A Lot   -   Need to walk in hospital room?: A Lot   -   Climbing 3-5 steps with a railing?: Total[CK. 2]     AM-PAC Score:[CK.1]  Raw Score: 14   Approx Degree of Impairment: 61.29%   Stephanie Lively Author:  Nicol Galindo PTA Service:  Rehab Author Type:  Physical Therapist    Filed:  2/4/2020  2:28 PM Date of Service:  2/4/2020  2:26 PM Status:  Signed    :  Nicol Galindo PTA (Physical Therapist)       PHYSICAL THERAPY TREATMENT O2 WALK                  AM-PAC '6-Clicks' INPATIENT SHORT FORM - BASIC MOBILITY  How much difficulty does the patient currently have. ..  -   Turning over in bed (including adjusting bedclothes, sheets and blankets)?: A Little   -   Sitting down on and sta Goal #5 Patient to demonstrate independence with home activity/exercise instructions provided to patient in preparation for discharge. Goal #5   Current Status ongoing   Goal #6    Goal #6  Current Status[CK. 1]         Attribution T[place]ley    CK. 1 - Josefina Bautista level of impairment may benefit from LISE to max I with ADLS and to max safety with transfers and ambulation with RW for support. Pt remained up in chair with all needs within reach, and legs elevated.  \  Discussed with pt car transfer safety with pt stat support and with CGA    BALANCE ASSESSMENT  Static Sitting: good  Dynamic Sitting: good  Static Standing: good  Dynamic Standing: good    FUNCTIONAL ADL ASSESSMENT  Grooming: CGA with standing 2 min at sink  Bathing: NT  Toileting: min assist  Upper Body D to the bathroom; pt declining walk as she reports increased pain despite education and encouragement; therapist encouraged pt to focus on sit to stand practice for ease with toileting and LE dressing clothing management; she was able to complete trials wit -   Taking care of personal grooming such as brushing teeth?: A Little  -   Eating meals?: A Little    AM-PAC Score:  Score: 15  Approx Degree of Impairment: 56.46%  Standardized Score (AM-PAC Scale): 34.69  CMS Modifier (G-Code): OR[place]O    Education Provided:

## (undated) NOTE — LETTER
[place] [address]  Authorization for Invasive Procedures  Date: 05/27/2025           Time: 1537    I hereby authorize Dr. Aquino, my physician and his/her assistants (if applicable), which may include medical students, residents, and/or fellows, to perform the following surgical operation/ procedure and administer such anesthesia as may be determined necessary by my physician:  Operation/Procedure name (s)  Cardiac Catheterization, Left Ventricular Cineangiography, Bilateral Selective Coronary Angiography and/or Right Heart Catheterization; possible Percutaneous Transluminal Coronary Angioplasty, Coronary Atherectomy, Coronary Stent, Intracoronary Thrombolytic therapy, Antiplatelet therapy and/or Intravascular Ultrasound on [facility]   2.   I recognize that during the surgical operation/procedure, unforeseen conditions may necessitate additional or different procedures than those listed above.  I, therefore, further authorize and request that the above-named surgeon, assistants, or designees perform such procedures as are, in their judgment, necessary and desirable.    3.   My surgeon/physician has discussed prior to my surgery the potential benefits, risks and side effects of this procedure; the likelihood of achieving goals; and potential problems that might occur during recuperation.  They also discussed reasonable alternatives to the procedure, including risks, benefits, and side effects related to the alternatives and risks related to not receiving this procedure.  I have had all my questions answered and I acknowledge that no guarantee has been made as to the result that may be obtained.    4.   Should the need arise during my operation/procedure, which includes change of level of care prior to discharge, I also consent to the administration of blood and/or blood products.  Further, I understand that despite careful testing and screening of blood or blood products by collecting  agencies, I may still be subject to ill effects as a result of receiving a blood transfusion and/or blood products.  The following are some, but not all, of the potential risks that can occur: fever and allergic reactions, hemolytic reactions, transmission of diseases such as Hepatitis, AIDS and Cytomegalovirus (CMV) and fluid overload.  In the event that I wish to have an autologous transfusion of my own blood, or a directed donor transfusion, I will discuss this with my physician.  Check only if Refusing Blood or Blood Products  I understand refusal of blood or blood products as deemed necessary by my physician may have serious consequences to my condition to include possible death. I hereby assume responsibility for my refusal and release the hospital, its personnel, and my physicians from any responsibility for the consequences of my refusal.          o  Refuse      5.   I authorize the use of any specimen, organs, tissues, body parts or foreign objects that may be removed from my body during the operation/procedure for diagnosis, research or teaching purposes and their subsequent disposal by hospital authorities.  I also authorize the release of specimen test results and/or written reports to my treating physician on the hospital medical staff or other referring or consulting physicians involved in my care, at the discretion of the Pathologist or my treating physician.    6.   I consent to the photographing or videotaping of the operations or procedures to be performed, including appropriate portions of my body for medical, scientific, or educational purposes, provided my identity is not revealed by the pictures or by descriptive texts accompanying them.  If the procedure has been photographed/videotaped, the surgeon will obtain the original picture, image, videotape or CD.  The hospital will not be responsible for storage, release or maintenance of the picture, image, tape or CD.    7.   I consent to the  presence of a  or observers in the operating room as deemed necessary by my physician or their designees.    8.   I recognize that in the event my procedure results in extended X-Ray/fluoroscopy time, I may develop a skin reaction.    9. If I have a Do Not Attempt Resuscitation (DNAR) order in place, that status will be suspended while in the operating room, procedural suite, and during the recovery period unless otherwise explicitly stated by me (or a person authorized to consent on my behalf). The surgeon or my attending physician will determine when the applicable recovery period ends for purposes of reinstating the DNAR order.  10. Patients having a sterilization procedure: I understand that if the procedure is successful the results will be permanent and it will therefore be impossible for me to inseminate, conceive, or bear children.  I also understand that the procedure is intended to result in sterility, although the result has not been guaranteed.   11. I acknowledge that my physician has explained sedation/analgesia administration to me including the risk and benefits I consent to the administration of sedation/analgesia as may be necessary or desirable in the judgment of my physician.    I CERTIFY THAT I HAVE READ AND FULLY UNDERSTAND THE ABOVE CONSENT TO OPERATION and/or OTHER PROCEDURE.        ____________________________________       _________________________________      ______________________________  Signature of Patient         Signature of Responsible Person        Printed Name of Responsible Person    ____________________________________      _________________________________      ______________________________       Signature of Witness          Relationship to Patient                       Date                                       Time  Patient Name: Radha Yu  : 1940    Reviewed: 2024   Printed: May 27, 2025  Medical Record #: [id number] Page 1 of 2            STATEMENT OF PHYSICIAN My signature below affirms that prior to the time of the procedure; I have explained to the patient and/or his/her legal representative, the risks and benefits involved in the proposed treatment and any reasonable alternative to the proposed treatment. I have also explained the risks and benefits involved in refusal of the proposed treatment and alternatives to the proposed treatment and have answered the patient's questions. If I have a significant financial interest in a co-management agreement or a significant financial interest in any product or implant, or other significant relationship used in this procedure/surgery, I have disclosed this and had a discussion with my patient.     _______________________________________________________________ _____________________________  (Signature of Physician)                                                                                         (Date)                                   (Time)  Patient Name: Radha Yu  : 1940    Reviewed: 2024   Printed: May 27, 2025  Medical Record #: [id number] Page 2 of 2

## (undated) NOTE — LETTER
[facility]  Administration of Anesthesia  Radha LAMAR agree to be cared for by a physician anesthesiologist alone and/or with a nurse anesthetist, who is specially trained to monitor me and give me medicine to put me to sleep or keep me comfortable during my procedure    I understand that my anesthesiologist and/or anesthetist is not an employee or agent of [facility] or [employer]. He or she works for [facility]    As the patient asking for anesthesia services, I agree to:  Allow the anesthesiologist (anesthesia doctor) to give me medicine and do additional procedures as necessary. Some examples are: Starting or using an “IV” to give me medicine, fluids or blood during my procedure, and having a breathing tube placed to help me breathe when I’m asleep (intubation). In the event that my heart stops working properly, I understand that my anesthesiologist will make every effort to sustain my life, unless otherwise directed by [facility] Do Not Resuscitate documents.  Tell my anesthesia doctor before my procedure:  If I am pregnant.  The last time that I ate or drank.  iii. All of the medicines I take (including prescriptions, herbal supplements, and pills I can buy without a prescription (including street drugs/illegal medications). Failure to inform my anesthesiologist about these medicines may increase my risk of anesthetic complications.  iv.If I am allergic to anything or have had a reaction to anesthesia before.  I understand how the anesthesia medicine will help me (benefits).  I understand that with any type of anesthesia medicine there are risks:  The most common risks are: nausea, vomiting, sore throat, muscle soreness, damage to my eyes, mouth, or teeth (from breathing tube placement).  Rare risks include: remembering what happened during my procedure, allergic reactions to medications, injury to my airway, heart, lungs, vision, nerves, or muscles  and in extremely rare instances death.  My doctor has explained to me other choices available to me for my care (alternatives).  Pregnant Patients (“epidural”):  I understand that the risks of having an epidural (medicine given into my back to help control pain during labor), include itching, low blood pressure, difficulty urinating, headache or slowing of the baby’s heart. Very rare risks include infection, bleeding, seizure, irregular heart rhythms and nerve injury.  Regional Anesthesia (“spinal”, “epidural”, & “nerve blocks”):  I understand that rare but potential complications include headache, bleeding, infection, seizure, irregular heart rhythms, and nerve injury.    _____________________________________________________________________________  Patient (or Representative) Signature/Relationship to Patient  Date   Time    _____________________________________________________________________________   Name (if used)    Language/Organization   Time    _____________________________________________________________________________  Nurse Anesthetist Signature     Date   Time  _____________________________________________________________________________  Anesthesiologist Signature     Date   Time  I have discussed the procedure and information above with the patient (or patient’s representative) and answered their questions. The patient or their representative has agreed to have anesthesia services.    _____________________________________________________________________________  Witness        Date   Time  I have verified that the signature is that of the patient or patient’s representative, and that it was signed before the procedure  Patient Name: Radha Yu     : 1940                 Printed: 2025 at 11:23 AM    Medical Record #: [id number]                                            Page 1 of 1  ----------ANESTHESIA CONSENT----------

## (undated) NOTE — ED AVS SNAPSHOT
Jason Ferreira   MRN: [id number]    Department:  [facility] Emergency Department   Date of Visit:  8/4/2018           Disclosure     Insurance plans vary and the physician(s) referred by the ER may not be covered by your plan.  Please contact your within the next three months to obtain basic health screening including reassessment of your blood pressure.     IF THERE IS ANY CHANGE OR WORSENING OF YOUR CONDITION, CALL YOUR PRIMARY CARE PHYSICIAN AT ONCE OR RETURN IMMEDIATELY TO THE EMERGENCY DEPARTMEN

## (undated) NOTE — LETTER
[facility] 3W/SW  [address] [facility] [address]  [phone]    Blood Transfusion Consent    In the course of your treatment, it may become necessary to administer a transfusion of blood or blood components. This form provides basic information concerning this procedure and, if signed by you, authorizes its administration. By signing this form, you agree that all of your questions about the administration of blood or blood products have been answered by the ordering medical professional or designee.    Description of Procedure  Blood is introduced into one of your veins, commonly in the arm, using a sterilized disposable needle. The amount of blood transfused, and whether the transfusion will be of blood or blood components is a judgement the physician will make based on your particular needs.    Risks  The transfusion is a common procedure of low risk.  MINOR AND TEMPORARY REACTIONS ARE NOT UNCOMMON, including a slight bruise, swelling or local reaction in the area where the needle pierces your skin, or a nonserious reaction to the transfused material itself, including headache, fever or mild skin reaction, such as rash.  Serious reactions are possible, though very unlikely, and include severe allergic reaction (shock) and destruction (hemolysis) of transfused blood cells.  Infectious diseases which are known to be transmitted by blood transfusion include certain types of viral Hepatitis(liver infection from a virus), Human Immunodeficiency Virus (HIV-1,2) infection, a viral infection known to cause Acquired Immunodeficiency Syndrome (AIDS), as well as certain other bacterial, viral, and parasitic diseases. While a minimal risk of acquiring an infectious disease from transfused blood exists, in accordance with the Federal and State law, all due care has been taken in donor selection and testing to avoid transmission of disease.    Alternatives  If loss of blood poses serious threats during your  treatment, THERE IS NO EFFECTIVE ALTERNATIVE TO BLOOD TRANSFUSION. However, if you have any further questions on this matter, your provider will fully explain the alternatives to you if it has not already been done.    I, ______________________________, have read/had read to me the above. I understand the matters bearing on the decision whether or not to authorize a transfusion of blood or blood components. I have no questions which have not been answered to my full satisfaction. I hereby consent to such transfusion as my physician may deem necessary or advisable in the course of my treatment.    ______________________________________________                    ___________________________  (Signature of Patient or Responsible party in case of minor,                 (Printed Name of Patient or incompetent, or unconscious patient)              Responsible Party)    ___________________________               _____________________  (Relationship to Patient if not self)                                    (Date and Time)    __________________________                                                           ______________________              (Signature of Witness)               (Printed Name of Witness)     Language line ()    Telephone/Verbal/Video Consent    __________________________                     ____________________  (Signature of 2nd Witness           (Printed Name of 2nd  Telephone/Verbal/Video Consent)           Witness)    Patient Name: Radha Yu     : 1940                 Printed: May 23, 2025     Medical Record #: [id number]      Rev: 2023

## (undated) NOTE — LETTER
[facility]  [address], [place], [place]    Authorization for Surgical Operation and Procedure                               I hereby authorize Najjar, Samer F, MD, my physician and his/her assistants (if applicable), which may include medical students, residents, and/or fellows, to perform the following surgical operation/ procedure and administer such anesthesia as may be determined necessary by my physician: Operation/Procedure name (s) RIGHT FEMORAL POPLITEAL BYPASS on Radha Yu   2.   I recognize that during the surgical operation/procedure, unforeseen conditions may necessitate additional or different procedures than those listed above.  I, therefore, further authorize and request that the above-named surgeon, assistants, or designees perform such procedures as are, in their judgment, necessary and desirable.    3.   My surgeon/physician has discussed prior to my surgery the potential benefits, risks and side effects of this procedure; the likelihood of achieving goals; and potential problems that might occur during recuperation.  They also discussed reasonable alternatives to the procedure, including risks, benefits, and side effects related to the alternatives and risks related to not receiving this procedure.  I have had all my questions answered and I acknowledge that no guarantee has been made as to the result that may be obtained.    4.   Should the need arise during my operation/procedure, which includes change of level of care prior to discharge, I also consent to the administration of blood and/or blood products.  Further, I understand that despite careful testing and screening of blood or blood products by collecting agencies, I may still be subject to ill effects as a result of receiving a blood transfusion and/or blood products.  The following are some, but not all, of the potential risks that can occur: fever and allergic reactions, hemolytic reactions, transmission of  diseases such as Hepatitis, AIDS and Cytomegalovirus (CMV) and fluid overload.  In the event that I wish to have an autologous transfusion of my own blood, or a directed donor transfusion, I will discuss this with my physician.  Check only if Refusing Blood or Blood Products  I understand refusal of blood or blood products as deemed necessary by my physician may have serious consequences to my condition to include possible death. I hereby assume responsibility for my refusal and release the hospital, its personnel, and my physicians from any responsibility for the consequences of my refusal.    o  Refuse   5.   I authorize the use of any specimen, organs, tissues, body parts or foreign objects that may be removed from my body during the operation/procedure for diagnosis, research or teaching purposes and their subsequent disposal by hospital authorities.  I also authorize the release of specimen test results and/or written reports to my treating physician on the hospital medical staff or other referring or consulting physicians involved in my care, at the discretion of the Pathologist or my treating physician.    6.   I consent to the photographing or videotaping of the operations or procedures to be performed, including appropriate portions of my body for medical, scientific, or educational purposes, provided my identity is not revealed by the pictures or by descriptive texts accompanying them.  If the procedure has been photographed/videotaped, the surgeon will obtain the original picture, image, videotape or CD.  The hospital will not be responsible for storage, release or maintenance of the picture, image, tape or CD.    7.   I consent to the presence of a  or observers in the operating room as deemed necessary by my physician or their designees.    8.   I recognize that in the event my procedure results in extended X-Ray/fluoroscopy time, I may develop a skin reaction.    9. If I have a Do Not  Attempt Resuscitation (DNAR) order in place, that status will be suspended while in the operating room, procedural suite, and during the recovery period unless otherwise explicitly stated by me (or a person authorized to consent on my behalf). The surgeon or my attending physician will determine when the applicable recovery period ends for purposes of reinstating the DNAR order.  10. Patients having a sterilization procedure: I understand that if the procedure is successful the results will be permanent and it will therefore be impossible for me to inseminate, conceive, or bear children.  I also understand that the procedure is intended to result in sterility, although the result has not been guaranteed.   11. I acknowledge that my physician has explained sedation/analgesia administration to me including the risk and benefits I consent to the administration of sedation/analgesia as may be necessary or desirable in the judgment of my physician.    I CERTIFY THAT I HAVE READ AND FULLY UNDERSTAND THE ABOVE CONSENT TO OPERATION and/or OTHER PROCEDURE.     ____________________________________  _________________________________        ______________________________  Signature of Patient    Signature of Responsible Person                Printed Name of Responsible Person                                      ____________________________________  _____________________________                ________________________________  Signature of Witness        Date  Time         Relationship to Patient    STATEMENT OF PHYSICIAN My signature below affirms that prior to the time of the procedure; I have explained to the patient and/or his/her legal representative, the risks and benefits involved in the proposed treatment and any reasonable alternative to the proposed treatment. I have also explained the risks and benefits involved in refusal of the proposed treatment and alternatives to the proposed treatment and have answered the  patient's questions. If I have a significant financial interest in a co-management agreement or a significant financial interest in any product or implant, or other significant relationship used in this procedure/surgery, I have disclosed this and had a discussion with my patient.     _____________________________________________________              _____________________________  (Signature of Physician)                                                                                         (Date)                                   (Time)  Patient Name: Radha Yu      : 1940      Printed: 2025     Medical Record #: [id number]                                      Page 1 of 1

## (undated) NOTE — LETTER
[place] [address]  Authorization for Invasive Procedures  Date: 6/2/25           Time: 1600    I hereby authorize Dr. Chely Vines, my physician and his/her assistants (if applicable), which may include medical students, residents, and/or fellows, to perform the following surgical operation/ procedure and administer such anesthesia as may be determined necessary by my physician: Right common femoral endarterectomy, right lower extremity angiogram with intervention on Radha Yu  2.   I recognize that during the surgical operation/procedure, unforeseen conditions may necessitate additional or different procedures than those listed above.  I, therefore, further authorize and request that the above-named surgeon, assistants, or designees perform such procedures as are, in their judgment, necessary and desirable.    3.   My surgeon/physician has discussed prior to my surgery the potential benefits, risks and side effects of this procedure; the likelihood of achieving goals; and potential problems that might occur during recuperation.  They also discussed reasonable alternatives to the procedure, including risks, benefits, and side effects related to the alternatives and risks related to not receiving this procedure.  I have had all my questions answered and I acknowledge that no guarantee has been made as to the result that may be obtained.    4.   Should the need arise during my operation/procedure, which includes change of level of care prior to discharge, I also consent to the administration of blood and/or blood products.  Further, I understand that despite careful testing and screening of blood or blood products by collecting agencies, I may still be subject to ill effects as a result of receiving a blood transfusion and/or blood products.  The following are some, but not all, of the potential risks that can occur: fever and allergic reactions, hemolytic reactions, transmission of  diseases such as Hepatitis, AIDS and Cytomegalovirus (CMV) and fluid overload.  In the event that I wish to have an autologous transfusion of my own blood, or a directed donor transfusion, I will discuss this with my physician.   Check only if Refusing Blood or Blood Products  I understand refusal of blood or blood products as deemed necessary by my physician may have serious consequences to my condition to include possible death. I hereby assume responsibility for my refusal and release the hospital, its personnel, and my physicians from any responsibility for the consequences of my refusal.         o  Refuse         5.   I authorize the use of any specimen, organs, tissues, body parts or foreign objects that may be removed from my body during the operation/procedure for diagnosis, research or teaching purposes and their subsequent disposal by hospital authorities.  I also authorize the release of specimen test results and/or written reports to my treating physician on the hospital medical staff or other referring or consulting physicians involved in my care, at the discretion of the Pathologist or my treating physician.    6.   I consent to the photographing or videotaping of the operations or procedures to be performed, including appropriate portions of my body for medical, scientific, or educational purposes, provided my identity is not revealed by the pictures or by descriptive texts accompanying them.  If the procedure has been photographed/videotaped, the surgeon will obtain the original picture, image, videotape or CD.  The hospital will not be responsible for storage, release or maintenance of the picture, image, tape or CD.    7.   I consent to the presence of a  or observers in the operating room as deemed necessary by my physician or their designees.    8.   I recognize that in the event my procedure results in extended X-Ray/fluoroscopy time, I may develop a skin reaction.    9. If I  have a Do Not Attempt Resuscitation (DNAR) order in place, that status will be suspended while in the operating room, procedural suite, and during the recovery period unless otherwise explicitly stated by me (or a person authorized to consent on my behalf). The surgeon or my attending physician will determine when the applicable recovery period ends for purposes of reinstating the DNAR order.  10. Patients having a sterilization procedure: I understand that if the procedure is successful the results will be permanent and it will therefore be impossible for me to inseminate, conceive, or bear children.  I also understand that the procedure is intended to result in sterility, although the result has not been guaranteed.   11. I acknowledge that my physician has explained sedation/analgesia administration to me including the risk and benefits I consent to the administration of sedation/analgesia as may be necessary or desirable in the judgment of my physician.    I CERTIFY THAT I HAVE READ AND FULLY UNDERSTAND THE ABOVE CONSENT TO OPERATION and/or OTHER PROCEDURE.        ____________________________________       _________________________________      ______________________________  Signature of Patient         Signature of Responsible Person        Printed Name of Responsible Person        ____________________________________      _________________________________      ______________________________       Signature of Witness          Relationship to Patient                       Date                                       Time  Patient Name: Radha Yu  : 1940    Reviewed: 2024   Printed: 2025  Medical Record #: [id number] Page 1 of 2             STATEMENT OF PHYSICIAN My signature below affirms that prior to the time of the procedure; I have explained to the patient and/or his/her legal representative, the risks and benefits involved in the proposed treatment and any reasonable alternative to  the proposed treatment. I have also explained the risks and benefits involved in refusal of the proposed treatment and alternatives to the proposed treatment and have answered the patient's questions. If I have a significant financial interest in a co-management agreement or a significant financial interest in any product or implant, or other significant relationship used in this procedure/surgery, I have disclosed this and had a discussion with my patient.     _______________________________________________________________ _____________________________  (Signature of Physician)                                                                                         (Date)                                   (Time)  Patient Name: Radha Yu  : 1940    Reviewed: 2024   Printed: 2025  Medical Record #: [id number] Page 2 of 2

## (undated) NOTE — Clinical Note
[facility]  [address], [place], [place]    Authorization for Surgical Operation and Procedure                               1. I hereby authorize Najjar, Samer F, MD, my physician and his/her assistants (if applicable), which may include medical students, residents, and/or fellows, to perform the following surgical operation/ procedure and administer such anesthesia as may be determined necessary by my physician: Operation/Procedure name (s) RIGHT FEMORAL POPLITEAL BYPASS on Radha Yu   2.   I recognize that during the surgical operation/procedure, unforeseen conditions may necessitate additional or different procedures than those listed above.  I, therefore, further authorize and request that the above-named surgeon, assistants, or designees perform such procedures as are, in their judgment, necessary and desirable.    3.   My surgeon/physician has discussed prior to my surgery the potential benefits, risks and side effects of this procedure; the likelihood of achieving goals; and potential problems that might occur during recuperation.  They also discussed reasonable alternatives to the procedure, including risks, benefits, and side effects related to the alternatives and risks related to not receiving this procedure.  I have had all my questions answered and I acknowledge that no guarantee has been made as to the result that may be obtained.    4.   Should the need arise during my operation/procedure, which includes change of level of care prior to discharge, I also consent to the administration of blood and/or blood products.  Further, I understand that despite careful testing and screening of blood or blood products by collecting agencies, I may still be subject to ill effects as a result of receiving a blood transfusion and/or blood products.  The following are some, but not all, of the potential risks that can occur: fever and allergic reactions, hemolytic reactions, transmission of  diseases such as Hepatitis, AIDS and Cytomegalovirus (CMV) and fluid overload.  In the event that I wish to have an autologous transfusion of my own blood, or a directed donor transfusion, I will discuss this with my physician.  Check only if Refusing Blood or Blood Products  I understand refusal of blood or blood products as deemed necessary by my physician may have serious consequences to my condition to include possible death. I hereby assume responsibility for my refusal and release the hospital, its personnel, and my physicians from any responsibility for the consequences of my refusal.    o  Refuse   5.   I authorize the use of any specimen, organs, tissues, body parts or foreign objects that may be removed from my body during the operation/procedure for diagnosis, research or teaching purposes and their subsequent disposal by hospital authorities.  I also authorize the release of specimen test results and/or written reports to my treating physician on the hospital medical staff or other referring or consulting physicians involved in my care, at the discretion of the Pathologist or my treating physician.    6.   I consent to the photographing or videotaping of the operations or procedures to be performed, including appropriate portions of my body for medical, scientific, or educational purposes, provided my identity is not revealed by the pictures or by descriptive texts accompanying them.  If the procedure has been photographed/videotaped, the surgeon will obtain the original picture, image, videotape or CD.  The hospital will not be responsible for storage, release or maintenance of the picture, image, tape or CD.    7.   I consent to the presence of a  or observers in the operating room as deemed necessary by my physician or their designees.    8.   I recognize that in the event my procedure results in extended X-Ray/fluoroscopy time, I may develop a skin reaction.    9. If I have a Do Not  Attempt Resuscitation (DNAR) order in place, that status will be suspended while in the operating room, procedural suite, and during the recovery period unless otherwise explicitly stated by me (or a person authorized to consent on my behalf). The surgeon or my attending physician will determine when the applicable recovery period ends for purposes of reinstating the DNAR order.  10. Patients having a sterilization procedure: I understand that if the procedure is successful the results will be permanent and it will therefore be impossible for me to inseminate, conceive, or bear children.  I also understand that the procedure is intended to result in sterility, although the result has not been guaranteed.   11. I acknowledge that my physician has explained sedation/analgesia administration to me including the risk and benefits I consent to the administration of sedation/analgesia as may be necessary or desirable in the judgment of my physician.    I CERTIFY THAT I HAVE READ AND FULLY UNDERSTAND THE ABOVE CONSENT TO OPERATION and/or OTHER PROCEDURE.     ____________________________________  _________________________________        ______________________________  Signature of Patient    Signature of Responsible Person                Printed Name of Responsible Person                                      ____________________________________  _____________________________                ________________________________  Signature of Witness        Date  Time         Relationship to Patient    STATEMENT OF PHYSICIAN My signature below affirms that prior to the time of the procedure; I have explained to the patient and/or his/her legal representative, the risks and benefits involved in the proposed treatment and any reasonable alternative to the proposed treatment. I have also explained the risks and benefits involved in refusal of the proposed treatment and alternatives to the proposed treatment and have answered the  patient's questions. If I have a significant financial interest in a co-management agreement or a significant financial interest in any product or implant, or other significant relationship used in this procedure/surgery, I have disclosed this and had a discussion with my patient.     _____________________________________________________              _____________________________  (Signature of Physician)                                                                                         (Date)                                   (Time)  Patient Name: Radha Yu      : 1940      Printed: 2025     Medical Record #: [id number]                                      Page 1 of 1